# Patient Record
Sex: MALE | Race: WHITE | NOT HISPANIC OR LATINO | Employment: UNEMPLOYED | URBAN - METROPOLITAN AREA
[De-identification: names, ages, dates, MRNs, and addresses within clinical notes are randomized per-mention and may not be internally consistent; named-entity substitution may affect disease eponyms.]

---

## 2017-01-05 ENCOUNTER — COMMUNICATION - HEALTHEAST (OUTPATIENT)
Dept: BEHAVIORAL HEALTH | Facility: CLINIC | Age: 32
End: 2017-01-05

## 2017-01-05 DIAGNOSIS — F90.0 ATTENTION DEFICIT HYPERACTIVITY DISORDER (ADHD), INATTENTIVE TYPE, MODERATE: ICD-10-CM

## 2017-01-09 ENCOUNTER — COMMUNICATION - HEALTHEAST (OUTPATIENT)
Dept: BEHAVIORAL HEALTH | Facility: CLINIC | Age: 32
End: 2017-01-09

## 2017-01-12 ENCOUNTER — COMMUNICATION - HEALTHEAST (OUTPATIENT)
Dept: BEHAVIORAL HEALTH | Facility: CLINIC | Age: 32
End: 2017-01-12

## 2017-01-13 ENCOUNTER — COMMUNICATION - HEALTHEAST (OUTPATIENT)
Dept: BEHAVIORAL HEALTH | Facility: CLINIC | Age: 32
End: 2017-01-13

## 2017-01-13 DIAGNOSIS — F11.21 OPIOID USE DISORDER, SEVERE, IN EARLY REMISSION, ON MAINTENANCE THERAPY, DEPENDENCE (H): ICD-10-CM

## 2017-01-26 ENCOUNTER — OFFICE VISIT - HEALTHEAST (OUTPATIENT)
Dept: BEHAVIORAL HEALTH | Facility: CLINIC | Age: 32
End: 2017-01-26

## 2017-01-26 DIAGNOSIS — F11.21 OPIOID USE DISORDER, SEVERE, IN EARLY REMISSION, ON MAINTENANCE THERAPY, DEPENDENCE (H): ICD-10-CM

## 2017-01-26 ASSESSMENT — MIFFLIN-ST. JEOR: SCORE: 1891.08

## 2017-01-30 ENCOUNTER — COMMUNICATION - HEALTHEAST (OUTPATIENT)
Dept: BEHAVIORAL HEALTH | Facility: CLINIC | Age: 32
End: 2017-01-30

## 2017-01-30 DIAGNOSIS — F11.20 OPIOID USE DISORDER, SEVERE, ON MAINTENANCE THERAPY (H): ICD-10-CM

## 2017-02-01 ENCOUNTER — COMMUNICATION - HEALTHEAST (OUTPATIENT)
Dept: BEHAVIORAL HEALTH | Facility: CLINIC | Age: 32
End: 2017-02-01

## 2017-02-01 DIAGNOSIS — F11.20 OPIOID USE DISORDER, SEVERE, ON MAINTENANCE THERAPY (H): ICD-10-CM

## 2017-02-01 DIAGNOSIS — F90.0 ATTENTION DEFICIT HYPERACTIVITY DISORDER (ADHD), INATTENTIVE TYPE, MODERATE: ICD-10-CM

## 2017-02-07 ENCOUNTER — AMBULATORY - HEALTHEAST (OUTPATIENT)
Dept: BEHAVIORAL HEALTH | Facility: CLINIC | Age: 32
End: 2017-02-07

## 2017-02-07 DIAGNOSIS — F90.0 ATTENTION DEFICIT HYPERACTIVITY DISORDER (ADHD), INATTENTIVE TYPE, MODERATE: ICD-10-CM

## 2017-02-17 ENCOUNTER — OFFICE VISIT - HEALTHEAST (OUTPATIENT)
Dept: BEHAVIORAL HEALTH | Facility: CLINIC | Age: 32
End: 2017-02-17

## 2017-02-17 DIAGNOSIS — F11.21 OPIOID USE DISORDER, SEVERE, IN EARLY REMISSION, ON MAINTENANCE THERAPY, DEPENDENCE (H): ICD-10-CM

## 2017-02-17 ASSESSMENT — MIFFLIN-ST. JEOR: SCORE: 1872.93

## 2017-03-02 ENCOUNTER — COMMUNICATION - HEALTHEAST (OUTPATIENT)
Dept: BEHAVIORAL HEALTH | Facility: CLINIC | Age: 32
End: 2017-03-02

## 2017-03-02 DIAGNOSIS — F90.0 ATTENTION DEFICIT HYPERACTIVITY DISORDER (ADHD), INATTENTIVE TYPE, MODERATE: ICD-10-CM

## 2017-03-02 DIAGNOSIS — F11.21 OPIOID USE DISORDER, SEVERE, IN EARLY REMISSION, ON MAINTENANCE THERAPY, DEPENDENCE (H): ICD-10-CM

## 2017-03-23 ENCOUNTER — COMMUNICATION - HEALTHEAST (OUTPATIENT)
Dept: BEHAVIORAL HEALTH | Facility: CLINIC | Age: 32
End: 2017-03-23

## 2017-03-23 ENCOUNTER — OFFICE VISIT - HEALTHEAST (OUTPATIENT)
Dept: BEHAVIORAL HEALTH | Facility: CLINIC | Age: 32
End: 2017-03-23

## 2017-03-23 DIAGNOSIS — F90.0 ATTENTION DEFICIT HYPERACTIVITY DISORDER (ADHD), INATTENTIVE TYPE, MODERATE: ICD-10-CM

## 2017-03-23 DIAGNOSIS — F11.21 OPIOID USE DISORDER, SEVERE, IN EARLY REMISSION, ON MAINTENANCE THERAPY, DEPENDENCE (H): ICD-10-CM

## 2017-03-23 DIAGNOSIS — F11.20 OPIOID USE DISORDER, SEVERE, ON MAINTENANCE THERAPY (H): ICD-10-CM

## 2017-03-23 DIAGNOSIS — F43.22 ADJUSTMENT DISORDER WITH ANXIETY: ICD-10-CM

## 2017-03-23 ASSESSMENT — MIFFLIN-ST. JEOR: SCORE: 1872.93

## 2017-03-24 ENCOUNTER — RECORDS - HEALTHEAST (OUTPATIENT)
Dept: ADMINISTRATIVE | Facility: OTHER | Age: 32
End: 2017-03-24

## 2017-04-20 ENCOUNTER — OFFICE VISIT - HEALTHEAST (OUTPATIENT)
Dept: BEHAVIORAL HEALTH | Facility: CLINIC | Age: 32
End: 2017-04-20

## 2017-04-20 DIAGNOSIS — F90.0 ATTENTION DEFICIT HYPERACTIVITY DISORDER (ADHD), INATTENTIVE TYPE, MODERATE: ICD-10-CM

## 2017-04-20 DIAGNOSIS — F11.21 OPIOID USE DISORDER, SEVERE, IN EARLY REMISSION, ON MAINTENANCE THERAPY, DEPENDENCE (H): ICD-10-CM

## 2017-04-20 ASSESSMENT — MIFFLIN-ST. JEOR: SCORE: 1900.15

## 2017-05-18 ENCOUNTER — OFFICE VISIT - HEALTHEAST (OUTPATIENT)
Dept: BEHAVIORAL HEALTH | Facility: CLINIC | Age: 32
End: 2017-05-18

## 2017-05-18 DIAGNOSIS — F43.22 ADJUSTMENT DISORDER WITH ANXIETY: ICD-10-CM

## 2017-05-18 DIAGNOSIS — F11.21 OPIOID USE DISORDER, SEVERE, IN EARLY REMISSION, ON MAINTENANCE THERAPY, DEPENDENCE (H): ICD-10-CM

## 2017-05-18 DIAGNOSIS — F90.0 ATTENTION DEFICIT HYPERACTIVITY DISORDER (ADHD), INATTENTIVE TYPE, MODERATE: ICD-10-CM

## 2017-05-18 ASSESSMENT — MIFFLIN-ST. JEOR: SCORE: 1859.32

## 2017-06-16 ENCOUNTER — OFFICE VISIT - HEALTHEAST (OUTPATIENT)
Dept: BEHAVIORAL HEALTH | Facility: CLINIC | Age: 32
End: 2017-06-16

## 2017-06-16 DIAGNOSIS — F11.21 OPIOID USE DISORDER, SEVERE, IN EARLY REMISSION, ON MAINTENANCE THERAPY, DEPENDENCE (H): ICD-10-CM

## 2017-06-16 DIAGNOSIS — F90.0 ATTENTION DEFICIT HYPERACTIVITY DISORDER (ADHD), INATTENTIVE TYPE, MODERATE: ICD-10-CM

## 2017-06-16 ASSESSMENT — MIFFLIN-ST. JEOR: SCORE: 1850.25

## 2017-07-13 ENCOUNTER — OFFICE VISIT - HEALTHEAST (OUTPATIENT)
Dept: BEHAVIORAL HEALTH | Facility: CLINIC | Age: 32
End: 2017-07-13

## 2017-07-13 DIAGNOSIS — F11.21 OPIOID USE DISORDER, SEVERE, IN EARLY REMISSION, ON MAINTENANCE THERAPY, DEPENDENCE (H): ICD-10-CM

## 2017-07-13 DIAGNOSIS — F90.0 ATTENTION DEFICIT HYPERACTIVITY DISORDER (ADHD), INATTENTIVE TYPE, MODERATE: ICD-10-CM

## 2017-07-13 ASSESSMENT — MIFFLIN-ST. JEOR: SCORE: 1832.11

## 2017-07-21 ENCOUNTER — OFFICE VISIT - HEALTHEAST (OUTPATIENT)
Dept: BEHAVIORAL HEALTH | Facility: CLINIC | Age: 32
End: 2017-07-21

## 2017-07-21 DIAGNOSIS — F11.21 OPIOID USE DISORDER, SEVERE, IN EARLY REMISSION, ON MAINTENANCE THERAPY, DEPENDENCE (H): ICD-10-CM

## 2017-07-21 ASSESSMENT — MIFFLIN-ST. JEOR: SCORE: 1841.18

## 2017-08-10 ENCOUNTER — OFFICE VISIT - HEALTHEAST (OUTPATIENT)
Dept: BEHAVIORAL HEALTH | Facility: CLINIC | Age: 32
End: 2017-08-10

## 2017-08-10 DIAGNOSIS — F43.22 ADJUSTMENT DISORDER WITH ANXIETY: ICD-10-CM

## 2017-08-10 DIAGNOSIS — F90.0 ATTENTION DEFICIT HYPERACTIVITY DISORDER (ADHD), INATTENTIVE TYPE, MODERATE: ICD-10-CM

## 2017-08-10 DIAGNOSIS — F11.21 OPIOID USE DISORDER, SEVERE, IN EARLY REMISSION, ON MAINTENANCE THERAPY, DEPENDENCE (H): ICD-10-CM

## 2017-08-10 ASSESSMENT — MIFFLIN-ST. JEOR: SCORE: 1841.18

## 2017-09-07 ENCOUNTER — OFFICE VISIT - HEALTHEAST (OUTPATIENT)
Dept: BEHAVIORAL HEALTH | Facility: CLINIC | Age: 32
End: 2017-09-07

## 2017-09-07 DIAGNOSIS — F11.21 OPIOID USE DISORDER, SEVERE, IN EARLY REMISSION, ON MAINTENANCE THERAPY, DEPENDENCE (H): ICD-10-CM

## 2017-09-07 DIAGNOSIS — F90.0 ATTENTION DEFICIT HYPERACTIVITY DISORDER (ADHD), INATTENTIVE TYPE, MODERATE: ICD-10-CM

## 2017-09-07 ASSESSMENT — MIFFLIN-ST. JEOR: SCORE: 1804.89

## 2017-10-05 ENCOUNTER — OFFICE VISIT - HEALTHEAST (OUTPATIENT)
Dept: BEHAVIORAL HEALTH | Facility: CLINIC | Age: 32
End: 2017-10-05

## 2017-10-05 DIAGNOSIS — F90.0 ATTENTION DEFICIT HYPERACTIVITY DISORDER (ADHD), INATTENTIVE TYPE, MODERATE: ICD-10-CM

## 2017-10-05 DIAGNOSIS — F11.21 OPIOID USE DISORDER, SEVERE, IN EARLY REMISSION, ON MAINTENANCE THERAPY, DEPENDENCE (H): ICD-10-CM

## 2017-10-05 ASSESSMENT — MIFFLIN-ST. JEOR: SCORE: 1795.82

## 2017-10-06 ENCOUNTER — COMMUNICATION - HEALTHEAST (OUTPATIENT)
Dept: BEHAVIORAL HEALTH | Facility: CLINIC | Age: 32
End: 2017-10-06

## 2017-10-06 DIAGNOSIS — F90.0 ATTENTION DEFICIT HYPERACTIVITY DISORDER (ADHD), INATTENTIVE TYPE, MODERATE: ICD-10-CM

## 2017-10-19 ENCOUNTER — OFFICE VISIT - HEALTHEAST (OUTPATIENT)
Dept: BEHAVIORAL HEALTH | Facility: CLINIC | Age: 32
End: 2017-10-19

## 2017-10-19 ENCOUNTER — COMMUNICATION - HEALTHEAST (OUTPATIENT)
Dept: BEHAVIORAL HEALTH | Facility: CLINIC | Age: 32
End: 2017-10-19

## 2017-10-19 DIAGNOSIS — F90.0 ATTENTION DEFICIT HYPERACTIVITY DISORDER (ADHD), INATTENTIVE TYPE, MODERATE: ICD-10-CM

## 2017-10-20 ENCOUNTER — COMMUNICATION - HEALTHEAST (OUTPATIENT)
Dept: BEHAVIORAL HEALTH | Facility: CLINIC | Age: 32
End: 2017-10-20

## 2017-11-02 ENCOUNTER — OFFICE VISIT - HEALTHEAST (OUTPATIENT)
Dept: BEHAVIORAL HEALTH | Facility: CLINIC | Age: 32
End: 2017-11-02

## 2017-11-02 DIAGNOSIS — F11.21 OPIOID USE DISORDER, SEVERE, IN EARLY REMISSION, ON MAINTENANCE THERAPY, DEPENDENCE (H): ICD-10-CM

## 2017-11-02 DIAGNOSIS — F43.22 ADJUSTMENT DISORDER WITH ANXIETY: ICD-10-CM

## 2017-11-02 DIAGNOSIS — F90.0 ATTENTION DEFICIT HYPERACTIVITY DISORDER (ADHD), INATTENTIVE TYPE, MODERATE: ICD-10-CM

## 2017-11-02 ASSESSMENT — MIFFLIN-ST. JEOR: SCORE: 1804.89

## 2017-11-03 ENCOUNTER — COMMUNICATION - HEALTHEAST (OUTPATIENT)
Dept: BEHAVIORAL HEALTH | Facility: CLINIC | Age: 32
End: 2017-11-03

## 2017-12-01 ENCOUNTER — OFFICE VISIT - HEALTHEAST (OUTPATIENT)
Dept: BEHAVIORAL HEALTH | Facility: CLINIC | Age: 32
End: 2017-12-01

## 2017-12-01 ENCOUNTER — AMBULATORY - HEALTHEAST (OUTPATIENT)
Dept: BEHAVIORAL HEALTH | Facility: CLINIC | Age: 32
End: 2017-12-01

## 2017-12-01 DIAGNOSIS — F90.0 ATTENTION DEFICIT HYPERACTIVITY DISORDER (ADHD), INATTENTIVE TYPE, MODERATE: ICD-10-CM

## 2017-12-01 DIAGNOSIS — F11.21 OPIOID USE DISORDER, SEVERE, IN EARLY REMISSION, ON MAINTENANCE THERAPY, DEPENDENCE (H): ICD-10-CM

## 2017-12-01 ASSESSMENT — MIFFLIN-ST. JEOR: SCORE: 1791.28

## 2017-12-28 ENCOUNTER — OFFICE VISIT - HEALTHEAST (OUTPATIENT)
Dept: BEHAVIORAL HEALTH | Facility: CLINIC | Age: 32
End: 2017-12-28

## 2017-12-28 DIAGNOSIS — F11.21 OPIOID USE DISORDER, SEVERE, IN EARLY REMISSION, ON MAINTENANCE THERAPY, DEPENDENCE (H): ICD-10-CM

## 2017-12-28 DIAGNOSIS — F90.0 ATTENTION DEFICIT HYPERACTIVITY DISORDER (ADHD), INATTENTIVE TYPE, MODERATE: ICD-10-CM

## 2017-12-28 DIAGNOSIS — F43.22 ADJUSTMENT DISORDER WITH ANXIETY: ICD-10-CM

## 2017-12-28 ASSESSMENT — MIFFLIN-ST. JEOR: SCORE: 1786.75

## 2018-01-25 ENCOUNTER — OFFICE VISIT - HEALTHEAST (OUTPATIENT)
Dept: BEHAVIORAL HEALTH | Facility: CLINIC | Age: 33
End: 2018-01-25

## 2018-01-25 DIAGNOSIS — F11.21 OPIOID USE DISORDER, SEVERE, IN EARLY REMISSION, ON MAINTENANCE THERAPY, DEPENDENCE (H): ICD-10-CM

## 2018-01-25 DIAGNOSIS — F11.20 OPIOID USE DISORDER, SEVERE, ON MAINTENANCE THERAPY (H): ICD-10-CM

## 2018-01-25 DIAGNOSIS — F90.0 ATTENTION DEFICIT HYPERACTIVITY DISORDER (ADHD), INATTENTIVE TYPE, MODERATE: ICD-10-CM

## 2018-01-25 LAB
AMPHETAMINES UR QL SCN: ABNORMAL
BARBITURATES UR QL: ABNORMAL
BENZODIAZ UR QL: ABNORMAL
CANNABINOIDS UR QL SCN: ABNORMAL
COCAINE UR QL: ABNORMAL
CREAT UR-MCNC: 203.1 MG/DL
METHADONE UR QL SCN: ABNORMAL
OPIATES UR QL SCN: ABNORMAL
OXYCODONE UR QL: ABNORMAL
PCP UR QL SCN: ABNORMAL

## 2018-01-25 ASSESSMENT — MIFFLIN-ST. JEOR: SCORE: 1764.07

## 2018-02-22 ENCOUNTER — OFFICE VISIT - HEALTHEAST (OUTPATIENT)
Dept: BEHAVIORAL HEALTH | Facility: CLINIC | Age: 33
End: 2018-02-22

## 2018-02-22 DIAGNOSIS — F11.21 OPIOID USE DISORDER, SEVERE, IN EARLY REMISSION, ON MAINTENANCE THERAPY, DEPENDENCE (H): ICD-10-CM

## 2018-02-22 DIAGNOSIS — F90.0 ATTENTION DEFICIT HYPERACTIVITY DISORDER (ADHD), INATTENTIVE TYPE, MODERATE: ICD-10-CM

## 2018-02-22 LAB
AMPHETAMINES UR QL SCN: ABNORMAL
BARBITURATES UR QL: ABNORMAL
BENZODIAZ UR QL: ABNORMAL
BUPRENORPHINE QUAL URINE LHE: ABNORMAL
CANNABINOIDS UR QL SCN: ABNORMAL
COCAINE UR QL: ABNORMAL
CREAT UR-MCNC: 106.4 MG/DL
CREAT UR-MCNC: 107 MG/DL
METHADONE UR QL SCN: ABNORMAL
OPIATES UR QL SCN: ABNORMAL
OXYCODONE UR QL: ABNORMAL
PCP UR QL SCN: ABNORMAL

## 2018-02-22 ASSESSMENT — MIFFLIN-ST. JEOR: SCORE: 1800.36

## 2018-03-12 ENCOUNTER — COMMUNICATION - HEALTHEAST (OUTPATIENT)
Dept: BEHAVIORAL HEALTH | Facility: CLINIC | Age: 33
End: 2018-03-12

## 2018-03-12 DIAGNOSIS — F11.20 OPIOID USE DISORDER, SEVERE, ON MAINTENANCE THERAPY (H): ICD-10-CM

## 2018-03-22 ENCOUNTER — OFFICE VISIT - HEALTHEAST (OUTPATIENT)
Dept: BEHAVIORAL HEALTH | Facility: CLINIC | Age: 33
End: 2018-03-22

## 2018-03-22 DIAGNOSIS — F90.0 ATTENTION DEFICIT HYPERACTIVITY DISORDER (ADHD), INATTENTIVE TYPE, MODERATE: ICD-10-CM

## 2018-03-22 DIAGNOSIS — F11.21 OPIOID USE DISORDER, SEVERE, IN EARLY REMISSION, ON MAINTENANCE THERAPY, DEPENDENCE (H): ICD-10-CM

## 2018-03-22 DIAGNOSIS — F43.22 ADJUSTMENT DISORDER WITH ANXIETY: ICD-10-CM

## 2018-03-22 LAB
AMPHETAMINES UR QL SCN: ABNORMAL
BARBITURATES UR QL: ABNORMAL
BENZODIAZ UR QL: ABNORMAL
CANNABINOIDS UR QL SCN: ABNORMAL
COCAINE UR QL: ABNORMAL
CREAT UR-MCNC: 134.3 MG/DL
METHADONE UR QL SCN: ABNORMAL
OPIATES UR QL SCN: ABNORMAL
OXYCODONE UR QL: ABNORMAL
PCP UR QL SCN: ABNORMAL

## 2018-03-22 ASSESSMENT — MIFFLIN-ST. JEOR: SCORE: 1791.28

## 2018-04-19 ENCOUNTER — OFFICE VISIT - HEALTHEAST (OUTPATIENT)
Dept: BEHAVIORAL HEALTH | Facility: CLINIC | Age: 33
End: 2018-04-19

## 2018-04-19 DIAGNOSIS — F90.0 ATTENTION DEFICIT HYPERACTIVITY DISORDER (ADHD), INATTENTIVE TYPE, MODERATE: ICD-10-CM

## 2018-04-19 DIAGNOSIS — F11.21 OPIOID USE DISORDER, SEVERE, IN EARLY REMISSION, ON MAINTENANCE THERAPY, DEPENDENCE (H): ICD-10-CM

## 2018-04-19 LAB
AMPHETAMINES UR QL SCN: ABNORMAL
BARBITURATES UR QL: ABNORMAL
BENZODIAZ UR QL: ABNORMAL
CANNABINOIDS UR QL SCN: ABNORMAL
COCAINE UR QL: ABNORMAL
CREAT UR-MCNC: 251.6 MG/DL
METHADONE UR QL SCN: ABNORMAL
OPIATES UR QL SCN: ABNORMAL
OXYCODONE UR QL: ABNORMAL
PCP UR QL SCN: ABNORMAL

## 2018-04-19 ASSESSMENT — MIFFLIN-ST. JEOR: SCORE: 1795.82

## 2018-05-02 ENCOUNTER — COMMUNICATION - HEALTHEAST (OUTPATIENT)
Dept: BEHAVIORAL HEALTH | Facility: CLINIC | Age: 33
End: 2018-05-02

## 2018-05-17 ENCOUNTER — COMMUNICATION - HEALTHEAST (OUTPATIENT)
Dept: BEHAVIORAL HEALTH | Facility: CLINIC | Age: 33
End: 2018-05-17

## 2018-05-17 DIAGNOSIS — F90.0 ATTENTION DEFICIT HYPERACTIVITY DISORDER (ADHD), INATTENTIVE TYPE, MODERATE: ICD-10-CM

## 2018-05-17 DIAGNOSIS — F11.21 OPIOID USE DISORDER, SEVERE, IN EARLY REMISSION, ON MAINTENANCE THERAPY, DEPENDENCE (H): ICD-10-CM

## 2018-05-17 DIAGNOSIS — F11.20 OPIOID USE DISORDER, SEVERE, ON MAINTENANCE THERAPY (H): ICD-10-CM

## 2018-05-23 ENCOUNTER — OFFICE VISIT - HEALTHEAST (OUTPATIENT)
Dept: BEHAVIORAL HEALTH | Facility: CLINIC | Age: 33
End: 2018-05-23

## 2018-05-23 DIAGNOSIS — F11.21 OPIOID USE DISORDER, SEVERE, IN EARLY REMISSION, ON MAINTENANCE THERAPY, DEPENDENCE (H): ICD-10-CM

## 2018-05-23 LAB
AMPHETAMINES UR QL SCN: ABNORMAL
BARBITURATES UR QL: ABNORMAL
BENZODIAZ UR QL: ABNORMAL
BUPRENORPHINE QUAL URINE LHE: ABNORMAL
CANNABINOIDS UR QL SCN: ABNORMAL
COCAINE UR QL: ABNORMAL
CREAT UR-MCNC: 368.6 MG/DL
CREAT UR-MCNC: 369.9 MG/DL
METHADONE UR QL SCN: ABNORMAL
OPIATES UR QL SCN: ABNORMAL
OXYCODONE UR QL: ABNORMAL
PCP UR QL SCN: ABNORMAL

## 2018-05-23 ASSESSMENT — MIFFLIN-ST. JEOR: SCORE: 1773.14

## 2018-06-13 ENCOUNTER — COMMUNICATION - HEALTHEAST (OUTPATIENT)
Dept: BEHAVIORAL HEALTH | Facility: CLINIC | Age: 33
End: 2018-06-13

## 2018-06-13 DIAGNOSIS — F90.0 ATTENTION DEFICIT HYPERACTIVITY DISORDER (ADHD), INATTENTIVE TYPE, MODERATE: ICD-10-CM

## 2018-06-13 DIAGNOSIS — F11.21 OPIOID USE DISORDER, SEVERE, IN EARLY REMISSION, ON MAINTENANCE THERAPY, DEPENDENCE (H): ICD-10-CM

## 2018-06-22 ENCOUNTER — OFFICE VISIT - HEALTHEAST (OUTPATIENT)
Dept: BEHAVIORAL HEALTH | Facility: CLINIC | Age: 33
End: 2018-06-22

## 2018-06-22 DIAGNOSIS — F11.21 OPIOID USE DISORDER, SEVERE, IN EARLY REMISSION, ON MAINTENANCE THERAPY, DEPENDENCE (H): ICD-10-CM

## 2018-06-22 DIAGNOSIS — F90.0 ATTENTION DEFICIT HYPERACTIVITY DISORDER (ADHD), INATTENTIVE TYPE, MODERATE: ICD-10-CM

## 2018-06-27 ENCOUNTER — COMMUNICATION - HEALTHEAST (OUTPATIENT)
Dept: BEHAVIORAL HEALTH | Facility: CLINIC | Age: 33
End: 2018-06-27

## 2018-06-27 DIAGNOSIS — F11.21 OPIOID USE DISORDER, SEVERE, IN EARLY REMISSION, ON MAINTENANCE THERAPY, DEPENDENCE (H): ICD-10-CM

## 2018-06-27 DIAGNOSIS — F90.0 ATTENTION DEFICIT HYPERACTIVITY DISORDER (ADHD), INATTENTIVE TYPE, MODERATE: ICD-10-CM

## 2018-06-28 ENCOUNTER — COMMUNICATION - HEALTHEAST (OUTPATIENT)
Dept: BEHAVIORAL HEALTH | Facility: CLINIC | Age: 33
End: 2018-06-28

## 2018-06-28 DIAGNOSIS — F11.21 OPIOID USE DISORDER, SEVERE, IN EARLY REMISSION, ON MAINTENANCE THERAPY, DEPENDENCE (H): ICD-10-CM

## 2018-07-26 ENCOUNTER — AMBULATORY - HEALTHEAST (OUTPATIENT)
Dept: BEHAVIORAL HEALTH | Facility: CLINIC | Age: 33
End: 2018-07-26

## 2018-07-26 DIAGNOSIS — F11.21 OPIOID USE DISORDER, SEVERE, IN EARLY REMISSION, ON MAINTENANCE THERAPY, DEPENDENCE (H): ICD-10-CM

## 2018-07-26 DIAGNOSIS — F90.0 ATTENTION DEFICIT HYPERACTIVITY DISORDER (ADHD), INATTENTIVE TYPE, MODERATE: ICD-10-CM

## 2018-07-26 LAB
AMPHETAMINES UR QL SCN: ABNORMAL
BARBITURATES UR QL: ABNORMAL
BENZODIAZ UR QL: ABNORMAL
CANNABINOIDS UR QL SCN: ABNORMAL
COCAINE UR QL: ABNORMAL
CREAT UR-MCNC: 156.9 MG/DL
METHADONE UR QL SCN: ABNORMAL
OPIATES UR QL SCN: ABNORMAL
OXYCODONE UR QL: ABNORMAL
PCP UR QL SCN: ABNORMAL

## 2018-07-26 ASSESSMENT — MIFFLIN-ST. JEOR: SCORE: 1818.86

## 2018-08-23 ENCOUNTER — COMMUNICATION - HEALTHEAST (OUTPATIENT)
Dept: BEHAVIORAL HEALTH | Facility: CLINIC | Age: 33
End: 2018-08-23

## 2018-08-23 ENCOUNTER — AMBULATORY - HEALTHEAST (OUTPATIENT)
Dept: BEHAVIORAL HEALTH | Facility: CLINIC | Age: 33
End: 2018-08-23

## 2018-08-23 DIAGNOSIS — F11.21 OPIOID USE DISORDER, SEVERE, IN EARLY REMISSION, ON MAINTENANCE THERAPY, DEPENDENCE (H): ICD-10-CM

## 2018-08-23 DIAGNOSIS — F90.0 ATTENTION DEFICIT HYPERACTIVITY DISORDER (ADHD), INATTENTIVE TYPE, MODERATE: ICD-10-CM

## 2018-08-23 LAB
AMPHETAMINES UR QL SCN: ABNORMAL
BARBITURATES UR QL: ABNORMAL
BENZODIAZ UR QL: ABNORMAL
CANNABINOIDS UR QL SCN: ABNORMAL
COCAINE UR QL: ABNORMAL
CREAT UR-MCNC: 95 MG/DL
METHADONE UR QL SCN: ABNORMAL
OPIATES UR QL SCN: ABNORMAL
OXYCODONE UR QL: ABNORMAL
PCP UR QL SCN: ABNORMAL

## 2018-08-23 ASSESSMENT — MIFFLIN-ST. JEOR: SCORE: 1800.36

## 2018-09-20 ENCOUNTER — OFFICE VISIT - HEALTHEAST (OUTPATIENT)
Dept: BEHAVIORAL HEALTH | Facility: CLINIC | Age: 33
End: 2018-09-20

## 2018-09-20 DIAGNOSIS — F11.21 OPIOID USE DISORDER, SEVERE, IN EARLY REMISSION, ON MAINTENANCE THERAPY, DEPENDENCE (H): ICD-10-CM

## 2018-09-20 DIAGNOSIS — F11.20 OPIOID USE DISORDER, SEVERE, ON MAINTENANCE THERAPY (H): ICD-10-CM

## 2018-09-20 DIAGNOSIS — F90.0 ATTENTION DEFICIT HYPERACTIVITY DISORDER (ADHD), INATTENTIVE TYPE, MODERATE: ICD-10-CM

## 2018-09-20 LAB
AMPHETAMINES UR QL SCN: ABNORMAL
BARBITURATES UR QL: ABNORMAL
BENZODIAZ UR QL: ABNORMAL
BUPRENORPHINE QUAL URINE LHE: ABNORMAL
CANNABINOIDS UR QL SCN: ABNORMAL
COCAINE UR QL: ABNORMAL
CREAT UR-MCNC: 112.2 MG/DL
CREAT UR-MCNC: 113.6 MG/DL
METHADONE UR QL SCN: ABNORMAL
OPIATES UR QL SCN: ABNORMAL
OXYCODONE UR QL: ABNORMAL
PCP UR QL SCN: ABNORMAL

## 2018-09-20 ASSESSMENT — MIFFLIN-ST. JEOR: SCORE: 1777.68

## 2018-10-06 ENCOUNTER — COMMUNICATION - HEALTHEAST (OUTPATIENT)
Dept: BEHAVIORAL HEALTH | Facility: CLINIC | Age: 33
End: 2018-10-06

## 2018-10-06 DIAGNOSIS — F11.21 OPIOID USE DISORDER, SEVERE, IN EARLY REMISSION, ON MAINTENANCE THERAPY, DEPENDENCE (H): ICD-10-CM

## 2018-10-18 ENCOUNTER — OFFICE VISIT - HEALTHEAST (OUTPATIENT)
Dept: BEHAVIORAL HEALTH | Facility: CLINIC | Age: 33
End: 2018-10-18

## 2018-10-18 DIAGNOSIS — F90.0 ATTENTION DEFICIT HYPERACTIVITY DISORDER (ADHD), INATTENTIVE TYPE, MODERATE: ICD-10-CM

## 2018-10-18 DIAGNOSIS — F11.21 OPIOID USE DISORDER, SEVERE, IN EARLY REMISSION, ON MAINTENANCE THERAPY, DEPENDENCE (H): ICD-10-CM

## 2018-10-18 LAB
AMPHETAMINES UR QL SCN: ABNORMAL
BARBITURATES UR QL: ABNORMAL
BENZODIAZ UR QL: ABNORMAL
CANNABINOIDS UR QL SCN: ABNORMAL
COCAINE UR QL: ABNORMAL
CREAT UR-MCNC: 11.3 MG/DL
METHADONE UR QL SCN: ABNORMAL
OPIATES UR QL SCN: ABNORMAL
OXYCODONE UR QL: ABNORMAL
PCP UR QL SCN: ABNORMAL

## 2018-10-18 ASSESSMENT — MIFFLIN-ST. JEOR: SCORE: 1777.68

## 2018-11-14 ENCOUNTER — COMMUNICATION - HEALTHEAST (OUTPATIENT)
Dept: BEHAVIORAL HEALTH | Facility: CLINIC | Age: 33
End: 2018-11-14

## 2018-11-14 DIAGNOSIS — F11.21 OPIOID USE DISORDER, SEVERE, IN EARLY REMISSION, ON MAINTENANCE THERAPY, DEPENDENCE (H): ICD-10-CM

## 2018-11-14 DIAGNOSIS — F90.0 ATTENTION DEFICIT HYPERACTIVITY DISORDER (ADHD), INATTENTIVE TYPE, MODERATE: ICD-10-CM

## 2018-11-15 ENCOUNTER — COMMUNICATION - HEALTHEAST (OUTPATIENT)
Dept: BEHAVIORAL HEALTH | Facility: CLINIC | Age: 33
End: 2018-11-15

## 2018-12-13 ENCOUNTER — OFFICE VISIT - HEALTHEAST (OUTPATIENT)
Dept: BEHAVIORAL HEALTH | Facility: CLINIC | Age: 33
End: 2018-12-13

## 2018-12-13 DIAGNOSIS — F11.21 OPIOID USE DISORDER, SEVERE, IN EARLY REMISSION, ON MAINTENANCE THERAPY, DEPENDENCE (H): ICD-10-CM

## 2018-12-13 DIAGNOSIS — F90.0 ATTENTION DEFICIT HYPERACTIVITY DISORDER (ADHD), INATTENTIVE TYPE, MODERATE: ICD-10-CM

## 2018-12-13 LAB
AMPHETAMINES UR QL SCN: ABNORMAL
BARBITURATES UR QL: ABNORMAL
BENZODIAZ UR QL: ABNORMAL
BUPRENORPHINE QUAL URINE LHE: ABNORMAL
CANNABINOIDS UR QL SCN: ABNORMAL
COCAINE UR QL: ABNORMAL
CREAT UR-MCNC: 33.5 MG/DL
CREAT UR-MCNC: 33.7 MG/DL
METHADONE UR QL SCN: ABNORMAL
OPIATES UR QL SCN: ABNORMAL
OXYCODONE UR QL: ABNORMAL
PCP UR QL SCN: ABNORMAL

## 2018-12-13 ASSESSMENT — MIFFLIN-ST. JEOR: SCORE: 1768.6

## 2019-01-09 ENCOUNTER — COMMUNICATION - HEALTHEAST (OUTPATIENT)
Dept: BEHAVIORAL HEALTH | Facility: CLINIC | Age: 34
End: 2019-01-09

## 2019-01-09 DIAGNOSIS — F11.21 OPIOID USE DISORDER, SEVERE, IN EARLY REMISSION, ON MAINTENANCE THERAPY, DEPENDENCE (H): ICD-10-CM

## 2019-01-09 DIAGNOSIS — F90.0 ATTENTION DEFICIT HYPERACTIVITY DISORDER (ADHD), INATTENTIVE TYPE, MODERATE: ICD-10-CM

## 2019-02-06 ENCOUNTER — COMMUNICATION - HEALTHEAST (OUTPATIENT)
Dept: BEHAVIORAL HEALTH | Facility: CLINIC | Age: 34
End: 2019-02-06

## 2019-02-06 DIAGNOSIS — F90.0 ATTENTION DEFICIT HYPERACTIVITY DISORDER (ADHD), INATTENTIVE TYPE, MODERATE: ICD-10-CM

## 2019-02-06 DIAGNOSIS — F11.21 OPIOID USE DISORDER, SEVERE, IN EARLY REMISSION, ON MAINTENANCE THERAPY, DEPENDENCE (H): ICD-10-CM

## 2019-02-14 ENCOUNTER — OFFICE VISIT - HEALTHEAST (OUTPATIENT)
Dept: BEHAVIORAL HEALTH | Facility: CLINIC | Age: 34
End: 2019-02-14

## 2019-02-14 DIAGNOSIS — F11.21 OPIOID USE DISORDER, SEVERE, IN EARLY REMISSION, ON MAINTENANCE THERAPY, DEPENDENCE (H): ICD-10-CM

## 2019-02-14 DIAGNOSIS — F33.1 MAJOR DEPRESSIVE DISORDER, RECURRENT, MODERATE (H): ICD-10-CM

## 2019-02-14 DIAGNOSIS — F90.0 ATTENTION DEFICIT HYPERACTIVITY DISORDER (ADHD), INATTENTIVE TYPE, MODERATE: ICD-10-CM

## 2019-02-14 DIAGNOSIS — F13.10 SEDATIVE, HYPNOTIC OR ANXIOLYTIC USE DISORDER, MILD, ABUSE (H): ICD-10-CM

## 2019-02-14 LAB
AMPHETAMINES UR QL SCN: ABNORMAL
BARBITURATES UR QL: ABNORMAL
BENZODIAZ UR QL: ABNORMAL
CANNABINOIDS UR QL SCN: ABNORMAL
COCAINE UR QL: ABNORMAL
CREAT UR-MCNC: 36.6 MG/DL
METHADONE UR QL SCN: ABNORMAL
OPIATES UR QL SCN: ABNORMAL
OXYCODONE UR QL: ABNORMAL
PCP UR QL SCN: ABNORMAL

## 2019-02-14 ASSESSMENT — MIFFLIN-ST. JEOR: SCORE: 1809.43

## 2019-02-17 LAB
ETHYL GLUCURONIDE UR CFM-MCNC: <100 NG/ML
ETHYL SULFATE UR CFM-MCNC: <100 NG/ML

## 2019-03-04 ENCOUNTER — COMMUNICATION - HEALTHEAST (OUTPATIENT)
Dept: BEHAVIORAL HEALTH | Facility: CLINIC | Age: 34
End: 2019-03-04

## 2019-03-04 DIAGNOSIS — F11.21 OPIOID USE DISORDER, SEVERE, IN EARLY REMISSION, ON MAINTENANCE THERAPY, DEPENDENCE (H): ICD-10-CM

## 2019-03-06 ENCOUNTER — COMMUNICATION - HEALTHEAST (OUTPATIENT)
Dept: BEHAVIORAL HEALTH | Facility: CLINIC | Age: 34
End: 2019-03-06

## 2019-03-06 DIAGNOSIS — F11.21 OPIOID USE DISORDER, SEVERE, IN EARLY REMISSION, ON MAINTENANCE THERAPY, DEPENDENCE (H): ICD-10-CM

## 2019-03-06 DIAGNOSIS — F90.0 ATTENTION DEFICIT HYPERACTIVITY DISORDER (ADHD), INATTENTIVE TYPE, MODERATE: ICD-10-CM

## 2019-04-02 ENCOUNTER — OFFICE VISIT - HEALTHEAST (OUTPATIENT)
Dept: BEHAVIORAL HEALTH | Facility: CLINIC | Age: 34
End: 2019-04-02

## 2019-04-02 ENCOUNTER — COMMUNICATION - HEALTHEAST (OUTPATIENT)
Dept: BEHAVIORAL HEALTH | Facility: CLINIC | Age: 34
End: 2019-04-02

## 2019-04-02 DIAGNOSIS — F90.0 ATTENTION DEFICIT HYPERACTIVITY DISORDER (ADHD), INATTENTIVE TYPE, MODERATE: ICD-10-CM

## 2019-04-02 DIAGNOSIS — F11.21 OPIOID USE DISORDER, SEVERE, IN EARLY REMISSION, ON MAINTENANCE THERAPY, DEPENDENCE (H): ICD-10-CM

## 2019-04-02 DIAGNOSIS — F33.1 MAJOR DEPRESSIVE DISORDER, RECURRENT, MODERATE (H): ICD-10-CM

## 2019-04-05 ENCOUNTER — COMMUNICATION - HEALTHEAST (OUTPATIENT)
Dept: BEHAVIORAL HEALTH | Facility: CLINIC | Age: 34
End: 2019-04-05

## 2019-04-05 DIAGNOSIS — F11.21 OPIOID USE DISORDER, SEVERE, IN EARLY REMISSION, ON MAINTENANCE THERAPY, DEPENDENCE (H): ICD-10-CM

## 2019-04-10 ENCOUNTER — COMMUNICATION - HEALTHEAST (OUTPATIENT)
Dept: BEHAVIORAL HEALTH | Facility: CLINIC | Age: 34
End: 2019-04-10

## 2019-04-10 DIAGNOSIS — F11.21 OPIOID USE DISORDER, SEVERE, IN EARLY REMISSION, ON MAINTENANCE THERAPY, DEPENDENCE (H): ICD-10-CM

## 2019-04-15 ENCOUNTER — AMBULATORY - HEALTHEAST (OUTPATIENT)
Dept: BEHAVIORAL HEALTH | Facility: CLINIC | Age: 34
End: 2019-04-15

## 2019-04-15 ENCOUNTER — COMMUNICATION - HEALTHEAST (OUTPATIENT)
Dept: BEHAVIORAL HEALTH | Facility: CLINIC | Age: 34
End: 2019-04-15

## 2019-04-15 DIAGNOSIS — F11.21 OPIOID USE DISORDER, SEVERE, IN EARLY REMISSION, ON MAINTENANCE THERAPY, DEPENDENCE (H): ICD-10-CM

## 2019-04-18 ENCOUNTER — OFFICE VISIT - HEALTHEAST (OUTPATIENT)
Dept: BEHAVIORAL HEALTH | Facility: CLINIC | Age: 34
End: 2019-04-18

## 2019-04-18 DIAGNOSIS — F40.10 SOCIAL ANXIETY DISORDER: ICD-10-CM

## 2019-04-18 DIAGNOSIS — F90.0 ATTENTION DEFICIT HYPERACTIVITY DISORDER (ADHD), INATTENTIVE TYPE, MODERATE: ICD-10-CM

## 2019-04-18 DIAGNOSIS — F11.21 OPIOID USE DISORDER, SEVERE, IN EARLY REMISSION, ON MAINTENANCE THERAPY, DEPENDENCE (H): ICD-10-CM

## 2019-04-18 LAB
AMPHETAMINES UR QL SCN: ABNORMAL
BARBITURATES UR QL: ABNORMAL
BENZODIAZ UR QL: ABNORMAL
BUPRENORPHINE QUAL URINE LHE: ABNORMAL
CANNABINOIDS UR QL SCN: ABNORMAL
COCAINE UR QL: ABNORMAL
CREAT UR-MCNC: 207.3 MG/DL
CREAT UR-MCNC: 214.1 MG/DL
METHADONE UR QL SCN: ABNORMAL
OPIATES UR QL SCN: ABNORMAL
OXYCODONE UR QL: ABNORMAL
PCP UR QL SCN: ABNORMAL

## 2019-04-18 ASSESSMENT — MIFFLIN-ST. JEOR: SCORE: 1764.07

## 2019-05-02 ENCOUNTER — OFFICE VISIT - HEALTHEAST (OUTPATIENT)
Dept: BEHAVIORAL HEALTH | Facility: CLINIC | Age: 34
End: 2019-05-02

## 2019-05-02 DIAGNOSIS — F11.21 OPIOID USE DISORDER, SEVERE, IN EARLY REMISSION, ON MAINTENANCE THERAPY, DEPENDENCE (H): ICD-10-CM

## 2019-05-02 LAB
AMPHETAMINES UR QL SCN: ABNORMAL
BARBITURATES UR QL: ABNORMAL
BENZODIAZ UR QL: ABNORMAL
CANNABINOIDS UR QL SCN: ABNORMAL
COCAINE UR QL: ABNORMAL
CREAT UR-MCNC: 231.4 MG/DL
METHADONE UR QL SCN: ABNORMAL
OPIATES UR QL SCN: ABNORMAL
OXYCODONE UR QL: ABNORMAL
PCP UR QL SCN: ABNORMAL

## 2019-05-02 ASSESSMENT — MIFFLIN-ST. JEOR: SCORE: 1759.53

## 2019-05-14 ENCOUNTER — OFFICE VISIT - HEALTHEAST (OUTPATIENT)
Dept: BEHAVIORAL HEALTH | Facility: CLINIC | Age: 34
End: 2019-05-14

## 2019-05-14 DIAGNOSIS — F90.0 ATTENTION DEFICIT HYPERACTIVITY DISORDER (ADHD), INATTENTIVE TYPE, MODERATE: ICD-10-CM

## 2019-05-14 DIAGNOSIS — F40.10 SOCIAL ANXIETY DISORDER: ICD-10-CM

## 2019-05-14 DIAGNOSIS — F11.21 OPIOID USE DISORDER, SEVERE, IN EARLY REMISSION, ON MAINTENANCE THERAPY, DEPENDENCE (H): ICD-10-CM

## 2019-05-14 LAB
AMPHETAMINES UR QL SCN: ABNORMAL
BARBITURATES UR QL: ABNORMAL
BENZODIAZ UR QL: ABNORMAL
CANNABINOIDS UR QL SCN: ABNORMAL
COCAINE UR QL: ABNORMAL
CREAT UR-MCNC: 304.5 MG/DL
METHADONE UR QL SCN: ABNORMAL
OPIATES UR QL SCN: ABNORMAL
OXYCODONE UR QL: ABNORMAL
PCP UR QL SCN: ABNORMAL

## 2019-05-14 ASSESSMENT — MIFFLIN-ST. JEOR: SCORE: 1750.46

## 2019-05-28 ENCOUNTER — COMMUNICATION - HEALTHEAST (OUTPATIENT)
Dept: BEHAVIORAL HEALTH | Facility: CLINIC | Age: 34
End: 2019-05-28

## 2019-05-28 DIAGNOSIS — F40.10 SOCIAL ANXIETY DISORDER: ICD-10-CM

## 2019-06-06 ENCOUNTER — OFFICE VISIT - HEALTHEAST (OUTPATIENT)
Dept: BEHAVIORAL HEALTH | Facility: CLINIC | Age: 34
End: 2019-06-06

## 2019-06-06 DIAGNOSIS — F32.1 CURRENT MODERATE EPISODE OF MAJOR DEPRESSIVE DISORDER, UNSPECIFIED WHETHER RECURRENT (H): ICD-10-CM

## 2019-06-06 DIAGNOSIS — F11.21 OPIOID USE DISORDER, SEVERE, IN EARLY REMISSION, ON MAINTENANCE THERAPY, DEPENDENCE (H): ICD-10-CM

## 2019-06-06 LAB
AMPHETAMINES UR QL SCN: ABNORMAL
BARBITURATES UR QL: ABNORMAL
BENZODIAZ UR QL: ABNORMAL
CANNABINOIDS UR QL SCN: ABNORMAL
COCAINE UR QL: ABNORMAL
CREAT UR-MCNC: 268.8 MG/DL
METHADONE UR QL SCN: ABNORMAL
OPIATES UR QL SCN: ABNORMAL
OXYCODONE UR QL: ABNORMAL
PCP UR QL SCN: ABNORMAL

## 2019-06-06 ASSESSMENT — MIFFLIN-ST. JEOR: SCORE: 1773.14

## 2019-06-24 ENCOUNTER — COMMUNICATION - HEALTHEAST (OUTPATIENT)
Dept: BEHAVIORAL HEALTH | Facility: CLINIC | Age: 34
End: 2019-06-24

## 2019-06-24 DIAGNOSIS — F90.0 ATTENTION DEFICIT HYPERACTIVITY DISORDER (ADHD), INATTENTIVE TYPE, MODERATE: ICD-10-CM

## 2019-06-24 DIAGNOSIS — F11.21 OPIOID USE DISORDER, SEVERE, IN EARLY REMISSION, ON MAINTENANCE THERAPY, DEPENDENCE (H): ICD-10-CM

## 2019-06-28 ENCOUNTER — COMMUNICATION - HEALTHEAST (OUTPATIENT)
Dept: BEHAVIORAL HEALTH | Facility: CLINIC | Age: 34
End: 2019-06-28

## 2019-06-28 DIAGNOSIS — F40.10 SOCIAL ANXIETY DISORDER: ICD-10-CM

## 2019-07-03 ENCOUNTER — COMMUNICATION - HEALTHEAST (OUTPATIENT)
Dept: BEHAVIORAL HEALTH | Facility: CLINIC | Age: 34
End: 2019-07-03

## 2019-07-08 ENCOUNTER — COMMUNICATION - HEALTHEAST (OUTPATIENT)
Dept: BEHAVIORAL HEALTH | Facility: CLINIC | Age: 34
End: 2019-07-08

## 2019-07-18 ENCOUNTER — OFFICE VISIT - HEALTHEAST (OUTPATIENT)
Dept: BEHAVIORAL HEALTH | Facility: CLINIC | Age: 34
End: 2019-07-18

## 2019-07-18 DIAGNOSIS — F13.10 SEDATIVE, HYPNOTIC OR ANXIOLYTIC USE DISORDER, MILD, ABUSE (H): ICD-10-CM

## 2019-07-18 DIAGNOSIS — F11.21 OPIOID USE DISORDER, SEVERE, IN EARLY REMISSION, ON MAINTENANCE THERAPY, DEPENDENCE (H): ICD-10-CM

## 2019-07-18 DIAGNOSIS — F90.0 ATTENTION DEFICIT HYPERACTIVITY DISORDER (ADHD), INATTENTIVE TYPE, MODERATE: ICD-10-CM

## 2019-07-18 DIAGNOSIS — F40.10 SOCIAL ANXIETY DISORDER: ICD-10-CM

## 2019-07-18 DIAGNOSIS — F43.22 ADJUSTMENT DISORDER WITH ANXIETY: ICD-10-CM

## 2019-07-18 LAB
AMPHETAMINES UR QL SCN: ABNORMAL
BARBITURATES UR QL: ABNORMAL
BENZODIAZ UR QL: ABNORMAL
BUPRENORPHINE QUAL URINE LHE: ABNORMAL
CANNABINOIDS UR QL SCN: ABNORMAL
COCAINE UR QL: ABNORMAL
CREAT UR-MCNC: 200.1 MG/DL
CREAT UR-MCNC: 205.9 MG/DL
METHADONE UR QL SCN: ABNORMAL
OPIATES UR QL SCN: ABNORMAL
OXYCODONE UR QL: ABNORMAL
PCP UR QL SCN: ABNORMAL

## 2019-07-18 ASSESSMENT — MIFFLIN-ST. JEOR: SCORE: 1804.89

## 2019-07-23 LAB
ETHYL GLUCURONIDE UR CFM-MCNC: <100 NG/ML
ETHYL SULFATE UR CFM-MCNC: <100 NG/ML

## 2019-07-26 ENCOUNTER — COMMUNICATION - HEALTHEAST (OUTPATIENT)
Dept: BEHAVIORAL HEALTH | Facility: CLINIC | Age: 34
End: 2019-07-26

## 2019-07-26 DIAGNOSIS — F40.10 SOCIAL ANXIETY DISORDER: ICD-10-CM

## 2019-08-01 ENCOUNTER — COMMUNICATION - HEALTHEAST (OUTPATIENT)
Dept: BEHAVIORAL HEALTH | Facility: CLINIC | Age: 34
End: 2019-08-01

## 2019-08-01 DIAGNOSIS — F11.21 OPIOID USE DISORDER, SEVERE, IN EARLY REMISSION, ON MAINTENANCE THERAPY, DEPENDENCE (H): ICD-10-CM

## 2019-08-08 ENCOUNTER — COMMUNICATION - HEALTHEAST (OUTPATIENT)
Dept: BEHAVIORAL HEALTH | Facility: CLINIC | Age: 34
End: 2019-08-08

## 2019-08-15 ENCOUNTER — OFFICE VISIT - HEALTHEAST (OUTPATIENT)
Dept: BEHAVIORAL HEALTH | Facility: CLINIC | Age: 34
End: 2019-08-15

## 2019-08-15 DIAGNOSIS — F11.21 OPIOID USE DISORDER, SEVERE, IN EARLY REMISSION, ON MAINTENANCE THERAPY, DEPENDENCE (H): ICD-10-CM

## 2019-08-15 DIAGNOSIS — F90.0 ATTENTION DEFICIT HYPERACTIVITY DISORDER (ADHD), INATTENTIVE TYPE, MODERATE: ICD-10-CM

## 2019-08-15 LAB
AMPHETAMINES UR QL SCN: ABNORMAL
BARBITURATES UR QL: ABNORMAL
BENZODIAZ UR QL: ABNORMAL
CANNABINOIDS UR QL SCN: ABNORMAL
COCAINE UR QL: ABNORMAL
CREAT UR-MCNC: 330.3 MG/DL
METHADONE UR QL SCN: ABNORMAL
OPIATES UR QL SCN: ABNORMAL
OXYCODONE UR QL: ABNORMAL
PCP UR QL SCN: ABNORMAL

## 2019-08-15 ASSESSMENT — MIFFLIN-ST. JEOR: SCORE: 1795.82

## 2019-08-23 ENCOUNTER — COMMUNICATION - HEALTHEAST (OUTPATIENT)
Dept: BEHAVIORAL HEALTH | Facility: CLINIC | Age: 34
End: 2019-08-23

## 2019-08-23 DIAGNOSIS — F40.10 SOCIAL ANXIETY DISORDER: ICD-10-CM

## 2019-09-11 ENCOUNTER — COMMUNICATION - HEALTHEAST (OUTPATIENT)
Dept: BEHAVIORAL HEALTH | Facility: CLINIC | Age: 34
End: 2019-09-11

## 2019-09-11 DIAGNOSIS — F90.0 ATTENTION DEFICIT HYPERACTIVITY DISORDER (ADHD), INATTENTIVE TYPE, MODERATE: ICD-10-CM

## 2019-09-11 DIAGNOSIS — F11.21 OPIOID USE DISORDER, SEVERE, IN EARLY REMISSION, ON MAINTENANCE THERAPY, DEPENDENCE (H): ICD-10-CM

## 2019-09-12 ENCOUNTER — OFFICE VISIT - HEALTHEAST (OUTPATIENT)
Dept: BEHAVIORAL HEALTH | Facility: CLINIC | Age: 34
End: 2019-09-12

## 2019-09-12 DIAGNOSIS — F11.21 OPIOID USE DISORDER, SEVERE, IN EARLY REMISSION, ON MAINTENANCE THERAPY, DEPENDENCE (H): ICD-10-CM

## 2019-09-12 LAB
AMPHETAMINES UR QL SCN: ABNORMAL
BARBITURATES UR QL: ABNORMAL
BENZODIAZ UR QL: ABNORMAL
CANNABINOIDS UR QL SCN: ABNORMAL
COCAINE UR QL: ABNORMAL
CREAT UR-MCNC: 104.9 MG/DL
METHADONE UR QL SCN: ABNORMAL
OPIATES UR QL SCN: ABNORMAL
OXYCODONE UR QL: ABNORMAL
PCP UR QL SCN: ABNORMAL

## 2019-09-12 ASSESSMENT — ANXIETY QUESTIONNAIRES
7. FEELING AFRAID AS IF SOMETHING AWFUL MIGHT HAPPEN: SEVERAL DAYS
1. FEELING NERVOUS, ANXIOUS, OR ON EDGE: MORE THAN HALF THE DAYS
5. BEING SO RESTLESS THAT IT IS HARD TO SIT STILL: SEVERAL DAYS
2. NOT BEING ABLE TO STOP OR CONTROL WORRYING: MORE THAN HALF THE DAYS
4. TROUBLE RELAXING: SEVERAL DAYS
6. BECOMING EASILY ANNOYED OR IRRITABLE: MORE THAN HALF THE DAYS
GAD7 TOTAL SCORE: 10
3. WORRYING TOO MUCH ABOUT DIFFERENT THINGS: SEVERAL DAYS

## 2019-09-12 ASSESSMENT — PATIENT HEALTH QUESTIONNAIRE - PHQ9: SUM OF ALL RESPONSES TO PHQ QUESTIONS 1-9: 7

## 2019-09-12 ASSESSMENT — MIFFLIN-ST. JEOR: SCORE: 1823.04

## 2019-09-19 ENCOUNTER — COMMUNICATION - HEALTHEAST (OUTPATIENT)
Dept: BEHAVIORAL HEALTH | Facility: CLINIC | Age: 34
End: 2019-09-19

## 2019-09-19 DIAGNOSIS — F40.10 SOCIAL ANXIETY DISORDER: ICD-10-CM

## 2019-09-26 ENCOUNTER — COMMUNICATION - HEALTHEAST (OUTPATIENT)
Dept: BEHAVIORAL HEALTH | Facility: CLINIC | Age: 34
End: 2019-09-26

## 2019-09-26 DIAGNOSIS — F13.10 SEDATIVE, HYPNOTIC OR ANXIOLYTIC USE DISORDER, MILD, ABUSE (H): ICD-10-CM

## 2019-09-26 DIAGNOSIS — F43.22 ADJUSTMENT DISORDER WITH ANXIETY: ICD-10-CM

## 2019-09-26 DIAGNOSIS — F40.10 SOCIAL ANXIETY DISORDER: ICD-10-CM

## 2019-10-09 ENCOUNTER — COMMUNICATION - HEALTHEAST (OUTPATIENT)
Dept: BEHAVIORAL HEALTH | Facility: CLINIC | Age: 34
End: 2019-10-09

## 2019-10-09 DIAGNOSIS — F90.0 ATTENTION DEFICIT HYPERACTIVITY DISORDER (ADHD), INATTENTIVE TYPE, MODERATE: ICD-10-CM

## 2019-10-09 DIAGNOSIS — F11.21 OPIOID USE DISORDER, SEVERE, IN EARLY REMISSION, ON MAINTENANCE THERAPY, DEPENDENCE (H): ICD-10-CM

## 2019-10-14 ENCOUNTER — OFFICE VISIT - HEALTHEAST (OUTPATIENT)
Dept: BEHAVIORAL HEALTH | Facility: CLINIC | Age: 34
End: 2019-10-14

## 2019-10-14 DIAGNOSIS — F11.20 OPIOID USE DISORDER, SEVERE, DEPENDENCE (H): ICD-10-CM

## 2019-10-15 ENCOUNTER — OFFICE VISIT - HEALTHEAST (OUTPATIENT)
Dept: BEHAVIORAL HEALTH | Facility: CLINIC | Age: 34
End: 2019-10-15

## 2019-10-15 ENCOUNTER — COMMUNICATION - HEALTHEAST (OUTPATIENT)
Dept: BEHAVIORAL HEALTH | Facility: CLINIC | Age: 34
End: 2019-10-15

## 2019-10-15 DIAGNOSIS — F41.9 ANXIETY DISORDER, UNSPECIFIED TYPE: ICD-10-CM

## 2019-10-15 DIAGNOSIS — F11.21 OPIOID USE DISORDER, SEVERE, IN EARLY REMISSION, ON MAINTENANCE THERAPY, DEPENDENCE (H): ICD-10-CM

## 2019-10-15 DIAGNOSIS — F43.10 PTSD (POST-TRAUMATIC STRESS DISORDER): ICD-10-CM

## 2019-10-15 DIAGNOSIS — F40.10 SOCIAL ANXIETY DISORDER: ICD-10-CM

## 2019-10-15 DIAGNOSIS — F60.9 PERSONALITY DISORDER, UNSPECIFIED (H): ICD-10-CM

## 2019-10-15 DIAGNOSIS — F60.3 BORDERLINE PERSONALITY DISORDER (H): ICD-10-CM

## 2019-10-15 LAB
AMPHETAMINES UR QL SCN: ABNORMAL
BARBITURATES UR QL: ABNORMAL
BENZODIAZ UR QL: ABNORMAL
CANNABINOIDS UR QL SCN: ABNORMAL
COCAINE UR QL: ABNORMAL
CREAT UR-MCNC: 247.9 MG/DL
METHADONE UR QL SCN: ABNORMAL
OPIATES UR QL SCN: ABNORMAL
OXYCODONE UR QL: ABNORMAL
PCP UR QL SCN: ABNORMAL

## 2019-10-15 ASSESSMENT — ANXIETY QUESTIONNAIRES
4. TROUBLE RELAXING: SEVERAL DAYS
1. FEELING NERVOUS, ANXIOUS, OR ON EDGE: MORE THAN HALF THE DAYS
7. FEELING AFRAID AS IF SOMETHING AWFUL MIGHT HAPPEN: SEVERAL DAYS
GAD7 TOTAL SCORE: 10
5. BEING SO RESTLESS THAT IT IS HARD TO SIT STILL: MORE THAN HALF THE DAYS
6. BECOMING EASILY ANNOYED OR IRRITABLE: SEVERAL DAYS
2. NOT BEING ABLE TO STOP OR CONTROL WORRYING: MORE THAN HALF THE DAYS
IF YOU CHECKED OFF ANY PROBLEMS ON THIS QUESTIONNAIRE, HOW DIFFICULT HAVE THESE PROBLEMS MADE IT FOR YOU TO DO YOUR WORK, TAKE CARE OF THINGS AT HOME, OR GET ALONG WITH OTHER PEOPLE: SOMEWHAT DIFFICULT
3. WORRYING TOO MUCH ABOUT DIFFERENT THINGS: SEVERAL DAYS

## 2019-10-15 ASSESSMENT — PATIENT HEALTH QUESTIONNAIRE - PHQ9: SUM OF ALL RESPONSES TO PHQ QUESTIONS 1-9: 12

## 2019-10-15 ASSESSMENT — MIFFLIN-ST. JEOR
SCORE: 1841.54
SCORE: 1841.18

## 2019-10-17 LAB
AMPHET UR-MCNC: >5000 NG/ML
MDA UR-MCNC: <200 NG/ML
MDEA UR-MCNC: <200 NG/ML
MDMA UR-MCNC: <200 NG/ML
METHAMPHET UR-MCNC: <200 NG/ML
PHENTERMINE UR CFM-MCNC: <200 NG/ML

## 2019-10-19 LAB
ETHYL GLUCURONIDE UR CFM-MCNC: <100 NG/ML
ETHYL SULFATE UR CFM-MCNC: <100 NG/ML

## 2019-11-06 ENCOUNTER — COMMUNICATION - HEALTHEAST (OUTPATIENT)
Dept: BEHAVIORAL HEALTH | Facility: CLINIC | Age: 34
End: 2019-11-06

## 2019-11-06 DIAGNOSIS — F90.0 ATTENTION DEFICIT HYPERACTIVITY DISORDER (ADHD), INATTENTIVE TYPE, MODERATE: ICD-10-CM

## 2019-11-06 DIAGNOSIS — F41.9 ANXIETY DISORDER, UNSPECIFIED TYPE: ICD-10-CM

## 2019-11-06 DIAGNOSIS — F11.21 OPIOID USE DISORDER, SEVERE, IN EARLY REMISSION, ON MAINTENANCE THERAPY, DEPENDENCE (H): ICD-10-CM

## 2019-11-06 DIAGNOSIS — F33.1 MAJOR DEPRESSIVE DISORDER, RECURRENT, MODERATE (H): ICD-10-CM

## 2019-11-07 ENCOUNTER — COMMUNICATION - HEALTHEAST (OUTPATIENT)
Dept: BEHAVIORAL HEALTH | Facility: CLINIC | Age: 34
End: 2019-11-07

## 2019-11-07 DIAGNOSIS — F11.21 OPIOID USE DISORDER, SEVERE, IN EARLY REMISSION, ON MAINTENANCE THERAPY, DEPENDENCE (H): ICD-10-CM

## 2019-11-07 DIAGNOSIS — F90.0 ATTENTION DEFICIT HYPERACTIVITY DISORDER (ADHD), INATTENTIVE TYPE, MODERATE: ICD-10-CM

## 2019-11-14 ENCOUNTER — OFFICE VISIT - HEALTHEAST (OUTPATIENT)
Dept: BEHAVIORAL HEALTH | Facility: CLINIC | Age: 34
End: 2019-11-14

## 2019-11-14 DIAGNOSIS — F40.10 SOCIAL ANXIETY DISORDER: ICD-10-CM

## 2019-11-14 DIAGNOSIS — F11.21 OPIOID USE DISORDER, SEVERE, IN EARLY REMISSION, ON MAINTENANCE THERAPY, DEPENDENCE (H): ICD-10-CM

## 2019-11-14 LAB
AMPHETAMINES UR QL SCN: ABNORMAL
BARBITURATES UR QL: ABNORMAL
BENZODIAZ UR QL: ABNORMAL
BUPRENORPHINE QUAL URINE LHE: ABNORMAL
CANNABINOIDS UR QL SCN: ABNORMAL
COCAINE UR QL: ABNORMAL
CREAT UR-MCNC: 181.6 MG/DL
CREAT UR-MCNC: 188.6 MG/DL
METHADONE UR QL SCN: ABNORMAL
OPIATES UR QL SCN: ABNORMAL
OXYCODONE UR QL: ABNORMAL
PCP UR QL SCN: ABNORMAL

## 2019-11-14 ASSESSMENT — ANXIETY QUESTIONNAIRES
1. FEELING NERVOUS, ANXIOUS, OR ON EDGE: SEVERAL DAYS
3. WORRYING TOO MUCH ABOUT DIFFERENT THINGS: MORE THAN HALF THE DAYS
GAD7 TOTAL SCORE: 11
2. NOT BEING ABLE TO STOP OR CONTROL WORRYING: MORE THAN HALF THE DAYS
5. BEING SO RESTLESS THAT IT IS HARD TO SIT STILL: MORE THAN HALF THE DAYS
4. TROUBLE RELAXING: MORE THAN HALF THE DAYS
6. BECOMING EASILY ANNOYED OR IRRITABLE: SEVERAL DAYS
7. FEELING AFRAID AS IF SOMETHING AWFUL MIGHT HAPPEN: SEVERAL DAYS

## 2019-11-14 ASSESSMENT — PATIENT HEALTH QUESTIONNAIRE - PHQ9: SUM OF ALL RESPONSES TO PHQ QUESTIONS 1-9: 8

## 2019-11-14 ASSESSMENT — MIFFLIN-ST. JEOR: SCORE: 1832.11

## 2019-11-25 ENCOUNTER — COMMUNICATION - HEALTHEAST (OUTPATIENT)
Dept: BEHAVIORAL HEALTH | Facility: CLINIC | Age: 34
End: 2019-11-25

## 2019-11-25 DIAGNOSIS — F33.1 MAJOR DEPRESSIVE DISORDER, RECURRENT, MODERATE (H): ICD-10-CM

## 2019-11-25 DIAGNOSIS — F90.0 ATTENTION DEFICIT HYPERACTIVITY DISORDER (ADHD), INATTENTIVE TYPE, MODERATE: ICD-10-CM

## 2019-11-25 DIAGNOSIS — F41.9 ANXIETY DISORDER, UNSPECIFIED TYPE: ICD-10-CM

## 2019-12-04 ENCOUNTER — COMMUNICATION - HEALTHEAST (OUTPATIENT)
Dept: BEHAVIORAL HEALTH | Facility: CLINIC | Age: 34
End: 2019-12-04

## 2019-12-04 DIAGNOSIS — F90.0 ATTENTION DEFICIT HYPERACTIVITY DISORDER (ADHD), INATTENTIVE TYPE, MODERATE: ICD-10-CM

## 2019-12-04 DIAGNOSIS — F11.21 OPIOID USE DISORDER, SEVERE, IN EARLY REMISSION, ON MAINTENANCE THERAPY, DEPENDENCE (H): ICD-10-CM

## 2019-12-05 ENCOUNTER — COMMUNICATION - HEALTHEAST (OUTPATIENT)
Dept: BEHAVIORAL HEALTH | Facility: CLINIC | Age: 34
End: 2019-12-05

## 2019-12-05 DIAGNOSIS — F11.21 OPIOID USE DISORDER, SEVERE, IN EARLY REMISSION, ON MAINTENANCE THERAPY, DEPENDENCE (H): ICD-10-CM

## 2019-12-05 DIAGNOSIS — F90.0 ATTENTION DEFICIT HYPERACTIVITY DISORDER (ADHD), INATTENTIVE TYPE, MODERATE: ICD-10-CM

## 2019-12-12 ENCOUNTER — COMMUNICATION - HEALTHEAST (OUTPATIENT)
Dept: BEHAVIORAL HEALTH | Facility: CLINIC | Age: 34
End: 2019-12-12

## 2019-12-23 ENCOUNTER — COMMUNICATION - HEALTHEAST (OUTPATIENT)
Dept: BEHAVIORAL HEALTH | Facility: CLINIC | Age: 34
End: 2019-12-23

## 2019-12-23 DIAGNOSIS — F41.9 ANXIETY DISORDER, UNSPECIFIED TYPE: ICD-10-CM

## 2019-12-23 DIAGNOSIS — F33.1 MAJOR DEPRESSIVE DISORDER, RECURRENT, MODERATE (H): ICD-10-CM

## 2019-12-31 ENCOUNTER — COMMUNICATION - HEALTHEAST (OUTPATIENT)
Dept: BEHAVIORAL HEALTH | Facility: CLINIC | Age: 34
End: 2019-12-31

## 2019-12-31 DIAGNOSIS — F11.21 OPIOID USE DISORDER, SEVERE, IN EARLY REMISSION, ON MAINTENANCE THERAPY, DEPENDENCE (H): ICD-10-CM

## 2019-12-31 DIAGNOSIS — F90.0 ATTENTION DEFICIT HYPERACTIVITY DISORDER (ADHD), INATTENTIVE TYPE, MODERATE: ICD-10-CM

## 2020-01-03 ENCOUNTER — OFFICE VISIT - HEALTHEAST (OUTPATIENT)
Dept: BEHAVIORAL HEALTH | Facility: CLINIC | Age: 35
End: 2020-01-03

## 2020-01-03 DIAGNOSIS — F11.21 OPIOID USE DISORDER, SEVERE, IN EARLY REMISSION, ON MAINTENANCE THERAPY, DEPENDENCE (H): ICD-10-CM

## 2020-01-03 DIAGNOSIS — F90.0 ATTENTION DEFICIT HYPERACTIVITY DISORDER (ADHD), INATTENTIVE TYPE, MODERATE: ICD-10-CM

## 2020-01-03 DIAGNOSIS — F40.10 SOCIAL ANXIETY DISORDER: ICD-10-CM

## 2020-01-03 LAB
AMPHETAMINES UR QL SCN: ABNORMAL
BARBITURATES UR QL: ABNORMAL
BENZODIAZ UR QL: ABNORMAL
CANNABINOIDS UR QL SCN: ABNORMAL
COCAINE UR QL: ABNORMAL
CREAT UR-MCNC: 221.1 MG/DL
METHADONE UR QL SCN: ABNORMAL
OPIATES UR QL SCN: ABNORMAL
OXYCODONE UR QL: ABNORMAL
PCP UR QL SCN: ABNORMAL

## 2020-01-03 ASSESSMENT — ANXIETY QUESTIONNAIRES
5. BEING SO RESTLESS THAT IT IS HARD TO SIT STILL: MORE THAN HALF THE DAYS
GAD7 TOTAL SCORE: 9
6. BECOMING EASILY ANNOYED OR IRRITABLE: MORE THAN HALF THE DAYS
2. NOT BEING ABLE TO STOP OR CONTROL WORRYING: SEVERAL DAYS
4. TROUBLE RELAXING: SEVERAL DAYS
3. WORRYING TOO MUCH ABOUT DIFFERENT THINGS: SEVERAL DAYS
7. FEELING AFRAID AS IF SOMETHING AWFUL MIGHT HAPPEN: SEVERAL DAYS
1. FEELING NERVOUS, ANXIOUS, OR ON EDGE: SEVERAL DAYS

## 2020-01-03 ASSESSMENT — MIFFLIN-ST. JEOR: SCORE: 1931.9

## 2020-01-03 ASSESSMENT — PATIENT HEALTH QUESTIONNAIRE - PHQ9: SUM OF ALL RESPONSES TO PHQ QUESTIONS 1-9: 9

## 2020-01-06 LAB
ETHYL GLUCURONIDE UR CFM-MCNC: <100 NG/ML
ETHYL SULFATE UR CFM-MCNC: <100 NG/ML

## 2020-01-07 ENCOUNTER — COMMUNICATION - HEALTHEAST (OUTPATIENT)
Dept: BEHAVIORAL HEALTH | Facility: CLINIC | Age: 35
End: 2020-01-07

## 2020-01-17 ENCOUNTER — AMBULATORY - HEALTHEAST (OUTPATIENT)
Dept: BEHAVIORAL HEALTH | Facility: HOSPITAL | Age: 35
End: 2020-01-17

## 2020-01-24 ENCOUNTER — COMMUNICATION - HEALTHEAST (OUTPATIENT)
Dept: BEHAVIORAL HEALTH | Facility: CLINIC | Age: 35
End: 2020-01-24

## 2020-01-24 DIAGNOSIS — F33.1 MAJOR DEPRESSIVE DISORDER, RECURRENT, MODERATE (H): ICD-10-CM

## 2020-01-24 DIAGNOSIS — F41.9 ANXIETY DISORDER, UNSPECIFIED TYPE: ICD-10-CM

## 2020-01-30 ENCOUNTER — OFFICE VISIT - HEALTHEAST (OUTPATIENT)
Dept: BEHAVIORAL HEALTH | Facility: CLINIC | Age: 35
End: 2020-01-30

## 2020-01-30 DIAGNOSIS — F11.21 OPIOID USE DISORDER, SEVERE, IN EARLY REMISSION, ON MAINTENANCE THERAPY, DEPENDENCE (H): ICD-10-CM

## 2020-01-30 DIAGNOSIS — F90.0 ATTENTION DEFICIT HYPERACTIVITY DISORDER (ADHD), INATTENTIVE TYPE, MODERATE: ICD-10-CM

## 2020-01-30 LAB
AMPHETAMINES UR QL SCN: ABNORMAL
BARBITURATES UR QL: ABNORMAL
BENZODIAZ UR QL: ABNORMAL
BUPRENORPHINE QUAL URINE LHE: ABNORMAL
CANNABINOIDS UR QL SCN: ABNORMAL
COCAINE UR QL: ABNORMAL
CREAT UR-MCNC: 230 MG/DL
CREAT UR-MCNC: 230.3 MG/DL
METHADONE UR QL SCN: ABNORMAL
OPIATES UR QL SCN: ABNORMAL
OXYCODONE UR QL: ABNORMAL
PCP UR QL SCN: ABNORMAL

## 2020-01-30 ASSESSMENT — MIFFLIN-ST. JEOR: SCORE: 1936.44

## 2020-01-30 ASSESSMENT — ANXIETY QUESTIONNAIRES
GAD7 TOTAL SCORE: 8
1. FEELING NERVOUS, ANXIOUS, OR ON EDGE: SEVERAL DAYS
5. BEING SO RESTLESS THAT IT IS HARD TO SIT STILL: SEVERAL DAYS
6. BECOMING EASILY ANNOYED OR IRRITABLE: MORE THAN HALF THE DAYS
2. NOT BEING ABLE TO STOP OR CONTROL WORRYING: SEVERAL DAYS
4. TROUBLE RELAXING: SEVERAL DAYS
3. WORRYING TOO MUCH ABOUT DIFFERENT THINGS: SEVERAL DAYS
7. FEELING AFRAID AS IF SOMETHING AWFUL MIGHT HAPPEN: SEVERAL DAYS

## 2020-01-30 ASSESSMENT — PATIENT HEALTH QUESTIONNAIRE - PHQ9: SUM OF ALL RESPONSES TO PHQ QUESTIONS 1-9: 8

## 2020-02-21 ENCOUNTER — COMMUNICATION - HEALTHEAST (OUTPATIENT)
Dept: BEHAVIORAL HEALTH | Facility: CLINIC | Age: 35
End: 2020-02-21

## 2020-02-21 DIAGNOSIS — F33.1 MAJOR DEPRESSIVE DISORDER, RECURRENT, MODERATE (H): ICD-10-CM

## 2020-02-21 DIAGNOSIS — F41.9 ANXIETY DISORDER, UNSPECIFIED TYPE: ICD-10-CM

## 2020-02-25 ENCOUNTER — COMMUNICATION - HEALTHEAST (OUTPATIENT)
Dept: BEHAVIORAL HEALTH | Facility: CLINIC | Age: 35
End: 2020-02-25

## 2020-02-26 ENCOUNTER — COMMUNICATION - HEALTHEAST (OUTPATIENT)
Dept: BEHAVIORAL HEALTH | Facility: CLINIC | Age: 35
End: 2020-02-26

## 2020-02-26 DIAGNOSIS — F90.0 ATTENTION DEFICIT HYPERACTIVITY DISORDER (ADHD), INATTENTIVE TYPE, MODERATE: ICD-10-CM

## 2020-02-26 DIAGNOSIS — F11.21 OPIOID USE DISORDER, SEVERE, IN EARLY REMISSION, ON MAINTENANCE THERAPY, DEPENDENCE (H): ICD-10-CM

## 2020-03-13 ENCOUNTER — COMMUNICATION - HEALTHEAST (OUTPATIENT)
Dept: BEHAVIORAL HEALTH | Facility: CLINIC | Age: 35
End: 2020-03-13

## 2020-03-13 DIAGNOSIS — F33.1 MAJOR DEPRESSIVE DISORDER, RECURRENT, MODERATE (H): ICD-10-CM

## 2020-03-19 ENCOUNTER — COMMUNICATION - HEALTHEAST (OUTPATIENT)
Dept: BEHAVIORAL HEALTH | Facility: CLINIC | Age: 35
End: 2020-03-19

## 2020-03-19 DIAGNOSIS — F11.21 OPIOID USE DISORDER, SEVERE, IN EARLY REMISSION, ON MAINTENANCE THERAPY, DEPENDENCE (H): ICD-10-CM

## 2020-03-19 DIAGNOSIS — F90.0 ATTENTION DEFICIT HYPERACTIVITY DISORDER (ADHD), INATTENTIVE TYPE, MODERATE: ICD-10-CM

## 2020-03-26 ENCOUNTER — COMMUNICATION - HEALTHEAST (OUTPATIENT)
Dept: BEHAVIORAL HEALTH | Facility: CLINIC | Age: 35
End: 2020-03-26

## 2020-03-26 ENCOUNTER — OFFICE VISIT - HEALTHEAST (OUTPATIENT)
Dept: BEHAVIORAL HEALTH | Facility: CLINIC | Age: 35
End: 2020-03-26

## 2020-03-26 DIAGNOSIS — F40.10 SOCIAL ANXIETY DISORDER: ICD-10-CM

## 2020-03-26 DIAGNOSIS — F11.21 OPIOID USE DISORDER, SEVERE, IN EARLY REMISSION, ON MAINTENANCE THERAPY, DEPENDENCE (H): ICD-10-CM

## 2020-03-26 DIAGNOSIS — F17.200 NICOTINE USE DISORDER: ICD-10-CM

## 2020-03-26 DIAGNOSIS — F17.200 TOBACCO USE DISORDER: ICD-10-CM

## 2020-03-26 DIAGNOSIS — F90.0 ATTENTION DEFICIT HYPERACTIVITY DISORDER (ADHD), INATTENTIVE TYPE, MODERATE: ICD-10-CM

## 2020-04-13 ENCOUNTER — COMMUNICATION - HEALTHEAST (OUTPATIENT)
Dept: BEHAVIORAL HEALTH | Facility: CLINIC | Age: 35
End: 2020-04-13

## 2020-04-13 DIAGNOSIS — F33.1 MAJOR DEPRESSIVE DISORDER, RECURRENT, MODERATE (H): ICD-10-CM

## 2020-04-16 ENCOUNTER — OFFICE VISIT - HEALTHEAST (OUTPATIENT)
Dept: BEHAVIORAL HEALTH | Facility: CLINIC | Age: 35
End: 2020-04-16

## 2020-04-16 DIAGNOSIS — F90.0 ATTENTION DEFICIT HYPERACTIVITY DISORDER (ADHD), INATTENTIVE TYPE, MODERATE: ICD-10-CM

## 2020-04-16 DIAGNOSIS — F11.21 OPIOID USE DISORDER, SEVERE, IN EARLY REMISSION, ON MAINTENANCE THERAPY, DEPENDENCE (H): ICD-10-CM

## 2020-04-16 ASSESSMENT — ANXIETY QUESTIONNAIRES
2. NOT BEING ABLE TO STOP OR CONTROL WORRYING: MORE THAN HALF THE DAYS
GAD7 TOTAL SCORE: 16
6. BECOMING EASILY ANNOYED OR IRRITABLE: MORE THAN HALF THE DAYS
3. WORRYING TOO MUCH ABOUT DIFFERENT THINGS: NEARLY EVERY DAY
7. FEELING AFRAID AS IF SOMETHING AWFUL MIGHT HAPPEN: NEARLY EVERY DAY
5. BEING SO RESTLESS THAT IT IS HARD TO SIT STILL: MORE THAN HALF THE DAYS
4. TROUBLE RELAXING: MORE THAN HALF THE DAYS
1. FEELING NERVOUS, ANXIOUS, OR ON EDGE: MORE THAN HALF THE DAYS

## 2020-04-16 ASSESSMENT — PATIENT HEALTH QUESTIONNAIRE - PHQ9: SUM OF ALL RESPONSES TO PHQ QUESTIONS 1-9: 13

## 2020-05-01 ENCOUNTER — COMMUNICATION - HEALTHEAST (OUTPATIENT)
Dept: BEHAVIORAL HEALTH | Facility: CLINIC | Age: 35
End: 2020-05-01

## 2020-05-01 DIAGNOSIS — F40.10 SOCIAL ANXIETY DISORDER: ICD-10-CM

## 2020-05-01 DIAGNOSIS — F13.10 SEDATIVE, HYPNOTIC OR ANXIOLYTIC USE DISORDER, MILD, ABUSE (H): ICD-10-CM

## 2020-05-01 DIAGNOSIS — F43.22 ADJUSTMENT DISORDER WITH ANXIETY: ICD-10-CM

## 2020-05-07 ENCOUNTER — OFFICE VISIT - HEALTHEAST (OUTPATIENT)
Dept: BEHAVIORAL HEALTH | Facility: CLINIC | Age: 35
End: 2020-05-07

## 2020-05-07 DIAGNOSIS — F40.10 SOCIAL ANXIETY DISORDER: ICD-10-CM

## 2020-05-07 DIAGNOSIS — F90.0 ATTENTION DEFICIT HYPERACTIVITY DISORDER (ADHD), INATTENTIVE TYPE, MODERATE: ICD-10-CM

## 2020-05-07 DIAGNOSIS — F33.1 MAJOR DEPRESSIVE DISORDER, RECURRENT, MODERATE (H): ICD-10-CM

## 2020-05-07 DIAGNOSIS — F11.21 OPIOID USE DISORDER, SEVERE, IN EARLY REMISSION, ON MAINTENANCE THERAPY, DEPENDENCE (H): ICD-10-CM

## 2020-05-07 ASSESSMENT — PATIENT HEALTH QUESTIONNAIRE - PHQ9: SUM OF ALL RESPONSES TO PHQ QUESTIONS 1-9: 12

## 2020-05-07 ASSESSMENT — ANXIETY QUESTIONNAIRES
3. WORRYING TOO MUCH ABOUT DIFFERENT THINGS: NEARLY EVERY DAY
6. BECOMING EASILY ANNOYED OR IRRITABLE: NEARLY EVERY DAY
1. FEELING NERVOUS, ANXIOUS, OR ON EDGE: NEARLY EVERY DAY
GAD7 TOTAL SCORE: 21
4. TROUBLE RELAXING: NEARLY EVERY DAY
5. BEING SO RESTLESS THAT IT IS HARD TO SIT STILL: NEARLY EVERY DAY
2. NOT BEING ABLE TO STOP OR CONTROL WORRYING: NEARLY EVERY DAY
7. FEELING AFRAID AS IF SOMETHING AWFUL MIGHT HAPPEN: NEARLY EVERY DAY

## 2020-05-13 ENCOUNTER — COMMUNICATION - HEALTHEAST (OUTPATIENT)
Dept: BEHAVIORAL HEALTH | Facility: CLINIC | Age: 35
End: 2020-05-13

## 2020-05-13 DIAGNOSIS — F33.1 MAJOR DEPRESSIVE DISORDER, RECURRENT, MODERATE (H): ICD-10-CM

## 2020-06-08 ENCOUNTER — OFFICE VISIT - HEALTHEAST (OUTPATIENT)
Dept: BEHAVIORAL HEALTH | Facility: CLINIC | Age: 35
End: 2020-06-08

## 2020-06-08 DIAGNOSIS — F40.10 SOCIAL ANXIETY DISORDER: ICD-10-CM

## 2020-06-08 DIAGNOSIS — F11.21 OPIOID USE DISORDER, SEVERE, IN EARLY REMISSION, ON MAINTENANCE THERAPY, DEPENDENCE (H): ICD-10-CM

## 2020-06-08 DIAGNOSIS — F90.0 ATTENTION DEFICIT HYPERACTIVITY DISORDER (ADHD), INATTENTIVE TYPE, MODERATE: ICD-10-CM

## 2020-06-08 ASSESSMENT — ANXIETY QUESTIONNAIRES
6. BECOMING EASILY ANNOYED OR IRRITABLE: NEARLY EVERY DAY
1. FEELING NERVOUS, ANXIOUS, OR ON EDGE: MORE THAN HALF THE DAYS
7. FEELING AFRAID AS IF SOMETHING AWFUL MIGHT HAPPEN: SEVERAL DAYS
GAD7 TOTAL SCORE: 13
2. NOT BEING ABLE TO STOP OR CONTROL WORRYING: MORE THAN HALF THE DAYS
3. WORRYING TOO MUCH ABOUT DIFFERENT THINGS: MORE THAN HALF THE DAYS
4. TROUBLE RELAXING: MORE THAN HALF THE DAYS
5. BEING SO RESTLESS THAT IT IS HARD TO SIT STILL: SEVERAL DAYS

## 2020-06-08 ASSESSMENT — PATIENT HEALTH QUESTIONNAIRE - PHQ9: SUM OF ALL RESPONSES TO PHQ QUESTIONS 1-9: 12

## 2020-06-12 ENCOUNTER — COMMUNICATION - HEALTHEAST (OUTPATIENT)
Dept: BEHAVIORAL HEALTH | Facility: CLINIC | Age: 35
End: 2020-06-12

## 2020-06-12 DIAGNOSIS — F13.10 SEDATIVE, HYPNOTIC OR ANXIOLYTIC USE DISORDER, MILD, ABUSE (H): ICD-10-CM

## 2020-06-12 DIAGNOSIS — F43.22 ADJUSTMENT DISORDER WITH ANXIETY: ICD-10-CM

## 2020-06-12 DIAGNOSIS — F33.1 MAJOR DEPRESSIVE DISORDER, RECURRENT, MODERATE (H): ICD-10-CM

## 2020-06-12 DIAGNOSIS — F40.10 SOCIAL ANXIETY DISORDER: ICD-10-CM

## 2020-07-02 ENCOUNTER — COMMUNICATION - HEALTHEAST (OUTPATIENT)
Dept: BEHAVIORAL HEALTH | Facility: CLINIC | Age: 35
End: 2020-07-02

## 2020-07-14 ENCOUNTER — COMMUNICATION - HEALTHEAST (OUTPATIENT)
Dept: BEHAVIORAL HEALTH | Facility: CLINIC | Age: 35
End: 2020-07-14

## 2020-07-14 DIAGNOSIS — F33.1 MAJOR DEPRESSIVE DISORDER, RECURRENT, MODERATE (H): ICD-10-CM

## 2020-07-14 DIAGNOSIS — F40.10 SOCIAL ANXIETY DISORDER: ICD-10-CM

## 2020-07-14 DIAGNOSIS — F43.22 ADJUSTMENT DISORDER WITH ANXIETY: ICD-10-CM

## 2020-07-14 DIAGNOSIS — F13.10 SEDATIVE, HYPNOTIC OR ANXIOLYTIC USE DISORDER, MILD, ABUSE (H): ICD-10-CM

## 2020-07-15 ENCOUNTER — COMMUNICATION - HEALTHEAST (OUTPATIENT)
Dept: BEHAVIORAL HEALTH | Facility: CLINIC | Age: 35
End: 2020-07-15

## 2020-07-21 ENCOUNTER — OFFICE VISIT - HEALTHEAST (OUTPATIENT)
Dept: BEHAVIORAL HEALTH | Facility: CLINIC | Age: 35
End: 2020-07-21

## 2020-07-21 ENCOUNTER — COMMUNICATION - HEALTHEAST (OUTPATIENT)
Dept: BEHAVIORAL HEALTH | Facility: CLINIC | Age: 35
End: 2020-07-21

## 2020-07-21 DIAGNOSIS — F13.10 SEDATIVE, HYPNOTIC OR ANXIOLYTIC USE DISORDER, MILD, ABUSE (H): ICD-10-CM

## 2020-07-21 DIAGNOSIS — F17.200 TOBACCO USE DISORDER: ICD-10-CM

## 2020-07-21 DIAGNOSIS — F90.0 ATTENTION DEFICIT HYPERACTIVITY DISORDER (ADHD), INATTENTIVE TYPE, MODERATE: ICD-10-CM

## 2020-07-21 DIAGNOSIS — F15.11 AMPHETAMINE USE DISORDER, MILD, IN EARLY REMISSION (H): ICD-10-CM

## 2020-07-21 DIAGNOSIS — F40.10 SOCIAL ANXIETY DISORDER: ICD-10-CM

## 2020-07-21 DIAGNOSIS — F11.21 OPIOID USE DISORDER, SEVERE, IN EARLY REMISSION, ON MAINTENANCE THERAPY, DEPENDENCE (H): ICD-10-CM

## 2020-07-21 ASSESSMENT — ANXIETY QUESTIONNAIRES
1. FEELING NERVOUS, ANXIOUS, OR ON EDGE: SEVERAL DAYS
4. TROUBLE RELAXING: SEVERAL DAYS
IF YOU CHECKED OFF ANY PROBLEMS ON THIS QUESTIONNAIRE, HOW DIFFICULT HAVE THESE PROBLEMS MADE IT FOR YOU TO DO YOUR WORK, TAKE CARE OF THINGS AT HOME, OR GET ALONG WITH OTHER PEOPLE: SOMEWHAT DIFFICULT
GAD7 TOTAL SCORE: 7
5. BEING SO RESTLESS THAT IT IS HARD TO SIT STILL: SEVERAL DAYS
3. WORRYING TOO MUCH ABOUT DIFFERENT THINGS: SEVERAL DAYS
2. NOT BEING ABLE TO STOP OR CONTROL WORRYING: SEVERAL DAYS
6. BECOMING EASILY ANNOYED OR IRRITABLE: SEVERAL DAYS
7. FEELING AFRAID AS IF SOMETHING AWFUL MIGHT HAPPEN: SEVERAL DAYS

## 2020-07-21 ASSESSMENT — PATIENT HEALTH QUESTIONNAIRE - PHQ9: SUM OF ALL RESPONSES TO PHQ QUESTIONS 1-9: 7

## 2020-08-03 ENCOUNTER — COMMUNICATION - HEALTHEAST (OUTPATIENT)
Dept: BEHAVIORAL HEALTH | Facility: CLINIC | Age: 35
End: 2020-08-03

## 2020-08-04 ENCOUNTER — OFFICE VISIT - HEALTHEAST (OUTPATIENT)
Dept: BEHAVIORAL HEALTH | Facility: CLINIC | Age: 35
End: 2020-08-04

## 2020-08-04 DIAGNOSIS — F90.0 ATTENTION DEFICIT HYPERACTIVITY DISORDER (ADHD), INATTENTIVE TYPE, MODERATE: ICD-10-CM

## 2020-08-04 DIAGNOSIS — F40.10 SOCIAL ANXIETY DISORDER: ICD-10-CM

## 2020-08-04 DIAGNOSIS — F43.22 ADJUSTMENT DISORDER WITH ANXIETY: ICD-10-CM

## 2020-08-04 DIAGNOSIS — F13.10 SEDATIVE, HYPNOTIC OR ANXIOLYTIC USE DISORDER, MILD, ABUSE (H): ICD-10-CM

## 2020-08-04 DIAGNOSIS — F11.21 OPIOID USE DISORDER, SEVERE, IN EARLY REMISSION, ON MAINTENANCE THERAPY, DEPENDENCE (H): ICD-10-CM

## 2020-08-04 ASSESSMENT — ANXIETY QUESTIONNAIRES
6. BECOMING EASILY ANNOYED OR IRRITABLE: MORE THAN HALF THE DAYS
1. FEELING NERVOUS, ANXIOUS, OR ON EDGE: MORE THAN HALF THE DAYS
GAD7 TOTAL SCORE: 11
4. TROUBLE RELAXING: SEVERAL DAYS
7. FEELING AFRAID AS IF SOMETHING AWFUL MIGHT HAPPEN: SEVERAL DAYS
2. NOT BEING ABLE TO STOP OR CONTROL WORRYING: MORE THAN HALF THE DAYS
5. BEING SO RESTLESS THAT IT IS HARD TO SIT STILL: SEVERAL DAYS
3. WORRYING TOO MUCH ABOUT DIFFERENT THINGS: MORE THAN HALF THE DAYS

## 2020-08-04 ASSESSMENT — PATIENT HEALTH QUESTIONNAIRE - PHQ9: SUM OF ALL RESPONSES TO PHQ QUESTIONS 1-9: 7

## 2020-08-24 ENCOUNTER — COMMUNICATION - HEALTHEAST (OUTPATIENT)
Dept: BEHAVIORAL HEALTH | Facility: CLINIC | Age: 35
End: 2020-08-24

## 2020-10-08 ENCOUNTER — COMMUNICATION - HEALTHEAST (OUTPATIENT)
Dept: BEHAVIORAL HEALTH | Facility: CLINIC | Age: 35
End: 2020-10-08

## 2021-05-26 ASSESSMENT — PATIENT HEALTH QUESTIONNAIRE - PHQ9
SUM OF ALL RESPONSES TO PHQ QUESTIONS 1-9: 12
SUM OF ALL RESPONSES TO PHQ QUESTIONS 1-9: 8
SUM OF ALL RESPONSES TO PHQ QUESTIONS 1-9: 7
SUM OF ALL RESPONSES TO PHQ QUESTIONS 1-9: 8

## 2021-05-27 ASSESSMENT — PATIENT HEALTH QUESTIONNAIRE - PHQ9
SUM OF ALL RESPONSES TO PHQ QUESTIONS 1-9: 13
SUM OF ALL RESPONSES TO PHQ QUESTIONS 1-9: 12
SUM OF ALL RESPONSES TO PHQ QUESTIONS 1-9: 9
SUM OF ALL RESPONSES TO PHQ QUESTIONS 1-9: 12
SUM OF ALL RESPONSES TO PHQ QUESTIONS 1-9: 7
SUM OF ALL RESPONSES TO PHQ QUESTIONS 1-9: 7

## 2021-05-27 NOTE — TELEPHONE ENCOUNTER
Dr. Sloan covering for Dr. Brunner    Date of Last Office Visit: 2/14/19  Date of Next Office Visit: 4/11/19  No shows since last visit: none  Cancellations since last visit: none  ED visits since last visit: none    Medication Gabapentin 300 mg date last ordered: 3/4/19  Qty: 120  Refills: 0    gabapentin (NEURONTIN) 300 MG capsule 120 capsule 0 3/4/2019  No   Sig: TAKE ONE CAPSULE BY MOUTH TWICE A DAY AND TAKE TWO CAPSULES BY MOUTH AT BEDTIME       Lapse in therapy greater than 7 days: no  Medication refill request verified as identical to current order: yes  Result of Last DAM, VPA, Li+ Level, CBC, or Carbamazepine Level (at or since last visit): 2/14/19 DAM positive for amphetamines as expected ( on Vyvanse);   2/14/19 ETG: < 100      []Eligibility - not seen in last year    []Supervision - no future appointment    []Compliance     []Verification - order discrepancy    [x]Controlled Medication    []90 - day supply request    [x]Other LPN pending medications    Current Medication list:      atenolol (TENORMIN) 50 MG tablet Take 50 mg by mouth daily.    buprenorphine-naloxone (SUBOXONE) 8-2 mg Film per sublingual film Half film (4-1 mg), three times daily.   cloNIDine HCl (CATAPRES) 0.1 MG tablet Take 1 tablet (0.1 mg total) by mouth 3 (three) times a day. Use as needed for for symptoms of anxiety/craving   fluticasone (FLONASE) 50 mcg/actuation nasal spray 1 spray into each nostril 2 (two) times a day.   gabapentin (NEURONTIN) 300 MG capsule TAKE ONE CAPSULE BY MOUTH TWICE A DAY AND TAKE TWO CAPSULES BY MOUTH AT BEDTIME   lisdexamfetamine (VYVANSE) 30 MG capsule TAKE ONE CAPSULE BY MOUTH EVERY DAY AT NOON   lisdexamfetamine (VYVANSE) 40 MG capsule TAKE ONE CAPSULE BY MOUTH EVERY DAY   magnesium citrate solution Take by mouth at bedtime. 1/3 to 1/2 bottle at bedtime each night.   montelukast (SINGULAIR) 10 mg tablet    nicotine polacrilex (NICORETTE) 4 MG gum Apply 4 mg to the mouth or throat as needed for  smoking cessation.   OMEGA-3/DHA/EPA/FISH OIL (FISH OIL-OMEGA-3 FATTY ACIDS) 300-1,000 mg capsule Take 1 g by mouth daily.   polyethylene glycol (GLYCOLAX) 17 gram/dose powder MIX 17 GRAMS IN 8OZ OF LIQUID PER CONTAINER DIRECTIONS AND DRINK DAILY       Medication Plan of Care at last office visit with MD/CNP:    PLAN:    1. Patient given a 30-day prescription for suboxone 8/2 mg film three times a day, with 1 refill.   2. Encouraged patient to continue attending 12 step meetings to work on setting an example for others in the program, and he has continued to feel it is beneficial when he foes.   3. Patient encouraged to continue with psychotherapy, and schedule with psychiatry. Continue on vyvanse to help with focus, which patient has been stable and functional on for months. Vyvanse 30 mg po as well as vyvanse 40 mg po daily. I also prescribed clonidine 0.1 mg po three times a day prn anxiety/insomnia.  4. Urine toxicology as expected.  5. RTC in 2 months and sooner prn.     [place], [place], and [place] :Reviewed and no worrisome pharmacy activity noted ( see last 6 fills below)    04/03/2019 1 04/03/2019 Vyvanse 40 Mg Capsule 30 30 Se Dab [id number] Fam (7422) 0  04/03/2019 1 04/03/2019 Vyvanse 30 Mg Capsule 30 30 Se Dab [id number] Fam (7422) 0  03/18/2019 1 02/14/2019 Buprenorp-Nalox 8-2 Mg Sl Film 45 30 Em Bru [id number] Fam (7422) 1  03/06/2019 1 03/06/2019 Vyvanse 30 Mg Capsule 30 30 Se Dab [id number] Fam (7422) 003/06/2019 1   03/06/2019 Vyvanse 40 Mg Capsule 30 30 Se Dab [id number] Fam (7422) 0  03/05/2019 1 03/04/2019 Gabapentin 300 Mg Capsule 120 30 Se Dab [id number] Fam (7422) 0

## 2021-05-27 NOTE — PROGRESS NOTES
Correct pharmacy verified with patient and confirmed in snapshot? [x] yes []no    Charge captured ? [x] yes  [] no    Medications Phoned  to Pharmacy [] yes [x]no  Name of Pharmacist:  List Medications, including dose, quantity and instructions      Medication Prescriptions given to patient   [] yes  [x] no   List the name of the drug the prescription was written for.       Medications ordered this visit were e-scribed.  Verified by order class [x] yes  [] no  Suboxone 8-2 mg  Vyvanse 30 and 40 mg  Gabapentin 600 mg    Medication changes or discontinuations were communicated to patient's pharmacy: [] yes  [x] no    UA collected [x] yes  [] no    [place] Prescription Monitoring Program Reviewed? [x] yes  [] no    Referrals were made to:none      Future appointment was made: [x] yes  [] no    Dictation completed at time of chart check: [] yes  [x] no    I have checked the documentation for today s encounters and the above information has been reviewed and completed.

## 2021-05-27 NOTE — PROGRESS NOTES
Suboxone 8-2mg 6 films called into [facility]. Pharmacist read back for confirmation.

## 2021-05-27 NOTE — TELEPHONE ENCOUNTER
Pt is requesting brand, states generic not working. May need PA    buprenorphine-naloxone (SUBOXONE SL FILM) 8-2 mg Film per sublingual film 3 each 0 4/11/2019  No   Sig: DISSOLVE ONE-HALF FILM IN MOUTH THREE TIMES DAILY AS DIRECTED

## 2021-05-27 NOTE — TELEPHONE ENCOUNTER
Patient calling with questions on his name brand suboxone script, will run out before his appt on 4.18.19 please advise [phone]

## 2021-05-27 NOTE — PROGRESS NOTES
"  Mental Health Visit Note    4/2/2019    Start time: 14:05  Stop Time:15:00   Intake Session # 1    Session Type: Patient is presenting for an Individual session.    Barak Clarke is a 34 y.o. male is being seen today for    Chief Complaint   Patient presents with     Intake session 1     New symptoms or complaints: This 34 y.o  male reported to the clinic for his first intake session for a rule 79 referral. The patient was referred by Dr. Brunner due to multiple needs that require assistance to navigate the system. Has many records to be expunged \"so I can find a good job, be able to drive, and have a normal life\" He is being seeing for his maintenance from addiction. He reports a 5-6 years of sobriety. Patient is well known in the clinic. Writer had him complete the informed consent. Writer had patient complete screening tools( WHODAS, CAGE AID, PHQ-9, KILO 7 and C-SSRS). Patient lives in [facility]. He want to live there which means he will need a statement of needs from [facility]. Patient signed the authorization to review his medical records from Care everywhere.  Patient will sign a DELORES for [facility] at the next visit. Writer will need to know the exact address to send the referral.     Functional Impairment:   Personal: 4  Family: 4  Social: 4  Work: 3    Clinical assessment of mental status:   Barak Clarke presented on time.   He was oriented x3, open and cooperative, and dressed appropriately for this session and weather. His memory was Normal cognitive functioning .  His speech was  Within normal.  Language was appropriate.  Concentration and focus is Within normal. Psychosis is not noted or reported. He reports his mood is Anxious and Depressed.  Affect is congruent with speech and is Anxious and Depressed.  Fund of knowledge is adequate. Insight is adequate for therapy.    Suicidal/Homicidal Ideation present: Patient denies suicidal and homicidal ideations/means or plans.     Patient's " impression of their current status: Patient reported he was here because he needs some assistance to get back on feet. Shared the only way to do this is to get a help from someone so he can have his records expunged. He shared he has done many bad things. Shared they are more than worse things. He went in and out of nursing home. He lost a relationship. He lived with lies and addiction. He now wants to live for his 9 y.o.son and provide for him. He want to better himself. Patient reports a long history of MMD and drug use since he was 6 years old. He has been in many systems that include [facility], [facility], [facility], [facility] and [facility].  Was treated for mediations and psychotherapy. He is currently under suboxone by Dr. Brunner. He indicated his Mental health medications are being managed by his PCP. Patient is requesting that he gets a CM in [facility] where he lives.  He will sign a DELORES to their CM intake team.     Therapist impression of patients current state: This 34 y.o. White or  male presented on time for his intake session. He appeared anxious due to the unfamiliarity of the process. Writer oriented him to the process. Provided education/clarification around CM services.  Patient was determined to complete his assessment for the referral. He appeared a good historian about his own problems.     Assessment tools used today include:  KILO-7:7, PHQ-9:8, CAGE-AID:0/4 sober for 5-6 years, C-SSRS: 1 over 10 years ago due drug use. [place] it was better if he . Not in the past month.     Type of psychotherapeutic technique provided: Client centered and Intake session, rapport building    Progress toward short term goals:Today was the first intake session. patient's goal is complete his DA so a referral can be make for his CM.     Review of long term goals: Not done at today's visit     Diagnosis:   1. Major depressive disorder, recurrent, moderate (H)       Improving, worsening, no  change: Unable to assess at this very first intake session    Plan and Follow-up:   Return in week with a completed intake questionnaire for the second intake session.     Discharge Criteria/Planning: Patient will continue with follow-up until therapy can be discontinued without return of signs and symptoms.    Performed and documented by BEKA Sams- SERAFIN; [facility] 4/2/2019

## 2021-05-28 ASSESSMENT — ANXIETY QUESTIONNAIRES
GAD7 TOTAL SCORE: 9
GAD7 TOTAL SCORE: 21
GAD7 TOTAL SCORE: 7
GAD7 TOTAL SCORE: 13
GAD7 TOTAL SCORE: 10
GAD7 TOTAL SCORE: 11
GAD7 TOTAL SCORE: 8
GAD7 TOTAL SCORE: 16
GAD7 TOTAL SCORE: 11
GAD7 TOTAL SCORE: 10

## 2021-05-28 NOTE — PROGRESS NOTES
Correct pharmacy verified with patient and confirmed in snapshot? [x] yes []no    Charge captured ? [x] yes  [] no    Medications Phoned  to Pharmacy [] yes [x]no  Name of Pharmacist:  List Medications, including dose, quantity and instructions      Medication Prescriptions given to patient   [] yes  [x] no   List the name of the drug the prescription was written for.       Medications ordered this visit were e-scribed.  Verified by order class [x] yes  [] no  Suboxone 8-2 mg    Medication changes or discontinuations were communicated to patient's pharmacy: [] yes  [x] no    UA collected [x] yes  [] no    [place] Prescription Monitoring Program Reviewed? [x] yes  [] no    Referrals were made to: none     Future appointment was made: [x] yes  [] no    Dictation completed at time of chart check: [] yes  [x] no    I have checked the documentation for today s encounters and the above information has been reviewed and completed.

## 2021-05-29 NOTE — TELEPHONE ENCOUNTER
Date of Last Office Visit:5-14-19   Date of Next Office Visit: 6-6-19  No shows since last visit: 0  Cancellations since last visit: 0  ED visits since last visit:  0  Medication gabapentin  date last ordered: 4-18-19  Qty: 120  Refills: 0  Lapse in therapy greater than 7 days: yes  Medication refill request verified as identical to current order: yes  Result of Last DAM, VPA, Li+ Level, CBC, or Carbamazepine Level (at or since last visit): N/A     [] Medication refilled per [facility].   [x] Medication unable to be refilled by RN due to criteria not met as indicated below:     []Eligibility - not seen in last year    []Supervision - no future appointment    []Compliance     []Verification - order discrepancy    []Controlled Medication    [x]Medication not included in RN Protocol    []90 - day supply request    []Other   Current Medication list:    atenolol (TENORMIN) 50 MG tablet Take 50 mg by mouth daily.    buprenorphine-naloxone (SUBOXONE SL FILM) 8-2 mg Film per sublingual film DISSOLVE ONE-HALF FILM IN MOUTH THREE TIMES DAILY AS DIRECTED; YUNI   cloNIDine HCl (CATAPRES) 0.1 MG tablet Take 1 tablet (0.1 mg total) by mouth 3 (three) times a day. Use as needed for for symptoms of anxiety/craving   cloNIDine HCl (CATAPRES) 0.1 MG tablet Take 1 tablet (0.1 mg total) by mouth 3 (three) times a day as needed. Hold for dizziness or light headedness and call the clinic   fluticasone (FLONASE) 50 mcg/actuation nasal spray 1 spray into each nostril 2 (two) times a day.   gabapentin (NEURONTIN) 600 MG tablet Use three times daily and once as needed   lisdexamfetamine (VYVANSE) 30 MG capsule TAKE ONE CAPSULE BY MOUTH EVERY DAY AT NOON   lisdexamfetamine (VYVANSE) 40 MG capsule TAKE ONE CAPSULE BY MOUTH EVERY DAY   polyethylene glycol (GLYCOLAX) 17 gram/dose powder MIX 17 GRAMS IN 8OZ OF LIQUID PER CONTAINER DIRECTIONS AND DRINK DAILY       Medication Plan of Care at last office visit with MD/CNP:  Plan:   1. Patient given  a 30-day rx for 4/1 mg film of suboxone three times a day. We again reviewed importance of taking medication as prescribed and the danger of using benzodiazepines in combination with this medication. Patient verbalized an understanding of this agreement.   2. Encouraged patient to return to attending a 12 step program, and we discussed.   3. Patient encouraged to reschedule for psychotherapy, and we discussed how this can benefit him. Continue on vyvanse to help with focus, which patient has been stable and functional on for months. Vyvanse 30 mg po as well as vyvanse 40 mg po daily. Continue on clonidine 0.1 mg po three times a day prn anxiety/insomnia.  4. Urine toxicology pending at this time.   5. RTC in 30 days and sooner prn.      At least 45 minutes spent in the care of this patient, with >50% of this time in face-to-face counseling, specifically related to planning for how to add a multidisciplinary program and continue with MAT for opioid use disorder as well as generalized anxiety disorder. Motivational interviewing utilized.

## 2021-05-29 NOTE — PROGRESS NOTES
Correct pharmacy verified with patient and confirmed in snapshot? [x] yes []no    Charge captured ? [x] yes  [] no    Medications Phoned  to Pharmacy [] yes [x]no  Name of Pharmacist:  List Medications, including dose, quantity and instructions      Medication Prescriptions given to patient   [] yes  [x] no   List the name of the drug the prescription was written for.       Medications ordered this visit were e-scribed.  Verified by order class [x] yes  [] no  Prozac 10 mg  Suboxone 8-2 mg    Medication changes or discontinuations were communicated to patient's pharmacy: [] yes  [x] no    UA collected [x] yes  [] no    [place] Prescription Monitoring Program Reviewed? [x] yes  [] no    Referrals were made to: none     Future appointment was made: [x] yes  [] no    Dictation completed at time of chart check: [] yes  [x] no    I have checked the documentation for today s encounters and the above information has been reviewed and completed.

## 2021-05-30 VITALS — WEIGHT: 207 LBS | HEIGHT: 71 IN | BODY MASS INDEX: 28.98 KG/M2

## 2021-05-30 VITALS — WEIGHT: 209 LBS | BODY MASS INDEX: 29.26 KG/M2 | HEIGHT: 71 IN

## 2021-05-30 VITALS — WEIGHT: 203 LBS | BODY MASS INDEX: 28.42 KG/M2 | HEIGHT: 71 IN

## 2021-05-30 VITALS — BODY MASS INDEX: 28.42 KG/M2 | WEIGHT: 203 LBS | HEIGHT: 71 IN

## 2021-05-30 NOTE — TELEPHONE ENCOUNTER
Patient called to let Dr. Brunner know he stopped his Prozac yesterday as it makes him hungry and tired. His next follow up appointment is 7-18-19.  He will need suboxone around 7-12-19 so will have his pharmacy fax over his refill request to get addressed Friday before Dr. Brunner is on vacation.

## 2021-05-30 NOTE — PROGRESS NOTES
Correct pharmacy verified with patient and confirmed in snapshot? [x] yes []no    Charge captured ? [x] yes  [] no    Medications Phoned  to Pharmacy [] yes [x]no  Name of Pharmacist:  List Medications, including dose, quantity and instructions      Medication Prescriptions given to patient   [] yes  [x] no   List the name of the drug the prescription was written for.       Medications ordered this visit were e-scribed.  Verified by order class [x] yes  [] no  Vyvanse 30 mg and 60 mg  Baclofen 10 mg    Medication changes or discontinuations were communicated to patient's pharmacy: [] yes  [x] no    UA collected [x] yes  [] no    [place] Prescription Monitoring Program Reviewed? [x] yes  [] no    Referrals were made to: none     Future appointment was made: [x] yes  [] no    Dictation completed at time of chart check: [] yes  [x] no    I have checked the documentation for today s encounters and the above information has been reviewed and completed.

## 2021-05-30 NOTE — TELEPHONE ENCOUNTER
Called pharmacy and let them know his suboxone did not need a PA.  The pharmacist replied that they initiated it only because the patient called and said he got a letter stating his next prescription will need a PA.  Informed pharmacy we do not run proactive PAs and that those letters are courtesy letters sent to patient by insurance companies.  We process PAs once a script triggers one at the pharmacy.

## 2021-05-31 VITALS — WEIGHT: 188 LBS | HEIGHT: 71 IN | BODY MASS INDEX: 26.32 KG/M2

## 2021-05-31 VITALS — BODY MASS INDEX: 27.16 KG/M2 | HEIGHT: 71 IN | WEIGHT: 194 LBS

## 2021-05-31 VITALS — HEIGHT: 71 IN | BODY MASS INDEX: 26.32 KG/M2 | WEIGHT: 188 LBS

## 2021-05-31 VITALS — BODY MASS INDEX: 27.72 KG/M2 | WEIGHT: 198 LBS | HEIGHT: 71 IN

## 2021-05-31 VITALS — HEIGHT: 71 IN | BODY MASS INDEX: 27.44 KG/M2 | WEIGHT: 196 LBS

## 2021-05-31 VITALS — HEIGHT: 71 IN | WEIGHT: 186 LBS | BODY MASS INDEX: 26.04 KG/M2

## 2021-05-31 VITALS — WEIGHT: 184 LBS | BODY MASS INDEX: 25.76 KG/M2 | HEIGHT: 71 IN

## 2021-05-31 VITALS — BODY MASS INDEX: 28 KG/M2 | HEIGHT: 71 IN | WEIGHT: 200 LBS

## 2021-05-31 VITALS — HEIGHT: 71 IN | WEIGHT: 196 LBS | BODY MASS INDEX: 27.44 KG/M2

## 2021-05-31 VITALS — HEIGHT: 71 IN | WEIGHT: 185 LBS | BODY MASS INDEX: 25.9 KG/M2

## 2021-05-31 NOTE — TELEPHONE ENCOUNTER
Spoke to Dr. Sloan and as per verbal order Suboxone 8-2 mg film dissolve one-half film three times a day Qty: 20 RF: 0 phoned in to [facility]. Spoke to Williams, read back for accuracy.    Called and updated pt.

## 2021-05-31 NOTE — TELEPHONE ENCOUNTER
Patient called due to running last for appointment due to work, per Dr. Brunner rescheduled out one week.

## 2021-05-31 NOTE — PROGRESS NOTES
Correct pharmacy verified with patient and confirmed in snapshot? [x] yes []no    Charge captured ? [x] yes  [] no    Medications Phoned  to Pharmacy [] yes [x]no  Name of Pharmacist:  List Medications, including dose, quantity and instructions      Medication Prescriptions given to patient   [] yes  [x] no   List the name of the drug the prescription was written for.       Medications ordered this visit were e-scribed.  Verified by order class [x] yes  [] no  Suboxone 8-2 mg  Vyvance 30 mg and 40 mg    Medication changes or discontinuations were communicated to patient's pharmacy: [] yes  [x] no    UA collected [x] yes  [] no    [place] Prescription Monitoring Program Reviewed? [x] yes  [] no    Referrals were made to: none     Future appointment was made: [x] yes  [] no    Dictation completed at time of chart check: [] yes  [x] no    I have checked the documentation for today s encounters and the above information has been reviewed and completed.

## 2021-06-01 VITALS — HEIGHT: 71 IN | BODY MASS INDEX: 25.06 KG/M2 | WEIGHT: 179 LBS

## 2021-06-01 VITALS — BODY MASS INDEX: 26.18 KG/M2 | WEIGHT: 187 LBS | HEIGHT: 71 IN

## 2021-06-01 VITALS — WEIGHT: 191.08 LBS | HEIGHT: 71 IN | BODY MASS INDEX: 26.75 KG/M2

## 2021-06-01 VITALS — WEIGHT: 187 LBS | BODY MASS INDEX: 26.18 KG/M2 | HEIGHT: 71 IN

## 2021-06-01 VITALS — HEIGHT: 71 IN | BODY MASS INDEX: 25.9 KG/M2 | WEIGHT: 185 LBS

## 2021-06-01 VITALS — BODY MASS INDEX: 25.34 KG/M2 | HEIGHT: 71 IN | WEIGHT: 181 LBS

## 2021-06-01 VITALS — HEIGHT: 71 IN | WEIGHT: 186 LBS | BODY MASS INDEX: 26.04 KG/M2

## 2021-06-01 NOTE — PROGRESS NOTES
Correct pharmacy verified with patient and confirmed in snapshot? [x] yes []no    Charge captured ? [x] yes  [] no    Medications Phoned  to Pharmacy [] yes [x]no  Name of Pharmacist:  List Medications, including dose, quantity and instructions      Medication Prescriptions given to patient   [] yes  [x] no   List the name of the drug the prescription was written for.       Medications ordered this visit were e-scribed.  Verified by order class [x] yes  [] no  Suboxone 8-2 mg two times a day     Medication changes or discontinuations were communicated to patient's pharmacy: [x] yes  [] no  Suboxone 1/2 three times a day     UA collected [x] yes  [] no    [place] Prescription Monitoring Program Reviewed? [x] yes  [] no    Referrals were made to: none     Future appointment was made: [x] yes  [] no    Dictation completed at time of chart check: [] yes  [x] no    I have checked the documentation for today s encounters and the above information has been reviewed and completed.

## 2021-06-02 VITALS — WEIGHT: 180 LBS | BODY MASS INDEX: 25.2 KG/M2 | HEIGHT: 71 IN

## 2021-06-02 VITALS — WEIGHT: 182 LBS | HEIGHT: 71 IN | BODY MASS INDEX: 25.48 KG/M2

## 2021-06-02 VITALS — WEIGHT: 189 LBS | BODY MASS INDEX: 26.46 KG/M2 | HEIGHT: 71 IN

## 2021-06-02 VITALS — HEIGHT: 71 IN | WEIGHT: 182 LBS | BODY MASS INDEX: 25.48 KG/M2
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
## 2021-06-02 NOTE — PROGRESS NOTES
Correct pharmacy verified with patient and confirmed in snapshot? [x] yes []no    Charge captured ? [x] yes  [] no    Medications Phoned  to Pharmacy [] yes [x]no  Name of Pharmacist:  List Medications, including dose, quantity and instructions      Medication Prescriptions given to patient   [] yes  [x] no   List the name of the drug the prescription was written for.       Medications ordered this visit were e-scribed.  Verified by order class [] yes  [x] no, no medication ordered by Dr. Brunner.    Medication changes or discontinuations were communicated to patient's pharmacy: [] yes  [x] no, no medication discontinued by Dr. Brunner.    UA collected [x] yes  [] no    [place] Prescription Monitoring Program Reviewed? [x] yes  [] no    Referrals were made to:  NA    Future appointment was made: [x] yes  [] no    Dictation completed at time of chart check: [] yes  [x] no    I have checked the documentation for today s encounters and the above information has been reviewed and completed.

## 2021-06-02 NOTE — PROGRESS NOTES
Correct pharmacy verified with patient and confirmed in snapshot? [x] yes []no    Charge captured ? [x] yes  [] no    Medications Phoned  to Pharmacy [] yes [x]no  Name of Pharmacist:  List Medications, including dose, quantity and instructions      Medication Prescriptions given to patient   [] yes  [x] no   List the name of the drug the prescription was written for.       Medications ordered this visit were e-scribed.  Verified by order class [x] yes  [] no    Medication changes or discontinuations were communicated to patient's pharmacy: [] yes  [x] no    UA collected [] yes  [x] no    [place] Pres[place] Monitoring [place] Reviewed? [x] yes  [] no    Referrals were made to:  none  Future appointment was made: [] yes  [x] no    Dictation completed at time of chart check: [] yes  [x] no    I have checked the documentation for today s encounters and the above information has been reviewed and completed.

## 2021-06-02 NOTE — TELEPHONE ENCOUNTER
Suboxone order printed to clinic, called to pharmacy, spoke with pharmacist Robyn    Suboxone 8-2 mg sl film, takes two times a day, #60 0-RF.     Order read back for accuracy. Hard copy of printed script destroyed and placed in confidential bin.

## 2021-06-02 NOTE — TELEPHONE ENCOUNTER
Client called re:   lisdexamfetamine (VYVANSE) 40 MG capsule  SUBOXONE 8-2 mg Film per sublingual film  Pharmacy:   [facility] [address] [place] [address]  [phone] (Phone)  [phone] (Fax)    Client req call back @ [facility]# [phone]

## 2021-06-03 VITALS
DIASTOLIC BLOOD PRESSURE: 76 MMHG | BODY MASS INDEX: 27.45 KG/M2 | RESPIRATION RATE: 18 BRPM | HEART RATE: 83 BPM | HEIGHT: 71 IN | TEMPERATURE: 98.1 F | SYSTOLIC BLOOD PRESSURE: 123 MMHG | WEIGHT: 196.08 LBS

## 2021-06-03 VITALS — WEIGHT: 179 LBS | HEIGHT: 71 IN | BODY MASS INDEX: 25.06 KG/M2

## 2021-06-03 VITALS — WEIGHT: 178 LBS | BODY MASS INDEX: 24.92 KG/M2 | HEIGHT: 71 IN

## 2021-06-03 VITALS — WEIGHT: 188 LBS | HEIGHT: 71 IN | BODY MASS INDEX: 26.32 KG/M2

## 2021-06-03 VITALS
SYSTOLIC BLOOD PRESSURE: 123 MMHG | HEART RATE: 83 BPM | BODY MASS INDEX: 27.44 KG/M2 | WEIGHT: 196 LBS | DIASTOLIC BLOOD PRESSURE: 76 MMHG | HEIGHT: 71 IN

## 2021-06-03 VITALS — HEIGHT: 71 IN | BODY MASS INDEX: 26.04 KG/M2 | WEIGHT: 186 LBS

## 2021-06-03 VITALS
WEIGHT: 192 LBS | HEART RATE: 73 BPM | BODY MASS INDEX: 26.88 KG/M2 | DIASTOLIC BLOOD PRESSURE: 71 MMHG | HEIGHT: 71 IN | SYSTOLIC BLOOD PRESSURE: 117 MMHG

## 2021-06-03 VITALS — WEIGHT: 181 LBS | HEIGHT: 71 IN | BODY MASS INDEX: 25.34 KG/M2

## 2021-06-03 VITALS — WEIGHT: 176 LBS | BODY MASS INDEX: 24.64 KG/M2 | HEIGHT: 71 IN

## 2021-06-03 NOTE — PROGRESS NOTES
Correct pharmacy verified with patient and confirmed in snapshot? [x] yes []no    Charge captured ? [x] yes  [] no    Medications Phoned  to Pharmacy [] yes [x]no  Name of Pharmacist:  List Medications, including dose, quantity and instructions      Medication Prescriptions given to patient   [] yes  [x] no   List the name of the drug the prescription was written for.       Medications ordered this visit were e-scribed.  Verified by order class [x] yes  [] no  Gabapentin 600 mg    Medication changes or discontinuations were communicated to patient's pharmacy: [] yes  [x] no    UA collected [x] yes  [] no    [place] Pres[place] Monitoring [place] Reviewed? [x] yes  [] no    Referrals were made to:none     Future appointment was made: [x] yes  [] no    Dictation completed at time of chart check: [x] yes  [] no    I have checked the documentation for today s encounters and the above information has been reviewed and completed.

## 2021-06-03 NOTE — TELEPHONE ENCOUNTER
"When Barak saw Dr. Haddad on 10/15/2019, per Dr. Haddad's CMA that day, it was a one time appointment, he was seeing her only to ask for Clonazepam, and seeing she would not agree to ordering that, he did not want to return to her, and Dr. Haddad did not request him to return for a future visit.  Dr. Haddad's plan:    Plan:       Reviewed risks/benefits of medication with patient.       1. Educated patient on EBT for anxiety, PTSD and depression (SSRI/SNRI). After some discussion he agreed (reluctantly) to retrial of Celexa.  He made it clear he wants Klonopin and I doubt he will celexa. Dr. Brunner will prescribe. No f/u at this time.     2. Recommended therapy, he is not interested stating it never helped although he is currently in couples therapy with GF at [facility].  Said he tried individual therapy in [place] \"off and on x 6 months, it did not help\".  "

## 2021-06-03 NOTE — TELEPHONE ENCOUNTER
Call placed to Barak and he stated that he never restarted the Citalopram.  He stated that the last time he was on it, he had significant weight gain, and does not want that to occur again.  He would like to start the Venlafaxine, and said he would like to have it started before he sees Dr. Brunner on 11/12/2019.  He was informed the other medications have already been sent to the pharmacy.

## 2021-06-03 NOTE — TELEPHONE ENCOUNTER
Writer called in Suboxone 8-2 mg SL Film      Medications Phoned  to Pharmacy [x] yes []no  Name of Pharmacist:  Gema @ [place], [place]  List Medications, including dose, quantity and instructions    Suboxone 8-2 mg SL Film  SIG:     Place one film under tongue twice daily  Qty.     60 films   Refills:  Zero  Read back for accuracy. No errors found.    Also Pharmacy indicted that Vyvanse 40 mg was given an Qty of 60 capsules per SIG instructions written once daily so Qty was reduced per insurance to 30 capsules.

## 2021-06-03 NOTE — TELEPHONE ENCOUNTER
Call placed to patient. He states he is at work but he can't talk while he is at work. He reports said he will be out of the Effexor after today. At 11/14/19 appt, Dr Brunner had said he could increased his dose to 75 mg daily if needed (2. Patient has significant anxiety, and continues to experience sedative cravings. He can continue on baclofen 10 mg po at bedtime for sedative cravings. Patient to continue on vyvanse to help with focus, which patient has been stable and functional on for months. Vyvanse 30 mg po as well as vyvanse 40 mg po daily. Continue on clonidine 0.1 mg po three times a day and gabapentin 600 mg four times a day prn anxiety/insomnia. Patient also on effexor 37.5 mg po daily and can increase to 75 mg po daily prior to upcoming appointment.) Patient has appt with Dr Brunner 12/12/19.    Order pended to Dr Sloan (covering for Dr Brunner) as requested by patient.     Date of Last Office Visit: 11/14/19  Date of Next Office Visit: 12/12/19  No shows since last visit: none  Cancellations since last visit: none  ED visits since last visit:  none  Medication Effexor-XR date last ordered: 11/7/19  Qty: 30  Refills: 0  Lapse in therapy greater than 7 days: no  Medication refill request verified as identical to current order: yes  Result of Last DAM, VPA, Li+ Level, CBC, or Carbamazepine Level (at or since last visit): Positive  11/14/19 positive amphet, THC, BUP     [] Medication refilled per [facility] M-1.   [x] Medication unable to be refilled by RN due to criteria not met as indicated below:     []Eligibility - not seen in last year    []Supervision - no future appointment    []Compliance     [x]Verification - order discrepancy    []Controlled Medication    []Medication not included in RN Protocol    []90 - day supply request    []Other     Current Medication list:    atenolol (TENORMIN) 50 MG tablet Take 50 mg by mouth daily.    baclofen (LIORESAL) 10 MG tablet TAKE ONE TABLET BY MOUTH TWICE A DAY AS  NEEDED   citalopram (CELEXA) 20 MG tablet Take 1 tablet (20 mg total) by mouth daily.   cloNIDine HCl (CATAPRES) 0.1 MG tablet TAKE 1 TABLET BY MOUTH 3 TIMES A DAY AS NEEDED. HOLD FOR DIZZINESS OR LIGHT HEADEDNESS AND CALL THE CLINIC   fluticasone (FLONASE) 50 mcg/actuation nasal spray 1 spray into each nostril 2 (two) times a day.   gabapentin (NEURONTIN) 600 MG tablet TAKE ONE TABLET BY MOUTH THREE TIMES A DAY PLUS ONE ADDITIONAL TABLET EVERY DAY AS NEEDED   lisdexamfetamine (VYVANSE) 30 MG capsule TAKE ONE CAPSULE BY MOUTH EVERY DAY AT NOON   lisdexamfetamine (VYVANSE) 40 MG capsule TAKE ONE CAPSULE BY MOUTH ONCE A DAY   polyethylene glycol (GLYCOLAX) 17 gram/dose powder MIX 17 GRAMS IN 8OZ OF LIQUID PER CONTAINER DIRECTIONS AND DRINK DAILY   SUBOXONE 8-2 mg Film per sublingual film PLACE ONE FILM UNDER THE TONGUE TWICE A DAY   venlafaxine (EFFEXOR-XR) 37.5 MG 24 hr capsule Take 1 capsule (37.5 mg total) by mouth daily.       Medication Plan of Care at last office visit with MD/CNP:    Plan:   1. Patient to continue on suboxone, and does not need a refill today. He can call for a refill with his new pharmacy and obtain a 30-day supply prior to his next appointment.   2. Patient has significant anxiety, and continues to experience sedative cravings. He can continue on baclofen 10 mg po at bedtime for sedative cravings. Patient to continue on vyvanse to help with focus, which patient has been stable and functional on for months. Vyvanse 30 mg po as well as vyvanse 40 mg po daily. Continue on clonidine 0.1 mg po three times a day and gabapentin 600 mg four times a day prn anxiety/insomnia. Patient also on effexor 37.5 mg po daily and can increase to 75 mg po daily prior to upcoming appointment.   3. Again recommended returning to 12 step groups as he enjoyed these previously.   4. Urine toxicology - DAM pending at this time.   5. Patient recommended to cease use of e-cigarettes and replace with nicotine therapy. He  will consider.   6. RTC in 30 days and sooner prn.

## 2021-06-03 NOTE — TELEPHONE ENCOUNTER
Pt called in 11/06/2019 for refill. This was responded to via another encounter. Duplicate Request

## 2021-06-03 NOTE — TELEPHONE ENCOUNTER
Pt was told by Dr. Brunner to double up on Effexor of symptoms worsened. Pt now has one dose left for tomorrow. Pt taking 2 of the 37.5 and needing a refill. Pt needing the order to [facility] in [place] off [address]. Please contact pt

## 2021-06-03 NOTE — TELEPHONE ENCOUNTER
Client called reg meds:   SUBOXONE 8-2 mg Film per sublingual film  lisdexamfetamine (VYVANSE) 30 MG capsule and 40 MG   Per client need re-fill on all     Also called re: citalopram (CELEXA) 20 MG tablet is concerned about weight gain   Wondering if he could be on Effexor instead

## 2021-06-04 ENCOUNTER — RECORDS - HEALTHEAST (OUTPATIENT)
Dept: EMERGENCY MEDICINE | Facility: CLINIC | Age: 36
End: 2021-06-04

## 2021-06-04 VITALS
HEART RATE: 69 BPM | HEIGHT: 71 IN | SYSTOLIC BLOOD PRESSURE: 112 MMHG | WEIGHT: 194 LBS | DIASTOLIC BLOOD PRESSURE: 61 MMHG | BODY MASS INDEX: 27.16 KG/M2

## 2021-06-04 VITALS
SYSTOLIC BLOOD PRESSURE: 113 MMHG | HEART RATE: 78 BPM | BODY MASS INDEX: 30.24 KG/M2 | HEIGHT: 71 IN | WEIGHT: 216 LBS | DIASTOLIC BLOOD PRESSURE: 70 MMHG

## 2021-06-04 VITALS
SYSTOLIC BLOOD PRESSURE: 131 MMHG | DIASTOLIC BLOOD PRESSURE: 71 MMHG | HEIGHT: 71 IN | HEART RATE: 90 BPM | BODY MASS INDEX: 30.38 KG/M2 | WEIGHT: 217 LBS

## 2021-06-04 NOTE — TELEPHONE ENCOUNTER
The patient is calling back regarding his refill request. Please connect with the patient when available.

## 2021-06-04 NOTE — TELEPHONE ENCOUNTER
Patient is calling on his refill status- The patient called his pharmacy to send a refill, pharmacy told the patient to contact the provider office. Please follow up with the patient when available at [phone].    The pt does have an appt scheduled w/ Dr Brunner on 1/3/2020.     [facility] [place] [id number] - [address] [phone] (Phone)  [phone] (Fax)

## 2021-06-04 NOTE — TELEPHONE ENCOUNTER
Pt requested scripts be sent to different pharmacy. See previous refill encounter. Medications cued up for Dr. Sloan.

## 2021-06-04 NOTE — TELEPHONE ENCOUNTER
Writer called patient due to no show today with Dr. Brunner, talked with patient. Patient states he thought the appointment was on Monday and is working 9-5, states he will call back to reschedule.

## 2021-06-04 NOTE — TELEPHONE ENCOUNTER
Barak called stating he has been out of the Suboxone for 2 days, and not feeling very good today.  His explanation is not making much sense, but stating that he was cutting the film in 1/2 and taking at first 4 times daily, then later in conversation states he was taking 1 1/2 daily as in past.  I said then he should have even more left, but states he does not know, it is all gone.  Asked if he could come today to see Dr. Brunner, instead of tomorrow.  Told no, that Dr. Brunner is off today.  Told I would resend to Dr. Sloan, but would not promise that she would fill the Suboxone.  I asked about the Vyvanse, and he said he took the last today.  He said there may be some somewhere around, but hasn't felt like looking for it.  Aware that Dr. Sloan will address, but may or may not refill.

## 2021-06-04 NOTE — TELEPHONE ENCOUNTER
Per review of [place] prescription monitoring program patient's last refill of both Vyvanse and Suboxone was on December 5 for 1 month and he should have enough until his appointment on January 3.  Refill will be denied today and can be addressed at his appointment on January 3 with Dr. Brunner.

## 2021-06-04 NOTE — TELEPHONE ENCOUNTER
Date of Last Office Visit: 11/14/19  Date of Next Office Visit: 1/3/20  No shows since last visit: 12/12/19  Cancellations since last visit: 0  ED visits since last visit:  0    Medication venlafaxine 75 mg date last ordered: 11/25/19  Qty: 30  Refills: 0    Lapse in therapy greater than 7 days: No  Medication refill request verified as identical to current order: Yes  Result of Last DAM, VPA, Li+ Level, CBC, or Carbamazepine Level (at or since last visit):  DAM positive for amphetamines (on vyvanse) and THC on 11/14/19, Pos BUP 11/14/19     [] Medication refilled per [facility] M-1.   [x] Medication unable to be refilled by RN due to criteria not met as indicated below:     []Eligibility - not seen in last year    []Supervision - no future appointment    [x]Compliance - no show     []Verification - order discrepancy    []Controlled Medication    []Medication not included in RN Protocol    []90 - day supply request    []Other     Current Medication list:  Barak Clarke    (MRN [id number])   Your Current Medications Are     atenolol (TENORMIN) 50 MG tablet Take 50 mg by mouth daily.    baclofen (LIORESAL) 10 MG tablet TAKE ONE TABLET BY MOUTH TWICE A DAY AS NEEDED   citalopram (CELEXA) 20 MG tablet Take 1 tablet (20 mg total) by mouth daily.   cloNIDine HCl (CATAPRES) 0.1 MG tablet TAKE 1 TABLET BY MOUTH 3 TIMES A DAY AS NEEDED. HOLD FOR DIZZINESS OR LIGHT HEADEDNESS AND CALL THE CLINIC   fluticasone (FLONASE) 50 mcg/actuation nasal spray 1 spray into each nostril 2 (two) times a day.   gabapentin (NEURONTIN) 600 MG tablet TAKE ONE TABLET BY MOUTH THREE TIMES A DAY PLUS ONE ADDITIONAL TABLET EVERY DAY AS NEEDED   lisdexamfetamine (VYVANSE) 30 MG capsule TAKE ONE CAPSULE BY MOUTH EVERY DAY AT NOON   lisdexamfetamine (VYVANSE) 40 MG capsule TAKE ONE CAPSULE BY MOUTH ONCE A DAY   polyethylene glycol (GLYCOLAX) 17 gram/dose powder MIX 17 GRAMS IN 8OZ OF LIQUID PER CONTAINER DIRECTIONS AND DRINK DAILY   SUBOXONE 8-2 mg Film per  sublingual film PLACE ONE FILM UNDER THE TONGUE TWICE A DAY   venlafaxine (EFFEXOR-XR) 75 MG 24 hr capsule Take 1 capsule (75 mg total) by mouth daily.       Medication Plan of Care at last office visit with MD/CNP:  Plan:   1. Patient to continue on suboxone, and does not need a refill today. He can call for a refill with his new pharmacy and obtain a 30-day supply prior to his next appointment.   2. Patient has significant anxiety, and continues to experience sedative cravings. He can continue on baclofen 10 mg po at bedtime for sedative cravings. Patient to continue on vyvanse to help with focus, which patient has been stable and functional on for months. Vyvanse 30 mg po as well as vyvanse 40 mg po daily. Continue on clonidine 0.1 mg po three times a day and gabapentin 600 mg four times a day prn anxiety/insomnia. Patient also on effexor 37.5 mg po daily and can increase to 75 mg po daily prior to upcoming appointment.   3. Again recommended returning to 12 step groups as he enjoyed these previously.   4. Urine toxicology - DAM pending at this time.   5. Patient recommended to cease use of e-cigarettes and replace with nicotine therapy. He will consider.   6. RTC in 30 days and sooner prn.

## 2021-06-04 NOTE — PROGRESS NOTES
Correct pharmacy verified with patient and confirmed in snapshot? [x] yes []no    Charge captured ? [x] yes  [] no    Medications Phoned  to Pharmacy [] yes [x]no  Name of Pharmacist:  List Medications, including dose, quantity and instructions      Medication Prescriptions given to patient   [] yes  [x] no   List the name of the drug the prescription was written for.       Medications ordered this visit were e-scribed.  Verified by order class [x] yes  [] no  Vyvance 30 mg and 40 mg  Gabapentin 600 mg  Suboxone 8-2 mg    Medication changes or discontinuations were communicated to patient's pharmacy: [] yes  [x] no    UA collected [x] yes  [] no    [place] Prescription Monitoring Program Reviewed? [x] yes  [] no    Referrals were made to: none     Future appointment was made: [x] yes  [] no    Dictation completed at time of chart check: [] yes  [x] no    I have checked the documentation for today s encounters and the above information has been reviewed and completed.

## 2021-06-04 NOTE — TELEPHONE ENCOUNTER
Pt is requesting vyvannse and suboxone.  [address] [place] [address] in [place] is the address for the pharmacy, [phone]. He stated he is unable to request the meds from the pharmacy because those meds have not been filled there before.  Please contact pt if you have any questions.

## 2021-06-04 NOTE — TELEPHONE ENCOUNTER
Pt recently moved and wanting to switch pharmacy's but doesn't know if it will need a new prior auth. Pt is about an hour away from previous pharmacy and wants to switch.Please contact pt and discuss; New pharmacy is a [facility] off of [address] Suboxone and Vivance are the meds.

## 2021-06-04 NOTE — TELEPHONE ENCOUNTER
Authorized a refill of Effexor XR 75 mg #30 no refills.  Please contact the patient for arranging a follow-up appointment within the next 2 to 4 weeks.

## 2021-06-04 NOTE — TELEPHONE ENCOUNTER
Called patient and informed him that the refill request was routed to the provider with the correct pharmacy.

## 2021-06-04 NOTE — TELEPHONE ENCOUNTER
Message left for Barak to request his refills a few extra days before due, in case we will need to do a prior authorization, seeing he is changing pharmacies.  Also said for him to verify the phone number for the pharmacy, as the one that was chosen now is not off of [address] and , but on [place] [address] and [facility], and we want to make sure what scripts he needs filled, and that they go to the proper pharmacy.

## 2021-06-05 NOTE — TELEPHONE ENCOUNTER
Attempted to contact patient. Left voicemail regarding Dr. Brunner's directive. Asked for him to call back and ask for nurse triage.

## 2021-06-05 NOTE — TELEPHONE ENCOUNTER
The pt is checking on the status of his refill; the pt states he will not have medication for this weekend.     Please connect with the patient regarding his refill when available

## 2021-06-05 NOTE — PROGRESS NOTES
Patient was once seen for his first intake session. This was on 4/02/2019. Patient scheduled for his second intake session for 4/16/2019. However, he initiated a cancellation for this appointment.  Since then, he was not seen by this writer. Since this DA was not completed, writer was not able to complete his referral which was a referral for CM services.  At this time, it is necessary to discharge him.

## 2021-06-05 NOTE — TELEPHONE ENCOUNTER
Date of Last Office Visit: 1/3/2020  Date of Next Office Visit: 1/31/2020  No shows since last visit: no  Cancellations since last visit: no  ED visits since last visit: no    Medication Effexor XR 75 mg date last ordered: 12/23/2019  Qty: 30  Refills: 0    venlafaxine (EFFEXOR-XR) 75 MG 24 hr capsule 30 capsule 0 12/23/2019  No   Sig - Route: Take 1 capsule (75 mg total) by mouth daily. - Oral       Lapse in therapy greater than 7 days: no  Medication refill request verified as identical to current order: yes  Result of Last DAM, VPA, Li+ Level, CBC, or Carbamazepine Level (at or since last visit): Positive for THC and amphetamines DAM and neg ETG on 1/3/2020        []Eligibility - not seen in last year    []Supervision - no future appointment    []Compliance     []Verification - order discrepancy    []Controlled Medication    []90 - day supply request    [x]Other LPN pending medications    Current Medication list:    atenolol (TENORMIN) 50 MG tablet Take 50 mg by mouth daily.    baclofen (LIORESAL) 10 MG tablet TAKE ONE TABLET BY MOUTH TWICE A DAY AS NEEDED   citalopram (CELEXA) 20 MG tablet Take 1 tablet (20 mg total) by mouth daily.   cloNIDine HCl (CATAPRES) 0.1 MG tablet TAKE 1 TABLET BY MOUTH 3 TIMES A DAY AS NEEDED. HOLD FOR DIZZINESS OR LIGHT HEADEDNESS AND CALL THE CLINIC   fluticasone (FLONASE) 50 mcg/actuation nasal spray 1 spray into each nostril 2 (two) times a day.   gabapentin (NEURONTIN) 600 MG tablet TAKE ONE TABLET BY MOUTH THREE TIMES A DAY PLUS ONE ADDITIONAL TABLET EVERY DAY AS NEEDED   lisdexamfetamine (VYVANSE) 30 MG capsule TAKE ONE CAPSULE BY MOUTH EVERY DAY AT NOON   lisdexamfetamine (VYVANSE) 40 MG capsule TAKE ONE CAPSULE BY MOUTH ONCE A DAY   polyethylene glycol (GLYCOLAX) 17 gram/dose powder MIX 17 GRAMS IN 8OZ OF LIQUID PER CONTAINER DIRECTIONS AND DRINK DAILY   SUBOXONE 8-2 mg Film per sublingual film PLACE ONE FILM UNDER THE TONGUE TWICE A DAY   venlafaxine (EFFEXOR-XR) 75 MG 24 hr  capsule Take 1 capsule (75 mg total) by mouth daily.         Medication Plan of Care at last office visit with MD/CNP:    PLAN:     Plan:   1. Patient to continue on suboxone, and was given an rx for 8/2 mg  SL two times a day, 30 day supply was prescribed.   2. Patient has significant anxiety, and continues to experience sedative cravings. He can continue on baclofen 10 mg po at bedtime for sedative cravings. Patient to continue on vyvanse to help with focus, which patient has been stable and functional on for months. Vyvanse 30 mg po as well as vyvanse 40 mg po daily. Continue on clonidine 0.1 mg po three times a day and gabapentin 600 mg four times a day prn anxiety/insomnia. Patient increased to effexor 75 mg po daily.   3. Again recommended returning to 12 step groups as he enjoyed these previously. He will consider.   4. Urine toxicology - DAM reveals + thc and amphetamines as expected.    5. RTC in 30 days and sooner prn.     [place], [place], [place], and [place] : NA

## 2021-06-05 NOTE — PROGRESS NOTES
Correct pharmacy verified with patient and confirmed in snapshot? [x] yes []no    Charge captured ? [x] yes  [] no    Medications Phoned  to Pharmacy [] yes [x]no  Name of Pharmacist:  List Medications, including dose, quantity and instructions      Medication Prescriptions given to patient   [] yes  [x] no   List the name of the drug the prescription was written for.       Medications ordered this visit were e-scribed.  Verified by order class [x] yes  [] no  Vyvance 30 mg ad 40 mg  Suboxone 8-2 mg    Medication changes or discontinuations were communicated to patient's pharmacy: [] yes  [x] no    UA collected [x] yes  [] no    [place] Prescription Monitoring Program Reviewed? [x] yes  [] no    Referrals were made to:none      Future appointment was made: [x] yes  [] no    Dictation completed at time of chart check: [] yes  [x] no    I have checked the documentation for today s encounters and the above information has been reviewed and completed.

## 2021-06-05 NOTE — TELEPHONE ENCOUNTER
Pt states he is experiencing weight gain related to the medication venlafaxine (EFFEXOR-XR) 75 MG 24 hr capsule; pt states he has gained roughly 20 lbs over the last 2 months.     The pt would like to discuss the possibility of weaning off this medication and try a different medication.     Please connect with the pt at [phone]

## 2021-06-06 NOTE — TELEPHONE ENCOUNTER
Patient called and Dr. Brunner's directives given to him. He verbalized understanding and he was reminded of his next appointment with Dr. Brunner.     Reminded patient to call triage if further questions or concerns.

## 2021-06-06 NOTE — TELEPHONE ENCOUNTER
Date of Last Office Visit: 1/30/2020  Date of Next Office Visit: 3/26/2020  No shows since last visit: no  Cancellations since last visit: 1  ED visits since last visit:  none  Medication Vyvanse both doses 30 mg #30 with no refills and 40 mg #30 with no refills 1/30/2020  Lapse in therapy greater than 7 days: none  Medication refill request verified as identical to current order: Yes  Result of Last DAM, VPA, Li+ Level, CBC, or Carbamazepine Level (at or since last visit): N/A     [] Medication refilled per [facility] M-1.   [x] Medication unable to be refilled by RN due to criteria not met as indicated below:     []Eligibility - not seen in last year    []Supervision - no future appointment    []Compliance     []Verification - order discrepancy    [x]Controlled Medication    []Medication not included in RN Protocol    []90 - day supply request    []Other   Current Medication list:  Barak Clarke    (MRN [id number])   Your Current Medications Are     atenolol (TENORMIN) 50 MG tablet Take 50 mg by mouth daily.    baclofen (LIORESAL) 10 MG tablet TAKE ONE TABLET BY MOUTH TWICE A DAY AS NEEDED   buPROPion (WELLBUTRIN XL) 150 MG 24 hr tablet Take 1 tablet (150 mg total) by mouth daily. Use 1 tablet daily, then increase to 2 tablets daily   citalopram (CELEXA) 20 MG tablet Take 1 tablet (20 mg total) by mouth daily.   cloNIDine HCl (CATAPRES) 0.1 MG tablet TAKE 1 TABLET BY MOUTH 3 TIMES A DAY AS NEEDED. HOLD FOR DIZZINESS OR LIGHT HEADEDNESS AND CALL THE CLINIC   fluticasone (FLONASE) 50 mcg/actuation nasal spray 1 spray into each nostril 2 (two) times a day.   gabapentin (NEURONTIN) 600 MG tablet TAKE ONE TABLET BY MOUTH THREE TIMES A DAY PLUS ONE ADDITIONAL TABLET EVERY DAY AS NEEDED   lisdexamfetamine (VYVANSE) 30 MG capsule TAKE ONE CAPSULE BY MOUTH EVERY DAY AT NOON   lisdexamfetamine (VYVANSE) 40 MG capsule TAKE ONE CAPSULE BY MOUTH ONCE A DAY   polyethylene glycol (GLYCOLAX) 17 gram/dose powder MIX 17 GRAMS IN 8OZ OF  LIQUID PER CONTAINER DIRECTIONS AND DRINK DAILY   SUBOXONE 8-2 mg Film per sublingual film PLACE ONE FILM UNDER THE TONGUE TWICE A DAY   venlafaxine (EFFEXOR-XR) 37.5 MG 24 hr capsule Take 1 capsule (37.5 mg total) by mouth daily.       Medication Plan of Care at last office visit with MD/CNP:  Plan:   1. Patient to continue on suboxone, and was given an rx for 8/2 mg  SL two times a day, 30 day supply was prescribed.   2. Patient has significant anxiety, and will continue to be treated with effexor 75 mg po daily; we discussed potential treatment options in the future. Patient to continue on vyvanse to help with focus, which patient has been stable and functional on for months. Vyvanse 30 mg po as well as vyvanse 40 mg po daily. Continue on clonidine 0.1 mg po three times a day and gabapentin 600 mg four times a day prn anxiety/insomnia.   3. Again recommended returning to 12 step groups as he enjoyed these previously. He will consider.   4. Urine toxicology - DAM and bup pending  5. RTC in 30 days and sooner prn.      At least 25 minutes spent in the care of this patient, with >50% of this time in face-to-face counseling, specifically regarding relapse management. We also discussed recent side effect of increased appetite and ongoing treatment with effexor. Motivational interviewing technique utilized.     No access to [facility] as provider has not given access.

## 2021-06-06 NOTE — TELEPHONE ENCOUNTER
Client called back re: med request:   lisdexamfetamine (VYVANSE) 30 MG capsule  And the Buproprin XL   Asking for deidra back re above meds   [facility]     [phone]   Pharmacy Address and Hours     Address Hours   [address] [place] [id number] None Available

## 2021-06-06 NOTE — TELEPHONE ENCOUNTER
Pt had pharmacy send refill request for Wellbutrin XL to be 300mg. PT was told to start taking 2-150mg for seven days, which pt did. Pt is now needing a refill at 300mg

## 2021-06-06 NOTE — TELEPHONE ENCOUNTER
Client called states that he is still interested in switching to Wellbutrin '  But in the mean time he is running out of Effexor   Is requesting refill of Effexor to bridge till Wellbutrin can be started  Can be reached @ [phone]

## 2021-06-06 NOTE — TELEPHONE ENCOUNTER
Attempted to reach pt to gather more information, but had to leave a message. Instructed to return call and request a nurse in triage.

## 2021-06-06 NOTE — TELEPHONE ENCOUNTER
Incoming call from patient , asking for Dr Brunner to consider changing his Effexor to Wellbutrin 150mg XR as he has taken it before. If this is not possible, please note he only has one effexor left and will run out this weekend [phone]

## 2021-06-06 NOTE — TELEPHONE ENCOUNTER
Returned call to patient. Patient is aware of bupropion PA, aware that it was approved and has picked up the medication. Approval dates 1/1/20-2/25/21. Patient asking for refill on Vyvanse. Information given to refill nurse.

## 2021-06-06 NOTE — TELEPHONE ENCOUNTER
Pt. Called and informed that Vyavance 30 mg and 40 mg both approved by provider and sent to his pharmacy [facility]. Patient verbalized understanding.

## 2021-06-06 NOTE — TELEPHONE ENCOUNTER
PT is calling and wants to know when his Wellbutrin 150 mg will be filled. He advised that he has no more and he is out as of today. He will not be able to answer his telephone because he is at work. Inquiring to have the medication approved as soon as possible and sent to pharmacy so he can get his prescription today.

## 2021-06-06 NOTE — TELEPHONE ENCOUNTER
The patient did not  the refill sent to his pharmacy; insurance denied covering the medication. Insurance ([employer]) stated the refill is too early to process.    Please connect with the pt to clarify the medication dose and directions with the patient so that his insurance can process the refill. Please send a new rx if needed to the pharmacy for processing.     The patient will be out of medication over the weekend 3.14.2020.     Preferred Pharmacy:  [id number] [place] [address] & [facility] [phone] (Phone)  [phone] (Fax)

## 2021-06-06 NOTE — TELEPHONE ENCOUNTER
"Patient called returned. He reports that he would like Dr. Brunner to change his Effexor to Wellbutrin. He mentions that he is troubled by the weight gain from Effexor and knows that Wellbutrin works in curving his anxiety and helping with depression. He noted that he was once on Wellbutrin. Hopes that he can get a response before 11 am today, however, knows it takes time for the Wellbutrin to work. Also, reports that he is out of Effexor. States \"I feel fine\" when asked if he feels safe. Reports depression and anxiety are fine.      Instructed patient that staff will sent note to provider. If further questions he can call triage.   "

## 2021-06-06 NOTE — TELEPHONE ENCOUNTER
Date of Last Office Visit: 1/30/2020  Date of Next Office Visit: 3/26/2020  No shows since last visit: none  Cancellations since last visit: 1(patient initiated).   ED visits since last visit:  1/30/2020    Medication Wellbutrin 150 mg 24 hr date last ordered: 2/24/2020  Qty: 60  Refills: 0    Lapse in therapy greater than 7 days: no  Medication refill request verified as identical to current order: yes  Result of Last DAM, VPA, Li+ Level, CBC, or Carbamazepine Level (at or since last visit): N/A     [] Medication refilled per [facility] M-1.   [x] Medication unable to be refilled by RN due to criteria not met as indicated below:     []Eligibility - not seen in last year    []Supervision - no future appointment    []Compliance     []Verification - order discrepancy    []Controlled Medication    []Medication not included in RN Protocol    []90 - day supply request    [x]Other  Not sure if medication is too early to be filled or not.      Current Medication list:  Barak Clarke    (MRN [id number])   Your Current Medications Are     atenolol (TENORMIN) 50 MG tablet  Take 50 mg by mouth daily.    baclofen (LIORESAL) 10 MG tablet  TAKE ONE TABLET BY MOUTH TWICE A DAY AS NEEDED    buPROPion (WELLBUTRIN XL) 150 MG 24 hr tablet  Take 1 tablet (150 mg total) by mouth daily. Use 1 tablet daily, then increase to 2 tablets daily    citalopram (CELEXA) 20 MG tablet  Take 1 tablet (20 mg total) by mouth daily.    cloNIDine HCl (CATAPRES) 0.1 MG tablet  TAKE 1 TABLET BY MOUTH 3 TIMES A DAY AS NEEDED. HOLD FOR DIZZINESS OR LIGHT HEADEDNESS AND CALL THE CLINIC    fluticasone (FLONASE) 50 mcg/actuation nasal spray  1 spray into each nostril 2 (two) times a day.    gabapentin (NEURONTIN) 600 MG tablet  TAKE ONE TABLET BY MOUTH THREE TIMES A DAY PLUS ONE ADDITIONAL TABLET EVERY DAY AS NEEDED    lisdexamfetamine (VYVANSE) 30 MG capsule  TAKE ONE CAPSULE BY MOUTH EVERY DAY AT NOON    lisdexamfetamine (VYVANSE) 40 MG capsule  TAKE ONE CAPSULE BY  MOUTH ONCE A DAY    polyethylene glycol (GLYCOLAX) 17 gram/dose powder  MIX 17 GRAMS IN 8OZ OF LIQUID PER CONTAINER DIRECTIONS AND DRINK DAILY    SUBOXONE 8-2 mg Film per sublingual film  PLACE ONE FILM UNDER THE TONGUE TWICE A DAY    venlafaxine (EFFEXOR-XR) 37.5 MG 24 hr capsule  Take 1 capsule (37.5 mg total) by mouth daily.        Medication Plan of Care at last office visit with MD/CNP:  Plan:   1. Patient to continue on suboxone, and was given an rx for 8/2 mg  SL two times a day, 30 day supply was prescribed.   2. Patient has significant anxiety, and will continue to be treated with effexor 75 mg po daily; we discussed potential treatment options in the future. Patient to continue on vyvanse to help with focus, which patient has been stable and functional on for months. Vyvanse 30 mg po as well as vyvanse 40 mg po daily. Continue on clonidine 0.1 mg po three times a day and gabapentin 600 mg four times a day prn anxiety/insomnia.   3. Again recommended returning to 12 step groups as he enjoyed these previously. He will consider.   4. Urine toxicology - DAM and bup pending  5. RTC in 30 days and sooner prn.

## 2021-06-06 NOTE — TELEPHONE ENCOUNTER
Per pharmacy, patient was told to increase his Wellbutrin to 2 tablets/300 mg q day. However, insurance canot cover the 2 pills of 150 mg each, rather a 300 mg each day will be cover, therefore, patient needs a new script for 300 mg.     Per patient he was unable to  medication and will have been out by the gone weekend. Please review med list and provider directives or order medication.     Patient called, no answer, writer left him a message to call triage.

## 2021-06-07 NOTE — TELEPHONE ENCOUNTER
Called and left message for patient to request refills from his pharmacy and return call to triage.

## 2021-06-07 NOTE — TELEPHONE ENCOUNTER
Patient was called, message left to return call to triage to clarify reason for request to discontinue bupropion and to inquire if he has ever been on Fluoxetine.

## 2021-06-07 NOTE — TELEPHONE ENCOUNTER
PA approved for Nicotrol beginning 1/1/2020 and ends 3/27/2021.     Patient called and writer left a VM.   Pharmacy informed of PA approval.

## 2021-06-07 NOTE — TELEPHONE ENCOUNTER
Pt is advisng he would like to get off buPROPion (WELLBUTRIN XL) 300 MG 24 hr tablet and switch to another medication.    Pt also advised that he needs a refill on baclofen (LIORESAL) 10 MG tablet.    Preferred pharamcy :   [id number] - [place] [address] [address] [place] [address] [address] [phone] (Phone)  [phone] (Fax)       Pt can be reached at [phone]

## 2021-06-07 NOTE — TELEPHONE ENCOUNTER
Date of Last Office Visit: 4/16/20  Date of Next Office Visit: 5/7/20  No shows since last visit: none  Cancellations since last visit: none  ED visits since last visit:  none  Medication baclofen 10mg date last ordered: 9/26/19  Qty: 60  Refills: 0  Lapse in therapy greater than 7 days: yes  Medication refill request verified as identical to current order: yes  Result of Last DAM, VPA, Li+ Level, CBC, or Carbamazepine Level (at or since last visit): N/A     [] Medication refilled per [facility] M-1.   [x] Medication unable to be refilled by RN due to criteria not met as indicated below:     []Eligibility - not seen in last year    []Supervision - no future appointment    [x]Compliance     []Verification - order discrepancy    []Controlled Medication    []Medication not included in RN Protocol    []90 - day supply request    []Other   Current Medication list:   atenolol (TENORMIN) 50 MG tablet  Take 50 mg by mouth daily.    baclofen (LIORESAL) 10 MG tablet  TAKE ONE TABLET BY MOUTH TWICE A DAY AS NEEDED    buPROPion (WELLBUTRIN XL) 300 MG 24 hr tablet  TAKE 1 TABLET(300 MG) BY MOUTH DAILY    citalopram (CELEXA) 20 MG tablet  Take 1 tablet (20 mg total) by mouth daily.    cloNIDine HCl (CATAPRES) 0.1 MG tablet  TAKE 1 TABLET BY MOUTH 3 TIMES A DAY AS NEEDED. HOLD FOR DIZZINESS OR LIGHT HEADEDNESS AND CALL THE CLINIC    fluticasone (FLONASE) 50 mcg/actuation nasal spray  1 spray into each nostril 2 (two) times a day.    gabapentin (NEURONTIN) 600 MG tablet  TAKE ONE TABLET BY MOUTH THREE TIMES A DAY PLUS ONE ADDITIONAL TABLET EVERY DAY AS NEEDED    lisdexamfetamine (VYVANSE) 30 MG capsule  TAKE ONE CAPSULE BY MOUTH EVERY DAY AT NOON    lisdexamfetamine (VYVANSE) 40 MG capsule  TAKE ONE CAPSULE BY MOUTH ONCE A DAY    nicotine (NICOTROL) 10 mg inhaler  Inhale 1 puff as needed for smoking cessation.    polyethylene glycol (GLYCOLAX) 17 gram/dose powder  MIX 17 GRAMS IN 8OZ OF LIQUID PER CONTAINER DIRECTIONS AND DRINK DAILY     SUBOXONE 8-2 mg Film per sublingual film  PLACE ONE FILM UNDER THE TONGUE TWICE A DAY; please fill 2 weeks early per covid    venlafaxine (EFFEXOR-XR) 37.5 MG 24 hr capsule  Take 1 capsule (37.5 mg total) by mouth daily.         Medication Plan of Care at last office visit with MD/CNP:    Assessment/Plan: Patient is a 35-year-old man with anxiety, depression, ADHD and opioid use disorder currently on suboxone for the treatment of opioid cravings.   1. The patient was prescribed suboxone 8/2 mg film SL two times a day, a 30-day supply and I recommended this be filled 2 weeks early.   2. Patient prescribed vyvanse 30 mg and vyvanse 40 mg po daily for ADHD and again requested for this to be prescribed 2 weeks early given concern for supply chain given coronavirus issues. We also discussed ongoing mood and occasional craving for benzodiazepines.  3. Patient advised on protective measures regarding coronavirus and maintaining social disease.  4. I again recommended trying attendance at [facility]meetings.   5. Urine toxicology to be deferred during coronavirus crisis.  6. We will reschedule a cybervisit in 1 month and patient may contact me sooner if needed

## 2021-06-07 NOTE — TELEPHONE ENCOUNTER
Date of Last Office Visit: 3/26/20  Date of Next Office Visit: 4/16/20  No shows since last visit: 0  Cancellations since last visit: 0  ED visits since last visit:  0    Medication bupropion 300 mg date last ordered: 3/16/20  Qty: 30  Refills: 0    Lapse in therapy greater than 7 days: No  Medication refill request verified as identical to current order: Yes  Result of Last DAM, VPA, Li+ Level, CBC, or Carbamazepine Level (at or since last visit): N/A     [x] Medication refilled per [facility] M-1.   [] Medication unable to be refilled by RN due to criteria not met as indicated below:     []Eligibility - not seen in last year    []Supervision - no future appointment    []Compliance     []Verification - order discrepancy    []Controlled Medication    []Medication not included in RN Protocol    []90 - day supply request    []Other     Current Medication list:  Barak Clarke    (MRN [id number])   Your Current Medications Are     atenolol (TENORMIN) 50 MG tablet  Take 50 mg by mouth daily.    baclofen (LIORESAL) 10 MG tablet  TAKE ONE TABLET BY MOUTH TWICE A DAY AS NEEDED    buPROPion (WELLBUTRIN XL) 300 MG 24 hr tablet  Take 1 tablet (300 mg total) by mouth daily.    citalopram (CELEXA) 20 MG tablet  Take 1 tablet (20 mg total) by mouth daily.    cloNIDine HCl (CATAPRES) 0.1 MG tablet  TAKE 1 TABLET BY MOUTH 3 TIMES A DAY AS NEEDED. HOLD FOR DIZZINESS OR LIGHT HEADEDNESS AND CALL THE CLINIC    fluticasone (FLONASE) 50 mcg/actuation nasal spray  1 spray into each nostril 2 (two) times a day.    gabapentin (NEURONTIN) 600 MG tablet  TAKE ONE TABLET BY MOUTH THREE TIMES A DAY PLUS ONE ADDITIONAL TABLET EVERY DAY AS NEEDED    lisdexamfetamine (VYVANSE) 30 MG capsule  TAKE ONE CAPSULE BY MOUTH EVERY DAY AT NOON    lisdexamfetamine (VYVANSE) 40 MG capsule  TAKE ONE CAPSULE BY MOUTH ONCE A DAY    nicotine (NICOTROL) 10 mg inhaler  Inhale 1 puff as needed for smoking cessation.    polyethylene glycol (GLYCOLAX) 17 gram/dose powder   MIX 17 GRAMS IN 8OZ OF LIQUID PER CONTAINER DIRECTIONS AND DRINK DAILY    SUBOXONE 8-2 mg Film per sublingual film  PLACE ONE FILM UNDER THE TONGUE TWICE A DAY; please fill 2 weeks early per covid    venlafaxine (EFFEXOR-XR) 37.5 MG 24 hr capsule  Take 1 capsule (37.5 mg total) by mouth daily.        Medication Plan of Care at last office visit with MD/CNP:  Assessment/Plan:  1. The patient was prescribed suboxone 8/2 mg film SL two times a day, a 30-day supply and I recommended this be filled 2 weeks early.   2. Patient prescribed vyvanse 30 mg and vyvanse 40 mg po daily for ADHD and ok to prescribe 2 weeks early given concern for supply chain given coronavirus issues. We also discussed ongoing mood and occasional craving for benzodiazepines. Patient applauded on avoiding these.   3. Patient advised on protective measures regarding coronavirus and maintaining social disease.  4. Discussed options for cybermeetings.   5. Urine toxicology to be deferred during coronavirus crisis.  6. We will reschedule a cybervisit in 1 month and patient may contact me sooner if needed.

## 2021-06-08 NOTE — TELEPHONE ENCOUNTER
Date of Last Office Visit: 6/8/2020  Date of Next Office Visit: none (central scheduling will contact patient)  No shows since last visit: none  Cancellations since last visit: none  ED visits since last visit: none    Medication Wellbutrin  mg 24 hr date last ordered: 5/13/2020 Qty:30  Refills:0    Lapse in therapy greater than 7 days: none  Medication refill request verified as identical to current order: yes  Result of Last DAM, VPA, Li+ Level, CBC, or Carbamazepine Level (at or since last visit): n/a     [] Medication refilled per [facility] M-1.   [x] Medication unable to be refilled by RN due to criteria not met as indicated below:     []Eligibility - not seen in last year    []Supervision - no future appointment    []Compliance     []Verification - order discrepancy    []Controlled Medication    [x]Medication not included in RN Protocol    []90 - day supply request    []Other   Current Medication list: Barak Clarke    (MRN [id number])   Your Current Medications Are     atenolol (TENORMIN) 50 MG tablet  Take 50 mg by mouth daily.    baclofen (LIORESAL) 10 MG tablet  TAKE 1 TABLET BY MOUTH TWICE DAILY AS NEEDED    buPROPion (WELLBUTRIN XL) 150 MG 24 hr tablet  Take 1 tablet (150 mg total) by mouth daily.    cloNIDine HCl (CATAPRES) 0.1 MG tablet  TAKE 1 TABLET BY MOUTH 3 TIMES A DAY AS NEEDED. HOLD FOR DIZZINESS OR LIGHT HEADEDNESS AND CALL THE CLINIC    DULoxetine (CYMBALTA) 30 MG capsule  Take 1 capsule (30 mg total) by mouth 2 (two) times a day. Use 1 per day x 1 week, then increase to twice daily    fluticasone (FLONASE) 50 mcg/actuation nasal spray  1 spray into each nostril 2 (two) times a day.    gabapentin (NEURONTIN) 600 MG tablet  TAKE ONE TABLET BY MOUTH THREE TIMES A DAY PLUS ONE ADDITIONAL TABLET EVERY DAY AS NEEDED    lisdexamfetamine (VYVANSE) 30 MG capsule  TAKE ONE CAPSULE BY MOUTH EVERY DAY AT NOON    lisdexamfetamine (VYVANSE) 40 MG capsule  TAKE ONE CAPSULE BY MOUTH ONCE A DAY    nicotine  (NICOTROL) 10 mg inhaler  Inhale 1 puff as needed for smoking cessation.    polyethylene glycol (GLYCOLAX) 17 gram/dose powder  MIX 17 GRAMS IN 8OZ OF LIQUID PER CONTAINER DIRECTIONS AND DRINK DAILY    SUBOXONE 8-2 mg Film per sublingual film  Place 1 Film under the tongue 2 (two) times a day. PLACE ONE FILM UNDER THE TONGUE TWICE A DAY; please fill 2 weeks early per covid        Medication Plan of Care at last office visit with MD/CNP: Assessment/Plan:  Patient is a 35-year-old man with anxiety, depression, ADHD and opioid use disorder currently on suboxone who reports recent benzodiazepine cravings related to having higher anxiety. We discussed changing anxiety medication and starting cymbalta in place of bupropion. I also recommended starting individual psychotherapy.   1. The patient was prescribed suboxone 8/2 mg film SL two times a day, a 30-day supply with 1 refill.   2. Patient prescribed vyvanse 30 mg and vyvanse 40 mg po daily for ADHD.   3. Patient reports he ceased use of cymbalta. Bupropion 150 mg po daily started, and patient reports this is working well.   4. I again recommended trying attendance at [employer].   5. Urine toxicology to be deferred during coronavirus crisis.  6. Follow-up in 2 months and sooner prn.

## 2021-06-08 NOTE — PROGRESS NOTES
Addiction Medicine  Outpatient Visit  Dr. García    1/26/2017    Barak Clarke, 1985.    Subjective   Patient was seen today in follow-up for opiate use disorder, severe.    Last UDS performed on 12/29/16 and was: positive for amphetamines - due to medication  Last BUP performed on 12/6/17 and was: positive     UA collected    [place]  program [place]. There has been no worrisome pharmacy activity in the past 3 month      How is life going?  Are there any significant stressors? Any significant depression? Are you working or going to school?  Pt states life is going good. Biggest stressor is dwelling mortality issues. Doesn't like the thought of death. No SI /HI       Have you maintained sobriety from all substances? Are you having any cravings?  From 0 to 5 (zero being none) grade your craving. If not what is your problem substance and frequency?   Yes, small cravings now and again for marijuana but doesn't take over.       What is the long term plan for suboxone?    Get off someday         Is the medication causing any somnolence or significant side effects?  Constipation, but has meds for this.           Addiction History:  This is substantially documented elsewhere.    Recovery Environment:  This is substantially documented elsewhere.    Allergies:  Prazosin and Lactose      Medications:  Current Outpatient Prescriptions   Medication Sig Dispense Refill     atenolol (TENORMIN) 50 MG tablet Take 1 tablet (50 mg total) by mouth daily. 30 tablet 0     buprenorphine-naloxone (SUBOXONE SL FILM) 8-2 mg Film per sublingual film Take 2 films daily 24 Film 0     gabapentin (NEURONTIN) 300 MG capsule Take 1 capsule (300 mg total) by mouth 4 (four) times a day as needed (anxiety). 120 capsule 2     lisdexamfetamine (VYVANSE) 30 MG capsule Take one capsule by mouth every day at noon. 30 capsule 0     lisdexamfetamine (VYVANSE) 40 MG capsule Take 1 capsule (40 mg total) by mouth every morning. 30  capsule 0     multivitamin with minerals (THERA-M) 9 mg iron-400 mcg Tab tablet Take 1 tablet by mouth daily. 30 tablet 0     nicotine (NICODERM CQ) 7 mg/24 hr Place 1 patch on the skin daily.       OMEGA-3/DHA/EPA/FISH OIL (FISH OIL-OMEGA-3 FATTY ACIDS) 300-1,000 mg capsule Take 1 g by mouth daily.       polyethylene glycol (MIRALAX) 17 gram packet Take 1 packet (17 g total) by mouth daily. 30 packet 1     No current facility-administered medications for this visit.          Physical Examination:  There were no vitals taken for this visit.  Physical Exam  Documented elsewhere        [place] Prescription Monitoring Program:  Reviewed and no worrisome pharmacy activity noted    ROBIN HOLMAN  Search Criteria: Last Name 'robin' and First Name 'akilah' and  = '[birth year]' and Request Period = '10/28/16' to  ' - 10 out of 10 Recipients Selected.  Fill Date Product, Str, Form Qty Days Pt ID Prescriber Written RX# N/R* Pharm MED+  ---------- -------------------------------- ------ ---- --------- ---------- ---------- ------------ ----- --------- ------  2017 GABAPENTIN 300 MG CAPSULE 120.00 30 [id number] 2016 [id number] N [id number] 00.0  2017 SUBOXONE 8 MG-2 MG SL FILM 22.00 11 [id number] 2017 [id number] N [id number] 480.0  2017 SUBOXONE 8 MG-2 MG SL FILM 2.00 1 [id number] 2017 [id number] N [id number] 480.0  2017 VYVANSE 40 MG CAPSULE 30.00 30 [id number] 2017 [id number] N [id number] 00.0  2017 VYVANSE 30 MG CAPSULE 30.00 30 [id number] 2017 [id number] N [id number] 00.0  2016 SUBOXONE 8 MG-2 MG SL FILM 30.00 15 [id number] 2016 [id number] N [id number] 480.0  2016 GABAPENTIN 300 MG CAPSULE 120.00 30 [id number] 10/13/2016 [id number] R [id number] 00.0  2016 VYVANSE 30 MG CAPSULE 30.00 30 [id number] 2016 [id number] N [id number] 00.0  2016 VYVANSE 40 MG CAPSULE 30.00 30 [id number]  2016 [id number] N [id number] 00.0  2016 SUBOXONE 8 MG-2 MG SL FILM 42.00 21 [id number] 2016 [id number] N [id number] 480.0  2016 GABAPENTIN 300 MG CAPSULE 120.00 30 [id number] 10/13/2016 [id number] R [id number] 00.0  11/10/2016 VYVANSE 30 MG CAPSULE 30.00 30 [id number] 11/10/2016 [id number] N [id number] 00.0  11/10/2016 VYVANSE 40 MG CAPSULE 30.00 30 [id number] 11/10/2016 [id number] N [id number] 00.0  2016 GABAPENTIN 300 MG CAPSULE 120.00 30 [id number] 10/13/2016 [id number] N [id number] 00.0  *N/R N=New R=Refill  +MED Daily  Prescribers for prescriptions listed  ----------------------------------------------------------------------------------------------------------------------------------  [id number] JL-MARY COLVIN; [address] [facility] [address]  [id number] YESENIA REILLY MD; [facility], [address], [place] [address]  Pharmacies that dispensed prescriptions listed  ----------------------------------------------------------------------------------------------------------------------------------  [id number] [facility]; [address] [address],  [id number] [facility] : [id number], [address], [place] [address],  Patients that match search criteria  ----------------------------------------------------------------------------------------------------------------------------------  [id number] ROBIN HOLMAN, [facility] [birth year]; [address], [place] [address]  [id number] ROBIN HOLMAN, [facility] [birth year]; [address], [place] [address]  [id number] ROBIN HOLMAN, [facility] [birth year]; [address], [place] [address]  [id number] ROBIN HOLMAN, [facility] [birth year]; [address], [place] [address]  [id number] ROBIN HOLMAN,  [birth year]; [address] [address]  [id number] ROBIN HOLMAN,  [birth year]; [address] [address]  [id number] ROBIN HOLMAN, [facility] [birth year]; [address], [facility] [address]  [id number] ROBIN HOLMAN,  [birth year]; [address] , [facility] [address]  [id number] ROBIN HOLMAN,  [birth year]; [address], [facility] [address]  [id number] ROBIN HOLMAN,  [birth year]; [address] [place] [address]

## 2021-06-08 NOTE — TELEPHONE ENCOUNTER
Pt called and reports his bupropion 150mg was unable to be filled at the pharmacy.  Pharmacy told him that Dr. Brunner did not approve the refill.  Pt uses [facility] in [place], [phone].  He would like a call back to find out why this was not approved. He reports he is switching to duloxetine but was told to continue taking bupropion for several weeks in conjunction with duloxetine but he is now out and has only been taking both meds for four days. Can be reached at [phone].

## 2021-06-08 NOTE — PROGRESS NOTES
Pt is here for opioid use disorder folllow up and to discuss the Neuro[facility] test results.   Doing well, reports being sober. Some days he gets some cravings for marijuana use, otherwise doing good.     Correct pharmacy verified with patient and confirmed in snapshot? [x] yes []no    Medications Phoned  to Pharmacy [] yes [x]no  Name of Pharmacist:  List Medications, including dose, quantity and instructions      Medication Prescriptions given to patient   [] yes  [x] no   List the name of the drug the prescription was written for.       Medications ordered this visit were e-scribed.  Verified by order class [x] yes  [] no  2/17/17  Medication changes or discontinuations were communicated to patient's pharmacy: [] yes  [x] no    UA collected [x] yes    [] no    [place] Prescription Monitoring [place] Reviewed? [x] yes  [] no    Referrals were made to: none    Future appointment was made: [x] yes  [] no  3/16/17  Dictation completed at time of chart check: [x] yes  [] no    I have checked the documentation for today s encounters and the above information has been reviewed and completed.

## 2021-06-08 NOTE — PROGRESS NOTES
Correct pharmacy verified with patient and confirmed in snapshot? [x] yes []no    Medications Phoned  to Pharmacy [] yes [x]no  Name of Pharmacist:  List Medications, including dose, quantity and instructions      Medication Prescriptions given to patient   [] yes  [x] no   List the name of the drug the prescription was written for.      Medications ordered this visit were e-scribed.  Verified by order class [x] yes  [] no  Vyvanse    Medication changes or discontinuations were communicated to patient's pharmacy:  [x] yes  [] no  Pharmacist Spoke With: D/C of wrong rx of vyvanse amount in 1 tab only    UA collected [x] yes  [] no    [place] Prescription Monitoring [place] Reviewed? [x] yes  [] no    Referrals/Labs were made to: None    Completed Charge Capture?  [x] yes  [] no    Future appointment was made: [x] yes  [] no  2/17/17  Dictation completed at time of chart check: [x] yes  [] no    I have checked the documentation for today s encounters and the above information has been reviewed and completed.

## 2021-06-08 NOTE — TELEPHONE ENCOUNTER
Called and left message for patient to return call to triage. Patient's pharmacy has been requesting bupropion 300mg which patient is no longer taking.

## 2021-06-08 NOTE — TELEPHONE ENCOUNTER
Patient said he finished the Bupropion 300 mg today and is suppose to start the Bupropion 150 mg today.  He will need a new script generated to his pharmacy for the decreased dose.  Below is Dr. Brunner's last visit note.    Routed to Dr. Sloan who is covering for Dr. Brunner.     Assessment/Plan:  Patient is a 35-year-old man with anxiety, depression, ADHD and opioid use disorder currently on suboxone who reports recent benzodiazepine cravings related to having higher anxiety. We discussed changing anxiety medication and starting cymbalta in place of bupropion. I also recommended starting individual psychotherapy.   1. The patient was prescribed suboxone 8/2 mg film SL two times a day, a 30-day supply and I recommended this be filled 2 weeks early.   2. Patient prescribed vyvanse 30 mg and vyvanse 40 mg po daily for ADHD and again requested for this to be prescribed 2 weeks early given concern for supply chain given coronavirus issues.   3. Patient to be started on cymbalta 30 mg po two times a day, and will taper off bupropion as this is causing him anxiety. Strongly recommended patient return to individual psychotherapy.   4. I again recommended trying attendance at [employer].   5. Discussed increasing baclofen 10-20 mg po at bedtime or up to twice daily as needed to help with benzodiazepine cravings.   6. Urine toxicology to be deferred during coronavirus crisis.  7. Follow-up in 1 month and sooner prn.

## 2021-06-08 NOTE — TELEPHONE ENCOUNTER
Date of Last Office Visit: 6/8/2020  Date of Next Office Visit: none (central scheduling will contact patient)  No shows since last visit: none  Cancellations since last visit: none  ED visits since last visit: none    Medication Baclofen 10 mg date last ordered: 5/4/2020 Qty:60  Refills:0    Lapse in therapy greater than 7 days: none  Medication refill request verified as identical to current order: yes  Result of Last DAM, VPA, Li+ Level, CBC, or Carbamazepine Level (at or since last visit): n/a     [] Medication refilled per [facility] M-1.   [x] Medication unable to be refilled by RN due to criteria not met as indicated below:     []Eligibility - not seen in last year    []Supervision - no future appointment    []Compliance     []Verification - order discrepancy    []Controlled Medication    [x]Medication not included in RN Protocol    []90 - day supply request    []Other   Current Medication list: Barak Clarke    (MRN [id number])   Your Current Medications Are     atenolol (TENORMIN) 50 MG tablet  Take 50 mg by mouth daily.    baclofen (LIORESAL) 10 MG tablet  TAKE 1 TABLET BY MOUTH TWICE DAILY AS NEEDED    buPROPion (WELLBUTRIN XL) 150 MG 24 hr tablet  Take 1 tablet (150 mg total) by mouth daily.    cloNIDine HCl (CATAPRES) 0.1 MG tablet  TAKE 1 TABLET BY MOUTH 3 TIMES A DAY AS NEEDED. HOLD FOR DIZZINESS OR LIGHT HEADEDNESS AND CALL THE CLINIC    DULoxetine (CYMBALTA) 30 MG capsule  Take 1 capsule (30 mg total) by mouth 2 (two) times a day. Use 1 per day x 1 week, then increase to twice daily    fluticasone (FLONASE) 50 mcg/actuation nasal spray  1 spray into each nostril 2 (two) times a day.    gabapentin (NEURONTIN) 600 MG tablet  TAKE ONE TABLET BY MOUTH THREE TIMES A DAY PLUS ONE ADDITIONAL TABLET EVERY DAY AS NEEDED    lisdexamfetamine (VYVANSE) 30 MG capsule  TAKE ONE CAPSULE BY MOUTH EVERY DAY AT NOON    lisdexamfetamine (VYVANSE) 40 MG capsule  TAKE ONE CAPSULE BY MOUTH ONCE A DAY    nicotine (NICOTROL) 10 mg  inhaler  Inhale 1 puff as needed for smoking cessation.    polyethylene glycol (GLYCOLAX) 17 gram/dose powder  MIX 17 GRAMS IN 8OZ OF LIQUID PER CONTAINER DIRECTIONS AND DRINK DAILY    SUBOXONE 8-2 mg Film per sublingual film  Place 1 Film under the tongue 2 (two) times a day. PLACE ONE FILM UNDER THE TONGUE TWICE A DAY; please fill 2 weeks early per covid        Medication Plan of Care at last office visit with MD/CNP: Assessment/Plan:  Patient is a 35-year-old man with anxiety, depression, ADHD and opioid use disorder currently on suboxone who reports recent benzodiazepine cravings related to having higher anxiety. We discussed changing anxiety medication and starting cymbalta in place of bupropion. I also recommended starting individual psychotherapy.   1. The patient was prescribed suboxone 8/2 mg film SL two times a day, a 30-day supply with 1 refill.   2. Patient prescribed vyvanse 30 mg and vyvanse 40 mg po daily for ADHD.   3. Patient reports he ceased use of cymbalta. Bupropion 150 mg po daily started, and patient reports this is working well.   4. I again recommended trying attendance at [employer].   5. Urine toxicology to be deferred during coronavirus crisis.  6. Follow-up in 2 months and sooner prn.

## 2021-06-08 NOTE — TELEPHONE ENCOUNTER
Left patient vm to ask that he call and set up his follow up with Dr Brunner 2 months from 6.8.2020 visit per office visit note

## 2021-06-09 NOTE — TELEPHONE ENCOUNTER
Received message from triage that patient pharmacist needed clarification. Called pharmacy and spoke with pharmacist who needed clarification on the description. Informed Dr. Sloan and she will change from Lexapro to Prozac 20 mg. Informed pharmacy that they should cancel script for lexapro. Pharmacist verbalized understanding.

## 2021-06-09 NOTE — TELEPHONE ENCOUNTER
Date of Last Office Visit: 6/8/2020   Date of Next Office Visit: none (central scheduling will contact patient.   No shows since last visit: none  Cancellations since last visit: none  ED visits since last visit:  none    Medication Baclofen 10 mg  date last ordered:6/12/2020  Qty:60   Refills:0    Medication Bupropion 150 mg date last ordered: 6/12/2020  Qty: 30  Refills: 0    Lapse in therapy greater than 7 days: no-few days not 7.  Medication refill request verified as identical to current order: yes  Result of Last DAM, VPA, Li+ Level, CBC, or Carbamazepine Level (at or since last visit): n/a     [] Medication refilled per [facility] M-1.   [x] Medication unable to be refilled by RN due to criteria not met as indicated below:     []Eligibility - not seen in last year    [x]Supervision - no future appointment    []Compliance     []Verification - order discrepancy    []Controlled Medication    [x]Medication not included in RN Protocol    []90 - day supply request    []Other   Current Medication list:  Barak YULIA Vince    (MRN [id number])   Your Current Medications Are     atenolol (TENORMIN) 50 MG tablet  Take 50 mg by mouth daily.    baclofen (LIORESAL) 10 MG tablet  TAKE 1 TABLET BY MOUTH TWICE DAILY AS NEEDED    buPROPion (WELLBUTRIN XL) 150 MG 24 hr tablet  TAKE 1 TABLET(150 MG) BY MOUTH DAILY    cloNIDine HCl (CATAPRES) 0.1 MG tablet  TAKE 1 TABLET BY MOUTH 3 TIMES A DAY AS NEEDED. HOLD FOR DIZZINESS OR LIGHT HEADEDNESS AND CALL THE CLINIC    DULoxetine (CYMBALTA) 30 MG capsule  Take 1 capsule (30 mg total) by mouth 2 (two) times a day. Use 1 per day x 1 week, then increase to twice daily    fluticasone (FLONASE) 50 mcg/actuation nasal spray  1 spray into each nostril 2 (two) times a day.    gabapentin (NEURONTIN) 600 MG tablet  TAKE ONE TABLET BY MOUTH THREE TIMES A DAY PLUS ONE ADDITIONAL TABLET EVERY DAY AS NEEDED    lisdexamfetamine (VYVANSE) 30 MG capsule  TAKE ONE CAPSULE BY MOUTH EVERY DAY AT NOON     lisdexamfetamine (VYVANSE) 40 MG capsule  TAKE ONE CAPSULE BY MOUTH ONCE A DAY    nicotine (NICOTROL) 10 mg inhaler  Inhale 1 puff as needed for smoking cessation.    polyethylene glycol (GLYCOLAX) 17 gram/dose powder  MIX 17 GRAMS IN 8OZ OF LIQUID PER CONTAINER DIRECTIONS AND DRINK DAILY    SUBOXONE 8-2 mg Film per sublingual film  Place 1 Film under the tongue 2 (two) times a day. PLACE ONE FILM UNDER THE TONGUE TWICE A DAY; please fill 2 weeks early per covid        Medication Plan of Care at last office visit with MD/CNP:  Assessment/Plan:  Patient is a 35-year-old man with anxiety, depression, ADHD and opioid use disorder currently on suboxone who reports recent benzodiazepine cravings related to having higher anxiety. We discussed changing anxiety medication and starting cymbalta in place of bupropion. I also recommended starting individual psychotherapy.   1. The patient was prescribed suboxone 8/2 mg film SL two times a day, a 30-day supply with 1 refill.   2. Patient prescribed vyvanse 30 mg and vyvanse 40 mg po daily for ADHD.   3. Patient reports he ceased use of cymbalta. Bupropion 150 mg po daily started, and patient reports this is working well.   4. I again recommended trying attendance at [facility].   5. Urine toxicology to be deferred during coronavirus crisis.  6. Follow-up in 2 months and sooner prn.

## 2021-06-09 NOTE — TELEPHONE ENCOUNTER
Pt. Called refill line left message asking for Dr. Brunner to give him 300 mg of gabapentin as he is almost out of his 600 mg and pharmacy said he has till a week before he can have his refills.     Staff called pharmacy and spoke with pharmacist Jamie who went over frequency of the medication and was able to resolve the situation. Pharmacy will call patient to let him know to  his gabapentin.

## 2021-06-09 NOTE — TELEPHONE ENCOUNTER
7/15/20 Received call from patient, stating he ran out of Gabapentin and he is not sure how. He stated being told by the Pharmacy that he can not get refill until 7/20. He would like to know, if he can get 300 mg of Gabapentin for a week until he can get a his refill.

## 2021-06-09 NOTE — TELEPHONE ENCOUNTER
Pt. Called. He stated that Prozac made him like he was drunk and it was not a good feeling. Pt. Does not seem to want to try this medication.

## 2021-06-09 NOTE — PROGRESS NOTES
________________________________________  Medications Phoned  to Pharmacy [] yes [x]no  Name of Pharmacist:  List Medications, including dose, quantity and instructions    Medications ordered this visit were e-scribed.  Verified by order class [x] yes  [] no     Medication changes or discontinuations were communicated to patient's pharmacy: [x] yes  [] no yes, RN spoke with pharmacy staff Mar and informed him of meds Bupropion 150 mg and Cymbalth 30 mg d/c by Dr. Sloan. He stated that the lexapro 20 mg ordered was received.     Dictation completed at time of chart check: [] yes  [x] no    I have checked the documentation for today s encounters and the above information has been reviewed and completed.

## 2021-06-09 NOTE — TELEPHONE ENCOUNTER
The patient has been scheduled for a return visit with Dr. Sloan on 7.21.2020    The patient would like to know where Dr Brunner is going to, he might want to follow her to her new location. Please connect with the patient at [phone]

## 2021-06-09 NOTE — TELEPHONE ENCOUNTER
Spoke to Barak and he said he had tried the Prozac in the past and it makes him feel drunk.  He said his speech became slurred and had that drunk feeling.  Patient also said he had tried Remeron, vistaril, Buspar and Cymbalta in the past without any therapeutic results.  He is worried about anxiety in the afternoon with being off Wellbutrin.

## 2021-06-09 NOTE — PROGRESS NOTES
This telephone visit will be conducted via a call between you and your physician/provider. We have found that certain health care needs can be provided without the need for an in-person physical exam. This service lets us provide the care you need with a telephone conversation. If a prescription is necessary we can send it directly to your pharmacy. If lab work is needed we can place an order for that and you can then stop by our lab to have the test done at a later time.    Just as we bill insurance for in-person visits, we also bill insurance for telephone visits. If you have questions about your insurance coverage, we recommend that you speak with your insurance company.    Patient has given verbal consent for Telephone visit? yes cell phone: [phone]  Swathi MOSES RN    Patient verified allergies, medications and pharmacy via phone. PHQ9:7 and GAD7: 7 done verbally with writer. Patient states yes is ready for visit.

## 2021-06-09 NOTE — TELEPHONE ENCOUNTER
Patient called. He is concerned about taking Lexapro as he believes it will gain weight and he has read about it and so many people talk about the med causing them to gain weight. Patient wants to try an alternative different from what he has tried. Staff informed him that Dr. Sloan said she could order Buspar, he said it never helped him when he used it. Provider informed via teams.   He is also afraid he will have a lot of anxiety being off the Wellbutrin as provider did d/c it today.     Patient encouraged to call clinic as needed.

## 2021-06-09 NOTE — TELEPHONE ENCOUNTER
Returned call to patient and informed him that he can  his refill of 600 mg gabapentin per pharmacist.

## 2021-06-09 NOTE — PROGRESS NOTES
Patient was seen today in follow-up for opiate dependence    Last UDS performed on 2/17/17 and was: positive for amphetamines  Last BUP performed on  2/17/17 and was: positive   Pt is stable from an addiction standpoint. Uses Miralax for constipation. He is trying to quit e-cigs due to difficulties breathing. He is working on Weight loss.      printed    Correct pharmacy verified with patient and confirmed in snapshot? [x] yes []no    Medications Phoned  to Pharmacy [] yes [x]no  Name of Pharmacist:  List Medications, including dose, quantity and instructions      Medication Prescriptions given to patient   [] yes  [x] no   List the name of the drug the prescription was written for.       Medications ordered this visit were e-scribed.  Verified by order class [x] yes  [] no  Suboxone, Gabapentin, Vyvance 30 and 40 mg, and Miralax.  Medication changes or discontinuations were communicated to patient's pharmacy: [] yes  [x] no    UA collected [x] yes    [] no    [place] Prescription Monitoring Program Reviewed? [x] yes  [] no    Referrals were made to:  none    Future appointment was made: [x] yes  [] no  4/20/17  Dictation completed at time of chart check: [x] yes  [] no    I have checked the documentation for today s encounters and the above information has been reviewed and completed.

## 2021-06-09 NOTE — TELEPHONE ENCOUNTER
Patient said he does not want to take the Prozac or the Wellbutrin XL.  He wants to know if Wellbutrin can be ordered that doesn't last as long.  He said he is willing to take anything for anxiety that is not a narcotic.

## 2021-06-10 NOTE — PROGRESS NOTES
DAM  Date: 3/23/17   Results: pos amp  BUP   Date: 17   Results: pos    Attending outpatient groups: 2 x a week    Working or school: not right now    Concerns today: Patient would like to discuss how to quit smoking.    [place] :    Health Information [place] Date: 17  [employer] Query Report Page#: 1  Patient Rx History Report  ROBIN HOLMAN  Search Criteria: Last Name 'robin' and First Name 'akilah' and  = '[birth year]' and Request Period = '10/22/16' to  ' - 10 out of 10 Recipients Selected.  Fill Date Product, Str, Form Qty Days Pt ID Prescriber Written RX# N/R* Pharm Pay MED+  ---------- -------------------------------- ------ ---- --------- ---------- ---------- ------------ ----- --------- ---------- ------  2017 VYVANSE 30 MG CAPSULE 30.00 30 [id number] 2017 [id number] N [id number] 00.0  2017 VYVANSE 40 MG CAPSULE 30.00 30 [id number] 2017 [id number] N [id number] 00.0  2017 GABAPENTIN 300 MG CAPSULE 120.00 30 [id number] 2017 [id number] N [id number] 00.0  2017 SUBOXONE 8 MG-2 MG SL FILM 60.00 30 [id number] 2017 [id number] N [id number] 480.0  2017 VYVANSE 40 MG CAPSULE 30.00 30 [id number] 2017 [id number] N [id number] 00.0  2017 VYVANSE 30 MG CAPSULE 30.00 30 [id number] 2017 [id number] N [id number] 00.0  2017 GABAPENTIN 300 MG CAPSULE 120.00 30 [id number] 2016 [id number] R [id number] 00.0  2017 SUBOXONE 8 MG-2 MG SL FILM 60.00 30 [id number] 2017 [id number] N [id number] 480.0  2017 VYVANSE 30 MG CAPSULE 30.00 30 [id number] 2017 [id number] N [id number] 00.0  2017 VYVANSE 40 MG CAPSULE 30.00 30 [id number] 2017 [id number] N [id number] 00.0  2017 SUBOXONE 8 MG-2 MG SL FILM 60.00 30 [id number] 2017 [id number] N [id number] 480.0  2017 GABAPENTIN 300 MG CAPSULE 120.00 30 [id number] 2016 [id number] N [id number]  00.0  01/16/2017 SUBOXONE 8 MG-2 MG SL FILM 22.00 11 [id number] 01/13/2017 [id number] N [id number] 480.0  01/13/2017 SUBOXONE 8 MG-2 MG SL FILM 2.00 1 [id number] 01/13/2017 [id number] N [id number] 480.0  01/05/2017 VYVANSE 40 MG CAPSULE 30.00 30 [id number] 01/05/2017 [id number] N [id number] 00.0  01/05/2017 VYVANSE 30 MG CAPSULE 30.00 30 [id number] 01/05/2017 [id number] N [id number] 00.0  12/29/2016 SUBOXONE 8 MG-2 MG SL FILM 30.00 15 [id number] 12/29/2016 [id number] N [id number] 480.0  12/21/2016 GABAPENTIN 300 MG CAPSULE 120.00 30 [id number] 10/13/2016 [id number] R [id number] 00.0  12/08/2016 VYVANSE 30 MG CAPSULE 30.00 30 [id number] 12/06/2016 [id number] N [id number] 00.0  12/08/2016 VYVANSE 40 MG CAPSULE 30.00 30 [id number] 12/06/2016 [id number] N [id number] 00.0  12/06/2016 SUBOXONE 8 MG-2 MG SL FILM 42.00 21 [id number] 12/06/2016 [id number] N [id number] 480.0  11/28/2016 GABAPENTIN 300 MG CAPSULE 120.00 30 [id number] 10/13/2016 [id number] R [id number] 00.0  11/10/2016 VYVANSE 30 MG CAPSULE 30.00 30 [id number] 11/10/2016 [id number] N [id number] 00.0  11/10/2016 VYVANSE 40 MG CAPSULE 30.00 30 [id number] 11/10/2016 [id number] N [id number] 00.0  11/08/2016 SUBOXONE 8 MG-2 MG SL FILM 56.00 28 [id number] 11/08/2016 [id number] N [id number] 480.0  11/01/2016 GABAPENTIN 300 MG CAPSULE 120.00 30 [id number] 10/13/2016 [id number] N [id number] 00.0  *N/R N=New R=Refill  +MED Daily  Prescribers for prescriptions listed  ----------------------------------------------------------------------------------------------------------------------------------  [id number] JL-MARY COLVIN; [address] [facility] [address]  [id number] YESENIA REILLY MD; [facility], [address], [place] [address]  [id number] MANJULA DOWELL MD; [facility], [address] [place] [address]  Pharmacies that dispensed prescriptions  listed  -------------------------------------------------------------------------------------------------------------------------------

## 2021-06-10 NOTE — PROGRESS NOTES
Correct pharmacy verified with patient and confirmed in snapshot? [x] yes []no    Medications Phoned  to Pharmacy [] yes [x]no  Name of Pharmacist:  List Medications, including dose, quantity and instructions      Medication Prescriptions given to patient   [] yes  [x] no   List the name of the drug the prescription was written for.       Medications ordered this visit were e-scribed.  Verified by order class [x] yes  [] no   Gabapentin 300 mg, Suboxone 8-2 mg, Vyvanse 30 mg & 40 mg  Medication changes or discontinuations were communicated to patient's pharmacy: [] yes  [x] no    UA collected [x] yes    [] no    [place] Prescription Monitoring Program Reviewed? [x] yes  [] no    Referrals were made to:  None    Future appointment was made: [x] yes  [] no    Dictation completed at time of chart check: [x] yes  [] no    I have checked the documentation for today s encounters and the above information has been reviewed and completed.

## 2021-06-10 NOTE — PROGRESS NOTES
Pt here for follow up.    Last DAM 17 results: positive Amphetamines  Last BUP 17 results: positive     Depression 2/5 not all the time  Anxiety 2/5  Not all the time  Sleep about 8 hours, having nightmares and wakes up feeling tense at times, recent death in the family amando started around that time frame, he states.     Cravings: cannabis 3/10 manageable    Denies SI/HI at this time        [place]:  [place] Date: 17  [employer] Query Report Page#: 1  Patient Rx History Report  ROBIN HOLMAN  Search Criteria: Last Name 'robin' and First Name 'akilah' and  = '[birth year]' and Request Period =  to  ' - 10 out of 10 Recipients Selected.  Fill Date Product, Str, Form Qty Days Pt ID Prescriber Written RX# N/R* Pharm **MED+  ---------- -------------------------------- ------ ---- --------- ---------- ---------- ------------ ----- --------- ------  2017 GABAPENTIN 300 MG CAPSULE 120.00 30 [id number] 2017 [id number] R [id number] 00.0  2017 VYVANSE 40 MG CAPSULE 30.00 30 [id number] 2017 [id number] N [id number] 00.0  2017 VYVANSE 30 MG CAPSULE 30.00 30 [id number] 2017 [id number] N [id number] 00.0  2017 SUBOXONE 8 MG-2 MG SL FILM 60.00 30 [id number] 2017 [id number] N [id number] 480.0  2017 VYVANSE 30 MG CAPSULE 30.00 30 [id number] 2017 [id number] N [id number] 00.0  2017 VYVANSE 40 MG CAPSULE 30.00 30 [id number] 2017 [id number] N [id number] 00.0  2017 GABAPENTIN 300 MG CAPSULE 120.00 30 [id number] 2017 [id number] N [id number] 00.0  2017 SUBOXONE 8 MG-2 MG SL FILM 60.00 30 [id number] 2017 [id number] N [id number] 480.0  2017 VYVANSE 40 MG CAPSULE 30.00 30 [id number] 2017 [id number] N [id number] 00.0  2017 VYVANSE 30 MG CAPSULE 30.00 30 [id number] 2017 [id number] N [id number] 00.0  2017 GABAPENTIN 300 MG  CAPSULE 120.00 30 [id number] 12/29/2016 [id number] R [id number] 00.0  02/23/2017 SUBOXONE 8 MG-2 MG SL FILM 60.00 30 [id number] 02/17/2017 [id number] N [id number] 480.0  02/02/2017 VYVANSE 30 MG CAPSULE 30.00 30 [id number] 02/02/2017 [id number] N [id number] 00.0  02/02/2017 VYVANSE 40 MG CAPSULE 30.00 30 [id number] 01/26/2017 [id number] N [id number] 00.0  01/27/2017 SUBOXONE 8 MG-2 MG SL FILM 60.00 30 [id number] 01/26/2017 [id number] N [id number] 480.0  01/20/2017 GABAPENTIN 300 MG CAPSULE 120.00 30 [id number] 12/29/2016 [id number] N [id number] 00.0  01/16/2017 SUBOXONE 8 MG-2 MG SL FILM 22.00 11 [id number] 01/13/2017 [id number] N [id number] 480.0  01/13/2017 SUBOXONE 8 MG-2 MG SL FILM 2.00 1 [id number] 01/13/2017 [id number] N [id number] 480.0  01/05/2017 VYVANSE 40 MG CAPSULE 30.00 30 [id number] 01/05/2017 [id number] N [id number] 00.0  01/05/2017 VYVANSE 30 MG CAPSULE 30.00 30 [id number] 01/05/2017 [id number] N [id number] 00.0  12/29/2016 SUBOXONE 8 MG-2 MG SL FILM 30.00 15 [id number] 12/29/2016 [id number] N [id number] 480.0  12/21/2016 GABAPENTIN 300 MG CAPSULE 120.00 30 [id number] 10/13/2016 [id number] R [id number] 00.0  12/08/2016 VYVANSE 30 MG CAPSULE 30.00 30 [id number] 12/06/2016 [id number] N [id number] 00.0  12/08/2016 VYVANSE 40 MG CAPSULE 30.00 30 [id number] 12/06/2016 [id number] N [id number] 00.0  12/06/2016 SUBOXONE 8 MG-2 MG SL FILM 42.00 21 [id number] 12/06/2016 [id number] N [id number] 480.0  11/28/2016 GABAPENTIN 300 MG CAPSULE 120.00 30 [id number] 10/13/2016 [id number] R [id number] 00.0  *N/R N=New R=Refill  +MED Daily  Prescribers for prescriptions listed  ----------------------------------------------------------------------------------------------------------------------------------  [id number] JL-MARY COLVIN; [address] [facility] [address]  [id number] YESENIA REILLY MD; [address], [place] [address]  [id number] MANJULA DOWELL MD; [facility], [address] [place] [address]  [facility] Date: 17  [employer] Query Report Page#: 2  Patient Rx History Report  ROBIN HOLMAN  Pharmacies that dispensed prescriptions listed  ----------------------------------------------------------------------------------------------------------------------------------  [id number] [facility]; [address] [place] [address],  [id number] [facility] DAYSI LLupilloLLupilloCLupillo; : [id number], [address], [place] [address],  Patients that match search criteria  ----------------------------------------------------------------------------------------------------------------------------------  [id number] ROBIN HOLMAN,  [birth year]; [address], [place] [address]  [id number] ROBIN HOLMAN,  [birth year]; [address], [place] [address]  [id number] ROBIN HOLMAN,  [birth year]; [address], [place] [address]  [id number] ROBIN HOLMAN,  [birth year]; [facility], [place] [address]  [id number] ROBIN HOLMAN,  [birth year]; [address], [facility] [address]  [id number] ROBIN HOLMAN,  [birth year];  [address], [facility] [address]  [id number] ROBIN HOLMAN,  [birth year];  [address], [facility] [address]  [id number] ROBIN HOLMAN,  [birth year];  [address] , [facility] [address]  [id number] ROBIN HOLMAN,  [birth year];  [address], [facility] [address]  [id number] ROBIN HOLMAN,  [birth year]; [address] [place] [address]  MED Summary  This section displays cumulative MED values by unique recipient. The MED Max value is the maximum occurrence of cumulative MED  sustained for any 3 consecutive days. This value is calculated based on prescriptions dispensed during the date range requested.  -----------------------------------------------------------------------------------------------------------------------------------  [address];  1985; [address] [place] [address] [address], [facility] [place] [facility] [address]

## 2021-06-10 NOTE — PROGRESS NOTES
Correct pharmacy verified with patient and confirmed in snapshot? [x] yes []no    Medications Phoned  to Pharmacy [] yes [x]no  Name of Pharmacist:  List Medications, including dose, quantity and instructions      Medication Prescriptions given to patient   [] yes  [x] no   List the name of the drug the prescription was written for.       Medications ordered this visit were e-scribed.  Verified by order class [x] yes  [] no    Medication changes or discontinuations were communicated to patient's pharmacy: [] yes  [x] no    UA collected [x] yes    [] no    [place] Prescription Monitoring Program Reviewed? [x] yes  [] no    Referrals were made to:  none    Future appointment was made: [x] yes  [] no    Dictation completed at time of chart check: [x] yes  [] no    I have checked the documentation for today s encounters and the above information has been reviewed and completed.

## 2021-06-10 NOTE — TELEPHONE ENCOUNTER
Patient called to cancel appt- said he is establishing care at [facility] now- thanks Dr. Sloan and the team for helping him for the last few years.    Appt cancelled.    Thanks!

## 2021-06-11 NOTE — PROGRESS NOTES
Patient was seen today in follow-up for opiate dependence.    Last UDS performed on 17 and was: positive for amphetamines  Last BUP performed on  17 and was positive    Pt is having severe tooth ache. Wt. on  See attached  report below      [facility] [place] Date: 17  Query Report Page#: 1  Patient Rx History Report  ROBIN HOLMAN  Search Criteria: Last Name 'robin' and First Name 'akilah' and  = '[birth year]' and Request Period =  to  ' - 10 out of 10 Recipients Selected.  Fill Date Product, Str, Form Qty Days Pt ID Prescriber Written RX# N/R* Pharm **MED+  ---------- -------------------------------- ------ ---- --------- ---------- ---------- ------------ ----- --------- ------  2017 GABAPENTIN 300 MG CAPSULE 120.00 30 [id number] 2017 [id number] N [id number] 00.0  2017 VYVANSE 40 MG CAPSULE 30.00 30 [id number] 2017 [id number] N [id number] 00.0  2017 VYVANSE 30 MG CAPSULE 30.00 30 [id number] 2017 [id number] N [id number] 00.0  2017 SUBOXONE 8 MG-2 MG SL FILM 60.00 30 [id number] 2017 [id number] N [id number] 480.0  2017 GABAPENTIN 300 MG CAPSULE 120.00 30 [id number] 2017 [id number] R [id number] 00.0  2017 VYVANSE 40 MG CAPSULE 30.00 30 [id number] 2017 [id number] N [id number] 00.0  2017 VYVANSE 30 MG CAPSULE 30.00 30 [id number] 2017 [id number] N [id number] 00.0  2017 SUBOXONE 8 MG-2 MG SL FILM 60.00 30 [id number] 2017 [id number] N [id number] 480.0  2017 VYVANSE 30 MG CAPSULE 30.00 30 [id number] 2017 [id number] N [id number] 00.0  2017 VYVANSE 40 MG CAPSULE 30.00 30 [id number] 2017 [id number] N [id number] 00.0  2017 GABAPENTIN 300 MG CAPSULE 120.00 30 [id number] 2017 [id number] N [id number] 00.0  2017 SUBOXONE 8 MG-2 MG SL FILM 60.00 30 [id number] 2017 [id number] N [id number] 480.0  *N/R  N=New R=Refill  +MED Daily  Prescribers for prescriptions listed  ----------------------------------------------------------------------------------------------------------------------------------  [id number] YESENIA REILLY MD; [facility], [address], [place] [address]  Pharmacies that dispensed prescriptions listed  ----------------------------------------------------------------------------------------------------------------------------------  [id number] [facility]; [address] [place] [address],  Patients that match search criteria  ----------------------------------------------------------------------------------------------------------------------------------  [id number] JUAN CARLOS MAYNARD [birth year]; [address], [place] [address]  [id number] JUAN CARLOS MAYNARD [birth year]; [facility] [address], [place] [address]  [id number] ROBIN HOLMAN,  [birth year]; [address], [place] [address]  [id number] ROBIN HOLMAN,  [birth year]; [facility], [place] [address]  [id number] ROBIN HOLMAN,  [birth year]; [address] [facility] [address], [facility] [address]  [id number] ROBIN HOLMAN,  [birth year]; [address], [facility] [address]  [id number] ROBIN HOLMAN,  [birth year]; [address], [facility] [address]  [id number] ROBIN HOLMAN,  [birth year]; [address] , [facility] [address]  [id number] ROBIN HOLMAN,  [birth year]; [address], [facility] [address]  [id number] ROBIN HOLMAN,  [birth year]; [address] [place] [address]  MED Summary  **Per CDC guidance, the conversion factors and associated daily morphine milligram equivalents for drugs prescribed as part of  medication-assisted treatment for opioid use disorder should not be used to benchmark against dosage thresholds meant for opioids  prescribed for pain.    Correct pharmacy verified with patient and confirmed in snapshot? [x] yes []no    Medications Phoned  to Pharmacy [] yes [x]no  Name of Pharmacist:  List Medications, including  dose, quantity and instructions      Medication Prescriptions given to patient   [] yes  [x] no   List the name of the drug the prescription was written for.       Medications ordered this visit were e-scribed.  Verified by order class [x] yes  [] no  Suboxone and Vyvanse 30 and 40 mg.  Medication changes or discontinuations were communicated to patient's pharmacy: [] yes  [x] no    UA collected [x] yes    [] no    [place] Prescription Monitoring Program Reviewed? [x] yes  [] no    Referrals were made to:  none    Future appointment was made: [] yes  [x] no    Dictation completed at time of chart check: [x] yes  [] no    I have checked the documentation for today s encounters and the above information has been reviewed and completed.

## 2021-06-11 NOTE — PROGRESS NOTES
Patient is here for follow up and medication management.    DAM  Date: 17   Results: pos amph  BUP   Date: 17   Results: pos    Attending outpatient meetings: 3 x a week    Working or school: no    Concerns today: no new concerns today.    [place] :    [facility] [place] Date: 17  Query Report Page#: 1  Patient Rx History Report  ROBIN HOLMAN  Search Criteria: Last Name 'robin' and First Name 'akilah' and  = '[birth year]' and Request Period =  to  ' - 10 out of 10 Recipients Selected.  Fill Date Product, Str, Form Qty Days Pt ID Prescriber Written RX# N/R* Pharm **MED+  ---------- -------------------------------- ------ ---- --------- ---------- ---------- ------------ ----- --------- ------  2017 GABAPENTIN 300 MG CAPSULE 120.00 30 [id number] 2017 [id number] R [id number] 00.0  2017 VYVANSE 40 MG CAPSULE 30.00 30 [id number] 2017 [id number] N [id number] 00.0  2017 VYVANSE 30 MG CAPSULE 30.00 30 [id number] 2017 [id number] N [id number] 00.0  2017 SUBOXONE 8 MG-2 MG SL FILM 66.00 33 [id number] 2017 [id number] N [id number] 480.0  2017 GABAPENTIN 300 MG CAPSULE 120.00 30 [id number] 2017 [id number] N [id number] 00.0  2017 VYVANSE 40 MG CAPSULE 30.00 30 [id number] 2017 [id number] N [id number] 00.0  2017 VYVANSE 30 MG CAPSULE 30.00 30 [id number] 2017 [id number] N [id number] 00.0  2017 SUBOXONE 8 MG-2 MG SL FILM 60.00 30 [id number] 2017 [id number] N [id number] 480.0  2017 GABAPENTIN 300 MG CAPSULE 120.00 30 [id number] 2017 [id number] R [id number] 00.0  2017 VYVANSE 40 MG CAPSULE 30.00 30 [id number] 2017 [id number] N [id number] 00.0  2017 VYVANSE 30 MG CAPSULE 30.00 30 [id number] 2017 [id number] N [id number] 00.0  2017 SUBOXONE 8 MG-2 MG SL FILM 60.00 30 [id number] 2017 [id number] N [id number]  480.0  03/29/2017 VYVANSE 30 MG CAPSULE 30.00 30 [id number] 03/23/2017 [id number] N [id number] 00.0  03/29/2017 VYVANSE 40 MG CAPSULE 30.00 30 [id number] 03/23/2017 [id number] N [id number] 00.0  03/23/2017 GABAPENTIN 300 MG CAPSULE 120.00 30 [id number] 03/23/2017 [id number] N [id number] 00.0  03/23/2017 SUBOXONE 8 MG-2 MG SL FILM 60.00 30 [id number] 03/23/2017 [id number] N [id number] 480.0  03/03/2017 VYVANSE 40 MG CAPSULE 30.00 30 [id number] 03/03/2017 [id number] N [id number] 00.0  03/03/2017 VYVANSE 30 MG CAPSULE 30.00 30 [id number] 03/03/2017 [id number] N [id number] 00.0  02/23/2017 GABAPENTIN 300 MG CAPSULE 120.00 30 [id number] 12/29/2016 [id number] R [id number] 00.0  02/23/2017 SUBOXONE 8 MG-2 MG SL FILM 60.00 30 [id number] 02/17/2017 [id number] N [id number] 480.0  02/02/2017 VYVANSE 30 MG CAPSULE 30.00 30 [id number] 02/02/2017 [id number] N [id number] 00.0  02/02/2017 VYVANSE 40 MG CAPSULE 30.00 30 [id number] 01/26/2017 [id number] N [id number] 00.0  01/27/2017 SUBOXONE 8 MG-2 MG SL FILM 60.00 30 [id number] 01/26/2017 [id number] N [id number] 480.0  01/20/2017 GABAPENTIN 300 MG CAPSULE 120.00 30 [id number] 12/29/2016 [id number] N [id number] 00.0  01/16/2017 SUBOXONE 8 MG-2 MG SL FILM 22.00 11 [id number] 01/13/2017 [id number] N [id number] 480.0  *N/R N=New R=Refill  +MED Daily  Prescribers for prescriptions listed  ----------------------------------------------------------------------------------------------------------------------------------  [id number] YESENIA REILLY MD; [facility], [address], [place] [address]  [id number] MANJULA DOWELL MD; [facility], [address] [place] [address]  [id number] YESENIA REILLY MD; [facility], [address], [place] [address]  Pharmacies that dispensed prescriptions  listed  -------------------------------------------------------------------------------------------------------------------------------  Correct pharmacy verified with patient and confirmed in snapshot? [x] yes []no    Medications Phoned  to Pharmacy [] yes [x]no  Name of Pharmacist:  List Medications, including dose, quantity and instructions      Medication Prescriptions given to patient   [] yes  [x] no   List the name of the drug the prescription was written for.       Medications ordered this visit were e-scribed.  Verified by order class [x] yes  [] no    Medication changes or discontinuations were communicated to patient's pharmacy: [] yes  [x] no    UA collected [x] yes    [] no    [place] Prescription Monitoring Program Reviewed? [x] yes  [] no    Referrals were made to:  none    Future appointment was made: [x] yes  [] no    Dictation completed at time of chart check: [] yes  [] no    I have checked the documentation for today s encounters and the above information has been reviewed and completed.

## 2021-06-12 NOTE — PROGRESS NOTES
Addiction Medicine  Outpatient Visit  Dr. García    8/10/2017    Barak Clarke, 1985.    Subjective   Patient was seen today in follow-up for opiate use disorder, severe.    Last UDS performed on 17 and was: positive for amphetamines due to medication  Last BUP performed on 17 and was: positive     UA collected    Pt states things are going good, some depression but manageable by self.          ROBIN HOLMAN  Search Criteria: Last Name 'robin' and First Name 'barak' and  = '[birth year]' and Request Period =  to  ' - 10 out of 10 Recipients Selected.  Fill Date Product, Str, Form Qty Days Pt ID Prescriber Written RX# N/R* Pharm **MED+  ---------- -------------------------------- ------ ---- --------- ---------- ---------- ------------ ----- --------- ------  2017 GABAPENTIN 300 MG CAPSULE 120.00 30 [id number] 2017 [id number] R [id number] 00.0  2017 SUBOXONE 8 MG-2 MG SL FILM 60.00 30 [id number] 2017 [id number] N [id number] 480.0  07/15/2017 VYVANSE 30 MG CAPSULE 30.00 30 [id number] 2017 [id number] N [id number] 00.0  07/15/2017 VYVANSE 40 MG CAPSULE 30.00 30 [id number] 2017 [id number] N [id number] 00.0  2017 GABAPENTIN 300 MG CAPSULE 120.00 30 [id number] 2017 [id number] R [id number] 00.0  2017 VYVANSE 40 MG CAPSULE 30.00 30 [id number] 2017 [id number] N [id number] 00.0  2017 VYVANSE 30 MG CAPSULE 30.00 30 [id number] 2017 [id number] N [id number] 00.0  2017 SUBOXONE 8 MG-2 MG SL FILM 66.00 33 [id number] 2017 [id number] N [id number] 480.0  2017 GABAPENTIN 300 MG CAPSULE 120.00 30 [id number] 2017 [id number] N [id number] 00.0  2017 VYVANSE 40 MG CAPSULE 30.00 30 [id number] 2017 [id number] N [id number] 00.0  2017 VYVANSE 30 MG CAPSULE 30.00 30 [id number] 2017 [id number] N [id number] 00.0  2017 SUBOXONE 8 MG-2 MG SL FILM  60.00 30 [id number] 2017 [id number] N [id number] 480.0  *N/R N=New R=Refill  +MED Daily  Prescribers for prescriptions listed  ----------------------------------------------------------------------------------------------------------------------------------  [id number] YESENIA REILLY MD; [facility], [address], [place] [address]  [id number] YESNEIA REILLY MD; [facility], [address], [place] [address]  Pharmacies that dispensed prescriptions listed  ----------------------------------------------------------------------------------------------------------------------------------  [id number] [facility]; [address] [place] [address],  Patients that match search criteria  ----------------------------------------------------------------------------------------------------------------------------------  [id number] ROBIN HOLMAN,  [birth year]; [address], [place] [address]  [id number] ROBIN HOLMAN,  [birth year]; [facility], [place] [address]  [id number] ROBIN HOLMAN,  [birth year]; [address], [place] [address]  [id number] ROBIN HOLMAN,  [birth year]; GENERAL [facility], [place] [address]  [id number] ROBIN HOLMAN,  [birth year]; [address] [facility] [address], [facility] [address]  [id number] ROBIN HOLMAN,  [birth year]; [address], [facility] [address]  [id number] ROBIN HOLMAN,  [birth year]; [address], [facility] [address]  [id number] ROBIN HOLMAN,  [birth year]; [address] , [facility] [address]  [id number] ROBIN HOLMAN,  [birth year]; [address], [facility] [address]  [id number] ROBIN HOLMAN,  [birth year]; [address] [place] [address]            Correct pharmacy verified with patient and confirmed in snapshot? [x] yes []no    Medications Phoned  to Pharmacy [] yes [x]no  Name of Pharmacist:  List Medications, including dose, quantity and instructions      Medication Prescriptions given to patient   [] yes  [x] no   List the name of the  drug the prescription was written for.      Medications ordered this visit were e-scribed.  Verified by order class [x] yes  [] no  Suboxone, Gabapentin, Vyvanse    Medication changes or discontinuations were communicated to patient's pharmacy:  [] yes  [x] no  Pharmacist Spoke With:     UA collected [x] yes  [] no    [place] Prescription Monitoring Program Reviewed? [x] yes  [] no    Referrals/Labs were made to:     Completed Charge Capture?  [x] yes  [] no    Future appointment was made: [x] yes  [] no  9/7/17  Dictation completed at time of chart check: [] yes  [x] no    I have checked the documentation for today s encounters and the above information has been reviewed and completed.

## 2021-06-12 NOTE — PROGRESS NOTES
Pt here for follow up and medication management      Last DAM 17 results: positive Amphetamines  Last BUP 17 results: positive    Cravings are manageable THC he states.   Plans to see ENT in the future regarding breathing issues.   At times has trouble staying focused.    Denies SI/HI thoughts at this time.     No other concerns at this time.     [place]  below:  [facility] [place] Date: 17  Query Report Page#: 1  Patient Rx History Report  ROBIN HOLMAN  Search Criteria: Last Name 'robin' and First Name 'akilah' and  = '[birth year]' and Request Period = ' to  ' - 10 out of 10 Recipients Selected.  Fill Date Product, Str, Form Qty Days Pt ID Prescriber Written RX# N/R* Pharm **MED+  ---------- -------------------------------- ------ ---- --------- ---------- ---------- ------------ ----- --------- ------  2017 SUBOXONE 8 MG-2 MG SL FILM 60.00 30 [id number] 2017 [id number] N [id number] 480.0  07/15/2017 VYVANSE 30 MG CAPSULE 30.00 30 [id number] 2017 [id number] N [id number] 00.0  07/15/2017 VYVANSE 40 MG CAPSULE 30.00 30 [id number] 2017 [id number] N [id number] 00.0  2017 GABAPENTIN 300 MG CAPSULE 120.00 30 [id number] 2017 [id number] R [id number] 00.0  2017 VYVANSE 40 MG CAPSULE 30.00 30 [id number] 2017 [id number] N [id number] 00.0  2017 VYVANSE 30 MG CAPSULE 30.00 30 [id number] 2017 [id number] N [id number] 00.0  2017 SUBOXONE 8 MG-2 MG SL FILM 66.00 33 [id number] 2017 [id number] N [id number] 480.0  2017 GABAPENTIN 300 MG CAPSULE 120.00 30 [id number] 2017 [id number] N [id number] 00.0  2017 VYVANSE 40 MG CAPSULE 30.00 30 [id number] 2017 [id number] N [id number] 00.0  2017 VYVANSE 30 MG CAPSULE 30.00 30 [id number] 2017 [id number] N [id number] 00.0  2017 SUBOXONE 8 MG-2 MG SL FILM 60.00 30 [id number] 2017 [id number] N  [id number] 480.0  04/26/2017 GABAPENTIN 300 MG CAPSULE 120.00 30 [id number] 03/23/2017 [id number] R [id number] 00.0  04/25/2017 VYVANSE 40 MG CAPSULE 30.00 30 [id number] 04/20/2017 [id number] N [id number] 00.0  04/25/2017 VYVANSE 30 MG CAPSULE 30.00 30 [id number] 04/20/2017 [id number] N [id number] 00.0  04/22/2017 SUBOXONE 8 MG-2 MG SL FILM 60.00 30 [id number] 04/20/2017 [id number] N [id number] 480.0  03/29/2017 VYVANSE 30 MG CAPSULE 30.00 30 [id number] 03/23/2017 [id number] N [id number] 00.0  03/29/2017 VYVANSE 40 MG CAPSULE 30.00 30 [id number] 03/23/2017 [id number] N [id number] 00.0  03/23/2017 GABAPENTIN 300 MG CAPSULE 120.00 30 [id number] 03/23/2017 [id number] N [id number] 00.0  03/23/2017 SUBOXONE 8 MG-2 MG SL FILM 60.00 30 [id number] 03/23/2017 [id number] N [id number] 480.0  03/03/2017 VYVANSE 40 MG CAPSULE 30.00 30 [id number] 03/03/2017 [id number] N [id number] 00.0  03/03/2017 VYVANSE 30 MG CAPSULE 30.00 30 [id number] 03/03/2017 [id number] N [id number] 00.0  02/23/2017 GABAPENTIN 300 MG CAPSULE 120.00 30 [id number] 12/29/2016 [id number] R [id number] 00.0  02/23/2017 SUBOXONE 8 MG-2 MG SL FILM 60.00 30 [id number] 02/17/2017 [id number] N [id number] 480.0  02/02/2017 VYVANSE 30 MG CAPSULE 30.00 30 [id number] 02/02/2017 [id number] N [id number] 00.0  02/02/2017 VYVANSE 40 MG CAPSULE 30.00 30 [id number] 01/26/2017 [id number] N [id number] 00.0  01/27/2017 SUBOXONE 8 MG-2 MG SL FILM 60.00 30 [id number] 01/26/2017 [id number] N [id number] 480.0  *N/R N=New R=Refill  +MED Daily  Prescribers for prescriptions listed  ----------------------------------------------------------------------------------------------------------------------------------  [id number] YESENIA REILLY COLLIN MD; [facility], [address], [place] [address]  [id number] MANJULA DOWELL MD; [facility] "[employer], [address] [place] [address]  [id number] TOMMIE  YESENIA POLANCO MD; [facility], [address], [place] [address]  **Per CDC guidance, the conversion factors and associated daily morphine milligram equivalents for drugs prescribed as part of  medication-assisted treatment for opioid use disorder should not be used to benchmark against dosage thresholds meant for opioids  prescribed for pain.  [employer] [place] Date: 17  Query Report Page#: 2  Patient Rx History Report  ROBIN HOLMAN  Pharmacies that dispensed prescriptions listed  ----------------------------------------------------------------------------------------------------------------------------------  [id number] [facility]; [address] [place] [address],  Patients that match search criteria  ----------------------------------------------------------------------------------------------------------------------------------  [id number] ROBIN HOLMAN,  [birth year];  [address], [place] [address]  [id number] ROBIN HOLMAN,  [birth year]; [address], [place] [address]  [id number] ROBIN HOLMAN,  [birth year]; [address], [place] [address]  [id number] ROBIN HOLMAN,  [birth year]; [facility], [place] [address]  [id number] ROBIN HOLMAN,  [birth year]; [address], [facility] [place] [address]  [id number] ROBIN HOLMAN,  [birth year];  [address], [facility] [place] [address]  [id number] ROBIN HOLMAN,  [birth year];  [address], [facility] [place] [address]  [id number] ROBIN HOLMAN,  [birth year]; [address] , [facility] [place] [address]  [id number] ROBIN HOLMAN,  [birth year];  [address], [facility] [place] [address]  [id number] ROBIN HOLMAN,  [birth year]; [address] [place] [address]  MED Summary  This section displays cumulative MED values by unique recipient. The MED Max value is the maximum occurrence of cumulative MED  sustained for any 3 consecutive days. This value is calculated based on prescriptions dispensed during the date range  requested.  -----------------------------------------------------------------------------------------------------------------------------------  [address]; 1985; [address] [place] [address]  **Per CDC guidance, the conversion factors and associated daily morphine milligram equivalents for drugs prescribed as part of  medication-assisted treatment for opioid use disorder should not be used to benchmark against dosage thresholds meant for opioids  prescribed for pain.    Correct pharmacy verified with patient and confirmed in snapshot? [x] yes []no    Medications Phoned  to Pharmacy [] yes [x]no  Name of Pharmacist:  List Medications, including dose, quantity and instructions      Medication Prescriptions given to patient   [] yes  [x] no   List the name of the drug the prescription was written for.       Medications ordered this visit were e-scribed.  Verified by order class [] yes  [x] no    Medication changes or discontinuations were communicated to patient's pharmacy: [] yes  [x] no    UA collected [x] yes    [] no    [place] Prescription Monitoring Program Reviewed? [x] yes  [] no    Referrals were made to:  none    Future appointment was made: [x] yes  [] no    Dictation completed at time of chart check: [] yes  [x] no    I have checked the documentation for today s encounters and the above information has been reviewed and completed.

## 2021-06-12 NOTE — PROGRESS NOTES
Patient is here for follow up and medication management.    DAM  Date: 8/10/17    Results: + Amph.  BUP   Date: 8/10/17   Results: +    Attending outpatient groups or meetings: AA and NA 2 x a week    Working or school: not working    Concerns today: No new concerns today.    [place] :    [employer] [place] Date: 17  Query Report Page#: 1  Patient Rx History Report  ROBIN HOLMAN  Search Criteria: Last Name 'robin' and First Name 'akilah' and  = '[birth year]' and Request Period =  to  ' - 10 out of 10 Recipients Selected.  Fill Date Product, Str, Form Qty Days Pt ID Prescriber Written RX# N/R* Pharm **MED+  ---------- -------------------------------- ------ ---- --------- ---------- ---------- ------------ ----- --------- ------  2017 GABAPENTIN 300 MG CAPSULE 120.00 30 [id number] 08/10/2017 [id number] N [id number] 00.0  2017 SUBOXONE 8 MG-2 MG SL FILM 60.00 30 [id number] 08/10/2017 [id number] N [id number] 480.0  2017 VYVANSE 40 MG CAPSULE 30.00 30 [id number] 08/10/2017 [id number] N [id number] 00.0  2017 VYVANSE 30 MG CAPSULE 30.00 30 [id number] 08/10/2017 [id number] N [id number] 00.0  2017 GABAPENTIN 300 MG CAPSULE 120.00 30 [id number] 2017 [id number] R [id number] 00.0  2017 SUBOXONE 8 MG-2 MG SL FILM 60.00 30 [id number] 2017 [id number] N [id number] 480.0  07/15/2017 VYVANSE 30 MG CAPSULE 30.00 30 [id number] 2017 [id number] N [id number] 00.0  07/15/2017 VYVANSE 40 MG CAPSULE 30.00 30 [id number] 2017 [id number] N [id number] 00.0  2017 GABAPENTIN 300 MG CAPSULE 120.00 30 [id number] 2017 [id number] R [id number] 00.0  2017 VYVANSE 40 MG CAPSULE 30.00 30 [id number] 2017 [id number] N [id number] 00.0  2017 VYVANSE 30 MG CAPSULE 30.00 30 [id number] 2017 [id number] N [id number] 00.0  2017 SUBOXONE 8 MG-2 MG SL FILM 66.00 33 [id number] 2017  [id number] N [id number] 480.0  05/22/2017 GABAPENTIN 300 MG CAPSULE 120.00 30 [id number] 05/18/2017 [id number] N [id number] 00.0  05/22/2017 VYVANSE 40 MG CAPSULE 30.00 30 [id number] 05/18/2017 [id number] N [id number] 00.0  05/22/2017 VYVANSE 30 MG CAPSULE 30.00 30 [id number] 05/18/2017 [id number] N [id number] 00.0  05/19/2017 SUBOXONE 8 MG-2 MG SL FILM 60.00 30 [id number] 05/18/2017 [id number] N [id number] 480.0  04/26/2017 GABAPENTIN 300 MG CAPSULE 120.00 30 [id number] 03/23/2017 [id number] R [id number] 00.0  04/25/2017 VYVANSE 40 MG CAPSULE 30.00 30 [id number] 04/20/2017 [id number] N [id number] 00.0  04/25/2017 VYVANSE 30 MG CAPSULE 30.00 30 [id number] 04/20/2017 [id number] N [id number] 00.0  04/22/2017 SUBOXONE 8 MG-2 MG SL FILM 60.00 30 [id number] 04/20/2017 [id number] N [id number] 480.0  03/29/2017 VYVANSE 30 MG CAPSULE 30.00 30 [id number] 03/23/2017 [id number] N [id number] 00.0  03/29/2017 VYVANSE 40 MG CAPSULE 30.00 30 [id number] 03/23/2017 [id number] N [id number] 00.0  03/23/2017 GABAPENTIN 300 MG CAPSULE 120.00 30 [id number] 03/23/2017 [id number] N [id number] 00.0  03/23/2017 SUBOXONE 8 MG-2 MG SL FILM 60.00 30 [id number] 03/23/2017 [id number] N [id number] 480.0  *N/R N=New R=Refill  +MED Daily  Prescribers for prescriptions listed  ----------------------------------------------------------------------------------------------------------------------------------  [id number] YESENIA REILLY MD; [facility], [address], [place] [address]  [id number] YESENIA REILLY MD; [facility], [address], [place] [address]  Pharmacies that dispensed prescriptions listed  ----------------------------------------------------------------------------------------------------------------------------------  [id number] [facility]; [address] [place] [address],  **Per CDC guidance, the conversion factors and associated  daily morphine milligram    Correct pharmacy verified with patient and confirmed in snapshot? [x] yes []no    Charge captured ? [x] yes  [] no    Medications Phoned  to Pharmacy [] yes [x]no  Name of Pharmacist:  List Medications, including dose, quantity and instructions      Medication Prescriptions given to patient   [] yes  [x] no   List the name of the drug the prescription was written for.       Medications ordered this visit were e-scribed.  Verified by order class [x] yes  [] no    Medication changes or discontinuations were communicated to patient's pharmacy: [] yes  [x] no    UA collected [x] yes  [] no    [place] Prescription Monitoring Program Reviewed? [x] yes  [] no    Referrals were made to:  none    Future appointment was made: [x] yes  [] no    Dictation completed at time of chart check: [] yes  [x] no    I have checked the documentation for today s encounters and the above information has been reviewed and completed.

## 2021-06-13 NOTE — PROGRESS NOTES
Patient is here for follow up and medication management.      DAM  Date: 10/5/17   Results: pos. Amph  BUP   Date: 8/10/17   Results: pos    Attending outpatient groups or meetings: 2-3 x a week    Working or school: no    Concerns today: Patient is on Ritalin SR because the insurance wanted him to try it before oking the Vyvance/  He states that he feels on edge and uneasy and doesn't like it.    [place] :    [employer] [place] Date: 17  Query Report Page#: 1  Patient Rx History Report  ROBIN HOLMAN  Search Criteria: Last Name 'robin' and First Name 'akilah' and  = '[birth year]' and Request Period =  to  ' - 10 out of 10 Recipients Selected.  Fill Date Product, Str, Form Qty Days Pt ID Prescriber Written RX# N/R* Pharm **MED+  ---------- -------------------------------- ------ ---- --------- ---------- ---------- ------------ ----- --------- ------  10/19/2017 METHYLPHENIDATE ER 20 MG TAB 60.00 30 [id number] 10/19/2017 [id number] N [id number] 00.0  10/13/2017 METHYLPHENIDATE ER 20 MG TAB 14.00 7 [id number] 10/13/2017 [id number] N [id number] 00.0  10/08/2017 GABAPENTIN 300 MG CAPSULE 120.00 30 [id number] 08/10/2017 [id number] R [id number] 00.0  10/05/2017 SUBOXONE 8 MG-2 MG SL FILM 60.00 30 [id number] 10/05/2017 [id number] N [id number] 480.0  2017 GABAPENTIN 300 MG CAPSULE 120.00 30 [id number] 08/10/2017 [id number] R [id number] 00.0  2017 SUBOXONE 8 MG-2 MG SL FILM 60.00 30 [id number] 2017 [id number] N [id number] 480.0  2017 VYVANSE 40 MG CAPSULE 30.00 30 [id number] 2017 [id number] N [id number] 00.0  2017 VYVANSE 30 MG CAPSULE 30.00 30 [id number] 2017 [id number] N [id number] 00.0  2017 GABAPENTIN 300 MG CAPSULE 120.00 30 [id number] 08/10/2017 [id number] N [id number] 00.0  2017 SUBOXONE 8 MG-2 MG SL FILM 60.00 30 [id number] 08/10/2017 [id number] N [id number] 480.0  2017 VYVANSE 40 MG CAPSULE  30.00 30 [id number] 08/10/2017 [id number] N [id number] 00.0  08/11/2017 VYVANSE 30 MG CAPSULE 30.00 30 [id number] 08/10/2017 [id number] N [id number] 00.0  07/17/2017 GABAPENTIN 300 MG CAPSULE 120.00 30 [id number] 05/18/2017 [id number] R [id number] 00.0  07/16/2017 SUBOXONE 8 MG-2 MG SL FILM 60.00 30 [id number] 07/13/2017 [id number] N [id number] 480.0  07/15/2017 VYVANSE 30 MG CAPSULE 30.00 30 [id number] 07/13/2017 [id number] N [id number] 00.0  07/15/2017 VYVANSE 40 MG CAPSULE 30.00 30 [id number] 07/13/2017 [id number] N [id number] 00.0  06/19/2017 GABAPENTIN 300 MG CAPSULE 120.00 30 [id number] 05/18/2017 [id number] R [id number] 00.0  06/18/2017 VYVANSE 40 MG CAPSULE 30.00 30 [id number] 06/16/2017 [id number] N [id number] 00.0  06/18/2017 VYVANSE 30 MG CAPSULE 30.00 30 [id number] 06/16/2017 [id number] N [id number] 00.0  06/16/2017 SUBOXONE 8 MG-2 MG SL FILM 66.00 33 [id number] 06/16/2017 [id number] N [id number] 480.0  05/22/2017 GABAPENTIN 300 MG CAPSULE 120.00 30 [id number] 05/18/2017 [id number] N [id number] 00.0  05/22/2017 VYVANSE 40 MG CAPSULE 30.00 30 [id number] 05/18/2017 [id number] N [id number] 00.0  05/22/2017 VYVANSE 30 MG CAPSULE 30.00 30 [id number] 05/18/2017 [id number] N [id number] 00.0  05/19/2017 SUBOXONE 8 MG-2 MG SL FILM 60.00 30 [id number] 05/18/2017 [id number] N [id number] 480.0  *N/R N=New R=Refill  +MED Daily  Prescribers for prescriptions listed  ----------------------------------------------------------------------------------------------------------------------------------  [id number] YESENIA REILLY MD; [facility], [address], [place] [address]  [id number] YESENIA REILLY MD; [facility], [address], [place] [address]  [id number] MANJULA DOWELL MD; [facility], [address] [place] [address]  Pharmacies that dispensed prescriptions  listed  ----------------------------------------------------------------------------------------------------------------------------------  [id number] [employer]; [address] [place] [address],

## 2021-06-13 NOTE — PROGRESS NOTES
Patient here today for follow up of medication management. Last DAM 17-positive-amphetamines and THC, BUP-8/10/17-positive. States he has maintained sobriety.     [employer] [place] Date: 10/05/17  Query Report Page#: 1  Patient Rx History Report  ROBIN HOLMAN  Search Criteria: Last Name 'robin' and First Name 'akilah' and  = '[birth year]' and Request Period =  to  '10/05/17' - 10 out of 10 Recipients Selected.  Fill Date Product, Str, Form Qty Days Pt ID Prescriber Written RX# N/R* Pharm **MED+  ---------- -------------------------------- ------ ---- --------- ---------- ---------- ------------ ----- --------- ------  2017 GABAPENTIN 300 MG CAPSULE 120.00 30 [id number] 08/10/2017 [id number] R [id number] 00.0  2017 SUBOXONE 8 MG-2 MG SL FILM 60.00 30 [id number] 2017 [id number] N [id number] 480.0  2017 VYVANSE 40 MG CAPSULE 30.00 30 [id number] 2017 [id number] N [id number] 00.0  2017 VYVANSE 30 MG CAPSULE 30.00 30 [id number] 2017 [id number] N [id number] 00.0  2017 GABAPENTIN 300 MG CAPSULE 120.00 30 [id number] 08/10/2017 [id number] N [id number] 00.0  2017 SUBOXONE 8 MG-2 MG SL FILM 60.00 30 [id number] 08/10/2017 [id number] N [id number] 480.0  2017 VYVANSE 40 MG CAPSULE 30.00 30 [id number] 08/10/2017 [id number] N [id number] 00.0  2017 VYVANSE 30 MG CAPSULE 30.00 30 [id number] 08/10/2017 [id number] N [id number] 00.0  2017 GABAPENTIN 300 MG CAPSULE 120.00 30 [id number] 2017 [id number] R [id number] 00.0  2017 SUBOXONE 8 MG-2 MG SL FILM 60.00 30 [id number] 2017 [id number] N [id number] 480.0  07/15/2017 VYVANSE 30 MG CAPSULE 30.00 30 [id number] 2017 [id number] N [id number] 00.0  07/15/2017 VYVANSE 40 MG CAPSULE 30.00 30 [id number] 2017 [id number] N [id number] 00.0  *N/R N=New R=Refill  +MED Daily  Prescribers for prescriptions  listed  ----------------------------------------------------------------------------------------------------------------------------------  [id number] YESENIA REILLY MD; [facility], [address], [place] [address]  [id number] YESENIA REILLY MD; [facility], [address], [place] [address]  Pharmacies that dispensed prescriptions listed  ----------------------------------------------------------------------------------------------------------------------------------  [id number] [facility]; [address] [place] [address] [place] [address],  Patients that match search criteria  ----------------------------------------------------------------------------------------------------------------------------------  [id number] JUAN CARLOS MAYNARD [birth year]; [address], [place] [address]  [id number] ROBIN [place]IVER,  [birth year]; [facility] [address], [place] [address]  [id number] ROBIN RIVER,  [birth year]; [address], [place] [address]  [id number] ROBIN RIVER,  [birth year]; [facility], [place] [address]  [id number] ROBIN RIVER,  [birth year]; [address] [facility] [address], [facility] [address]  [id number] ROBIN RIVER,  [birth year]; [address], [facility] [address]  [id number] ROBIN RIVER,  [birth year]; [address], [facility] [address]  [id number] ROBIN RVIER,  [birth year]; [address] , [facility] [address]  [id number] ROBIN RIVER,  [birth year]; [address], [facility] [address]  [id number] ROBIN R[place],  [birth year]; [address] [place] [address]

## 2021-06-13 NOTE — PROGRESS NOTES
Correct pharmacy verified with patient and confirmed in snapshot? [x] yes []no    Charge captured ? [x] yes  [] no    Medications Phoned  to Pharmacy [] yes [x]no  Name of Pharmacist:  List Medications, including dose, quantity and instructions      Medication Prescriptions given to patient   [] yes  [x] no   List the name of the drug the prescription was written for.       Medications ordered this visit were e-scribed.  Verified by order class [x] yes  [] no  Suboxone 8-2 mg  Vyvanse 30 mg and 40 mg    Medication changes or discontinuations were communicated to patient's pharmacy: [] yes  [x] no    UA collected [x] yes  [] no    [place] Prescription Monitoring Program Reviewed? [x] yes  [] no    Referrals were made to:none     Future appointment was made: [x] yes  [] no    Dictation completed at time of chart check: [x] yes  [] no    I have checked the documentation for today s encounters and the above information has been reviewed and completed.

## 2021-06-14 NOTE — PROGRESS NOTES
Patient was seen today in follow-up for opiate dependence.    Last UDS performed on 17 and was: negative  Last BUP performed on 17  and was positive    Pt has been stable from an addiction standpoint.     See attached  report below      [employer] [place] Date: 17  Query Report Page#: 1  Patient Rx History Report  VINCE HOLMAN  Search Criteria: Last Name 'Vince' and First Name 'Barak' and  = '[birth year]' and Request Period =  to  ' - 10 out of 10 Recipients Selected.  Fill Date Product, Str, Form Qty Days Pt ID Prescriber Written RX# N/R* Pharm **MED+  ---------- -------------------------------- ------ ---- --------- ---------- ---------- ------------ ----- --------- ------  2017 VYVANSE 30 MG CAPSULE 30.00 30 [id number] 2017 [id number] N [id number] 00.0  2017 VYVANSE 40 MG CAPSULE 30.00 30 [id number] 2017 [id number] N [id number] 00.0  2017 GABAPENTIN 300 MG CAPSULE 120.00 30 [id number] 2017 [id number] N [id number] 00.0  2017 SUBOXONE 8 MG-2 MG SL FILM 60.00 30 [id number] 2017 [id number] N [id number] 480.0  10/19/2017 METHYLPHENIDATE ER 20 MG TAB 60.00 30 [id number] 10/19/2017 [id number] N [id number] 00.0  10/13/2017 METHYLPHENIDATE ER 20 MG TAB 14.00 7 [id number] 10/13/2017 [id number] N [id number] 00.0  10/08/2017 GABAPENTIN 300 MG CAPSULE 120.00 30 [id number] 08/10/2017 [id number] R [id number] 00.0  10/05/2017 SUBOXONE 8 MG-2 MG SL FILM 60.00 30 [id number] 10/05/2017 [id number] N [id number] 480.0  2017 GABAPENTIN 300 MG CAPSULE 120.00 30 [id number] 08/10/2017 [id number] R [id number] 00.0  2017 SUBOXONE 8 MG-2 MG SL FILM 60.00 30 [id number] 2017 [id number] N [id number] 480.0  2017 VYVANSE 40 MG CAPSULE 30.00 30 [id number] 2017 [id number] N [id number] 00.0  2017 VYVANSE 30 MG CAPSULE 30.00 30 [id number] 2017 [id number] N [id number]  00.0  *N/R N=New R=Refill  +MED Daily  Prescribers for prescriptions listed  ----------------------------------------------------------------------------------------------------------------------------------  [id number] YESENIA REILLY MD; [facility], [address], [place] [address]  [id number] YESENIA REILLY MD; [facility], [address], [place] [address]  [id number] MANJULA DOWELL MD; [facility] [employer] [facility], [address] [place] [address]  Pharmacies that dispensed prescriptions listed  ----------------------------------------------------------------------------------------------------------------------------------  [id number] [facility]; [address] [place] [address],  Patients that match search criteria  ----------------------------------------------------------------------------------------------------------------------------------  [id number] ROBIN HOLMAN,  [birth year]; [address], [place] [address]  [id number] ROBIN HOLMAN,  [birth year]; [address], [place] [address]  [id number] ROBIN HOLMAN,  [birth year]; [address], [place] [address]  [id number] ROBIN HOLMAN,  [birth year]; GENER[facility], [place] [address]  [id number] ROBIN HOLMAN,  [birth year]; [address] [facility] [address], [facility] [address]  [id number] ROBIN HOLMAN,  [birth year]; [address], [facility] [address]  [id number] ROBIN HOLMAN,  [birth year]; [address], [facility] [address]  [id number] ROBIN HOLMAN,  [birth year]; [address] , [facility] [address]  [id number] ROBIN HOLMAN,  [birth year]; [address], [facility] [address]  [id number] ROBIN HOLMAN,  [birth year]; [address] [place] [address]  **Per CDC guidance, the conversion factors and associated daily morphine milligram equivalents for drugs prescribed as part of  medication-assisted treatment for opioid use disorder should not be used to benchmark against dosage thresholds meant for  opioids  prescribed for pain.    Correct pharmacy verified with patient and confirmed in snapshot? [x] yes []no    Charge captured ? [x] yes  [] no    Medications Phoned  to Pharmacy [] yes [x]no  Name of Pharmacist:  List Medications, including dose, quantity and instructions      Medication Prescriptions given to patient   [] yes  [x] no   List the name of the drug the prescription was written for.       Medications ordered this visit were e-scribed.  Verified by order class [x] yes  [] no  Suboxone and Vyvanse  Medication changes or discontinuations were communicated to patient's pharmacy: [] yes  [x] no    UA collected [x] yes  [] no    [place] Prescription Monitoring Program Reviewed? [x] yes  [] no    Referrals were made to:  none    Future appointment was made: [x] yes  [] no  12/28/17  Dictation completed at time of chart check: [] yes  [x] no    I have checked the documentation for today s encounters and the above information has been reviewed and completed.

## 2021-06-15 NOTE — PROGRESS NOTES
Patient is here for follow up and medication management.      DAM  Date: 17   Results: pos. Amph.  BUP   Date: 17   Results: pos.    Pain Level: 0    How is sleep?: Not great but better than being on sleep meds.    Attending outpatient groups or meetings: 2 x a week    Working or school: no    Concerns today: No new concerns today.  He would really like to kick the ecig but has had a hard time.    [place] :    [employer] [place] Date: 17  Query Report Page#: 1  Patient Rx History Report  ROBIN HOLMAN  Search Criteria: Last Name 'robin' and First Name 'Barak' and  = '[birth year]' and Request Period =  to  ' - 10 out of 10 Recipients Selected.  Fill Date Product, Str, Form Qty Days Pt ID Prescriber Written RX# N/R* Pharm **MED+  ---------- -------------------------------- ------ ---- --------- ---------- ---------- ------------ ----- --------- ------  2017 GABAPENTIN 300 MG CAPSULE 120.00 30 [id number] 2017 [id number] R [id number] 00.0  2017 VYVANSE 40 MG CAPSULE 30.00 30 [id number] 2017 [id number] N [id number] 00.0  2017 VYVANSE 30 MG CAPSULE 30.00 30 [id number] 2017 [id number] N [id number] 00.0  2017 SUBOXONE 8 MG-2 MG SL FILM 60.00 30 [id number] 2017 [id number] N [id number] 480.0  2017 VYVANSE 30 MG CAPSULE 30.00 30 [id number] 2017 [id number] N [id number] 00.0  2017 VYVANSE 40 MG CAPSULE 30.00 30 [id number] 2017 [id number] N [id number] 00.0  2017 GABAPENTIN 300 MG CAPSULE 120.00 30 [id number] 2017 [id number] N [id number] 00.0  2017 SUBOXONE 8 MG-2 MG SL FILM 60.00 30 [id number] 2017 [id number] N [id number] 480.0  10/19/2017 METHYLPHENIDATE ER 20 MG TAB 60.00 30 [id number] 10/19/2017 [id number] N [id number] 00.0  10/13/2017 METHYLPHENIDATE ER 20 MG TAB 14.00 7 [id number] 10/13/2017 [id number] N [id number] 00.0  10/08/2017 GABAPENTIN 300 MG  CAPSULE 120.00 30 [id number] 08/10/2017 [id number] R [id number] 00.0  10/05/2017 SUBOXONE 8 MG-2 MG SL FILM 60.00 30 [id number] 10/05/2017 [id number] N [id number] 480.0  09/12/2017 GABAPENTIN 300 MG CAPSULE 120.00 30 [id number] 08/10/2017 [id number] R [id number] 00.0  09/08/2017 SUBOXONE 8 MG-2 MG SL FILM 60.00 30 [id number] 09/07/2017 [id number] N [id number] 480.0  09/07/2017 VYVANSE 40 MG CAPSULE 30.00 30 [id number] 09/07/2017 [id number] N [id number] 00.0  09/07/2017 VYVANSE 30 MG CAPSULE 30.00 30 [id number] 09/07/2017 [id number] N [id number] 00.0  08/12/2017 GABAPENTIN 300 MG CAPSULE 120.00 30 [id number] 08/10/2017 [id number] N [id number] 00.0  08/12/2017 SUBOXONE 8 MG-2 MG SL FILM 60.00 30 [id number] 08/10/2017 [id number] N [id number] 480.0  08/11/2017 VYVANSE 40 MG CAPSULE 30.00 30 [id number] 08/10/2017 [id number] N [id number] 00.0  08/11/2017 VYVANSE 30 MG CAPSULE 30.00 30 [id number] 08/10/2017 [id number] N [id number] 00.0  07/17/2017 GABAPENTIN 300 MG CAPSULE 120.00 30 [id number] 05/18/2017 [id number] R [id number] 00.0  07/16/2017 SUBOXONE 8 MG-2 MG SL FILM 60.00 30 [id number] 07/13/2017 [id number] N [id number] 480.0  07/15/2017 VYVANSE 30 MG CAPSULE 30.00 30 [id number] 07/13/2017 [id number] N [id number] 00.0  07/15/2017 VYVANSE 40 MG CAPSULE 30.00 30 [id number] 07/13/2017 [id number] N [id number] 00.0  *N/R N=New R=Refill  +MED Daily  Prescribers for prescriptions listed  ----------------------------------------------------------------------------------------------------------------------------------  [id number] YESENIA REILLY MD; [facility], [address], [place] [address]  [id number] YESENIA REILLY MD; [facility], [address], [place] [address]  [id number] MANJULA DOWELL MD; [employer] [facility], [address] [place] [address]  Pharmacies that dispensed prescriptions  listed  ----------------------------------------------------------------------------------------------------------------------------------  [id number] [facility]; [address] [place] [address],  **Per CDC guidance, the conversion factors and associated daily morphine milligram

## 2021-06-15 NOTE — PROGRESS NOTES
Patient is here for follow up and medication management.    DAM  Date: 17   Results: Pos. Amp., Pos. THC  BUP   Date: 17  Results: Pos.    Pain Level: 0    How is sleep?: Waking up a few times a night.  He wonders if it is due to metoprolol.    Attending outpatient groups or meetings: JONO 2 x a week    Working or school: no    Concerns today: Nothing new    [place] :    [employer] [place] Date: 18  Query Report Page#: 1  Patient Rx History Report  ROBIN HOLMAN  Search Criteria: Last Name 'robin' and First Name 'akilah' and  = '[birth year]' and Request Period =  to  ' - 10 out of 10 Recipients Selected.  Fill Date Product, Str, Form Qty Days Pt ID Prescriber Written RX# N/R* Pharm **MED+  ---------- -------------------------------- ------ ---- --------- ---------- ---------- ------------ ----- --------- ------  01/10/2018 GABAPENTIN 300 MG CAPSULE 120.00 30 [id number] 2017 [id number] N [id number] 00.0  2017 VYVANSE 40 MG CAPSULE 30.00 30 [id number] 2017 [id number] N [id number] 00.0  2017 VYVANSE 30 MG CAPSULE 30.00 30 [id number] 2017 [id number] N [id number] 00.0  2017 SUBOXONE 8 MG-2 MG SL FILM 60.00 30 [id number] 2017 [id number] N [id number] 480.0  2017 GABAPENTIN 300 MG CAPSULE 120.00 30 [id number] 2017 [id number] R [id number] 00.0  2017 VYVANSE 40 MG CAPSULE 30.00 30 [id number] 2017 [id number] N [id number] 00.0  2017 VYVANSE 30 MG CAPSULE 30.00 30 [id number] 2017 [id number] N [id number] 00.0  2017 SUBOXONE 8 MG-2 MG SL FILM 60.00 30 [id number] 2017 [id number] N [id number] 480.0  2017 VYVANSE 30 MG CAPSULE 30.00 30 [id number] 2017 [id number] N [id number] 00.0  2017 VYVANSE 40 MG CAPSULE 30.00 30 [id number] 2017 [id number] N [id number] 00.0  2017 GABAPENTIN 300 MG CAPSULE 120.00 30 [id number] 2017 [id number] N  [id number] 00.0  11/02/2017 SUBOXONE 8 MG-2 MG SL FILM 60.00 30 [id number] 11/02/2017 [id number] N [id number] 480.0  10/19/2017 METHYLPHENIDATE ER 20 MG TAB 60.00 30 [id number] 10/19/2017 [id number] N [id number] 00.0  10/13/2017 METHYLPHENIDATE ER 20 MG TAB 14.00 7 [id number] 10/13/2017 [id number] N [id number] 00.0  10/08/2017 GABAPENTIN 300 MG CAPSULE 120.00 30 [id number] 08/10/2017 [id number] R [id number] 00.0  10/05/2017 SUBOXONE 8 MG-2 MG SL FILM 60.00 30 [id number] 10/05/2017 [id number] N [id number] 480.0  09/12/2017 GABAPENTIN 300 MG CAPSULE 120.00 30 [id number] 08/10/2017 [id number] R [id number] 00.0  09/08/2017 SUBOXONE 8 MG-2 MG SL FILM 60.00 30 [id number] 09/07/2017 [id number] N [id number] 480.0  09/07/2017 VYVANSE 40 MG CAPSULE 30.00 30 [id number] 09/07/2017 [id number] N [id number] 00.0  09/07/2017 VYVANSE 30 MG CAPSULE 30.00 30 [id number] 09/07/2017 [id number] N [id number] 00.0  08/12/2017 GABAPENTIN 300 MG CAPSULE 120.00 30 [id number] 08/10/2017 [id number] N [id number] 00.0  08/12/2017 SUBOXONE 8 MG-2 MG SL FILM 60.00 30 [id number] 08/10/2017 [id number] N [id number] 480.0  08/11/2017 VYVANSE 40 MG CAPSULE 30.00 30 [id number] 08/10/2017 [id number] N [id number] 00.0  08/11/2017 VYVANSE 30 MG CAPSULE 30.00 30 [id number] 08/10/2017 [id number] N [id number] 00.0  *N/R N=New R=Refill  +MED Daily  Prescribers for prescriptions listed  ----------------------------------------------------------------------------------------------------------------------------------  [id number] YESENIA REILLY MD; [facility], [address], [place] [address]  [id number] YESENIA REILLY MD; [facility], [address], [place] [address]  [id number] MANJULA DOWELL MD; [facility], [address], [place] [address]  Pharmacies that dispensed prescriptions  listed  ----------------------------------------------------------------------------------------------------------------------------------  [id number] [facility]; [address] [place] [address],

## 2021-06-15 NOTE — PROGRESS NOTES
Correct pharmacy verified with patient and confirmed in snapshot? [x] yes []no    Charge captured ? [x] yes  [] no    Medications Phoned  to Pharmacy [] yes [x]no  Name of Pharmacist:  List Medications, including dose, quantity and instructions      Medication Prescriptions given to patient   [] yes  [x] no   List the name of the drug the prescription was written for.       Medications ordered this visit were e-scribed.  Verified by order class [x] yes  [] no    Medication changes or discontinuations were communicated to patient's pharmacy: [] yes  [x] no    UA collected [x] yes  [] no    [place] Prescription Monitoring Program Reviewed? [x] yes  [] no    Referrals were made to:  none  Future appointment was made: [x] yes  [] no    Dictation completed at time of chart check: [x] yes  [] no    I have checked the documentation for today s encounters and the above information has been reviewed and completed.

## 2021-06-16 PROBLEM — F60.9 PERSONALITY DISORDER, UNSPECIFIED (H): Status: ACTIVE | Noted: 2019-10-15

## 2021-06-16 NOTE — TELEPHONE ENCOUNTER
Telephone Encounter by Constanza Schulz LPN at 2/24/2020  9:19 AM     Author: Constanza Schulz LPN Service: -- Author Type: Licensed Nurse    Filed: 2/24/2020  9:20 AM Encounter Date: 2/21/2020 Status: Signed    : Constanza Schulz LPN (Licensed Nurse)       Duplicate request - see parallel telephone encounter

## 2021-06-16 NOTE — TELEPHONE ENCOUNTER
Telephone Encounter by Dena Garcia RN at 11/6/2019 12:17 PM     Author: Dena Garcia RN Service: Psychiatry Author Type: Registered Nurse    Filed: 11/6/2019 12:27 PM Encounter Date: 11/6/2019 Status: Addendum    : Dena Garcia RN (Registered Nurse)    Related Notes: Original Note by Dena Garcia RN (Registered Nurse) filed at 11/6/2019 12:26 PM       Also called re: citalopram (CELEXA) 20 MG tablet is concerned about weight gain   Wondering if he could be on Effexor instead     Date of Last Office Visit: 10/15/2019  Date of Next Office Visit: 11/12/2019  No shows since last visit: none  Cancellations since last visit: none  ED visits since last visit:  none  Medication Buprenorphine-Naloxone 8-2 mg and Lisdexamfetamine 30 mg and 40 mg date last ordered: 10/10/2019  Qty: 30 day supply  Refills: 0, and  Buprenorphine-Naloxone last filled on 10/11/2019 and Lisdexamfetamine last filled on 10/10/2019.  Lapse in therapy greater than 7 days: no  Medication refill request verified as identical to current order: yes  Result of Last DAM, VPA, Li+ Level, CBC, or Carbamazepine Level (at or since last visit): Positive on 10/15/2019 for Amphetamines, and that was confirmed.     [] Medication refilled per [facility] M-1.   [x] Medication unable to be refilled by RN due to criteria not met as indicated below:     []Eligibility - not seen in last year    []Supervision - no future appointment    []Compliance     []Verification - order discrepancy    [x]Controlled Medication    []Medication not included in RN Protocol    []90 - day supply request    []Other     Current Medication list:    Barak BENDER Vince    (MRN [id number])   Your Current Medications Are     atenolol (TENORMIN) 50 MG tablet Take 50 mg by mouth daily.    baclofen (LIORESAL) 10 MG tablet TAKE ONE TABLET BY MOUTH TWICE A DAY AS NEEDED   citalopram (CELEXA) 20 MG tablet Take 1 tablet (20 mg total) by mouth daily.   cloNIDine HCl (CATAPRES)  0.1 MG tablet TAKE 1 TABLET BY MOUTH 3 TIMES A DAY AS NEEDED. HOLD FOR DIZZINESS OR LIGHT HEADEDNESS AND CALL THE CLINIC   fluticasone (FLONASE) 50 mcg/actuation nasal spray 1 spray into each nostril 2 (two) times a day.   gabapentin (NEURONTIN) 600 MG tablet TAKE ONE TABLET BY MOUTH THREE TIMES A DAY PLUS ONE ADDITIONAL TABLET EVERY DAY AS NEEDED   lisdexamfetamine (VYVANSE) 30 MG capsule TAKE ONE CAPSULE BY MOUTH EVERY DAY AT NOON   lisdexamfetamine (VYVANSE) 40 MG capsule TAKE ONE CAPSULE BY MOUTH ONCE A DAY   polyethylene glycol (GLYCOLAX) 17 gram/dose powder MIX 17 GRAMS IN 8OZ OF LIQUID PER CONTAINER DIRECTIONS AND DRINK DAILY   SUBOXONE 8-2 mg Film per sublingual film PLACE ONE FILM UNDER THE TONGUE TWICE A DAY       Medication Plan of Care at last office visit with MD/CNP:      Plan:   1. Patient increased in suboxone to 8/2 mg film SL bid. 30-day supply prescribed today, especially as patient has multiple changes in his current situation occurring at this time.   2. Patient has significant anxiety, and continues to experience sedative cravings. He was recommended to continue on baclofen 10 mg po at bedtime at this time to help with sleep and cravings.  3. Patient to continue on vyvanse to help with focus, which patient has been stable and functional on for months. Vyvanse 30 mg po as well as vyvanse 40 mg po daily. Continue on clonidine 0.1 mg po three times a day and gabapentin 600 mg four times a day prn anxiety/insomnia. Agree with Dr. Haddad recommendation to start patient on celexa.   4. Urine toxicology - DAM was positive amphetamines only as expected.  5. Patient recommended to cease use of e-cigarettes and replace with nicotine therapy. He will consider.   6. RTC in 30 days and sooner prn.   [place] :

## 2021-06-16 NOTE — TELEPHONE ENCOUNTER
Telephone Encounter by Dena Garcia RN at 9/11/2019 12:11 PM     Author: Dena Garcia RN Service: Psychiatry Author Type: Registered Nurse    Filed: 9/11/2019 12:36 PM Encounter Date: 9/11/2019 Status: Signed    : Dena Garcia RN (Registered Nurse)       Requesting today, as would like to pick them up on way home, after appointment tomorrow, so hoping Dr. Brunner would fill maybe tomorrow am, seeing she is off today.    Date of Last Office Visit: 08/15/2019  Date of Next Office Visit: 09/12/2019  No shows since last visit: none  Cancellations since last visit: none  ED visits since last visit:  none  Medication Suboxone 8-2 mg YUNI and Lisdexamfetamine 30 mg and 40 mg date last ordered: 08/15/2019  Qty: 30 day supply for each  Refills: 0, all filled on 08/16/2019  Lapse in therapy greater than 7 days: no  Medication refill request verified as identical to current order: yes  Result of Last DAM, VPA, Li+ Level, CBC, or Carbamazepine Level (at or since last visit): Positive on 08/15/2019 for Amphetamines and THC.     [] Medication refilled per [facility] M-1.   [x] Medication unable to be refilled by RN due to criteria not met as indicated below:     []Eligibility - not seen in last year    []Supervision - no future appointment    []Compliance     []Verification - order discrepancy    [x]Controlled Medication    []Medication not included in RN Protocol    []90 - day supply request    []Other     Current Medication list:    Barak Clarke    (MRN [id number])   Your Current Medications Are     atenolol (TENORMIN) 50 MG tablet Take 50 mg by mouth daily.    baclofen (LIORESAL) 10 MG tablet Take 1 tablet (10 mg total) by mouth 2 (two) times a day as needed.   cloNIDine HCl (CATAPRES) 0.1 MG tablet Take 1 tablet (0.1 mg total) by mouth 3 (three) times a day as needed. Hold for dizziness or light headedness and call the clinic   fluticasone (FLONASE) 50 mcg/actuation nasal spray 1 spray into each nostril  2 (two) times a day.   gabapentin (NEURONTIN) 600 MG tablet TAKE ONE TABLET BY MOUTH THREE TIMES A DAY AND MAY TAKE ONE ADDITIONAL TABLET EVERY DAY AS NEEDED   lisdexamfetamine (VYVANSE) 30 MG capsule TAKE ONE CAPSULE BY MOUTH EVERY DAY AT NOON   lisdexamfetamine (VYVANSE) 40 MG capsule TAKE ONE CAPSULE BY MOUTH EVERY DAY   polyethylene glycol (GLYCOLAX) 17 gram/dose powder MIX 17 GRAMS IN 8OZ OF LIQUID PER CONTAINER DIRECTIONS AND DRINK DAILY   SUBOXONE 8-2 mg Film per sublingual film DISSOLVE ONE-HALF FILM IN MOUTH THREE TIMES DAILY AS DIRECTED       Medication Plan of Care at last office visit with MD/CNP:    Plan:   1. Patient currently on 4/1 mg film three times a day of suboxone. 30-day supply prescribed today.     2. Patient has significant anxiety, and has had prior relapses on benzodiazepines. He was recommended to continue with frequent psychotherapy.   3. Patient to continue on vyvanse to help with focus, which patient has been stable and functional on for months. Vyvanse 30 mg po as well as vyvanse 40 mg po daily. Continue on clonidine 0.1 mg po three times a day and gabapentin 600 mg four times a day prn anxiety/insomnia. Patient recommended to schedule for addiction psychiatry.   4. Urine toxicology - DAM was positive for thc and amphetamines; recommended against use of thc.   5. RTC in 30 days and sooner prn.     [place] :

## 2021-06-16 NOTE — PROGRESS NOTES
Patient is here for follow up and medication management.    DAM  Date: 18   Results: Pos. Amph.  BUP   Date: 17    Results: pos    Pain Level: 0    How is sleep?: Same as before, waking up a lot    Attending outpatient groups or meetings: 2 x a  week    Working or school: no    Concerns today: Patient is trying to cut back on Suboxone and has been taking 6 mg in the evening rather than 8 mg.  He started with therapy yesterday.    [place] :    [employer] [place] Date: 18  Query Report Page#: 1  Patient Rx History Report  ROBIN HOLMAN  Search Criteria: Last Name 'robin' and First Name 'akilah' and  = '[birth year]' and Request Period =  to  ' - 10 out of 10 Recipients Selected.  Fill Date Product, Str, Form Qty Days Pt ID Prescriber Written RX# N/R* Pharm **MED+  ---------- -------------------------------- ------ ---- --------- ---------- ---------- ------------ ----- --------- ------  02/15/2018 GABAPENTIN 300 MG CAPSULE 120.00 30 [id number] 2017 [id number] R [id number] 00.0  2018 SUBOXONE 8 MG-2 MG SL FILM 60.00 30 [id number] 2018 [id number] N [id number] 480.0  2018 VYVANSE 40 MG CAPSULE 30.00 30 [id number] 2018 [id number] N [id number] 00.0  2018 VYVANSE 30 MG CAPSULE 30.00 30 [id number] 2018 [id number] N [id number] 00.0  01/10/2018 GABAPENTIN 300 MG CAPSULE 120.00 30 [id number] 2017 [id number] N [id number] 00.0  2017 VYVANSE 40 MG CAPSULE 30.00 30 [id number] 2017 [id number] N [id number] 00.0  2017 VYVANSE 30 MG CAPSULE 30.00 30 [id number] 2017 [id number] N [id number] 00.0  2017 SUBOXONE 8 MG-2 MG SL FILM 60.00 30 [id number] 2017 [id number] N [id number] 480.0  2017 GABAPENTIN 300 MG CAPSULE 120.00 30 [id number] 2017 [id number] R [id number] 00.0  2017 VYVANSE 40 MG CAPSULE 30.00 30 [id number] 2017 [id number] N [id number]  00.0  12/02/2017 VYVANSE 30 MG CAPSULE 30.00 30 [id number] 12/01/2017 [id number] N [id number] 00.0  12/01/2017 SUBOXONE 8 MG-2 MG SL FILM 60.00 30 [id number] 12/01/2017 [id number] N [id number] 480.0  11/06/2017 VYVANSE 30 MG CAPSULE 30.00 30 [id number] 11/02/2017 [id number] N [id number] 00.0  11/06/2017 VYVANSE 40 MG CAPSULE 30.00 30 [id number] 11/02/2017 [id number] N [id number] 00.0  11/03/2017 GABAPENTIN 300 MG CAPSULE 120.00 30 [id number] 11/02/2017 [id number] N [id number] 00.0  11/02/2017 SUBOXONE 8 MG-2 MG SL FILM 60.00 30 [id number] 11/02/2017 [id number] N [id number] 480.0  10/19/2017 METHYLPHENIDATE ER 20 MG TAB 60.00 30 [id number] 10/19/2017 [id number] N [id number] 00.0  10/13/2017 METHYLPHENIDATE ER 20 MG TAB 14.00 7 [id number] 10/13/2017 [id number] N [id number] 00.0  10/08/2017 GABAPENTIN 300 MG CAPSULE 120.00 30 [id number] 08/10/2017 [id number] R [id number] 00.0  10/05/2017 SUBOXONE 8 MG-2 MG SL FILM 60.00 30 [id number] 10/05/2017 [id number] N [id number] 480.0  09/12/2017 GABAPENTIN 300 MG CAPSULE 120.00 30 [id number] 08/10/2017 [id number] R [id number] 00.0  09/08/2017 SUBOXONE 8 MG-2 MG SL FILM 60.00 30 [id number] 09/07/2017 [id number] N [id number] 480.0  09/07/2017 VYVANSE 40 MG CAPSULE 30.00 30 [id number] 09/07/2017 [id number] N [id number] 00.0  09/07/2017 VYVANSE 30 MG CAPSULE 30.00 30 [id number] 09/07/2017 [id number] N [id number] 00.0  *N/R N=New R=Refill  +MED Daily  Prescribers for prescriptions listed  ----------------------------------------------------------------------------------------------------------------------------------  [id number] YESENIA REILLY MD; [facility], [address], [place] [address]  [id number] YESENIA REILLY MD; [facility], [address], [place] [address]  [id number] MANJULA DOWELL MD; [facility], [address], [place] [address]  Pharmacies that  dispensed prescriptions listed  ----------------------------------------------------------------------------------------------------------------------------------  [id number] [facility]; [address] [place] [address],  **Per CDC guidance, the conversion factors and associated daily morphine milligram    Correct pharmacy verified with patient and confirmed in snapshot? [x] yes []no    Charge captured ? [x] yes  [] no    Medications Phoned  to Pharmacy [] yes [x]no  Name of Pharmacist:  List Medications, including dose, quantity and instructions      Medication Prescriptions given to patient   [] yes  [x] no   List the name of the drug the prescription was written for.       Medications ordered this visit were e-scribed.  Verified by order class [x] yes  [] no    Medication changes or discontinuations were communicated to patient's pharmacy: [] yes  [x] no    UA collected [x] yes  [] no    [place] Prescription Monitoring Program Reviewed? [x] yes  [] no    Referrals were made to:  NONE    Future appointment was made: [x] yes  [] no    Dictation completed at time of chart check: [] yes  [x] no    I have checked the documentation for today s encounters and the above information has been reviewed and completed.

## 2021-06-16 NOTE — TELEPHONE ENCOUNTER
Telephone Encounter by Dena Garcia RN at 3/6/2019  8:52 AM     Author: Dena Garcia RN Service: Psychiatry Author Type: Registered Nurse    Filed: 3/6/2019  9:02 AM Encounter Date: 3/6/2019 Status: Signed    : Dena Garcia RN (Registered Nurse)       Asking for refill a little early, as he has new insurance and is sure they will be asking for a new prior authorization.    Date of Last Office Visit: 02/14/2019  Date of Next Office Visit: 04/18/2019  No shows since last visit: none  Cancellations since last visit: none  ED visits since last visit:  none  Medication Lisdexamfetaine 30 mg and 40 mg date last ordered: 02/07/2019  Qty: 30  Refills: 0  Lapse in therapy greater than 7 days: no  Medication refill request verified as identical to current order: yes  Result of Last DAM, VPA, Li+ Level, CBC, or Carbamazepine Level (at or since last visit): Positive on 02/14/2019 for Amphetamines only     [] Medication refilled per [facility] M-1.   [x] Medication unable to be refilled by RN due to criteria not met as indicated below:     []Eligibility - not seen in last year    []Supervision - no future appointment    []Compliance     []Verification - order discrepancy    [x]Controlled Medication    []Medication not included in RN Protocol    []90 - day supply request    []Other     Current Medication list:    Barak Clarke    (MRN [id number])   Your Current Medications Are     atenolol (TENORMIN) 50 MG tablet Take 50 mg by mouth daily.    buprenorphine-naloxone (SUBOXONE) 8-2 mg Film per sublingual film Half film (4-1 mg), three times daily.   cloNIDine HCl (CATAPRES) 0.1 MG tablet Take 1 tablet (0.1 mg total) by mouth 3 (three) times a day. Use as needed for for symptoms of anxiety/craving   fluticasone (FLONASE) 50 mcg/actuation nasal spray 1 spray into each nostril 2 (two) times a day.   gabapentin (NEURONTIN) 300 MG capsule TAKE ONE CAPSULE BY MOUTH TWICE A DAY AND TAKE TWO CAPSULES BY MOUTH AT  BEDTIME   lisdexamfetamine (VYVANSE) 30 MG capsule TAKE ONE CAPSULE BY MOUTH EVERY DAY AT NOON   lisdexamfetamine (VYVANSE) 40 MG capsule TAKE ONE CAPSULE BY MOUTH EVERY DAY   magnesium citrate solution Take by mouth at bedtime. 1/3 to 1/2 bottle at bedtime each night.   montelukast (SINGULAIR) 10 mg tablet    nicotine polacrilex (NICORETTE) 4 MG gum Apply 4 mg to the mouth or throat as needed for smoking cessation.   OMEGA-3/DHA/EPA/FISH OIL (FISH OIL-OMEGA-3 FATTY ACIDS) 300-1,000 mg capsule Take 1 g by mouth daily.   polyethylene glycol (GLYCOLAX) 17 gram/dose powder MIX 17 GRAMS IN 8OZ OF LIQUID PER CONTAINER DIRECTIONS AND DRINK DAILY       Medication Plan of Care at last office visit with MD/CNP:    Assessment:33-year-old man with a history of ADHD, major depressive disorder, generalized anxiety disorder and opioid use disorder who presents for follow-up on medication-assisted treatment with [facility]. He continues to experience significant anxiety at times, and will be prescribed clonidine to help with anxiety.   Plan:   1. Patient given a 30-day prescription for suboxone 8/2 mg film three times a day, with 1 refill.   2. Encouraged patient to continue attending 12 step meetings to work on setting an example for others in the program, and he has continued to feel it is beneficial when he foes.   3. Patient encouraged to continue with psychotherapy, and schedule with psychiatry. Continue on vyvanse to help with focus, which patient has been stable and functional on for months. Vyvanse 30 mg po as well as vyvanse 40 mg po daily. I also prescribed clonidine 0.1 mg po three times a day prn anxiety/insomnia.  4. Urine toxicology as expected.  5. RTC in 2 months and sooner prn.     [place] :

## 2021-06-16 NOTE — TELEPHONE ENCOUNTER
Telephone Encounter by Tita Talavera RN at 7/26/2019 12:16 PM     Author: Tita Talavera RN Service: -- Author Type: Registered Nurse    Filed: 7/26/2019 12:20 PM Encounter Date: 7/26/2019 Status: Signed    : Tita Talavera RN (Registered Nurse)       Date of Last Office Visit: 7/18/19  Date of Next Office Visit: 8/8/19  No shows since last visit: none  Cancellations since last visit: none  ED visits since last visit:  none  Medication gabapentin 600mg date last ordered: 6/28/19  Qty: 120  Refills: 0  Lapse in therapy greater than 7 days: no  Medication refill request verified as identical to current order: yes  Result of Last DAM, VPA, Li+ Level, CBC, or Carbamazepine Level (at or since last visit): N/A     [] Medication refilled per [facility] M-1.   [x] Medication unable to be refilled by RN due to criteria not met as indicated below:     []Eligibility - not seen in last year    []Supervision - no future appointment    []Compliance     []Verification - order discrepancy    []Controlled Medication    []Medication not included in RN Protocol    []90 - day supply request    [x]Other - nurse discretion    Current Medication list:    atenolol (TENORMIN) 50 MG tablet Take 50 mg by mouth daily.    baclofen (LIORESAL) 10 MG tablet Take 1 tablet (10 mg total) by mouth 2 (two) times a day as needed.   buprenorphine-naloxone (SUBOXONE SL FILM) 8-2 mg Film per sublingual film DISSOLVE ONE-HALF FILM IN MOUTH THREE TIMES DAILY AS DIRECTED; YUNI   cloNIDine HCl (CATAPRES) 0.1 MG tablet Take 1 tablet (0.1 mg total) by mouth 3 (three) times a day. Use as needed for for symptoms of anxiety/craving   cloNIDine HCl (CATAPRES) 0.1 MG tablet Take 1 tablet (0.1 mg total) by mouth 3 (three) times a day as needed. Hold for dizziness or light headedness and call the clinic   fluticasone (FLONASE) 50 mcg/actuation nasal spray 1 spray into each nostril 2 (two) times a day.   gabapentin (NEURONTIN) 600 MG tablet TAKE ONE TABLET BY MOUTH THREE  TIMES A DAY AND TAKE ONE ADDITIONAL TABLET EVERY DAY AS NEEDED   lisdexamfetamine (VYVANSE) 30 MG capsule TAKE ONE CAPSULE BY MOUTH EVERY DAY AT NOON   lisdexamfetamine (VYVANSE) 40 MG capsule TAKE ONE CAPSULE BY MOUTH EVERY DAY   polyethylene glycol (GLYCOLAX) 17 gram/dose powder MIX 17 GRAMS IN 8OZ OF LIQUID PER CONTAINER DIRECTIONS AND DRINK DAILY       Medication Plan of Care at last office visit with MD/CNP:    Last office note incomplete

## 2021-06-16 NOTE — TELEPHONE ENCOUNTER
Telephone Encounter by Tita Talavera RN at 9/19/2019  3:43 PM     Author: Tita Talavera RN Service: -- Author Type: Registered Nurse    Filed: 9/19/2019  3:48 PM Encounter Date: 9/19/2019 Status: Signed    : Tita Talavera RN (Registered Nurse)       Date of Last Office Visit: 9/12/19  Date of Next Office Visit: 10/14/19  No shows since last visit: none  Cancellations since last visit: none  ED visits since last visit:  none  Medication gabapentin 600mg date last ordered: 8/23/19  Qty: 120  Refills: 0  Lapse in therapy greater than 7 days: no - patient is requesting early  Medication refill request verified as identical to current order: yes  Result of Last DAM, VPA, Li+ Level, CBC, or Carbamazepine Level (at or since last visit): N/A     [] Medication refilled per [facility] M-1.   [x] Medication unable to be refilled by RN due to criteria not met as indicated below:     []Eligibility - not seen in last year    []Supervision - no future appointment    []Compliance     []Verification - order discrepancy    []Controlled Medication    []Medication not included in RN Protocol    []90 - day supply request    [x]Other- patient requesting medication early   Current Medication list:    atenolol (TENORMIN) 50 MG tablet Take 50 mg by mouth daily.    baclofen (LIORESAL) 10 MG tablet Take 1 tablet (10 mg total) by mouth 2 (two) times a day as needed.   cloNIDine HCl (CATAPRES) 0.1 MG tablet Take 1 tablet (0.1 mg total) by mouth 3 (three) times a day as needed. Hold for dizziness or light headedness and call the clinic   fluticasone (FLONASE) 50 mcg/actuation nasal spray 1 spray into each nostril 2 (two) times a day.   gabapentin (NEURONTIN) 600 MG tablet TAKE ONE TABLET BY MOUTH THREE TIMES A DAY AND MAY TAKE ONE ADDITIONAL TABLET EVERY DAY AS NEEDED   lisdexamfetamine (VYVANSE) 30 MG capsule TAKE ONE CAPSULE BY MOUTH EVERY DAY AT NOON   lisdexamfetamine (VYVANSE) 40 MG capsule TAKE ONE CAPSULE BY MOUTH ONCE A DAY    polyethylene glycol (GLYCOLAX) 17 gram/dose powder MIX 17 GRAMS IN 8OZ OF LIQUID PER CONTAINER DIRECTIONS AND DRINK DAILY   SUBOXONE 8-2 mg Film per sublingual film 1 film two times a day       Medication Plan of Care at last office visit with MD/CNP:    Plan:   1. Patient increased in suboxone to 8/2 mg film SL bid. 30-day supply prescribed today.     2. Patient has significant anxiety, and continues to experience sedative cravings. He was recommended to continue on baclofen 10 mg po at bedtime at this time to help with sleep and cravings.  3. Patient to continue on vyvanse to help with focus, which patient has been stable and functional on for months. Vyvanse 30 mg po as well as vyvanse 40 mg po daily. Continue on clonidine 0.1 mg po three times a day and gabapentin 600 mg four times a day prn anxiety/insomnia. Patient has an appointment in October with Dr. Haddad to establish with addiction psychiatry.   4. Urine toxicology - DAM was positive for thc and amphetamines; recommended against use of thc.   5. RTC in 30 days and sooner prn.

## 2021-06-16 NOTE — TELEPHONE ENCOUNTER
Telephone Encounter by Dena Garcia RN at 4/3/2019  9:23 AM     Author: Dena Garcia RN Service: Psychiatry Author Type: Registered Nurse    Filed: 4/3/2019  9:29 AM Encounter Date: 4/2/2019 Status: Signed    : Dena Garcia RN (Registered Nurse)       Date of Last Office Visit: 02/14/2019  Date of Next Office Visit: 04/18/2019  No shows since last visit: none  Cancellations since last visit: none  ED visits since last visit:  none  Medication Lisdexamfetamine 30 mg and 40 mg date last ordered: 03/06/2019  Qty: 30  Refills: 0  Lapse in therapy greater than 7 days: no  Medication refill request verified as identical to current order: yes  Result of Last DAM, VPA, Li+ Level, CBC, or Carbamazepine Level (at or since last visit): Positive on 02/14/2019 for Amphetamines, only.     [] Medication refilled per [facility] M-1.   [x] Medication unable to be refilled by RN due to criteria not met as indicated below:     []Eligibility - not seen in last year    []Supervision - no future appointment    []Compliance     []Verification - order discrepancy    [x]Controlled Medication    []Medication not included in RN Protocol    []90 - day supply request    []Other     Current Medication list:    Barak Clarke    (MRN [id number])   Your Current Medications Are     atenolol (TENORMIN) 50 MG tablet Take 50 mg by mouth daily.    buprenorphine-naloxone (SUBOXONE) 8-2 mg Film per sublingual film Half film (4-1 mg), three times daily.   cloNIDine HCl (CATAPRES) 0.1 MG tablet Take 1 tablet (0.1 mg total) by mouth 3 (three) times a day. Use as needed for for symptoms of anxiety/craving   fluticasone (FLONASE) 50 mcg/actuation nasal spray 1 spray into each nostril 2 (two) times a day.   gabapentin (NEURONTIN) 300 MG capsule TAKE ONE CAPSULE BY MOUTH TWICE A DAY AND TAKE TWO CAPSULES BY MOUTH AT BEDTIME   lisdexamfetamine (VYVANSE) 30 MG capsule TAKE ONE CAPSULE BY MOUTH EVERY DAY AT NOON   lisdexamfetamine (VYVANSE) 40  MG capsule TAKE ONE CAPSULE BY MOUTH EVERY DAY   magnesium citrate solution Take by mouth at bedtime. 1/3 to 1/2 bottle at bedtime each night.   montelukast (SINGULAIR) 10 mg tablet    nicotine polacrilex (NICORETTE) 4 MG gum Apply 4 mg to the mouth or throat as needed for smoking cessation.   OMEGA-3/DHA/EPA/FISH OIL (FISH OIL-OMEGA-3 FATTY ACIDS) 300-1,000 mg capsule Take 1 g by mouth daily.   polyethylene glycol (GLYCOLAX) 17 gram/dose powder MIX 17 GRAMS IN 8OZ OF LIQUID PER CONTAINER DIRECTIONS AND DRINK DAILY       Medication Plan of Care at last office visit with MD/CNP:    Assessment:33-year-old man with a history of ADHD, major depressive disorder, generalized anxiety disorder and opioid use disorder who presents for follow-up on medication-assisted treatment with [facility]. He continues to experience significant anxiety at times, and will be prescribed clonidine to help with anxiety.   Plan:   1. Patient given a 30-day prescription for suboxone 8/2 mg film three times a day, with 1 refill.   2. Encouraged patient to continue attending 12 step meetings to work on setting an example for others in the program, and he has continued to feel it is beneficial when he foes.   3. Patient encouraged to continue with psychotherapy, and schedule with psychiatry. Continue on vyvanse to help with focus, which patient has been stable and functional on for months. Vyvanse 30 mg po as well as vyvanse 40 mg po daily. I also prescribed clonidine 0.1 mg po three times a day prn anxiety/insomnia.  4. Urine toxicology as expected.  5. RTC in 2 months and sooner prn.     [place] :

## 2021-06-16 NOTE — TELEPHONE ENCOUNTER
Telephone Encounter by Dena Garcia RN at 10/9/2019  2:40 PM     Author: Dena Garcia RN Service: Psychiatry Author Type: Registered Nurse    Filed: 10/9/2019  2:48 PM Encounter Date: 10/9/2019 Status: Signed    : Dena Garcia RN (Registered Nurse)       Date of Last Office Visit: 09/12/2019  Date of Next Office Visit: 10/14/2019  No shows since last visit: none  Cancellations since last visit: none  ED visits since last visit:  none  Medication Lisdexamfetamine 30 mg and 40 mg date last ordered: 09/12/2019  Qty: 30  Refills: 0  Medication Suboxone 8-2 mg YUNI date last ordered: 09//13/2019  Qty: 60  Refills: 0  Lapse in therapy greater than 7 days: no  Medication refill request verified as identical to current order: yes  Result of Last DAM, VPA, Li+ Level, CBC, or Carbamazepine Level (at or since last visit): Positive on 09/12/2019 for Amphetamines     [] Medication refilled per [facility] M-1.   [x] Medication unable to be refilled by RN due to criteria not met as indicated below:     []Eligibility - not seen in last year    []Supervision - no future appointment    []Compliance     []Verification - order discrepancy    [x]Controlled Medication    []Medication not included in RN Protocol    []90 - day supply request    []Other     Current Medication list:     Barak Clarke    (MRN [id number])   Your Current Medications Are     atenolol (TENORMIN) 50 MG tablet Take 50 mg by mouth daily.    baclofen (LIORESAL) 10 MG tablet TAKE ONE TABLET BY MOUTH TWICE A DAY AS NEEDED   cloNIDine HCl (CATAPRES) 0.1 MG tablet Take 1 tablet (0.1 mg total) by mouth 3 (three) times a day as needed. Hold for dizziness or light headedness and call the clinic   fluticasone (FLONASE) 50 mcg/actuation nasal spray 1 spray into each nostril 2 (two) times a day.   gabapentin (NEURONTIN) 600 MG tablet TAKE ONE TABLET BY MOUTH THREE TIMES A DAY AND MAY TAKE ONE ADDITIONAL TABLET EVERY DAY AS NEEDED   gabapentin (NEURONTIN)  600 MG tablet TAKE ONE TABLET BY MOUTH THREE TIMES A DAY AND TAKE ONE ADDITIONAL TABLET EVERY DAY AS NEEDED   lisdexamfetamine (VYVANSE) 30 MG capsule TAKE ONE CAPSULE BY MOUTH EVERY DAY AT NOON   lisdexamfetamine (VYVANSE) 40 MG capsule TAKE ONE CAPSULE BY MOUTH ONCE A DAY   polyethylene glycol (GLYCOLAX) 17 gram/dose powder MIX 17 GRAMS IN 8OZ OF LIQUID PER CONTAINER DIRECTIONS AND DRINK DAILY   SUBOXONE 8-2 mg Film per sublingual film 1 film two times a day       Medication Plan of Care at last office visit with MD/CNP:    Assessment  34-year-old man with a history of ADHD, major depressive disorder, generalized anxiety disorder and opioid use disorder who presents for follow-up on medication-assisted treatment with suboxone. We discussed increasing medication dose of suboxone to help with opioid cravings. He was also recommended to return to individual psychotherapy and see Dr. Haddad as scheduled.       Plan:   1. Patient increased in suboxone to 8/2 mg film SL bid. 30-day supply prescribed today.     2. Patient has significant anxiety, and continues to experience sedative cravings. He was recommended to continue on baclofen 10 mg po at bedtime at this time to help with sleep and cravings.  3. Patient to continue on vyvanse to help with focus, which patient has been stable and functional on for months. Vyvanse 30 mg po as well as vyvanse 40 mg po daily. Continue on clonidine 0.1 mg po three times a day and gabapentin 600 mg four times a day prn anxiety/insomnia. Patient has an appointment in October with Dr. Haddad to establish with addiction psychiatry.   4. Urine toxicology - DAM was positive amphetamines only as expected.  5. Patient recommended to cease use of e-cigarettes and replace with nicotine therapy. He will consider.   6. RTC in 30 days and sooner prn.     [place] :

## 2021-06-16 NOTE — TELEPHONE ENCOUNTER
Telephone Encounter by Makeda Zhou RN at 8/23/2019  3:12 PM     Author: Makeda Zhou RN Service: Behavioral Author Type: Registered Nurse    Filed: 8/23/2019  3:19 PM Encounter Date: 8/23/2019 Status: Signed    : Makeda Zhou RN (Registered Nurse)       Date of Last Office Visit: 8/15/19  Date of Next Office Visit: 9/12/19  No shows since last visit: 0  Cancellations since last visit: 0  ED visits since last visit:  0    Medication gabapentin 600 mg date last ordered: 7/26/19  Qty: 120  Refills: 0    Lapse in therapy greater than 7 days: No  Medication refill request verified as identical to current order: Yes  Result of Last DAM, VPA, Li+ Level, CBC, or Carbamazepine Level (at or since last visit): Positive amphetamines and THC on 8/15/19 (Prescription Vyvanse)     [] Medication refilled per [facility] M-1.   [x] Medication unable to be refilled by RN due to criteria not met as indicated below:     []Eligibility - not seen in last year    []Supervision - no future appointment    []Compliance     []Verification - order discrepancy    []Controlled Medication    []Medication not included in RN Protocol    []90 - day supply request    [x]Other     Current Medication list:    Medication Plan of Care at last office visit with MD/CNP:      Office visit notes from 8/15/19 unavailable.   Office visit notes from 7/18/19:

## 2021-06-16 NOTE — TELEPHONE ENCOUNTER
Telephone Encounter by Skyla Fournier RN at 6/24/2019 11:13 AM     Author: Skyla Fournier RN Service: Behavioral Author Type: Registered Nurse    Filed: 6/24/2019 11:49 AM Encounter Date: 6/24/2019 Status: Signed    : Skyla Fournier RN (Registered Nurse)       Date of Last Office Visit: 6/6/19  Date of Next Office Visit: 7/5/19  No shows since last visit: none  Cancellations since last visit: none  ED visits since last visit:  none  Medication Lisdexamfetamine 30 mg date last ordered: 5/14/19  Qty: 30  Refills: 0  Medication Lisdexamfetamine  date last ordered: 5/14/19  Qty: 30  Refills: 0  Lapse in therapy greater than 7 days: no, patient states he has a few left, he is at work so he can't count them  Medication refill request verified as identical to current order: yes  Result of Last DAM, VPA, Li+ Level, CBC, or Carbamazepine Level (at or since last visit): Positive  Amphetamines 6/6/19     [] Medication refilled per [facility] M-1.   [x] Medication unable to be refilled by RN due to criteria not met as indicated below:     []Eligibility - not seen in last year    []Supervision - no future appointment    []Compliance     []Verification - order discrepancy    [x]Controlled Medication    []Medication not included in RN Protocol    []90 - day supply request    []Other     Current Medication list:    atenolol (TENORMIN) 50 MG tablet Take 50 mg by mouth daily.    buprenorphine-naloxone (SUBOXONE SL FILM) 8-2 mg Film per sublingual film DISSOLVE ONE-HALF FILM IN MOUTH THREE TIMES DAILY AS DIRECTED; YUNI   cloNIDine HCl (CATAPRES) 0.1 MG tablet Take 1 tablet (0.1 mg total) by mouth 3 (three) times a day. Use as needed for for symptoms of anxiety/craving   cloNIDine HCl (CATAPRES) 0.1 MG tablet Take 1 tablet (0.1 mg total) by mouth 3 (three) times a day as needed. Hold for dizziness or light headedness and call the clinic   FLUoxetine (PROZAC) 10 MG capsule Take 1 capsule (10 mg total) by mouth daily.    fluticasone (FLONASE) 50 mcg/actuation nasal spray 1 spray into each nostril 2 (two) times a day.   gabapentin (NEURONTIN) 600 MG tablet TAKE ONE TABLET BY MOUTH THREE TIMES A DAY AND ONE ADDITIONAL TABLET ONCE DAILY AS NEEDED   lisdexamfetamine (VYVANSE) 30 MG capsule TAKE ONE CAPSULE BY MOUTH EVERY DAY AT NOON   lisdexamfetamine (VYVANSE) 40 MG capsule TAKE ONE CAPSULE BY MOUTH EVERY DAY   polyethylene glycol (GLYCOLAX) 17 gram/dose powder MIX 17 GRAMS IN 8OZ OF LIQUID PER CONTAINER DIRECTIONS AND DRINK DAILY       Medication Plan of Care at last office visit with MD/CNP:    Plan:   1. Patient given a 30-day rx for 4/1 mg film of suboxone three times a day. 45 of the 8/2 mg films prescribed at this time.   2. Encouraged patient to find a weekly meeting that he feels comfortable with or a volunteering activity so he practices interacting with people in a setting that is different from his work.    3. Patient applauded for going to psychotherapy with Sharda Marquez, and I agree with his coming plan to go weekly. He was asked to open a release today for her. We discussed at length various medication options for treating anxiety, and Barak elected to try fluoxetine. We reviewed risks and benefits of this medication, and I started him on fluoxetine 10 mg po daily. Continue on vyvanse to help with focus, which patient has been stable and functional on for months. Vyvanse 30 mg po as well as vyvanse 40 mg po daily. Continue on clonidine 0.1 mg po three times a day and gabapentin 600 mg four times a day prn anxiety/insomnia.  4. Urine toxicology pending at this time.   5. Patient may benefit from a job transition as he consistently complains about his job.   6. RTC in 30 days and sooner prn.    [place]

## 2021-06-16 NOTE — TELEPHONE ENCOUNTER
Telephone Encounter by Constanza Schulz LPN at 2/24/2020  9:18 AM     Author: Constanza Schulz LPN Service: -- Author Type: Licensed Nurse    Filed: 2/24/2020  9:19 AM Encounter Date: 2/21/2020 Status: Signed    : Constanza Schulz LPN (Licensed Nurse)       See parallel telephone encounter

## 2021-06-16 NOTE — TELEPHONE ENCOUNTER
Telephone Encounter by Makeda Zhou RN at 9/26/2019 11:43 AM     Author: Makeda Zhou RN Service: Behavioral Author Type: Registered Nurse    Filed: 9/26/2019 11:46 AM Encounter Date: 9/26/2019 Status: Signed    : Makeda Zhou RN (Registered Nurse)       Date of Last Office Visit: 9/12/19  Date of Next Office Visit: 10/14/19  No shows since last visit: 0  Cancellations since last visit: 0  ED visits since last visit:  0    Medication baclofen 10 mg date last ordered: 7/18/19  Qty: 60  Refills: 0    Lapse in therapy greater than 7 days: Yes  Medication refill request verified as identical to current order: Yes  Result of Last DAM, VPA, Li+ Level, CBC, or Carbamazepine Level (at or since last visit): Positive for amphetamines on 9/12/19     [] Medication refilled per [facility] M-1.   [x] Medication unable to be refilled by RN due to criteria not met as indicated below:     []Eligibility - not seen in last year    []Supervision - no future appointment    [x]Compliance -therapy lapse    []Verification - order discrepancy    []Controlled Medication    []Medication not included in RN Protocol    []90 - day supply request    []Other     Current Medication list:    Medication Plan of Care at last office visit with MD/CNP:

## 2021-06-16 NOTE — TELEPHONE ENCOUNTER
Telephone Encounter by Constanza Schulz LPN at 12/31/2019 11:16 AM     Author: Constanza Schulz LPN Service: -- Author Type: Licensed Nurse    Filed: 12/31/2019 11:58 AM Encounter Date: 12/31/2019 Status: Addendum    : Constanza Schulz LPN (Licensed Nurse)    Related Notes: Original Note by Constanza Schulz LPN (Licensed Nurse) filed at 12/31/2019 11:39 AM       Dr. Sloan covering for Dr. Brunner.    Pt called and said he is low on Suboxone, worries about clinic being closed on Holiday.      Date of Last Office Visit: 11/14/19   Date of Next Office Visit: 1/3/20  No shows since last visit: 12/12/19  Cancellations since last visit: no  ED visits since last visit: no    Medication All date last ordered: 12/5/19  Qty: 1 mos Refills: 0    Lapse in therapy greater than 7 days: no  Medication refill request verified as identical to current order: yes  Result of Last DAM, VPA, Li+ Level, CBC, or Carbamazepine Level (at or since last visit):  DAM pos for THC and Amphetamines on 11/14/19 and pos BUP on 11/14/19        []Eligibility - not seen in last year    []Supervision - no future appointment    [x]Compliance : N/S on 12/12/19    []Verification - order discrepancy    [x]Controlled Medication    []90 - day supply request    [x]Other. According to  last filled all on 12/5/19 for 30 days - pt should have enough meds until next appt  LPN pending medications    Current Medication list:      atenolol (TENORMIN) 50 MG tablet Take 50 mg by mouth daily.    baclofen (LIORESAL) 10 MG tablet TAKE ONE TABLET BY MOUTH TWICE A DAY AS NEEDED   citalopram (CELEXA) 20 MG tablet Take 1 tablet (20 mg total) by mouth daily.   cloNIDine HCl (CATAPRES) 0.1 MG tablet TAKE 1 TABLET BY MOUTH 3 TIMES A DAY AS NEEDED. HOLD FOR DIZZINESS OR LIGHT HEADEDNESS AND CALL THE CLINIC   fluticasone (FLONASE) 50 mcg/actuation nasal spray 1 spray into each nostril 2 (two) times a day.   gabapentin (NEURONTIN) 600  MG tablet TAKE ONE TABLET BY MOUTH THREE TIMES A DAY PLUS ONE ADDITIONAL TABLET EVERY DAY AS NEEDED   lisdexamfetamine (VYVANSE) 30 MG capsule TAKE ONE CAPSULE BY MOUTH EVERY DAY AT NOON   lisdexamfetamine (VYVANSE) 40 MG capsule TAKE ONE CAPSULE BY MOUTH ONCE A DAY   polyethylene glycol (GLYCOLAX) 17 gram/dose powder MIX 17 GRAMS IN 8OZ OF LIQUID PER CONTAINER DIRECTIONS AND DRINK DAILY   SUBOXONE 8-2 mg Film per sublingual film PLACE ONE FILM UNDER THE TONGUE TWICE A DAY   venlafaxine (EFFEXOR-XR) 75 MG 24 hr capsule Take 1 capsule (75 mg total) by mouth daily.       Medication Plan of Care at last office visit with MD/CNP:    PLAN:     Plan:   1. Patient to continue on suboxone, and does not need a refill today. He can call for a refill with his new pharmacy and obtain a 30-day supply prior to his next appointment.   2. Patient has significant anxiety, and continues to experience sedative cravings. He can continue on baclofen 10 mg po at bedtime for sedative cravings. Patient to continue on vyvanse to help with focus, which patient has been stable and functional on for months. Vyvanse 30 mg po as well as vyvanse 40 mg po daily. Continue on clonidine 0.1 mg po three times a day and gabapentin 600 mg four times a day prn anxiety/insomnia. Patient also on effexor 37.5 mg po daily and can increase to 75 mg po daily prior to upcoming appointment.   3. Again recommended returning to 12 step groups as he enjoyed these previously.   4. Urine toxicology - DAM pending at this time.   5. Patient recommended to cease use of e-cigarettes and replace with nicotine therapy. He will consider.   6. RTC in 30 days and sooner prn.     [place], [place], [place], and [place] :Reviewed and no worrisome pharmacy activity noted

## 2021-06-16 NOTE — TELEPHONE ENCOUNTER
Telephone Encounter by Constanza Schulz LPN at 12/4/2019  2:39 PM     Author: Constanza Schulz LPN Service: -- Author Type: Licensed Nurse    Filed: 12/4/2019  2:59 PM Encounter Date: 12/4/2019 Status: Signed    : Constanza Schulz LPN (Licensed Nurse)       Dr. Sloan covering for Dt. Brunner    Spoke to Barak, wants his prescriptions to be send to [facility]  in [facility].  Switching pharmacy, requesting refills a few days before due in case we will need to do a PA    Date of Last Office Visit: 11/14/19  Date of Next Office Visit: 12/12/19  No shows since last visit: no  Cancellations since last visit: no  ED visits since last visit: no    Medication All date last ordered: 11/7/19  Qty: 1 mos  Refills: 0  ( Last Rx for Vyvanse 40 mg caps was sent for Qty: 60 which is a 2 mos supply. Called and confirmed with [facility] that pt got only 30 caps of each dose on 11/7/19 and remaining amount got cancelled. Pt is now due for another refill)      Lapse in therapy greater than 7 days: no  Medication refill request verified as identical to current order: yes  Result of Last DAM, VPA, Li+ Level, CBC, or Carbamazepine Level (at or since last visit): Positive DAM for amphetamines and THC on 11/14/19. BUP was positive on 11/14/19 as well; Neg ETG on 10/15/19        []Eligibility - not seen in last year    []Supervision - no future appointment    []Compliance     []Verification - order discrepancy    [x]Controlled Medication    []90 - day supply request    [x]Other LPN pending medications    Current Medication list:    atenolol (TENORMIN) 50 MG tablet Take 50 mg by mouth daily.    baclofen (LIORESAL) 10 MG tablet TAKE ONE TABLET BY MOUTH TWICE A DAY AS NEEDED   citalopram (CELEXA) 20 MG tablet Take 1 tablet (20 mg total) by mouth daily.   cloNIDine HCl (CATAPRES) 0.1 MG tablet TAKE 1 TABLET BY MOUTH 3 TIMES A DAY AS NEEDED. HOLD FOR DIZZINESS OR LIGHT HEADEDNESS AND CALL THE CLINIC    fluticasone (FLONASE) 50 mcg/actuation nasal spray 1 spray into each nostril 2 (two) times a day.   gabapentin (NEURONTIN) 600 MG tablet TAKE ONE TABLET BY MOUTH THREE TIMES A DAY PLUS ONE ADDITIONAL TABLET EVERY DAY AS NEEDED   lisdexamfetamine (VYVANSE) 30 MG capsule TAKE ONE CAPSULE BY MOUTH EVERY DAY AT NOON   lisdexamfetamine (VYVANSE) 40 MG capsule TAKE ONE CAPSULE BY MOUTH ONCE A DAY   polyethylene glycol (GLYCOLAX) 17 gram/dose powder MIX 17 GRAMS IN 8OZ OF LIQUID PER CONTAINER DIRECTIONS AND DRINK DAILY   SUBOXONE 8-2 mg Film per sublingual film PLACE ONE FILM UNDER THE TONGUE TWICE A DAY   venlafaxine (EFFEXOR-XR) 75 MG 24 hr capsule Take 1 capsule (75 mg total) by mouth daily.         Medication Plan of Care at last office visit with MD/CNP:    PLAN:  Plan:   1. Patient to continue on suboxone, and does not need a refill today. He can call for a refill with his new pharmacy and obtain a 30-day supply prior to his next appointment.   2. Patient has significant anxiety, and continues to experience sedative cravings. He can continue on baclofen 10 mg po at bedtime for sedative cravings. Patient to continue on vyvanse to help with focus, which patient has been stable and functional on for months. Vyvanse 30 mg po as well as vyvanse 40 mg po daily. Continue on clonidine 0.1 mg po three times a day and gabapentin 600 mg four times a day prn anxiety/insomnia. Patient also on effexor 37.5 mg po daily and can increase to 75 mg po daily prior to upcoming appointment.   3. Again recommended returning to 12 step groups as he enjoyed these previously.   4. Urine toxicology - DAM pending at this time.   5. Patient recommended to cease use of e-cigarettes and replace with nicotine therapy. He will consider.   6. RTC in 30 days and sooner prn.        [place], [place], [place], and [place] :Reviewed and no worrisome pharmacy activity noted

## 2021-06-16 NOTE — TELEPHONE ENCOUNTER
Telephone Encounter by Constanza Schulz LPN at 2/6/2019  2:52 PM     Author: Constanza Schulz LPN Service: -- Author Type: Licensed Nurse    Filed: 2/6/2019  3:19 PM Encounter Date: 2/6/2019 Status: Signed    : Constanza Schulz LPN (Licensed Nurse)       Date of Last Office Visit: 12/13/18  Date of Next Office Visit: 2/14/19  No shows since last visit: none  Cancellations since last visit: none  ED visits since last visit: none    Medication Vyvanse 40  date last ordered: 1/11/19  Qty: 30  Refills: 0  Medication Vyvanse 30 mg date last ordered: 120  Qty: 30  Refills: 0  lisdexamfetamine (VYVANSE) 30 MG capsule 120 capsule 0 1/10/2019     Sig: TAKE ONE CAPSULE BY MOUTH EVERY DAY AT NOON    Last Rx for Vyvanse 30 mg was approved for 4 month ( 120 cap) on 1/10/19. Called pharmacy and spoke to the pharmacist Jonas pt picked up 30 capsules, remaining amount voided due to controlled C2 substance.   Medication Suboxone 8-2 mg date last ordered: 12/13/18  Qty: 45  Refills: 1    Lapse in therapy greater than 7 days:no  Medication refill request verified as identical to current order: yes  Result of Last DAM, VPA, Li+ Level, CBC, or Carbamazepine Level (at or since last visit): UTOX was positive for amphetamines and BUP as expected on 12/13/18        []Eligibility - not seen in last year    []Supervision - no future appointment    []Compliance     []Verification - order discrepancy    [x]Controlled Medication    []90 - day supply request    [x]Other LPN pending medications    Current Medication list:      atenolol (TENORMIN) 50 MG tablet Take 50 mg by mouth daily.    buprenorphine-naloxone (SUBOXONE) 8-2 mg Film per sublingual film Half film (4-1 mg), three times daily..   fluticasone (FLONASE) 50 mcg/actuation nasal spray 1 spray into each nostril 2 (two) times a day.   gabapentin (NEURONTIN) 300 MG capsule TAKE ONE CAPSULE BY MOUTH TWICE A DAY AND TAKE TWO CAPSULES BY MOUTH AT BEDTIME    lisdexamfetamine (VYVANSE) 30 MG capsule TAKE ONE CAPSULE BY MOUTH EVERY DAY AT NOON   lisdexamfetamine (VYVANSE) 40 MG capsule TAKE ONE CAPSULE BY MOUTH EVERY DAY   magnesium citrate solution Take by mouth at bedtime. 1/3 to 1/2 bottle at bedtime each night.   montelukast (SINGULAIR) 10 mg tablet    nicotine polacrilex (NICORETTE) 4 MG gum Apply 4 mg to the mouth or throat as needed for smoking cessation.   OMEGA-3/DHA/EPA/FISH OIL (FISH OIL-OMEGA-3 FATTY ACIDS) 300-1,000 mg capsule Take 1 g by mouth daily.   polyethylene glycol (GLYCOLAX) 17 gram/dose powder MIX 17 GRAMS IN 8OZ OF LIQUID PER CONTAINER DIRECTIONS AND DRINK DAILY         Medication Plan of Care at last office visit with MD/CNP:    PLAN:  . Patient continued on suboxone at current dose of 8/2 mg film- 1/2 film three times a day. 30 day supply provided today with 1 refill.   2. Patient given a refill of Vyvanse for 40 mg PO in AM and 30 mg in PM x 30 days. Contact us for refill in 1 month. Patient also to continue on gabapentin.   3. Continue individual psychotherapy. Encouraged him to explore his interest in Mu-ism but not to contact the man who came to [facility] given his potentially unsafe background.   4. Patient encouraged to continue with his robust program of recovery and to try attending pietro with his mother. Encouraged him to hold his boundaries with his sister.  5. We discussed having another trial of discontinuing vaping the e-cig at new years. We discussed 1-800-quit-now as a resource.   Consider trial of gum again and can call for prescription to use prn.   6. Patient to rtc in 2 months for follow-up and sooner prn.          [place] :  Reviewed and no worrisome pharmacy activity noted

## 2021-06-16 NOTE — TELEPHONE ENCOUNTER
Telephone Encounter by Constanza Schulz LPN at 6/28/2019 11:03 AM     Author: Constanza Schulz LPN Service: -- Author Type: Licensed Nurse    Filed: 6/28/2019 11:16 AM Encounter Date: 6/28/2019 Status: Signed    : Constanza Schulz LPN (Licensed Nurse)       Date of Last Office Visit: 6/6/19  Date of Next Office Visit: 7/5/19  No shows since last visit: no  Cancellations since last visit: no  ED visits since last visit: no    Medication Gabapentin 600 mg date last ordered: 5/28/19  Qty: 120  Refills: 0    gabapentin (NEURONTIN) 600 MG tablet 120 tablet 0 5/28/2019     Sig: TAKE ONE TABLET BY MOUTH THREE TIMES A DAY AND ONE ADDITIONAL TABLET ONCE DAILY AS NEEDED    Sent to pharmacy as: gabapentin (NEURONTIN) 600 MG tablet        Lapse in therapy greater than 7 days: no  Medication refill request verified as identical to current order: yes  Result of Last DAM, VPA, Li+ Level, CBC, or Carbamazepine Level (at or since last visit): DAM was positive for amphetamines as expected on Vyvanse on 6/6/19; Positive BUP on 4/18/19        []Eligibility - not seen in last year    []Supervision - no future appointment    []Compliance     []Verification - order discrepancy    [x]Controlled Medication    []90 - day supply request    [x]Other LPN pending medications    Current Medication list:    atenolol (TENORMIN) 50 MG tablet Take 50 mg by mouth daily.    buprenorphine-naloxone (SUBOXONE SL FILM) 8-2 mg Film per sublingual film DISSOLVE ONE-HALF FILM IN MOUTH THREE TIMES DAILY AS DIRECTED; YUNI   cloNIDine HCl (CATAPRES) 0.1 MG tablet Take 1 tablet (0.1 mg total) by mouth 3 (three) times a day. Use as needed for for symptoms of anxiety/craving   cloNIDine HCl (CATAPRES) 0.1 MG tablet Take 1 tablet (0.1 mg total) by mouth 3 (three) times a day as needed. Hold for dizziness or light headedness and call the clinic   FLUoxetine (PROZAC) 10 MG capsule Take 1 capsule (10 mg total) by mouth daily.    fluticasone (FLONASE) 50 mcg/actuation nasal spray 1 spray into each nostril 2 (two) times a day.   gabapentin (NEURONTIN) 600 MG tablet TAKE ONE TABLET BY MOUTH THREE TIMES A DAY AND ONE ADDITIONAL TABLET ONCE DAILY AS NEEDED   lisdexamfetamine (VYVANSE) 30 MG capsule TAKE ONE CAPSULE BY MOUTH EVERY DAY AT NOON   lisdexamfetamine (VYVANSE) 40 MG capsule TAKE ONE CAPSULE BY MOUTH EVERY DAY   polyethylene glycol (GLYCOLAX) 17 gram/dose powder MIX 17 GRAMS IN 8OZ OF LIQUID PER CONTAINER DIRECTIONS AND DRINK DAILY         Medication Plan of Care at last office visit with MD/CNP:    PLAN:    1. Patient given a 30-day rx for 4/1 mg film of suboxone three times a day. 45 of the 8/2 mg films prescribed at this time.   2. Encouraged patient to find a weekly meeting that he feels comfortable with or a volunteering activity so he practices interacting with people in a setting that is different from his work.    3. Patient applauded for going to psychotherapy with Sharda Marquez, and I agree with his coming plan to go weekly. He was asked to open a release today for her. We discussed at length various medication options for treating anxiety, and Barak elected to try fluoxetine. We reviewed risks and benefits of this medication, and I started him on fluoxetine 10 mg po daily. Continue on vyvanse to help with focus, which patient has been stable and functional on for months. Vyvanse 30 mg po as well as vyvanse 40 mg po daily. Continue on clonidine 0.1 mg po three times a day and gabapentin 600 mg four times a day prn anxiety/insomnia.  4. Urine toxicology pending at this time.   5. Patient may benefit from a job transition as he consistently complains about his job.   6. RTC in 30 days and sooner prn.     [place], [place], [place], and [place] : Reviewed

## 2021-06-16 NOTE — TELEPHONE ENCOUNTER
Telephone Encounter by Constanza Schulz LPN at 8/2/2019 10:10 AM     Author: Constanza Schulz LPN Service: -- Author Type: Licensed Nurse    Filed: 8/2/2019 10:24 AM Encounter Date: 8/1/2019 Status: Addendum    : Constanza Schulz LPN (Licensed Nurse)    Related Notes: Original Note by Constanza Schulz LPN (Licensed Nurse) filed at 8/2/2019 10:23 AM       Dr. Sloan covering for Dr. Brunner    Date of Last Office Visit: 7/18/19  Date of Next Office Visit: 8/8/19  No shows since last visit: no  Cancellations since last visit: no  ED visits since last visit: no    Medication Suboxone 8-2 three times a day date last ordered: 6/6/19  Qty: 90  Refills: 0 ( 2 mos supply )    buprenorphine-naloxone (SUBOXONE SL FILM) 8-2 mg Film per sublingual film 90 Film 0 6/6/2019  No   Sig: DISSOLVE ONE-HALF FILM IN MOUTH THREE TIMES DAILY AS DIRECTED; YUNI   Sent to pharmacy as: buprenorphine-naloxone (SUBOXONE SL FILM) 8-2 mg Film per sublingual film   Notes to Pharmacy: MARTINA: [id number]     Lapse in therapy greater than 7 days: no  Called pharmacy, Rx written for two month supply and had been dispensed twice: 6/12/19 Qty: 45 and 7/9/19 Q: 45    Medication refill request verified as identical to current order: yes  Result of Last DAM, VPA, Li+ Level, CBC, or Carbamazepine Level (at or since last visit):  7/18/19 DAM positive for amphetamines ( as expected on Vyvanse) and THC  7/18/19 BUP positive, ETG < 100        []Eligibility - not seen in last year    []Supervision - no future appointment    []Compliance     []Verification - order discrepancy    [x]Controlled Medication    []90 - day supply request    [x]Other. Should have enough medications until next appt with Dr. Brunner on 8/8/19  LPN pending medications    Current Medication list:      atenolol (TENORMIN) 50 MG tablet Take 50 mg by mouth daily.    baclofen (LIORESAL) 10 MG tablet Take 1 tablet (10 mg total) by mouth 2 (two) times a  day as needed.   buprenorphine-naloxone (SUBOXONE SL FILM) 8-2 mg Film per sublingual film DISSOLVE ONE-HALF FILM IN MOUTH THREE TIMES DAILY AS DIRECTED; YUNI   cloNIDine HCl (CATAPRES) 0.1 MG tablet Take 1 tablet (0.1 mg total) by mouth 3 (three) times a day. Use as needed for for symptoms of anxiety/craving   cloNIDine HCl (CATAPRES) 0.1 MG tablet Take 1 tablet (0.1 mg total) by mouth 3 (three) times a day as needed. Hold for dizziness or light headedness and call the clinic   fluticasone (FLONASE) 50 mcg/actuation nasal spray 1 spray into each nostril 2 (two) times a day.   gabapentin (NEURONTIN) 600 MG tablet TAKE ONE TABLET BY MOUTH THREE TIMES A DAY AND TAKE ONE ADDITIONAL TABLET EVERY DAY AS NEEDED   lisdexamfetamine (VYVANSE) 30 MG capsule TAKE ONE CAPSULE BY MOUTH EVERY DAY AT NOON   lisdexamfetamine (VYVANSE) 40 MG capsule TAKE ONE CAPSULE BY MOUTH EVERY DAY   polyethylene glycol (GLYCOLAX) 17 gram/dose powder MIX 17 GRAMS IN 8OZ OF LIQUID PER CONTAINER DIRECTIONS AND DRINK DAILY     Medication Plan of Care at last office visit with MD/CNP:    PLAN:  Dictation not completed    [place], [place], [place], and [place] :Reviewed and no worrisome pharmacy activity noted

## 2021-06-16 NOTE — TELEPHONE ENCOUNTER
Telephone Encounter by Makeda Zhou, RN at 4/11/2019 10:55 AM     Author: Makeda Zhou RN Service: Behavioral Author Type: Registered Nurse    Filed: 4/11/2019 11:06 AM Encounter Date: 4/10/2019 Status: Signed    : Makeda Zhou RN (Registered Nurse)       Pt has enough through 4/16/19. Cued up enough meds to get to appt on 4/18/19.      Date of Last Office Visit: 2/14/19  Date of Next Office Visit: 4/18/19  No shows since last visit: 0  Cancellations since last visit: Today, 4/11/19, due to weather (Pt initiated)  ED visits since last visit:  0    Medication buprenorphine-naloxone 8-2 mg date last ordered: 2/14/19  Qty: 45  Refills: 1    Lapse in therapy greater than 7 days: No  Medication refill request verified as identical to current order: Yes  Result of Last DAM, VPA, Li+ Level, CBC, or Carbamazepine Level (at or since last visit): Positive DAM on 2/14/19 for amphetamines (on Vyvanse)     [] Medication refilled per [facility] M-1.   [x] Medication unable to be refilled by RN due to criteria not met as indicated below:     []Eligibility - not seen in last year    []Supervision - no future appointment    []Compliance     []Verification - order discrepancy    [x]Controlled Medication    []Medication not included in RN Protocol    []90 - day supply request    []Other     Current Medication list:    Medication Plan of Care at last office visit with MD/CNP:

## 2021-06-16 NOTE — PROGRESS NOTES
Addiction Medicine  Outpatient Visit  Dr. García    3/22/2018    Barak Clarke, 1985.    Subjective   Patient was seen today in follow-up for opiate use disorder, severe. Pt states he is doing well. Pt denies cravings, pt states medication is helping with cravings. Pt states he is thinking about going down on dose of Gabapentin but he  Is not sure at the moment.    Last UDS performed on 18 and was: positive for amphetamines - medication  Last BUP performed on 18 and was: positive     UA collected        ROBIN HOLMAN  Search Criteria: Last Name 'robin' and First Name 'barak' and  = '[birth year]' and Request Period =  to  ' - 10 out of 10 Recipients Selected.  Fill Date Product, Str, Form Qty Days Pt ID Prescriber Written RX# N/R* Pharm **MED+  ---------- -------------------------------- ------ ---- --------- ---------- ---------- ------------ ----- --------- ------  2018 GABAPENTIN 300 MG CAPSULE 120.00 30 [id number] 2017 [id number] R [id number] 00.0  2018 SUBOXONE 8 MG-2 MG SL FILM 45.00 30 [id number] 2018 [id number] N [id number] 360.0  2018 VYVANSE 30 MG CAPSULE 30.00 30 [id number] 2018 [id number] N [id number] 00.0  2018 VYVANSE 40 MG CAPSULE 30.00 30 [id number] 2018 [id number] N [id number] 00.0  02/15/2018 GABAPENTIN 300 MG CAPSULE 120.00 30 [id number] 2017 [id number] R [id number] 00.0  2018 SUBOXONE 8 MG-2 MG SL FILM 60.00 30 [id number] 2018 [id number] N [id number] 480.0  2018 VYVANSE 40 MG CAPSULE 30.00 30 [id number] 2018 [id number] N [id number] 00.0  2018 VYVANSE 30 MG CAPSULE 30.00 30 [id number] 2018 [id number] N [id number] 00.0  01/10/2018 GABAPENTIN 300 MG CAPSULE 120.00 30 [id number] 2017 [id number] N [id number] 00.0  2017 VYVANSE 40 MG CAPSULE 30.00 30 [id number] 2017 [id number] N [id number] 00.0  2017 VYVANSE 30 MG  CAPSULE 30.00 30 [id number] 2017 [id number] N [id number] 00.0  2017 SUBOXONE 8 MG-2 MG SL FILM 60.00 30 [id number] 2017 [id number] N [id number] 480.0  *N/R N=New R=Refill  +MED Daily  Prescribers for prescriptions listed  ----------------------------------------------------------------------------------------------------------------------------------  [id number] YESENIA REILLY MD; [facility], [address], [place] [address]  [id number] YESENIA REILLY MD; [facility], [address], [place] [address]  Pharmacies that dispensed prescriptions listed  ----------------------------------------------------------------------------------------------------------------------------------  [id number] [facility]; [address] [place] [address],  Patients that match search criteria  ----------------------------------------------------------------------------------------------------------------------------------  [id number] ROBIN HOLMAN,  [birth year]; [address], [place] [address]  [id number] ROBIN HOLMAN,  [birth year]; [address], [place] [address]  [id number] ROBIN HOLMAN,  [birth year]; [address], [place] [address]  [id number] ROBIN HOLMAN,  [birth year]; [facility], [place] [address]  [id number] ROBIN HOLMAN,  [birth year]; [address] [facility] [address], [facility] [address]  [id number] ROBIN HOLMAN,  [birth year]; [address], [facility] [address]  [id number] ROBIN HOLMAN,  [birth year]; [address], [facility] [address]  [id number] ROBIN HOLMAN,  [birth year]; [address] , [facility] [address]  [id number] ROBIN HOLMAN,  [birth year]; [address], [facility] [address]  [id number] ROBIN HOLMAN,  [birth year]; [address] [place] [address]        Correct pharmacy verified with patient and confirmed in snapshot? [x] yes []no    Medications Phoned  to Pharmacy [] yes [x]no  Name of Pharmacist:  List Medications, including dose,  quantity and instructions      Medication Prescriptions given to patient   [] yes  [x] no   List the name of the drug the prescription was written for.      Medications ordered this visit were e-scribed.  Verified by order class [x] yes  [] no  Suboxone, Vyvanse    Medication changes or discontinuations were communicated to patient's pharmacy:  [x] yes  [] no  Pharmacist Spoke With:  States understanding to dose change of Gabapentin from 300mg QID to 400mg BID    UA collected [x] yes  [] no    [place] Prescription Monitoring Program Reviewed? [x] yes  [] no    Referrals/Labs were made to:     Completed Charge Capture?  [x] yes  [] no    Future appointment was made: [x] yes  [] no  4/19/18  Dictation completed at time of chart check: [] yes  [x] no    I have checked the documentation for today s encounters and the above information has been reviewed and completed.

## 2021-06-16 NOTE — TELEPHONE ENCOUNTER
Telephone Encounter by Constanza Schulz LPN at 3/4/2019  4:07 PM     Author: Constanza Schulz LPN Service: -- Author Type: Licensed Nurse    Filed: 3/4/2019  4:17 PM Encounter Date: 3/4/2019 Status: Signed    : Constanza Schulz LPN (Licensed Nurse)       Dr. Sloan covering for Dr. Brunner    Date of Last Office Visit: 2/14/19  Date of Next Office Visit: 4/18/19  No shows since last visit: none  Cancellations since last visit: none  ED visits since last visit: none    Medication Gabapentin 300 mg date last ordered: 1/10/19  Qty: 120  Refills: 0    gabapentin (NEURONTIN) 300 MG capsule 120 capsule 0 1/10/2019     Sig: TAKE ONE CAPSULE BY MOUTH TWICE A DAY AND TAKE TWO CAPSULES BY MOUTH AT BEDTIME        Lapse in therapy greater than 7 days: no, last filled on 1/29/19 ( as per )  Medication refill request verified as identical to current order: yes  Result of Last DAM, VPA, Li+ Level, CBC, or Carbamazepine Level (at or since last visit): Positive DAM for amphetamines on 2/14/19 (as expected, on Vyvanse); ETG on 2/14/19 < 100; BUP positive on 2/13/19        []Eligibility - not seen in last year    []Supervision - no future appointment    []Compliance     []Verification - order discrepancy    [x]Controlled Medication    []90 - day supply request    [x]Other LPN pending medications    Current Medication list:    atenolol (TENORMIN) 50 MG tablet Take 50 mg by mouth daily.    buprenorphine-naloxone (SUBOXONE) 8-2 mg Film per sublingual film Half film (4-1 mg), three times daily.   cloNIDine HCl (CATAPRES) 0.1 MG tablet Take 1 tablet (0.1 mg total) by mouth 3 (three) times a day. Use as needed for for symptoms of anxiety/craving   fluticasone (FLONASE) 50 mcg/actuation nasal spray 1 spray into each nostril 2 (two) times a day.   gabapentin (NEURONTIN) 300 MG capsule TAKE ONE CAPSULE BY MOUTH TWICE A DAY AND TAKE TWO CAPSULES BY MOUTH AT BEDTIME   lisdexamfetamine (VYVANSE) 30 MG capsule  TAKE ONE CAPSULE BY MOUTH EVERY DAY AT NOON   lisdexamfetamine (VYVANSE) 40 MG capsule TAKE ONE CAPSULE BY MOUTH EVERY DAY   magnesium citrate solution Take by mouth at bedtime. 1/3 to 1/2 bottle at bedtime each night.   montelukast (SINGULAIR) 10 mg tablet    nicotine polacrilex (NICORETTE) 4 MG gum Apply 4 mg to the mouth or throat as needed for smoking cessation.   OMEGA-3/DHA/EPA/FISH OIL (FISH OIL-OMEGA-3 FATTY ACIDS) 300-1,000 mg capsule Take 1 g by mouth daily.   polyethylene glycol (GLYCOLAX) 17 gram/dose powder MIX 17 GRAMS IN 8OZ OF LIQUID PER CONTAINER DIRECTIONS AND DRINK DAILY         Medication Plan of Care at last office visit with MD/CNP:    PLAN:  Assessment:33-year-old man with a history of ADHD, major depressive disorder, generalized anxiety disorder and opioid use disorder who presents for follow-up on medication-assisted treatment with [facility]. He continues to experience significant anxiety at times, and will be prescribed clonidine to help with anxiety.   Plan:   1. Patient given a 30-day prescription for suboxone 8/2 mg film three times a day, with 1 refill.   2. Encouraged patient to continue attending 12 step meetings to work on setting an example for others in the program, and he has continued to feel it is beneficial when he foes.   3. Patient encouraged to continue with psychotherapy, and schedule with psychiatry. Continue on vyvanse to help with focus, which patient has been stable and functional on for months. Vyvanse 30 mg po as well as vyvanse 40 mg po daily. I also prescribed clonidine 0.1 mg po three times a day prn anxiety/insomnia.  4. Urine toxicology as expected.  5. RTC in 2 months and sooner prn.     [place], [place], and [place] :Reviewed and no worrisome pharmacy activity noted

## 2021-06-17 NOTE — PROGRESS NOTES
May 10, 2021        Gayatri Held  [address] [facility] [address]      Dear Gayatri,       You have recently expressed interest in our Solid Organ Transplant Program and have requested to begin the evaluation process.  We have made several attempts to get in touch with you but have been unable to reach you.    If you are still interested and/or have any questions, please contact us at  [phone] or [phone]   Monday - Friday, between the hours of 8:00am and 5:00pm central time.    We look forward to hearing from you.      Regards,     Solid Organ Transplant Intake   [facility]'s [address]  [address], [facility] [address]  [place] [address]      Patient is here for follow up and medication management.    DAM  Date: 3/22/18   Results: pos. Amph.  BUP   Date: 18   Results: pos.    Depression:   Anxiety:3/5    Concerns today: No concerns.  He does have a job interview and is a little anxious about it.    [place] :    Lexim [place] Date: 18  Query Report Page#: 1  Patient Rx History Report  ROBIN HOLMAN  Search Criteria: Last Name 'robin' and First Name 'akilah' and  = '[birth year]' and Request Period = '10/18/17' to  ' - 10 out of 10 Recipients Selected.  Fill Date Product, Str, Form Qty Days Pt ID Prescriber Written RX# N/R* Pharm **MED+  ---------- -------------------------------- ------ ---- --------- ---------- ---------- ------------ ----- --------- ------  2018 GABAPENTIN 400 MG CAPSULE 60.00 30 [id number] 2018 [id number] N [id number] [facility]  2018 VYVANSE 30 MG CAPSULE 30.00 30 [id number] 2018 [id number] N [id number] [facility]  2018 VYVANSE 40 MG CAPSULE 30.00 30 [id number] 2018 [id number] N [id number] [facility]  2018 SUBOXONE 8 MG-2 MG SL FILM 45.00 30 [id number] 2018 [id number] N [id number] [facility]  2018 GABAPENTIN 300 MG CAPSULE 120.00 30 [id number] 2017 [id number] R [id number] [facility]  2018 SUBOXONE 8 MG-2 MG SL FILM 45.00 30 [id number] 2018 [id number] N [id number] [facility]  2018 VYVANSE 30 MG CAPSULE 30.00 30 [id number] 2018 [id number] N [id number] [facility]  2018 VYVANSE 40 MG CAPSULE 30.00 30 [id number] 2018 [id number] N [id number] [facility]  02/15/2018 GABAPENTIN 300 MG CAPSULE 120.00 30 [id number] 2017 [id number] R [id number] [facility]  2018 SUBOXONE 8 MG-2 MG SL FILM 60.00 30 [id number] 2018 [id number] N [id number] [facility]  2018 VYVANSE 40 MG CAPSULE 30.00 30 [id number] 2018 [id number] N [id number] [facility]  2018 VYVANSE 30 MG CAPSULE 30.00 30 [id number] 2018 [id number] N [id number]  [facility]  01/10/2018 GABAPENTIN 300 MG CAPSULE 120.00 30 [id number] 12/28/2017 [id number] N [id number] [facility]  12/29/2017 VYVANSE 40 MG CAPSULE 30.00 30 [id number] 12/28/2017 [id number] N [id number] [facility]  12/29/2017 VYVANSE 30 MG CAPSULE 30.00 30 [id number] 12/28/2017 [id number] N [id number] [facility]  12/28/2017 SUBOXONE 8 MG-2 MG SL FILM 60.00 30 [id number] 12/28/2017 [id number] N [id number] [facility]  12/07/2017 GABAPENTIN 300 MG CAPSULE 120.00 30 [id number] 11/02/2017 [id number] R [id number] [facility]  12/02/2017 VYVANSE 40 MG CAPSULE 30.00 30 [id number] 12/01/2017 [id number] N [id number] [facility]  12/02/2017 VYVANSE 30 MG CAPSULE 30.00 30 [id number] 12/01/2017 [id number] N [id number] [facility]  12/01/2017 SUBOXONE 8 MG-2 MG SL FILM 60.00 30 [id number] 12/01/2017 [id number] N [id number] [facility]  11/06/2017 VYVANSE 30 MG CAPSULE 30.00 30 [id number] 11/02/2017 [id number] N [id number] [facility]  11/06/2017 VYVANSE 40 MG CAPSULE 30.00 30 [id number] 11/02/2017 [id number] N [id number] [facility]  11/03/2017 GABAPENTIN 300 MG CAPSULE 120.00 30 [id number] 11/02/2017 [id number] N [id number] [facility]  11/02/2017 SUBOXONE 8 MG-2 MG SL FILM 60.00 30 [id number] 11/02/2017 [id number] N [id number] [facility]  10/19/2017 METHYLPHENIDATE ER 20 MG TAB 60.00 30 [id number] 10/19/2017 [id number] N [id number] UNK  *N/R N=New R=Refill  +MED Daily  Prescribers for prescriptions listed  ----------------------------------------------------------------------------------------------------------------------------------  [id number] YESENIA REILLY MD; [facility], [address], [place] [address]  [id number] YESENIA REILLY MD; [facility], [address], [place] [address]  Pharmacies that dispensed prescriptions listed  ----------------------------------------------------------------------------------------------------------------------------------  [id number] [facility] [address] [place] [address]  [id number],    Correct pharmacy verified with patient and confirmed in snapshot? [x] yes []no    Charge captured ? [x] yes  [] no    Medications Phoned  to Pharmacy [] yes [x]no  Name of Pharmacist:  List Medications, including dose, quantity and instructions      Medication Prescriptions given to patient   [] yes  [x] no   List the name of the drug the prescription was written for.       Medications ordered this visit were e-scribed.  Verified by order class [x] yes  [] no    Medication changes or discontinuations were communicated to patient's pharmacy: [] yes  [x] no    UA collected [x] yes  [] no    [place] Prescription Monitoring Program Reviewed? [x] yes  [] no    Referrals were made to:  none    Future appointment was made: [x] yes  [] no    Dictation completed at time of chart check: [] yes  [x] no    I have checked the documentation for today s encounters and the above information has been reviewed and completed.

## 2021-06-18 NOTE — PROGRESS NOTES
Per Dr. García, the following was called to Robyn, pharmacist at [facility]:    buprenorphine-naloxone (SUBOXONE) 8-2 mg Film per sublingual film 90 each 0 6/29/2018 7/29/2018      Sig :  Place 1 Film under the tongue 3 (three) times a day.       Route:   Sublingual       Comment:   MARTINA: # [id number]       Class:   Phone In       Directions were repeated back for accuracy and to attempt to prevent errors.

## 2021-06-18 NOTE — PROGRESS NOTES
Spoke to provider, pt has appt on 6/28 and was bridged with Vyvance and Suboxone on 6/15. Per  verbal order, called and cancelled Rxs for Suboxone ( starting date 6/29) and Vyvanse 30 and 40 mg e-scribed today for 14 days. Spoke to Nadine at [facility].

## 2021-06-19 NOTE — PROGRESS NOTES
"Addiction  Nurse Only Visit Note    7/26/2018  Date of last visit: 05/23/2018    Reason for today's visit: follow-up  Chief Complaint   Patient presents with     Follow-up       Vitals:    07/26/18 1107   BP: 111/69   Patient Site: Right Arm   Patient Position: Sitting   Cuff Size: Adult Large   Pulse: 68   Resp: 18   Temp: 97.5  F (36.4  C)   TempSrc: Oral   Weight: 191 lb 1.3 oz (86.7 kg)   Height: 5' 11\" (1.803 m)        If having pain, who will address pain:  0    Is pt assisted: No    Urine for toxicology sent today: yes, and today's screen positive for Amphetamines, as it should be, no THC this time.    Last UA date: 04/19/2018  Reviewed with patient: yes  Results: positive, for Amphetamines, THC, and BUP states he is not using, but is around son's Mom and she uses a lot.  Worries then about son, but said he keeps son the majority of the time.    AIMS: NA    Pill count: No  (Controlled substance only)  Medications needing renewal: Buprenorphine-Naloxone, Lisdexamfetamine.    Substance use history:    Using alcohol:No  Using street drugs or unprescribed medications: No  Using opioids other that Suboxone:No  Using tobacco:Yes: Describe: e-cig.    Recovery environment:  Attending formal chemical dependency programming: No  Attending \"outside\" mutual help meetings: Yes: Describe: NA and AA twice weekly, sits quietly and listens, thinks others maybe get more out of it than he does.  Worries what people would think if they new he was on the medications ordered for him.    Has a chemical dependency sponsor: No, last sponsor was bad.  Has other elements of structure: Yes: Describe: see therapist, Sharda, every two weeks.  Current Living Situation: apartment in [place] and a few buildings from son's Mom.  States it is tiring having son all of the time.  Son is partially autistic.    Cravings: yes 3 out of 10.    Sleep: yes not great, sometimes doesn't feel like he sleeps, since having son all of the time.  This has " affected his sleep.  At times he would use wife's bathroom, and when he would walk into it it would be full of smoke.  She has a bad cough, and he worries about her health.  He feels he has to be there for him, as wife was when he was using, and doesn't feel he can confront her, as she went through this with him.    Depression: yes never too bad, maybe about a 2 and sometimes to a 4-5 out of 10, but never lost in the dark range.  When sees his therapist, after about 20 minutes of talking will start crying.  About once to twice a week it gets a little higher.  Feels he should be working and feels guilty, but has to be with his son and taking care of his needs.    Anxiety: yes low most of the week, but usually always present, where depression isn't always, usually 2 out of 10, but sometimes 4 out of 10    Panic Attacks: no    Paranoia: no      Hallucinations: no      Suicidal Ideation: no    Homicidal Ideation:no  Comments: NA    Physical Problems: yes with constipation from the Buprenorphine-Naloxone, has been doing enema 2-3 times a week.  Polyethylene glycol doesn't do so well, and does not want to take another medication orally. Dr. García updated on this and said that Barak could try some Magnesium Citrate before bed each night, trying anywhere from a 1/3 to a 1/2 bottle and see how that works.  Will share with Barak on 07/27/2018 via phone, as I cannot reach him by phone after the appointment, and then at next appointment, he and Dr. García will discuss if it works or not, and if it does he will order him some.    [place]  reviewed and embedded in note, and no worrisome activity noted in reviewing it.  Struggles with son's mother and her usage, has tried to softly confront it, but she is not accepting of it, and guilt in doing so, because of his past and using.  Was upset that THC came back positive, as he was adamant he did not use, but described scenarios when at son's mother where he is definitely exposed to  it, due to her usage.  Dealing with the depression and anxiety in therapy.  Does not seem to really have people, outside of family, that he interacts much with, accept professionals.  Does not seem to feel like he fits in.  Struggling with his identity and feelings of not setting a good example for his son.  Overwhelmed by having son basically all the time.  Denies any illegal drug usage at all.  So glad Dr. García is improving, as he has been very worried about him.  Dr. García directive to schedule with him in a month, refill Buprenorphine-Naloxone without changes, and to ask Dr. Brunner if she would refill the Lisdexamfetamine 30 and 40 mg for him.  Will have Dr. García address issue of constipation either when reviewing today's notes, or when seeing him at the next visit in one month.  Buprenorphine-Nalxone ordered called to :    Gema, pharmacist at [facility].  buprenorphine-naloxone (SUBOXONE) 8-2 mg Film per sublingual film  Half film (4-1 mg), three times daily.   Phone In, [employer] Film, R-0   NADEAN: [id number]   Directions repeated back for accuracy and to attempt to prevent errors.      Yunait [place] Date: 18  Query Report Page#: 1  Patient Rx History Report  ROBIN RIVER  Search Criteria: Last Name 'ROBIN' and First Name 'RIVER' and  = '[birth year]' and Request Period = '18' to  '18' - 10 out of 10 Recipients Selected.  Fill Date Product, Str, Form Qty Days Pt ID Prescriber Written RX# N/R* Pharm **MED+  ---------- -------------------------------- ------------ ---- --------- ---------- ---------- ------------ ----- --------- ------  2018 GABAPENTIN 300 MG CAPSULE 90.821711 30 [id number] 2018 [id number] R [id number] 00.0  2018 VYVANSE 40 MG CAPSULE 30.528485 30 [id number] 2018 [id number] N [id number] 00.0  2018 VYVANSE 30 MG CAPSULE 30.353480  [id number] N [id number] 00.0  2018 SUBOXONE 8 MG-2 MG SL  FILM 45.330340 30 [id number] 06/28/2018 [id number] N [id number] UNK  06/22/2018 GABAPENTIN 300 MG CAPSULE 90.319631 30 [id number] 05/23/2018 [id number] R [id number] 00.0  06/15/2018 SUBOXONE 8 MG-2 MG SL FILM 21.258182 14 [id number] 06/15/2018 [id number] N [id number] UNK  06/15/2018 VYVANSE 30 MG CAPSULE 14.658566 14 [id number] 06/15/2018 [id number] N [id number] 00.0  06/15/2018 VYVANSE 40 MG CAPSULE 14.828390 14 [id number] 06/15/2018 [id number] N [id number] 00.0  05/23/2018 GABAPENTIN 300 MG CAPSULE 90.729897 30 [id number] 05/23/2018 [id number] N [id number] 00.0  05/17/2018 VYVANSE 40 MG CAPSULE 30.598717 30 [id number] 05/17/2018 [id number] N [id number] 00.0  05/17/2018 VYVANSE 30 MG CAPSULE 30.252966 30 [id number] 05/17/2018 [id number] N [id number] 00.0  05/17/2018 SUBOXONE 8 MG-2 MG SL FILM 45.483622 30 [id number] 05/17/2018 [id number] N [id number] K  05/04/2018 GABAPENTIN 400 MG CAPSULE 60.142242 30 [id number] 03/22/2018 [id number] R [id number] 00.0  *N/R N=New R=Refill  +MED Daily  Prescribers for prescriptions listed  ----------------------------------------------------------------------------------------------------------------------------------  [id number] YESENIA REILLY MD; [facility], [address], [place] [address]  [id number] MANJULA DOWELL MD; [facility], [address] [place], [place] [id number], [place] [address]  [id number] YESENIA REILLY MD; [facility], [address], [place] [address]  [id number] BRUNNER, EMILY A MD; [facility], [address] [place] [address] [address], [place] [address]  Pharmacies that dispensed prescriptions listed  ----------------------------------------------------------------------------------------------------------------------------------  [id number] [facility]; [address] [place] [address],  Patients that match search  criteria  ----------------------------------------------------------------------------------------------------------------------------------  [id number] ROBIN HOLMAN,  [birth year]; [address], [place] [address]  [id number] ROBIN HOLMAN,  [birth year]; [address], [place] [address]  [id number] ROBIN HOLMAN,  [birth year]; [address], [place] [address]  [id number] ROBIN HOLMAN,  [birth year]; [facility], [place] [address]  [id number] ROBIN HOLMAN,  [birth year]; [address] [address] [address], [facility] [address]  [id number] ROBIN HOLMAN,  [birth year]; [facility] [place] [address]  [id number] ROBIN HOLMAN,  [birth year]; [address], [facility] [address]  [id number] ROBIN HOLMAN,  [birth year]; [address] , [facility] [address]  **Per CDC guidance, the conversion factors and associated daily morphine milligram equivalents for drugs prescribed as part of  medication-assisted treatment for opioid use disorder should not be used to benchmark against dosage thresholds meant for opioids  prescribed for pain.  Report Disclaimers:  The report provided above is based upon the search criteria and the data provided by the dispensing entities. For more information  about any prescription, please contact the dispenser or the prescriber.  This report contains confidential information, including patient identifiers, and is not a public record. The information on this  report must be treated as protected health information and is to be disclosed to others only as authorized by applicable state  and Federal regulations.      Education Done Today  Patient Instructions:     Continue Medications as ordered.  No alcohol or unprescribed drug use.  No driving, if sedated.  Come to the Emergency Room if not feeling safe.  Call the clinic with any questions [phone].  You can also contact Urgent Care Crisis Line at [phone] 24 hours a day for help.  Follow up as directed, for your appointments, per your After Visit  Summary Form.    Dena Garcia    7/26/2018 11:18 AM

## 2021-06-19 NOTE — PROGRESS NOTES
Lisdexamphetamine did not go through with the order of taking one every am.  This was clarified with [facility].  Pended to Dr. García as a no print, as the pharmacy took clarification over the phone, but want it to go through correctly the next time it is sent in.  Call to Barak, and discussed Dr. García' directive to try using Magnesium Citrate, 1/3 of a bottle at HS every night, and could go to 1/2 of a bottle, and see if that will help, versus having to do enemas three times weekly.  He would be very glad to try this, and will have discuss how it works at next appointment with DR. García, because then he will order a case at the next visit.  He said he feels like an old man having to use enemas.  Very thankful for all of the help.

## 2021-06-20 NOTE — LETTER
Letter by Maegan Sawyer RN at      Author: Maegan Sawyer RN Service: -- Author Type: --    Filed:  Encounter Date: 7/2/2020 Status: (Other)       July 2, 2020          Barak Clarke  [address]  [facility] [address]      Dear Mr. Clarke:    Im writing to inform you that Emily Brunner, MD, will be leaving [facility] on August 28, 2020.   We apologize for this disruption in your care and we are committed to supporting your treatment needs.    If you have follow-up appointments after August 28, 2020, we will connect you with another addiction medicine provider within our system.     For medication refills, please contact your pharmacy and they will connect with us to initiate the refill process.  To schedule an appointment with Dr. Brunner before August 28, 2020 please call [facility] at [phone].    If you do not plan to return for ongoing medication management at this time, please let us know that as well, so we can note that in your medical record.   Again, we apologize for the inconvenience and look forward to continuing to provide you with the best possible care.    Sincerely,        Electronically signed by Maegan Sawyer RN, CNP - Clinic Manager  [facility]

## 2021-06-20 NOTE — PROGRESS NOTES
"Addiction  Nurse Only Visit Note    8/23/2018  Date of last visit: 7/23/18    Reason for today's visit:   Chief Complaint   Patient presents with     CCC RN Assessment       Vitals:    08/23/18 1036   BP: 140/81   Patient Site: Right Arm   Patient Position: Sitting   Cuff Size: Adult Regular   Pulse: 77   Temp: 98.1  F (36.7  C)   TempSrc: Oral   Weight: 187 lb (84.8 kg)   Height: 5' 11\" (1.803 m)        If having pain, who will address pain:  No pain    Is pt assisted: No    Urine for toxicology sent today: yes    Last UA date: 7/23/18  Reviewed with patient: yes  Results: 7/26/18 amphet (Vyvannse)    AIMS:     Pill count: NA  (Controlled substance only)  Medications needing renewal: see note    Substance use history:    Using alcohol:No  Using street drugs or unprescribed medications: No  Using opioids other that Suboxone:No  Using tobacco:Yes: Describe: e-cig    Recovery environment:  Attending formal chemical dependency programming: No  Attending \"outside\" mutual help meetings: Yes: Describe: twice weekly  Has a chemical dependency sponsor: No  Has other elements of structure: Yes: Describe: therapy every 2 weeks, takes care of son  Current Living Situation: own place    Cravings: yes always there, low, THC    Sleep: no    Depression: yes not too frequent    Anxiety: yes alot,4/10    Panic Attacks: no    Paranoia: no    Hallucinations: no    Suicidal Ideation: no    Homicidal Ideation:no  Comments: none    Physical Problems: yes dealing with constipation    [place]  was reviewed prior to seeing patient today. See below for embedded report.    Note: Pleasant and cooperative. Is attending meetings twice weekly. Does not have a sponsor. Did not talk about son's mother today, but more about his son's special needs. Excited about having a job interview tomorrow, would work at the local [employer]. Still dealing with constipation, did not try Mag Citrate as recommended last visit. Encouraged him try Dr García " recommendation, reviewing the directives with him from 18 visit. Re-enforced that he should call if he has any questions.    Also request refills for Vyvanse today, both 30 and 40 mg strengths. Pended to Dr Hernandes with visit notes. MNPMP embedded below, last filled 18    Suboxone 8/2 mg sl film, takes 1/2 film three times a day, #45 0-RF, called to pharmacy, spoke with pharmacist Luz, order read back for accuracy.     Patient Instructions   Continue Medications as ordered.  No alcohol or unprescribed drug use.  No driving, if sedated.  Come to the Emergency Room if not feeling safe.  Call the clinic with any questions [phone].  Follow up as directed, for your appointments, per your After Visit Summary Form.      Call the clinic if any concerns: [facility] [phone]  [facility] [phone]  and Report to the emergency room if you feel like harming yourself or others or are psychotic        Skyla Fournier    2018 10:44 AM    ROBIN HOLMAN  Search Criteria: Last Name 'robin' and First Name 'akilah' and  = '[birth year]' and Request Period = ' to  ' - 10 out of 10 Recipients Selected.  Fill Date Product, Str, Form Qty Days Pt ID Prescriber Written RX# N/R* Pharm **MED+  ---------- -------------------------------- ------------ ---- --------- ---------- ---------- ------------ ----- --------- ------  2018 GABAPENTIN 300 MG CAPSULE 90.360186 30 [id number] 2018 [id number] R [id number] 00.0  2018 VYVANSE 40 MG CAPSULE 30.541920 30 [id number] 2018 [id number] N [id number] 00.0  2018 SUBOXONE 8 MG-2 MG SL FILM 45.499360 30 [id number] 2018 [id number] N [id number] UNK  2018 VYVANSE 30 MG CAPSULE 30.530224 30 [id number] 2018 [id number] N [id number] 00.0  2018 GABAPENTIN 300 MG CAPSULE 90.995338 30 [id number] 2018 [id number] R [id number] 00.0  2018 VYVANSE 40 MG CAPSULE 30.432172 30 [id number]  [id number] 2018 [id number] N [id number] 00.0  2018 VYVANSE 30 MG CAPSULE 30.190382 30 [id number] 2018 [id number] N [id number] 00.0  2018 SUBOXONE 8 MG-2 MG SL FILM 45.009376 30 [id number] 2018 [id number] N [id number] UNK  2018 GABAPENTIN 300 MG CAPSULE 90.596949 30 [id number] 2018 [id number] R [id number] 00.0  06/15/2018 SUBOXONE 8 MG-2 MG SL FILM 21.822497 14 [id number] 06/15/2018 [id number] N [id number] UNK  06/15/2018 VYVANSE 30 MG CAPSULE 14.262426 14 [id number] 06/15/2018 [id number] N [id number] 00.0  06/15/2018 VYVANSE 40 MG CAPSULE 14.377538 14 [id number] 06/15/2018 [id number] N [id number] 00.0  *N/R N=New R=Refill  +MED Daily  Prescribers for prescriptions listed  ----------------------------------------------------------------------------------------------------------------------------------  [id number] MANJULA DOWELL MD; [facility], [address], [place] [address]  [id number] YESENIA REILLY MD; [facility], [address], [place] [address]  [id number] YESENIA REILLY MD; [facility], [address], [place] [address]  [id number] BRUNNER, EMILY A MD; [facility], [address] [facility] [address], [place] [address]  Pharmacies that dispensed prescriptions listed  ----------------------------------------------------------------------------------------------------------------------------------  [id number] [facility]; [address] [place] [address],  Patients that match search criteria  ----------------------------------------------------------------------------------------------------------------------------------  [id number] JUAN CARLOS MAYNARD [birth year]; [address], [place] [address]  [id number] JUAN CARLOS MAYNARD [birth year]; [address], [place] [address]  [id number] JUAN CARLOS MAYNARD [birth year]; [address], [place] [address]  [id number] ROBIN HOLMAN DOB  [birth year]; [facility], [place] [address]  [id number] ROBIN HOLMAN,  [birth year]; [address], [facility] [address]  [id number] ROBIN HOLMAN,  [birth year];  [address], [facility] [address]  [id number] ROBIN HOLMAN,  [birth year];  [address], [facility] [address]  [id number] ROBIN HOLMAN,  [birth year]; [address] , [facility] [address]  [id number] ROBIN HOLMAN,  [birth year];  [address], [facility] [address]  **Per CDC guidance, the conversion factors and associated daily morphine milligram equivalents for drugs prescribed as part of  medication-assisted treatment for opioid use disorder should not be used to benchmark against dosage thresholds meant for opioids  prescribed for pain.

## 2021-06-20 NOTE — LETTER
Letter by Maegan Sawyer RN at      Author: Maegan Sawyer RN Service: -- Author Type: --    Filed:  Encounter Date: 10/8/2020 Status: (Other)       October 9, 2020              Barak Clarke  [address]  [facility] [address]      Dear  Vince:    Im writing to inform you that Erwin Sloan MD, will be leaving [facility] on November 11, 2020.  We apologize for this disruption in your care and we are committed to supporting your treatment needs.  If you have follow-up appointments after November 11, 2020, we will connect you with another addiction medicine provider within our system.  For medication refills, please contact your pharmacy and they will connect with us to initiate the refill process.  To schedule an appointment with Dr. Sloan before November 11, 2020, please call   [facility] at [phone].  If you do not plan to return for ongoing medication management at this time, please let us know that as well, so we can note that in your medical record.  Again, we apologize for the inconvenience and look forward to continuing to provide you with the best possible care.    Sincerely,    Maegan Sawyer CNP - Clinic Manager  [facility]

## 2021-06-20 NOTE — PROGRESS NOTES
Correct pharmacy verified with patient and confirmed in snapshot? [x] yes []no    Charge captured ? [x] yes  [] no    Medications Phoned  to Pharmacy [] yes [x]no  Name of Pharmacist:  List Medications, including dose, quantity and instructions      Medication Prescriptions given to patient   [] yes  [x] no   List the name of the drug the prescription was written for.       Medications ordered this visit were e-scribed.  Verified by order class [x] yes  [] no  Suboxone 8-2 mg  Vyvanse 40 mg and 30 mg  Glycolax     Medication changes or discontinuations were communicated to patient's pharmacy: [] yes  [x] no    UA collected [x] yes  [] no    [place] Prescription Monitoring Program Reviewed? [x] yes  [] no    Referrals were made to:none      Future appointment was made: [x] yes  [] no    Dictation completed at time of chart check: [] yes  [x] no    I have checked the documentation for today s encounters and the above information has been reviewed and completed.

## 2021-06-21 NOTE — PROGRESS NOTES
Correct pharmacy verified with patient and confirmed in snapshot? [x] yes []no    Charge captured ? [x] yes  [] no    Medications Phoned  to Pharmacy [] yes [x]no  Name of Pharmacist:  List Medications, including dose, quantity and instructions      Medication Prescriptions given to patient   [] yes  [x] no   List the name of the drug the prescription was written for.       Medications ordered this visit were e-scribed.  Verified by order class [x] yes  [] no  Suboxone 8-2 mg  Gabapentin 300 mg  Vyvanse 30 mg and 40 mg    Medication changes or discontinuations were communicated to patient's pharmacy: [] yes  [x] no    UA collected [x] yes  [] no    [place] Prescription Monitoring [place] Reviewed? [x] yes  [] no    Referrals were made to:none      Future appointment was made: [x] yes  [] no    Dictation completed at time of chart check: [] yes  [x] no    I have checked the documentation for today s encounters and the above information has been reviewed and completed.

## 2021-06-22 NOTE — PROGRESS NOTES
Correct pharmacy verified with patient and confirmed in snapshot? [x] yes []no    Charge captured ? [x] yes  [] no    Medications Phoned  to Pharmacy [] yes [x]no  Name of Pharmacist:  List Medications, including dose, quantity and instructions      Medication Prescriptions given to patient   [] yes  [x] no   List the name of the drug the prescription was written for.       Medications ordered this visit were e-scribed.  Verified by order class [x] yes  [] no  Suboxone 8-2 mg  Vyvanse 30 and 40 mg    Medication changes or discontinuations were communicated to patient's pharmacy: [] yes  [x] no    UA collected [x] yes  [] no    [place] Prescription Monitoring [place] Reviewed? [x] yes  [] no    Referrals were made to:none      Future appointment was made: [x] yes  [] no    Dictation completed at time of chart check: [] yes  [x] no    I have checked the documentation for today s encounters and the above information has been reviewed and completed.

## 2021-06-22 NOTE — TELEPHONE ENCOUNTER
Date of Last Office Visit: 12/13/2018  Date of Next Office Visit: 02/14/2019  No shows since last visit: none  Cancellations since last visit: none  ED visits since last visit:  none  Medication Gabapentin 300 mg date last ordered: 01/02/2019  Qty: 120  Refills: 0  Medication Lisdexamfetamine 30 mg date last ordered: 12/13/2018  Qty: 30  Refills: 0  Lapse in therapy greater than 7 days: no, in fact the Gabapentin is early, but he realizes that he will need a refill before the 02/14/2019 appointment, as thought Dr. Brunner could post date it for .  Medication refill request verified as identical to current order: yes  Result of Last DAM, VPA, Li+ Level, CBC, or Carbamazepine Level (at or since last visit): Positive on 12/13/2018 for Amphetamines and BUP.     [] Medication refilled per [facility] M-1.   [x] Medication unable to be refilled by RN due to criteria not met as indicated below:     []Eligibility - not seen in last year    []Supervision - no future appointment    []Compliance     []Verification - order discrepancy    [x]Controlled Medication    []Medication not included in RN Protocol    []90 - day supply request    []Other     Current Medication list:    Barak Clarke    (MRN [id number])   Your Current Medications Are     atenolol (TENORMIN) 50 MG tablet Take 50 mg by mouth daily.    buprenorphine-naloxone (SUBOXONE) 8-2 mg Film per sublingual film Half film (4-1 mg), three times daily..   fluticasone (FLONASE) 50 mcg/actuation nasal spray 1 spray into each nostril 2 (two) times a day.   gabapentin (NEURONTIN) 300 MG capsule Use 1 tablet two times a day and 2 tablets qhs   lisdexamfetamine (VYVANSE) 30 MG capsule TAKE ONE CAPSULE BY MOUTH EVERY DAY AT NOON.   lisdexamfetamine (VYVANSE) 40 MG capsule TAKE ONE CAPSULE BY MOUTH EVERY DAY.   magnesium citrate solution Take by mouth at bedtime. 1/3 to 1/2 bottle at bedtime each night.   montelukast (SINGULAIR) 10 mg tablet    nicotine polacrilex (NICORETTE) 4 MG  gum Apply 4 mg to the mouth or throat as needed for smoking cessation.   OMEGA-3/DHA/EPA/FISH OIL (FISH OIL-OMEGA-3 FATTY ACIDS) 300-1,000 mg capsule Take 1 g by mouth daily.   polyethylene glycol (GLYCOLAX) 17 gram/dose powder MIX 17 GRAMS IN 8OZ OF LIQUID PER CONTAINER DIRECTIONS AND DRINK DAILY       Medication Plan of Care at last office visit with MD/CNP:    Diagnoses/Plan:  1. Patient continued on suboxone at current dose of 8/2 mg film- 1/2 film three times a day. 30 day supply provided today with 1 refill.   2. Patient given a refill of Vyvanse for 40 mg PO in AM and 30 mg in PM x 30 days. Contact us for refill in 1 month. Patient also to continue on gabapentin.   3. Continue individual psychotherapy. Encouraged him to explore his interest in [employer] but not to contact the man who came to [facility]] given his potentially unsafe background.   4. Patient encouraged to continue with his robust program of recovery and to try attending pietro with his mother. Encouraged him to hold his boundaries with his sister.  5. We discussed having another trial of discontinuing vaping the e-cig at new years. We discussed 1-800-quit-now as a resource.   Consider trial of gum again and can call for prescription to use prn.   6. Patient to rtc in 2 months for follow-up and sooner prn.      [place] :  01/02/2019 1 10/18/2018 Gabapentin 300 MG Capsule 120 30 Em Bru [id number] Fam (7422) 2 [place]    12/03/2018 1 10/18/2018 Gabapentin 300 MG Capsule 120 30 Em Bru [id number] Fam (7422) 1 [place]    12/13/2018 1 12/13/2018 Vyvanse 30 MG Capsule 30 30 Em Bru [id number] Fam (7422) 0 [place]    11/16/2018 1 11/15/2018 Vyvanse 30 MG Capsule 30 30 Em Bru [id number] Eco (7422) 0 [place]

## 2021-06-23 NOTE — TELEPHONE ENCOUNTER
Barak called asking about refill requests, reviewed with Dr Brunner, Vyvanse 30 mg and 40 mg filled, Suboxone bridged to 2/14/19 appt with Dr Brunner. Message left for patient.

## 2021-06-23 NOTE — TELEPHONE ENCOUNTER
Pt called asking to fill the Vyvanse 40 mg, he takes two different strength but only Vyvance 30 mg got approved yesterday.     Last filled:    lisdexamfetamine (VYVANSE) 40 MG capsule 30 capsule 0 12/13/2018     Sig: TAKE ONE CAPSULE BY MOUTH EVERY DAY.        Current med list    gabapentin (NEURONTIN) 300 MG capsule  (Taking) TAKE ONE CAPSULE BY MOUTH TWICE A DAY AND TAKE TWO CAPSULES BY MOUTH AT BEDTIME   lisdexamfetamine (VYVANSE) 30 MG capsule  (Taking) TAKE ONE CAPSULE BY MOUTH EVERY DAY AT NOON   atenolol (TENORMIN) 50 MG tablet Take 50 mg by mouth daily.    buprenorphine-naloxone (SUBOXONE) 8-2 mg Film per sublingual film Half film (4-1 mg), three times daily..   fluticasone (FLONASE) 50 mcg/actuation nasal spray 1 spray into each nostril 2 (two) times a day.   lisdexamfetamine (VYVANSE) 40 MG capsule TAKE ONE CAPSULE BY MOUTH EVERY DAY.   magnesium citrate solution Take by mouth at bedtime. 1/3 to 1/2 bottle at bedtime each night.   montelukast (SINGULAIR) 10 mg tablet    nicotine polacrilex (NICORETTE) 4 MG gum Apply 4 mg to the mouth or throat as needed for smoking cessation.   OMEGA-3/DHA/EPA/FISH OIL (FISH OIL-OMEGA-3 FATTY ACIDS) 300-1,000 mg capsule Take 1 g by mouth daily.   polyethylene glycol (GLYCOLAX) 17 gram/dose powder MIX 17 GRAMS IN 8OZ OF LIQUID PER CONTAINER DIRECTIONS AND DRINK DAILY

## 2021-06-24 NOTE — PROGRESS NOTES
Correct pharmacy verified with patient and confirmed in snapshot? [x] yes []no    Charge captured ? [x] yes  [] no    Medications Phoned  to Pharmacy [] yes [x]no  Name of Pharmacist:  List Medications, including dose, quantity and instructions      Medication Prescriptions given to patient   [] yes  [x] no   List the name of the drug the prescription was written for.       Medications ordered this visit were e-scribed.  Verified by order class [x] yes  [] no  Suboxone 8-2 mg  Clonidine 0.1 mg    Medication changes or discontinuations were communicated to patient's pharmacy: [] yes  [x] no    UA collected [x] yes  [] no    [place] Prescription Monitoring [place] Reviewed? [x] yes  [] no    Referrals were made to:none      Future appointment was made: [x] yes  [] no    Dictation completed at time of chart check: [] yes  [x] no    I have checked the documentation for today s encounters and the above information has been reviewed and completed.

## 2021-06-28 NOTE — PROGRESS NOTES
Progress Notes by Luz Gonzalez CMA at 3/26/2020  8:45 AM     Author: Luz Gonzalez CMA Service: Addiction Care Author Type: Certified Medical Assistant    Filed: 3/27/2020 10:21 PM Encounter Date: 3/26/2020 Status: Signed    : Luz Gonzalez CMA (Certified Medical Assistant)           Medications Phoned  to Pharmacy [] yes [x]no  Name of Pharmacist:  List Medications, including dose, quantity and instructions    Medications ordered this visit were e-scribed.  Verified by order class [x] yes  [] no  Gabapentin 600 mg  Suboxone 8-2 mg    Medication changes or discontinuations were communicated to patient's pharmacy: [] yes  [x] no    Future appointment was made: [x] yes  [] No    Dictation completed at time of chart check: [] yes  [x] no    I have checked the documentation for todays encounters and the above information has been reviewed and completed.

## 2021-06-28 NOTE — PROGRESS NOTES
Progress Notes by Sachi Gamble CMA at 10/15/2019  8:30 AM     Author: Sachi Gamble CMA Service: -- Author Type: Certified Medical Assistant    Filed: 10/15/2019  5:05 PM Encounter Date: 10/15/2019 Status: Signed    : Sachi Gamble CMA (Certified Medical Assistant)       Substance use: none  DAM: 10/14/19  Living Situation: will be moving in with girlfriend and her daughter and living in a mobile home  Therapist: couples therapy, [facility]  DELORES's: none  PCP: Dr. Milligan, seen 6 months ago    Depression: 2/5, no S/I  Anxiety: 3/5  How are things going: Patient would like to discuss his recent use of Klonopin and how it effected him.    [place] :

## 2021-06-29 NOTE — PROGRESS NOTES
Progress Notes by Luz Gonzalez CMA at 5/7/2020 11:15 AM     Author: Luz Gonzalez CMA Service: Addiction Care Author Type: Certified Medical Assistant    Filed: 5/7/2020  3:28 PM Encounter Date: 5/7/2020 Status: Addendum    : Luz Gonzalez CMA (Certified Medical Assistant)    Related Notes: Original Note by Luz Gonzalez CMA (Certified Medical Assistant) filed at 5/7/2020  1:19 PM       This video/telephone visit will be conducted via a call between you and your physician/provider. We have found that certain health care needs can be provided without the need for an in-person physical exam. This service lets us provide the care you need with a video /telephone conversation. If a prescription is necessary we can send it directly to your pharmacy. If lab work is needed we can place an order for that and you can then stop by our lab to have the test done at a later time.   Just as we bill insurance for in-person visits, we also bill insurance for video/telephone visits. If you have questions about your insurance coverage, we recommend that you speak with your insurance company.   Patient has given verbal consent for video/Telephone visit? yes  TRELL/SHANTE HUMPHRIES    Patient verified allergies, medications and pharmacy via phone. PHQ : and KILO:  done verbally with writer. Patient states he is ready for visit.  PHQ-12  KILO-21        ________________________________________  Medications Phoned  to Pharmacy [] yes [x]no  Name of Pharmacist:  List Medications, including dose, quantity and instructions    Medications ordered this visit were e-scribed.  Verified by order class [x] yes  [] no  cymbalta 30 mg  Gabapentin 600 mg  Vyvanse 30 mg and 40 mg  Suboxone 8-2 mg    Medication changes or discontinuations were communicated to patient's pharmacy: [] yes  [x] no    Dictation completed at time of chart check: [x] yes  [] no    I have checked the documentation for todays encounters and  the above information has been reviewed and completed.

## 2021-06-29 NOTE — PROGRESS NOTES
Progress Notes by Luz Gonzalez CMA at 6/8/2020 10:45 AM     Author: Luz Gonzalez CMA Service: Addiction Care Author Type: Certified Medical Assistant    Filed: 6/8/2020 12:22 PM Encounter Date: 6/8/2020 Status: Signed    : Luz Gonzalez CMA (Certified Medical Assistant)       This video/telephone visit will be conducted via a call between you and your physician/provider. We have found that certain health care needs can be provided without the need for an in-person physical exam. This service lets us provide the care you need with a video /telephone conversation. If a prescription is necessary we can send it directly to your pharmacy. If lab work is needed we can place an order for that and you can then stop by our lab to have the test done at a later time.   Just as we bill insurance for in-person visits, we also bill insurance for video/telephone visits. If you have questions about your insurance coverage, we recommend that you speak with your insurance company.   Patient has given verbal consent for video/Telephone visit? yes  TRELL/SHANTE HUMPHRIES    Patient verified allergies, medications and pharmacy via phone. PHQ : and KILO:  done verbally with writer. Patient states he is ready for visit.  PHQ-12  KILO-13

## 2021-06-29 NOTE — PROGRESS NOTES
Progress Notes by Luz Gonzalez CMA at 4/16/2020 11:15 AM     Author: Luz Gonzalez CMA Service: Addiction Care Author Type: Certified Medical Assistant    Filed: 4/17/2020 10:06 PM Encounter Date: 4/16/2020 Status: Signed    : Luz Gonzalez CMA (Certified Medical Assistant)       This video/telephone visit will be conducted via a call between you and your physician/provider. We have found that certain health care needs can be provided without the need for an in-person physical exam. This service lets us provide the care you need with a video /telephone conversation. If a prescription is necessary we can send it directly to your pharmacy. If lab work is needed we can place an order for that and you can then stop by our lab to have the test done at a later time.   Just as we bill insurance for in-person visits, we also bill insurance for video/telephone visits. If you have questions about your insurance coverage, we recommend that you speak with your insurance company.   Patient has given verbal consent for video/Telephone visit? Yes  TRELL/LPN Luz Gonzalez CMA    Patient verified allergies, medications and pharmacy via phone. PHQ : and KILO:  done verbally with writer. Patient states he  is ready for visit.  PHQ-13  KILO-16        ________________________________________  Medications Phoned  to Pharmacy [] yes [x]no  Name of Pharmacist:  List Medications, including dose, quantity and instructions    Medications ordered this visit were e-scribed.  Verified by order class [x] yes  [] no  Vyvanse    Medication changes or discontinuations were communicated to patient's pharmacy: [] yes  [x] no    Dictation completed at time of chart check: [] yes  [x] no    I have checked the documentation for todays encounters and the above information has been reviewed and completed.

## 2021-06-29 NOTE — PROGRESS NOTES
Progress Notes by Tiffanie Mclean CMA at 8/4/2020 11:45 AM     Author: Tiffanie Mclean CMA Service: -- Author Type: Certified Medical Assistant    Filed: 8/5/2020 12:47 PM Encounter Date: 8/4/2020 Status: Signed    : Tiffanie Mclean CMA (Certified Medical Assistant)       This video/telephone visit will be conducted via a call between you and your physician/provider. We have found that certain health care needs can be provided without the need for an in-person physical exam. This service lets us provide the care you need with a video /telephone conversation. If a prescription is necessary we can send it directly to your pharmacy. If lab work is needed we can place an order for that and you can then stop by our lab to have the test done at a later time.   Just as we bill insurance for in-person visits, we also bill insurance for video/telephone visits. If you have questions about your insurance coverage, we recommend that you speak with your insurance company.   Patient has given verbal consent for video/Telephone visit? Yes  Patient would like telephone visit, please call:  [phone]  TRELL/SHANTE CHAVEZ CMA    Patient verified allergies, medications and pharmacy via phone. PHQ: 7 and KILO:  11 done verbally with writer. Patient states he is ready for visit.    [place]  reviewed prior to appt, please see embedded report below:

## 2021-07-03 NOTE — ADDENDUM NOTE
Addendum Note by Brunner, Emily A, MD at 11/7/2019  7:39 PM     Author: Brunner, Emily A, MD Service: Addiction Care Author Type: Physician    Filed: 11/7/2019  7:39 PM Encounter Date: 11/6/2019 Status: Signed    : Brunner, Emily A, MD (Physician)    Addended by: BRUNNER, EMILY A on: 11/7/2019 07:39 PM        Modules accepted: Orders

## 2021-07-03 NOTE — ADDENDUM NOTE
Addendum Note by Ana Patel CMA at 1/26/2017  3:27 PM     Author: Ana Patel CMA Service: -- Author Type: Certified Medical Assistant    Filed: 1/26/2017  3:27 PM Encounter Date: 1/26/2017 Status: Signed    : Ana Patel CMA (Certified Medical Assistant)    Addended by: ANA PATEL on: 1/26/2017 03:27 PM        Modules accepted: Orders

## 2021-07-03 NOTE — ADDENDUM NOTE
Addendum Note by Brunner, Emily A, MD at 12/13/2018  2:45 PM     Author: Brunner, Emily A, MD Service: -- Author Type: Physician    Filed: 2/7/2019 12:08 PM Encounter Date: 12/13/2018 Status: Signed    : Brunner, Emily A, MD (Physician)    Addended by: BRUNNER, EMILY A on: 2/7/2019 12:08 PM        Modules accepted: Orders, Level of Service

## 2021-07-03 NOTE — ADDENDUM NOTE
Addendum Note by Maira Miranda MD at 3/3/2017  2:58 PM     Author: Maira Miranda MD Service: -- Author Type: Physician    Filed: 3/3/2017  2:58 PM Encounter Date: 3/2/2017 Status: Signed    : Maira Miranda MD (Physician)    Addended by: MAIRA MIRANDA on: 3/3/2017 02:58 PM        Modules accepted: Orders

## 2021-07-03 NOTE — ADDENDUM NOTE
Addendum Note by Mary Jefferson MD at 7/21/2020 11:45 AM     Author: Mary Jefferson MD Service: -- Author Type: Physician    Filed: 7/21/2020  2:16 PM Encounter Date: 7/21/2020 Status: Signed    : Mary Jefferson MD (Physician)    Addended by: MARY JEFFERSON on: 7/21/2020 02:16 PM        Modules accepted: Orders

## 2021-07-03 NOTE — ADDENDUM NOTE
Addendum Note by Dena Melo RN at 3/3/2017  2:51 PM     Author: Dena Melo RN Service: -- Author Type: Registered Nurse    Filed: 3/3/2017  2:51 PM Encounter Date: 3/2/2017 Status: Signed    : Dena Melo RN (Registered Nurse)    Addended by: DENA MELO on: 3/3/2017 02:51 PM        Modules accepted: Orders

## 2021-07-03 NOTE — ADDENDUM NOTE
Addendum Note by Maira Miranda MD at 10/13/2017  2:39 PM     Author: Maira Miranda MD Service: -- Author Type: Physician    Filed: 10/13/2017  2:39 PM Encounter Date: 10/6/2017 Status: Signed    : Maira Miranda MD (Physician)    Addended by: MAIRA MIRANDA on: 10/13/2017 02:39 PM        Modules accepted: Orders

## 2021-07-03 NOTE — ADDENDUM NOTE
Addendum Note by Constanza Schulz LPN at 1/11/2019  9:33 AM     Author: Constanza Schulz LPN Service: -- Author Type: Licensed Nurse    Filed: 1/11/2019  9:33 AM Encounter Date: 1/9/2019 Status: Signed    : Constanza Schulz LPN (Licensed Nurse)    Addended by: CONSTANZA SCHULZ on: 1/11/2019 09:33 AM        Modules accepted: Orders

## 2021-07-03 NOTE — ADDENDUM NOTE
Addendum Note by Yesenia García MD at 6/22/2018 11:07 AM     Author: Yesenia García MD Service: Addiction Care Author Type: Physician    Filed: 6/22/2018 11:07 AM Encounter Date: 6/13/2018 Status: Signed    : Yesenia García MD (Physician)    Addended by: YESENIA GARCÍA on: 6/22/2018 11:07 AM        Modules accepted: Orders

## 2021-07-03 NOTE — ADDENDUM NOTE
Addendum Note by Tammi Crowley RN at 6/22/2018  3:31 PM     Author: Tammi Crowley RN Service: Behavioral Author Type: Registered Nurse    Filed: 6/22/2018  3:31 PM Encounter Date: 6/22/2018 Status: Signed    : Tammi Crowley RN (Registered Nurse)    Addended by: TAMMI CROWLEY on: 6/22/2018 03:31 PM        Modules accepted: Orders

## 2021-07-03 NOTE — ADDENDUM NOTE
Addendum Note by Constanza Schulz LPN at 6/16/2017 11:55 AM     Author: Constanza Schulz LPN Service: -- Author Type: Licensed Nurse    Filed: 6/16/2017 11:55 AM Encounter Date: 6/16/2017 Status: Signed    : Constanza Schulz LPN (Licensed Nurse)    Addended by: CONSTANZA SCHULZ on: 6/16/2017 11:55 AM        Modules accepted: Orders

## 2021-07-03 NOTE — ADDENDUM NOTE
Addendum Note by Brunner, Emily A, MD at 3/19/2020  2:08 PM     Author: Brunner, Emily A, MD Service: Addiction Care Author Type: Physician    Filed: 3/19/2020  2:08 PM Encounter Date: 3/19/2020 Status: Signed    : Brunner, Emily A, MD (Physician)    Addended by: BRUNNER, EMILY A on: 3/19/2020 02:08 PM        Modules accepted: Orders

## 2021-07-03 NOTE — ADDENDUM NOTE
Addendum Note by Dena Melo RN at 6/22/2018 11:53 AM     Author: Dena Melo RN Service: -- Author Type: Registered Nurse    Filed: 6/22/2018 11:53 AM Encounter Date: 6/22/2018 Status: Signed    : Dena Melo RN (Registered Nurse)    Addended by: DENA MELO on: 6/22/2018 11:53 AM        Modules accepted: Orders

## 2022-06-29 ENCOUNTER — HOSPITAL ENCOUNTER (OUTPATIENT)
Dept: BEHAVIORAL HEALTH | Facility: CLINIC | Age: 37
Discharge: HOME | End: 2022-06-29
Attending: FAMILY MEDICINE | Admitting: FAMILY MEDICINE
Payer: COMMERCIAL

## 2022-06-29 ENCOUNTER — TELEPHONE (OUTPATIENT)
Dept: BEHAVIORAL HEALTH | Facility: CLINIC | Age: 37
End: 2022-06-29

## 2022-06-29 VITALS — WEIGHT: 210 LBS | BODY MASS INDEX: 28.44 KG/M2 | HEIGHT: 72 IN

## 2022-06-29 PROCEDURE — H0001 ALCOHOL AND/OR DRUG ASSESS: HCPCS | Mod: GT,95

## 2022-06-29 RX ORDER — BENZONATATE 200 MG/1
CAPSULE ORAL
COMMUNITY
Start: 2022-05-16 | End: 2022-08-03

## 2022-06-29 RX ORDER — BUPRENORPHINE AND NALOXONE 8; 2 MG/1; MG/1
FILM, SOLUBLE BUCCAL; SUBLINGUAL
Status: ON HOLD | COMMUNITY
Start: 2022-06-23 | End: 2024-04-02

## 2022-06-29 RX ORDER — CLONAZEPAM 0.5 MG/1
TABLET ORAL
COMMUNITY
Start: 2022-06-23 | End: 2022-08-03

## 2022-06-29 RX ORDER — CITALOPRAM HYDROBROMIDE 10 MG/1
TABLET ORAL
COMMUNITY
Start: 2022-02-04 | End: 2022-08-03

## 2022-06-29 RX ORDER — GABAPENTIN 600 MG/1
TABLET ORAL
COMMUNITY
Start: 2022-06-20 | End: 2022-08-03

## 2022-06-29 RX ORDER — LISDEXAMFETAMINE DIMESYLATE 70 MG/1
70 CAPSULE ORAL EVERY MORNING
COMMUNITY
Start: 2022-06-23 | End: 2024-03-14

## 2022-06-29 RX ORDER — LAMOTRIGINE 25 MG/1
TABLET ORAL
COMMUNITY
Start: 2022-06-16 | End: 2022-08-03

## 2022-06-29 RX ORDER — ATENOLOL 50 MG/1
50 TABLET ORAL DAILY
Status: ON HOLD | COMMUNITY
Start: 2022-01-20 | End: 2024-04-02

## 2022-06-29 ASSESSMENT — ANXIETY QUESTIONNAIRES
4. TROUBLE RELAXING: NEARLY EVERY DAY
1. FEELING NERVOUS, ANXIOUS, OR ON EDGE: NEARLY EVERY DAY
5. BEING SO RESTLESS THAT IT IS HARD TO SIT STILL: NEARLY EVERY DAY
6. BECOMING EASILY ANNOYED OR IRRITABLE: NEARLY EVERY DAY
7. FEELING AFRAID AS IF SOMETHING AWFUL MIGHT HAPPEN: MORE THAN HALF THE DAYS
2. NOT BEING ABLE TO STOP OR CONTROL WORRYING: NEARLY EVERY DAY
GAD7 TOTAL SCORE: 18
3. WORRYING TOO MUCH ABOUT DIFFERENT THINGS: SEVERAL DAYS
IF YOU CHECKED OFF ANY PROBLEMS ON THIS QUESTIONNAIRE, HOW DIFFICULT HAVE THESE PROBLEMS MADE IT FOR YOU TO DO YOUR WORK, TAKE CARE OF THINGS AT HOME, OR GET ALONG WITH OTHER PEOPLE: VERY DIFFICULT
GAD7 TOTAL SCORE: 18

## 2022-06-29 ASSESSMENT — COLUMBIA-SUICIDE SEVERITY RATING SCALE - C-SSRS
2. HAVE YOU ACTUALLY HAD ANY THOUGHTS OF KILLING YOURSELF IN THE PAST MONTH?: NO
6. HAVE YOU EVER DONE ANYTHING, STARTED TO DO ANYTHING, OR PREPARED TO DO ANYTHING TO END YOUR LIFE?: YES
4. HAVE YOU HAD THESE THOUGHTS AND HAD SOME INTENTION OF ACTING ON THEM?: NO
3. HAVE YOU BEEN THINKING ABOUT HOW YOU MIGHT KILL YOURSELF?: NO
1. IN THE PAST MONTH, HAVE YOU WISHED YOU WERE DEAD OR WISHED YOU COULD GO TO SLEEP AND NOT WAKE UP?: YES
5. HAVE YOU STARTED TO WORK OUT OR WORKED OUT THE DETAILS OF HOW TO KILL YOURSELF? DO YOU INTEND TO CARRY OUT THIS PLAN?: NO

## 2022-06-29 ASSESSMENT — PATIENT HEALTH QUESTIONNAIRE - PHQ9: SUM OF ALL RESPONSES TO PHQ QUESTIONS 1-9: 10

## 2022-06-29 ASSESSMENT — PAIN SCALES - GENERAL: PAINLEVEL: NO PAIN (0)

## 2022-06-29 NOTE — TELEPHONE ENCOUNTER
The below telephone note was routed to the [facility] UR department at: [facility] OUTPAT[facility] UR [[id number]] and to the [facility] IOP Admissions Department at: P CD ADULT IOP INTAKE POOL [[id number]] on 6/29/2022 as a referral for IOP treatment at [facility].    A.)  Name: Barak Clarke Tel #: [phone]  B.)  MRN: [id number]  C.)  Referral is for: a virtual or hybrid Evening Outpatient program at one of the [facility] locations for substance use disorder treatment or for co-occurring mental health and substance use disorder treatment.  D.)  Notes: Patient is okay with [facility], [facility] or [place] locations.    Thanks,    ALIYAH Levine

## 2022-06-29 NOTE — PROGRESS NOTES
"[facility]  Provider Name:  Sharda Cortes MA, [facility]     Telephone: [phone]      PATIENT'S NAME: Barak Clarke  PREFERRED NAME: \"Barak\"  PRONOUNS: he/him/his     MRN: [id number]  : 1985  ADDRESS: [address] [place] [address] [address]  [facility] [address]  ACCT. NUMBER:  [id number]  DATE OF SERVICE: 22  START TIME: 1:00 pm  END TIME: 2:34 pm  INSURANCE: [employer] [facility]  PMI:  [id number]  PREFERRED PHONE: [phone]  May we leave a program related message: Yes  EMERGENCY CONTACT: Sarika Clarke, mother, [phone]; Gema Clarke, spouse, [phone]    SERVICE MODALITY:  Video Visit:      Provider verified identity through the following two step process.  Patient provided:  Patient  and Patient's last 4 digits of N    Telemedicine Visit: The patient's condition can be safely assessed and treated via synchronous audio and visual telemedicine encounter.      Reason for Telemedicine Visit: Patient has requested telehealth visit    Originating Site (Patient Location): Patient's home    Distant Site (Provider Location): Provider Remote Setting- Home Office    Consent:  The patient/guardian has verbally consented to: the potential risks and benefits of telemedicine (video visit) versus in person care; bill my insurance or make self-payment for services provided; and responsibility for payment of non-covered services.     Patient would like the video invitation sent by:  Text to cell phone: [phone]    Mode of Communication:  Video Conference via MarkTend    As the provider I attest to compliance with applicable laws and regulations related to telemedicine.      UNIVERSAL ADULT SUBSTANCE USE DISORDER DIAGNOSTIC ASSESSMENT    Identifying Information:  The patient is a 37 year old, /White male of unknown decent.  The pronoun use throughout this assessment reflects the patient's chosen pronoun.  The patient was referred for an assessment by self.  The " "patient attended the session alone.     Chief Complaint:   The reason for seeking services at this time is: \"I'm looking for help.  The only reason I'm doing this is my wife, she wants me in.\"   The problem(s) began a little over a year ago. Patient has attempted to resolve these concerns in the past through inpatient treatment 10-12 times during his 20s.  Patient does not appear to be in severe withdrawal, an imminent safety risk to self or others, or requiring immediate medical attention and may proceed with the assessment interview.    Social/Family History:  Patient reported they grew up in [place], [place].  They were raised by biological parents who  when patient was 11.  Patient had 1 sister, who passed away just over a year ago.  Patient reported that their childhood was \"painful.\"  Patient describes current relationships with family of origin as \"broken. Things are hard right now.\"    The patient describes their cultural background as a Islam \"white kid.\"  Cultural influences and impact on patient's life structure, values, norms, and healthcare: N/A.  Contextual influences on patient's health include: Individual Factors Substance Use.  Patient identified their preferred language to be English. Patient reported they do not need the assistance of an  or other support involved in therapy.  Patient reports they are not involved in community of tr activities.  They reports spirituality impacts recovery in the following ways:  \"I believe in reincarnation and I have a lot of spiritual beliefs and most of them would sound too out there.\"     Patient reported had no significant delays in developmental tasks.   Patient's highest education level was some college. Patient identified the following learning problems: attention.  Patient reports they are  able to understand written materials.    Patient reported the following relationship history 1 marriage.  Patient's current relationship " "status is  for 1 year.   Patient identified their sexual orientation as heterosexual.  Patient reported having three child(christelle), ages 12, 12, and 6.    Patient's current living/housing situation involves staying in own home/apartment.  They live with wife and children and they report that housing is stable. Patient identified spouse as part of their support system.  Patient identified the quality of these relationships as poor.      Patient reports engaging in the following recreational/leisure activities: draw and being creative. Patient reports engaging in the following recreation/leisure activities while using: escape and find all sorts of ways to escape, \"the darker and more morbid the better.\" Patient reports the following people are supportive of recovery: mother and spouse and children.  Patient is currently employed full time and reports they are not able to function appropriately at work.  Patient reports their income is obtained through employment, family and spouse.  Patient does identify finances as a current stressor.  Cultural and socioeconomic factors do affect the patient's access to services.      Patient reports the following substance related arrests or legal issues: DWI, 5th degree possession, several theft charges that patient attributes to his meth use.  Patient denies being on probation / parole / under the jurisdiction of the court.    Patient's Strengths and Limitations:  Patient identified the following strengths or resources that will help them succeed in treatment: family support and motivation. Things that may interfere with the patient's success in treatment include: few friends, financial hardship and lack of social support.     Assessments:  The following assessments were completed by patient for this visit:  PHQ9:   PHQ-9 SCORE 1/30/2020 4/16/2020 5/7/2020 6/8/2020 7/21/2020 8/4/2020 6/29/2022   PHQ-9 Total Score 8 13 12 12 7 7 10     GAD7:   KILO-7 SCORE 1/30/2020 4/16/2020 " 5/7/2020 6/8/2020 7/21/2020 8/4/2020 6/29/2022   Total Score 8 16 21 13 7 11 18     PROMIS 10-Global Health (all questions and answers displayed):   PROMIS 10 6/29/2022   In general, would you say your health is: 3   In general, would you say your quality of life is: 4   In general, how would you rate your physical health? 3   In general, how would you rate your mental health, including your mood and your ability to think? 1   In general, how would you rate your satisfaction with your social activities and relationships? 2   In general, please rate how well you carry out your usual social activities and roles. (This includes activities at home, at work and in your community, and responsibilities as a parent, child, spouse, employee, friend, etc.) 4   To what extent are you able to carry out your everyday physical activities such as walking, climbing stairs, carrying groceries, or moving a chair? 5   In the past 7 days, how often have you been bothered by emotional problems such as feeling anxious, depressed, or irritable? 5   In the past 7 days, how would you rate your fatigue on average? 2   In the past 7 days, how would you rate your pain on average, where 0 means no pain, and 10 means worst imaginable pain? 0   Global Mental Health Score 8   Global Physical Health Score 17   PROMIS TOTAL - SUBSCORES 25   Some recent data might be hidden     Webster Suicide Severity Rating Scale (Lifetime/Recent)  Webster Suicide Severity Rating (Lifetime/Recent) 6/29/2022   Q1 Wished to be Dead (Past Month) yes   Q2 Suicidal Thoughts (Past Month) no   Q3 Suicidal Thought Method no   Q4 Suicidal Intent without Specific Plan no   Q5 Suicide Intent with Specific Plan no   Q6 Suicide Behavior (Lifetime) yes   Within the Past 3 Months? no   Level of Risk per Screen moderate risk       Personal and Family Medical History:  Patient does report a family history of mental health concerns.  Patient reports family history includes  Alcoholism in his father and mother; Depression in his father and mother; Hypertension in his mother; Mental Illness in his mother; Substance Abuse in his father, mother, and sister.      Patient reported the following previous mental health diagnoses: ADHD, PTSD, Major Depressive Disorder, Chemical Dependency and Anxiety.  Patient reports their primary mental health symptoms include:  Racing thoughts, disorganization, low mood, no motivation, ruminating thoughts, fatigue, excessive sleep, flashbacks and these do impact his ability to function.   Patient received mental health services in the past:  Psychiatric services, individual therapy.  Psychiatric Hospitalizations:  8 years ago in [place] for a suicide attempt.  Patient denies a history of civil commitment but says he received a stay of commitment twice.  Current mental health services/providers include:  [facility] for psychiatric medications.    Patient has not had a physical exam to rule out medical causes for current symptoms.  Date of last physical exam was greater than a year ago and client was encouraged to schedule an exam with PCP. The patient does not have a Primary Care Provider and was encouraged to establish care with a PCP.  Patient reports no current medical and/or dental concerns.  Patient denies any issues with pain. Patient denies pregnancy..  There are significant appetite / nutritional concerns / weight changes, patient said he has lost 10 pounds due to suhas.  Patient does not report a history of an eating disorder.  Patient does report a history of head injury / trauma / cognitive impairment.  Patient said he had an overdose at one point on a diuretic medication, patient said he was at 20% oxygen and was in a medical coma for a week and memory hasn't been the same since.  Patient said this occurred 8-9 years ago.  He was hospitalized at [facility].    Patient reports current meds as:   Outpatient Medications Marked as Taking for the 6/29/22  encounter (Hospital Encounter) with Sharda Cortes LADC   Medication Sig     buprenorphine HCl-naloxone HCl (SUBOXONE) 8-2 MG per film TAKE 1 FILM SUBLINGUALLY TWICE DAILY     citalopram (CELEXA) 10 MG tablet      Citalopram Hydrobromide (CELEXA PO)      clonazePAM (KLONOPIN) 0.5 MG tablet TAKE 2 TO 3 TABLETS BY MOUTH DAILY     gabapentin (NEURONTIN) 600 MG tablet TAKE 1 TABLET BY MOUTH FOUR TIMES DAILY AS NEEDED     GABAPENTIN PO Take 600 mg by mouth 3 times daily     VYVANSE 70 MG capsule        Medication Adherence:  Patient reports taking prescribed medications as prescribed.    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  Patient has received substance use disorder and/or gambling treatment in the past.  Patient reports the following dates and locations of treatment services:  IP treatment at [place], [facility], [facility], [facility], [facility], [facility], etc.  Patient has ever been to detox.  Patient is not currently receiving  "any chemical dependency treatment. Patient reports no history of support group attendance.        Substance Age of first use Pattern and duration of use (include amounts and frequency) Date of last use     Withdrawal potential Route of administration   has not used Alcohol        has used Marijuana   13 Current use: Patient said he was smoking Delta 8 until recently, 2 grams per day.  Patient said \"2 per grams\" spending $40/day.  Pattern had been going on for 2-3 months.    Patient said prior to current use he had gone 4 months without using and picked it back up.  Patient said his use has caused a lot of marital issues.    Patient said he will smoke marijuana if he cannot get Delta 8.  Patient said he first tried Delta 8 last year at a smoke shop out of curiosity. 7/7/22 No smoked     has used Amphetamines   6 Patient was prescribed Ritalin at age 6.    Patient began using meth at age 26.    Current Vyvanse only as prescribed. Last illicit use was 6 years ago No smoked and IV - injected   has not used Cocaine/crack           has used Hallucinogens 30s Patient said he uses psilocybin mushrooms twice a year.  Patient said he uses it as a \"spiritual journey\" and a way he connects with his wife. 8/2021 No oral   has not used Inhalants        has used Heroin 20s Patient said his use was around the same time as his meth use.    Patient is still on MAT. Last illicit use was 6 years ago Yes smoked and IV - injected   has not used Other Opiates        has used Benzodiazepine   20s Patient said he does not have a lot of illicit benzo use.     Patient has a current prescription of klonopin.  Patient said he takes his medication as prescribed. 6/29/22 Yes oral   has not used Barbiturates        has not used Over the counter meds.        has not use Caffeine        has used Nicotine  20 Patient said he vapes, using 6 mg liquid.  Patient will use 1.5 pods per day. 6/29/22 Yes smoked and inhaled   has not used other substances " "not listed above:  Identify:             Patient reported the following problems as a result of their substance use: DUI, family problems, financial problems, occupational / vocational problems and relationship problems.  Patient is concerned about substance use. Patient reports his wife is concerned about their substance use.  Patient reports their recovery goals are \"to take it seriously and actually do the work I need to do to see a different, better way.  My perspective on life right now is not helping me.\"     Patient reports experiencing the following withdrawal symptoms within the past 12 months: none and the following within the past 30 days: none.   Patients reports urges to use Cannabis/ Hashish and Nicotine / Tobacco.  Patient reports he has used more Cannabis/ Hashish and Nicotine / Tobacco than intended and over a longer period of time than intended. Patient reports he has had unsuccessful attempts to cut down or control use of Cannabis/ Hashish and Nicotine / Tobacco.  Patient reports longest period of abstinence was 1 year and return to use was due to his sister's death. Patient reports he has needed to use more Cannabis/ Hashish to achieve the same effect.  Patient does  report diminished effect with use of same amount of Cannabis/ Hashish.     Patient does  report a great deal of time is spent in activities necessary to obtain, use, or recover from Cannabis/ Hashish effects.  Patient does  report important social, occupational, or recreational activities are given up or reduced because of Cannabis/ Hashish use.  Cannabis/ Hashish use is continued despite knowledge of having a persistent or recurrent physical or psychological problem that is likely to have caused or exacerbated by use.  Patient reports the following problem behaviors while under the influence of substances: patient reports he becomes more argumentative with his wife.     Patient reports substance use has not ever impacted their " ability to function in a school setting. Patient reports substance use has ever impacted their ability to function in a work setting.  Patients demographics and history impact their recovery in the following ways:  N/A.  Patient reports engaging in the following recreation/leisure activities while using:  Patient said he hides from his wife and family and uses.  Patient reports the following people are supportive of recovery: Wife and mother.    Patient does not have a history of gambling concerns and/or treatment.  Patient does not have other addictive behaviors he is concerned about.      Dimension Scale Ratings:    Dimension 1 - Acute Intoxication/Withdrawal: 0 Client displays full functioning with good ability to tolerate and cope with withdrawal discomfort. No signs or symptoms of intoxication or withdrawal or resolving signs or symptoms.  Client is on MAT and is stable.  Dimension 2 - Biomedical: 0 Client displays full functioning with good ability to cope with physical discomfort.  No present medical or dental concerns.  Dimension 3 - Emotional/Behavioral/Cognitive Conditions: 2 Client has difficulty with impulse control and lacks coping skills. Client has thoughts of suicide or harm to others without means; however, the thoughts may interfere with participation in some treatment activities. Client has difficulty functioning in significant life areas, citing issues participating in his marriage and family activities. Client has moderate symptoms of emotional, behavioral, or cognitive problems. Client is able to participate in most treatment activities.  Dimension 4 - Readiness to Change:  1 Client is motivated with active reinforcement from his spouse to obtain treatment.  Dimension 5 - Relapse/Continued Use/ Continued Problem Potential: 4 No awareness of the negative impact of mental health problems or substance abuse. No coping skills to arrest mental health or addiction illnesses, or prevent  relapse.  Dimension 6 - Recovery Environment:  3 Client is not engaged in structured, meaningful activity and the client's peers, family, significant other, and living environment are unsupportive, or there is significant criminal justice system involvement.    Significant Losses / Trauma / Abuse / Neglect Issues:   The patient did not serve in the .  There are indications or report of significant loss, trauma, abuse or neglect issues related to:   The patient reported having significant grief and loss issues regarding the loss of his sister to substance use about 1 year ago.      .  Concerns for possible neglect are not present.    Safety Assessment:   Patient denies current homicidal ideation and behaviors.  Patient denies current self-injurious ideation and behaviors.    Patient denied risk behaviors associated with substance use.  Patient reported substance use associated with mental health symptoms.  Patient reports the following current concerns for their personal safety: None.  Patient reports there are not firearms in the house.     History of Safety Concerns:  Patient denied a history of homicidal ideation.     Patient denied a history of personal safety concerns.    Patient reported a history of assaultive behaviors.  Disorderly Conduct  Patient denied a history of sexual assault behaviors.     Patient denied a history of risk behaviors associated with substance use.  Patient reported a history of substance use associated with mental health symptoms.  Patient reports the following protective factors: positive relationships positive family connections, forward/future oriented thinking, adherence with prescribed medication, living with other people, daily obligations and uses community crisis resources    Risk Plan:  See Recommendations for Safety and Risk Management Plan    Review of Symptoms per patient report:  Substance Use:  blackouts, passing out, daily use, substance related legal problems,  substance related decrease in work performance, family relationship problems due to substance use and cravings/urges to use     Collateral Contact Summary:   Collateral contacts contributing to this assessment:  The patient's electronic medical records were reviewed at time of assessment.    No additional collateral data had been obtained at the time of this documentation.     If court related records were reviewed, summarize here: None    Information from collateral contacts supported/largely agreed with information from the client and associated risk ratings.    Information in this assessment was obtained from the medical record and provided by the patient who is a fair historian.        The patient will have open access to his substance use disorder assessment medical record.    Diagnostic Criteria:   1.)  Substance is often taken in larger amounts or over a longer period than was intended.  Met for:  cannabis/THC.  2.)  There is persistent desire or unsuccessful efforts to cut down or control use of the substance.  Met for:  cannabis/THC.  3.)  A great deal of time is spent in activities necessary to obtain the substance, use the substance, or recover from its effects.  Met for:  Cannabis/THC and nicotine.  4.)  Craving, or a strong desire or urge to use the substance.  Met for:  Cannabis/THC and nicotine.  5.)  Recurrent use of the substance resulting in a failure to fulfill major role obligations at work, school, or home.  Met for:  cannabis/THC.  6.)  Continued use of the substance despite having persistent or recurrent social or interpersonal problems caused or exacerbated by the effects of its use.  Met for:  cannabis/THC.  7.)  Important social, occupational, or recreational activities are given up or reduced because of the substance.  Met for:  Cannabis/THC and nicotine.  9.)  Use of the substance is continued despite knowledge of having a persistent or recurrent physical or psychological problem that is  likely to have been cause or exacerbated by the substance.  Met for:  cannabis/THC.  10.)  Tolerance:  either a need for markedly increased amounts of the substance to achieve the desired effect or a markedly diminished effect with continued use of the dame amount of the substance.  Met for:  cannabis/THC and nicotine.    As evidenced by self report and criteria, the patient meets the following DSM-5 Diagnoses: (Sustained by DSM-5 Criteria Listed Above)      Cannabis Use Disorder Severe - 304.30 (F12.20)  Tobacco Use Disorder Moderate - 305.10 (F17.200)  Opioid Use Disorder Severe - 304.00 (F11.20) In sustained remission on MAT per patient report  Amphetamine Use Disorder Severe - 304.40 (F15.20) In sustained remission per patient report    Specify if: In early remission:  After full criteria for alcohol/drug use disorder were previously met, none of the criteria for alcohol/drug use disorder have been met for at least 3 months but for less than 12 months (with the exception that Criterion A4,  Craving or a strong desire or urge to use alcohol/drug  may be met).     In sustained remission:   After full criteria for alcohol use disorder were previously met, none of the criteria for alcohol/drug use disorder have been met at any time during a period of 12 months or longer (with the exception that Criterion A4,  Craving or strong desire or urge to use alcohol/drug  may be met).     Specify if:   This additional specifier is used if the individual is in an environment where access to alcohol is restricted.    Mild: Presence of 2-3 symptoms  Moderate: Presence of 4-5 symptoms  Severe: Presence of 6 or more symptoms    Recommendations:     1. Plan for Safety and Risk Management:Recommended that patient call 911 or go to the local ED should there be a change in any of these risk factors..      Report to child / adult protection services was NA.     2. DAVID Recommendations:      1)  Complete a residential based or similar  treatment program, such as [facility].  2)  Abstain from all mood-altering chemicals unless prescribed by a licensed provider.   3)  Attend, at minimum, 2 weekly support group meetings, such as 12 step based (AA/NA), SMART Recovery, Health Realizations, and/or Refuge Recovery meetings.     4)  Actively work with a male mentor/sponsor on a weekly basis.   5)  Follow all the recommendations of your treatment/medical providers.  6)  Patient may benefit from obtaining a full mental health evaluation.  8)  Continue to attend your scheduled individual psychotherapy appointments.      The patient has a history of opiate use and was give treatment options, including Medication Assisted Treatment, and information on the risks of opiod use disorder including recognizing and responding to opiod overdose.    3.  Mental Health Referrals:     The patient would benefit from continuing to follow all of the recommendations of his mental health providers.  The patient would benefit from having a mental health evaluation to address his current mental health issues.  Intensive Outpatient Chemical Health Treatment     4. Cultural Concerns:    The patient did not identify having any cultural concerns regarding mental health, physical health, or substance use issues.     5. Recommendations for treatment focus:      Alcohol / Substance Use - See #2. DAVID Referrals above for details on recommendations.  Depressed Mood - See #3. Mental Health Referrals above for details on recommendations.  Anxiety - See #3. Mental Health Referrals above for details on recommendations.  Grief / Loss - See #3. Mental Health Referrals above for details.  Relational Problems related to: Conflict or difficulties with partner/spouse  See #3. Mental Health Referrals above for details on recommendations.     6.  Referrals:   [facility]   Mental Health & Addiction Services  Intake Phone: [phone]  Substance Use ([employer]org)  "    Clinical Substantiation:     Met with patient individually on 6/29/22 for a comprehensive assessment including summary of assessment and conclusion, assessment risk and appropriate steps taken, documentation to support the diagnosis, rationale for disposition and appropriate level of care recommended and alternative for treatment options.  Patient stated that he has had a long history of chemical dependency issues.  Currently, patient said he has been struggling with Delta 8 use.  Patient said he has been spending about $40 per day using Delta 8 which he can purchase from a smoke shop.  Patient said that it has caused him significant issues in his marriage.  Patient was seeking residential treatment but does not currently meet the criteria or inpatient services.  Patient has spoken with his wife and has agreed not to use Delta 8 any longer as he said his wife and children mean too much to him at this time.  Patient has had multiple IP episodes for chemical dependency during his 20s due to heroin and methamphetamine abuse.  Patient is on MAT currently and said he finds that to be helpful.  Patient receives [facility] care through [facility] for medication management and said he recently started individual therapy but only had a few sessions.  He states nothing further is scheduled.  Patient is encouraged to follow up with his current psychiatric provider for a mental health evaluation, as patient expressed that he believes he has bi-polar disorder and is \"just coming out of a manic episode.\"  Patient also expressed desire in wanting family therapy.    ADDENDUM 7/7/22:  Patient sent an email requesting a higher level of care.  Spoke with patient and he said that he has continued to smoke marijuana and Delta 8 and is having a difficult time stopping while in an outpatient setting and his requesting a higher level of care.  Patient said he needs to be in an environment where he does not have access to marijuana " and he is ready to address his substance abuse.  Advised patient about [facility] program and patient is agreeable to the referral.  Patient stated he is now on leave from work and his wife is not supportive and upset with him regarding his continued use.    Rational for recommended level of care: The patient lacks long-term sober maintenance skills, lacks sober coping skills, lacks a sober peer support network and has dual issues of mental health and substance abuse.    The patient reported he is willing to follow the above recommendations.    The patient would like the following family or other support people involved in their treatment:  None at this time.    DAANES Assessment ID: [id number]     Provider Name/ Credentials:  Sharda Cortes MA, [facility] Date: June 29, 2022

## 2022-06-29 NOTE — TELEPHONE ENCOUNTER
From: Sharda Cortes   Sent: Wednesday, June 29, 2022 3:14 PM  To: [email or web address]  Subject: [SECURE] CD Harris Cancino,    It was nice meeting with you today.  Please see my recommendations listed below.  I have made a referral to our outpatient programming and someone should be inContact with you within the next 2-3 business to schedule an intake.  I am also listing sober support meetings as well.      Recommendations:      1. Plan for Safety and Risk Management: Recommended that patient call 911 or go to the local ED should there be a change in any of these risk factors..                         Report to child / adult protection services was NA.      2. DAVID Recommendations:       1)  Complete an Intensive Outpatient CD Treatment Program, by a provider of your choice.  2)  Abstain from all mood-altering chemicals unless prescribed by a licensed provider.   3)  Attend, at minimum, 2 weekly support group meetings, such as 12 step based (AA/NA), SMART Recovery, Health Realizations, and/or Refuge Recovery meetings.     4)  Actively work with a male mentor/sponsor on a weekly basis.   5)  Follow all the recommendations of your treatment/medical providers.  6)  Patient may benefit from obtaining a full mental health evaluation.  8)  Continue to attend your scheduled individual psychotherapy appointments.       The patient has a history of opiate use and was give treatment options, including Medication Assisted Treatment, and information on the risks of opioid use disorder including recognizing and responding to opioid overdose.     3.  Mental Health Referrals:                The patient would benefit from continuing to follow all of the recommendations of his mental health providers.  The patient would benefit from having a mental health evaluation to address his current mental health issues.  Intensive Outpatient Chemical Health Treatment      4. Cultural Concerns:     The patient did not identify having any  cultural concerns regarding mental health, physical health, or substance use issues.      5. Recommendations for treatment focus:                 Alcohol / Substance Use - See #2. DAVID Referrals above for details on recommendations.  Depressed Mood - See #3. Mental Health Referrals above for details on recommendations.  Anxiety - See #3. Mental Health Referrals above for details on recommendations.  Grief / Loss - See #3. Mental Health Referrals above for details.  Relational Problems related to: Conflict or difficulties with partner/spouse  See #3. Mental Health Referrals above for details on recommendations.      6.  Referrals:   [facility]  Mental Health & Addiction Services  Intake Phone: [phone]  Substance Use ([facility].org)     7.  Sober Support Services  [facility] connects people seeking recovery to resources that help foster and sustain long-term recovery.  Whether you are seeking resources for treatment, transportation, housing, job training, education, health care or other pathways to recovery, [facility] is a great place to start.    [address] [place] [address] [address]  [place] [address]  Phone: [phone]  http://[place]Next 1 Interactive    Alcoholics Anonymous  24/7 Phone Line: [phone]  https://aam[place].org/   https://aamin[place].org/     For additional list of online meetings: http://aa-intergroup.org     SMART Recovery  www.smartrecovery.org   In person and Online meetings can be found through the website  For other questions, phone number is: [phone]    Addiction Busters at [facility] http://www.[facility].org/program1.html                Health Realizations- mnaltern[employer].com/AA Alternative Support Groups.pdf        Refuge Recovery, https://refugerecoverymeetings.org/locations/online-us       Let me know if you have any questions,    Sharda Cortes MA, [facility]  Chemical Dependency   Health & Addiction Services Assessment  [address], [address] [address]   [email or web address]  [email or web address].org   Office: [phone]  Fax: [phone]   *Working Remotely / Hours GILBERT-ADA 7:30-4  Employed by: [employer]

## 2022-07-01 ENCOUNTER — TELEPHONE (OUTPATIENT)
Dept: BEHAVIORAL HEALTH | Facility: CLINIC | Age: 37
End: 2022-07-01

## 2022-07-04 ENCOUNTER — TELEPHONE (OUTPATIENT)
Dept: BEHAVIORAL HEALTH | Facility: CLINIC | Age: 37
End: 2022-07-04

## 2022-07-06 ENCOUNTER — HOSPITAL ENCOUNTER (OUTPATIENT)
Dept: BEHAVIORAL HEALTH | Facility: CLINIC | Age: 37
Discharge: HOME | End: 2022-07-06
Attending: FAMILY MEDICINE
Payer: COMMERCIAL

## 2022-07-06 PROCEDURE — 999N000216 HC STATISTIC ADULT CD FACE TO FACE-NO CHRG: Mod: GT,95 | Performed by: COUNSELOR

## 2022-07-06 ASSESSMENT — ANXIETY QUESTIONNAIRES
GAD7 TOTAL SCORE: 18
4. TROUBLE RELAXING: NEARLY EVERY DAY
1. FEELING NERVOUS, ANXIOUS, OR ON EDGE: NEARLY EVERY DAY
6. BECOMING EASILY ANNOYED OR IRRITABLE: NEARLY EVERY DAY
GAD7 TOTAL SCORE: 18
3. WORRYING TOO MUCH ABOUT DIFFERENT THINGS: SEVERAL DAYS
2. NOT BEING ABLE TO STOP OR CONTROL WORRYING: NEARLY EVERY DAY
7. FEELING AFRAID AS IF SOMETHING AWFUL MIGHT HAPPEN: MORE THAN HALF THE DAYS
5. BEING SO RESTLESS THAT IT IS HARD TO SIT STILL: NEARLY EVERY DAY

## 2022-07-06 ASSESSMENT — COLUMBIA-SUICIDE SEVERITY RATING SCALE - C-SSRS
1. IN THE PAST MONTH, HAVE YOU WISHED YOU WERE DEAD OR WISHED YOU COULD GO TO SLEEP AND NOT WAKE UP?: YES
3. HAVE YOU BEEN THINKING ABOUT HOW YOU MIGHT KILL YOURSELF?: NO
5. HAVE YOU STARTED TO WORK OUT OR WORKED OUT THE DETAILS OF HOW TO KILL YOURSELF? DO YOU INTEND TO CARRY OUT THIS PLAN?: NO
2. HAVE YOU ACTUALLY HAD ANY THOUGHTS OF KILLING YOURSELF IN THE PAST MONTH?: NO
4. HAVE YOU HAD THESE THOUGHTS AND HAD SOME INTENTION OF ACTING ON THEM?: NO
6. HAVE YOU EVER DONE ANYTHING, STARTED TO DO ANYTHING, OR PREPARED TO DO ANYTHING TO END YOUR LIFE?: YES

## 2022-07-06 ASSESSMENT — PATIENT HEALTH QUESTIONNAIRE - PHQ9: SUM OF ALL RESPONSES TO PHQ QUESTIONS 1-9: 10

## 2022-07-06 NOTE — PROGRESS NOTES
"[facility] and Addiction Assessment Center    ADULT Mental Health Assessment Appointment Note    PATIENT'S NAME:  Barak Clarke    Preferred Pronoun:   MRN: [id number]  YOB: 1985  Address:  [address]  [place] [address]  PREFERRED PHONE: [phone]  May we leave a referral related message: Yes    DATE OF SERVICE: 22  START TIME: 11:05am   END TIME: 11:28  SERVICE MODALITY:  Video Visit:      Provider verified identity through the following two step process.  Patient provided:  Patient  and Patient address    Telemedicine Visit: The patient's condition can be safely assessed and treated via synchronous audio and visual telemedicine encounter.      Reason for Telemedicine Visit: Services only offered telehealth    Originating Site (Patient Location): Patient's home    Distant Site (Provider Location): [facility] & ADDICTION SERVICES    Consent:  The patient/guardian has verbally consented to: the potential risks and benefits of telemedicine (video visit) versus in person care; bill my insurance or make self-payment for services provided; and responsibility for payment of non-covered services.     Patient would like the video invitation sent by:  My Chart    Mode of Communication:  Video Conference via K[employer]cher    As the provider I attest to compliance with applicable laws and regulations related to telemedicine.    Identifying Information:  Patient is a 37 year old, .  Patient was referred for an assessment by self.  Patient attended the session alone. Patient identified their preferred language to be English. Patient reported they does not need the assistance of an  or other support involved in therapy.     Services Requested:   The reason for seeking services at this time is: \" I would like to try lithium \"  Patient has not attempted to resolve these concerns in the past.  Patient does not have legal concerns.    Substance " Use:  Do you  have a history of alcohol or illicit drug use? Pt had a rule 25 completed by  on 06/29/2022      Therapeutic Interventions Provided: supportive active listening and explored alternatives    Recommendations/Plan: Clinician explained DA process to pt and explained that we cannot prescribe medications. Clinician attempted to schedule an appointment for psychiatry in [facility], but pt already has an appointment scheduled for the 07/22/2022. Patient would like to get in sooner but no appointments were available. Clinician attempted to chat with [facility] to get pt scheduled, but was informed by [facility] they pt needs to call in to schedule now.     Referrals:     [facility]  [address], [address]  [place] [address]    [phone]    Chirag Gonzalez, [facility],  July 6, 2022  Mental Health and Addiction Services Assessment Center

## 2022-07-07 ENCOUNTER — TELEPHONE (OUTPATIENT)
Dept: BEHAVIORAL HEALTH | Facility: CLINIC | Age: 37
End: 2022-07-07

## 2022-07-07 NOTE — TELEPHONE ENCOUNTER
LP SCREEN TELEPHONE NOTE   Barak Clarke paperwork was reviewed by ALIYAH Levine and the patient was deemed ELIGIBLE for the LP program.     Inpatient or Community Referral: Community referral     Medical Screening (As Needed): The patient's medical and COVID-19 screen with the LP RN is pending at this time and NEEDS to be completed prior to placing this patient on the LP waiting list.     Regular use of benzodiazapine's (other than detox): The patient uses prescribed benzodiazepines and will NEED a medical screening with the LP RN.     Insurance: The [facility] Department is responsible for obtaining ALL authorizations for treatment services at the EOP, DOP and LP levels of care.      This will be a: Seen by a [facility] Evaluation Counselor: CHRIS, ISP & LP UPDATE NOTE evaluation. (Update SBAR and route DAANES and DELORES's)     IV use or Pregnant: This patient is not pregnant or an IV drug user.     Business office: [phone]     List: This patient CAN be placed on the COMMUNITY LP Waiting List at this time.       Group: [facility]     Additional Info as needed: Patient is taking klonopin and is aware that he cannot take that medication while in treatment and he stated he is okay with that requirement.  Patient is also on Vyvanse.     The best current contact telephone number for the patient is: [phone]       Shaji,  ALIYAH Levine   7/7/2022

## 2022-07-07 NOTE — ADDENDUM NOTE
Encounter addended by: Sharda Cortes LADC on: 7/7/2022 10:31 AM   Actions taken: Clinical Note Signed

## 2022-07-07 NOTE — TELEPHONE ENCOUNTER
Date: 7/7/2022    To: COOKIE RN    Please call this patient at [phone] to complete a medical screening to clarify his medications and medical condition and to complete a COVID-19 screening.      The patient has a history of:  Pt is prescribed Klonopin and Vyvanse    INSIDE: This patient had a substance abuse assessment with Sharda Cortes, so no paperwork will be sent up to the 6th floor because all of the clinical documentation is in the patient's electronic medical record in [facility].      Thanks,  ALIYAH Mora

## 2022-07-11 ENCOUNTER — HOSPITAL ENCOUNTER (EMERGENCY)
Facility: CLINIC | Age: 37
Discharge: HOME | End: 2022-07-12
Attending: EMERGENCY MEDICINE | Admitting: EMERGENCY MEDICINE
Payer: COMMERCIAL

## 2022-07-11 DIAGNOSIS — F19.10 POLYSUBSTANCE ABUSE (H): ICD-10-CM

## 2022-07-11 PROCEDURE — 99283 EMERGENCY DEPT VISIT LOW MDM: CPT

## 2022-07-11 PROCEDURE — 250N000013 HC RX MED GY IP 250 OP 250 PS 637: Performed by: EMERGENCY MEDICINE

## 2022-07-11 PROCEDURE — 99284 EMERGENCY DEPT VISIT MOD MDM: CPT | Performed by: EMERGENCY MEDICINE

## 2022-07-11 RX ORDER — BUPRENORPHINE AND NALOXONE 8; 2 MG/1; MG/1
2 FILM, SOLUBLE BUCCAL; SUBLINGUAL ONCE
Status: COMPLETED | OUTPATIENT
Start: 2022-07-11 | End: 2022-07-11

## 2022-07-11 RX ADMIN — BUPRENORPHINE AND NALOXONE 2 FILM: 8; 2 FILM BUCCAL; SUBLINGUAL at 23:43

## 2022-07-12 VITALS
OXYGEN SATURATION: 98 % | DIASTOLIC BLOOD PRESSURE: 85 MMHG | SYSTOLIC BLOOD PRESSURE: 145 MMHG | HEART RATE: 90 BPM | RESPIRATION RATE: 18 BRPM | TEMPERATURE: 98.2 F

## 2022-07-12 NOTE — ED PROVIDER NOTES
[facility] EMERGENCY DEPARTMENT ([facility])    7/11/22     [address]   History     Chief Complaint   Patient presents with     Addiction Problem     Detox from clonazepam before going to [facility], last today prior to arrival, 2 to 3 mg daily for one year, wanting to change to wellbutrin from vyvance due to [facility] treatment rules.     The history is provided by the patient and medical records.     Barak Clarke is a 37 year old male who presents seeking detox and treatment for clonazepam and heroin.  Patient is hoping to get into intensive outpatient chemical dependency treatment program and reports he was told he had to wean off his Klonopin in order to enter [facility].  He was told by intake that we do benzodiazepine detox with phenobarbital.  He reports he was called by [facility] and told he could present to see if he qualified for detox here.  He states that he needs to detox from clonazepam, has been on it 2-3 mg daily for about a year, prescribed by Savanna Wooten at [facility]. He is also on Suboxone 8mg strip twice a day. Hasn't had Suboxone in 2 days because he is out. He had appointment tomorrow for refill but cancelled this as he had thought he was going into detox. He has been on Suboxone for 4 years. He states his wife is sick of his ongoing substance dependence, and that he is worried about his marriage if he cannot get into detox. No alcohol use disorder. Is on Vyvanse for ADHD, is wondering if he can switch to just bupropion. He states that he is prescribed the bupropion but just doesn't  the Wellbutrin. He wanted to go to [facility] after being done with detox here. Denies any sick contacts. No suicidal or homicidal ideation. He was dropped off here. He denies any fever, cough, chest pain, shortness of breath, vomiting, diarrhea, or other complaints. He does not use any other benzodiazepines or non prescribed illicit substances. States he is going to [facility] for  marijuana addiction.       I have reviewed the Medications, Allergies, Past Medical and Surgical History, and Social History in the Epic system.  Past Medical History:   Diagnosis Date     Anxiety      Hypertension      MDD (major depressive disorder)      Opioid dependence (H)     suboxone contract       Past Surgical History:   Procedure Laterality Date     ORTHOPEDIC SURGERY       Past medical history, past surgical history, medications, and allergies were reviewed with the patient. Additional pertinent items: None    Family History   Problem Relation Age of Onset     Alcoholism Mother      Depression Mother      Substance Abuse Mother      Hypertension Mother      Mental Illness Mother      Substance Abuse Father      Alcoholism Father      Depression Father      Substance Abuse Sister        Social History     Tobacco Use     Smoking status: Current Every Day Smoker     Years: 10.00     Smokeless tobacco: Never Used     Tobacco comment: e-cig only (20 mg/nicotine)   Substance Use Topics     Alcohol use: No     Comment: occasional      Social history was reviewed with the patient. Additional pertinent items: None  REVIEW OF SYSTEMS  Pertinent positives and negatives are documented in the HPI. All other systems reviewed and are negative.    Physical Exam   BP: (!) 150/94  Pulse: 76  Temp: 98.2  F (36.8  C)  Resp: 16  SpO2: 100 %      Physical Exam  Vitals reviewed.   Constitutional:       General: He is not in acute distress.     Appearance: He is well-developed.   HENT:      Head: Normocephalic and atraumatic.   Eyes:      Extraocular Movements: Extraocular movements intact.      Pupils: Pupils are equal, round, and reactive to light.   Cardiovascular:      Rate and Rhythm: Normal rate and regular rhythm.      Pulses: Normal pulses.      Heart sounds: Normal heart sounds. No murmur heard.  Pulmonary:      Effort: Pulmonary effort is normal. No respiratory distress.      Breath sounds: Normal breath sounds. No  stridor. No wheezing or rales.   Abdominal:      General: Bowel sounds are normal. There is no distension.      Palpations: Abdomen is soft. There is no mass.      Tenderness: There is no abdominal tenderness. There is no guarding or rebound.   Musculoskeletal:         General: Normal range of motion.      Cervical back: Normal range of motion and neck supple.   Skin:     General: Skin is warm and dry.      Capillary Refill: Capillary refill takes less than 2 seconds.      Findings: No rash.   Neurological:      General: No focal deficit present.      Mental Status: He is alert and oriented to person, place, and time.      GCS: GCS eye subscore is 4. GCS verbal subscore is 5. GCS motor subscore is 6.      Cranial Nerves: No cranial nerve deficit.      Sensory: No sensory deficit.      Motor: No weakness or abnormal muscle tone.   Psychiatric:         Mood and Affect: Mood normal.         ED Course     11:22 PM  The patient was seen and examined by Dr. Valente in ED hallway.         Procedures              No results found for this or any previous visit (from the past 24 hour(s)).  Medications   buprenorphine HCl-naloxone HCl (SUBOXONE) 8-2 MG per film 2 Film (2 Film Sublingual Given 7/11/22 5484)              Assessments & Plan (with Medical Decision Making)   Patient presents requesting that he be detoxed off of his prescribed Klonopin.  He is reporting that he does not abuse this but is prescribed this by his addiction medicine provider.  He wants to go to [facility] which he reports is related to a marijuana addiction.  He previously had opioid abuse issues but has been on Suboxone for 4 years.  He has been out of this for 2 days and was supposed to have appointment tomorrow to refill this.  He states that [facility] discussed with him that he could present in the ER and be detoxed off of his Klonopin.  I did read the telephone note which stated that he could discuss weaning off the Klonopin with his  prescriber or try to present to the ER to see if he qualified for detox.  At this point today we do not have any detox beds available.  Discussed with the patient that as this is a prescribed medication that [facility] is requesting he wean off of prior to their program, he can contact his addiction medicine provider who is prescribing this and discuss a safe taper off of this medication.  Also discussed with him that he could return at any time to attempt to see if there is a detox bed.  He was given his dose of Suboxone 16 mg for the day as he had missed this for 2 days.  Discussed that he needed to call his provider for a refill of this which he states he will do in the morning and also discussed the Klonopin taper.  Unfortunately we do not have any beds available today.  He was not having any medical complaints at this time.  He is not suicidal or homicidal.  He was given indications for return emergency department.  He has no signs of benzodiazepine withdrawal at this time.  He voiced understanding and was in agreement with this plan.  He was discharged home in stable condition.    I have reviewed the nursing notes.    I have reviewed the findings, diagnosis, plan and need for follow up with the patient.    New Prescriptions    No medications on file       Final diagnoses:   Polysubstance abuse (H)       I, Carlee Knight, am serving as a trained medical scribe to document services personally performed by Miriam Valente MD based on the provider's statements to me on 7/11/22.  This document has been checked and approved by the attending provider.    I, Miriam Valente MD, was physically present and have reviewed and verified the accuracy of this note documented by Carlee Knight, medical scribe.       Miriam Valente MD    7/11/2022   [facility] EMERGENCY DEPARTMENT     Miriam Valente MD  07/13/22 0891

## 2022-07-12 NOTE — DISCHARGE INSTRUCTIONS
Follow up with your addiction medicine provider who is prescribing your klonopin to discuss tapering this off safely. Also call them for a refill of your suboxone tomorrow.     Return to the ED if you are having any urgent/life-threatening concerns.     DISCHARGE RESOURCES:  -[facility]: [phone] (detox), [phone] (outpatient & [facility])  -[facility] ([address]): [phone]  -[facility] Detox: [phone]  -[facility]: [phone]  -SMART Recovery - self management for addiction recovery:  www.Bestowed.org    -[facility] ~ A Health Crisis Resource & Support Center: 891.888.9849.  -[facility] [phone]   -Substance Abuse and Mental Health Services (www.samhsa.gov)  -Harm Reduction Coalition (www. Harmreduction.org)  -[place] Opioid Prevention Coalition: www.opioidcoalition.org  -Poison control 1-555.696.2950       Sober Support Group Information:  AA/NA & Sponsor/Support  -Alcoholics Anonymous (www.alcoholics-anonymous.org): for local information 24 hours/day  -AA Intergroup service office in [place] (http://www.aastpaul.org/) [phone]  -AA Intergroup service office in [facility]: [phone]. (http://www.aamin[place].org/)  -Narcotics Anonymous (www.Rezzcardinn[place].org) (472) 367-3841   -Sober Fun Activities: www.sober-activities.[employer]/[facility]//[facility] Recovery Connection ([facility])  [facility] connects people seeking recovery to resources that help foster and sustain long-term recovery.  Whether you are seeking resources for treatment, transportation, housing, job training, education, health care or other pathways to recovery, [facility] is a great place to start.   Phone: [phone]. www.min[employer]otaInStore Finance (Great listing of all types of recovery and non-recovery related resources)

## 2022-07-14 ENCOUNTER — HOSPITAL ENCOUNTER (EMERGENCY)
Facility: CLINIC | Age: 37
Discharge: HOME | End: 2022-07-14
Attending: EMERGENCY MEDICINE | Admitting: EMERGENCY MEDICINE
Payer: COMMERCIAL

## 2022-07-14 VITALS
DIASTOLIC BLOOD PRESSURE: 72 MMHG | OXYGEN SATURATION: 98 % | TEMPERATURE: 97.3 F | WEIGHT: 206.5 LBS | RESPIRATION RATE: 18 BRPM | BODY MASS INDEX: 28.91 KG/M2 | SYSTOLIC BLOOD PRESSURE: 129 MMHG | HEIGHT: 71 IN | HEART RATE: 98 BPM

## 2022-07-14 DIAGNOSIS — F13.20 BENZODIAZEPINE DEPENDENCE (H): ICD-10-CM

## 2022-07-14 PROCEDURE — 99282 EMERGENCY DEPT VISIT SF MDM: CPT | Performed by: EMERGENCY MEDICINE

## 2022-07-14 NOTE — ED TRIAGE NOTES
Patient is here for clonazepam detox, patient states he takes it every day, he is prescribed it. He states to smoke a lot of pot, he states his wife hates it and it is causing a lot of problems. Patient states [facility] take him, but he needs to get off clonazepam, he has been taking it for a year.      Triage Assessment     Row Name 07/14/22 3570       Triage Assessment (Adult)    Airway WDL WDL       Respiratory WDL    Respiratory WDL WDL       Skin Circulation/Temperature WDL    Skin Circulation/Temperature WDL WDL       Cardiac WDL    Cardiac WDL WDL       Peripheral/Neurovascular WDL    Peripheral Neurovascular WDL WDL       Cognitive/Neuro/Behavioral WDL    Cognitive/Neuro/Behavioral WDL WDL

## 2022-07-14 NOTE — DISCHARGE INSTRUCTIONS
As you have been taking your medication as prescribed from your doctor you are not a candidate for detox per the rules of the detox unit.  You report you have been taking 3 tablets daily.  I recommend you reduce your dose to 1 tablet twice daily  for the next week.  Subsequently you can take one half of a tablet twice daily for 1 week.  Finally reduce your dose to a one half of a tablet daily for 1 week before stopping the medication completely.  Follow-up with your primary care provider.  Return to the emergency department as needed for any new or worsening symptoms.

## 2022-07-14 NOTE — ED PROVIDER NOTES
[facility] EMERGENCY DEPARTMENT ([facility])  7/14/22    History     Chief Complaint   Patient presents with     Addiction Problem     Patient is wanting to go to [facility] for his addiction problem for weed, as his wife doesn't like it and it is causing problems. He states that he needs help getting off clonazepam prior to going there as well as getting off vyvanse and wants to be on wellbutrin instead.     HPI  Barak Clarke is a 37 year old male with PMH significant for polysubstance abuse who presents to the ED seeking detox from Klonopin.  Patient is attempting to get into treatment for marijuana use, but they will not administer Klonopin while in [facility] so he needs detox from this.  Patient states that he is prescribed Klonopin 0.5 mg 3 times daily.  He reports he has been taking this for approximately a year.  He states that he is taking his Klonopin as directed and is not taking extra.  He states that his primary problem is marijuana abuse and that he wants to get off the clonazepam so he can after treatment.  He denies alcohol or drug use besides marijuana.    Past Medical History  Past Medical History:   Diagnosis Date     Anxiety      Hypertension      MDD (major depressive disorder)      Opioid dependence (H)     suboxone contract     Past Surgical History:   Procedure Laterality Date     ORTHOPEDIC SURGERY       atenolol (TENORMIN) 50 MG tablet  ATENOLOL PO  benzonatate (TESSALON) 200 MG capsule  buprenorphine HCl-naloxone HCl (SUBOXONE) 8-2 MG per film  BuPROPion HCl (WELLBUTRIN PO)  citalopram (CELEXA) 10 MG tablet  Citalopram Hydrobromide (CELEXA PO)  clonazePAM (KLONOPIN) 0.5 MG tablet  gabapentin (NEURONTIN) 600 MG tablet  GABAPENTIN PO  HYDROcodone-acetaminophen (NORCO) 5-325 MG per tablet  IBUPROFEN PO  lamoTRIgine (LAMICTAL) 25 MG tablet  penicillin V potassium (VEETID) 500 MG tablet  PRAZOSIN HCL PO  QUEtiapine Fumarate (SEROQUEL PO)  VYVANSE 70 MG capsule      Allergies  "  Allergen Reactions     Prazosin Unknown     Worsening of nightmares     Lactose Other (See Comments)     Gas     Family History  Family History   Problem Relation Age of Onset     Alcoholism Mother      Depression Mother      Substance Abuse Mother      Hypertension Mother      Mental Illness Mother      Substance Abuse Father      Alcoholism Father      Depression Father      Substance Abuse Sister      Social History   Social History     Tobacco Use     Smoking status: Current Every Day Smoker     Years: 10.00     Smokeless tobacco: Never Used     Tobacco comment: e-cig only (20 mg/nicotine)   Substance Use Topics     Alcohol use: No     Comment: occasional     Drug use: Yes     Types: Marijuana, Methamphetamines      Past medical history, past surgical history, medications, allergies, family history, and social history were reviewed with the patient. No additional pertinent items.       Review of Systems   Constitutional: Negative for fever.   Respiratory: Negative for shortness of breath.    Cardiovascular: Negative for chest pain.   Gastrointestinal: Negative for abdominal pain.   All other systems reviewed and are negative.    A complete review of systems was performed with pertinent positives and negatives noted in the HPI, and all other systems negative.    Physical Exam   BP: 129/72  Pulse: 98  Temp: 97.3  F (36.3  C)  Resp: 18  Height: 180.3 cm (5' 11\")  Weight: 93.7 kg (206 lb 8 oz)  SpO2: 98 %  Physical Exam  Vitals and nursing note reviewed.   Constitutional:       General: He is not in acute distress.     Appearance: He is well-developed.   HENT:      Head: Normocephalic.      Right Ear: External ear normal.      Left Ear: External ear normal.      Nose: Nose normal.   Eyes:      Extraocular Movements: Extraocular movements intact.      Conjunctiva/sclera: Conjunctivae normal.   Pulmonary:      Effort: Pulmonary effort is normal. No respiratory distress.   Abdominal:      General: Abdomen is flat. " There is no distension.   Musculoskeletal:         General: No deformity. Normal range of motion.      Cervical back: Normal range of motion and neck supple.   Skin:     General: Skin is warm and dry.   Neurological:      Mental Status: He is alert. Mental status is at baseline.      Comments: Oriented   Psychiatric:         Mood and Affect: Mood normal.         Behavior: Behavior normal.         ED Course      Procedures   2:06 PM  The patient was seen and examined by Christian Jean DO in Room ED16C.                 No results found for any visits on 07/14/22.  Medications - No data to display     Assessments & Plan (with Medical Decision Making)   37-year-old male presents to us with a chief complaint of requesting detox from his Klonopin.  He is currently on 0.5 mg of Klonopin 3 times daily.  He is taking this as directed by his prescriber.  I did discuss with detox.  They do not admit for detox from benzos when the patient is taking them according to the directions provided by the physician.  The patient reports he is not taking any extra and is not obtaining any illicitly.  He stated his primary care physician was not comfortable tapering for some reason.  Recommendations at this point is he reduce from 1 tablet 3 times daily to 1 tablet twice daily for a week.  After that he can do 0.5 tablets twice daily.  Finally he can do 0.5 tablets once daily for a week.  This 3-week taper should be safe for him to get off of his Klonopin after which she can pursue chemical dependency treatment for his marijuana dependence.  I have reviewed the nursing notes. I have reviewed the findings, diagnosis, plan and need for follow up with the patient.    New Prescriptions    No medications on file       Final diagnoses:   Benzodiazepine dependence (H)     Johnson MILLER, am serving as a trained medical scribe to document services personally performed by Christian Jean DO, based on the provider's statements to me.      I, Christian Jean DO, was physically present and have reviewed and verified the accuracy of this note documented by Johnson Monroy.    --  Christian Jean DO  [facility] EMERGENCY DEPARTMENT  7/14/2022     Christian Jean DO  07/15/22 0747

## 2022-07-15 ASSESSMENT — ENCOUNTER SYMPTOMS
ABDOMINAL PAIN: 0
SHORTNESS OF BREATH: 0
FEVER: 0

## 2022-07-17 ENCOUNTER — TELEPHONE (OUTPATIENT)
Dept: BEHAVIORAL HEALTH | Facility: CLINIC | Age: 37
End: 2022-07-17

## 2022-07-17 NOTE — TELEPHONE ENCOUNTER
Dr Lee,    Wanted to get your thoughts on this pt.  He has been prescribed Klonopin for several years and has been taking as prescribed and admits to not abusing.  The pt went to the ED requesting detox from benzo's and was declined because he was taking the benzo's as prescribed.  The ED provider (7/14/22) gave pt a taper which would take 3 weeks.  In this case it may be appropriate to have the pt taper here at [facility].  We have done this in the past with a few pt's.      The drug of choice the pt has an issue with per his report is marijuana.       Please let me know your advisement and then I will reach out to the pt    Thanks!    Kayleigh

## 2022-07-18 NOTE — TELEPHONE ENCOUNTER
"Writer spoke with the pt [phone] regarding the option of signing an DELORES to come up with a benzo tapering plan which MAY be initiated while at .  [facility] Dr Doreen Barajas / Saray Wooten CNP.    Pt denied and informed writer that we wants to complete his 3 week taper at home as recommended by [facility] ED provider on 7/14/22 (see below):    \"Recommendations at this point is he reduce from 1 tablet 3 times daily to 1 tablet twice daily for a week.  After that he can do 0.5 tablets twice daily.  Finally he can do 0.5 tablets once daily for a week.  This 3-week taper should be safe for him to get off of his Klonopin after which he can pursue chemical dependency treatment for his marijuana dependence\"     Writer informed the pt that when he has completed this taper he is responsible to call [facility] and let them know he has completed.    Pt assured writer that his wife is holding his Klonopin pills and dolling them out to him as prescribed and that he wants to spend his first year anniversary with her which is the 7/24.     In the mean time, pt will be removed from [facility] call list and will pull him back on our list when he is ready for treatment.  Writer gave pt the direct number to [facility] office and he wrote it down.  Pt agrees to call [phone] when taper completed    Pt is not medically approved for admission to   This note routed to  admissions counselor    Pt is responsible to reach out to [facility] nursing staff when benzo taper is complete    Kayleigh Rothman RN  [facility]  Nurse Liaison at  [facility]  Office - [phone]  Fax - [phone]      "

## 2022-07-18 NOTE — TELEPHONE ENCOUNTER
He is seeing addiction medicine with Dr. Doreen Barajas for his ongoing Vyvnase, suboxone, and clonazepam mgmt. There is no clear indication of a conversation with his provider regarding a possible taper, which would be the ideal situation given the consistent, prescribed use.    Would the patient be willing to fill out an DELORES so we can discuss his care with Dr. Barajas?    Would be best to discuss as a team at our case review meeting today, as there are several issues to sort through.    Miquel Lee MD

## 2022-07-22 NOTE — TELEPHONE ENCOUNTER
Completed DELORES for [facility] providers  was recived  And faxed to  [facility]. Additional ED notes were also faxed to  [facility] providers.. Writer left message with pt's care team and requested a call back to discuss taper management.     Waiting on call back from  [facility] providers.

## 2022-07-22 NOTE — TELEPHONE ENCOUNTER
Pt called writer wanting to re-visit the possibly of tapering Klonopin while at [facility] under the care of his community prescriber which is [facility].  Pt was not promised by writer that this would happen.       DELORES document was sent to pt to sign and return for [facility] on 7/22/22.      Writer informed the pt that [facility] will need to then contact [facility] provider (via Fax or by phone) and ask them to approve / prescribe a Klonopin taper plan of care while at [facility].    Pt stated that he is currently following the taper prescribed by ED and in the 2nd week    Awaiting to retrieve signed and dated DELORES

## 2022-07-25 NOTE — TELEPHONE ENCOUNTER
Writer spoke with Sarya Wooten CNP. She is seeing pt in 2 days, she is open to taking on pt's taper. She understands that his meds will not be managed by anyone at [facility].  Writer will follow up wit pt and provider after this appt

## 2022-07-27 NOTE — TELEPHONE ENCOUNTER
Pt called [facility] RN and reports he saw provider Dr. Wooten and has obtained taper orders which he is following outpatient. Pt reports he would like to admit to [facility] program as soon as possible and is interested in finished his taper while admitted to [facility] program. Pt also reports that he scheduled an appointment with his primary medical provider at [facility] to manage his HTN medication and this is scheduled for 8/1/22 at 2pm. [facility] RN discussed with pt that a medical screening appears to still need to be completed and [facility] RN needs to request that Dr. Wooten fax over the taper orders of prescribed benzodiazepine for review and approval for self administration at +. Pt reports that he believes he has already completed a medical screening however cooperative. Pt verbalized understanding.     For now pt is not approved for [facility] until the above is addressed further. RN will contact Dr. Wooten and reach out to pt to complete a medical screening as it is required for admission to [facility] Shona Carter RN

## 2022-07-28 NOTE — TELEPHONE ENCOUNTER
Will speak with Savanna Wooten to confirm plan for taper and admission to [facility]. Open to this plan pending further conversation. Appreciate excellent coordination from our RN team.    Miquel Lee MD

## 2022-07-28 NOTE — TELEPHONE ENCOUNTER
Writer spoke with Savanna Wooten from [facility]  she will be reaching out to Dr Lee today to discuss medication plan for [facility] she is also faxing those orders to [facility] DELORES on file

## 2022-07-29 ENCOUNTER — MEDICAL CORRESPONDENCE (OUTPATIENT)
Dept: HEALTH INFORMATION MANAGEMENT | Facility: CLINIC | Age: 37
End: 2022-07-29

## 2022-07-29 NOTE — TELEPHONE ENCOUNTER
Savanna Wooten, provider at [facility] needs to Fax taper instructions, writer left  for her.     Admit early next week if possible, pt is medically approved to admit to [facility] Pending we receive taper instructions prior to admit date.

## 2022-07-29 NOTE — TELEPHONE ENCOUNTER
Taper schedule received. Pt should be scheduled in next available community spot. Pt is very motivated to come to program. Ideally pt will be able to admit next week.

## 2022-08-03 ENCOUNTER — TRANSFERRED RECORDS (OUTPATIENT)
Dept: HEALTH INFORMATION MANAGEMENT | Facility: CLINIC | Age: 37
End: 2022-08-03

## 2022-08-03 ENCOUNTER — HOSPITAL ENCOUNTER (OUTPATIENT)
Dept: BEHAVIORAL HEALTH | Facility: CLINIC | Age: 37
Discharge: HOME | End: 2022-08-03
Attending: FAMILY MEDICINE
Payer: COMMERCIAL

## 2022-08-03 VITALS
WEIGHT: 220 LBS | OXYGEN SATURATION: 100 % | HEIGHT: 71 IN | HEART RATE: 64 BPM | TEMPERATURE: 98.1 F | DIASTOLIC BLOOD PRESSURE: 67 MMHG | SYSTOLIC BLOOD PRESSURE: 113 MMHG | BODY MASS INDEX: 30.8 KG/M2

## 2022-08-03 DIAGNOSIS — F17.200 NICOTINE DEPENDENCE: Primary | ICD-10-CM

## 2022-08-03 PROBLEM — F19.20 CHEMICAL DEPENDENCY (H): Status: ACTIVE | Noted: 2022-08-03

## 2022-08-03 LAB — SARS-COV-2 RNA RESP QL NAA+PROBE: NEGATIVE

## 2022-08-03 PROCEDURE — 87635 SARS-COV-2 COVID-19 AMP PRB: CPT | Performed by: FAMILY MEDICINE

## 2022-08-03 PROCEDURE — 1002N00001 HC LODGING PLUS FACILITY CHARGE ADULT

## 2022-08-03 RX ORDER — CLONAZEPAM 0.5 MG/1
0.5 TABLET ORAL
COMMUNITY
End: 2023-07-10

## 2022-08-03 RX ORDER — ACETAMINOPHEN 325 MG/1
325-650 TABLET ORAL EVERY 4 HOURS PRN
COMMUNITY
End: 2023-07-10

## 2022-08-03 RX ORDER — LORATADINE 10 MG/1
10 TABLET ORAL DAILY PRN
COMMUNITY
End: 2023-07-10

## 2022-08-03 RX ORDER — AMOXICILLIN 250 MG
2 CAPSULE ORAL DAILY PRN
COMMUNITY
End: 2023-07-10

## 2022-08-03 RX ORDER — IBUPROFEN 200 MG
400 TABLET ORAL EVERY 6 HOURS PRN
COMMUNITY
End: 2023-07-10

## 2022-08-03 RX ORDER — MAGNESIUM HYDROXIDE/ALUMINUM HYDROXICE/SIMETHICONE 120; 1200; 1200 MG/30ML; MG/30ML; MG/30ML
30 SUSPENSION ORAL EVERY 6 HOURS PRN
COMMUNITY
End: 2024-03-14

## 2022-08-03 RX ORDER — LANOLIN ALCOHOL/MO/W.PET/CERES
3 CREAM (GRAM) TOPICAL AT BEDTIME
COMMUNITY

## 2022-08-03 ASSESSMENT — COLUMBIA-SUICIDE SEVERITY RATING SCALE - C-SSRS
3. HAVE YOU BEEN THINKING ABOUT HOW YOU MIGHT KILL YOURSELF?: NO
2. HAVE YOU ACTUALLY HAD ANY THOUGHTS OF KILLING YOURSELF LIFETIME?: NO
1. IN THE PAST MONTH, HAVE YOU WISHED YOU WERE DEAD OR WISHED YOU COULD GO TO SLEEP AND NOT WAKE UP?: YES
6. HAVE YOU EVER DONE ANYTHING, STARTED TO DO ANYTHING, OR PREPARED TO DO ANYTHING TO END YOUR LIFE?: YES
1. IN THE PAST MONTH, HAVE YOU WISHED YOU WERE DEAD OR WISHED YOU COULD GO TO SLEEP AND NOT WAKE UP?: YES
5. HAVE YOU STARTED TO WORK OUT OR WORKED OUT THE DETAILS OF HOW TO KILL YOURSELF? DO YOU INTEND TO CARRY OUT THIS PLAN?: NO
1. IN YOUR LIFETIME, HAVE YOU WISHED YOU WERE DEAD OR WISHED YOU COULD GO TO SLEEP AND NOT WAKE UP?: PASSIVE THOUGHTS
2. HAVE YOU ACTUALLY HAD ANY THOUGHTS OF KILLING YOURSELF IN THE PAST MONTH?: NO

## 2022-08-03 ASSESSMENT — ANXIETY QUESTIONNAIRES
3. WORRYING TOO MUCH ABOUT DIFFERENT THINGS: MORE THAN HALF THE DAYS
GAD7 TOTAL SCORE: 14
1. FEELING NERVOUS, ANXIOUS, OR ON EDGE: MORE THAN HALF THE DAYS
7. FEELING AFRAID AS IF SOMETHING AWFUL MIGHT HAPPEN: MORE THAN HALF THE DAYS
2. NOT BEING ABLE TO STOP OR CONTROL WORRYING: MORE THAN HALF THE DAYS
4. TROUBLE RELAXING: MORE THAN HALF THE DAYS
GAD7 TOTAL SCORE: 14
6. BECOMING EASILY ANNOYED OR IRRITABLE: MORE THAN HALF THE DAYS
5. BEING SO RESTLESS THAT IT IS HARD TO SIT STILL: MORE THAN HALF THE DAYS
IF YOU CHECKED OFF ANY PROBLEMS ON THIS QUESTIONNAIRE, HOW DIFFICULT HAVE THESE PROBLEMS MADE IT FOR YOU TO DO YOUR WORK, TAKE CARE OF THINGS AT HOME, OR GET ALONG WITH OTHER PEOPLE: SOMEWHAT DIFFICULT

## 2022-08-03 ASSESSMENT — PATIENT HEALTH QUESTIONNAIRE - PHQ9: SUM OF ALL RESPONSES TO PHQ QUESTIONS 1-9: 16

## 2022-08-03 NOTE — PROGRESS NOTES
Name: Barak Clarke  Date: 8/3/2022  Medical Record: [id number]  Envelope Number: [id number]  List of Contents (List each item separately in new row):   1 iphone   1 wireless   Admission:  I am responsible for any personal items that are not sent to the safe or pharmacy.  [facility] is not responsible for loss, theft or damage of any property in my possession  Patient Signature:  ___________________________________________       Date/Time:__________________________    Staff Signature: __________________________________       Date/Time:__________________________    2nd Staff person, if patient is unable/unwilling to sign:      __________________________________________________________       Date/Time: __________________________      Discharge:  [facility] has returned all of my personal belongings:    Patient Signature: ________________________________________     Date/Time: ____________________________________    Staff Signature: ______________________________________     Date/Time:_____________________________________

## 2022-08-03 NOTE — PROGRESS NOTES
Initial Services Plan    Before your first treatment group, please do the following    Immediate health & safety concerns: Look for sober housing and a supportive social network.  Look for a support network (such as AA, NA, DBT group, a Orthodoxy group, etc.)    Suggestions for client during the time between intake & completion of treatment plan:  Tour your treatment center (unit or outpatient clinic).  Introduce yourself to the treatment group.  Spend time getting to know your peers.  Complete the problem list for your treatment plan.  Start drug and alcohol use history.  Review your patient or client handbook.    Client issues to be addressed in the first treatment sessions:  Identify motivations(s) for coming to treatment, i.e. legal, family, job, self  Identify concerns about coming into treatment, i.e. fear of failing again, sharing a room in treatment  Identify concerns about going to group, i.e. fear of talking in group  Identify outside concerns that may interfere with treatment, i.e. bills not getting paid, homesick for children    Lan Kaba, [facility]  8/3/2022
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
## 2022-08-03 NOTE — PROGRESS NOTES
Name: Barak Clarke  Date: 8/3/2022  Medical Record: [id number]  Envelope Number: [id number]  List of Contents (List each item separately in new row):     Quetiapine 150 mg ER Tabs, Citalopram 20 mg Tabs, Nicotrol Inhaler Packets.    Admission:  I am responsible for any personal items that are not sent to the safe or pharmacy.  [facility] is not responsible for loss, theft or damage of any property in my possession.    Patient Signature:  ___________________________________________       Date/Time:__________________________    Staff Signature: __________________________________       Date/Time:__________________________    2nd Staff person, if patient is unable/unwilling to sign:    __________________________________________________________       Date/Time: __________________________    Discharge:  [facility] has returned all of my personal belongings:    Patient Signature: ________________________________________     Date/Time: ____________________________________    Staff Signature: ______________________________________     Date/Time:_____________________________________

## 2022-08-03 NOTE — PROGRESS NOTES
Progress Note    This patient had a Comprehensive Substance Abuse assessment on 06/29/2022 completed by Sharda LY.  This patient was seen for a face to face update of the Comprehensive Substance Abuse assessment on 8/3/2022 by ALIYAH Mora.  INSIDE: The patient's Comprehensive Substance Abuse assessment completed on 06/29/2022 is in the patient's electronic medical record in Epic in the Chart Review section under the Notes/Trans Tab.    Alcohol/Drug use since the last CD evaluation (include date of last use):     Patient reported his last use of marijuana was on 7/30/2022. He is on a benzodiazepine taper and last took a 0.5 mg on 8/2/2022.     Please note any other clinical changes since the last CD evaluation (such as medication changes, additional legal charges, detoxification admissions, overdoses, etc.)     Pt has been on a benzodiazepine taper.        ASAM Dimensions Original scores Current Scores   I.) Intoxication and Withdrawal: 0 0   II.) Biomedical:  0 0   III.) Emotional and Behavioral:  2 2   IV.) Readiness to Change:  1 1   V.) Relapse Potential: 4 4   VI.) Recovery Environmental: 3 3     Please list clinical justifications for the above ASAM score changes since the original comprehensive assessment:     None of the ASAM scores on the six dimensions had changed since the Comprehensive Substance Abuse assessment was completed on 06/29/2022.       Current BILLY: Current UA:     0.000     Positive for Amphetamines, Buprenorphine and THC and negative for all other screened drugs.       PHQ-9, KILO-7   PHQ-9 on 8/3/2022 KILO-7 on 8/3/2022   The patient's PHQ-9 score was 16 out of 27, indicating moderately severe depression.   The patient's KILO-7 score was 14 out of 21, indicating moderate anxiety.       [place]-Suicide Severity Rating Scale Reassessment   Have you ever wished you were dead or that you could go to sleep and not wake up?  Past Month:  Yes     Have you actually had any thoughts  of killing yourself?  Past Month:  No     Have you been thinking about how you might do this?     Past Month:  No   Lifetime:  No   Have you had these thoughts and had some intention of acting on them?     Past Month:  No   Lifetime:  No   Have you started to work out the details of how to kill yourself?   Past Month:  No   Lifetime:  No   Do you intend to carry out this plan?   No     When you have the thoughts how long do they last?  1-4 hours/a lot of time     Are there things - anyone or anything (i.e. family, Mormon, pain of death) that stopped you from wanting to die or acting on thoughts of suicide?  Does not apply       2008  [employer] [facility] [employer]  Used with permission by Andree Clemente, PhD.       Guide to C-SSRS Risk Ratings   NO IDEATION:  with no active thoughts IDEATION: with a wish to die. IDEATION: with active thoughts. Risk Ratings   If Yes No No 0 - Very Low Risk   If NA Yes No 1 - Low Risk   If NA Yes Yes 2 - Low/moderate risk   IDEATION: associated thoughts of methods without intent or plan INTENT: Intent to follow through on suicide PLAN: Plan to follow through on suicide Risk Ratings cont...   If Yes No No 3 - Moderate Risk   If Yes Yes No 4 - High Risk   If Yes Yes Yes 5 - High Risk   The patient's ADDITIONAL RISK FACTORS and lack of PROTECTIVE FACTORS may increase their overall suicide risk ratings.     Additional Risk Factors:    Someone close to the patient (family member/friend) completed a suicide     Significant history of having untreated or poorly treated mental health symptoms     Tendency to be socially isolated and/or cut off from the support of others     A recent death of someone close to the patient and/or unresolved grief and loss issues     A recent loss that was significant to the patient, i.e. loss of job, loss of home, divorce, break-up, etc.     Significant history of trauma and/or abuse issues     History of impulsive or aggressive behaviors    Protective Factors:    Having people in his/her life that would prevent the patient from considering a suicide attempt (i.e. young children, spouse, parents, etc.)     Having pet(s) that give companionship and/or would prevent the patient from considering a suicide attempt     An absence of chronic health problems or stable and well treated chronic health issues     A positive relationship with his/her clinical medical and/or mental health providers     Having easy access to supportive family members     Having restricted access to highly lethal means of suicide     Risk Status   1 low risk.     Additional information to support suicide risk rating: There was no additional information to provide at this time.

## 2022-08-03 NOTE — PROGRESS NOTES
[facility]  [address] [place] [address] 5th and 6th [place] [address]        ADULT CD ASSESSMENT ADDENDUM      Patient Name: Barak Clarke  Cell Phone:   Home: [phone] (home)    Mobile:   Telephone Information:   Mobile [phone]       Email:  [email or web address]  Emergency Contact: Gema Clarke- wife   Tel: [phone]    The patient reported being:      With which race do you identify? White    Initial Screening Questions     1. Are you currently having severe withdrawal symptoms that are putting yourself or others in danger?  No    2. Are you currently having severe medical problems that require immediate attention?  No    3. Are you currently having severe emotional or behavioral problems that are putting yourself or others at risk of harm?  No    4. Do you currently participate in community tr activities, such as attending Congregation, temple, Zoroastrian or Sikhism services?  No    5. How does your spirituality impact your recovery?  A lot    6. Do you currently self-administer your medications?  Yes    Do you have a valid 's license?    Yes       Mental Health Status   Physical Appearance/Attire: Appears stated age and Attire appropriate to age/situation   Hygiene: well groomed   Eye Contact: at examiner and at floor   Speech Rate:  regular   Speech Volume: regular   Speech Quality: fluid   Cognitive/Perceptual:  reality based   Cognition: memory intact    Judgment: intact and able to concentrate   Insight: intact and able to concentrate   Orientation:  time, place, person and situation   Thought: logical    Hallucinations:  none   General Behavioral Tone: cooperative   Psychomotor Activity: leg jiggling   Gait:  no problem   Mood: appropriate and anxious   Affect: congruence/appropriate   Counselor Notes: NA     Criteria for Diagnosis: DSM-5 Criteria for Substance Use Disorders      Cannabis Use Disorder Severe - 304.30 (F12.20)  Opioid Use Disorder  Severe - 304.00 (F11.20) in sustained remission, on MAT  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  Tobacco Use Disorder Moderate - 305.10 (F17.200)  Depression NOS, per patient self-report  Anxiety disorder NOS, per patient self-report    Level of Care   I.) Intoxication and Withdrawal: 0   II.) Biomedical:  0   III.) Emotional and Behavioral:  2   IV.) Readiness to Change:  1   V.) Relapse Potential: 4   VI.) Recovery Environmental: 3     Initial Problem List     The patient is currently living in an unhealthy and/or using environment  The patient lacks relapse prevention skills  The patient has poor coping skills  The patient has poor refusal skills   The patient lacks a sober peer support network  The patient has a tendency to isolate  The patient has dual issues of MI and CD  The patient lacks the ability to effectively manage his/her mental health issues  The patient has a significant history of trauma and/or abuse issues  The patient has a significant history of grief and loss issues    Patient/Client is willing to follow treatment recommendations.  Yes    Counselor: ALIYAH Mora

## 2022-08-03 NOTE — PROGRESS NOTES
"[facility] Assessment      Vital signs:     /67   Pulse 64   Temp 98.1  F (36.7  C)   Ht 1.803 m (5' 11\")   Wt 99.8 kg (220 lb)   SpO2 100%   BMI 30.68 kg/m        Direct admission    Counselor: Jeff  Drug of Choice: Marijuana / Heroin (have not used in 5 yrs)   Last use: 7/31/22  Home clinic/MD:   [facility]    [address] [place], [place] [id number] [phone]   Mario Das PA    [address] [place], [place] [id number] [phone] (Work)    [phone] (Fax)       Psychiatrist/therapist:       Medical history/current conditions:   Date     ADHD (attention deficit hyperactivity disorder)     Chemical dependency (HC)     Recurrent major depression (HC) 11/19/2013   Suicide attempt in past (approx 5 yrs ago)  Substance Use Disorder    H&P Screen:  H&P within the last 90 days: Yes.  Date: 8/1/22 [facility]: [facility]      Mental Health diagnosis: Depression / ADHD  Medication compliant?: yes  Recent sucidal thoughts? no     When? na  Current thought of self-harm? mp    Plan? na    Pain assessment:   Pt. Experiencing pain at this time?  No     [facility] Screen for COVID-19    ______________________________________________________________________________________________________________________  Do you have any of the following NEW or worsening symptoms NOT attributed to pre-existing conditions?    No    o Fever of 100.0  F (37.8 C) or over  o Chills  o Cough  o Shortness of Breath  o Loss of taste or smell  o Generalized body aches  o Persistent headache  o Sore throat (or trouble eating or drinking in young children?)  o Nausea, Vomiting, or diarrhea (loose stools)    Did you test positive for COVID-19 in the last 10 days or are you waiting on the test results due to an exposure or symptoms?  No    Has anyone told you to self-quarantine due to exposure to someone with COVID-19?  No    If pt responds   YES to any of the " symptoms or  Positive COVID-19 result in the last 10 days with or without symptoms or YES to symptoms with pending results ptwill need to leave unit immediately and can return in 11 days from discharge date.    ______________________________________________________________________________________________________________________  If you are admitting directly from the community you will be required to stay in your room until COVID lab results confirm negative. If COVID results are positive, You will have to exit the [facility] program, quarantine as recommended per CDC and then may return for CD treatment after symptoms have resolved.  What is your exit plan should you be positive for COVID today or anytime during your stay? Home to wife    COVID-19 Test completed by LPRN ? Yes    COVID-19 - Pt informed of the following while at :    1) Practice good hand washing hygiene and avoid touching face    2) If pt has any of the symptoms below, notify staff immediately.      Fever     Cough     Shortness of breath or difficulty breathing     Chills     Repeated shaking with chills    Muscle pain     3) COVID-19 testing may be initiated more than once during your stay.  If COVID results are at anytime positive, the pt will follow exit plan as listed above,  quarantine as recommended per CDC and then may return for CD treatment after symptoms have resolved.     4) Per COVID protocol, during your stay at , social distancing is required AND mouth and nose must be covered at all times with facial mask while out in milieu.      5) Patients will not be allowed to go to any outside appointments, all outside appointments will need to be virtual or by phone    RN Assessment of Patient's Ability to Safely Manage and Self-Administer Respiratory Treatments    Has experience in the management of Respiratory (If NA, indicate and move to Integrative Therapies): NA    Integrative Therapies: Essential Oils    Patient requesting essential oil  inhaler to manage (Mood/Mental Health/Physical/Spiritual symptoms).     Discussed appropriate use of essential oil inhalers and instructed patient not to leave labeled product out on unit.     Patient was screened for kidney disease, asthma/reactive airway disease and rashes and wounds or 1st trimester of pregnancy    List Essential Oils requested by pt calm    Patient verbalized and demonstrated understanding of how to use essential oil inhaler correctly and will notify LP RN with any concerns or side effects. Patient agrees not to share their essential oil inhaler with other clients.  Continue to support the patient in safely utilizing integrative therapies as able to manage symptoms during treatment.     Patient tobacco use:    Do you use tobacco? yes  Type? vape  How often? daily  Are you interested in quitting? Not at this time    NRT (Nicotine Replacement therapy) ordered? yes   Pt is aware of the dangers of tobacco cessation and in contemplation.    Pt given written education.    Nutritional Assessment:    Have you ever purged, binged or restricted yourself as a way to control your weight?   No     Are you on a special diet?   No     Do you have any concerns regarding your nutritional status?   No     Have you had any appetite changes in the last 3 months?   No   Have you had weight loss or weight gain of more than 10 lbs in the last 3 months?   If patient gained or lost more than 10 lbs, then refer to program RN / attending Physician for assessment.   No   Was the patient informed of BMI?    Above,  General nutrition education   Yes   Have you engaged in any risk-taking behavior that would put you at risk for exposure to blood-borne or sexually transmitted diseases?   No   Do you have any dental problems?   No       LPRN reviewed with pt the below 'medical concerns/medication refill expectations' while at LP Yes    LP has no rounding provider to assess medical issues or to refill your medications    Please  make virtual/phone appt/s with your community provider/s and notify LPRN of date and time    You may not leave LP to attend any medical appt's.     Pt verbalizes understanding of the above criteria yes    Nursing Assessment Summary:  As Above  Dr Lee approved both Clonazepam taper (completion will be on 8/8/22) and Adderall use while at LP    On-going nursing intervention required?   No    Acute care visit recommended: no

## 2022-08-04 ENCOUNTER — HOSPITAL ENCOUNTER (OUTPATIENT)
Dept: BEHAVIORAL HEALTH | Facility: CLINIC | Age: 37
Discharge: HOME | End: 2022-08-04
Attending: FAMILY MEDICINE
Payer: COMMERCIAL

## 2022-08-04 DIAGNOSIS — F19.20 CHEMICAL DEPENDENCY (H): ICD-10-CM

## 2022-08-04 LAB
FENTANYL UR QL: NORMAL
SARS-COV-2 RNA RESP QL NAA+PROBE: NEGATIVE

## 2022-08-04 PROCEDURE — U0003 INFECTIOUS AGENT DETECTION BY NUCLEIC ACID (DNA OR RNA); SEVERE ACUTE RESPIRATORY SYNDROME CORONAVIRUS 2 (SARS-COV-2) (CORONAVIRUS DISEASE [COVID-19]), AMPLIFIED PROBE TECHNIQUE, MAKING USE OF HIGH THROUGHPUT TECHNOLOGIES AS DESCRIBED BY CMS-2020-01-R: HCPCS | Performed by: FAMILY MEDICINE

## 2022-08-04 PROCEDURE — 1002N00001 HC LODGING PLUS FACILITY CHARGE ADULT

## 2022-08-04 PROCEDURE — 80307 DRUG TEST PRSMV CHEM ANLYZR: CPT

## 2022-08-04 PROCEDURE — H2035 A/D TX PROGRAM, PER HOUR: HCPCS | Mod: HQ

## 2022-08-04 NOTE — GROUP NOTE
Group Therapy Documentation    PATIENT'S NAME: Barak Clarke  MRN:   [id number]  :   1985  ACCT. NUMBER: [id number]  DATE OF SERVICE: 22  START TIME:  3:00 PM  END TIME:  4:00 PM  FACILITATOR(S): Tisha Guillermo LADC; Wendy Smith LADC; Cata Sanderson LADC  TOPIC: BEH Group Therapy  Number of patients attending the group:  6  Group Length:  1 Hours    Group Therapy Type: Recovery strategies    Summary of Group / Topics Discussed:    Problem gambling.      Group Attendance:  Attended group session    Patient's response to the group topic/interactions:  cooperative with task    Patient appeared to be Attentive.        Client specific details:  Barak was engaged in a lecture about problem gambling, presented by ALIYAH Olvera, [facility].

## 2022-08-04 NOTE — GROUP NOTE
Group Therapy Documentation    PATIENT'S NAME: Barak Clarke  MRN:   [id number]  :   1985  ACCT. NUMBER: [id number]  DATE OF SERVICE: 22  START TIME: 12:30 PM  END TIME:  2:30 PM  FACILITATOR(S): Rasta Ryder LADC  TOPIC: BEH Group Therapy  Number of patients attending the group:  6  Group Length:  2 Hours    Group Therapy Type: Emotion processing    Summary of Group / Topics Discussed:    Disease of addiction      Group Attendance:  Attended group session    Patient's response to the group topic/interactions:  cooperative with task    Patient appeared to be Actively participating.        Client specific details:  Barak participated and interacted appropriately with peers and staff in PM group. No triggers to use noted or discussed.

## 2022-08-04 NOTE — GROUP NOTE
Group Therapy Documentation    PATIENT'S NAME: Barak Clarke  MRN:   [id number]  :   1985  ACCT. NUMBER: [id number]  DATE OF SERVICE: 22  START TIME:  9:00 AM  END TIME: 11:00 AM  FACILITATOR(S): Wendy Smith LADC  TOPIC: BEH Group Therapy  Number of patients attending the group:  6  Group Length:  2 Hours    Group Therapy Type: Recovery strategies    Summary of Group / Topics Discussed:    Recovery Principles      Group Attendance:  Attended group session    Patient's response to the group topic/interactions:  cooperative with task    Patient appeared to be Actively participating.        Client specific details:  Barak attended AM small group.Patient remained engaged and participated. Patient shared his motivation for being in treatment is due to his wife wishes. Patient engaged in discussion with peers about vulnerability and living a authentic life.

## 2022-08-05 ENCOUNTER — HOSPITAL ENCOUNTER (OUTPATIENT)
Dept: BEHAVIORAL HEALTH | Facility: CLINIC | Age: 37
Discharge: HOME | End: 2022-08-05
Attending: FAMILY MEDICINE
Payer: COMMERCIAL

## 2022-08-05 LAB — SARS-COV-2 RNA RESP QL NAA+PROBE: NEGATIVE

## 2022-08-05 PROCEDURE — H2035 A/D TX PROGRAM, PER HOUR: HCPCS | Mod: HQ

## 2022-08-05 PROCEDURE — U0005 INFEC AGEN DETEC AMPLI PROBE: HCPCS | Performed by: FAMILY MEDICINE

## 2022-08-05 PROCEDURE — 1002N00001 HC LODGING PLUS FACILITY CHARGE ADULT

## 2022-08-05 NOTE — GROUP NOTE
Group Therapy Documentation    PATIENT'S NAME: Barak Clarke  MRN:   [id number]  :   1985  ACCT. NUMBER: [id number]  DATE OF SERVICE: 22  START TIME:  9:00 AM  END TIME: 11:00 AM  FACILITATOR(S): Angelita Doe  TOPIC: BEH Group Therapy  Number of patients attending the group: 5  Group Length:  2 Hours    Group Therapy Type: Recovery strategies and Emotion processing    Summary of Group / Topics Discussed:    Recovery Principles, Sober coping skills, Relationship/socialization, Co-occurring illnesses symptom management, Trauma informed care, Disease of addiction, Emotions/expression, Relapse prevention, and Self-care activities      Group Attendance:  Attended group session    Patient's response to the group topic/interactions:  confronted peers appropriately, cooperative with task, discussed personal experience with topic, expressed readiness to alter behaviors, expressed understanding of topic, gave appropriate feedback to peers, listened actively and offered helpful suggestions to peers    Patient appeared to be Actively participating, Attentive and Engaged.        Client specific details:  Client shared insights with group. Client participated in daily reading as well as sharing his check-in with the group. Client reports he would like to work on getting to a treatment oriented mindset this weekend and practice gratitude. Client gave positive feedback to peer who shared an assignment and client shared some of his own experiences.

## 2022-08-05 NOTE — PROGRESS NOTES
"Pt approached writer this am as he had been asked to come complete a COVID test. Pt immediatly begin talking about his Clonazepam taper instead. Pt said he wants the taper extended and wasn't sure that he wanted to go off the med at all. Pt repeatedly told writer that he is here to \"come off of marijuana not clonazepam\" and feels like he is being \"strong-armed.\" Pt was adamant that his leftover clonazepam not be destroyed after taper is completed, which is what had been decided on with staff RN at admission. Writer and patient spoke with his outside provider Savanna Wooten, pt explained he wanted to be kept on his clonazepam up until his last week of treatment instead of it ending on 8/8 as previously decided on by pt, Savanna Wooten and  medical director. Pt reported that he feels \"sick and doesn't feel right\" pt described that \"his head felt like it was smoking chemicals\" this am when he woke up. Savanna wooten spoke with  medical director and they agreed with original plan to end taper on 8/8. Writer met with pt and counselor. Decision regarding taper was explained to pt. Pt was offered multiple options, but patient continued to insist he was being forced to do something he doesn't want to do. Pt was offered to stay and end taper as planned and work with his provider on other med adjustments. If pt wanted to extend taper he could work with his provider on an outpatient basis and discharge today from . Pt chose to complete taper on 8/8 and stay at [facility] pt repeatedly declined offers to work with provider on other medication options and said he would \"just suffer and feel awful\". Pt also wanted to remove his agreement to destroy his remaining clonazepam. Writer helped pt do this. Pt was given directions of where to sign under a statement that said he did not want his meds destroyed, in addition pt aggressively crossed out his previously signed signature which agreed to destroy the meds. Writer asked him to stop as the " paper MAR can't have scribbled out information on it. Pt's provider was also informed that pt has chosen to keep his left over clonazepam. Pt reports he has no clonazepam at home.    Throughout interactions with patient writer observed that he was very intense and at times agitated. Pt was asked to be respectful while writer was speaking as pt would repeatedly look away and make frustrated noises while writer was talking.

## 2022-08-05 NOTE — GROUP NOTE
Group Therapy Documentation    PATIENT'S NAME: Barak Clarke  MRN:   [id number]  :   1985  ACCT. NUMBER: [id number]  DATE OF SERVICE: 22  START TIME: 12:30 PM  END TIME:  2:30 PM  FACILITATOR(S): Kimmie Pineda LADC; Vicky Zavala; Wendy Smith LADC  TOPIC: BEH Group Therapy  Number of patients attending the group:  13  Group Length:  2 Hours    Group Therapy Type: Recovery strategies    Summary of Group / Topics Discussed:    Recovery Principles, Disease of addiction, and Emotions/expression    Patients participated in group activity where they watched a film on addiction, treatment, and living in recovery. Patients developed greater understanding of the disease of addiction and benefits of living a life in recovery. Patients identified ways they related to characters and events portrayed in the movie and discussed how they can make positive behavior changes to support their recovery goals.       Group Attendance:  Attended group session    Patient's response to the group topic/interactions:  cooperative with task    Patient appeared to be Actively participating, Attentive and Engaged.        Client specific details:   Patient fully participated in group process and provided constructive feedback during discussion.

## 2022-08-05 NOTE — PROGRESS NOTES
Comprehensive Assessment Summary      Based on client interview, review of previous assessments and   comprehensive assessment interview the following diagnosis and recommendations are:       Client meets criteria for:  Category of Substance Severity (ICD-10 Code / DSM 5 Code)   Cannabis Use Disorder Severe - F12.20/304.30   Opioid Use Disorder Severe - F11.20/304.00 In Sustained Remission /MAT   Stimulant Related Disorder Severe - F15.20/304.00 In Sustained Remission: [facility]   Tobacco Use Disorder Moderate -F.17.20/305.10         Dimension One: Acute Intoxication/Withdrawal Potential     Ratin  (Consider the client's ability to cope with withdrawal symptoms and current state of intoxication)   Patient reports his substances of choice are Cannabis/Heroin. Patient reported his last use was 22 (Cannabis),  (Heroin/Meth).  Patient is prescribed Suboxone for MAT, and is complaint upon admission.     Dimension Two: Biomedical Condition and Complications    Ratin  (Consider the degree to which any physical disorder would interfere with treatment for substance abuse, and the client's ability to tolerate any related discomfort; determine the impact of continued chemical use on the unborn child if the client is pregnant)   Patient reported having a primary care provider, and is able to access medical services as needed.  Patient reported no medical concerns that would interfere with treatment. Patient stated she would speak to staff if any concerns arise. Patient reported remaining complaint with medications. Patient agreed prior to treatment that he would clinically taper off his Klonopin (prescription) while in treatment. Upon admission, patient signed agreement, and was advised his taper would end 22.     Dimension Three: Emotional/Behavioral/Cognitive Conditions & Complications  Ratin  (Determine the degree to which any condition or complications are likely to interfere with treatment for  "substance abuse or with functioning in significant life areas and the likelihood of risk of harm to self or others)  Patient reported a mental health diagnosis of ADHD, PTSD, and MDD. Patient reported marital problems upon admission, due to his continued use. Patient reported his wife is concerned about him. Patent reported a significant loss in the last year, with his sister passing. Patient reported wanting to explore coping skills so he can stop using cannabis to resolve mental health concerns. Patient reported hx of abuse. Patient reported an IP psych admission in [place], [place], 8 years ago due to a suicide attempt. Patient completed a C-SSRS  Risk \" Very Low.\"   Patient reported being open and willing to meet with staff therapist while in treatment. Patient expressed concerns about tapering off his Klonopin while in treatment, due to [facility] policy, knowing symptoms will arise with withdrawal.     Dimension Four: Treatment Acceptance/Resistance     Rating:  3  (Consider the amount of support and encouragement necessary to keep the client involved in treatment)   Patient reported attending treatment voluntarily, but his motivation is \" my wife wants me here, to do this.\" Patient reported a willingness to attend all programming and comply with program policy. Patient expressed some resistance to the Klonopin taper, despite initially agreeing, and feels as though he is being forced to stop. Patient was reminded of medical options in regards to the Klonopin. Patient stated his irritation, but stated he would stay and comply with taper. Patient was advised of all options, and he is able to make these choices freely.     Dimension Five: Continued Use/Relaspe Prevention     Ratin  (Consider the degree to which the client's recognizes relapse issues and has the skills to prevent relapse of either substance use or mental health problems)     Patient reported attending at least 8 previous treatments, and noted " "there were a few he couldn't remember. Patient reported being in remission with heroin for years, and wants to stop using cannabis.   Patient reported continued escalated use of cannabis despite consequences. Patient stated \" it is the same pattern I had with Heroin, years ago. \" Patient reported their use has effected their relationships, financial stability, housing, employment, and mental health stability. Patient reported needing to gain prevention skills that are essential to remain sober. Patient reported addressing their mental health concerns will be vital to remaining sober. Patient reported needing to create a prevention plan to navigate through triggers and high stress situations. Patient is at high risk for recidivisms concerns without issue.       Dimension Six: Recovery Environment     Rating:   3  (Consider the degree to which key areas of the client's life are supportive of or antagonistic to treatment participation and recovery)    Patient reported having a stable living environment to return to, and is open to considering aftercare treatment options.  Patient reported not having engagement in meetings or sober activities. Patient reported needing to expand sober network and maintain healthy connections. Patient reported no previous engagement with AA/NA. Patient reported no current probation, hx of DWI, 5th Degree Poss, and Theft Charges. Patient reported 3 children ages 12, 12, 6, whom life with their mother. Patient reported a full time, and he is on leave to address DAVID concerns. Patient identified finances are a stressor.         I have reviewed the information on the assessment, psychosocial and medical history and checklist- IT IS CURRENT.  "

## 2022-08-06 ENCOUNTER — HOSPITAL ENCOUNTER (OUTPATIENT)
Dept: BEHAVIORAL HEALTH | Facility: CLINIC | Age: 37
Discharge: HOME | End: 2022-08-06
Attending: FAMILY MEDICINE
Payer: COMMERCIAL

## 2022-08-06 PROCEDURE — 1002N00001 HC LODGING PLUS FACILITY CHARGE ADULT

## 2022-08-06 PROCEDURE — H2035 A/D TX PROGRAM, PER HOUR: HCPCS | Mod: HQ

## 2022-08-06 NOTE — GROUP NOTE
Group Therapy Documentation    PATIENT'S NAME: Barak Clarke  MRN:   [id number]  :   1985  ACCT. NUMBER: [id number]  DATE OF SERVICE: 22  START TIME:  9:00 AM  END TIME: 11:00 AM  FACILITATOR(S): Dimitri Jones LADC; Mauricio Masterson LADC; Juan Ramon Gee LADC  TOPIC: BEH Group Therapy  Number of patients attending the group:  19  Group Length:  2 Hours    Group Therapy Type: Recovery strategies and Emotion processing    Summary of Group / Topics Discussed:    Relationship/socialization and Emotions/expression      Group Attendance:  Attended group session    Patient's response to the group topic/interactions:  cooperative with task    Patient appeared to be Actively participating, Attentive and Engaged.        Client specific details:  Barak learned about building healthy relationships.

## 2022-08-06 NOTE — GROUP NOTE
Group Therapy Documentation    PATIENT'S NAME: Barak Clarke  MRN:   [id number]  :   1985  ACCT. NUMBER: [id number]  DATE OF SERVICE: 22  START TIME: 12:30 PM  END TIME:  2:30 PM  FACILITATOR(S): Mauricio Masterson LADC; Juan Ramon Gee LADC; [facility]ri [facility] [employer] [facility] [employer]  TOPIC: BEH Group Therapy  Number of patients attending the group:  19  Group Length:  2 Hours    Group Therapy Type: Recovery strategies    Summary of Group / Topics Discussed:    Recovery Principles and Sober coping skills      Group Attendance:  Attended group session    Patient's response to the group topic/interactions:  cooperative with task and listened actively    Patient appeared to be Actively participating, Attentive and Engaged.        Client specific details:  Barak learned about the importance of making sacrifices for recovery.

## 2022-08-07 ENCOUNTER — HOSPITAL ENCOUNTER (OUTPATIENT)
Dept: BEHAVIORAL HEALTH | Facility: CLINIC | Age: 37
Discharge: HOME | End: 2022-08-07
Attending: FAMILY MEDICINE
Payer: COMMERCIAL

## 2022-08-07 PROCEDURE — H2035 A/D TX PROGRAM, PER HOUR: HCPCS | Mod: HQ

## 2022-08-07 PROCEDURE — 1002N00001 HC LODGING PLUS FACILITY CHARGE ADULT

## 2022-08-07 NOTE — GROUP NOTE
Psychoeducation Group Documentation    PATIENT'S NAME: Barak Clarke  MRN:   [id number]  :   1985  ACCT. NUMBER: [id number]  DATE OF SERVICE: 22  START TIME:  8:45 AM  END TIME: 10:45 AM  FACILITATOR(S): Wendy Smith LADC; Gema Hills RN  TOPIC: BEH Pyschoeducation  Number of patients attending the group:  19  Group Length:  2 Hours    Skills Group Therapy Type: Healthy behaviors development and Health Education    Summary of Group / Topics Discussed:    HIV/AIDS Education and Prevention.     Facilitator (RN) presented educational information about HIV/AIDS. Topics shared addressed, Definition of HIV/AIDS, Transmission, Myths, Understating the Virus Breakdown, Prevention, Susceptibility, and Access to Resources for Testing in the [place] Area.           Group Attendance:  Attended group session    Patient's response to the group topic/interactions:  cooperative with task    Patient appeared to be Actively participating.         Client specific details:  Barak attended AM Psychoeducation Lecture about HIV/AIDS. Patient remained engaged and participated. No further concerns.

## 2022-08-07 NOTE — GROUP NOTE
Group Therapy Documentation    PATIENT'S NAME: Barak Clarke  MRN:   [id number]  :   1985  ACCT. NUMBER: [id number]  DATE OF SERVICE: 22  START TIME: 12:30 PM  END TIME:  1:30 PM  FACILITATOR(S): Dimitri Jones, [facility]; Wendy Smith LADC  TOPIC: BEH Group Therapy  Number of patients attending the group:  19  Group Length:  1 Hours    Group Therapy Type: Recovery strategies    Summary of Group / Topics Discussed:    Disease of addiction    Facilitator ( ALIYAH ) discussed topic related to Disease of Addiction.   Discussion focused around the middleton of the mind. The decisions that are made, and what influences our decisions day to day regarding making healthy vs unhealthy choices in regards to self, family, and use.       Group Attendance:  Attended group session    Patient's response to the group topic/interactions:  cooperative with task    Patient appeared to be Actively participating.        Client specific details:  Barak attended PM Skills Group. Patient remained engaged and participated in discussion regarding the middleton of the mind with addiction.

## 2022-08-08 ENCOUNTER — HOSPITAL ENCOUNTER (OUTPATIENT)
Dept: BEHAVIORAL HEALTH | Facility: CLINIC | Age: 37
Discharge: HOME | End: 2022-08-08
Attending: FAMILY MEDICINE
Payer: COMMERCIAL

## 2022-08-08 LAB — BENZODIAZ UR QL: NORMAL

## 2022-08-08 PROCEDURE — 80346 BENZODIAZEPINES1-12: CPT

## 2022-08-08 PROCEDURE — 80307 DRUG TEST PRSMV CHEM ANLYZR: CPT | Performed by: FAMILY MEDICINE

## 2022-08-08 PROCEDURE — 1002N00001 HC LODGING PLUS FACILITY CHARGE ADULT

## 2022-08-08 PROCEDURE — H2035 A/D TX PROGRAM, PER HOUR: HCPCS | Mod: HQ

## 2022-08-08 ASSESSMENT — PATIENT HEALTH QUESTIONNAIRE - PHQ9: SUM OF ALL RESPONSES TO PHQ QUESTIONS 1-9: 9

## 2022-08-08 ASSESSMENT — ANXIETY QUESTIONNAIRES
4. TROUBLE RELAXING: SEVERAL DAYS
5. BEING SO RESTLESS THAT IT IS HARD TO SIT STILL: SEVERAL DAYS
GAD7 TOTAL SCORE: 9
7. FEELING AFRAID AS IF SOMETHING AWFUL MIGHT HAPPEN: SEVERAL DAYS
2. NOT BEING ABLE TO STOP OR CONTROL WORRYING: MORE THAN HALF THE DAYS
2. FELT ANXIOUS, WORRIED, OR NERVOUS: MORE THAN HALF THE DAYS
3. WORRYING TOO MUCH ABOUT DIFFERENT THINGS: SEVERAL DAYS
6. BECOMING EASILY ANNOYED OR IRRITABLE: SEVERAL DAYS

## 2022-08-08 NOTE — PROGRESS NOTES
"[facility] Weekly Treatment Plan Review      ATTENDANCE for the following date span:  8-3-22 to 22    Date    8-3 Thurs 8-4 Friday 8-5 Sat 8- 8-7   Group Therapy      0 hours 4 hours 4 hours  4 hours  1 hours   Skills Psychoeducat.     0 hours  1 hour 0 hours 0 hours  2 hours   Individual Session (LADC)     1 hours 1 hours 0 hours 0 hours 0 hours   Individual psychotherapy     0 hours 0 hours 0 hours 0 hours 0 hours   Peer-led Recovery Group     1 hour  1 hour 1 hour  1 hour 1 hour   Family Therapy   0 hour 0 hour 0 hour 0 hour 0 hour       Patient did have any absences during this time period (list absence dates and reason for absence).  Pt was excused from all three groups on 8-3-22 due to admitting that afternoon.    Weekly Treatment Plan Review     Treatment Plan initiated on: 22.    Dimension1: Acute Intoxication/Withdrawal Potential -   Previous Dimension Ratin  Current Dimension Ratin  Date of Last Use: 22 (Cannabis),  (Heroin/Meth).  Any reports of withdrawal symptoms - Yes, \"my brain feels like it's on fire all night, and like smoking charcoal in the morning.\"    Dimension 2: Biomedical Conditions & Complications -   Previous Dimension Ratin  Current Dimension Ratin  Medical Concerns:  none reported  Current Medications & Medication Changes:  Current Outpatient Medications   Medication     acetaminophen (TYLENOL) 325 MG tablet     alum & mag hydroxide-simethicone (MAALOX) 200-200-20 MG/5ML SUSP suspension     atenolol (TENORMIN) 50 MG tablet     benzocaine-menthol (CEPACOL) 15-3.6 MG lozenge     buprenorphine HCl-naloxone HCl (SUBOXONE) 8-2 MG per film     Citalopram Hydrobromide (CELEXA PO)     clonazePAM (KLONOPIN) 0.5 MG tablet     GABAPENTIN PO     guaiFENesin (ROBITUSSIN) 20 mg/mL SOLN solution     ibuprofen (ADVIL/MOTRIN) 200 MG tablet     loratadine (CLARITIN) 10 MG tablet     melatonin 3 MG tablet     nicotine polacrilex (NICORETTE) 4 MG gum "     senna-docusate (SENOKOT-S/PERICOLACE) 8.6-50 MG tablet     VYVANSE 70 MG capsule     No current facility-administered medications for this encounter.     Facility-Administered Medications Ordered in Other Encounters   Medication     Self Administer Medications: Behavioral Services     Medication Prescriber:  [facility]  Taking meds as prescribed? Yes  Medication side effects or concerns:  None reported  Outside medical appointments this week (list provider and reason for visit):  none    Narrative: Pt seems fully functioning and seeks medical attention as needed. Pt's temperature and oxygen level are measured daily in accordance with COVID-19 precautions. He attended lecture given by  nurse on HIV/AIDs on 22.     Dimension 3: Emotional/Behavioral Conditions & Complications -   Previous Dimension Ratin  Current Dimension Ratin  PHQ2:   PHQ-2 (  [employer]) 2022   Q1: Little interest or pleasure in doing things 1 1   Q2: Feeling down, depressed or hopeless 2 2   PHQ-2 Score 3 3      GAD2:   KILO-2 2022   Feeling nervous, anxious, or on edge 2   Not being able to stop or control worrying 2   KILO-2 Total Score 4     Mental health diagnosis: anxiety, ADHD, PTSD, and MDD  Date of last SIB:    Date of  last SI:  Patient denies.  Date of last HI: Patient denies.  Behavioral Targets: Follow recommendations of medical provider. Report any alcohol or drug use to counselor and any increase in withdrawal symptoms to nurse. Stabilize and maintain mental health.   Risk factors:  The patient is currently living in an unhealthy and/or using environment  The patient lacks relapse prevention skills, and has poor coping and refusal skills. He lacks a sober peer support network and has a tendency to isolate. The patient has dual issues of MI and CD and lacks the ability to effectively manage his mental health issues when using substances. He has a significant history of trauma and  "grief and loss issues.  Protective factors:  spirituality, positive relationships positive family connections, forward/future oriented thinking, dedication to family/friends, safe and stable environment, abstinence from substances, adherence with prescribed medication, agreement to use safety plan, living with other people, structured day, positive social skills and access to a variety of clinical interventions  Current MH Assignments:  none currently.    Narrative: Current Mental Health symptoms include: none reported. See below for suicide risk assessment. Pt does not endorse thoughts of self-harm or suicide ideation at this time. Pt's current CGI is 5/5.  He reports that his stress level has increased; coping skills to manage stress used were \"meds and meditation.\" Pt reported that his mood has significantly changed this past week and he is \"way faster and seeing through people too well.\"    [place] Suicide Severity Rating Scale (Short Version)  [place] Suicide Severity Rating (Short Version) 6/29/2022 7/6/2022 7/11/2022 7/14/2022 8/3/2022   Over the past 2 weeks have you felt down, depressed, or hopeless? - - yes yes -   Over the past 2 weeks have you had thoughts of killing yourself? - - no no -   Have you ever attempted to kill yourself? - - no no -   Q1 Wished to be Dead (Past Month) yes yes - - yes   Q2 Suicidal Thoughts (Past Month) no no - - no   Q3 Suicidal Thought Method no no - - no   Q4 Suicidal Intent without Specific Plan no no - - -   Q5 Suicide Intent with Specific Plan no no - - no   Q6 Suicide Behavior (Lifetime) yes yes - - yes   Within the Past 3 Months? no no - - no   Level of Risk per Screen moderate risk moderate risk - - moderate risk       Dimension 4: Treatment Acceptance / Resistance -   Previous Dimension Rating:  3  Current Dimension Rating:  3  DAVID Diagnosis:   Cannabis Use Disorder Severe - 304.30 (F12.20)  Tobacco Use Disorder Moderate - 305.10 (F17.200)  Opioid Use Disorder " "Severe - 304.00 (F11.20) In sustained remission on MAT per patient report  Amphetamine Use Disorder Severe - 304.40 (F15.20) In sustained remission per patient report  Commitment to tx process/Stage of change- Pt consistently attends and participates in groups and lectures. He appears to be in the contemplative stage of change at this time.  DAVID assignments -  reading the first 164 pages of the [employer] \"Big Book\"    Narrative - Pt reports his motivation this week is \"my wife?\" Pt reports that his aftercare plans include \"finding a good outpatient place.\" He reports that he has gotten along \"fine\" with peers and staff this week.     Dimension 5: Relapse / Continued Problem Potential -   Previous Dimension Ratin  Current Dimension Ratin  Relapses this week - None  Urges to use - YES, List    UA results - Positive for BUP, AMP, THC (pt rx buprenorphine and vyvanse)    Narrative- Pt reports craving 5/10, ten being high. He reported experiencing triggers to use but declined to elaborate; he used the following coping skill(s): meditation, medication, and prayer. Pt participated in the spirituality group, facilitated by Melissa Arce and  counseling staff was present during group.    Dimension 6: Recovery Environment -   Previous Dimension Rating:  3  Current Dimension Rating:  3  Family Involvement - n/a  Summarize attendance at family groups and family sessions - n/a  Family supportive of treatment?  Yes  Recreational activities - drawing, talking with others, smoking, exercising.    Narrative - Pt is spending free time with same-gender peers and attending at least 3 virtual 12-step meetings weekly while in . He participated in weekend workshop on relationships and completed all activities.    Progress made on transition planning goals: TBD    Justification for Continued Treatment at this Level of Care: Risk ratings indicate continued need for this level of care. Pt has been unable to maintain abstinence " "from drugs while at the outpatient level of care, lacks long-term sober maintenance skills, lacks sober coping skills and has mental health issues which are exacerbated by substance abuse.  Treatment coordination activities this week:  referrals to mental health services including family therapy  Need for peer recovery support referral? No    Discharge Planning:  Target Discharge Date/Timeframe: 8-31-22  Med Mgmt Provider/Appt:  BERT  Ind therapy Provider/Appt:  BERT  Family therapy Provider/Appt:  BERT  Other referrals: Pt requested referrals for \"outpatient stuff near [place], [place]\" - this will be part of his aftercare planning.    Has vulnerable adult status changed? No    Interdisciplinary Clinical Supervision including: ALIYAH and RN    Are Treatment Plan goals/objectives effective? Yes  *If no, list changes to treatment plan:    Are the current goals meeting client's needs? Yes  *If no, list the changes to treatment plan.    Patient Input / Response: Pt contributed to treatment plan review.    *Client agrees with any changes to the treatment plan: N/A  *Client received copy of changes: N/A  *Client is aware of right to access a treatment plan review: Yes    ALIYAH Abbasi    "

## 2022-08-08 NOTE — PROGRESS NOTES
Acknowledgement of Current Treatment Plan - Initial Treatment Plan         INITIAL TREATMENT PLAN:     1. I have participated in creating my treatment plan with my therapist / counselor on __________.     I agree with the plan as it is written in the electronic health record.    Name Signature/Date   Patient     Name of Therapist / Counselor   Signature/Date   Counselor      2. I have completed and reviewed my Safety Plan with my counselor and signed this on _________. I have been given the hard copy of this plan.    Patient signature/date:      _____________________________________________________________________________    3. Last Use Date: __________    Patient signature/date:     _____________________________________________________________________________

## 2022-08-08 NOTE — PROGRESS NOTES
Name: Barak Clarke  Date: 8/8/2022  Medical Record: [id number]  Envelope Number: [id number]  List of Contents (List each item separately in new row):   7 Tabs of Clonazepam 0.5 mg tablet.   Admission:  I am responsible for any personal items that are not sent to the safe or pharmacy.  [facility] is not responsible for loss, theft or damage of any property in my possession.    Patient Signature:  ___________________________________________       Date/Time:__________________________    Staff Signature: __________________________________       Date/Time:__________________________    2nd Staff person, if patient is unable/unwilling to sign:      __________________________________________________________       Date/Time: __________________________    Discharge:  [facility] has returned all of my personal belongings:    Patient Signature: ________________________________________     Date/Time: ____________________________________    Staff Signature: ______________________________________     Date/Time:_____________________________________

## 2022-08-08 NOTE — GROUP NOTE
Group Therapy Documentation    PATIENT'S NAME: Barak Clarke  MRN:   [id number]  :   1985  ACCT. NUMBER: [id number]  DATE OF SERVICE: 22  START TIME:  9:00 AM  END TIME: 11:00 AM  FACILITATOR(S): Wendy Smith LADC  TOPIC: BEH Group Therapy  Number of patients attending the group: 5  Group Length:  2 Hours    Group Therapy Type: Recovery strategies    Summary of Group / Topics Discussed:    Recovery Principles      Group Attendance:  Attended group session    Patient's response to the group topic/interactions:  cooperative with task    Patient appeared to be Actively participating.        Client specific details:  Barak attended AM small group. Patient remained engaged and participated.

## 2022-08-08 NOTE — GROUP NOTE
Group Therapy Documentation    PATIENT'S NAME: Barak Clarke  MRN:   [id number]  :   1985  ACCT. NUMBER: [id number]  DATE OF SERVICE: 22  START TIME: 12:30 PM  END TIME:  2:30 PM  FACILITATOR(S): Wendy Smith LADC; Melissa Haywood  TOPIC: BEH Group Therapy  Number of patients attending the group:  5  Group Length:  2 Hours    Group Therapy Type: Recovery strategies    Summary of Group / Topics Discussed:    Recovery Principles and Spiritual Care      Group Attendance:  Attended group session    Patient's response to the group topic/interactions:  cooperative with task    Patient appeared to be Actively participating.        Client specific details:  Barak attended PM small group therapy. Patient remained engaged and participated. No concerns reported.

## 2022-08-09 ENCOUNTER — HOSPITAL ENCOUNTER (OUTPATIENT)
Dept: BEHAVIORAL HEALTH | Facility: CLINIC | Age: 37
Discharge: HOME | End: 2022-08-09
Attending: FAMILY MEDICINE
Payer: COMMERCIAL

## 2022-08-09 DIAGNOSIS — F19.20 CHEMICAL DEPENDENCY (H): Primary | ICD-10-CM

## 2022-08-09 DIAGNOSIS — F19.20 CHEMICAL DEPENDENCY (H): ICD-10-CM

## 2022-08-09 LAB — SARS-COV-2 RNA RESP QL NAA+PROBE: NEGATIVE

## 2022-08-09 PROCEDURE — U0003 INFECTIOUS AGENT DETECTION BY NUCLEIC ACID (DNA OR RNA); SEVERE ACUTE RESPIRATORY SYNDROME CORONAVIRUS 2 (SARS-COV-2) (CORONAVIRUS DISEASE [COVID-19]), AMPLIFIED PROBE TECHNIQUE, MAKING USE OF HIGH THROUGHPUT TECHNOLOGIES AS DESCRIBED BY CMS-2020-01-R: HCPCS

## 2022-08-09 PROCEDURE — 1002N00001 HC LODGING PLUS FACILITY CHARGE ADULT

## 2022-08-09 PROCEDURE — H2035 A/D TX PROGRAM, PER HOUR: HCPCS | Mod: HQ

## 2022-08-09 NOTE — GROUP NOTE
Group Therapy Documentation    PATIENT'S NAME: Barak Clarke  MRN:   [id number]  :   1985  ACCT. NUMBER: [id number]  DATE OF SERVICE: 22  START TIME: 12:30 PM  END TIME:  2:30 PM  FACILITATOR(S): Rasta Ryder LADC  TOPIC: BEH Group Therapy  Number of patients attending the group:  4  Group Length:  2 Hours    Group Therapy Type: Emotion processing    Summary of Group / Topics Discussed:    Disease of addiction      Group Attendance:  Attended group session    Patient's response to the group topic/interactions:  cooperative with task    Patient appeared to be Actively participating.        Client specific details:  Barak participated and interacted appropriately with peers and staff in PM group. No triggers to use noted or discussed.

## 2022-08-09 NOTE — GROUP NOTE
Psychoeducation Group Documentation    PATIENT'S NAME: Barak Clarke  MRN:   [id number]  :   1985  ACCT. NUMBER: [id number]  DATE OF SERVICE: 22  START TIME:  3:00 PM  END TIME:  4:00 PM  FACILITATOR(S): Noe Yeh LADC; Vicky Zavala; Cata Sanderson LADC  TOPIC: BEH Pyschoeducation  Number of patients attending the group:  4  Group Length:  1 Hours    Skills Group Therapy Type: Recovery skills    Summary of Group / Topics Discussed:    Balanced lifestyle skills          Group Attendance:  Attended group session    Patient's response to the group topic/interactions:  cooperative with task    Patient appeared to be Attentive.         Client specific details:  The patient participated in the afternoon lecture.

## 2022-08-09 NOTE — GROUP NOTE
Group Therapy Documentation    PATIENT'S NAME: Barak Clarke  MRN:   [id number]  :   1985  ACCT. NUMBER: [id number]  DATE OF SERVICE: 22  START TIME:  9:00 AM  END TIME: 11:00 AM  FACILITATOR(S): Wendy Smith LADC; Noe Yeh LADC  TOPIC: BEH Group Therapy  Number of patients attending the group:  7  Group Length:  2 Hours    Group Therapy Type: Recovery strategies    Summary of Group / Topics Discussed:    Recovery Principles      Group Attendance:  Attended group session    Patient's response to the group topic/interactions:  cooperative with task    Patient appeared to be Actively participating.        Client specific details:  Barak attended AM small group.Patient remained engaged and participated in discussions involving losses and burdens we carry for others, that cause maladaptive behaviors. No concerns reported.

## 2022-08-10 ENCOUNTER — HOSPITAL ENCOUNTER (OUTPATIENT)
Dept: BEHAVIORAL HEALTH | Facility: CLINIC | Age: 37
Discharge: HOME | End: 2022-08-10
Attending: FAMILY MEDICINE
Payer: COMMERCIAL

## 2022-08-10 LAB — SARS-COV-2 RNA RESP QL NAA+PROBE: NEGATIVE

## 2022-08-10 PROCEDURE — H2035 A/D TX PROGRAM, PER HOUR: HCPCS | Mod: HQ

## 2022-08-10 PROCEDURE — 1002N00001 HC LODGING PLUS FACILITY CHARGE ADULT

## 2022-08-10 PROCEDURE — U0005 INFEC AGEN DETEC AMPLI PROBE: HCPCS | Performed by: FAMILY MEDICINE

## 2022-08-10 NOTE — GROUP NOTE
Group Therapy Documentation    PATIENT'S NAME: Barak Clarke  MRN:   [id number]  :   1985  ACCT. NUMBER: [id number]  DATE OF SERVICE: 8/10/22  START TIME: 12:30 PM  END TIME:  2:30 PM  FACILITATOR(S): Kathy Gould LADC; Noe Yeh LADC  TOPIC: BEH Group Therapy  Number of patients attending the group: 8  Group Length:  2 Hours    Group Therapy Type: Emotion processing    Summary of Group / Topics Discussed:    Recovery Principles and Disease of addiction      Group Attendance:  Attended group session    Patient's response to the group topic/interactions:  cooperative with task    Patient appeared to be Actively participating, Attentive and Engaged.        Client specific details: Barak was an active participant in group therapy session discussing the potential downfalls of sobriety. He was excused for one hour to attend an appointment.

## 2022-08-10 NOTE — GROUP NOTE
Psychoeducation Group Documentation    PATIENT'S NAME: Barak Clarke  MRN:   [id number]  :   1985  ACCT. NUMBER: [id number]  DATE OF SERVICE: 8/10/22  START TIME:  8:30 AM  END TIME:  9:30 AM  FACILITATOR(S): Bro Dozier LADC; Miquel Lee MD  TOPIC: BEH Pyschoeducation  Number of patients attending the group:  5  Group Length:  1 hour    Skills Group Therapy Type: Recovery skills and Relationship skills development    Summary of Group / Topics Discussed:    Relationship/social skills and Relapse prevention skills          Group Attendance:  Attended group session    Patient's response to the group topic/interactions:  cooperative with task    Patient appeared to be Attentive.         Client specific details:  Barak gave appropriate feedback..

## 2022-08-10 NOTE — GROUP NOTE
"Group Therapy Documentation    PATIENT'S NAME: Barak Clarke  MRN:   [id number]  :   1985  ACCT. NUMBER: [id number]  DATE OF SERVICE: 8/10/22  START TIME:  9:40 AM  END TIME: 11:30 AM  FACILITATOR(S): Wendy Smith LADC  TOPIC: BEH Group Therapy  Number of patients attending the group:  8  Group Length:  2 Hours    Group Therapy Type: Recovery strategies    Summary of Group / Topics Discussed:    Recovery Principles, Sober coping skills, and Balanced lifestyle    Patients engaged in daily check ins and meditation readings. New peers shared an intro to group/treatment. Patients discussed the meditation reading that discussed Lonliness. Patients engaged in activity on problem-solving, and making healthy recovery choices easier. Patients created \"Cups of Choice.\" Patients were able to create sticks with healthy activities they can do alone or with family/friends to avoid [place] or destructive patterns. Patients had discussions of great affordable activities, events, and things to do in the [place] metro area. Patients shared experiences on trying new things and shared about the difficulty meeting new people and doing things consistently that are sober and engaging.       Group Attendance:  Attended group session    Patient's response to the group topic/interactions:  cooperative with task    Patient appeared to be Actively participating.        Client specific details:  Barak attended AM small group therapy. Patient remained engaged and participated throughout group.     "

## 2022-08-11 ENCOUNTER — HOSPITAL ENCOUNTER (OUTPATIENT)
Dept: BEHAVIORAL HEALTH | Facility: CLINIC | Age: 37
Discharge: HOME | End: 2022-08-11
Attending: FAMILY MEDICINE
Payer: COMMERCIAL

## 2022-08-11 DIAGNOSIS — F19.20 CHEMICAL DEPENDENCY (H): ICD-10-CM

## 2022-08-11 LAB — FENTANYL UR QL: NORMAL

## 2022-08-11 PROCEDURE — H2035 A/D TX PROGRAM, PER HOUR: HCPCS | Mod: HQ

## 2022-08-11 PROCEDURE — 1002N00001 HC LODGING PLUS FACILITY CHARGE ADULT

## 2022-08-11 PROCEDURE — 80307 DRUG TEST PRSMV CHEM ANLYZR: CPT

## 2022-08-11 NOTE — GROUP NOTE
Group Therapy Documentation    PATIENT'S NAME: Barak Clarke  MRN:   [id number]  :   1985  ACCT. NUMBER: [id number]  DATE OF SERVICE: 22  START TIME: 12:30 PM  END TIME:  2:30 PM  FACILITATOR(S): Rasta Ryder LADC  TOPIC: BEH Group Therapy  Number of patients attending the group:  5  Group Length:  2 Hours    Group Therapy Type: Recovery strategies    Summary of Group / Topics Discussed:    Sober coping skills      Group Attendance:  Attended group session    Patient's response to the group topic/interactions:  cooperative with task    Patient appeared to be Actively participating.        Client specific details:  Barak participated and interacted appropriately with peers and staff in PM group. No triggers to use noted or discussed.

## 2022-08-11 NOTE — GROUP NOTE
Group Therapy Documentation    PATIENT'S NAME: Barak Clarke  MRN:   [id number]  :   1985  ACCT. NUMBER: [id number]  DATE OF SERVICE: 22  START TIME:  9:00 AM  END TIME: 11:00 AM  FACILITATOR(S): Wendy Smith LADC  TOPIC: BEH Group Therapy  Number of patients attending the group:  5  Group Length:  2 Hours    Group Therapy Type: Recovery strategies    Summary of Group / Topics Discussed:    Recovery Principles, Balanced lifestyle, and Relapse prevention      Group Attendance:  Attended group session    Patient's response to the group topic/interactions:  cooperative with task    Patient appeared to be Actively participating.        Client specific details:  Barak attended AM group therapy.Patient engaged in check ins, meditation readings, and discussion revolving around accountability, self care, and relapse prevention.

## 2022-08-11 NOTE — GROUP NOTE
Psychoeducation Group Documentation    PATIENT'S NAME: Barak Clarke  MRN:   [id number]  :   1985  ACCT. NUMBER: [id number]  DATE OF SERVICE: 22  START TIME:  3:00 PM  END TIME:  4:00 PM  FACILITATOR(S): Noe Yeh LADC; Vicky Zavala  TOPIC: BEH Pyschoeducation  Number of patients attending the group:  5  Group Length:  1 Hours    Skills Group Therapy Type: Medication education    Summary of Group / Topics Discussed:    Medication management skills          Group Attendance:  Attended group session    Patient's response to the group topic/interactions:  cooperative with task    Patient appeared to be Attentive.         Client specific details:  The patient participated in the afternoon skills group focused on medications.

## 2022-08-12 ENCOUNTER — HOSPITAL ENCOUNTER (OUTPATIENT)
Dept: BEHAVIORAL HEALTH | Facility: CLINIC | Age: 37
Discharge: HOME | End: 2022-08-12
Attending: FAMILY MEDICINE
Payer: COMMERCIAL

## 2022-08-12 PROCEDURE — H2035 A/D TX PROGRAM, PER HOUR: HCPCS | Mod: HQ

## 2022-08-12 PROCEDURE — 1002N00001 HC LODGING PLUS FACILITY CHARGE ADULT

## 2022-08-12 NOTE — PROGRESS NOTES
Name: Barak Clarke  Date: 8/12/2022  Medical Record: [id number]    Envelope Number: [id number]    List of Contents (List each item separately in new row):   Quetiapine 50MG ER tab = 2 tabs inside the bottle  Citalopram 20MG tablets = 1&1/2 tabs inside the bottle  Nicotrol Inhaler packets    SENT TO SEC[facility] FROM [facility] # [id number]    Admission:  I am responsible for any personal items that are not sent to the safe or pharmacy.  [facility] is not responsible for loss, theft or damage of any property in my possession.      Patient Signature:  ___________________________________________       Date/Time:__________________________    Staff Signature: __________________________________       Date/Time:__________________________    [facility] Staff person, if patient is unable/unwilling to sign:      __________________________________________________________       Date/Time: __________________________      Discharge:  [facility] has returned all of my personal belongings:    Patient Signature: ________________________________________     Date/Time: ____________________________________    Staff Signature: ______________________________________     Date/Time:_____________________________________

## 2022-08-12 NOTE — GROUP NOTE
Group Therapy Documentation    PATIENT'S NAME: Barak Clarke  MRN:   [id number]  :   1985  ACCT. NUMBER: [id number]  DATE OF SERVICE: 22  START TIME:  9:00 AM  END TIME: 11:00 AM  FACILITATOR(S): Angelita Deo  TOPIC: BEH Group Therapy  Number of patients attending the group:  5  Group Length:  2 Hours    Group Therapy Type: Recovery strategies and Emotion processing    Summary of Group / Topics Discussed:    Recovery Principles, Sober coping skills, Relationship/socialization, Co-occurring illnesses symptom management, Trauma informed care, Disease of addiction, Emotions/expression, and Self-care activities          Group Attendance:  Attended group session    Patient's response to the group topic/interactions:  confronted peers appropriately, cooperative with task, discussed personal experience with topic, expressed readiness to alter behaviors, expressed understanding of topic, gave appropriate feedback to peers, listened actively and offered helpful suggestions to peers    Patient appeared to be Actively participating, Attentive and Engaged.        Client specific details:  Client participated in group throughout the time. Client shared reflections on the daily readings for today and how they pertain to his life. Client shared check-in with the group relating to feelings of anxiousness due to the conversations he needs to have with his wife over the weekend and his desire to spend more time with his son in 1:1 setting to help him through a hard time. Client was receptive to peer feedback and shared insights with the group.

## 2022-08-12 NOTE — GROUP NOTE
"Group Therapy Documentation    PATIENT'S NAME: Barak Clarke  MRN:   [id number]  :   1985  ACCT. NUMBER: [id number]  DATE OF SERVICE: 22  START TIME: 12:30 PM  END TIME:  2:30 PM  FACILITATOR(S): Wendy Smith LADC; Noe Yeh LADC  TOPIC: BEH Group Therapy  Number of patients attending the group:  5  Group Length:  2 Hours    Group Therapy Type: Recovery strategies    Summary of Group / Topics Discussed:    Recovery Principles, Sober coping skills, Disease of addiction, and Self-care activitiesRelationship/socialization, Balanced lifestyle,Emotions/expression, and Family and Addiction    Patients viewed an Addiction and Metal Health Related Film, \"Street Cat Named Conor\"   This film encompasses themes addressing Family and Addiction, Family Roles, Loss/Grief, Healing Relationships, and Addiction in regards to creating a sober network and perseverance.     Patients engaged in lengthy discussion surrounding themes, and how it related to their own lives. Patients shared what they enjoyed about the movie, and expressed personal experiences.        Group Attendance:  Attended group session    Patient's response to the group topic/interactions:  cooperative with task    Patient appeared to be Actively participating.        Client specific details:  Barak attended PM group therapy. Patient engaged and watched film, and shared personal thoughts on how he related with his own recovery.         "

## 2022-08-13 ENCOUNTER — HOSPITAL ENCOUNTER (OUTPATIENT)
Dept: BEHAVIORAL HEALTH | Facility: CLINIC | Age: 37
Discharge: HOME | End: 2022-08-13
Attending: FAMILY MEDICINE
Payer: COMMERCIAL

## 2022-08-13 PROCEDURE — 1002N00001 HC LODGING PLUS FACILITY CHARGE ADULT

## 2022-08-13 PROCEDURE — H2035 A/D TX PROGRAM, PER HOUR: HCPCS | Mod: HQ

## 2022-08-13 NOTE — GROUP NOTE
Group Therapy Documentation    PATIENT'S NAME: Barak Clarke  MRN:   [id number]  :   1985  ACCT. NUMBER: [id number]  DATE OF SERVICE: 22  START TIME: 12:30 PM  END TIME:  2:30 PM  FACILITATOR(S): Kimmie Pineda LADC; Kathy Gould LADC; Charla North LADC  TOPIC: BEH Group Therapy  Number of patients attending the group:  22  Group Length:  2 Hours    Group Therapy Type: Daily living/independence skills    Summary of Group / Topics Discussed:    Sober coping skills and Relationship/socialization      Group Attendance:  Attended group session    Patient's response to the group topic/interactions:  cooperative with task    Patient appeared to be Engaged.        Client specific details: Pt was appropriate and attentive in group during weekend relationship workshop lecture on  Personality Trait  this PM.

## 2022-08-14 ENCOUNTER — HOSPITAL ENCOUNTER (OUTPATIENT)
Dept: BEHAVIORAL HEALTH | Facility: CLINIC | Age: 37
Discharge: HOME | End: 2022-08-14
Attending: FAMILY MEDICINE
Payer: COMMERCIAL

## 2022-08-14 LAB
1OH-MIDAZOLAM UR CFM-MCNC: <20 NG/ML
7AMINOCLONAZEPAM UR CFM-MCNC: 26 NG/ML
A-OH ALPRAZ UR CFM-MCNC: <5 NG/ML
ALPRAZ UR CFM-MCNC: <5 NG/ML
CHLORDIAZEP UR CFM-MCNC: <20 NG/ML
CLONAZEPAM UR CFM-MCNC: <5 NG/ML
DIAZEPAM UR CFM-MCNC: <20 NG/ML
LORAZEPAM UR CFM-MCNC: <20 NG/ML
MIDAZOLAM UR CFM-MCNC: <20 NG/ML
NORDIAZEPAM UR CFM-MCNC: <20 NG/ML
OXAZEPAM UR CFM-MCNC: <20 NG/ML
TEMAZEPAM UR CFM-MCNC: <20 NG/ML

## 2022-08-14 PROCEDURE — 1002N00001 HC LODGING PLUS FACILITY CHARGE ADULT

## 2022-08-14 PROCEDURE — H2035 A/D TX PROGRAM, PER HOUR: HCPCS | Mod: HQ

## 2022-08-14 NOTE — GROUP NOTE
Group Therapy Documentation    PATIENT'S NAME: Barak Clarke  MRN:   [id number]  :   1985  ACCT. NUMBER: [id number]  DATE OF SERVICE: 22  START TIME: 12:30 PM  END TIME:  1:30 PM  FACILITATOR(S): Kimmie Pineda LADC; Charla North LADC  TOPIC: BEH Group Therapy  Number of patients attending the group:  22  Group Length:  1 Hours    Group Therapy Type: Recovery strategies    Summary of Group / Topics Discussed:    Relationship/socialization, Cognitive behavioral therapy skills, and Relapse prevention    Patients participated in group lecture and discussion on personal strengths. Patients learned how to explore and identify their personal strengths and developed insight in how to use their strengths to accomplish recovery goals and long-term abstinence of substances. Each patient had an opportunity to process and provide constructive feedback.      Group Attendance:  Attended group session    Patient's response to the group topic/interactions:  cooperative with task    Patient appeared to be Actively participating, Attentive and Engaged.        Client specific details:  Patient fully participated in group discussion and strength identification activity. Patient provided appropriate feedback to peers who shared.

## 2022-08-14 NOTE — GROUP NOTE
Group Therapy Documentation    PATIENT'S NAME: Barak Clarke  MRN:   [id number]  :   1985  ACCT. NUMBER: [id number]  DATE OF SERVICE: 22  START TIME:  9:00 AM  END TIME: 11:00 AM  FACILITATOR(S): Kayleigh Rothman RN; Kimmie Pineda LAD; Charla North [facility]  TOPIC: BEH Group Therapy  Number of patients attending the group:  22  Group Length:  2 Hours    Group Therapy Type: Health and wellbeing     Summary of Group / Topics Discussed:    Balanced lifestyle and Self-care activities      Group Attendance:  Attended group session    Patient's response to the group topic/interactions:  cooperative with task    Patient appeared to be Attentive.        Client specific details: Pt was attentive and appropriate during RN's Lecture on TB/Hep A, B, &C this AM.

## 2022-08-15 ENCOUNTER — HOSPITAL ENCOUNTER (OUTPATIENT)
Dept: BEHAVIORAL HEALTH | Facility: CLINIC | Age: 37
Discharge: HOME | End: 2022-08-15
Attending: FAMILY MEDICINE
Payer: COMMERCIAL

## 2022-08-15 PROCEDURE — 1002N00001 HC LODGING PLUS FACILITY CHARGE ADULT

## 2022-08-15 PROCEDURE — H2035 A/D TX PROGRAM, PER HOUR: HCPCS | Mod: HQ

## 2022-08-15 NOTE — GROUP NOTE
Group Therapy Documentation    PATIENT'S NAME: Barak Clarke  MRN:   [id number]  :   1985  ACCT. NUMBER: [id number]  DATE OF SERVICE: 8/15/22  START TIME:  9:00 AM  END TIME: 11:00 AM  FACILITATOR(S): Wendy Smith LADC  TOPIC: BEH Group Therapy  Number of patients attending the group:  5  Group Length:  2 Hours    Group Therapy Type: Recovery strategies    Summary of Group / Topics Discussed:    Recovery Principles and Relapse prevention          Group Attendance:  Attended group session    Patient's response to the group topic/interactions:  cooperative with task    Patient appeared to be Actively participating.        Client specific details:  Barak attended AM small group therapy.Patient remained engaged and participated in group discussion and meditation readings.

## 2022-08-16 ENCOUNTER — HOSPITAL ENCOUNTER (OUTPATIENT)
Dept: BEHAVIORAL HEALTH | Facility: CLINIC | Age: 37
Discharge: HOME | End: 2022-08-16
Attending: FAMILY MEDICINE
Payer: COMMERCIAL

## 2022-08-16 PROCEDURE — H2035 A/D TX PROGRAM, PER HOUR: HCPCS

## 2022-08-16 PROCEDURE — 1002N00001 HC LODGING PLUS FACILITY CHARGE ADULT

## 2022-08-16 PROCEDURE — H2035 A/D TX PROGRAM, PER HOUR: HCPCS | Mod: HQ

## 2022-08-16 NOTE — GROUP NOTE
Group Therapy Documentation    PATIENT'S NAME: Barak Clarke  MRN:   [id number]  :   1985  ACCT. NUMBER: [id number]  DATE OF SERVICE: 22  START TIME:  9:00 AM  END TIME: 11:00 AM  FACILITATOR(S): Wendy Smith LADC  TOPIC: BEH Group Therapy  Number of patients attending the group:  5  Group Length:  2 Hours    Group Therapy Type: Recovery strategies    Summary of Group / Topics Discussed:    Recovery Principles, Relationship/socialization, and Relapse prevention      Group Attendance:  Attended group session    Patient's response to the group topic/interactions:  cooperative with task    Patient appeared to be Actively participating.        Client specific details:  Barak attended AM group therapy. Patient remained engaged and participated in check ins, meditation readings, and feedback to peers who shared assignments.

## 2022-08-16 NOTE — GROUP NOTE
Group Therapy Documentation    PATIENT'S NAME: Barak Clarke  MRN:   [id number]  :   1985  ACCT. NUMBER: [id number]  DATE OF SERVICE: 22  START TIME: 12:30 PM  END TIME:  2:30 PM  FACILITATOR(S): Rasta Ryder LADC  TOPIC: BEH Group Therapy  Number of patients attending the group:  5  Group Length:  2 Hours    Group Therapy Type: Recovery strategies    Summary of Group / Topics Discussed:    Sober coping skills      Group Attendance:  Attended group session    Patient's response to the group topic/interactions:  cooperative with task    Patient appeared to be Actively participating.        Client specific details:  Barak participated and interacted appropriately with peers and staff in PM group. No triggers to use noted or discussed. Pt attended a one hour psychotherapy session during group.

## 2022-08-16 NOTE — ADDENDUM NOTE
Encounter addended by: Rasta Ryder LADC on: 8/16/2022 8:06 AM   Actions taken: Charge Capture section accepted

## 2022-08-16 NOTE — GROUP NOTE
Group Therapy Documentation    PATIENT'S NAME: Barak Clarke  MRN:   [id number]  :   1985  ACCT. NUMBER: [id number]  DATE OF SERVICE: 8/15/22  START TIME: 12:30 PM  END TIME:  2:30 PM  FACILITATOR(S): Rasta Ryder LADC; Melissa Haywood  TOPIC: BEH Group Therapy  Number of patients attending the group:  5  Group Length:  2 Hours    Group Therapy Type: Recovery strategies    Summary of Group / Topics Discussed:    Spiritual Care      Group Attendance:  Attended group session    Patient's response to the group topic/interactions:  cooperative with task    Patient appeared to be Actively participating.        Client specific details:  Barak participated and interacted appropriately with peers and staff in spiritual group. No triggers to use noted or discussed.

## 2022-08-16 NOTE — GROUP NOTE
Group Therapy Documentation    PATIENT'S NAME: Barak Clarke  MRN:   [id number]  :   1985  ACCT. NUMBER: [id number]  DATE OF SERVICE: 22  START TIME:  3:00 PM  END TIME:  4:00 PM  FACILITATOR(S): Wendy Smith LADC; Vicky Zavala  TOPIC: BEH Group Therapy  Number of patients attending the group:  24  Group Length:  1 Hours    Group Therapy Type: Recovery strategies and Health and wellbeing     Summary of Group / Topics Discussed:    Recovery Principles, Balanced lifestyle, and Healthy Lifestyle Choices     Patients attended a skill group with Dr. Bellamy of the [facility]. Patients developed knowledge on the disease of addiction from both a biological and environmental perspective. Patients identified ways to utilize treatment and recovery strategies to address their biological and environmental factors leading to use of substances. Each patient had an opportunity to process the information, ask questions of the facilitator, and provide constructive feedback to peers who shared.         Group Attendance:  Attended group session    Patient's response to the group topic/interactions:  cooperative with task    Patient appeared to be Actively participating.        Client specific details:  Barak attended group session and remained engaged. No further concerns.   .

## 2022-08-17 ENCOUNTER — HOSPITAL ENCOUNTER (OUTPATIENT)
Dept: BEHAVIORAL HEALTH | Facility: CLINIC | Age: 37
Discharge: HOME | End: 2022-08-17
Attending: FAMILY MEDICINE
Payer: COMMERCIAL

## 2022-08-17 LAB — SARS-COV-2 RNA RESP QL NAA+PROBE: NEGATIVE

## 2022-08-17 PROCEDURE — H2035 A/D TX PROGRAM, PER HOUR: HCPCS | Mod: HQ

## 2022-08-17 PROCEDURE — U0003 INFECTIOUS AGENT DETECTION BY NUCLEIC ACID (DNA OR RNA); SEVERE ACUTE RESPIRATORY SYNDROME CORONAVIRUS 2 (SARS-COV-2) (CORONAVIRUS DISEASE [COVID-19]), AMPLIFIED PROBE TECHNIQUE, MAKING USE OF HIGH THROUGHPUT TECHNOLOGIES AS DESCRIBED BY CMS-2020-01-R: HCPCS | Performed by: FAMILY MEDICINE

## 2022-08-17 PROCEDURE — 1002N00001 HC LODGING PLUS FACILITY CHARGE ADULT

## 2022-08-17 PROCEDURE — H2035 A/D TX PROGRAM, PER HOUR: HCPCS

## 2022-08-17 NOTE — GROUP NOTE
Group Therapy Documentation    PATIENT'S NAME: Barak Clarke  MRN:   [id number]  :   1985  ACCT. NUMBER: [id number]  DATE OF SERVICE: 22  START TIME:  9:30 AM  END TIME: 11:30 AM  FACILITATOR(S): Rasta Ryder LADC  TOPIC: BEH Group Therapy  Number of patients attending the group:  4  Group Length:  2 Hours    Group Therapy Type: Emotion processing    Summary of Group / Topics Discussed:    Disease of addiction      Group Attendance:  Attended group session    Patient's response to the group topic/interactions:  cooperative with task    Patient appeared to be Actively participating.        Client specific details:  Barak participated and interacted appropriately with peers and staff in PM group. No triggers to use noted or discussed.
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
## 2022-08-17 NOTE — PROGRESS NOTES
[facility] Weekly Treatment Plan Review      ATTENDANCE for the following date span:  22-22    Date      Group Therapy 4 Hours 4 Hours  5 Hours  4 Hours 4 Hours 4 Hours 1 Hour   Individual LADC 0 Hours  0 Hours  0 Hours  0 Hours  0 Hours  0 Hours  0 Hours    Individual Therapy 0 Hours  0 Hours  1 Hour 0 Hours  0 Hours  0 Hours  0 Hours    Skills Group 0 Hour 1 Hour 0 Hour 1 Hour 0 Hour 0 Hour 1 Hour   Peer Led Group 1 Hour 1 Hour 1 Hour 1 Hour 1 Hour 1 Hour 1 Hour       Patient did have any absences during this time period (list absence dates and reason for absence).      Weekly Treatment Plan Review     Treatment Plan initiated on: 22.    Dimension1: Acute Intoxication/Withdrawal Potential -   Previous Dimension Ratin  Current Dimension Ratin  Date of Last Use: 22 (Cannabis),  (Heroin/Meth).  Any reports of withdrawal symptoms - None  Patient reported maintaining his daily dosing of Suboxone.     Dimension 2: Biomedical Conditions & Complications -   Previous Dimension Ratin  Current Dimension Ratin  Medical Concerns:  none reported  Current Medications & Medication Changes:  Current Outpatient Medications   Medication     acetaminophen (TYLENOL) 325 MG tablet     alum & mag hydroxide-simethicone (MAALOX) 200-200-20 MG/5ML SUSP suspension     atenolol (TENORMIN) 50 MG tablet     benzocaine-menthol (CEPACOL) 15-3.6 MG lozenge     buprenorphine HCl-naloxone HCl (SUBOXONE) 8-2 MG per film     Citalopram Hydrobromide (CELEXA PO)     clonazePAM (KLONOPIN) 0.5 MG tablet     GABAPENTIN PO     guaiFENesin (ROBITUSSIN) 20 mg/mL SOLN solution     ibuprofen (ADVIL/MOTRIN) 200 MG tablet     loratadine (CLARITIN) 10 MG tablet     melatonin 3 MG tablet     nicotine polacrilex (NICORETTE) 4 MG gum     senna-docusate (SENOKOT-S/PERICOLACE) 8.6-50 MG tablet     VYVANSE 70 MG capsule     No current  facility-administered medications for this encounter.     Facility-Administered Medications Ordered in Other Encounters   Medication     Self Administer Medications: Behavioral Services     Medication Prescriber:  [facility]  Taking meds as prescribed? Yes  Medication side effects or concerns:  None reported  Outside medical appointments this week (list provider and reason for visit):  Met with primary for medication update, post benzo taper.     Narrative: Pt seems fully functioning and seeks medical attention as needed. Pt's temperature and oxygen level are measured daily in accordance with COVID-19 precautions. He attended lecture given by [facility] nurse on Hepatitis C on 22.    Dimension 3: Emotional/Behavioral Conditions & Complications -   Previous Dimension Ratin  Current Dimension Ratin  PHQ2:   PHQ-2 (  [employer]) 2022   Q1: Little interest or pleasure in doing things 1 1 1   Q2: Feeling down, depressed or hopeless 1 2 2   PHQ-2 Score 2 3 3      GAD2:   KILO-2 2022   Feeling nervous, anxious, or on edge 2 1   Not being able to stop or control worrying 2 1   KILO-2 Total Score 4 2     Mental health diagnosis: Anxiety, ADHD, PTSD, and MDD  Date of last SIB:    Date of  last SI:  Patient denies.  Date of last HI: Patient denies.  Behavioral Targets: Follow recommendations of medical provider. Report any alcohol or drug use to counselor and any increase in withdrawal symptoms to nurse. Stabilize and maintain mental health.   Risk factors:  Patient admitted from an unhealthy/non DAVID/MH supportive environment. MH concerns were not being addressed, lack of coping skills, intense grief reported, inability to implament coping skills outside of DAVID.   Protective factors:  spirituality, positive relationships positive family connections, forward/future oriented thinking, dedication to family/friends, safe and stable environment, abstinence from substances,  "adherence with prescribed medication, agreement to use safety plan, living with other people, structured day, positive social skills and access to a variety of clinical interventions  Current MH Assignments: Guilt & Shame    Narrative: Current Mental Health symptoms include: none reported. See below for suicide risk assessment. Pt does not endorse thoughts of self-harm or suicide ideation at this time. Pt's current CGI is 5/5.  He reports that his stress level has increased; coping skills to manage stress used were \"meds and meditation.\" Pt reported that his mood has significantly changed this past week and he is \"way faster and seeing through people too well.\"    Brown Suicide Severity Rating Scale (Short Version)  Brown Suicide Severity Rating (Short Version) 2022 2022 2022 2022 8/3/2022 2022   Over the past 2 weeks have you felt down, depressed, or hopeless? - - yes yes - -   Over the past 2 weeks have you had thoughts of killing yourself? - - no no - -   Have you ever attempted to kill yourself? - - no no - -   Q1 Wished to be Dead (Past Month) yes yes - - yes yes   Q2 Suicidal Thoughts (Past Month) no no - - no no   Q3 Suicidal Thought Method no no - - no no   Q4 Suicidal Intent without Specific Plan no no - - - no   Q5 Suicide Intent with Specific Plan no no - - no no   Q6 Suicide Behavior (Lifetime) yes yes - - yes yes   Within the Past 3 Months? no no - - no no   Level of Risk per Screen moderate risk moderate risk - - moderate risk moderate risk       Dimension 4: Treatment Acceptance / Resistance -   Previous Dimension Rating:  3  Current Dimension Ratin  DAVID Diagnosis:     Cannabis Use Disorder Severe - F12.20/304.30   Opioid Use Disorder Severe - F11.20/304.00 In Sustained Remission /MAT   Stimulant Related Disorder Severe - F15.20/304.00 In Sustained Remission: [place]   Tobacco Use Disorder Moderate -F.17.20/305.10        Commitment to tx process/Stage of change- Pt " "consistently attends and participates in groups and lectures. He appears to be in the Contemplative Stage of change. Patients attitude appears more engaging this week, and patient has been sharing more experiences and feelings related to his inability to stay sober longterm, and willingness to be more open and attend therapy has increased. Patient is making consistent progress.   DAVID assignments -  reading the first 164 pages of the A.A. \"Big Book\", and Complete First Step Assignment.     Narrative - Pt reports his motivation this week is \" my wife, my kids, me.\"  Pt reports that his aftercare plans include \"finding a good outpatient place.\" He reports that he has gotten along \"fine\" with peers and staff this week. Patient reports his coping skills this week that have helped are \"drawing, and sharing with peers in group.\"    Dimension 5: Relapse / Continued Problem Potential -   Previous Dimension Ratin  Current Dimension Rating:  3  Relapses this week - None  Urges to use - Yes- talking about using/stress  UA results -   8/3/22-Positive for BUP, AMP, THC (pt rx buprenorphine and vyvanse)  22- Positive for BUP, AMP, THC (pt rx buprenorphine and vyvanse)  22- Positive for BUP, AMP, THC (pt rx buprenorphine and vyvanse)  22- Positive for BUP, AMP, THC (pt rx buprenorphine and vyvanse)      Narrative- Pt reports craving 3/10, ten being highest. He reported experiencing triggers to use revolving around talking about use and stress; he used the following coping skill(s): \" nothing really healthy, I need to get more skills for this.\"   Pt participated in the spirituality group, facilitated by Melissa Arce and  counseling staff was present during group.    Dimension 6: Recovery Environment -   Previous Dimension Rating:  3  Current Dimension Rating:  3  Family Involvement - Yes, weekly Tiffany Video Check In with Wife  Summarize attendance at family groups and family sessions - N/A   Family supportive " "of treatment?  Yes  Recreational activities - drawing, talking with others, smoking, exercising.    Narrative - Pt is spending free time with same-gender peers and attending at least 3 virtual 12-step meetings weekly while in [place]. He participated in weekend workshop on relationships and completed all activities. Patient meets with counselor weekly for a family check in session with wife. Patient reports this has been really helpful and staying in touch and also being able to share how treatment has been helping patient address DAVID/MH concerns. Patient has considered outpatient and sober living, but has been hesitant and not committing to a plan yet.     Progress made on transition planning goals: In Process-       Justification for Continued Treatment at this Level of Care: Risk ratings indicate continued need for this level of care.Pt was unable upon admission to remain sober with OP level of care,Patient  lacks long-term sober maintenance skills, lacks sober coping skills and has mental health issues which are exacerbated by substance abuse. Patient has significant amount of MH concerns related to grief and healthy, stable relationships that has been unaddressed for many years.   Treatment coordination activities this week:  referrals to mental health services including family therapy  Need for peer recovery support referral? No    Discharge Planning:  Target Discharge Date/Timeframe: 8-31-22  Med Mgmt Provider/Appt:  BERT  Ind therapy Provider/Appt:  BERT  Family therapy Provider/Appt:  BERT  Other referrals: Pt requested referrals for \"outpatient stuff near [place], [place]\" - this will be part of his aftercare planning.    Has vulnerable adult status changed? No    Interdisciplinary Clinical Supervision including: LADC and RN    Are Treatment Plan goals/objectives effective? Yes  *If no, list changes to treatment plan:    Are the current goals meeting client's needs? Yes  *If no, list the changes to treatment " plan.    Patient Input / Response: Pt contributed to treatment plan review.    *Client agrees with any changes to the treatment plan: N/A  *Client received copy of changes: N/A  *Client is aware of right to access a treatment plan review: Yes    ALIYAH Hines

## 2022-08-17 NOTE — GROUP NOTE
Psychoeducation Group Documentation    PATIENT'S NAME: Barak Clarke  MRN:   [id number]  :   1985  ACCT. NUMBER: [id number]  DATE OF SERVICE: 22  START TIME:  8:30 AM  END TIME:  9:30 AM  FACILITATOR(S): Bro Dozier LADC; Liam De La Cruz, PhD LP; Kathy Gould LADC  TOPIC: BEH Pyschoeducation  Number of patients attending the group:  4  Group Length:  1 Hours    Skills Group Therapy Type: Recovery skills and Emotion regulation skills    Summary of Group / Topics Discussed:    Balanced lifestyle skills and Symptom management skills          Group Attendance:  Attended group session    Patient's response to the group topic/interactions:  cooperative with task    Patient appeared to be Attentive.         Client specific details:  Barak gave appropriate feedback..

## 2022-08-18 ENCOUNTER — HOSPITAL ENCOUNTER (OUTPATIENT)
Dept: BEHAVIORAL HEALTH | Facility: CLINIC | Age: 37
Discharge: HOME | End: 2022-08-18
Attending: FAMILY MEDICINE
Payer: COMMERCIAL

## 2022-08-18 DIAGNOSIS — F19.20 CHEMICAL DEPENDENCY (H): ICD-10-CM

## 2022-08-18 LAB — FENTANYL UR QL: NORMAL

## 2022-08-18 PROCEDURE — 1002N00001 HC LODGING PLUS FACILITY CHARGE ADULT

## 2022-08-18 PROCEDURE — H2035 A/D TX PROGRAM, PER HOUR: HCPCS | Mod: HQ

## 2022-08-18 PROCEDURE — 80307 DRUG TEST PRSMV CHEM ANLYZR: CPT

## 2022-08-18 NOTE — GROUP NOTE
Group Therapy Documentation    PATIENT'S NAME: Barak Clarke  MRN:   [id number]  :   1985  ACCT. NUMBER: [id number]  DATE OF SERVICE: 22  START TIME: 12:30 PM  END TIME:  2:30 PM  FACILITATOR(S): Rasta Ryder LADC  TOPIC: BEH Group Therapy  Number of patients attending the group:  5  Group Length:  2 Hours    Group Therapy Type: Recovery strategies    Summary of Group / Topics Discussed:    Recovery Principles      Group Attendance:  Attended group session    Patient's response to the group topic/interactions:  cooperative with task    Patient appeared to be Actively participating.        Client specific details:  Barak participated and interacted appropriately with peers and staff in PM group. No triggers to use noted or discussed.

## 2022-08-18 NOTE — GROUP NOTE
Psychoeducation Group Documentation    PATIENT'S NAME: Barak Clarke  MRN:   [id number]  :   1985  ACCT. NUMBER: [id number]  DATE OF SERVICE: 22  START TIME:  3:00 PM  END TIME:  4:00 PM  FACILITATOR(S): Noe Yeh LADC; Cata Sanderson LADC; Angelita Doe LADC  TOPIC: BEH Pyschoeducation  Number of patients attending the group:  5  Group Length:  1 Hours    Skills Group Therapy Type: Healthy behaviors development    Summary of Group / Topics Discussed:    Balanced lifestyle skills          Group Attendance:  Attended group session    Patient's response to the group topic/interactions:  cooperative with task    Patient appeared to be Attentive.         Client specific details:  The patient participated in the afternoon skills group on addiction.

## 2022-08-18 NOTE — GROUP NOTE
Group Therapy Documentation    PATIENT'S NAME: Barak Clarke  MRN:   [id number]  :   1985  ACCT. NUMBER: [id number]  DATE OF SERVICE: 22  START TIME:  9:00 AM  END TIME: 11:00 AM  FACILITATOR(S): Wendy Smith LADC  TOPIC: BEH Group Therapy  Number of patients attending the group:  4  Group Length:  2 Hours    Group Therapy Type: Recovery strategies and Emotion processing    Summary of Group / Topics Discussed:    Recovery Principles      Group Attendance:  Attended group session    Patient's response to the group topic/interactions:  cooperative with task    Patient appeared to be Actively participating.        Client specific details:  Barak attended AM small group therapy. Patient participated in activity and remained engaged.

## 2022-08-18 NOTE — PROGRESS NOTES
"Late Entry 8/17/22   DAVID Family Check In - Phone Call  3:00-3:52PM     Writer met with patient for a weekly family check-in with his wife. Patient expressed upon admission, he struggles with motivation to get sober and remain sober long term. Patient requested weekly DAVID check ins with wife to incorporate what he has been working on , and continue to address how his DAVID/MH effects his life, and his family.     The discussion consisted of patient sharing how his week has been going, and the highs/lows of being in treatment. Patient shared insight on where his motivation is at for being sober, and the struggle of thinking \" forever vs for today.\" Patient shared insight he gained from group this week about, how he can use the motivation to be sober for his wife/family, and transition that into self motivation, to continue to maintain.     Patients wife (Gema) shared about her experience this week with exploring [place] for herself. Patient stated , \" it was different, but I like it, I attended a meeting with a great group of women, and I want to go back.\"  She shared that it feelings good to have outlets for herself other then therapy. Patient reported feeling isolated and disconnected not really understanding addiction from the spouse perspective.  Writer shared the importance for both patient and wife to address personal concerns, as well as having outlets other than each other.     Wife (Gema) also mentioned insight from her therapy session  about how the two of them unintentionally trigger each other when conversations escalate to arguments. Gema shared that when she is called a liar, or feels not believed, it triggers her past trauma, and she turns mean and escalates. Patient shared understanding of this, and added that when the conversations escalate, he becomes more mad, and is more adamant at proving that she was lying. Both parties verbalized understanding there are things to work on. Each agreed to look at " their role in the conversations and how to avoid arguments.     Overall session went well with patient and his wife.   All are in agreement - Next session 8/24/22 at 3pm.       ALIYAH Hines

## 2022-08-18 NOTE — GROUP NOTE
Group Therapy Documentation    PATIENT'S NAME: Barak Clarke  MRN:   [id number]  :   1985  ACCT. NUMBER: [id number]  DATE OF SERVICE: 22  START TIME:  12:30PM  END TIME: 2:30PM  FACILITATOR(S): Wendy Smith LADC  TOPIC: BEH Group Therapy  Number of patients attending the group:  4  Group Length:  2 Hours    Group Therapy Type: Recovery strategies    Summary of Group / Topics Discussed:    Recovery Principles, Balanced lifestyle, Emotions/expression, and Relapse prevention      Group Attendance:  Attended group session    Patient's response to the group topic/interactions:  cooperative with task    Patient appeared to be Actively participating.        Client specific details:  Barak attended PM small group therapy. He remained engaged and particpated in group discussion addressing daily inventory..

## 2022-08-19 ENCOUNTER — HOSPITAL ENCOUNTER (OUTPATIENT)
Dept: BEHAVIORAL HEALTH | Facility: CLINIC | Age: 37
Discharge: HOME | End: 2022-08-19
Attending: FAMILY MEDICINE
Payer: COMMERCIAL

## 2022-08-19 PROCEDURE — 1002N00001 HC LODGING PLUS FACILITY CHARGE ADULT

## 2022-08-19 PROCEDURE — H2035 A/D TX PROGRAM, PER HOUR: HCPCS | Mod: HQ

## 2022-08-19 NOTE — GROUP NOTE
"Group Therapy Documentation    PATIENT'S NAME: Barak Clarke  MRN:   [id number]  :   1985  ACCT. NUMBER: [id number]  DATE OF SERVICE: 22  START TIME:  9:00 AM  END TIME: 11:00 AM  FACILITATOR(S): Cata Sanderson LADC  TOPIC: BEH Group Therapy  Number of patients attending the group:  10  Group Length:  2 Hours    Group Therapy Type: Recovery strategies and Daily living/independence skills    Summary of Group / Topics Discussed:    Sober coping skills, Balanced lifestyle, and Relapse prevention    Group Attendance:  Attended group session    Patient's response to the group topic/interactions:  cooperative with task and expressed readiness to alter behaviors    Patient appeared to be Actively participating, Attentive and Engaged.        Client specific details: Pt reported feeling motivated, and participated in a group exercise to set goals and \"pick low-hanging fruit.\" One of his goals is to find and practice a healthy hobby.  "

## 2022-08-20 ENCOUNTER — HOSPITAL ENCOUNTER (OUTPATIENT)
Dept: BEHAVIORAL HEALTH | Facility: CLINIC | Age: 37
Discharge: HOME | End: 2022-08-20
Attending: FAMILY MEDICINE
Payer: COMMERCIAL

## 2022-08-20 DIAGNOSIS — F41.9 ANXIETY: Primary | ICD-10-CM

## 2022-08-20 PROCEDURE — 1002N00001 HC LODGING PLUS FACILITY CHARGE ADULT

## 2022-08-20 PROCEDURE — H2035 A/D TX PROGRAM, PER HOUR: HCPCS | Mod: HQ

## 2022-08-20 RX ORDER — GABAPENTIN 600 MG/1
600 TABLET ORAL 4 TIMES DAILY PRN
Qty: 120 TABLET | Refills: 0 | Status: SHIPPED | OUTPATIENT
Start: 2022-08-20 | End: 2024-03-14

## 2022-08-20 NOTE — GROUP NOTE
Psychoeducation Group Documentation    PATIENT'S NAME: Barak Clarke  MRN:   [id number]  :   1985  ACCT. NUMBER: [id number]  DATE OF SERVICE: 22  START TIME: 12:30 PM  END TIME:  2:30 PM  FACILITATOR(S): Olvin Lafleur LADC  TOPIC: BEH Pyschoeducation  Number of patients attending the group: 22  Group Length:  2 Hours    Skills Group Therapy Type: Emotion regulation skills, Daily living/independence skills, Healthy behaviors development, and Relationship skills development    Summary of Group / Topics Discussed:    Balanced lifestyle skills and Relapse prevention skills          Group Attendance:  {Group Attendance:501722}    Patient's response to the group topic/interactions:  {OPBEHCLIENTRESPONSE:734061}    Patient appeared to be {Engagement:185519}.         Client specific details:  ***.

## 2022-08-20 NOTE — TELEPHONE ENCOUNTER
Pt's outside psychiatrist alka rivers did not send in refills at their last appt and pt had no remaining refills at his outside pharmacy. Writer found this out after hours on Friday. His provider cannot be reached on the weekend to provide a refill and pt will run out today. Pt takes this med as a scheduled med and was very anxious and upset when he found out he could run out before we could get a hold of his provider on Monday.

## 2022-08-20 NOTE — GROUP NOTE
Group Therapy Documentation    PATIENT'S NAME: Barak Clarke  MRN:   [id number]  :   1985  ACCT. NUMBER: [id number]  DATE OF SERVICE: 22  START TIME: 10:00 AM  END TIME: 11:30 AM  FACILITATOR(S): Vicky Zavala  TOPIC: BEH Group Therapy  Number of patients attending the group:  25    Group Length:  2 Hours    Group Therapy Type: Emotion processing    Summary of Group / Topics Discussed:    Co-occurring illnesses symptom management, Coping/DBT informed care, and Trauma informed care      Group Attendance:  Attended group session    Patient's response to the group topic/interactions:  cooperative with task    Patient appeared to be Actively participating, Attentive and Engaged.        Client specific details:  Patient attentive group session and was attentive and participative.

## 2022-08-20 NOTE — GROUP NOTE
"Psychoeducation Group Documentation    PATIENT'S NAME: Barak Clarke  MRN:   [id number]  :   1985  ACCT. NUMBER: [id number]  DATE OF SERVICE: 22  START TIME: 12:30 PM  END TIME:  2:30 PM  FACILITATOR(S): Olvin Lafleur LADC  TOPIC: BEH Pyschoeducation  Number of patients attending the group: 22  Group Length:  2 Hours    Skills Group Therapy Type: Recovery skills, Healthy behaviors development, and Relationship skills development    Summary of Group / Topics Discussed:    Relationship/social skills, Balanced lifestyle skills, and Relapse prevention skills          Group Attendance:  Attended group session    Patient's response to the group topic/interactions:  cooperative with task, discussed personal experience with topic and expressed readiness to alter behaviors    Patient appeared to be Actively participating, Attentive and Engaged.         Client specific details:  Patient participated in group activity on \"The voice of Addiction\" and how to utilize their own voices with telling their diease off. They also, discussed \"consequense and rewards.         "

## 2022-08-21 ENCOUNTER — HOSPITAL ENCOUNTER (OUTPATIENT)
Dept: BEHAVIORAL HEALTH | Facility: CLINIC | Age: 37
Discharge: HOME | End: 2022-08-21
Attending: FAMILY MEDICINE
Payer: COMMERCIAL

## 2022-08-21 ENCOUNTER — HEALTH MAINTENANCE LETTER (OUTPATIENT)
Age: 37
End: 2022-08-21

## 2022-08-21 PROCEDURE — H2035 A/D TX PROGRAM, PER HOUR: HCPCS | Mod: HQ

## 2022-08-21 PROCEDURE — 1002N00001 HC LODGING PLUS FACILITY CHARGE ADULT

## 2022-08-21 NOTE — GROUP NOTE
Group Therapy Documentation    PATIENT'S NAME: Barak Clarke  MRN:   [id number]  :   1985  ACCT. NUMBER: [id number]  DATE OF SERVICE: 22  START TIME:  8:45 AM  END TIME: 10:45 AM  FACILITATOR(S): Gema Hills RN; Mauricio Masterson LADC; Vicky Zavala  TOPIC: BEH Group Therapy  Number of patients attending the group:  23  Group Length:  2 Hours    Group Therapy Type: Health and wellbeing     Summary of Group / Topics Discussed:    Balanced lifestyle and Self-care activities      Group Attendance:  Attended group session    Patient's response to the group topic/interactions:  cooperative with task and listened actively    Patient appeared to be Actively participating, Attentive and Engaged.        Client specific details:  Barak learned about the importance of diet and exercise as a part of a healthy recovery.

## 2022-08-21 NOTE — GROUP NOTE
Group Therapy Documentation    PATIENT'S NAME: Barak Clarke  MRN:   [id number]  :   1985  ACCT. NUMBER: [id number]  DATE OF SERVICE: 22  START TIME: 12:30 PM  END TIME:  1:30 PM  FACILITATOR(S): Vicky Zavala  TOPIC: BEH Group Therapy  Number of patients attending the group:  23    Group Length:  1 Hours    Group Therapy Type: Recovery strategies and Daily living/independence skills    Summary of Group / Topics Discussed:    Recovery Principles, Sober coping skills, and Relapse prevention      Group Attendance:  Attended group session    Patient's response to the group topic/interactions:  cooperative with task    Patient appeared to be Actively participating, Attentive and Engaged.        Client specific details:  Patient attended group lecture and was attentive and participative.

## 2022-08-22 ENCOUNTER — HOSPITAL ENCOUNTER (OUTPATIENT)
Dept: BEHAVIORAL HEALTH | Facility: CLINIC | Age: 37
Discharge: HOME | End: 2022-08-22
Attending: FAMILY MEDICINE
Payer: COMMERCIAL

## 2022-08-22 DIAGNOSIS — F19.20 CHEMICAL DEPENDENCY (H): ICD-10-CM

## 2022-08-22 LAB — FENTANYL UR QL: NORMAL

## 2022-08-22 PROCEDURE — 80307 DRUG TEST PRSMV CHEM ANLYZR: CPT

## 2022-08-22 PROCEDURE — H2035 A/D TX PROGRAM, PER HOUR: HCPCS | Mod: HQ

## 2022-08-22 PROCEDURE — 1002N00001 HC LODGING PLUS FACILITY CHARGE ADULT

## 2022-08-22 NOTE — GROUP NOTE
Group Therapy Documentation    PATIENT'S NAME: Barak Clarke  MRN:   [id number]  :   1985  ACCT. NUMBER: [id number]  DATE OF SERVICE: 22  START TIME:  9:00 AM  END TIME: 11:00 AM  FACILITATOR(S): Wendy Smith LADC  TOPIC: BEH Group Therapy  Number of patients attending the group:  5  Group Length:  2 Hours    Group Therapy Type: Recovery strategies    Summary of Group / Topics Discussed:    Recovery Principles      Group Attendance:  Attended group session    Patient's response to the group topic/interactions:  cooperative with task    Patient appeared to be Actively participating.        Client specific details:   attended AM small group therapy. Patient participated in daily check ins, meditation readings. Patient presented their Drug History assignment, and was open to feedback and questions from peers. Patient shared his history and appeared very motivated by the assignment and was willing to verbally say that he has been addicted to his benzo prescription that he has been talking for the past year.

## 2022-08-22 NOTE — GROUP NOTE
Group Therapy Documentation    PATIENT'S NAME: Barak Clarke  MRN:   [id number]  :   1985  ACCT. NUMBER: [id number]  DATE OF SERVICE: 22  START TIME: 12:30 PM  END TIME:  2:30 PM  FACILITATOR(S): Wendy Smith LADC  TOPIC: BEH Group Therapy  Number of patients attending the group:  7  Group Length:  2 Hours    Group Therapy Type: Recovery strategies    Summary of Group / Topics Discussed:    Spiritual Care  Spiritual Group Therapy consisted of Spiritual Care and addressing Principles that are important in recovery, and wellness of self. Patients and facilitators (Rochelle & ALIYAH)  reviewed topics related to sense of self, identity, and relations to others within spirituality/relision/nonspiritual concepts.    Group Attendance:  Attended group session    Patient's response to the group topic/interactions:  cooperative with task    Patient appeared to be Actively participating.        Client specific details:  Barak  attended and participated in PM group therapy..

## 2022-08-23 ENCOUNTER — HOSPITAL ENCOUNTER (OUTPATIENT)
Dept: BEHAVIORAL HEALTH | Facility: CLINIC | Age: 37
Discharge: HOME | End: 2022-08-23
Attending: FAMILY MEDICINE
Payer: COMMERCIAL

## 2022-08-23 PROCEDURE — 1002N00001 HC LODGING PLUS FACILITY CHARGE ADULT

## 2022-08-23 PROCEDURE — H2035 A/D TX PROGRAM, PER HOUR: HCPCS | Mod: HQ

## 2022-08-23 NOTE — GROUP NOTE
Group Therapy Documentation    PATIENT'S NAME: Barak Clarke  MRN:   [id number]  :   1985  ACCT. NUMBER: [id number]  DATE OF SERVICE: 22  START TIME:  9:00 AM  END TIME: 11:00 AM  FACILITATOR(S): Vicky Zavala  TOPIC: BEH Group Therapy  Number of patients attending the group: 7    Group Length:  2 Hours    Group Therapy Type: Recovery strategies, Emotion processing, and Daily living/independence skills    Summary of Group / Topics Discussed:    Recovery Principles, Sober coping skills, Relationship/socialization, and Relapse prevention      Group Attendance:  Attended group session    Patient's response to the group topic/interactions:  cooperative with task    Patient appeared to be Actively participating, Attentive and Engaged.        Client specific details:  Patient attended group session and was attentive and participative.

## 2022-08-23 NOTE — GROUP NOTE
Psychoeducation Group Documentation    PATIENT'S NAME: Barak Clarke  MRN:   [id number]  :   1985  ACCT. NUMBER: [id number]  DATE OF SERVICE: 22  START TIME:  3:00 PM  END TIME:  4:00 PM  FACILITATOR(S): Noe Yeh LADC  TOPIC: BEH Pyschoeducation  Number of patients attending the group:  7  Group Length:  1 Hours    Skills Group Therapy Type: Healthy behaviors development    Summary of Group / Topics Discussed:    Balanced lifestyle skills          Group Attendance:  Attended group session    Patient's response to the group topic/interactions:  cooperative with task    Patient appeared to be Attentive.         Client specific details:  The patient participated in the afternoon skills group on Nutrition.

## 2022-08-23 NOTE — GROUP NOTE
"Group Therapy Documentation    PATIENT'S NAME: Barak Clarke  MRN:   [id number]  :   1985  ACCT. NUMBER: [id number]  DATE OF SERVICE: 22  START TIME: 12:30 PM  END TIME:  2:30 PM  FACILITATOR(S): Wendy Smith LADC  TOPIC: BEH Group Therapy  Number of patients attending the group: 7  Group Length:  2 Hours    Group Therapy Type: Recovery strategies    Summary of Group / Topics Discussed:    Recovery Principles, Disease of addiction, and Consequences of Use    Patients completed daily check ins and the question of the day, \" What are your top 3 values?\"        Patients engaged in reading and processing daily meditation about self care. Patients shared their ideas of self care and why it is so important.   Patients discussed their values and how they were effected while they were in active use vs sober. Patients created lists of consequences of use that effected their Health ( Physical, Emotional, Familial and Environment).      Group Attendance:  Attended group session    Patient's response to the group topic/interactions:  cooperative with task    Patient appeared to be Actively participating.        Client specific details:  Barak attended PM group therapy. Patient engaged in discussion and participated in sharing feedback to his peers.         "

## 2022-08-24 ENCOUNTER — HOSPITAL ENCOUNTER (OUTPATIENT)
Dept: BEHAVIORAL HEALTH | Facility: CLINIC | Age: 37
Discharge: HOME | End: 2022-08-24
Attending: FAMILY MEDICINE
Payer: COMMERCIAL

## 2022-08-24 DIAGNOSIS — F19.20 CHEMICAL DEPENDENCY (H): ICD-10-CM

## 2022-08-24 LAB — SARS-COV-2 RNA RESP QL NAA+PROBE: NEGATIVE

## 2022-08-24 PROCEDURE — H2035 A/D TX PROGRAM, PER HOUR: HCPCS

## 2022-08-24 PROCEDURE — U0003 INFECTIOUS AGENT DETECTION BY NUCLEIC ACID (DNA OR RNA); SEVERE ACUTE RESPIRATORY SYNDROME CORONAVIRUS 2 (SARS-COV-2) (CORONAVIRUS DISEASE [COVID-19]), AMPLIFIED PROBE TECHNIQUE, MAKING USE OF HIGH THROUGHPUT TECHNOLOGIES AS DESCRIBED BY CMS-2020-01-R: HCPCS

## 2022-08-24 PROCEDURE — 1002N00001 HC LODGING PLUS FACILITY CHARGE ADULT

## 2022-08-24 PROCEDURE — H2035 A/D TX PROGRAM, PER HOUR: HCPCS | Mod: HQ

## 2022-08-24 NOTE — PROGRESS NOTES
Patient met with program  to review and initiate aftercare placement opportunities. Patient reports that he will be returning home after completing LP. Patient states that he lives with his wife and 3 children. He indicates a stressful financial situation and related need to begin work as soon as possible.  Various outpatient program options were presented to patient. He appears amenable to EOP offered virtually. Patient ws provided program schedules and contact information for [facility] [place] and [facility], Patient was directed to review information with his primary counselors to initiate referrals.

## 2022-08-24 NOTE — GROUP NOTE
Group Therapy Documentation    PATIENT'S NAME: Barak Clarke  MRN:   [id number]  :   1985  ACCT. NUMBER: [id number]  DATE OF SERVICE: 22  START TIME: 12:30 PM  END TIME:  2:30 PM  FACILITATOR(S): Wendy Smith LADC  TOPIC: BEH Group Therapy      Group Attendance:  Attended group session    Patient's response to the group topic/interactions:  cooperative with task    Patient appeared to be Actively participating.        Client specific details:  Barak attended PM group therapy. Patient engaged in discussion and participated in sharing feedback to his peers.

## 2022-08-24 NOTE — GROUP NOTE
Psychoeducation Group Documentation    PATIENT'S NAME: Barak Clarke  MRN:   [id number]  :   1985  ACCT. NUMBER: [id number]  DATE OF SERVICE: 22  START TIME:  8:30 AM  END TIME:  9:30 AM  FACILITATOR(S): Bro Dozier LADC; Mario Richardson  TOPIC: BEH Pyschoeducation  Number of patients attending the group:  7  Group Length:  1 Hours    Skills Group Therapy Type: Recovery skills    Summary of Group / Topics Discussed:    Balanced lifestyle skills and Symptom management skills          Group Attendance:  Attended group session    Patient's response to the group topic/interactions:  cooperative with task    Patient appeared to be Attentive.         Client specific details:  Barak gave appropriate feedback..

## 2022-08-24 NOTE — GROUP NOTE
Group Therapy Documentation    PATIENT'S NAME: Barak Clarke  MRN:   [id number]  :   1985  ACCT. NUMBER: [id number]  DATE OF SERVICE: 22  START TIME: 10:00 AM  END TIME: 11:30 AM  FACILITATOR(S): Vicky Zavala  TOPIC: BEH Group Therapy  Number of patients attending the group:  7    Group Length:  1.5 Hours    Group Therapy Type: Recovery strategies, Emotion processing, and Daily living/independence skills    Summary of Group / Topics Discussed:    Recovery Principles, Sober coping skills, Relationship/socialization, and Relapse prevention      Group Attendance:  Attended group session    Patient's response to the group topic/interactions:  cooperative with task    Patient appeared to be Actively participating, Attentive and Engaged.        Client specific details:  Patient attended group session and was attentive and participative.

## 2022-08-24 NOTE — PROGRESS NOTES
Writer met with patient for a weekly Family Check In Phone Call-  3:00-3:50PM    Pt shared how his week has been, and the things he has been working on in group and with staff for his recovery.   Patient and wife discussed current stress level for each of them coming up on patients discharge date.   Patient shared meeting with staff and discussing aftercare options and his willingness to attend them.   Wife inquired about patients motivation to attend aftercare, and how it will fit into their schedule due to her being in nursing school, and him needing to find a job.   Writer acknowledged the stress for both partes, and also reiterated the importance of patient staying on track and attending aftercare for his recovery and overall stability.   Writer and patient inquired to wife how she has been doing with starting nursing school, and being able to balance everything.   Patient expressed concern for his wife being overwhelmed and he shared his feelings of guilt and embarrassment, not being able to be home and helping her.   Writer explained the importance of staying engaged in treatment and doing as much as he can to remain stable to gain skills to help for when he transitions home.   Patients discharge next week was discussed and arrangements for patient getting picked up and how he will be transported home clarified.       ALIYAH Hines

## 2022-08-25 ENCOUNTER — HOSPITAL ENCOUNTER (OUTPATIENT)
Dept: BEHAVIORAL HEALTH | Facility: CLINIC | Age: 37
Discharge: HOME | End: 2022-08-25
Attending: FAMILY MEDICINE
Payer: COMMERCIAL

## 2022-08-25 PROCEDURE — 1002N00001 HC LODGING PLUS FACILITY CHARGE ADULT

## 2022-08-25 PROCEDURE — H2035 A/D TX PROGRAM, PER HOUR: HCPCS | Mod: HQ

## 2022-08-25 NOTE — GROUP NOTE
Group Therapy Documentation    PATIENT'S NAME: Barak Clarke  MRN:   [id number]  :   1985  ACCT. NUMBER: [id number]  DATE OF SERVICE: 22  START TIME: 12:30 PM  END TIME:  2:30 PM  FACILITATOR(S): Wendy Smith LADC  TOPIC: BEH Group Therapy  Number of patients attending the group:  7  Group Length:  2 Hours    Group Therapy Type: Recovery strategies    Summary of Group / Topics Discussed:    Recovery Principles      Group Attendance:  Attended group session    Patient's response to the group topic/interactions:  cooperative with task    Patient appeared to be Actively participating.        Client specific details:  Barak attended PM group therapy. Patient engaged in discussion and participated in sharing feedback to his peers.   .

## 2022-08-25 NOTE — GROUP NOTE
Psychoeducation Group Documentation    PATIENT'S NAME: Barak Clarke  MRN:   [id number]  :   1985  ACCT. NUMBER: [id number]  DATE OF SERVICE: 22  START TIME:  3:00 PM  END TIME:  4:00 PM  FACILITATOR(S): Cata Sanderson LADC; Olvin Lafleur LADC; Kimmie Pineda LADC  TOPIC: BEH Pyschoeducation  Number of patients attending the group:  28  Group Length:  1 Hours    Skills Group Therapy Type: Recovery skills and Healthy behaviors development    Summary of Group / Topics Discussed:    Balanced lifestyle skills and Mindfulness/Relaxation skills    Group Attendance:  Attended group session    Patient's response to the group topic/interactions:  cooperative with task    Patient appeared to be Attentive and Engaged.         Client specific details: Pt participated in a mindfulness practice of identifying gratitude and utilizing it to support recovery.

## 2022-08-25 NOTE — GROUP NOTE
Group Therapy Documentation    PATIENT'S NAME: Barak Clarke  MRN:   [id number]  :   1985  ACCT. NUMBER: [id number]  DATE OF SERVICE: 22  START TIME:  9:00 AM  END TIME: 11:00 AM  FACILITATOR(S): Vicky Zavala  TOPIC: BEH Group Therapy  Number of patients attending the group:  7    Group Length:  2 Hours    Group Therapy Type: Recovery strategies, Emotion processing, and Daily living/independence skills    Summary of Group / Topics Discussed:    Recovery Principles, Sober coping skills, Relationship/socialization, Balanced lifestyle, and Relapse prevention      Group Attendance:  Attended group session    Patient's response to the group topic/interactions:  cooperative with task    Patient appeared to be Actively participating, Attentive and Engaged.        Client specific details:  Patient attended group session and was attentive and participative.

## 2022-08-26 ENCOUNTER — HOSPITAL ENCOUNTER (OUTPATIENT)
Dept: BEHAVIORAL HEALTH | Facility: CLINIC | Age: 37
Discharge: HOME | End: 2022-08-26
Attending: FAMILY MEDICINE
Payer: COMMERCIAL

## 2022-08-26 PROCEDURE — H2035 A/D TX PROGRAM, PER HOUR: HCPCS | Mod: HQ

## 2022-08-26 NOTE — PROGRESS NOTES
LP counselor notified RN staff that pt verbalized he is out of his Suboxone medication.  Last dose taken was 8/25 evening dose. Writer called [facility] and medication has not been ordered yet.     Writer then notified the below Suboxone prescribed and left  for callback.  Writer has spoken to pt in the [ast about keeping track of medications and refills.  Pt had reached out to the provider via text on 8/25  .        Awaiting for call from [facility] for resolve

## 2022-08-26 NOTE — GROUP NOTE
Group Therapy Documentation    PATIENT'S NAME: Barak Clarke  MRN:   [id number]  :   1985  ACCT. NUMBER: [id number]  DATE OF SERVICE: 22  START TIME:  9:00 AM  END TIME: 11:00 AM  FACILITATOR(S): Vicyk Zavala  TOPIC: BEH Group Therapy  Number of patients attending the group:  7    Group Length:  2 Hours    Group Therapy Type: Recovery strategies, Daily living/independence skills, and Health and wellbeing     Summary of Group / Topics Discussed:    Recovery Principles, Sober coping skills, Relationship/socialization, and Relapse prevention      Group Attendance:  Attended group session    Patient's response to the group topic/interactions:  cooperative with task    Patient appeared to be Actively participating, Attentive and Engaged.        Client specific details:  Patient attended group session and was attentive and participative.
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
## 2022-08-27 ENCOUNTER — HOSPITAL ENCOUNTER (OUTPATIENT)
Dept: BEHAVIORAL HEALTH | Facility: CLINIC | Age: 37
Discharge: HOME | End: 2022-08-27
Attending: FAMILY MEDICINE
Payer: COMMERCIAL

## 2022-08-27 PROCEDURE — H2035 A/D TX PROGRAM, PER HOUR: HCPCS | Mod: HQ

## 2022-08-27 NOTE — GROUP NOTE
Psychoeducation Group Documentation    PATIENT'S NAME: Barak Clarke  MRN:   [id number]  :   1985  ACCT. NUMBER: [id number]  DATE OF SERVICE: 22  START TIME:  9:00 AM  END TIME: 11:00 AM  FACILITATOR(S): Bro Dozier [facility]; Cata Sanderson [facility]; Wendy Smith [facility]  TOPIC: BEH Pyschoeducation  Number of patients attending the group:  7  Group Length:  2 Hours    Skills Group Therapy Type: Recovery skills    Summary of Group / Topics Discussed:    Relapse prevention skills          Group Attendance:  Attended group session    Patient's response to the group topic/interactions:  cooperative with task and listened actively    Patient appeared to be Actively participating, Attentive and Engaged.         Client specific details:  Barak gave appropriate feedback..

## 2022-08-27 NOTE — PROGRESS NOTES
[facility] Weekly Treatment Plan Review      ATTENDANCE for the following date span: 8/15/22 - 22    Date      Group Therapy 4 Hours 4 Hours  5 Hours  4 Hours 4 Hours 4 Hours 1 Hour   Individual LADC 0 Hours  0 Hours  1 Hours  0 Hours  0 Hours  0 Hours  0 Hours    Individual Therapy 0 Hours  0 Hours  1 Hour 3 Hours  0 Hours  0 Hours  0 Hours    Skills Group 0 Hour 1 Hour 0 Hour 1 Hour 0 Hour 0 Hour 1 Hour   Peer Led Group 1 Hour 1 Hour 1 Hour 1 Hour 1 Hour 1 Hour 1 Hour       Patient absenses:  22 excused for therapy session with LMFT.     Weekly Treatment Plan Review     Treatment Plan initiated on: 22.    Dimension1: Acute Intoxication/Withdrawal Potential -   Previous Dimension Ratin  Current Dimension Ratin  Date of Last Use: 22 (Cannabis),  (Heroin/Meth).  Any reports of withdrawal symptoms - None  Patient reported maintaining his daily dosing of Suboxone.     Dimension 2: Biomedical Conditions & Complications -   Previous Dimension Ratin  Current Dimension Ratin  Medical Concerns:  none reported  Current Medications & Medication Changes:  Current Outpatient Medications   Medication     acetaminophen (TYLENOL) 325 MG tablet     alum & mag hydroxide-simethicone (MAALOX) 200-200-20 MG/5ML SUSP suspension     atenolol (TENORMIN) 50 MG tablet     benzocaine-menthol (CEPACOL) 15-3.6 MG lozenge     buprenorphine HCl-naloxone HCl (SUBOXONE) 8-2 MG per film     Citalopram Hydrobromide (CELEXA PO)     clonazePAM (KLONOPIN) 0.5 MG tablet     gabapentin (NEURONTIN) 600 MG tablet     guaiFENesin (ROBITUSSIN) 20 mg/mL SOLN solution     ibuprofen (ADVIL/MOTRIN) 200 MG tablet     loratadine (CLARITIN) 10 MG tablet     melatonin 3 MG tablet     nicotine polacrilex (NICORETTE) 4 MG gum     senna-docusate (SENOKOT-S/PERICOLACE) 8.6-50 MG tablet     VYVANSE 70 MG capsule     No current facility-administered  medications for this encounter.     Facility-Administered Medications Ordered in Other Encounters   Medication     Self Administer Medications: Behavioral Services     Medication Prescriber:  [facility]  Taking meds as prescribed? Yes  Medication side effects or concerns:  None reported  Outside medical appointments this week (list provider and reason for visit):  None  Narrative: Pt seems fully functioning and seeks medical attention as needed. Pt's temperature and oxygen level are measured daily in accordance with COVID-19 precautions. He attended lecture given by LP nurse on Self Care 22.    Dimension 3: Emotional/Behavioral Conditions & Complications -   Previous Dimension Ratin  Current Dimension Ratin  PHQ2:   PHQ-2 (  [employer]) 2022   Q1: Little interest or pleasure in doing things 1 1 1 1   Q2: Feeling down, depressed or hopeless 1 1 2 2   PHQ-2 Score 2 2 3 3      GAD2:   KILO-2 2022   Feeling nervous, anxious, or on edge 2 1 1   Not being able to stop or control worrying 2 1 1   KILO-2 Total Score 4 2 2     Mental health diagnosis: Anxiety, ADHD, PTSD, and MDD  Date of last SIB:    Date of  last SI:  Patient denies.  Date of last HI: Patient denies.  Behavioral Targets: Follow recommendations of medical provider. Report any alcohol or drug use to counselor and any increase in withdrawal symptoms to nurse. Stabilize and maintain mental health.   Risk factors:  Patient admitted from an unhealthy/non DAVID/MH supportive environment. MH concerns were not being addressed, lack of coping skills, intense grief reported, inability to implament coping skills outside of DAVID.   Protective factors:  spirituality, positive relationships positive family connections, forward/future oriented thinking, dedication to family/friends, safe and stable environment, abstinence from substances, adherence with prescribed medication, agreement to  "use safety plan, living with other people, structured day, positive social skills and access to a variety of clinical interventions  Current MH Assignments: Anxiety and DAVID.     Narrative: Current Mental Health symptoms include: none reported. See below for suicide risk assessment. Pt does not endorse thoughts of self-harm or suicide ideation at this time. Pt's current CGI is 5/5.  He reports that his stress level has been up and down all week, shifting every other day. Patient shared that he feelings his moods have been up and down, is due to, \" realizing I have so much to change.\" Patient stated asking himself, \" is it worth it? \" Patient shared that he tends to isolate, read, and draw, to cope with the moods, and increased stress. Patient continues to struggle with motivation, and reasoning to stay in treatment, and processing emotions around these situations. Patient stated he feels \" angry and resentful at times\" due to feeling not good enough or that he isn't providing what he needs to for his wife and family.   [place] Suicide Severity Rating Scale (Short Version)  [place] Suicide Severity Rating (Short Version) 6/29/2022 7/6/2022 7/11/2022 7/14/2022 8/3/2022 8/17/2022   Over the past 2 weeks have you felt down, depressed, or hopeless? - - yes yes - -   Over the past 2 weeks have you had thoughts of killing yourself? - - no no - -   Have you ever attempted to kill yourself? - - no no - -   Q1 Wished to be Dead (Past Month) yes yes - - yes yes   Q2 Suicidal Thoughts (Past Month) no no - - no no   Q3 Suicidal Thought Method no no - - no no   Q4 Suicidal Intent without Specific Plan no no - - - no   Q5 Suicide Intent with Specific Plan no no - - no no   Q6 Suicide Behavior (Lifetime) yes yes - - yes yes   Within the Past 3 Months? no no - - no no   Level of Risk per Screen moderate risk moderate risk - - moderate risk moderate risk       Dimension 4: Treatment Acceptance / Resistance -   Previous Dimension " "Rating:  3  Current Dimension Ratin  DAVID Diagnosis:     Cannabis Use Disorder Severe - F12.20/304.30   Opioid Use Disorder Severe - F11.20/304.00 In Sustained Remission /MAT   Stimulant Related Disorder Severe - F15.20/304.00 In Sustained Remission: DE   Tobacco Use Disorder Moderate -F.17.20/305.10        Commitment to tx process/Stage of change- Pt consistently attends and participates in groups and lectures. He appears to be in the Contemplative Stage of change. Patients attitude appears more engaging this week, and patient has been sharing more experiences and feelings related to his inability to stay sober longterm, and willingness to be more open and attend therapy has increased. Patient is making consistent progress.     DAVID assignments -  Completing Drug History     Narrative - Pt reports his motivation this week is \" my wife, will go to hell if I dont.\"  Patient reported feeling motivated to talk with LADC about aftercare and see what his options are.    He reports that he has gotten along \"fine\" with peers and staff this week. Patient reports his coping skills this week that have helped are \"drawing, reading big book with wife, and sharing with peers in group.\"    Dimension 5: Relapse / Continued Problem Potential -   Previous Dimension Ratin  Current Dimension Rating:  3  Relapses this week - None  Urges to use - Yes- talking about using/stress  UA results -   8/3/22-Positive for BUP, AMP, THC (pt rx buprenorphine and vyvanse)  22- Positive for BUP, AMP, THC (pt rx buprenorphine and vyvanse)  22- Positive for BUP, AMP, THC (pt rx buprenorphine and vyvanse)  22- Positive for BUP, AMP, THC (pt rx buprenorphine and vyvanse)  22- Positive for BUP, AMP, THC (pt rx buprenorphine and vyvanse)      Narrative- Pt reports craving 7/10, ten being highest. He reported experiencing triggers to use revolving around talking about use and feeling very anxious this week. Patient shared that he " "\"talked with peers and with his wife, and asked more questions in group, to gain skills to address concerns. \"  Pt participated in the spirituality group, facilitated by Melissa Arce and  counseling staff was present during group.    Dimension 6: Recovery Environment -   Previous Dimension Rating:  3  Current Dimension Rating:  3  Family Involvement - Yes, weekly Amwell Video Check In with Wife  Summarize attendance at family groups and family sessions - N/A   Family supportive of treatment?  Yes  Recreational activities - drawing, talking with others, smoking, exercising.    Narrative - Pt is spending free time with same-gender peers and attending at least 3 virtual 12-step meetings weekly while in . He participated in weekend workshop on relationships and completed all activities. Patient met with counselor weekly for a family check in session with wife. Patient reports this has been really helpful and staying in touch and also being able to share how treatment has been helping patient address DAVID/MH concerns. Patient was given outpatient options and decided to do evening virtual OP and be able to take care of his kids and still be engaged in recovery.  Progress made on transition planning goals: In Process- Referrals made to [facility] and [facility]    Justification for Continued Treatment at this Level of Care: Risk ratings indicate continued need for this level of care.Pt was unable upon admission to remain sober with OP level of care,Patient  lacks long-term sober maintenance skills, lacks sober coping skills and has mental health issues which are exacerbated by substance abuse. Patient has significant amount of MH concerns related to grief and healthy, stable relationships that has been unaddressed for many years.     Treatment coordination activities this week:  referrals to DAVID services for OP made this week.     Need for peer recovery support referral? No    Discharge Planning:  Target Discharge " Date/Timeframe: 8-31-22  Med Mgmt Provider/Appt:  TBD  Ind therapy Provider/Appt:  TBD  Family therapy Provider/Appt:  TBD  Other referrals:None  Has vulnerable adult status changed? No    Interdisciplinary Clinical Supervision including: ALIYAH, Mental health professional, RN and Supervisor/manager    Are Treatment Plan goals/objectives effective? Yes  *If no, list changes to treatment plan:    Are the current goals meeting client's needs? Yes  *If no, list the changes to treatment plan.    Patient Input / Response: Pt contributed to treatment plan review.    *Client agrees with any changes to the treatment plan: N/A  *Client received copy of changes: N/A  *Client is aware of right to access a treatment plan review: Yes    ALIYAH Hines

## 2022-08-27 NOTE — GROUP NOTE
Group Therapy Documentation    PATIENT'S NAME: Barak Clarke  MRN:   [id number]  :   1985  ACCT. NUMBER: [id number]  DATE OF SERVICE: 22  START TIME: 12:30 PM  END TIME:  2:30 PM  FACILITATOR(S): Bro Dozier LADC; Cata Sanderson LADC; Wendy Smith LADC  TOPIC: BEH Group Therapy  Number of patients attending the group:  26  Group Length:  2 Hours    Group Therapy Type: Recovery strategies and Daily living/independence skills    Summary of Group / Topics Discussed:    Relationship/socialization, Balanced lifestyle, and communication skills    Group Attendance:  Attended group session    Patient's response to the group topic/interactions:  cooperative with task    Patient appeared to be Actively participating, Attentive and Engaged.        Client specific details: Pt listened respectfully to a presentation on communication styles and participated in an exercise practicing communication skills.

## 2022-08-27 NOTE — PROGRESS NOTES
Patient left AMA this evening. Writer met with patient and asked why he was leaving, and patient responded,  oh it s time to go  and would not elaborate further. Patient reported he has saved place to go and transportation. No safety concern from the program and he left program around @1840.

## 2022-08-30 NOTE — PROGRESS NOTES
CHEMICAL DEPENDENCY DISCHARGE SUMMARY    PATIENT NAME: Barak Clarke   : 1985     EVALUATION COUNSELOR: Sharda Cortes [facility]  TREATMENT COUNSELORS: Rasta Ryder [facility]; Wendy Smith [facility]   REFERRAL SOURCE: Self   PROGRAM: [facility]  ADMISSION DATE: 8/3/2022  DATE OF LAST SESSION: 2022   DISCHARGE DATE: 2022    ADMISSION DIAGNOSIS:    Cannabis Use Disorder Severe - F12.20/304.30   Opioid Use Disorder Severe - F11.20/304.00 In Sustained Remission /MAT   Stimulant Related Disorder Severe - F15.20/304.00 In Sustained Remission: [place]   Tobacco Use Disorder Moderate -F.17.20/305.10         DISCHARGE DIAGNOSIS:  Cannabis Use Disorder Severe - F12.20/304.30   Opioid Use Disorder Severe - F11.20/304.00 In Sustained Remission /MAT   Stimulant Related Disorder Severe - F15.20/304.00 In Sustained Remission: [place]   Tobacco Use Disorder Moderate -F.17.20/305.10         DISCHARGE STATUS: Pt left the [facility] program ASA.  LAST USE DATE: Patient reported last date of use as 2022  DAYS OF TREATMENT COMPLETED:  Patient completed 23 days of treatment    PRESENTING INFORMATION: Barak Clarke presented to the [facility] in [place], [place] for a substance use evaluation. The pt was a self referent. The pt was seeking tx for cannabis use. The pt reported a hx of ADHD, PTSD, MDD, abuse, recent significant loss, psychiatric hospitalization, prior substance use and concerns which are in remission, prior tx attempts, and current prescribed Klonopin taper attempt. Pt denied unemployment and homelessness. Pt was assessed to be appropriate for substance use disorder tx at [facility].      READMITTANCE INFORMATION: If additional substance use disorder tx is required, pt may re-admit into the [facility] program following 30 days from date of discharge.     SERVICES PROVIDED: Our services included assessment, treatment planning and education regarding  "chemical dependency, mental illness, relationships, and relapse prevention. The patient also participated in individual therapy, group therapy, recovery oriented workshops, spiritual care counseling, recovery skills training, and aftercare planning.    ISSUES ADDRESS IN TREATMENT:    DIMENSION 1 - ACUTE INTOXICATION/WITHDRAWAL POTENTIAL  ADMISSION RISK RATIN   DISCHARGE RISK RATIN   Patient entered into [facility] on 8/3/2022. Patient reported substances of use as cannabis. Patient reported last date of use as 2022. Throughout treatment patient denied any withdrawal symptoms that would interfere with full participation in treatment programming.     DIMENSION 2 - BIOMEDICAL COMPLICATIONS AND CONDITIONS  ADMISSION RISK RATIN   DISCHARGE RISK RATIN  Upon admissions patient denied any medical concerns that would interfere with full participation in treatment programming. Patient reported having a PCP. Patient maintain medication compliance throughout treatment. Upon discharge patient appeared able to access medical aid as needed.     DIMENSION 3 - EMOTIONAL, BEHAVIORAL, COGNITIVE CONDITIONS AND COMPLICATIONS  ADMISSION RISK RATIN   DISCHARGE RISK RATIN   Upon admissions patient reported a mental health diagnosis of ADHD, PTSD, and MDD. To address this concern patient met with staff therapist DIEGO Stephenson. Patient reported this as a healthy therapeutic relationship. Upon admissions patient was given a suicidal risk screening. Patient was rated as \"Low Risk\". Upon discharge patient denied any suicidal thoughts or ideations.     DIMENSION 4 - READINESS FOR CHANGE  ADMISSION RISK RATING: 3   DISCHARGE RISK RATIN  Goal: Develop a greater awareness of substance use patterns and identify consequences of addiction, strength and commitment to making significant lifestyle changes supportive of recovery. Intervention: Pt completed and presented his drug use history/ " "progression assignment. The assignment helps to identify patterns of use and related emotional costs. The pt presented as an active and willing participant in all scheduled programming. Pt was also provided with a \"First Step\" assignment which was meant to develop his understanding of the AA's First Step and how it relates to his tx program and recovery.    DIMENSION 5 - RELAPSE, CONTINUED USE AND CONTINUED PROBLEM POTENTIAL   ADMISSION RISK RATIN   DISCHARGE RISK RATING: 3   Goal: Structure an aftercare plan, which will provide for continued support and skills development. Intervention: The pt was encouraged by his primary counselors to consider transitioning into an outpatient tx program after completing [facility]. The pt was accepted into [facility] prior to being discharged from [facility]. Goal: Gain insight into personal relapse cues and effective prevention strategies. Intervention: Pt attended two relapse prevention workshops. The pt was provided assignments to develop a personal relapse prevention plan and understand potential self-sabotaging, triggers, and cues. Pt was also required to attend at least three sober support meetings a week while in [facility].    DIMENSION 6 - RECOVERY ENVIRONMENT  ADMISSION RISK RATING: 3   DISCHARGE RISK RATIN   Goal: Ensure that living environment is supportive of recovery. Intervention: Pt reported that he would be returning home after discharge from [facility]. Goal: Begin to develop skills necessary to building a sober support network, identify helpful community resources. Intervention: The pt was provided information/ suggestions about getting a temporary sponsor and resources were made available to assist pt in identifying sober support group times and locations he  might attend after completing [facility]. Goal: Begin process of healing personal relationships damaged by ongoing use and related behaviors. Intervention: Pt was encouraged to invite " family/ concerned persons to attend an individual session. Pt's wife did participate in the session. The pt attended two weekend workshops on personal relationships.          STRENGTHS: Patient maintained a positive attitude while in treatment. Patient was an active participant in group therapy and was always open for feedback from his counselors and peers. Patient appears motivated for recovery at this time and willing to incorporate positive changes into his life.     LIVING ARRANGEMENTS AT DISCHARGE: Pt reported that he would be returning home after discharge from the [facility] program.      PROGNOSIS: Prognosis for this patient is guarded at this time. This can be upgraded if the patient follows the continuing care recommendations below.      CONTINUING CARE RECOMMENDATIONS AND REFERRALS:    1.  Abstain from all mood-altering chemicals unless prescribed by a licensed medical provider, and take all medications as prescribed.  2.  Attend a minimum of three AA/NA/Sober support groups weekly in the community.  3.  Obtain a permanent male sponsor and maintain regular contact with him.  4.  Admit immediately into [facility] and follow all recommendations.  5.  Continue to invest in building a sober support network.  6.  Continue to monitor and understand relapse triggers and stressors through the use and development of healthy coping skills.  7.  Obtain a mental health therapist and attend all scheduled programming.  8. Attend all scheduled medical appointments.  9. Take medication as prescribed.      This information has been disclosed to you from records protected by Federal confidentiality rules (42 CFR part 2). The Federal rules prohibit you from making any further disclosure of this information unless further disclosure is expressly permitted by the written consent of the person to whom it pertains or as otherwise permitted by 42 CFR part 2. A general authorization for the release of medical or other  information is NOT sufficient for this purpose. The Federal rules restrict any use of the information to criminally investigate or prosecute any alcohol or drug abuse patient.       NORIS Lewis

## 2022-11-06 ENCOUNTER — HOSPITAL ENCOUNTER (EMERGENCY)
Facility: HOSPITAL | Age: 37
Discharge: HOME | End: 2022-11-06
Attending: EMERGENCY MEDICINE | Admitting: EMERGENCY MEDICINE
Payer: COMMERCIAL

## 2022-11-06 VITALS
WEIGHT: 240 LBS | BODY MASS INDEX: 33.6 KG/M2 | OXYGEN SATURATION: 96 % | HEART RATE: 68 BPM | DIASTOLIC BLOOD PRESSURE: 86 MMHG | RESPIRATION RATE: 18 BRPM | TEMPERATURE: 97.1 F | HEIGHT: 71 IN | SYSTOLIC BLOOD PRESSURE: 128 MMHG

## 2022-11-06 DIAGNOSIS — F11.93 OPIOID WITHDRAWAL (H): ICD-10-CM

## 2022-11-06 PROCEDURE — 99282 EMERGENCY DEPT VISIT SF MDM: CPT

## 2022-11-06 PROCEDURE — 250N000013 HC RX MED GY IP 250 OP 250 PS 637: Performed by: EMERGENCY MEDICINE

## 2022-11-06 RX ORDER — BUPRENORPHINE AND NALOXONE 8; 2 MG/1; MG/1
1 FILM, SOLUBLE BUCCAL; SUBLINGUAL ONCE
Status: CANCELLED | OUTPATIENT
Start: 2022-11-06

## 2022-11-06 RX ORDER — BUPRENORPHINE AND NALOXONE 8; 2 MG/1; MG/1
1 FILM, SOLUBLE BUCCAL; SUBLINGUAL ONCE
Status: DISCONTINUED | OUTPATIENT
Start: 2022-11-06 | End: 2022-11-06 | Stop reason: CLARIF

## 2022-11-06 RX ORDER — BUPRENORPHINE HYDROCHLORIDE AND NALOXONE HYDROCHLORIDE DIHYDRATE 8; 2 MG/1; MG/1
1 TABLET SUBLINGUAL ONCE
Status: COMPLETED | OUTPATIENT
Start: 2022-11-06 | End: 2022-11-06

## 2022-11-06 RX ADMIN — BUPRENORPHINE HYDROCHLORIDE AND NALOXONE HYDROCHLORIDE DIHYDRATE 1 TABLET: 8; 2 TABLET SUBLINGUAL at 18:53

## 2022-11-06 NOTE — ED TRIAGE NOTES
Pt is looking for refill on suboxone. States he ran out yesterday and didn't realize it and feels like he is beginning to withdraw. Pt would like either rx or meds for symptoms.

## 2022-11-07 NOTE — DISCHARGE INSTRUCTIONS
You were seen in the Emergency Department today for evaluation of some opiate withdrawal symptoms from missing your Suboxone.  You were given a single dose of Suboxone here.  You need to call your provider tomorrow to get your prescription.  Follow up with your primary care physician to ensure resolution of symptoms. Return if you have new or worsening symptoms.

## 2022-11-07 NOTE — ED PROVIDER NOTES
EMERGENCY DEPARTMENT ENCOUNTER      NAME: Barak Clarke  AGE: 37 year old male  YOB: 1985  MRN: [id number]  EVALUATION DATE & TIME: No admission date for patient encounter.    PCP: Sameer Milligan    ED PROVIDER: Angella Adams M.D.      Chief Complaint   Patient presents with     Withdrawal       FINAL IMPRESSION:  1. Opioid withdrawal (H)        ED COURSE & MEDICAL DECISION MAKING:    Pertinent Labs & Imaging studies reviewed. (See chart for details)  ED Course as of 11/06/22 2215   Sun Nov 06, 2022   1800 Patient is a pleasant self 37-year-old male with a history of opiate abuse and currently on Suboxone and has been for several years.  He said he was supposed to get it filled on Friday but forgot and so took his last dose yesterday morning.  He is starting to feel withdrawal symptoms and feeling a little bit anxious.  He is hemodynamically stable.  I told him we could give him a dose of his Suboxone here but then he needs to call his provider tomorrow to get set up for refill.  He agrees to do so.  He otherwise looks okay here.  He does not have any other complaints or other concerns and is comfortable with the plan.  We will work on getting him dismissed.       6:00 PM I met with patient for initial interview and encounter. We also discussed plan for discharge at this time.     Medical Decision Making    Supplemental history from: N/A    External Record(s) Reviewed: Outpatient Record    Differential Diagnosis: See [facility] charting for differential considered.     I performed an independent interpretation of the: N/A    Discussed with radiology regarding test interpretation: N/A    Discussion of management with another provider: N/A    The following testing was considered but ultimately not selected: None    I considered prescription management with: N/A    The patient's care impacted: None    Consideration of Admission/Observation: N/A - Patient discharged without consideration for  "admission    Care significantly affected by Social Determinants of Health including: Alcohol Abuse and/or Recreational Drug Use    At the conclusion of the encounter I discussed  the results of all of the tests and the disposition with patient.   All questions were answered.  The patient acknowledged understanding and was involved in the decision making regarding the overall care plan.      I discussed with patient the utility, limitations and findings of the exam/interventions/studies done during this visit as well as the list of differential diagnosis and symptoms to monitor/return to ER for.  Additional verbal discharge instructions were provided.     MEDICATIONS GIVEN IN THE EMERGENCY:  Medications   buprenorphine-naloxone (SUBOXONE) 8-2 MG sublingual tablet 1 tablet (1 tablet Sublingual Given 11/6/22 1853)       NEW PRESCRIPTIONS STARTED AT TODAY'S ER VISIT  Discharge Medication List as of 11/6/2022  6:54 PM             =================================================================    HPI    Triage Note: Pt is looking for refill on suboxone. States he ran out yesterday and didn't realize it and feels like he is beginning to withdraw. Pt would like either rx or meds for symptoms.     Patient information was obtained from: Patient    Use of : N/A    Barak Clarke is a 37 year old male who presents to the ED for evaluation of withdrawal-like symptoms.     Patient presents with complaints of increasing withdrawal-like symptoms including anxiety, shakiness, and \"feeling awful.\" He is requesting a temporary prescription of his Suboxone at this time, and states that he has been on this medication for years. There are no other medical complaints.     REVIEW OF SYSTEMS   Except as stated in the HPI all other systems reviewed and are negative.    PAST MEDICAL HISTORY:  Past Medical History:   Diagnosis Date     Anxiety      Hypertension      MDD (major depressive disorder)      Opioid dependence (H)     " suboxone contract       PAST SURGICAL HISTORY:  Past Surgical History:   Procedure Laterality Date     ORTHOPEDIC SURGERY         CURRENT MEDICATIONS:    No current facility-administered medications for this encounter.    Current Outpatient Medications:      acetaminophen (TYLENOL) 325 MG tablet, Take 325-650 mg by mouth every 4 hours as needed for mild pain, Disp: , Rfl:      alum & mag hydroxide-simethicone (MAALOX) 200-200-20 MG/5ML SUSP suspension, Take 30 mLs by mouth every 6 hours as needed for indigestion, Disp: , Rfl:      atenolol (TENORMIN) 50 MG tablet, Take 50 mg by mouth daily, Disp: , Rfl:      benzocaine-menthol (CEPACOL) 15-3.6 MG lozenge, Place 1 lozenge inside cheek every 2 hours as needed for moderate pain, Disp: , Rfl:      buprenorphine HCl-naloxone HCl (SUBOXONE) 8-2 MG per film, TAKE 1 FILM SUBLINGUALLY TWICE DAILY, Disp: , Rfl:      Citalopram Hydrobromide (CELEXA PO), Take 20 mg by mouth daily, Disp: , Rfl:      clonazePAM (KLONOPIN) 0.5 MG tablet, Take 0.5 mg by mouth Take one-half (0.25mg) tab daily for 10 days starting from July 29, 2022.  Last dose will be taken on Prescribed by PIPER Moreno / [facility] [place] Approved by Dr Lee for pt to complete taper while at at [facility], Disp: , Rfl:      gabapentin (NEURONTIN) 600 MG tablet, Take 1 tablet (600 mg) by mouth 4 times daily as needed for other (anxiety), Disp: 120 tablet, Rfl: 0     guaiFENesin (ROBITUSSIN) 20 mg/mL SOLN solution, Take 10 mLs by mouth every 4 hours as needed for cough, Disp: , Rfl:      ibuprofen (ADVIL/MOTRIN) 200 MG tablet, Take 400 mg by mouth every 6 hours as needed for mild pain, Disp: , Rfl:      loratadine (CLARITIN) 10 MG tablet, Take 10 mg by mouth daily as needed for allergies, Disp: , Rfl:      melatonin 3 MG tablet, Take 3 mg by mouth nightly as needed for sleep, Disp: , Rfl:      nicotine polacrilex (NICORETTE) 4 MG gum, Place 1 each (4 mg) inside cheek as needed for smoking cessation, Disp:  "220 each, Rfl: 1     senna-docusate (SENOKOT-S/PERICOLACE) 8.6-50 MG tablet, Take 2 tablets by mouth daily as needed for constipation, Disp: , Rfl:      VYVANSE 70 MG capsule, 70 mg every morning Prescribed by Saray SALDAÑA / [facility] [place] and approved by Dr Miquel Lee while pt is at [employer] [employer], Disp: , Rfl:     Facility-Administered Medications Ordered in Other Encounters:      Self Administer Medications: Behavioral Services, , Does not apply, See Admin Instructions, Miquel Lee MD    ALLERGIES:  Allergies   Allergen Reactions     Prazosin Unknown     Worsening of nightmares     Lactose Other (See Comments)     Gas       FAMILY HISTORY:  Family History   Problem Relation Age of Onset     Alcoholism Mother      Depression Mother      Substance Abuse Mother      Hypertension Mother      Mental Illness Mother      Substance Abuse Father      Alcoholism Father      Depression Father      Substance Abuse Sister        SOCIAL HISTORY:   Social History     Socioeconomic History     Marital status: Single   Tobacco Use     Smoking status: Every Day     Years: 10.00     Types: Cigarettes     Smokeless tobacco: Never     Tobacco comments:     e-cig only (20 mg/nicotine)   Substance and Sexual Activity     Alcohol use: No     Comment: occasional     Drug use: Yes     Types: Marijuana, Methamphetamines     Sexual activity: Not Currently     Partners: Female     Birth control/protection: None       PHYSICAL EXAM    VITAL SIGNS: /86   Pulse 68   Temp 97.1  F (36.2  C) (Temporal)   Resp 18   Ht 1.803 m (5' 11\")   Wt 108.9 kg (240 lb)   SpO2 96%   BMI 33.47 kg/m     GENERAL: Awake, Alert, answering questions, No acute distress, Well nourished  HEENT: Normal cephalic, Atraumatic, bilateral external ears normal, No scleral icterus, mask in place  NECK: No obvious swelling or abnormality, No stridor  PULMONARY:Normal and symmetric breath sounds, No respiratory distress, Lungs clear to auscultation " bilaterally. No wheezing  CARDIOVASCULAR: Regular rate and rhythm, Distal pulses present and normal.  ABDOMINAL: Soft, Nondistended, Nontender, No flank tenderness, No palpable masses  EXTREMITIES: Moves all extremities spontaneously, warm, no edema, No major deformities  NEURO: No facial droop, normal motor function, Normal speech   PSYCH: Normal mood and affect  SKIN: No rashes on visualized skin, dry, warm      I, Luis Fernando Jones, am serving as a scribe to document services personally performed by Dr. Adams based on my observation and the provider's statements to me. I, Angella Adams MD attest that Luis Fernando Jones is acting in a scribe capacity, has observed my performance of the services and has documented them in accordance with my direction.    Angella Adams M.D.  Emergency Medicine  [facility] EMERGENCY DEPARTMENT  [address] [place] [id number]  [phone]  Dept: [phone]     Angella Adams MD  11/06/22 5070

## 2022-11-21 ENCOUNTER — HEALTH MAINTENANCE LETTER (OUTPATIENT)
Age: 37
End: 2022-11-21

## 2023-04-16 ENCOUNTER — HEALTH MAINTENANCE LETTER (OUTPATIENT)
Age: 38
End: 2023-04-16

## 2023-07-10 ENCOUNTER — HOSPITAL ENCOUNTER (OUTPATIENT)
Facility: CLINIC | Age: 38
Setting detail: OBSERVATION
Discharge: HOME | End: 2023-07-11
Attending: EMERGENCY MEDICINE | Admitting: EMERGENCY MEDICINE
Payer: COMMERCIAL

## 2023-07-10 DIAGNOSIS — F11.21 OPIOID USE DISORDER, MODERATE, IN EARLY REMISSION, ON MAINTENANCE THERAPY, DEPENDENCE (H): ICD-10-CM

## 2023-07-10 DIAGNOSIS — F90.2 ATTENTION DEFICIT HYPERACTIVITY DISORDER (ADHD), COMBINED TYPE: ICD-10-CM

## 2023-07-10 DIAGNOSIS — F33.1 MAJOR DEPRESSIVE DISORDER, RECURRENT EPISODE, MODERATE (H): ICD-10-CM

## 2023-07-10 DIAGNOSIS — F41.1 GAD (GENERALIZED ANXIETY DISORDER): ICD-10-CM

## 2023-07-10 PROBLEM — F48.9 MENTAL HEALTH PROBLEM: Status: ACTIVE | Noted: 2023-07-10

## 2023-07-10 LAB
ALBUMIN SERPL BCG-MCNC: 4.2 G/DL (ref 3.5–5.2)
ALP SERPL-CCNC: 151 U/L (ref 40–129)
ALT SERPL W P-5'-P-CCNC: 26 U/L (ref 0–70)
ANION GAP SERPL CALCULATED.3IONS-SCNC: 12 MMOL/L (ref 7–15)
AST SERPL W P-5'-P-CCNC: 21 U/L (ref 0–45)
BASOPHILS # BLD AUTO: 0.1 10E3/UL (ref 0–0.2)
BASOPHILS NFR BLD AUTO: 1 %
BILIRUB SERPL-MCNC: 0.6 MG/DL
BUN SERPL-MCNC: 16.6 MG/DL (ref 6–20)
CALCIUM SERPL-MCNC: 8.5 MG/DL (ref 8.6–10)
CHLORIDE SERPL-SCNC: 99 MMOL/L (ref 98–107)
CREAT SERPL-MCNC: 0.91 MG/DL (ref 0.67–1.17)
DEPRECATED HCO3 PLAS-SCNC: 25 MMOL/L (ref 22–29)
EOSINOPHIL # BLD AUTO: 0 10E3/UL (ref 0–0.7)
EOSINOPHIL NFR BLD AUTO: 1 %
ERYTHROCYTE [DISTWIDTH] IN BLOOD BY AUTOMATED COUNT: 13.6 % (ref 10–15)
GFR SERPL CREATININE-BSD FRML MDRD: >90 ML/MIN/1.73M2
GLUCOSE SERPL-MCNC: 110 MG/DL (ref 70–99)
HBA1C MFR BLD: 5.1 %
HCT VFR BLD AUTO: 45.1 % (ref 40–53)
HGB BLD-MCNC: 15.2 G/DL (ref 13.3–17.7)
HOLD SPECIMEN: NORMAL
HOLD SPECIMEN: NORMAL
IMM GRANULOCYTES # BLD: 0 10E3/UL
IMM GRANULOCYTES NFR BLD: 1 %
LYMPHOCYTES # BLD AUTO: 1.5 10E3/UL (ref 0.8–5.3)
LYMPHOCYTES NFR BLD AUTO: 24 %
MCH RBC QN AUTO: 26.4 PG (ref 26.5–33)
MCHC RBC AUTO-ENTMCNC: 33.7 G/DL (ref 31.5–36.5)
MCV RBC AUTO: 78 FL (ref 78–100)
MONOCYTES # BLD AUTO: 0.3 10E3/UL (ref 0–1.3)
MONOCYTES NFR BLD AUTO: 4 %
NEUTROPHILS # BLD AUTO: 4.4 10E3/UL (ref 1.6–8.3)
NEUTROPHILS NFR BLD AUTO: 69 %
NRBC # BLD AUTO: 0 10E3/UL
NRBC BLD AUTO-RTO: 0 /100
PLATELET # BLD AUTO: 285 10E3/UL (ref 150–450)
POTASSIUM SERPL-SCNC: 3.7 MMOL/L (ref 3.4–5.3)
PROT SERPL-MCNC: 7.5 G/DL (ref 6.4–8.3)
RBC # BLD AUTO: 5.75 10E6/UL (ref 4.4–5.9)
SODIUM SERPL-SCNC: 136 MMOL/L (ref 136–145)
WBC # BLD AUTO: 6.2 10E3/UL (ref 4–11)

## 2023-07-10 PROCEDURE — G0378 HOSPITAL OBSERVATION PER HR: HCPCS

## 2023-07-10 PROCEDURE — 83036 HEMOGLOBIN GLYCOSYLATED A1C: CPT | Performed by: EMERGENCY MEDICINE

## 2023-07-10 PROCEDURE — 85025 COMPLETE CBC W/AUTO DIFF WBC: CPT | Performed by: EMERGENCY MEDICINE

## 2023-07-10 PROCEDURE — 36415 COLL VENOUS BLD VENIPUNCTURE: CPT | Performed by: EMERGENCY MEDICINE

## 2023-07-10 PROCEDURE — 80053 COMPREHEN METABOLIC PANEL: CPT | Performed by: EMERGENCY MEDICINE

## 2023-07-10 PROCEDURE — 90791 PSYCH DIAGNOSTIC EVALUATION: CPT

## 2023-07-10 PROCEDURE — 99285 EMERGENCY DEPT VISIT HI MDM: CPT | Mod: 25

## 2023-07-10 RX ORDER — OLANZAPINE 5 MG/1
5 TABLET ORAL AT BEDTIME
Status: DISCONTINUED | OUTPATIENT
Start: 2023-07-10 | End: 2023-07-11 | Stop reason: HOSPADM

## 2023-07-10 RX ORDER — OLANZAPINE 5 MG/1
5 TABLET ORAL DAILY PRN
Status: ON HOLD | COMMUNITY
End: 2024-04-02

## 2023-07-10 RX ORDER — GABAPENTIN 600 MG/1
600 TABLET ORAL 4 TIMES DAILY PRN
Status: DISCONTINUED | OUTPATIENT
Start: 2023-07-10 | End: 2023-07-11 | Stop reason: HOSPADM

## 2023-07-10 RX ORDER — MAGNESIUM HYDROXIDE/ALUMINUM HYDROXICE/SIMETHICONE 120; 1200; 1200 MG/30ML; MG/30ML; MG/30ML
30 SUSPENSION ORAL EVERY 6 HOURS PRN
Status: DISCONTINUED | OUTPATIENT
Start: 2023-07-10 | End: 2023-07-11 | Stop reason: HOSPADM

## 2023-07-10 RX ORDER — HYDROXYZINE HYDROCHLORIDE 50 MG/1
50 TABLET, FILM COATED ORAL EVERY 6 HOURS PRN
Status: DISCONTINUED | OUTPATIENT
Start: 2023-07-10 | End: 2023-07-11 | Stop reason: HOSPADM

## 2023-07-10 RX ORDER — LANOLIN ALCOHOL/MO/W.PET/CERES
3 CREAM (GRAM) TOPICAL
Status: DISCONTINUED | OUTPATIENT
Start: 2023-07-10 | End: 2023-07-11 | Stop reason: HOSPADM

## 2023-07-10 RX ORDER — TRAZODONE HYDROCHLORIDE 50 MG/1
50 TABLET, FILM COATED ORAL
Status: DISCONTINUED | OUTPATIENT
Start: 2023-07-10 | End: 2023-07-11 | Stop reason: HOSPADM

## 2023-07-10 RX ORDER — BUPROPION HYDROCHLORIDE 150 MG/1
150 TABLET ORAL EVERY MORNING
Status: DISCONTINUED | OUTPATIENT
Start: 2023-07-11 | End: 2023-07-11 | Stop reason: HOSPADM

## 2023-07-10 RX ORDER — CITALOPRAM HYDROBROMIDE 20 MG/1
20 TABLET ORAL DAILY
Status: DISCONTINUED | OUTPATIENT
Start: 2023-07-11 | End: 2023-07-11 | Stop reason: HOSPADM

## 2023-07-10 RX ORDER — BUPRENORPHINE AND NALOXONE 8; 2 MG/1; MG/1
1 FILM, SOLUBLE BUCCAL; SUBLINGUAL DAILY
Status: DISCONTINUED | OUTPATIENT
Start: 2023-07-11 | End: 2023-07-11 | Stop reason: HOSPADM

## 2023-07-10 RX ORDER — BUPROPION HYDROCHLORIDE 150 MG/1
150 TABLET ORAL EVERY MORNING
COMMUNITY
End: 2024-03-14

## 2023-07-10 RX ORDER — LISDEXAMFETAMINE DIMESYLATE 70 MG/1
70 CAPSULE ORAL DAILY
Status: DISCONTINUED | OUTPATIENT
Start: 2023-07-11 | End: 2023-07-11

## 2023-07-10 ASSESSMENT — COLUMBIA-SUICIDE SEVERITY RATING SCALE - C-SSRS
LETHALITY/MEDICAL DAMAGE CODE MOST LETHAL ACTUAL ATTEMPT: SEVERE PHYSICAL DAMAGE, MEDICAL HOSPITALIZATION WITH INTENSIVE CARE REQUIRED
2. HAVE YOU ACTUALLY HAD ANY THOUGHTS OF KILLING YOURSELF?: YES
1. IN THE PAST MONTH, HAVE YOU WISHED YOU WERE DEAD OR WISHED YOU COULD GO TO SLEEP AND NOT WAKE UP?: YES
REASONS FOR IDEATION PAST MONTH: COMPLETELY TO END OR STOP THE PAIN (YOU COULDN'T GO ON LIVING WITH THE PAIN OR HOW YOU WERE FEELING)
3. HAVE YOU BEEN THINKING ABOUT HOW YOU MIGHT KILL YOURSELF?: YES
ATTEMPT LIFETIME: YES
LETHALITY/MEDICAL DAMAGE CODE MOST RECENT ACTUAL ATTEMPT: SEVERE PHYSICAL DAMAGE, MEDICAL HOSPITALIZATION WITH INTENSIVE CARE REQUIRED
6. HAVE YOU EVER DONE ANYTHING, STARTED TO DO ANYTHING, OR PREPARED TO DO ANYTHING TO END YOUR LIFE?: NO
4. HAVE YOU HAD THESE THOUGHTS AND HAD SOME INTENTION OF ACTING ON THEM?: YES
2. HAVE YOU ACTUALLY HAD ANY THOUGHTS OF KILLING YOURSELF?: YES
TOTAL  NUMBER OF ABORTED OR SELF INTERRUPTED ATTEMPTS LIFETIME: NO
REASONS FOR IDEATION LIFETIME: COMPLETELY TO END OR STOP THE PAIN (YOU COULDN'T GO ON LIVING WITH THE PAIN OR HOW YOU WERE FEELING)
ATTEMPT PAST THREE MONTHS: NO
4. HAVE YOU HAD THESE THOUGHTS AND HAD SOME INTENTION OF ACTING ON THEM?: NO
1. HAVE YOU WISHED YOU WERE DEAD OR WISHED YOU COULD GO TO SLEEP AND NOT WAKE UP?: YES
LETHALITY/MEDICAL DAMAGE CODE FIRST ACTUAL ATTEMPT: SEVERE PHYSICAL DAMAGE, MEDICAL HOSPITALIZATION WITH INTENSIVE CARE REQUIRED
TOTAL  NUMBER OF INTERRUPTED ATTEMPTS LIFETIME: NO
5. HAVE YOU STARTED TO WORK OUT OR WORKED OUT THE DETAILS OF HOW TO KILL YOURSELF? DO YOU INTEND TO CARRY OUT THIS PLAN?: YES
5. HAVE YOU STARTED TO WORK OUT OR WORKED OUT THE DETAILS OF HOW TO KILL YOURSELF? DO YOU INTEND TO CARRY OUT THIS PLAN?: NO
TOTAL  NUMBER OF ACTUAL ATTEMPTS LIFETIME: 1

## 2023-07-10 ASSESSMENT — ACTIVITIES OF DAILY LIVING (ADL)
ADLS_ACUITY_SCORE: 35
ADLS_ACUITY_SCORE: 35

## 2023-07-10 NOTE — ED PROVIDER NOTES
"    History     Chief Complaint:  Depression       The history is provided by the patient and a relative.      Barak Clarke is a 38 year old male with a history of depression and anxiety who presents with depression. The patient's family reports that the patient has been experiencing increased stressors regarding his relationship with his wife. When asked if the patient has been experiencing suicidal ideations, he states, \"I don't know\". Family also notes of decreased involvement with his son with whom he is usually very involved with. The patient notes that he does not have a psychiatrist that he follows with but does have a suboxone provider. He did take his dose of suboxone today. The patient's family also notes of a concern for Diabetes Mellitus due a recent weight gain over the past few months. He has been drinking a lot of soda. The patient denies homicidal ideation. He denies cigarette or drug use.     Independent Historian:    Family    Review of External Notes:  n/a    Medications:    Tenormin   Suboxone   Celexa   Klonopin   Gabapentin   Vyvanse     Past Medical History:    Amphetamine use disorder   Depression   ADHD   Insomnia   Anxiety   Opioid dependence     Past Surgical History:    Orthopedic surgery     Physical Exam     Patient Vitals for the past 24 hrs:   BP Temp Temp src Pulse Resp SpO2 Height Weight   07/10/23 1919 126/86 97.8  F (36.6  C) Oral 72 14 98 % 1.803 m (5' 11\") 118.9 kg (262 lb 3.2 oz)   07/10/23 1633 128/86 97.8  F (36.6  C) Temporal 77 20 95 % -- --        Physical Exam  General: Patient in mild distress.  Alert and cooperative with exam. Normal mentation  HEENT: NC/AT. Conjunctiva without injection or scleral icterus. External ears normal.  Respiratory: Breathing comfortably on room air  CV: Normal rate, all extremities well perfused  GI:  Non-distended abdomen  Skin: Warm, dry, no rashes/open wounds on exposed skin  Musculoskeletal: No obvious deformities  Neuro: Alert, answers " questions appropriately. No gross motor deficits  Psychiatric: endorses depression    Emergency Department Course     Laboratory:  Labs Ordered and Resulted from Time of ED Arrival to Time of ED Departure   COMPREHENSIVE METABOLIC PANEL - Abnormal       Result Value    Sodium 136      Potassium 3.7      Chloride 99      Carbon Dioxide (CO2) 25      Anion Gap 12      Urea Nitrogen 16.6      Creatinine 0.91      Calcium 8.5 (*)     Glucose 110 (*)     Alkaline Phosphatase 151 (*)     AST 21      ALT 26      Protein Total 7.5      Albumin 4.2      Bilirubin Total 0.6      GFR Estimate >90     CBC WITH PLATELETS AND DIFFERENTIAL - Abnormal    WBC Count 6.2      RBC Count 5.75      Hemoglobin 15.2      Hematocrit 45.1      MCV 78      MCH 26.4 (*)     MCHC 33.7      RDW 13.6      Platelet Count 285      % Neutrophils 69      % Lymphocytes 24      % Monocytes 4      % Eosinophils 1      % Basophils 1      % Immature Granulocytes 1      NRBCs per 100 WBC 0      Absolute Neutrophils 4.4      Absolute Lymphocytes 1.5      Absolute Monocytes 0.3      Absolute Eosinophils 0.0      Absolute Basophils 0.1      Absolute Immature Granulocytes 0.0      Absolute NRBCs 0.0     HEMOGLOBIN A1C - Normal    Hemoglobin A1C 5.1        Emergency Department Course & Assessments:    PSS-3    Date and Time Over the past 2 weeks have you felt down, depressed, or hopeless? Over the past 2 weeks have you had thoughts of killing yourself? Have you ever attempted to kill yourself? When did this last happen? User   07/10/23 1638  yes  yes  yes -- W              Suicide assessment completed by mental health (D.E.C., LCSW, etc.)    Assessments:  1831 I obtained history and examined the patient as noted above. We discussed plan for transfer to [facility] and the patient is comfortable with this plan.     Independent Interpretation (X-rays, CTs, rhythm strip):  None    Consultations/Discussion of Management or Tests:  None    Social Determinants of Health  affecting care:  None    [facility]  [facility]     Impression & Plan    CMS Diagnoses: None    Medical Decision Making:  Barak Clarke is a 38 year old male who presents for evaluation of depression.  Patient denies toxic ingestion, intoxication, illicit drug use, or intentional overdose.  Family did have concern for significant increased weight gain/development of diabetes and therefore labs were obtained without significant acute findings.  Hemoglobin A1c not significant elevated.  At this time patient is medically clear for ongoing evaluation in empath unit.  [facility]      Diagnosis:    ICD-10-CM    1. Depression, unspecified depression type  F32.A             Scribe Disclosure:  Ruby MILLER, am serving as a scribe at 7:14 PM on 7/10/2023 to document services personally performed by Zelalem Flores DO based on my observations and the provider's statements to me.    7/10/2023   Zelalem Flores DO O'Neill, Christopher Warren, DO  07/11/23 0421

## 2023-07-10 NOTE — ED TRIAGE NOTES
"Per pt, worsening depression. When asked if pt feels suicidal pt reports \"I dont know\"   Mother concerned with psych medications causing diabetes, 50lbs weight gain since starting medication this fall. Mother reports \"Just craving sweets\"    Pt reports psychiatry office closing and no longer prescribing medications any longer.      Triage Assessment     Row Name 07/10/23 5123       Triage Assessment (Adult)    Airway WDL WDL              "

## 2023-07-11 VITALS
HEART RATE: 70 BPM | OXYGEN SATURATION: 96 % | RESPIRATION RATE: 18 BRPM | SYSTOLIC BLOOD PRESSURE: 113 MMHG | BODY MASS INDEX: 36.71 KG/M2 | TEMPERATURE: 97.8 F | WEIGHT: 262.2 LBS | HEIGHT: 71 IN | DIASTOLIC BLOOD PRESSURE: 62 MMHG

## 2023-07-11 PROCEDURE — 250N000013 HC RX MED GY IP 250 OP 250 PS 637: Performed by: PSYCHIATRY & NEUROLOGY

## 2023-07-11 PROCEDURE — 99234 HOSP IP/OBS SM DT SF/LOW 45: CPT

## 2023-07-11 PROCEDURE — G0378 HOSPITAL OBSERVATION PER HR: HCPCS

## 2023-07-11 RX ORDER — POLYETHYLENE GLYCOL 3350 17 G
2 POWDER IN PACKET (EA) ORAL
Status: DISCONTINUED | OUTPATIENT
Start: 2023-07-11 | End: 2023-07-11 | Stop reason: HOSPADM

## 2023-07-11 RX ADMIN — NICOTINE POLACRILEX 2 MG: 2 LOZENGE ORAL at 08:20

## 2023-07-11 RX ADMIN — GABAPENTIN 600 MG: 600 TABLET, FILM COATED ORAL at 07:49

## 2023-07-11 RX ADMIN — LISDEXAMFETAMINE DIMESYLATE 70 MG: 50 CAPSULE ORAL at 07:39

## 2023-07-11 RX ADMIN — NICOTINE POLACRILEX 2 MG: 2 LOZENGE ORAL at 03:14

## 2023-07-11 RX ADMIN — BUPROPION HYDROCHLORIDE 150 MG: 150 TABLET, FILM COATED, EXTENDED RELEASE ORAL at 07:41

## 2023-07-11 RX ADMIN — OLANZAPINE 5 MG: 5 TABLET, FILM COATED ORAL at 01:59

## 2023-07-11 RX ADMIN — CITALOPRAM HYDROBROMIDE 20 MG: 20 TABLET ORAL at 07:41

## 2023-07-11 RX ADMIN — BUPRENORPHINE AND NALOXONE 1 FILM: 8; 2 FILM, SOLUBLE BUCCAL; SUBLINGUAL at 07:41

## 2023-07-11 ASSESSMENT — ACTIVITIES OF DAILY LIVING (ADL)
ADLS_ACUITY_SCORE: 35

## 2023-07-11 NOTE — PROGRESS NOTES
Patient visible on the unit. Pacing the unit. He denies SI/HI today. Denies AH/VH. States he is still depressed over the desolation of his marriage but he feels safe discharging today with the resources he was given. He endorses feeling anxious about the upcoming changes in his life.   Mood is sad. Affect is blunt, anxious

## 2023-07-11 NOTE — ED NOTES
Pt was sleeping in his chair when nursing went to offer HS medications. Pt was no arousable will continue to monitor.

## 2023-07-11 NOTE — CONSULTS
Diagnostic Evaluation Consultation  Crisis Assessment    Patient was assessed: In Person  Patient location: declocations: EMPATH  Was a release of information signed: Yes. Providers included on the release: Dr. Doreen Barajas (Suboxone maintenance), [facility] (therapy), Louise Clarke (mother)      Referral Data and Chief Complaint  Barak is a 38 year old, who uses he/him pronouns, and presents to the ED with his mother. Patient is referred to the ED by his mother. Patient is presenting to the ED for the following concerns: suicidal ideation.      Informed Consent and Assessment Methods     Patient is his own guardian. Writer met with patient and explained the crisis assessment process, including applicable information disclosures and limits to confidentiality, assessed understanding of the process, and obtained consent to proceed with the assessment. Patient was observed to be able to participate in the assessment as evidenced by verbal understanding and engagement in the assessment process. Assessment methods included conducting a formal interview with patient, review of medical records, collaboration with medical staff, and obtaining relevant collateral information from family and community providers when available..     Over the course of this crisis assessment this writer provided reassurance, offered validation and engaged patient in problem solving and disposition planning. Patient's response to interventions was positive. Pt answered this writer's questions about his mental health symptoms and engaged with this writer in disposition planning.      Summary of Patient Situation     Pt presents to the ED at the recommendation of his mother for worsening depressive symptoms. Pt has flat affect and slowed speech upon assessment. He tells this writer that he has had increasingly intense arguments with his wife and that he feels that his wife no longer has an emotional investment in their marriage. He acknowledges  "that an ongoing point of contention has been his marijuana use and hiding his use from his wife. Pt reports that he started sleeping in the family's porch but ultimately left to stay at his mother's house on 07/06/2023. He believes that his marriage is ending. Pt tells this writer that he feels broken and no longer knows what his purpose is as his family was \"everything\" to him. He states that he has been isolating in his room smoking marijuana since going to his mother's house. When asked about suicidal ideation, pt states that he has \"had enough\" and wants \"to turn the Barak tape off\". He reports that he has been smoking marijuana with the hope of passing out. When asked if he has a plan for suicide, he states something \"quick\" like jumping off of something high. When asked if he has intent to end his life, he reports that \"it depends on how bad it gets\". He denies homicidal ideation. He does not know whether he has been experiencing sleep or appetite disruptions due to how frequently he has been using marijuana.     Brief Psychosocial History    Pt has been staying with his mother since 07/06/2023 when he  from his wife. He has a 13-year-old son from a previous relationship who is currently staying with his mother. Pt has been  for two years and was dating his wife for three years before they got . His wife has two children of her own. Pt identifies his mother as a current source of support. He is employed but reports that he has been unable to meaningfully engage with others at work since the separation.     Significant Clinical History     Pt carries current diagnoses of major depressive disorder, social anxiety disorder, ADHD inattentive type, and opioid use disorder on a maintenance therapy. He is followed by an addiction medicine doctor at [facility] for Suboxone maintenance as well as a therapist. Per chart review, he has participated in at least 8-10 [facility] treatment programs. He was initiated " "on Suboxone with a significant improvement in functioning following a hospitalization at [facility] from 11/12-12/03/2015 for polysubstance use. At that time, pt was using multiple substances, including benzodiazepines, cocaine, methamphetamines, synthetic stimulants, and synthetic opioids. Pt attempted suicide in 2014 via ingestion of Imodium and hydroxyzine, resulting in hypoxic respiratory failure requiring intubation. He subsequently developed aspiration pneumonia. He was hospitalized at [facility] [place], [place]. Pt also had inpatient psychiatric hospitalizations from 02/07-02/18/2011 at [facility] for substance-induced psychosis and again from 05/20-06/01/2015 at [facility] for suicidal ideation.      Collateral Information    The following information was received from Louise Clarke whose relationship to the patient is mother. Information was obtained via phone. Her phone number is [phone] and she last had contact with patient on 07/10/2023.    What happened today: Pt broke down and cried a lot today. Pt's mother suggested that he go to the ED for a mental health evaluation.    What is different about patient's functioning: Pt has been staying with his mother for the last week. He asked to come stay with her on 07/06/23 due to marital conflict. He has been very depressed and is sleeping all of the time. Pt's mother is concerned that he has gained 60 to 70 pounds since his medications were adjusted late last summer.     Concern about alcohol/drug use: Yes. Pt's mother has limited information about his current substance use. When he was a senior in college, he was not allowed to take his ADHD medication to [place]. This set him into a \"tailspin\" when he was in [place] as he had significantly difficulty functioning without his medication. He self-medicated with alcohol while he was in [place]. When he returned home, he started \"self-medicating with anything\" to \"middleton his depression\". He was primarily diagnosed " with addiction to opioids.      What do you think the patient needs: He needs a psychiatrist. The addiction medication doctor who manages his Suboxone maintenance therapy will no longer be able to prescribe him his psychiatric medication as they are only going to be focusing on addiction medicine.    Has patient made comments about wanting to kill themselves/others:  Pt's mother does not have information about his current suicidal ideation. He was been hesitant to talk with her about his mental health. He has at least one prior suicide attempt from 8 or 9 years ago. He overdosed on Imodium to the point that his lips were turning blue and [place] [facility] [place]     If d/c is recommended, can they take part in safety/aftercare planning: Yes, pt's mother is a support for him. He is able to return to her house.    Other information: No other information collected at this time.       Risk Assessment  [place] Suicide Severity Rating Scale Full Clinical Version: 07/10/2023  Suicidal Ideation  1. Wish to be Dead (Lifetime): Yes  1. Wish to be Dead (Past 1 Month): Yes  2. Non-Specific Active Suicidal Thoughts (Lifetime): Yes  2. Non-Specific Active Suicidal Thoughts (Past 1 Month): Yes  3. Active Suicidal Ideation with any Methods (Not Plan) Without Intent to Act (Lifetime): Yes  3. Active Suicidal Ideation with any Methods (Not Plan) Without Intent to Act (Past 1 Month): Yes  4. Active Suicidal Ideation with Some Intent to Act, Without Specific Plan (Lifetime): Yes  4. Active Suicidal Ideation with Some Intent to Act, Without Specific Plan (Past 1 Month): No  5. Active Suicidal Ideation with Specific Plan and Intent (Lifetime): Yes  5. Active Suicidal Ideation with Specific Plan and Intent (Past 1 Month): No  Intensity of Ideation  Most Severe Ideation Rating (Lifetime): 5  Most Severe Ideation Rating (Past 1 Month): 3  Frequency (Lifetime): Many times each day  Frequency (Past 1 Month): Many times each  day  Duration (Lifetime): More than 8 hours/persistent or continuous  Duration (Past 1 Month): 4-8 hours/most of day  Controllability (Lifetime): Unable to control thoughts  Controllability (Past 1 Month): Unable to control thoughts  Deterrents (Lifetime): Deterrents definitely did not stop you  Deterrents (Past 1 Month): Uncertain that deterrents stopped you  Reasons for Ideation (Lifetime): Completely to end or stop the pain (You couldn't go on living with the pain or how you were feeling)  Reasons for Ideation (Past 1 Month): Completely to end or stop the pain (You couldn't go on living with the pain or how you were feeling)  Suicidal Behavior  Actual Attempt (Lifetime): Yes  Total Number of Actual Attempts (Lifetime): 1  Actual Attempt (Past 3 Months): No  Has subject engaged in non-suicidal self-injurious behavior? (Lifetime): No  Interrupted Attempts (Lifetime): No  Aborted or Self-Interrupted Attempt (Lifetime): No  Preparatory Acts or Behavior (Lifetime): No  C-SSRS Risk (Lifetime/Recent)  Calculated C-SSRS Risk Score (Lifetime/Recent): Moderate Risk    Actual/Potential Lethality (Most Lethal Attempt)  Most Lethal Attempt Date:  (2014)  Actual Lethality/Medical Damage Code (Most Lethal Attempt): Severe physical damage, medical hospitalization with intensive care required       Validity of evaluation is not impacted by presenting factors during interview.   Comments regarding subjective versus objective responses to [place] tool: none  Environmental or Psychosocial Events: loss of relationship due to divorce/separation, threats to a prized relationship, challenging interpersonal relationships, helplessness/hopelessness, ongoing abuse of substances and social isolation  Chronic Risk Factors: history of suicide attempts (2014) and history of psychiatric hospitalization   Warning Signs: talking or writing about death, dying, or suicide, hopelessness, feeling trapped, like there is no way out, withdrawing from  "friends, family, and society, anxiety, agitation, unable to sleep, sleeping all the time, no reason for living, no sense of purpose in life and recent losses (physical, financial, personal)  Protective Factors: strong bond to family unit, community support, or employment, responsibilities and duties to others, including pets and children and lives in a responsibly safe and stable environment  Interpretation of Risk Scoring, Risk Mitigation Interventions and Safety Plan:  Pt endorses thinking that it would be easier if it would all end and wanting the \"Barak tape\" to end. When asked about a suicidal plan, he states something \"quick\" like jumping off of something high. He does not have a place identified to jump. When asked about intent, he states that it \"depends on how bad it gets\".      Does the patient have thoughts of harming others? No     Is the patient engaging in sexually inappropriate behavior?  no        Current Substance Abuse     Is there recent substance abuse? Yes, pt reports excessively smoking marijuana.      Was a urine drug screen or blood alcohol level obtained: No       Mental Status Exam     Affect: Flat   Appearance: Disheveled    Attention Span/Concentration: Attentive  Eye Contact: Avoidant   Fund of Knowledge: Appropriate    Language /Speech Content: Fluent   Language /Speech Volume: Normal    Language /Speech Rate/Productions: Slow    Recent Memory: Intact   Remote Memory: Intact   Mood: Depressed and Sad    Orientation to Person: Yes    Orientation to Place: Yes   Orientation to Time of Day: Yes    Orientation to Date: Yes    Situation (Do they understand why they are here?): Yes    Psychomotor Behavior: Normal    Thought Content: Suicidal   Thought Form: Intact      History of commitment: No       Medication    Psychotropic medications:     No current facility-administered medications for this encounter.     Current Outpatient Medications   Medication     alum & mag hydroxide-simethicone " (MAALOX) 200-200-20 MG/5ML SUSP suspension     atenolol (TENORMIN) 50 MG tablet     benzocaine-menthol (CEPACOL) 15-3.6 MG lozenge     buprenorphine HCl-naloxone HCl (SUBOXONE) 8-2 MG per film     buPROPion (WELLBUTRIN XL) 150 MG 24 hr tablet     Citalopram Hydrobromide (CELEXA PO)     gabapentin (NEURONTIN) 600 MG tablet     melatonin 3 MG tablet     OLANZapine (ZYPREXA) 5 MG tablet     VYVANSE 70 MG capsule     Facility-Administered Medications Ordered in Other Encounters   Medication     Self Administer Medications: Behavioral Services     Medication compliant: Yes  Recent medication changes: No  Medication changes made in the last two weeks: No       Current Care Team    Primary Care Provider: No  Psychiatrist: No  Addiction Medicine: Dr. Doreen Barajas [facility]  Therapist: [facility] (possibly a [facility] Echevarria)   : No     CTSS or ARMHS: No  ACT Team: No  Other: No      Diagnosis    296.33 (F33.2) Major Depressive Disorder, Recurrent Episode, Severe - by hx  Attention-Deficit/Hyperactivity Disorder 314.01 (F90.2) Combined presentation - by hx  300.23 (F40.10) Social Anxiety Disorder - by hx  Substance-Related & Addictive Disorders Opioid Use Disorder, Specify if: On a maintenance therapy with a severity of: 304.00 (F11.20) Severe - by hx    Clinical Summary and Substantiation of Recommendations    It is the recommendation of this writer that pt stay overnight on observation status due to his presenting suicidal ideation with identified means to jump off of something high. Pt will be seen by the psychiatrist in the morning. If pt's suicidal ideation stabilizes, he may be able to discharge home to his mother's house. He would benefit from a psychiatry appointment and a referral to an IOP.   Disposition    Recommended disposition: Individual Therapy, Medication Management and Programmatic Care: IOP       Reviewed case and recommendations with attending provider. Attending Name: pending review by attending  provider       Attending concurs with disposition: pending review by attending provider     Patient and/or validated legal guardian concurs with disposition: Yes       Final disposition: Other: observation.     Assessment Details    Patient interview started at: 9:30 PM and completed at: 10:00 PM.     Total time spent with the patient or their family: .50 hrs      CPT code(s) utilized: 92725 - Psychotherapy for Crisis - 60 (30-74*) min       REN Lynne, Psychotherapist  [facility] - Triage & Transition Services  Callback: [phone]

## 2023-07-11 NOTE — PROGRESS NOTES
Pt woke briefly during the NOC, and received his HS Zyprexa late, he has otherwise been sleeping sound.

## 2023-07-11 NOTE — DISCHARGE INSTRUCTIONS
"Aftercare Plan    Appointment information and/or additional resources available to me:      Scheduled Appointment  Date: Friday, 7/14/2023  Time: 10:00 am - 11:00 am  Provider: Luis Armando Avalos MA, CNP,RN  Location: [facility], [address] [place] [address], [address], [place] [address]  Phone: [phone]  Type: Telepsychiatry      If I am feeling unsafe or I am in a crisis, I will:   Contact my established care providers   Call the National Suicide Prevention Lifeline: 871.879.9442   Go to the nearest emergency room   Call 911     Your On license of [facility] has a mental health crisis team you can call 24/7: [facility], [phone]    Warning signs that I or other people might notice when a crisis is developing for me: \"I stop communicating\"    Things I am able to do on my own to cope or help me feel better: \"I can exercise\"  -Practice square breathing when I begin to feel anxious - in breath through the nose for the count   of 4 and the first line on the square. Out breath through the mouth for the count of 4 for the second line   of the square. Repeat to complete the square. Repeat the square as many times as needed.    Things that I am able to do with others to cope or help me feel better: \"I can ask others how they are doing.\"  -Use community resources, including hotline numbers, On license of [facility] crisis and support meetings    Things I can use or do for distraction: \"I can clean.\"  -Distraction skills of: going for walks, watching TV, spending time outside, calling a friend or family member  -Download a meditation alon and spend 15-20 minutes per day mediating/relaxing. Some apps to   download include: Calm, Headspace and Insight Timer. All 3 of these apps have free version    Changes I can make to support my mental health and wellness: \"I can try harder.\"  -Attend scheduled mental health therapy and psychiatric appointments and follow all recommendations  -Maintain a daily schedule/routine  -Practice deep breathing " "skills  -Abstain from all mood altering chemicals not currently prescribed to me     People in my life that I can ask for help: \"My mom, Fuentes.\"  [employer]ness (AMINATA)  123.564.1677 or 1.888.AMINATA.HELPS    Other things that are important when I m in crisis:  -Commit to 30 minutes of self care daily - this can be as simple as taking a shower, going for a walk, cooking a meal, read, writing, etc        Crisis Lines  Crisis Text Line  Text 363346  You will be connected with a trained live crisis counselor to provide support.    Por espanol, texto  KITA a 324085 o texto a 442-AYUDAME en WhatsApp    National Hope Line  1.800.SUICIDE [4053630]      Community Resources  Fast Tracker  Linking people to mental health and substance use disorder resources  AdReadyn.org     [place] Mental Health Warm Line  Peer to peer support  Monday thru Saturday, 12 pm to 10 pm  130.914.0485 or 0.729.607.6923  Text \"Support\" to 18549    [employer] Mental Illness (AMINATA)  453.346.8590 or 1.888.AMINATA.HELPS        Mental Health Apps  My3  https://myOpalitypp.org/    VirtualHopeBox  https://[employer].org/apps/virtual-hope-box/      Additional Information  Today you were seen by a licensed mental health professional through Triage and Transition services, [facility] Providers ([facility])  for a crisis assessment in the Emergency Department at [facility].  It is recommended that you follow up with your established providers (psychiatrist, mental health therapist, and/or primary care doctor - as relevant) as soon as possible. Coordinators from [facility] will be calling you in the next 24-48 hours to ensure that you have the resources you need.  You can also contact [facility] coordinators directly at [phone]. You may have been scheduled for or offered an appointment with a mental health provider. [facility] maintains an extensive network of licensed behavioral health providers to connect patients with the " services they need.  We do not charge providers a fee to participate in our referral network.  We match patients with providers based on a patient's specific needs, insurance coverage, and location.  Our first effort will be to refer you to a provider within your care system, and will utilize providers outside your care system as needed.

## 2023-07-11 NOTE — PLAN OF CARE
Barak Clarke  July 10, 2023  Plan of Care Hand-off Note     Patient Care Path: Observation    Plan for Care:     It is the recommendation of this writer that pt stay overnight on observation status due to his presenting suicidal ideation with identified means to jump off of something high. Pt will be seen by the psychiatrist in the morning. If pt's suicidal ideation stabilizes, he may be able to discharge home to his mother's house. He would benefit from a psychiatry appointment and a referral to an IOP.     Critical Safety Issues: suicidal ideation    Overview:  This patient is a child/adolescent: No    This patient has additional special visitor precautions: No    Legal Status: Voluntary    Contacts:   Louise Clarke, mother, PH: [phone]     Psychiatry Consult:  Psychiatry Consult not requested because pt is on [facility]    Updated RN and Attending Provider regarding plan of care.    REN Lynne

## 2023-07-11 NOTE — ED PROVIDER NOTES
"EmPATH Unit - Psychiatry  Combined Observation Note and Discharge Summary  [facility] Emergency Department  Observation Initiation Date: Jul 10, 2023    Barak Clarke MRN: [id number]   Age: 38 year old YOB: 1985     History     Chief Complaint   Patient presents with     Depression     HPI  Barak Clarke is a 38 year old male with a past history notable for MDD, KILO, ADHD, polysubstance abuse, and opioid use disorder- on maintenance therapy who presented to the emergency department with worsening depression and suicidal thoughts in the context of his marriage ending and recent cannabis use. In the emergency department, Barak was determined to be medically stable and transferred to the EmPATH unit for psychiatric assessment.  Record review indicates numerous past [facility] treatment programs with several inpatient hospitalizations. Record review indicates one previous suicide attempt via overdose in 2014 with subsequent hospitalization.  They have currently been in the emergency department for 17 hours.     On examination today Barak reports that he feels like \"part of my world is gone\" referring to his marriage. He has been  for 2 years and describes this as a \"bad codependency.\" He reports that 5 days ago everything \"blew up\" and he thinks his marriage is over now. He has been using cannabis daily to help him relax after work and says his wife did not approve of it. He has increasing passive suicidal thoughts yesterday, denies specific plan and intent. He denies suicidal thoughts today and reports that intensity of anxiety has also decreased since arriving at [facility]. He denies AH/VH and denies homicidal thoughts. He continues to feel hopeless and sad about his life situation and is unsure where he will be living and when he will see his children given the state of his marriage. He has been staying with his mom and reports that although this is not where he wants to be living, it is a safe environment. He " works at [employer] and finds this stressful but reports it is helpful to keep him busy. He has a therapist that he sees weekly and an addiction medicine provider where he obtains his suboxone. He find his current medications to be helpful and does not feel as though medication changes will help his current situation. He has taken klonopin in the past for anxiety but notes he was tapered off of this about 6 months ago and given zyprexa and gabapentin instead. He says his addiction medicine provider is unwilling to restart klonopin given his past substance use history, writer agrees with his outpatient provider and dicussed that klonopin would not be restarted at [facility]. Barak reports that prn gabapentin has been helpful for anxiety. He endorses weight gain since starting zyprexa 6 months ago but does not want to change this as he has found it to be helpful. He also notes recent decrease in activity and poor diet that could be contributing.  Discussed considering propranolol or hydroxyzine, Barak declined noting that he would prefer to continue his current medication regimen. He would like to discharge to home this morning so that he can return to work this afternoon. He's interested in a referral for psychiatric medication management and IOP.       Past Medical History  Past Medical History:   Diagnosis Date     Anxiety      Hypertension      MDD (major depressive disorder)      Opioid dependence (H)     suboxone contract     Past Surgical History:   Procedure Laterality Date     ORTHOPEDIC SURGERY       alum & mag hydroxide-simethicone (MAALOX) 200-200-20 MG/5ML SUSP suspension  atenolol (TENORMIN) 50 MG tablet  benzocaine-menthol (CEPACOL) 15-3.6 MG lozenge  buprenorphine HCl-naloxone HCl (SUBOXONE) 8-2 MG per film  buPROPion (WELLBUTRIN XL) 150 MG 24 hr tablet  Citalopram Hydrobromide (CELEXA PO)  gabapentin (NEURONTIN) 600 MG tablet  melatonin 3 MG tablet  OLANZapine (ZYPREXA) 5 MG tablet  VYVANSE 70 MG  capsule      Allergies   Allergen Reactions     Prazosin Unknown     Worsening of nightmares     Lactose Other (See Comments)     Gas     Family History  Family History   Problem Relation Age of Onset     Alcoholism Mother      Depression Mother      Substance Abuse Mother      Hypertension Mother      Mental Illness Mother      Substance Abuse Father      Alcoholism Father      Depression Father      Substance Abuse Sister      Social History   Social History     Tobacco Use     Smoking status: Every Day     Years: 10.00     Types: Cigarettes     Smokeless tobacco: Never     Tobacco comments:     e-cig only (20 mg/nicotine)   Substance Use Topics     Alcohol use: No     Comment: occasional     Drug use: Yes     Types: Marijuana, Methamphetamines      Past medical history, past surgical history, medications, allergies, family history, and social history were reviewed with the patient. No additional pertinent items.       Review of Systems  A medically appropriate review of systems was performed with pertinent positives and negatives noted in the HPI, and all other systems negative.    Physical Examination   BP: 128/86  Pulse: 77  Temp: 97.8  F (36.6  C)  Resp: 20  Height:  (5 11)  Weight: 118.9 kg (262 lb 3.2 oz)  SpO2: 95 %    Physical Exam  General: Appears stated age.   Neuro: Alert and fully oriented. Extremities appear to demonstrate normal strength on visual inspection.   Integumentary/Skin: no rash visualized, normal color    Psychiatric Examination   Appearance: awake, alert, adequately groomed and dressed in hospital scrubs  Attitude:  cooperative  Eye Contact:  fair  Mood:  depressed  Affect:  mood congruent and intensity is flat  Speech:  clear, coherent and normal prosody  Psychomotor Behavior:  no evidence of tardive dyskinesia, dystonia, or tics and intact station, gait and muscle tone  Thought Process:  logical and linear  Associations:  no loose associations  Thought Content:  no evidence of suicidal  ideation or homicidal ideation, no evidence of psychotic thought, no auditory hallucinations present and no visual hallucinations present  Insight:  fair  Judgement:  fair  Oriented to:  time, person, and place  Attention Span and Concentration:  fair  Recent and Remote Memory:  intact  Language: able to name/identify objects without impairment  Fund of Knowledge: intact with awareness of current and past events    ED Course        Labs Ordered and Resulted from Time of ED Arrival to Time of ED Departure   COMPREHENSIVE METABOLIC PANEL - Abnormal       Result Value    Sodium 136      Potassium 3.7      Chloride 99      Carbon Dioxide (CO2) 25      Anion Gap 12      Urea Nitrogen 16.6      Creatinine 0.91      Calcium 8.5 (*)     Glucose 110 (*)     Alkaline Phosphatase 151 (*)     AST 21      ALT 26      Protein Total 7.5      Albumin 4.2      Bilirubin Total 0.6      GFR Estimate >90     CBC WITH PLATELETS AND DIFFERENTIAL - Abnormal    WBC Count 6.2      RBC Count 5.75      Hemoglobin 15.2      Hematocrit 45.1      MCV 78      MCH 26.4 (*)     MCHC 33.7      RDW 13.6      Platelet Count 285      % Neutrophils 69      % Lymphocytes 24      % Monocytes 4      % Eosinophils 1      % Basophils 1      % Immature Granulocytes 1      NRBCs per 100 WBC 0      Absolute Neutrophils 4.4      Absolute Lymphocytes 1.5      Absolute Monocytes 0.3      Absolute Eosinophils 0.0      Absolute Basophils 0.1      Absolute Immature Granulocytes 0.0      Absolute NRBCs 0.0     HEMOGLOBIN A1C - Normal    Hemoglobin A1C 5.1         Assessments & Plan (with Medical Decision Making)   Patient presenting with increasing symptoms of depression and passive suicidal thoughts in the context of recent psychosocial stressors, his marriage ending, and cannabis use. Patient is currently on suboxone for opioid maintenance therapy. Treatment plan focused on continuing current medication regimen as patient perceives these to be beneficial. Nursing  notes reviewed noting no acute issues.     I have reviewed the assessment completed by the [facility].     During the observation period, the patient did not require medications for agitation, and did not require restraints/seclusion for patient and/or provider safety.    The patient was found to have a psychiatric condition that would benefit from an observation stay in the emergency department for further psychiatric stabilization and/or coordination of a safe disposition. The observation plan includes serial assessments of psychiatric condition, potential administration of medications if indicated, further disposition pending the patient's psychiatric course during the monitoring period.     Preliminary diagnosis:    ICD-10-CM    1. KILO (generalized anxiety disorder)  F41.1       2. Opioid use disorder, moderate, in early remission, on maintenance therapy, dependence (H)  F11.21       3. Attention deficit hyperactivity disorder (ADHD), combined type  F90.2       4. Major depressive disorder, recurrent episode, moderate (H)  F33.1            Treatment Plan:  -Continue current medications without change   -Referral for IOP and psychiatric medication provider (pateint has been obtaining refills from addiction medicine provider or PCP)  -Patient has standing weekly therapy appointments  -Follow-up with addiction medication provider for suboxone   -Individual psychotherapy and outpatient psychiatric care recommended for additional support and ongoing development of nonpharmacologic coping skills and strategies.    -Problem focused supportive therapy and education provided today related to patient's current and acute stressors, symptoms, and diagnoses.  -Discharge to home with outpatient and crisis resources     After the period in observation care, the patient's circumstances and mental state were safe for outpatient management. After counseling on the diagnosis, work-up, and treatment plan, the patient was discharged.  Close follow-up with a psychiatrist and/or therapist was recommended and community psychiatric resources were provided. Patient is to return to the ED if any urgent or potentially life-threatening concerns.      At the time of discharge, the patient's acute suicide risk was determined to be low due to the following factors: Reduction in the intensity of mood/anxiety symptoms that preceded the admission, denial of suicidal thoughts, denies feeling helpless or helpless, not currently under the influence of alcohol or illicit substances, denies experiencing command hallucinations, no immediate access to firearms. The patient's acute risk could be higher if noncompliant with their treatment plan, medications, follow-up appointments or using illicit substances or alcohol. Protective factors include: social supports, children, employment, established outpatient therapist and addiction medicine provider     --  PIPER Bustamante CNP   [facility] EMERGENCY DEPT  EmPATH Unit        Gema Haas APRN CNP  07/11/23 0928

## 2023-07-11 NOTE — PROGRESS NOTES
"emPATH Discharge Note    Writer met with patient to discuss discharge planning. Reviewed current presentation on the unit and assessed for safety. Patient participated in aftercare and safety planning. The following plan was developed:    Aftercare Plan     Appointment information and/or additional resources available to me:        Scheduled Appointment  Date: Friday, 7/14/2023  Time: 10:00 am - 11:00 am  Provider: Luis Armando Avalos MA, CNP,RN  Location: [facility], [address] [place] [address], [address], [place] [address]  Phone: [phone]  Type: Telepsychiatry        If I am feeling unsafe or I am in a crisis, I will:   Contact my established care providers   Call the National Suicide Prevention Lifeline: 511.667.3111   Go to the nearest emergency room   Call 911      Your [facility] has a mental health crisis team you can call 24/7: [facility], [phone]     Warning signs that I or other people might notice when a crisis is developing for me: \"I stop communicating\"     Things I am able to do on my own to cope or help me feel better: \"I can exercise\"  -Practice square breathing when I begin to feel anxious - in breath through the nose for the count   of 4 and the first line on the square. Out breath through the mouth for the count of 4 for the second line   of the square. Repeat to complete the square. Repeat the square as many times as needed.     Things that I am able to do with others to cope or help me feel better: \"I can ask others how they are doing.\"  -Use community resources, including hotline numbers, [facility] crisis and support meetings     Things I can use or do for distraction: \"I can clean.\"  -Distraction skills of: going for walks, watching TV, spending time outside, calling a friend or family member  -Download a meditation alon and spend 15-20 minutes per day mediating/relaxing. Some apps to   download include: Calm, Headspace and Insight Timer. All 3 of these apps have free " "version     Changes I can make to support my mental health and wellness: \"I can try harder.\"  -Attend scheduled mental health therapy and psychiatric appointments and follow all recommendations  -Maintain a daily schedule/routine  -Practice deep breathing skills  -Abstain from all mood altering chemicals not currently prescribed to me      People in my life that I can ask for help: \"My mom, Fuentes.\"  [employer] [place] [employer] Illness (AMINATA)  215.335.3615 or 1.888.AMINATA.HELPS     Other things that are important when I m in crisis:  -Commit to 30 minutes of self care daily - this can be as simple as taking a shower, going for a walk, cooking a meal, read, writing, etc           Crisis Lines  Crisis Text Line  Text 306683  You will be connected with a trained live crisis counselor to provide support.     Por joaquinanol, texto  KITA a 661649 o texto a 442-AYUDAME en WhatsApp     National Hope Line  1.800.SUICIDE [1211913]        Community Resources  Fast Tracker  Linking people to mental health and substance use disorder resources  myseekitn.Ozsale      [place] Mental Health Warm Line  Peer to peer support  Monday thru Saturday, 12 pm to 10 pm  606.901.0307 or 8.279.763.0652  Text \"Support\" to 77717     [employer] [place] [employer] Mental Illness (AMINATA)  219.254.7175 or 1.888.AMINATA.HELPS           Mental Health Apps  My3  https://my3app.org/     VirtualHopeBox  https://[employer].org/apps/virtual-hope-box/        Additional Information  Today you were seen by a licensed mental health professional through Triage and Transition services, [facility])  for a crisis assessment in the Emergency Department at [facility].  It is recommended that you follow up with your established providers (psychiatrist, mental health therapist, and/or primary care doctor - as relevant) as soon as possible. Coordinators from P will be calling you in the next 24-48 hours to ensure that you have the " resources you need.  You can also contact [facility] coordinators directly at [phone]. You may have been scheduled for or offered an appointment with a mental health provider. [facility] maintains an extensive network of licensed behavioral health providers to connect patients with the services they need.  We do not charge providers a fee to participate in our referral network.  We match patients with providers based on a patient's specific needs, insurance coverage, and location.  Our first effort will be to refer you to a provider within your care system, and will utilize providers outside your care system as needed.

## 2023-07-11 NOTE — PROGRESS NOTES
Patient agrees to discharge plan. Discharge instructions reviewed with patient including follow-up care plan. Medications: not changed. Reviewed safety plan and outpatient resources. Denies SI and HI. All belongings that were brought into the hospital have been returned to patient. Escorted off the unit at 1145 accompanied by [facility] staff. Discharged to home via car.

## 2023-08-11 ENCOUNTER — HOSPITAL ENCOUNTER (EMERGENCY)
Facility: CLINIC | Age: 38
Discharge: HOME | End: 2023-08-11
Attending: EMERGENCY MEDICINE | Admitting: EMERGENCY MEDICINE
Payer: COMMERCIAL

## 2023-08-11 VITALS
HEIGHT: 71 IN | TEMPERATURE: 97.2 F | HEART RATE: 72 BPM | RESPIRATION RATE: 16 BRPM | WEIGHT: 240 LBS | DIASTOLIC BLOOD PRESSURE: 72 MMHG | SYSTOLIC BLOOD PRESSURE: 112 MMHG | OXYGEN SATURATION: 98 % | BODY MASS INDEX: 33.6 KG/M2

## 2023-08-11 DIAGNOSIS — F32.A DEPRESSION, UNSPECIFIED DEPRESSION TYPE: ICD-10-CM

## 2023-08-11 PROCEDURE — 99282 EMERGENCY DEPT VISIT SF MDM: CPT

## 2023-08-11 ASSESSMENT — ACTIVITIES OF DAILY LIVING (ADL): ADLS_ACUITY_SCORE: 35

## 2023-08-11 NOTE — ED PROVIDER NOTES
"  History     Chief Complaint:  Depression       HPI   Barak Clarke is a 38 year old male arrives in the ER at 1204.  The patient is brought to the emergency department by his father.  The patient was admitted to the empath unit on 7/10/2023.  The patient's father states that he was \"supposed to have been contacted by a psychiatrist\" for a follow-up visit but was \"never contacted\".  The patient's father states that he is worried about him because he has been sleeping excessively, recently started on Suboxone therapy, and has been staying at his mother's house in [place].  The patient's father states the patient has been experiencing memory loss, he has lost a $1400 phone.  The patient is at risk of losing his job because he has missed a few days of work.    The patient himself feels he is does not need to be here.  He is interested in psychiatric follow-up but denies suicidal ideation.  He does not believe that he has been experiencing memory loss.    Independent Historian:   The patient and his father are providing most of the history.    Review of External Notes:   I reviewed several telephone notes from 7/27 and 7/31 regarding their attempts to contact the patient for follow-up      Medications:    alum & mag hydroxide-simethicone (MAALOX) 200-200-20 MG/5ML SUSP suspension  atenolol (TENORMIN) 50 MG tablet  benzocaine-menthol (CEPACOL) 15-3.6 MG lozenge  buprenorphine HCl-naloxone HCl (SUBOXONE) 8-2 MG per film  buPROPion (WELLBUTRIN XL) 150 MG 24 hr tablet  Citalopram Hydrobromide (CELEXA PO)  gabapentin (NEURONTIN) 600 MG tablet  melatonin 3 MG tablet  OLANZapine (ZYPREXA) 5 MG tablet  VYVANSE 70 MG capsule        Past Medical History:    Past Medical History:   Diagnosis Date     Anxiety      Hypertension      MDD (major depressive disorder)      Opioid dependence (H)        Past Surgical History:    Past Surgical History:   Procedure Laterality Date     ORTHOPEDIC SURGERY          Physical Exam   Patient " "Vitals for the past 24 hrs:   BP Temp Temp src Pulse Resp SpO2 Height Weight   08/11/23 1207 112/72 97.2  F (36.2  C) Temporal 72 16 98 % 1.803 m (5' 11\") 108.9 kg (240 lb)        Physical Exam  General: Alert, No distress. Nontoxic appearance  Head: No signs of trauma.   Mouth/Throat: Oropharynx moist.   Eyes: Conjunctivae are normal. Pupils are equal..   Neck: Normal range of motion.    CV: Appears well perfused.  Resp:No respiratory distress.   MSK: Normal range of motion. No obvious deformity.   Neuro: The patient is alert and interactive. RAINEY. Speech normal. GCS 15  Skin: No lesion or sign of trauma noted.   Psych: normal mood and affect. behavior is normal.       Emergency Department Course       Emergency Department Course & Assessments:      Assessments:  The patient was assessed in the triage area but refused evaluation in the empath unit    Consultations/Discussion of Management or Tests:  I placed an order for the DEC consult but the patient refused to wait for this consult to be completed    Social Determinants of Health affecting care:   Healthcare Access/Compliance and Stress/Adjustment Disorders    Disposition:  The patient was discharged to home.     Impression & Plan        Medical Decision Making:  The patient is brought into the emergency department for a psychiatric evaluation after the father became concerned with the patient's progress in obtaining outpatient treatment for his depression.  A consultation was placed for the DEC staff to evaluate the patient but the patient refused to wait for this to be completed.  They are most concerned about obtaining a work note so the patient does not lose his job.  A work note was provided that he was seen in the ED today.  He denies suicidal ideation.  No signs of psychosis.  He will follow-up at his previously scheduled psychiatric appointments.  He states that he is aware of an upcoming appointment that he will complete.    Diagnosis:    ICD-10-CM    1. " Depression, unspecified depression type  F32.A            Discharge Medications:  New Prescriptions    No medications on file          8/11/2023   Arnav Fish MD Joing, Todd Roger, MD  08/11/23 2051

## 2023-08-11 NOTE — ED TRIAGE NOTES
"    Patient states he is having increased depresion.  States he \"Wants to be left alone\" Patient states he doesn't want to go to empath, but also needs a doctors note for work.   "

## 2023-08-11 NOTE — LETTER
August 11, 2023      To Whom It May Concern:      Barak Clarke was seen in our Emergency Department today, 08/11/23.  Sincerely,        Britta Murcia RN

## 2024-02-21 ENCOUNTER — TRANSFERRED RECORDS (OUTPATIENT)
Dept: HEALTH INFORMATION MANAGEMENT | Facility: CLINIC | Age: 39
End: 2024-02-21
Payer: COMMERCIAL

## 2024-02-27 ENCOUNTER — TELEPHONE (OUTPATIENT)
Dept: BEHAVIORAL HEALTH | Facility: CLINIC | Age: 39
End: 2024-02-27

## 2024-02-27 NOTE — TELEPHONE ENCOUNTER
Writer contacted ALIYAH Hernandez, the  who sent referral for pt to discuss referral. Writer left VM and callback number.

## 2024-02-27 NOTE — TELEPHONE ENCOUNTER
Writer received call back from . Writer informed  that pt would be denied due to recent suicide attempt.

## 2024-02-27 NOTE — TELEPHONE ENCOUNTER
2/27/2024    Received referral from [facility]     Zeny Parker [facility] - Patient Navigator   [email or web address]  Direct phone: [phone]

## 2024-03-07 ENCOUNTER — TRANSFERRED RECORDS (OUTPATIENT)
Dept: HEALTH INFORMATION MANAGEMENT | Facility: CLINIC | Age: 39
End: 2024-03-07
Payer: COMMERCIAL

## 2024-03-14 ENCOUNTER — TELEPHONE (OUTPATIENT)
Dept: BEHAVIORAL HEALTH | Facility: CLINIC | Age: 39
End: 2024-03-14
Payer: COMMERCIAL

## 2024-03-14 ENCOUNTER — HOSPITAL ENCOUNTER (INPATIENT)
Facility: CLINIC | Age: 39
LOS: 14 days | Discharge: HOME | End: 2024-04-03
Attending: EMERGENCY MEDICINE | Admitting: PSYCHIATRY & NEUROLOGY
Payer: COMMERCIAL

## 2024-03-14 DIAGNOSIS — F48.9 MENTAL HEALTH PROBLEM: ICD-10-CM

## 2024-03-14 DIAGNOSIS — F19.90 POLYSUBSTANCE USE DISORDER: ICD-10-CM

## 2024-03-14 DIAGNOSIS — F29 PSYCHOSIS, UNSPECIFIED PSYCHOSIS TYPE (H): ICD-10-CM

## 2024-03-14 DIAGNOSIS — I10 HYPERTENSION, UNSPECIFIED TYPE: ICD-10-CM

## 2024-03-14 DIAGNOSIS — F33.1 MAJOR DEPRESSIVE DISORDER, RECURRENT, MODERATE (H): ICD-10-CM

## 2024-03-14 DIAGNOSIS — F22 PARANOID (H): ICD-10-CM

## 2024-03-14 DIAGNOSIS — F19.99 SUBSTANCE-RELATED DISORDER (H): Primary | ICD-10-CM

## 2024-03-14 PROCEDURE — 250N000013 HC RX MED GY IP 250 OP 250 PS 637: Performed by: EMERGENCY MEDICINE

## 2024-03-14 PROCEDURE — 99285 EMERGENCY DEPT VISIT HI MDM: CPT | Mod: 25 | Performed by: EMERGENCY MEDICINE

## 2024-03-14 PROCEDURE — 250N000013 HC RX MED GY IP 250 OP 250 PS 637: Performed by: PHYSICIAN ASSISTANT

## 2024-03-14 PROCEDURE — 99285 EMERGENCY DEPT VISIT HI MDM: CPT | Mod: FS | Performed by: EMERGENCY MEDICINE

## 2024-03-14 RX ORDER — ATENOLOL 50 MG/1
50 TABLET ORAL DAILY
Status: DISCONTINUED | OUTPATIENT
Start: 2024-03-15 | End: 2024-04-03 | Stop reason: HOSPADM

## 2024-03-14 RX ORDER — GABAPENTIN 300 MG/1
600 CAPSULE ORAL ONCE
Status: COMPLETED | OUTPATIENT
Start: 2024-03-14 | End: 2024-03-14

## 2024-03-14 RX ORDER — DESVENLAFAXINE 50 MG/1
50 TABLET, FILM COATED, EXTENDED RELEASE ORAL DAILY
Status: DISCONTINUED | OUTPATIENT
Start: 2024-03-15 | End: 2024-03-20

## 2024-03-14 RX ORDER — POLYETHYLENE GLYCOL 3350 17 G
2 POWDER IN PACKET (EA) ORAL
Status: DISCONTINUED | OUTPATIENT
Start: 2024-03-14 | End: 2024-03-22

## 2024-03-14 RX ORDER — OLANZAPINE 5 MG/1
5 TABLET ORAL DAILY PRN
Status: DISCONTINUED | OUTPATIENT
Start: 2024-03-14 | End: 2024-03-15 | Stop reason: DRUGHIGH

## 2024-03-14 RX ORDER — TIZANIDINE 2 MG/1
2 TABLET ORAL 2 TIMES DAILY
Status: ON HOLD | COMMUNITY
End: 2024-04-02

## 2024-03-14 RX ORDER — OLANZAPINE 10 MG/1
10 TABLET ORAL AT BEDTIME
Status: ON HOLD | COMMUNITY
End: 2024-04-02

## 2024-03-14 RX ORDER — BUPROPION HYDROCHLORIDE 150 MG/1
150 TABLET, EXTENDED RELEASE ORAL DAILY
Status: DISCONTINUED | OUTPATIENT
Start: 2024-03-15 | End: 2024-03-17 | Stop reason: ALTCHOICE

## 2024-03-14 RX ORDER — BUPRENORPHINE AND NALOXONE 8; 2 MG/1; MG/1
1 FILM, SOLUBLE BUCCAL; SUBLINGUAL 2 TIMES DAILY
Status: DISCONTINUED | OUTPATIENT
Start: 2024-03-15 | End: 2024-03-15

## 2024-03-14 RX ORDER — ACETAMINOPHEN 500 MG
1000 TABLET ORAL ONCE
Status: COMPLETED | OUTPATIENT
Start: 2024-03-14 | End: 2024-03-15

## 2024-03-14 RX ORDER — TIZANIDINE 2 MG/1
2 TABLET ORAL 2 TIMES DAILY
Status: DISCONTINUED | OUTPATIENT
Start: 2024-03-14 | End: 2024-04-03 | Stop reason: HOSPADM

## 2024-03-14 RX ORDER — GABAPENTIN 300 MG/1
900 CAPSULE ORAL 3 TIMES DAILY
Status: DISCONTINUED | OUTPATIENT
Start: 2024-03-14 | End: 2024-03-17

## 2024-03-14 RX ORDER — DESVENLAFAXINE 50 MG/1
50 TABLET, FILM COATED, EXTENDED RELEASE ORAL DAILY
Status: ON HOLD | COMMUNITY
End: 2024-04-02

## 2024-03-14 RX ORDER — OLANZAPINE 10 MG/1
10 TABLET ORAL AT BEDTIME
Status: DISCONTINUED | OUTPATIENT
Start: 2024-03-14 | End: 2024-03-16

## 2024-03-14 RX ORDER — BUPRENORPHINE AND NALOXONE 4; 1 MG/1; MG/1
2 FILM, SOLUBLE BUCCAL; SUBLINGUAL ONCE
Status: COMPLETED | OUTPATIENT
Start: 2024-03-14 | End: 2024-03-14

## 2024-03-14 RX ORDER — GABAPENTIN 300 MG/1
900 CAPSULE ORAL 3 TIMES DAILY
Status: ON HOLD | COMMUNITY
End: 2024-04-02

## 2024-03-14 RX ORDER — BUPROPION HYDROCHLORIDE 150 MG/1
150 TABLET, EXTENDED RELEASE ORAL DAILY
Status: ON HOLD | COMMUNITY
End: 2024-04-02

## 2024-03-14 RX ADMIN — GABAPENTIN 900 MG: 300 CAPSULE ORAL at 21:22

## 2024-03-14 RX ADMIN — NICOTINE POLACRILEX 2 MG: 2 LOZENGE ORAL at 19:30

## 2024-03-14 RX ADMIN — NICOTINE POLACRILEX 2 MG: 2 LOZENGE ORAL at 20:29

## 2024-03-14 RX ADMIN — OLANZAPINE 10 MG: 10 TABLET, FILM COATED ORAL at 21:22

## 2024-03-14 RX ADMIN — GABAPENTIN 600 MG: 300 CAPSULE ORAL at 18:08

## 2024-03-14 RX ADMIN — TIZANIDINE 2 MG: 2 TABLET ORAL at 21:23

## 2024-03-14 RX ADMIN — BUPRENORPHINE AND NALOXONE 2 FILM: 4; 1 FILM, SOLUBLE BUCCAL; SUBLINGUAL at 18:09

## 2024-03-14 ASSESSMENT — ACTIVITIES OF DAILY LIVING (ADL)
ADLS_ACUITY_SCORE: 35

## 2024-03-14 ASSESSMENT — COLUMBIA-SUICIDE SEVERITY RATING SCALE - C-SSRS
2. HAVE YOU ACTUALLY HAD ANY THOUGHTS OF KILLING YOURSELF IN THE PAST MONTH?: NO
6. HAVE YOU EVER DONE ANYTHING, STARTED TO DO ANYTHING, OR PREPARED TO DO ANYTHING TO END YOUR LIFE?: NO
1. IN THE PAST MONTH, HAVE YOU WISHED YOU WERE DEAD OR WISHED YOU COULD GO TO SLEEP AND NOT WAKE UP?: NO

## 2024-03-14 NOTE — ED TRIAGE NOTES
Triage Assessment (Adult)       Row Name 03/14/24 1411          Triage Assessment    Airway WDL WDL        Respiratory WDL    Respiratory WDL WDL        Skin Circulation/Temperature WDL    Skin Circulation/Temperature WDL WDL        Cardiac WDL    Cardiac WDL WDL        Peripheral/Neurovascular WDL    Peripheral Neurovascular WDL WDL        Cognitive/Neuro/Behavioral WDL    Cognitive/Neuro/Behavioral WDL WDL
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
## 2024-03-14 NOTE — PROGRESS NOTES
"Triage & Transition Services, Extended Care       Patient: Barak goes by \"Barak,\" uses he/him pronouns  Date of Service: March 14, 2024  Site of Service: [facility] EMERGENCY DEPARTMENT                             UREDH-E  Patient was seen      Mode of Assessment:  in person    Patient is followed related to: boarding status  Notable observations from today's encounter include: Friendly, easy to work with, did tell one very bizarre story.    Recommendations: Residential MICD Treatment at [facility] and [facility]     Referrals/ Alternatives:  [facility]  [address] [place] [address]  Phone: [phone]  [email or web address]     [facility]  [address] [place] [address]  Phone: [phone]  Fax: [phone]  [email or web address]    ALIYAH Pisano on 3/14/2024 at 4:34 PM    "

## 2024-03-14 NOTE — DISCHARGE INSTRUCTIONS
Behavioral Discharge Planning and Instructions    Summary: You were admitted on 3/20/2024 after presenting to the ED on 3/14/2024 due to symptoms concerning for psychosis.  You were treated by Dr. Bridger Stack and discharged on 4/2/20[facility] Locations:[id number]}    Main Diagnosis: ***    Health Care Follow-up:   Medication Management/Psychiatry:  Provider: PIPER Carey [facility]: [address] [place] [address] ph:  [phone]: fax: [phone]       You report you have an appointment scheduled and decline having unit staff verify this appointment.     If no appointments scheduled, explain - you report you have an appointment with your provider and decline to verify prior to leaving the unit.     [facility] crisis: [phone]      While in the ED you had a Substance Use Disorder Assessment and referrals were made to the following programs.   Referrals/ Alternatives: Patient should call each of the below facilities on a daily basis until placement is found.   [facility] [address] [place] [address] Phone: [phone]  [email or web address]     [facility]  [address] [place] [address] Phone: [phone] Fax: [phone]  [email or web address]    Attend all scheduled appointments with your outpatient providers. Call at least 24 hours in advance if you need to reschedule an appointment to ensure continued access to your outpatient providers.     Major Treatments, Procedures and Findings:  You were provided with: a psychiatric assessment, assessed for medical stability, medication evaluation and/or management, and CD evaluation/assessment    Symptoms to Report: increased confusion, losing more sleep, mood getting worse, or thoughts of suicide    Early warning signs can include: increased depression or  "anxiety sleep disturbances increased thoughts or behaviors of suicide or self-harm  increased unusual thinking, such as paranoia or hearing voices    Safety and Wellness:  Take all medicines as directed.  Make no changes unless your doctor suggests them.      Follow treatment recommendations.  Refrain from alcohol and non-prescribed drugs.     Resources:   Crisis Intervention: 700.748.1654 or 361-852-0796 (TTY: 271.264.9723).  Call anytime for help.  [place] on Mental Illness (www.[place].colt.org): 569.994.3022 or 796-665-6377.  Alcoholics Anonymous (www.alcoholics-anonymous.org): Check your phone book for your local chapter.  Suicide Awareness Voices of Education (SAVE) (www.save.org): 218-536-SAVE (0790)  Self- Management and Recovery Training., SMART-- Toll free: 878.851.6199  www.[employer]  [facility] Crisis (COPE) Response - Adult 850 641-7783  [facility] Crisis Response - Adult 871 076-2134  Text 4 Life: txt \"LIFE\" to [address]9712 for immediate support and crisis intervention  Crisis text line: Text \"[place]\" to 347159. Free, confidential, 24/7.    General Medication Instructions:   See your medication sheet(s) for instructions.   Take all medicines as directed.  Make no changes unless your doctor suggests them.   Go to all your doctor visits.  Be sure to have all your required lab tests. This way, your medicines can be refilled on time.  Do not use any drugs not prescribed by your doctor.  Avoid alcohol.    Advance Directives:   Scanned document on file with [place]? No scanned doc  Is document scanned? Pt states no documents  Honoring Choices Your Rights Handout: Informed and given  Was more information offered? Pt declined    The Treatment team has appreciated the opportunity to work with you. If you have any questions or concerns about your recent admission, you can contact the unit which can receive your call 24 hours a day, 7 days a week. They will be able to get in touch with a Provider if " needed. The unit number is [phone] .

## 2024-03-14 NOTE — TELEPHONE ENCOUNTER
3/14/2024    Received another referral from [place] for pt. [facility] to [facility] admission staff.     Zeny Parker [facility] - Patient Navigator   [email or web address]  Direct phone: [phone]

## 2024-03-14 NOTE — TELEPHONE ENCOUNTER
Pts Mom called seeking info on L+ program.   Pt on Multiple medications that may exclude him from L+   Pts Mom told client needs to contact intake re: Lodging program.     @ time of phone call writter was not aware that client had been denied admission 2/27/24  Seven on file for Mom.

## 2024-03-14 NOTE — TELEPHONE ENCOUNTER
Writer received referral. There is not an assessment in the paperwork sent to [facility]. Pt is currently in the ED awaiting placement. Writer will contact the ED and explain the necessary steps to admit to [facility].

## 2024-03-14 NOTE — CONSULTS
"Triage & Transition Services, Extended Care         Patient: Baark goes by \"Barak,\" uses he/him pronouns  Date of Service: March 14, 2024  Site of Service: [facility] EMERGENCY DEPARTMENT                             UREDH-E  Patient was seen      Mode of Assessment:  in person     Patient is followed related to: boarding status  Notable observations from today's encounter include: Friendly, easy to work with, did tell one very bizarre story.     Recommendations: Residential MICD Treatment at [facility] and [facility]     Referrals/ Alternatives:  [facility]  [address] [place] [address]  Phone: [phone]  [email or web address]-[email or web address]-program     [facility]  [address] [place] [address]  Phone: [phone]  Fax: [phone]  [email or web address]     ALIYAH Pisano on 3/14/2024 at 4:34 PM  "
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
## 2024-03-14 NOTE — TELEPHONE ENCOUNTER
LP SCREEN TELEPHONE NOTE   Barak Clarke paperwork was reviewed by Rasta Pradhan [facility] and the patient was deemed ELIGIBLE for the LP program.     Inpatient or Community Referral: Inpatient referral     Medical Screening (As Needed): The patient's medical and COVID-19 screen with the LP RN is pending at this time and NEEDS to be completed prior to placing this patient on the LP waiting list.     Regular use of benzodiazapine's (other than detox): The patient uses prescribed benzodiazepines and will NEED a medical screening with the LP RN.     Insurance: The [facility] Department is responsible for obtaining ALL authorizations for treatment services at the EOP, DOP and LP levels of care.      This will be a: FULL evaluation. (FULL ASSESSMENT, ADDENDUM, VAA, ISP, DAANES, DELORES's & SBAR)     IV use or Pregnant: This patient is not pregnant or an IV drug user.     Business office: [phone]     List: This patient CAN be placed on the PRIORITY LP Waiting List at this time.       Group: [facility]     Additional Info as needed: NA     The best current contact telephone number for the patient is: [phone]

## 2024-03-15 ENCOUNTER — TELEPHONE (OUTPATIENT)
Dept: BEHAVIORAL HEALTH | Facility: CLINIC | Age: 39
End: 2024-03-15

## 2024-03-15 LAB
AMPHETAMINES UR QL SCN: ABNORMAL
BARBITURATES UR QL SCN: ABNORMAL
BENZODIAZ UR QL SCN: ABNORMAL
BZE UR QL SCN: ABNORMAL
CANNABINOIDS UR QL SCN: ABNORMAL
FENTANYL UR QL: ABNORMAL
OPIATES UR QL SCN: ABNORMAL
PCP QUAL URINE (ROCHE): ABNORMAL

## 2024-03-15 PROCEDURE — 250N000013 HC RX MED GY IP 250 OP 250 PS 637: Performed by: PSYCHIATRY & NEUROLOGY

## 2024-03-15 PROCEDURE — 250N000013 HC RX MED GY IP 250 OP 250 PS 637: Performed by: EMERGENCY MEDICINE

## 2024-03-15 PROCEDURE — 99418 PROLNG IP/OBS E/M EA 15 MIN: CPT | Performed by: CLINICAL NURSE SPECIALIST

## 2024-03-15 PROCEDURE — 80307 DRUG TEST PRSMV CHEM ANLYZR: CPT | Performed by: PHYSICIAN ASSISTANT

## 2024-03-15 PROCEDURE — 250N000013 HC RX MED GY IP 250 OP 250 PS 637: Performed by: PHYSICIAN ASSISTANT

## 2024-03-15 PROCEDURE — 99255 IP/OBS CONSLTJ NEW/EST HI 80: CPT | Performed by: CLINICAL NURSE SPECIALIST

## 2024-03-15 PROCEDURE — 250N000013 HC RX MED GY IP 250 OP 250 PS 637: Performed by: CLINICAL NURSE SPECIALIST

## 2024-03-15 RX ORDER — NICOTINE 21 MG/24HR
1 PATCH, TRANSDERMAL 24 HOURS TRANSDERMAL ONCE
Status: DISCONTINUED | OUTPATIENT
Start: 2024-03-15 | End: 2024-03-15

## 2024-03-15 RX ORDER — POLYETHYLENE GLYCOL 3350 17 G
2 POWDER IN PACKET (EA) ORAL
Status: DISCONTINUED | OUTPATIENT
Start: 2024-03-15 | End: 2024-03-20

## 2024-03-15 RX ORDER — OLANZAPINE 10 MG/1
10 TABLET ORAL EVERY MORNING
Status: DISCONTINUED | OUTPATIENT
Start: 2024-03-16 | End: 2024-03-16

## 2024-03-15 RX ORDER — BUPRENORPHINE AND NALOXONE 8; 2 MG/1; MG/1
1 FILM, SOLUBLE BUCCAL; SUBLINGUAL 2 TIMES DAILY
Status: DISCONTINUED | OUTPATIENT
Start: 2024-03-16 | End: 2024-04-03 | Stop reason: HOSPADM

## 2024-03-15 RX ORDER — OLANZAPINE 5 MG/1
5 TABLET ORAL ONCE
Status: COMPLETED | OUTPATIENT
Start: 2024-03-15 | End: 2024-03-15

## 2024-03-15 RX ORDER — LANOLIN ALCOHOL/MO/W.PET/CERES
3 CREAM (GRAM) TOPICAL
Status: DISCONTINUED | OUTPATIENT
Start: 2024-03-15 | End: 2024-03-20

## 2024-03-15 RX ORDER — NICOTINE 21 MG/24HR
1 PATCH, TRANSDERMAL 24 HOURS TRANSDERMAL DAILY
Status: DISCONTINUED | OUTPATIENT
Start: 2024-03-15 | End: 2024-04-03 | Stop reason: HOSPADM

## 2024-03-15 RX ORDER — ACETAMINOPHEN 500 MG
1000 TABLET ORAL ONCE
Status: COMPLETED | OUTPATIENT
Start: 2024-03-15 | End: 2024-03-15

## 2024-03-15 RX ADMIN — DESVENLAFAXINE 50 MG: 50 TABLET, FILM COATED, EXTENDED RELEASE ORAL at 07:55

## 2024-03-15 RX ADMIN — NICOTINE POLACRILEX 2 MG: 2 LOZENGE ORAL at 17:42

## 2024-03-15 RX ADMIN — NICOTINE POLACRILEX 2 MG: 2 LOZENGE ORAL at 11:00

## 2024-03-15 RX ADMIN — OLANZAPINE 10 MG: 10 TABLET, FILM COATED ORAL at 20:03

## 2024-03-15 RX ADMIN — NICOTINE POLACRILEX 2 MG: 2 LOZENGE ORAL at 11:40

## 2024-03-15 RX ADMIN — NICOTINE POLACRILEX 2 MG: 2 LOZENGE ORAL at 09:19

## 2024-03-15 RX ADMIN — OLANZAPINE 5 MG: 5 TABLET, FILM COATED ORAL at 02:08

## 2024-03-15 RX ADMIN — GABAPENTIN 900 MG: 300 CAPSULE ORAL at 13:24

## 2024-03-15 RX ADMIN — NICOTINE POLACRILEX 2 MG: 2 LOZENGE ORAL at 15:04

## 2024-03-15 RX ADMIN — GABAPENTIN 900 MG: 300 CAPSULE ORAL at 07:54

## 2024-03-15 RX ADMIN — BUPRENORPHINE AND NALOXONE 1 FILM: 8; 2 FILM, SOLUBLE BUCCAL; SUBLINGUAL at 07:54

## 2024-03-15 RX ADMIN — TIZANIDINE 2 MG: 2 TABLET ORAL at 16:54

## 2024-03-15 RX ADMIN — Medication 3 MG: at 20:02

## 2024-03-15 RX ADMIN — NICOTINE 1 PATCH: 21 PATCH, EXTENDED RELEASE TRANSDERMAL at 18:25

## 2024-03-15 RX ADMIN — ATENOLOL 50 MG: 50 TABLET ORAL at 07:55

## 2024-03-15 RX ADMIN — NICOTINE POLACRILEX 2 MG: 2 LOZENGE ORAL at 18:53

## 2024-03-15 RX ADMIN — BUPROPION HYDROCHLORIDE 150 MG: 150 TABLET, EXTENDED RELEASE ORAL at 07:55

## 2024-03-15 RX ADMIN — NICOTINE POLACRILEX 2 MG: 2 LOZENGE ORAL at 20:49

## 2024-03-15 RX ADMIN — NICOTINE POLACRILEX 2 MG: 2 LOZENGE ORAL at 08:10

## 2024-03-15 RX ADMIN — TIZANIDINE 2 MG: 2 TABLET ORAL at 07:54

## 2024-03-15 RX ADMIN — NICOTINE 1 PATCH: 21 PATCH, EXTENDED RELEASE TRANSDERMAL at 08:55

## 2024-03-15 RX ADMIN — GABAPENTIN 900 MG: 300 CAPSULE ORAL at 19:37

## 2024-03-15 RX ADMIN — NICOTINE POLACRILEX 2 MG: 2 LOZENGE ORAL at 06:29

## 2024-03-15 RX ADMIN — NICOTINE POLACRILEX 2 MG: 2 LOZENGE ORAL at 16:54

## 2024-03-15 RX ADMIN — ACETAMINOPHEN 1000 MG: 500 TABLET ORAL at 00:49

## 2024-03-15 RX ADMIN — BUPRENORPHINE AND NALOXONE 1 FILM: 8; 2 FILM, SOLUBLE BUCCAL; SUBLINGUAL at 16:53

## 2024-03-15 RX ADMIN — OLANZAPINE 5 MG: 5 TABLET, FILM COATED ORAL at 13:25

## 2024-03-15 RX ADMIN — NICOTINE POLACRILEX 2 MG: 2 LOZENGE ORAL at 15:56

## 2024-03-15 RX ADMIN — NICOTINE POLACRILEX 2 MG: 2 LOZENGE ORAL at 13:25

## 2024-03-15 NOTE — ED NOTES
Pt's home medications counted and logged onto Medications/valuables envelope. Medications taken to central pharmacy for storage. Medications were counted and logged by writer and CHARI Tejeda.   Controlled substance, Suboxone 8-2mg Film amongst home medications. Total of 43 sublingual films counted/logged.

## 2024-03-15 NOTE — ED NOTES
IP MH Referral Acuity Rating Score (RARS)    LMHP complete at referral to IP MH, with DEC; and, daily while awaiting IP MH placement. Call score to PPS.  CRITERIA SCORING   New 72 HH and Involuntary for IP MH (not adolescent) 0/1   Boarding over 24 hours 0/1   Vulnerable adult at least 55+ with multiple co morbidities; or, Patient age 11 or under 0/1   Suicide ideation without relief of precipitating factors 0/1   Current plan for suicide 0/1   Current plan for homicide 0/1   Imminent risk or actual attempt to seriously harm another without relief of factors precipitating the attempt 0/1   Severe dysfunction in daily living (ex: complete neglect for self care, extreme disruption in vegetative function, extreme deterioration in social interactions) 1/1   Recent (last 2 weeks) or current physical aggression in the ED 0/1   Restraints or seclusion episode in ED 0/1   Verbal aggression, agitation, yelling, etc., while in the ED 0/1   Active psychosis with psychomotor agitation or catatonia 1/1   Need for constant or near constant redirection (from leaving, from others, etc).  1/1   Intrusive or disruptive behaviors 0/1   TOTAL Acuity Total Score: 3

## 2024-03-15 NOTE — TELEPHONE ENCOUNTER
R: [place]  Access Inpatient Bed Call Log 3/15/24 8:30 AM  (metro):  Intake has called facilities that have not updated the bed status within the last 12 hours.                             [facility] does not have an appropriate bed avail.        [facility] is posting 0 beds. [phone]   [facility] is posting 2 beds. Negative covid required; per call at 9:10 am to [place], they are at cap.   [facility] is posting 0 beds. Neg covid. No high school/Lori-psych. [phone]   [facility] is posting 0 beds. [phone]  [facility] is posting 0 beds. [phone]   [facility] is posting 3 beds. Ages 18-28. Negative covid. [phone] Per call @8:21am; Pt is outside the accepted age range  [facility] is posting 0 beds          [facility] is posting 0 beds.  [phone]        Pt remains on the work list pending appropriate bed availability.

## 2024-03-15 NOTE — ED NOTES
I received report on this patient and will take over care at 23:30 pm.2:20 am patient restless states having hard time resting calm soft voice.5:54 am patient does wake up and sleep walks it appears at times on unit guided back to room back to bed.

## 2024-03-15 NOTE — TELEPHONE ENCOUNTER
R: [place]  Access Inpatient Bed Call Log  3/15/2024 12:28 AM  Intake has called facilities that have not updated their bed status within the last 12 hours.??      ADULTS:     *[facility]  [place] -- [facility]: @ cap per website.  [place] -- [facility]:  @ cap per website.   [place] -- [facility]: @ Cap per website.  [place] -- [facility]: @ cap per website. 3/15 12:54 AM Per [place], they are capped.   [place] -- [facility]: @ Cap per website.  [facility] [place] -- [facility]: @ cap per website.   [place] -- [facility] beds @ Posting 3 beds.  Ages 18-25, Voluntary only, COVID test req'd, NO aggression, physical or sexual assault, violence hx or drug abuse, or psychosis 3/15 Pt not appropriate d/t unit age restrictions.  [place] -- [facility]: @ Cap per website.  [place] -- [facility]: @ cap per website.  [place] -- [facility]:  @ Cap per website.      Pt remains on waitlist pending appropriate placement availability.

## 2024-03-15 NOTE — ED NOTES
"Writer introduced self to patient. Offered time for 1:1. Patient affect calm. Mood verbalized as \"good.\" Pt was medication compliant. Reviewed medications with patient prior to administration. Pt reports main reason for admission is substance abuse. However patient has had paranoia. Pt reports no safety concerns at this time. Pt is expected to go inpatient mental health.  "

## 2024-03-15 NOTE — ED NOTES
"Pt received from main ED; BEC process explained. Pt ambulatory. Pt states he's here at hospital with the hopes of getting into the treatment program because \"its safer\". Pt states he last used street drugs on 2/20th. He also reports he was hearing voices up until 2/24. He's currently denying any AVH, SI/SIB/HI. Pt is behaviorally controlled. VSS, denies pain.  "
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
## 2024-03-15 NOTE — ED NOTES
Patient pacing unit at 3pm.   Introduced self and patient participated in one on one.   Patient reports struggles with substance abuse and is here for treatment.   Patient denies SI, HI, AVH.  Patient suboxone time of admin. Changed to earlier.
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
## 2024-03-15 NOTE — PHARMACY-ADMISSION MEDICATION HISTORY
Pharmacist Admission Medication History    Admission medication history is complete. The information provided in this note is only as accurate as the sources available at the time of the update.    Information Source(s): Patient and Hospital records via in-person    Pertinent Information: Updated medication list based on discharge summary from [facility] 3/6/24, patient confirmed he was taking the same medications. The only medications he endorsed taking that didn't match the discharge summary were tizanidine and Vyvanse. He did fill a prescription for tizanidine on 3/12/24. He has been off Vyvanse for over a month, he used to take 70 mg (40 mg + 30 mg). Vyvanse was discontinued on the [facility] discharge summary.     Changes made to PTA medication list:  Added: desvenlafaxine, tizanidine  Deleted: alum & mag hydroxide-simethicone suspension, benzocaine-menthol lozenge, citalopram, lisdexamphetamine  Changed: changed bupropion dosage form, updated gabapentin dose, updating olanzapine dosing    Allergies reviewed with patient and updates made in EHR: yes    Medication History Completed By: Berenice Jeter RPH 3/14/2024 8:15 PM    PTA Med List   Medication Sig Last Dose    atenolol (TENORMIN) 50 MG tablet Take 50 mg by mouth daily 3/14/2024    buprenorphine HCl-naloxone HCl (SUBOXONE) 8-2 MG per film TAKE 1 FILM SUBLINGUALLY TWICE DAILY 3/14/2024    buPROPion (WELLBUTRIN SR) 150 MG 12 hr tablet Take 150 mg by mouth daily 3/14/2024    desvenlafaxine (PRISTIQ) 50 MG 24 hr tablet Take 50 mg by mouth daily 3/14/2024    gabapentin (NEURONTIN) 300 MG capsule Take 900 mg by mouth 3 times daily 3/14/2024    melatonin 3 MG tablet Take 3 mg by mouth at bedtime 3/13/2024    OLANZapine (ZYPREXA) 10 MG tablet Take 10 mg by mouth at bedtime 3/13/2024    OLANZapine (ZYPREXA) 5 MG tablet Take 5 mg by mouth daily as needed (anxiety, mood swings, psychosis) Unknown    tiZANidine (ZANAFLEX) 2 MG tablet Take 2 mg by mouth 2 times daily AM and  1700 3/14/2024      Berenice Jeter, PharmD

## 2024-03-15 NOTE — PLAN OF CARE
Barak YULIA Vince  March 14, 2024  Plan of Care Hand-off Note     Patient Care Path: inpatient mental health    Plan for Care:   Patient is alert and oriented x4. Patient is adequately dressed and groomed. Patient has articulate speech with normal rate and volume. Patient has anxious mood and congruent affect. Patient was cooperative with the assessment process. Patient cites primary stressor(s) as divorce from ex-wife. Patient reports coping with substances, most recently 2/20/2024. Patient presents as acutely paranoid and delusional, please see extensive documentation above. Patient denies any current SIB, SI, or HI. Patient was initially seen by [facility] clinician who recommended residential treatment. Patient was reportedly declined from [facility] due to psychiatric symptoms. Patient is agreeable to inpatient admission for stabilization, prior to substance use treatment.    Identified Goals and Safety Issues: medication management and crisis stabilization    Overview:    Sarika Clarke (Mother) [phone]      Legal Status: Legal Status at Admission: Voluntary/Patient has signed consent for treatment    Psychiatry Consult: ordered     Updated MD regarding plan of care.      SERAFIN Hernandez

## 2024-03-15 NOTE — TELEPHONE ENCOUNTER
R: [place]  Access Inpatient Bed Call Log 3/14/24 @ 330 pm      Intake has called facilities that have not updated the bed status within the last 12 hours.                                 [facility] is at capacity               [facility] is posting 0 beds. [phone]       [facility] is posting 0 beds. Negative covid req.   [phone]       [facility] is posting 0 beds. Neg covid. No high school/Lori-psych.   [phone]  Kei Blake @3:25 pm- No Beds     [facility] is posting 0 beds. [phone]     [facility] is posting 0 beds. [phone]       [facility] is posting 0 beds for Young Adults. Negative covid. [phone]       [facility] is posting 0 beds. [phone]         [facility] is posting 0 beds. [phone]           Pt remains on the work list pending appropriate bed availability.

## 2024-03-16 ENCOUNTER — TELEPHONE (OUTPATIENT)
Dept: BEHAVIORAL HEALTH | Facility: CLINIC | Age: 39
End: 2024-03-16
Payer: COMMERCIAL

## 2024-03-16 PROCEDURE — 250N000013 HC RX MED GY IP 250 OP 250 PS 637: Performed by: PSYCHIATRY & NEUROLOGY

## 2024-03-16 PROCEDURE — 250N000013 HC RX MED GY IP 250 OP 250 PS 637: Performed by: CLINICAL NURSE SPECIALIST

## 2024-03-16 PROCEDURE — 250N000013 HC RX MED GY IP 250 OP 250 PS 637: Performed by: EMERGENCY MEDICINE

## 2024-03-16 PROCEDURE — 250N000013 HC RX MED GY IP 250 OP 250 PS 637: Performed by: PHYSICIAN ASSISTANT

## 2024-03-16 PROCEDURE — 99233 SBSQ HOSP IP/OBS HIGH 50: CPT | Performed by: CLINICAL NURSE SPECIALIST

## 2024-03-16 PROCEDURE — 99418 PROLNG IP/OBS E/M EA 15 MIN: CPT | Performed by: CLINICAL NURSE SPECIALIST

## 2024-03-16 RX ORDER — OLANZAPINE 10 MG/1
10 TABLET ORAL AT BEDTIME
Status: DISCONTINUED | OUTPATIENT
Start: 2024-03-16 | End: 2024-03-16

## 2024-03-16 RX ORDER — OLANZAPINE 10 MG/1
10 TABLET ORAL AT BEDTIME
Status: DISCONTINUED | OUTPATIENT
Start: 2024-03-16 | End: 2024-03-17

## 2024-03-16 RX ORDER — ACETAMINOPHEN 325 MG/1
650 TABLET ORAL EVERY 6 HOURS PRN
Status: DISCONTINUED | OUTPATIENT
Start: 2024-03-16 | End: 2024-03-20

## 2024-03-16 RX ORDER — TIZANIDINE 2 MG/1
2 TABLET ORAL DAILY PRN
Status: DISCONTINUED | OUTPATIENT
Start: 2024-03-16 | End: 2024-04-03 | Stop reason: HOSPADM

## 2024-03-16 RX ORDER — HYDROXYZINE HYDROCHLORIDE 50 MG/1
50 TABLET, FILM COATED ORAL 3 TIMES DAILY PRN
Status: DISCONTINUED | OUTPATIENT
Start: 2024-03-16 | End: 2024-03-20

## 2024-03-16 RX ORDER — OLANZAPINE 10 MG/1
10 TABLET ORAL 3 TIMES DAILY
Status: DISCONTINUED | OUTPATIENT
Start: 2024-03-16 | End: 2024-03-16

## 2024-03-16 RX ORDER — OLANZAPINE 5 MG/1
5 TABLET ORAL DAILY PRN
Status: DISCONTINUED | OUTPATIENT
Start: 2024-03-16 | End: 2024-03-17

## 2024-03-16 RX ORDER — TRAZODONE HYDROCHLORIDE 50 MG/1
50 TABLET, FILM COATED ORAL AT BEDTIME
Status: DISCONTINUED | OUTPATIENT
Start: 2024-03-16 | End: 2024-03-18

## 2024-03-16 RX ORDER — OLANZAPINE 5 MG/1
5 TABLET ORAL EVERY MORNING
Status: DISCONTINUED | OUTPATIENT
Start: 2024-03-17 | End: 2024-03-17 | Stop reason: ALTCHOICE

## 2024-03-16 RX ADMIN — BUPRENORPHINE AND NALOXONE 1 FILM: 8; 2 FILM BUCCAL; SUBLINGUAL at 07:58

## 2024-03-16 RX ADMIN — TRAZODONE HYDROCHLORIDE 50 MG: 50 TABLET ORAL at 03:39

## 2024-03-16 RX ADMIN — TIZANIDINE 2 MG: 2 TABLET ORAL at 07:58

## 2024-03-16 RX ADMIN — NICOTINE POLACRILEX 2 MG: 2 LOZENGE ORAL at 18:20

## 2024-03-16 RX ADMIN — NICOTINE POLACRILEX 2 MG: 2 LOZENGE ORAL at 13:24

## 2024-03-16 RX ADMIN — GABAPENTIN 900 MG: 300 CAPSULE ORAL at 07:58

## 2024-03-16 RX ADMIN — NICOTINE POLACRILEX 2 MG: 2 LOZENGE ORAL at 10:53

## 2024-03-16 RX ADMIN — BUPRENORPHINE AND NALOXONE 1 FILM: 8; 2 FILM BUCCAL; SUBLINGUAL at 16:36

## 2024-03-16 RX ADMIN — DESVENLAFAXINE 50 MG: 50 TABLET, FILM COATED, EXTENDED RELEASE ORAL at 07:59

## 2024-03-16 RX ADMIN — NICOTINE POLACRILEX 2 MG: 2 LOZENGE ORAL at 12:01

## 2024-03-16 RX ADMIN — NICOTINE POLACRILEX 2 MG: 2 LOZENGE ORAL at 19:57

## 2024-03-16 RX ADMIN — GABAPENTIN 900 MG: 300 CAPSULE ORAL at 15:13

## 2024-03-16 RX ADMIN — TIZANIDINE 2 MG: 2 TABLET ORAL at 18:18

## 2024-03-16 RX ADMIN — ATENOLOL 50 MG: 50 TABLET ORAL at 07:58

## 2024-03-16 RX ADMIN — NICOTINE POLACRILEX 2 MG: 2 LOZENGE ORAL at 23:30

## 2024-03-16 RX ADMIN — NICOTINE POLACRILEX 2 MG: 2 LOZENGE ORAL at 03:10

## 2024-03-16 RX ADMIN — NICOTINE POLACRILEX 2 MG: 2 LOZENGE ORAL at 09:27

## 2024-03-16 RX ADMIN — NICOTINE 1 PATCH: 21 PATCH, EXTENDED RELEASE TRANSDERMAL at 08:10

## 2024-03-16 RX ADMIN — NICOTINE POLACRILEX 2 MG: 2 LOZENGE ORAL at 16:36

## 2024-03-16 RX ADMIN — OLANZAPINE 10 MG: 10 TABLET, FILM COATED ORAL at 07:58

## 2024-03-16 RX ADMIN — OLANZAPINE 10 MG: 10 TABLET, FILM COATED ORAL at 20:05

## 2024-03-16 RX ADMIN — BUPROPION HYDROCHLORIDE 150 MG: 150 TABLET, EXTENDED RELEASE ORAL at 07:58

## 2024-03-16 RX ADMIN — NICOTINE POLACRILEX 2 MG: 2 LOZENGE ORAL at 07:59

## 2024-03-16 RX ADMIN — NICOTINE POLACRILEX 2 MG: 2 LOZENGE ORAL at 04:40

## 2024-03-16 RX ADMIN — ACETAMINOPHEN 650 MG: 325 TABLET, FILM COATED ORAL at 05:04

## 2024-03-16 RX ADMIN — Medication 3 MG: at 19:56

## 2024-03-16 RX ADMIN — NICOTINE POLACRILEX 2 MG: 2 LOZENGE ORAL at 00:06

## 2024-03-16 RX ADMIN — GABAPENTIN 900 MG: 300 CAPSULE ORAL at 19:57

## 2024-03-16 NOTE — ED NOTES
Pt has slept minimally. Awakened around 0006 Hrs. And received sleepy time tea & nicotine lozenge per request. Pt retreated to bed and awakened again at 0300 Hrs. Pt noted to be pacing; states he's having a difficult time falling/staying asleep. Trazodone administered per MD orders. Pt intermittently awake/pacing  milieu. He received nicotine lozenge a few more times. Also received PRN Tylenol per request for pain. Pt currently resting in recliner chair in milieu; eyes closed, even breathing, body movements noted. No safety/behavioral concerns.

## 2024-03-16 NOTE — TELEPHONE ENCOUNTER
R: [place]  Access Inpatient Bed Call Log 3/16/24 7:15 AM    Intake has called facilities that have not updated the bed status within the last 12 hours.                               [facility] is at capacity            [facility] is posting 0 beds. [phone]    [facility] is posting 0 beds. Negative covid required              [facility] is posting 0 beds. Neg covid. No high school/Lori-psych. [phone] Per call to Ozzy@ 7:11, at capacity and no d/c's till Monday.   [facility] is posting 0 beds. [phone]   [facility] is posting 0 beds. [phone]    [facility] is posting 0 beds           [facility] is posting 1 bed [phone]           Pt remains on the work list pending appropriate bed availability.
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
## 2024-03-16 NOTE — TELEPHONE ENCOUNTER
R: [place]  Access Inpatient Bed Call Log 3/16/24 4:15 PM    Intake has called facilities that have not updated the bed status within the last 12 hours.                               [facility] is at capacity            [facility] is posting 0 beds. [phone]    [facility] is posting 0 beds. Negative covid required              [facility] is posting 0 beds. Neg covid. No high school/[facility]-psych. [phone] Per call to Ozzy@ 7:11, at capacity and no d/c's till Monday.   [facility] is posting 0 beds. [phone]   [facility] is posting 0 beds. [phone]    [facility] is posting 2 beds. Negative covid. [phone]   [facility] is posting 0 beds           [facility] is posting 1 bed [phone]     Pt remains on the work list pending appropriate bed availability.
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
## 2024-03-16 NOTE — ED NOTES
"Pt reports \"groggy\" feeling that he believes is due to the Trazodone. No acute clinical concerns noted.  "
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
## 2024-03-16 NOTE — TELEPHONE ENCOUNTER
R: [place]  Access Inpatient Bed Call Log 3/15/24 9:36 PM    Intake has called facilities that have not updated the bed status within the last 12 hours.  ([facility])                      [facility] is at capacity            [facility] is posting 0 beds. [phone]    [facility] is posting 0 beds. Negative covid required              [facility] is posting 0 beds. Neg covid. No high school/[facility]-psych. [phone]    [facility] is posting 0 beds. [phone]   [facility] is posting 0 beds. [phone]    [facility] is posting 2 beds. Negative covid. [phone] 3/16 Pt not appropriate d/t unit age restriction.  [facility] is posting 0 beds           [facility] is posting 2 bed [phone] 3/16 Per call to [facility] at 1:42 am no beds available.    Pt remains on the work list pending appropriate bed availability.

## 2024-03-17 ENCOUNTER — TELEPHONE (OUTPATIENT)
Dept: BEHAVIORAL HEALTH | Facility: CLINIC | Age: 39
End: 2024-03-17
Payer: COMMERCIAL

## 2024-03-17 PROCEDURE — 250N000013 HC RX MED GY IP 250 OP 250 PS 637: Performed by: EMERGENCY MEDICINE

## 2024-03-17 PROCEDURE — 250N000013 HC RX MED GY IP 250 OP 250 PS 637: Performed by: CLINICAL NURSE SPECIALIST

## 2024-03-17 PROCEDURE — 99418 PROLNG IP/OBS E/M EA 15 MIN: CPT | Performed by: CLINICAL NURSE SPECIALIST

## 2024-03-17 PROCEDURE — 250N000013 HC RX MED GY IP 250 OP 250 PS 637: Performed by: PSYCHIATRY & NEUROLOGY

## 2024-03-17 PROCEDURE — 99233 SBSQ HOSP IP/OBS HIGH 50: CPT | Performed by: CLINICAL NURSE SPECIALIST

## 2024-03-17 PROCEDURE — 250N000013 HC RX MED GY IP 250 OP 250 PS 637: Performed by: PHYSICIAN ASSISTANT

## 2024-03-17 RX ORDER — GABAPENTIN 300 MG/1
600 CAPSULE ORAL 3 TIMES DAILY
Status: DISCONTINUED | OUTPATIENT
Start: 2024-03-17 | End: 2024-04-03 | Stop reason: HOSPADM

## 2024-03-17 RX ORDER — OLANZAPINE 2.5 MG/1
2.5 TABLET, FILM COATED ORAL DAILY PRN
Status: DISCONTINUED | OUTPATIENT
Start: 2024-03-17 | End: 2024-03-17 | Stop reason: DRUGHIGH

## 2024-03-17 RX ORDER — LISDEXAMFETAMINE DIMESYLATE 40 MG/1
40 CAPSULE ORAL DAILY
Status: DISCONTINUED | OUTPATIENT
Start: 2024-03-18 | End: 2024-03-20

## 2024-03-17 RX ORDER — OLANZAPINE 5 MG/1
5 TABLET ORAL AT BEDTIME
Status: DISCONTINUED | OUTPATIENT
Start: 2024-03-17 | End: 2024-03-17 | Stop reason: ALTCHOICE

## 2024-03-17 RX ORDER — OLANZAPINE 5 MG/1
5 TABLET, ORALLY DISINTEGRATING ORAL DAILY PRN
Status: DISCONTINUED | OUTPATIENT
Start: 2024-03-17 | End: 2024-03-18

## 2024-03-17 RX ORDER — OLANZAPINE 5 MG/1
5 TABLET, ORALLY DISINTEGRATING ORAL 2 TIMES DAILY
Status: DISCONTINUED | OUTPATIENT
Start: 2024-03-17 | End: 2024-03-18

## 2024-03-17 RX ADMIN — GABAPENTIN 600 MG: 300 CAPSULE ORAL at 20:33

## 2024-03-17 RX ADMIN — OLANZAPINE 5 MG: 5 TABLET, ORALLY DISINTEGRATING ORAL at 20:34

## 2024-03-17 RX ADMIN — NICOTINE POLACRILEX 2 MG: 2 LOZENGE ORAL at 15:13

## 2024-03-17 RX ADMIN — TIZANIDINE 2 MG: 2 TABLET ORAL at 02:49

## 2024-03-17 RX ADMIN — ATENOLOL 50 MG: 50 TABLET ORAL at 07:47

## 2024-03-17 RX ADMIN — Medication 3 MG: at 20:34

## 2024-03-17 RX ADMIN — NICOTINE POLACRILEX 2 MG: 2 LOZENGE ORAL at 09:42

## 2024-03-17 RX ADMIN — TIZANIDINE 2 MG: 2 TABLET ORAL at 07:46

## 2024-03-17 RX ADMIN — BUPRENORPHINE AND NALOXONE 1 FILM: 8; 2 FILM BUCCAL; SUBLINGUAL at 15:56

## 2024-03-17 RX ADMIN — NICOTINE POLACRILEX 2 MG: 2 LOZENGE ORAL at 20:33

## 2024-03-17 RX ADMIN — NICOTINE 1 PATCH: 21 PATCH, EXTENDED RELEASE TRANSDERMAL at 08:00

## 2024-03-17 RX ADMIN — DESVENLAFAXINE 50 MG: 50 TABLET, FILM COATED, EXTENDED RELEASE ORAL at 07:47

## 2024-03-17 RX ADMIN — NICOTINE POLACRILEX 2 MG: 2 LOZENGE ORAL at 18:38

## 2024-03-17 RX ADMIN — BUPROPION HYDROCHLORIDE 150 MG: 150 TABLET, EXTENDED RELEASE ORAL at 07:47

## 2024-03-17 RX ADMIN — GABAPENTIN 900 MG: 300 CAPSULE ORAL at 13:29

## 2024-03-17 RX ADMIN — NICOTINE POLACRILEX 2 MG: 2 LOZENGE ORAL at 19:22

## 2024-03-17 RX ADMIN — NICOTINE POLACRILEX 2 MG: 2 LOZENGE ORAL at 06:44

## 2024-03-17 RX ADMIN — NICOTINE POLACRILEX 2 MG: 2 LOZENGE ORAL at 07:46

## 2024-03-17 RX ADMIN — OLANZAPINE 5 MG: 5 TABLET, FILM COATED ORAL at 02:48

## 2024-03-17 RX ADMIN — NICOTINE POLACRILEX 2 MG: 2 LOZENGE ORAL at 02:49

## 2024-03-17 RX ADMIN — BUPRENORPHINE AND NALOXONE 1 FILM: 8; 2 FILM BUCCAL; SUBLINGUAL at 07:49

## 2024-03-17 RX ADMIN — GABAPENTIN 900 MG: 300 CAPSULE ORAL at 07:48

## 2024-03-17 RX ADMIN — TIZANIDINE 2 MG: 2 TABLET ORAL at 15:57

## 2024-03-17 RX ADMIN — OLANZAPINE 5 MG: 5 TABLET, FILM COATED ORAL at 07:47

## 2024-03-17 RX ADMIN — NICOTINE POLACRILEX 2 MG: 2 LOZENGE ORAL at 13:31

## 2024-03-17 RX ADMIN — NICOTINE POLACRILEX 2 MG: 2 LOZENGE ORAL at 05:48

## 2024-03-17 NOTE — ED NOTES
Patient remains medication compliant. At times pacing lounge, other times watching television in room. Some social interaction with peers.

## 2024-03-17 NOTE — TELEPHONE ENCOUNTER
R: [facility] ONLY    [facility] Access Inpatient Bed Call Log 3/17/24 7:35 AM    Intake has called facilities that have not updated the bed status within the last 12 hours.                                            [facility] is at capacity                       [facility] is posting 0 beds. [phone]               [facility] is posting 0 beds. Negative covid required                         [facility] is posting 0 beds. Neg covid. No high school/Lori-psych. [phone] Per call at 7:40am to [facility] at [facility].              [facility] is posting 0 beds. [phone]              [facility] is posting 0 beds. [phone]               [facility] is posting 2 beds. Negative covid. [phone] Per call at 7:41 am to [facility] MARCELINO is unavailable to provide bed availability to check back.              [facility] is posting 0 beds                      [facility] is posting 0 bed [phone]       Pt remains on the work list pending appropriate bed availability.

## 2024-03-17 NOTE — ED NOTES
"Patient up early and in the milieu. Patient asked for morning medications before breakfast. Pt reports slept well, but did wake a couple times for PRNs' \"I got Zyprexa and Zanaflex\" which patient states was helpful in getting back to sleep. Otherwise patient states he is well rested. Paranoia continues with the belief his residence is a home for squatters. No behavioral issues. No SI/SIB or HI. Reviewed medications and administered per order. Pt verbalized understanding of medications.   "

## 2024-03-17 NOTE — CONSULTS
Adult Psychiatry Consultation     Barak Clarke MRN# [id number]   Age: 39 year old YOB: 1985   Date of Admission to ED: 3/14/2024    In person visit Details:     Patient was assessed and interviewed in person in the [facility]. Assessment methods included conducting a formal interview with patient, review of medical records, collaboration with medical staff, and obtaining relevant collateral information from family and community providers when available.      PIPER Narayan CNP            Contacts:   Attending Physician: Dr. Christian Jean    Current Outpatient Psychiatrist:  [facility]  Primary Care Provider: [facility], [facility]         Subjective:     Barak reports that he feels a little too sedated while also feeling restless.  He did not have a lot of energy today and spent more time than usual sleeping.  He also appears to be more restless than yesterday, as he needed to constantly move.  He reports that he had asked if there was access to a gym so that he can get some exercise in.  He is also concerned about weight gain as he is feeling his weight creeping up.    He was able to sleep better last night, but he did take his as needed dose of Zyprexa and tizanidine.  His appetite has increased.  He is feeling very anxious, and partly attributes this to Wellbutrin.  He has experienced increased anxiety on Wellbutrin in the past but needs to take this as it helps with focus and concentration.  Prior to being in the hospital, he was actually taking Vyvanse 70 mg daily.  Reviewed his , which does show that he has been taking Vyvanse for the past year, with no pattern of misuse.     He continues to feel paranoid about his home having been breached by people who he believes need to cause him harm.  He denies auditory or visual hallucinations.  He denies suicidal or homicidal thoughts.         Objective:       He continues to be cooperative with nursing  cares, as well as taking his scheduled medications.  He continues to report to nursing staff that he believes that his home has been invaded by squatters.  He has not had any episodes of behavioral dysregulation since his arrival on 3/14/2024 and consequently has not required seclusion or restraints.  He has requested that his morning dose of Zyprexa be decreased to 5 mg, with an additional 5 mg available daily as needed for increased anxiety during the day, or at night which might negatively impact sleep.  He did request the as needed dose at 2:48 AM.  He is currently prescribed tizanidine 2 mg twice a day, which he reports is effective for restlessness and muscle spasms for about 3 to 4 hours.  He reported that the restlessness and spasms sometimes wake him up in the middle of the night, so we added tizanidine 2 mg daily as needed.      Laboratory/Imaging:    Recent Results (from the past 336 hour(s))   Urine Drug Screen Panel    Collection Time: 03/15/24  6:43 AM   Result Value Ref Range    Amphetamines Urine Screen Negative Screen Negative    Barbituates Urine Screen Negative Screen Negative    Benzodiazepine Urine Screen Negative Screen Negative    Cannabinoids Urine Screen Positive (A) Screen Negative    Cocaine Urine Screen Negative Screen Negative    Fentanyl Qual Urine Screen Negative Screen Negative    Opiates Urine Screen Negative Screen Negative    PCP Urine Screen Negative Screen Negative              Collateral information from chart review and phone calls:     Reviewed DEC assessment, Initial ED consult, nursing and ED notes.      Mental Status Exam:   Appearance:  awake, alert, adequately groomed, and casually dressed  Attitude:  cooperative  Eye Contact:  good  Mood:  anxious  Affect:  appropriate and in normal range  Speech:  clear, coherent  Psychomotor Behavior:   Constant movement, rocking from side-to-side  Thought Process:  logical, linear, and goal oriented  Associations:  no loose  "associations  Thought Content:  no evidence of suicidal ideation or homicidal ideation and obsessions present  Insight:  partial  Judgment:  fair  Oriented to:  time, person, and place  Attention Span and Concentration:  fair  Recent and Remote Memory:  intact  Language: Able to name objects, Able to repeat phrases, and Able to read and write  Fund of Knowledge: appropriate  Muscle Strength and Tone: normal  Gait and Station: Normal         Physical Exam:     /79   Pulse 73   Temp 98.1  F (36.7  C) (Oral)   Resp 16   Ht 1.803 m (5' 11\")   Wt 98.5 kg (217 lb 3.2 oz)   SpO2 98%   BMI 30.29 kg/m    Weight is 217 lbs 3.2 oz 5' 11\" Body mass index is 30.29 kg/m .      ROS: 10 point ROS neg other than the symptoms noted above in the subjective.                Impression:     This patient is a 39 year old year old male with a past psychiatric history of depression, anxiety, panic disorder, ADHD, PTSD, and substance use, who presented on 3/14/2024 for paranoia. Prior to presenting to the ED, Barak Clarke was at a treatment facility that he wanted to transfer out of because he believes that his ex-wife's uncle is also there and is targeting him. He states that this uncle was talking to him about hiking the [facility] [place] together, and Barak believes that the uncle plans to kill him. He has been having bizarre experiences where he wakes up with knives sticking straight up beside the bed, nooses randomly placed at home, and cameras that he bought but did not set up already being set up when he woke up, and smart home functions having been taken over by someone. These are all very upsetting for him, and he does not feel safe at home. He believes that his ex-wife's family is out to get him. He has been abusing substances, notably  benzos and stimulants purchased online from [employer], which are contributory, if not the direct cause.     Significant symptoms include paranoia, depression, and anxiety.    Risk " for harm is moderate.    Based on interview with patient, records review, conversations with ED staff, [facility] staff and ED attending, Barak Clarke meets criteria for unspecified psychosis.  Current medications are listed below.  Recommended medications adjustments are listed below.  Acute inpatient stabilization is needed as indicated by unresolved psychosis.  Other recommendations are listed below.     Brief Therapeutic Intervention(s):   Provided rapport building, active listening, unconditional positive regard, and validation.    Legal Status: Voluntary    Medications: risks/benefits discussed with patient    Current Facility-Administered Medications   Medication    acetaminophen (TYLENOL) tablet 650 mg    atenolol (TENORMIN) tablet 50 mg    buprenorphine HCl-naloxone HCl (SUBOXONE) 8-2 MG per film 1 Film    buPROPion (WELLBUTRIN SR) 12 hr tablet 150 mg    desvenlafaxine (PRISTIQ) 24 hr tablet 50 mg    gabapentin (NEURONTIN) capsule 900 mg    hydrOXYzine HCl (ATARAX) tablet 50 mg    melatonin tablet 3 mg    nicotine (COMMIT) lozenge 2 mg    nicotine (COMMIT) lozenge 2 mg    nicotine (NICODERM CQ) 21 MG/24HR 24 hr patch 1 patch    OLANZapine (zyPREXA) tablet 10 mg    OLANZapine (zyPREXA) tablet 5 mg    OLANZapine (zyPREXA) tablet 5 mg    tiZANidine (ZANAFLEX) tablet 2 mg    tiZANidine (ZANAFLEX) tablet 2 mg    traZODone (DESYREL) tablet 50 mg     Current Outpatient Medications   Medication Sig    atenolol (TENORMIN) 50 MG tablet Take 50 mg by mouth daily    buprenorphine HCl-naloxone HCl (SUBOXONE) 8-2 MG per film TAKE 1 FILM SUBLINGUALLY TWICE DAILY    buPROPion (WELLBUTRIN SR) 150 MG 12 hr tablet Take 150 mg by mouth daily    desvenlafaxine (PRISTIQ) 50 MG 24 hr tablet Take 50 mg by mouth daily    gabapentin (NEURONTIN) 300 MG capsule Take 900 mg by mouth 3 times daily    melatonin 3 MG tablet Take 3 mg by mouth at bedtime    OLANZapine (ZYPREXA) 10 MG tablet Take 10 mg by mouth at bedtime    OLANZapine (ZYPREXA) 5  MG tablet Take 5 mg by mouth daily as needed (anxiety, mood swings, psychosis)    tiZANidine (ZANAFLEX) 2 MG tablet Take 2 mg by mouth 2 times daily AM and 1700     Facility-Administered Medications Ordered in Other Encounters   Medication    Self Administer Medications: Behavioral Services              Diagnoses:     Principal Diagnosis:   Psychosis, unspecified  R/O Delusional disorder  R/O Trauma and stressor-related disorder    Secondary psychiatric diagnoses of concern this admission:  ADHD  Anxiety  Methamphetamine use disorder  Benzodiazepine use disorder    Current medical diagnosis being treated:   R/O akathisia  Muscle spasms         Recommendations:     Discussed the case and writer's recommendations with Dr. Christian Jean, B[facility] attending.    Recommend inpatient psychiatric admission for stabilization as he continues to be paranoid and delusional.    Continue the following medications:  Buprenorphine naloxone (Suboxone) 8-2 mg twice a day for opioid use disorder  Desvenlafaxine (Pristiq) 50 mg daily for depression  Gabapentin (Neurontin) 600 mg 3 times daily - decreased the dose by 300 mg per dose to see if patient will be less sedated while anxiety remains manageable.  If he does experience an increase in anxiety, okay to increase back to 900 milligrams 3 times daily  Olanzapine ODT (Zyprexa) 5 mg every morning and nightly.  This is a dose decrease.  The as needed dose will stat at 5 mg daily. He reports that the ODT formulation works better.  Tizanidine (Zanaflex) 2 mg twice daily for muscle spasms, and another 2 mg dose daily as needed  Trazodone (Desyrel) 50 mg at bedtime for insomnia  Hydroxyzine (Atarax) 50 mg 3 times daily as needed for anxiety  Atenolol (Tenormin) 50 mg daily for hypertension  Acetaminophen (Tylenol) 650 mg every 6 hours for mild pain  Melatonin 3 mg at bedtime as needed  Nicotine lozenge (Commit) 2 - 4 mg every hour as needed      3.   Start the following medications:   -  Lisdexamfetamine (Vyvanse) 40 mg daily every morning - this is to replace Wellbutrin SR.  He has been taking Vyvanse for the past year, and his dose is 70 mg daily.  However he will be started at 40 since he has not taken this medicine in almost 4 weeks.     -Metformin (Glucophage) 500 mg twice daily with meals for antipsychotic induced weight gain.  He is aware that this may cause GI upset.    4.   Continue to consult psychiatry as needed.       Attestation:  Patient time: 20 minutes  Team/BHP/ED/EC staff time:10 minutes  Chart review: 15 minutes  Documentation: 20 minutes  Total time:  65 minutes spent on the date of the encounter.     I have provided critical care services at the bedside in the [facility] behavioral emergency [place], evaluating the patient, reviewing notes and laboratory values and directing care. I have discussed recommendation regarding whether or not hospitalization is needed and recommendations for medications and laboratory testing with the attending emergency department provider. Katie Virk, Dominique, MSN, APRN, CNP March 17, 2024.    Disclaimer: This note consists of symbols derived from keyboarding, dictation, and/or voice recognition software. As a result, there may be errors in the script that have gone undetected.  Please consider this when interpreting information found in the chart.    Please call [facility]/DEC at [phone] if you have follow-up questions or wish to place another consult.

## 2024-03-17 NOTE — ED NOTES
Patient woke up several time asking for something to drink,after drinking patient went back to sleep,patient was comfortable no s/s of distress noted.2.50 am patient woke up  request for his prn and lozenge 2mg went back to bed no behaviors noted

## 2024-03-17 NOTE — TELEPHONE ENCOUNTER
R: [place]  Access Inpatient Bed Call Log  3/17/24 @ 1:00am   Intake has called facilities that have not updated their bed status within the last 12 hours.??     *METRO:  [place] -- [facility]: @ cap per website.  [place] -- [facility]:  @ cap per website.  [place] -- [facility]: @ cap per website.  [place] -- [facility]: @ cap per website. Low acuity only.  [place] -- [facility]: @ cap per website.  [facility] -- [facility]: @ cap per website.   [facility]/ beds - POSTING 2 BEDS. Ages 18-28, Voluntary only, NO aggression/physical/sexual assault, violence hx or drug abuse, or psychosis. Negative Covid.   [place] -- [facility]: @ cap per website.  [place] -- [facility]: @ cap per website.  [place] -- [facility]: @ cap per website. Not reviewing until 10AM.     Pt remains on waitlist pending appropriate placement availability

## 2024-03-17 NOTE — ED NOTES
"Patient walking the milieu at start of shift.  Patient visiting with other patient.  During one to one conversation, patient states he believes there are squatters at his residence.  Patient spent approximately 45 minutes with the NP reviewing and changing medications.   Patient Stated \"I am so happy and thankful to her for fixing my medications, I am gaining so much weight from the Zyprexa\"   Patient medication compliant.  Patient relaxing in room watching TV prior to bed.      "

## 2024-03-17 NOTE — ED NOTES
Patient playing chess with volunteer this afternoon.   Patient calm and cooperative with bright affect most of evening.  Patient at times fixating on medications and timing.  Patient reports frustrated when medications are not present to take from pharmacy.   Patient took all medications except Trazodone.   Patient too concerned of after affects of drowsiness the next day.  Patient going to bed early in bed by 9pm.

## 2024-03-18 ENCOUNTER — TELEPHONE (OUTPATIENT)
Dept: BEHAVIORAL HEALTH | Facility: CLINIC | Age: 39
End: 2024-03-18
Payer: COMMERCIAL

## 2024-03-18 PROCEDURE — 250N000013 HC RX MED GY IP 250 OP 250 PS 637: Performed by: PSYCHIATRY & NEUROLOGY

## 2024-03-18 PROCEDURE — 250N000013 HC RX MED GY IP 250 OP 250 PS 637: Performed by: EMERGENCY MEDICINE

## 2024-03-18 PROCEDURE — 250N000013 HC RX MED GY IP 250 OP 250 PS 637: Performed by: PHYSICIAN ASSISTANT

## 2024-03-18 PROCEDURE — 250N000013 HC RX MED GY IP 250 OP 250 PS 637: Performed by: CLINICAL NURSE SPECIALIST

## 2024-03-18 RX ORDER — QUETIAPINE FUMARATE 25 MG/1
50 TABLET, FILM COATED ORAL AT BEDTIME
Status: DISCONTINUED | OUTPATIENT
Start: 2024-03-18 | End: 2024-03-19

## 2024-03-18 RX ADMIN — BUPRENORPHINE AND NALOXONE 1 FILM: 8; 2 FILM BUCCAL; SUBLINGUAL at 17:23

## 2024-03-18 RX ADMIN — NICOTINE 1 PATCH: 21 PATCH, EXTENDED RELEASE TRANSDERMAL at 08:04

## 2024-03-18 RX ADMIN — NICOTINE POLACRILEX 2 MG: 2 LOZENGE ORAL at 14:26

## 2024-03-18 RX ADMIN — TIZANIDINE 2 MG: 2 TABLET ORAL at 16:11

## 2024-03-18 RX ADMIN — TIZANIDINE 2 MG: 2 TABLET ORAL at 08:03

## 2024-03-18 RX ADMIN — NICOTINE POLACRILEX 2 MG: 2 LOZENGE ORAL at 23:52

## 2024-03-18 RX ADMIN — Medication 3 MG: at 21:33

## 2024-03-18 RX ADMIN — NICOTINE POLACRILEX 2 MG: 2 LOZENGE ORAL at 21:33

## 2024-03-18 RX ADMIN — NICOTINE POLACRILEX 2 MG: 2 LOZENGE ORAL at 09:19

## 2024-03-18 RX ADMIN — DESVENLAFAXINE 50 MG: 50 TABLET, FILM COATED, EXTENDED RELEASE ORAL at 08:03

## 2024-03-18 RX ADMIN — NICOTINE POLACRILEX 2 MG: 2 LOZENGE ORAL at 20:31

## 2024-03-18 RX ADMIN — HYDROXYZINE HYDROCHLORIDE 50 MG: 25 TABLET, FILM COATED ORAL at 22:09

## 2024-03-18 RX ADMIN — OLANZAPINE 5 MG: 5 TABLET, ORALLY DISINTEGRATING ORAL at 00:51

## 2024-03-18 RX ADMIN — NICOTINE POLACRILEX 2 MG: 2 LOZENGE ORAL at 16:08

## 2024-03-18 RX ADMIN — QUETIAPINE FUMARATE 50 MG: 25 TABLET ORAL at 21:33

## 2024-03-18 RX ADMIN — GABAPENTIN 600 MG: 300 CAPSULE ORAL at 08:03

## 2024-03-18 RX ADMIN — NICOTINE POLACRILEX 2 MG: 2 LOZENGE ORAL at 19:09

## 2024-03-18 RX ADMIN — NICOTINE POLACRILEX 2 MG: 2 LOZENGE ORAL at 13:31

## 2024-03-18 RX ADMIN — NICOTINE POLACRILEX 2 MG: 2 LOZENGE ORAL at 17:23

## 2024-03-18 RX ADMIN — METFORMIN HYDROCHLORIDE 500 MG: 500 TABLET, FILM COATED ORAL at 12:34

## 2024-03-18 RX ADMIN — LISDEXAMFETAMINE DIMESYLATE 40 MG: 20 CAPSULE ORAL at 08:03

## 2024-03-18 RX ADMIN — METFORMIN HYDROCHLORIDE 500 MG: 500 TABLET, FILM COATED ORAL at 18:11

## 2024-03-18 RX ADMIN — NICOTINE POLACRILEX 2 MG: 2 LOZENGE ORAL at 00:42

## 2024-03-18 RX ADMIN — ATENOLOL 50 MG: 50 TABLET ORAL at 08:03

## 2024-03-18 RX ADMIN — NICOTINE POLACRILEX 2 MG: 2 LOZENGE ORAL at 06:38

## 2024-03-18 RX ADMIN — NICOTINE POLACRILEX 2 MG: 2 LOZENGE ORAL at 08:11

## 2024-03-18 RX ADMIN — GABAPENTIN 600 MG: 300 CAPSULE ORAL at 19:10

## 2024-03-18 RX ADMIN — TIZANIDINE 2 MG: 2 TABLET ORAL at 02:40

## 2024-03-18 RX ADMIN — NICOTINE POLACRILEX 2 MG: 2 LOZENGE ORAL at 12:35

## 2024-03-18 RX ADMIN — GABAPENTIN 600 MG: 300 CAPSULE ORAL at 13:06

## 2024-03-18 RX ADMIN — BUPRENORPHINE AND NALOXONE 1 FILM: 8; 2 FILM BUCCAL; SUBLINGUAL at 08:05

## 2024-03-18 NOTE — ED NOTES
IP MH Referral Acuity Rating Score (RARS)    LMHP complete at referral to IP MH, with DEC; and, daily while awaiting IP MH placement. Call score to PPS.  CRITERIA SCORING   New 72 HH and Involuntary for IP MH (not adolescent) 1/1   Boarding over 24 hours 1/1   Vulnerable adult at least 55+ with multiple co morbidities; or, Patient age 11 or under 0/1   Suicide ideation without relief of precipitating factors 0/1   Current plan for suicide 0/1   Current plan for homicide 0/1   Imminent risk or actual attempt to seriously harm another without relief of factors precipitating the attempt 0/1   Severe dysfunction in daily living (ex: complete neglect for self care, extreme disruption in vegetative function, extreme deterioration in social interactions) 1/1   Recent (last 2 weeks) or current physical aggression in the ED 0/1   Restraints or seclusion episode in ED 0/1   Verbal aggression, agitation, yelling, etc., while in the ED 0/1   Active psychosis with psychomotor agitation or catatonia 1/1   Need for constant or near constant redirection (from leaving, from others, etc).  1/1   Intrusive or disruptive behaviors 0/1   TOTAL Acuity Total Score: 5

## 2024-03-18 NOTE — ED NOTES
Patient woke up request  for prn lozenge and olanzapine took medication went back to bed.Patient denies SI/HIB/SIB,denies auditory hallucinations. Patient was up couples time asking for food and drinks patient had good appetite no behaviors noted.

## 2024-03-18 NOTE — TELEPHONE ENCOUNTER
R: [place]  Access Inpatient Bed Call Log  3/18/24 @ 1:00am   Intake has called facilities that have not updated their bed status within the last 12 hours.??     *METRO:  [place] -- [facility]: @ cap per website.  [place] -- [facility]:  @ cap per website.  [place] -- [facility]: @ cap per website.  [place] -- [facility]: @ cap per website. Low acuity only.  [place] -- [facility]: @ cap per website.  [facility] -- [facility]: @ cap per website.   [facility] - @ cap per website. Ages 18-28, Voluntary only, NO aggression/physical/sexual assault, violence hx or drug abuse, or psychosis. Negative Covid.   [place] -- [facility]: @ cap per website.  [place] -- [facility]: @ cap per website.  [place] -- [facility]: @ cap per website. Not reviewing until 10AM.     Pt remains on waitlist pending appropriate placement availability

## 2024-03-18 NOTE — ED PROVIDER NOTES
[facility] ED Mental Health Handoff Note:       Brief HPI:  This is a 39 year old male signed out to me.  See initial ED Provider note for full details of the presentation. Interval history is pertinent for continued search for inpatient mental health bed patient while waiting in the emergency room until that time..    Home meds reviewed and ordered/administered: Yes    Medically stable for inpatient mental health admission: Yes.    Evaluated by mental health: Yes. The recommendation is for inpatient mental health treatment. Bed search in process    Safety concerns: At the time I received sign out, there were no safety concerns.    Hold Status:  Active Orders   N/A           Exam:   Patient Vitals for the past 24 hrs:   BP Temp Temp src Resp SpO2   03/18/24 0756 124/72 98.3  F (36.8  C) Oral 18 97 %           ED Course:    Medications   nicotine (COMMIT) lozenge 2 mg ( Buccal Canceled Entry 3/18/24 1422)   desvenlafaxine (PRISTIQ) 24 hr tablet 50 mg (50 mg Oral $Given 3/18/24 0803)   atenolol (TENORMIN) tablet 50 mg (50 mg Oral $Given 3/18/24 0803)   tiZANidine (ZANAFLEX) tablet 2 mg (2 mg Oral $Given 3/18/24 0803)   nicotine (COMMIT) lozenge 2 mg (2 mg Buccal $Given 3/18/24 1426)   buprenorphine HCl-naloxone HCl (SUBOXONE) 8-2 MG per film 1 Film (1 Film Sublingual $Given 3/18/24 0805)   nicotine (NICODERM CQ) 21 MG/24HR 24 hr patch 1 patch (1 patch Transdermal $Patch/Med Applied 3/18/24 0804)   melatonin tablet 3 mg (3 mg Oral $Given 3/17/24 2034)   traZODone (DESYREL) tablet 50 mg (0 mg Oral Hold 3/17/24 2200)   acetaminophen (TYLENOL) tablet 650 mg (650 mg Oral $Given 3/16/24 0504)   hydrOXYzine HCl (ATARAX) tablet 50 mg (has no administration in time range)   tiZANidine (ZANAFLEX) tablet 2 mg (2 mg Oral $Given 3/18/24 0240)   lisdexamfetamine (VYVANSE) capsule 40 mg (40 mg Oral $Given 3/18/24 0803)   metFORMIN (GLUCOPHAGE) tablet 500 mg (500 mg Oral $Given 3/18/24 1232)   gabapentin (NEURONTIN) capsule  600 mg (600 mg Oral $Given 3/18/24 1306)   OLANZapine zydis (zyPREXA) ODT tab 5 mg (5 mg Oral Not Given 3/18/24 0757)   OLANZapine zydis (zyPREXA) ODT tab 5 mg (5 mg Oral $Given 3/18/24 0051)   gabapentin (NEURONTIN) capsule 600 mg (600 mg Oral $Given 3/14/24 1808)   buprenorphine HCl-naloxone HCl (SUBOXONE) 4-1 MG per film 2 Film (2 Film Sublingual $Given 3/14/24 1809)   acetaminophen (TYLENOL) tablet 1,000 mg (1,000 mg Oral Not Given 3/15/24 0001)   acetaminophen (TYLENOL) tablet 1,000 mg (1,000 mg Oral $Given 3/15/24 0049)   OLANZapine (zyPREXA) tablet 5 mg (5 mg Oral $Given 3/15/24 1325)            There were no significant events during my shift.    Patient was signed out to the oncoming provider, Dr. Armando      Impression:    ICD-10-CM    1. Paranoid (H)  F22       2. Psychosis, unspecified psychosis type (H)  F29       3. Polysubstance use disorder  F19.90           Plan:    Awaiting inpatient mental health admission/transfer.            MD Breanne Da Silva, Ernesto Neville MD  03/18/24 1478
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
## 2024-03-18 NOTE — ED NOTES
Pt visible in milieu; intermittently pacing, coloring, speaking on phone. He is med compliant. VSS, denies pain. Pt makes frequent requests. He's pleasant with interactions; denies any safety concerns. Denies SI/SIB/HI, denies AVH. POC ongoing.

## 2024-03-18 NOTE — PROGRESS NOTES
Triage & Transition Services, Extended [facility]     Barak Clarke  March 18, 2024      Barak is followed related to 72 hour hold, long wait time for admission, high patient acuity Per psychiatry, patient is not making progress and continues to endorse paranoia and delusional thinking. See psychiatry note for most recent recommendations. . Please see initial DEC Crisis Assessment completed for complete assessment information. Medical record is reviewed.  While patient is in the ED, care team is working towards symptom reduction and stabilization, placement for [facility].     Patient was seen no  Mode of Assessment:Not completed    There no significant status changes.     Recommend to continue care coordination with care team, patient support system and outpatient providers    Plan:  inpatient mental health     Plan for Care reviewed with Assigned Medical Provider?   Yes - Dr. Raymundo    Comments:  [facility] will follow and meet with patient/family/care team as able or requested.   Patient was not seen today due to prioritization of crisis assessments. Patient is voluntary for admission at time of this note.     Priyank Flood  [facility], [facility]   [phone]

## 2024-03-18 NOTE — TELEPHONE ENCOUNTER
R: [facility] Access Inpatient Bed Call Log 3/18/2024 8:09:00 AM    Intake has called facilities that have not updated their bed status within the last 12 hours.    ADULTS:  *[facility] is posting 0 beds.              [facility] is posting 0 beds. [phone]    [facility] is posting 0 beds. [phone]              [facility] is posting 0 beds. [phone] 8:15a Per [facility], at capacity.   [facility] is posting 0 beds. [phone]   [facility] (These are Young Adult beds, 18-28) is posting 0 beds. Negative COVID test required, no recent or significant aggression, violence, or sexual assault. [phone] 8:16a Per [place], all beds under review for currently, can CB later to see if anything opens up.   [facility] is posting 0 beds. [phone]            [facility] is posting 0 beds. [phone]     [facility] through [facility] is posting 0 beds. [phone]        Pt remains on work list pending appropriate bed availability.

## 2024-03-19 ENCOUNTER — TELEPHONE (OUTPATIENT)
Dept: BEHAVIORAL HEALTH | Facility: CLINIC | Age: 39
End: 2024-03-19
Payer: COMMERCIAL

## 2024-03-19 PROCEDURE — 250N000013 HC RX MED GY IP 250 OP 250 PS 637: Performed by: FAMILY MEDICINE

## 2024-03-19 PROCEDURE — 250N000013 HC RX MED GY IP 250 OP 250 PS 637

## 2024-03-19 PROCEDURE — 250N000013 HC RX MED GY IP 250 OP 250 PS 637: Performed by: EMERGENCY MEDICINE

## 2024-03-19 PROCEDURE — 250N000013 HC RX MED GY IP 250 OP 250 PS 637: Performed by: PSYCHIATRY & NEUROLOGY

## 2024-03-19 PROCEDURE — 99233 SBSQ HOSP IP/OBS HIGH 50: CPT

## 2024-03-19 PROCEDURE — 250N000013 HC RX MED GY IP 250 OP 250 PS 637: Performed by: PHYSICIAN ASSISTANT

## 2024-03-19 PROCEDURE — 250N000013 HC RX MED GY IP 250 OP 250 PS 637: Performed by: CLINICAL NURSE SPECIALIST

## 2024-03-19 RX ORDER — OLANZAPINE 5 MG/1
5 TABLET, ORALLY DISINTEGRATING ORAL 2 TIMES DAILY PRN
Status: DISCONTINUED | OUTPATIENT
Start: 2024-03-19 | End: 2024-03-20 | Stop reason: DRUGHIGH

## 2024-03-19 RX ORDER — OLANZAPINE 10 MG/1
10 TABLET, ORALLY DISINTEGRATING ORAL ONCE
Status: COMPLETED | OUTPATIENT
Start: 2024-03-19 | End: 2024-03-19

## 2024-03-19 RX ORDER — QUETIAPINE FUMARATE 100 MG/1
100 TABLET, FILM COATED ORAL AT BEDTIME
Status: DISCONTINUED | OUTPATIENT
Start: 2024-03-19 | End: 2024-03-20

## 2024-03-19 RX ADMIN — NICOTINE POLACRILEX 2 MG: 2 LOZENGE ORAL at 19:54

## 2024-03-19 RX ADMIN — OLANZAPINE 10 MG: 10 TABLET, ORALLY DISINTEGRATING ORAL at 00:27

## 2024-03-19 RX ADMIN — QUETIAPINE FUMARATE 100 MG: 100 TABLET ORAL at 20:49

## 2024-03-19 RX ADMIN — NICOTINE POLACRILEX 2 MG: 2 LOZENGE ORAL at 22:03

## 2024-03-19 RX ADMIN — NICOTINE POLACRILEX 2 MG: 2 LOZENGE ORAL at 13:19

## 2024-03-19 RX ADMIN — NICOTINE POLACRILEX 2 MG: 2 LOZENGE ORAL at 07:10

## 2024-03-19 RX ADMIN — OLANZAPINE 5 MG: 5 TABLET, ORALLY DISINTEGRATING ORAL at 17:36

## 2024-03-19 RX ADMIN — NICOTINE POLACRILEX 2 MG: 2 LOZENGE ORAL at 09:49

## 2024-03-19 RX ADMIN — DESVENLAFAXINE 50 MG: 50 TABLET, FILM COATED, EXTENDED RELEASE ORAL at 08:07

## 2024-03-19 RX ADMIN — NICOTINE 1 PATCH: 21 PATCH, EXTENDED RELEASE TRANSDERMAL at 08:14

## 2024-03-19 RX ADMIN — NICOTINE POLACRILEX 2 MG: 2 LOZENGE ORAL at 06:03

## 2024-03-19 RX ADMIN — BUPRENORPHINE AND NALOXONE 1 FILM: 8; 2 FILM BUCCAL; SUBLINGUAL at 17:35

## 2024-03-19 RX ADMIN — ATENOLOL 50 MG: 50 TABLET ORAL at 09:36

## 2024-03-19 RX ADMIN — NICOTINE POLACRILEX 2 MG: 2 LOZENGE ORAL at 20:49

## 2024-03-19 RX ADMIN — BUPRENORPHINE AND NALOXONE 1 FILM: 8; 2 FILM BUCCAL; SUBLINGUAL at 08:06

## 2024-03-19 RX ADMIN — NICOTINE POLACRILEX 2 MG: 2 LOZENGE ORAL at 17:38

## 2024-03-19 RX ADMIN — NICOTINE POLACRILEX 2 MG: 2 LOZENGE ORAL at 08:06

## 2024-03-19 RX ADMIN — GABAPENTIN 600 MG: 300 CAPSULE ORAL at 19:18

## 2024-03-19 RX ADMIN — LISDEXAMFETAMINE DIMESYLATE 40 MG: 20 CAPSULE ORAL at 08:06

## 2024-03-19 RX ADMIN — Medication 3 MG: at 20:50

## 2024-03-19 RX ADMIN — NICOTINE POLACRILEX 2 MG: 2 LOZENGE ORAL at 14:23

## 2024-03-19 RX ADMIN — TIZANIDINE 2 MG: 2 TABLET ORAL at 17:34

## 2024-03-19 RX ADMIN — TIZANIDINE 2 MG: 2 TABLET ORAL at 06:03

## 2024-03-19 RX ADMIN — NICOTINE POLACRILEX 2 MG: 2 LOZENGE ORAL at 12:19

## 2024-03-19 RX ADMIN — NICOTINE POLACRILEX 2 MG: 2 LOZENGE ORAL at 03:11

## 2024-03-19 RX ADMIN — NICOTINE POLACRILEX 2 MG: 2 LOZENGE ORAL at 15:30

## 2024-03-19 RX ADMIN — GABAPENTIN 600 MG: 300 CAPSULE ORAL at 13:13

## 2024-03-19 RX ADMIN — NICOTINE POLACRILEX 2 MG: 2 LOZENGE ORAL at 18:46

## 2024-03-19 RX ADMIN — TIZANIDINE 2 MG: 2 TABLET ORAL at 09:47

## 2024-03-19 RX ADMIN — GABAPENTIN 600 MG: 300 CAPSULE ORAL at 08:08

## 2024-03-19 NOTE — CONSULTS
"      Psychiatry Consultation; Follow up              Reason for Consult, requesting source:    Follow up   Requesting source: Ernesto Raymundo    Labs and imaging reviewed, seen by PIPER Wilkerson CNP    Total time spent in chart review, patient interview and coordination of care; 60 minutes - all time was spent on the date of the encounter that I saw patient                Interim history:    --PIPER Garcia CNP for initial psychiatric evaluation on 03/15/2024 who also evaluated him on 2 subsequent occasions on 3/16 and 3/17/2024.   --Seen by Dr. Castillo 3/18 who discontinued trazodone, replaced zyprexa with seroquel     Patients main concern today was that he \"can't sleep\" however upon further questioning he states he goes to bed around 9 but then wakes up at 3 and then 5 again. He reports the medication changes last night were not particularly helpful. He has been pacing around the [facility] and talked to me about how recently he has been hypervigilant because of \"recent trauma.\"         Current Medications:      atenolol  50 mg Oral Daily    buprenorphine HCl-naloxone HCl  1 Film Sublingual BID    desvenlafaxine  50 mg Oral Daily    gabapentin  600 mg Oral TID    lisdexamfetamine  40 mg Oral Daily    metFORMIN  500 mg Oral BID w/meals    nicotine  1 patch Transdermal Daily    QUEtiapine  100 mg Oral At Bedtime    tiZANidine  2 mg Oral BID              MSE:   Appearance: awake, alert  Attitude:  cooperative  Eye Contact:  intense  Mood:  anxious  Affect:  mood congruent  Speech:  clear, coherent  Psychomotor Behavior:  no evidence of tardive dyskinesia, dystonia, or tics  Muscle strength and tone: baseline   Thought Process:  logical, linear, and goal oriented  Associations:  no loose associations  Thought Content:  no evidence of suicidal ideation or homicidal ideation  Insight:  fair  Judgement:  fair  Oriented to:  time, person, and place  Attention Span and Concentration:  fair  Recent and " "Remote Memory:  fair    Vital signs:  Temp: 98.1  F (36.7  C) Temp src: Oral BP: (!) 127/92 Pulse: 74   Resp: 17 SpO2: 97 % O2 Device: None (Room air)   Height: 180.3 cm (5' 11\") Weight: 98.5 kg (217 lb 3.2 oz)  Estimated body mass index is 30.29 kg/m  as calculated from the following:    Height as of this encounter: 1.803 m (5' 11\").    Weight as of this encounter: 98.5 kg (217 lb 3.2 oz).             DSM-5 Diagnosis:   PTSD  Anxiety  ADHD  Status post substance-induced psychotic disorder  Opioid use disorder  Stimulant use disorder  Cannabis use disorder  Benzodiazepine use disorder          Assessment:   Barak is a 39 year old male with a history of the above diagnoses who presents for mental health evaluation. Zyprexa was replaced with seroquel last night and patient reports he still isn't sleeping well. I discussed increasing seroquel to 100mg and patient agreed, CBT-I will be the most helpful thing for him on an outpatient basis if he is willing to engage. I discussed my concern for vyvanse given recent psychosis and continued anxiety/hypervigilence, he reported that he's been on stimulants since 6 years old and that they make him feel \"normal.\"             Summary of Recommendations:     Increased Seroquel to 100mg at bedtime   Zyprexa 5mg ODT BID PRN for agitation  Consider decreasing Vyvanse given ongoing anxiety/hypervigilance    Emy Cary, PMHNP-BC  Consult/Liaison Psychiatry   [facility]            "

## 2024-03-19 NOTE — PROGRESS NOTES
Triage & Transition Services, Extended Care     Client Name: Barak Clarke    Date: March 19, 2024      Service Type: attended group session  Session Start Time:  1135    Session End Time: 1205  Session Length: 30  Site Location: [facility] EMERGENCY DEPARTMENT                             [id number]  Total Number ofAttendees: 4  Topic: self-care activities, emotions/expression, coping skills/lifestyle management   Response: cooperative with task     CHARLES Rincon   MSW Intern, Extended Care  [phone]

## 2024-03-19 NOTE — PROGRESS NOTES
"Triage & Transition Services, Extended Care     Therapy Progress Note    Patient: Barak goes by \"Barak,\" uses he/him pronouns  Date of Service: March 19, 2024  Site of Service: [facility] EMERGENCY DEPARTMENT                             [id number]  Patient was seen yes  Mode of Assessment: In person    Presentation Summary: Pt is calm and cooperative. Affect is constricted. HIs thoughts to appear more clear today. He is questioning if his medications are effective and notes that psychiatry has discontinued his Zyprexa and started him on Seroquel. He is not sure if this is helping or not. He has been having trouble sleeping each night while in the [facility].    Therapeutic Intervention(s) Provided: Coached on coping techniques/relaxation skills to help improve distress tolerance and managing intense emotions., Discussed and practiced mindfulness., Identified and practiced coping skills.    Current Symptoms: anxious impaired decision making excessive worry (pacing) high risk behavior      Mental Status Exam   Affect: Constricted  Appearance: Appropriate  Attention Span/Concentration: Attentive  Eye Contact: Intense    Fund of Knowledge: Appropriate   Language /Speech Content: Fluent  Language /Speech Volume: Normal  Language /Speech Rate/Productions: Normal  Recent Memory: Intact  Remote Memory: Intact  Mood: Anxious  Orientation to Person: Yes   Orientation to Place: Yes  Orientation to Time of Day: Yes  Orientation to Date: Yes     Situation (Do they understand why they are here?): No  Psychomotor Behavior: Normal  Thought Content: Paranoia  Thought Form: Paranoia    Treatment Objective(s) Addressed: processing feelings, assessing safety, identifying treatment goals, exploring obstacles to safety in the community    Patient Response to Interventions: verbalizes understanding, acceptance expressed    Progress Towards Goals: Patient Reports Symptoms Are: ongoing  Patient Progress Toward Goals: is making " progress  Comment: Appears to be less anxious today  Next Step to Work Toward Discharge: symptom stabilization  Symptom Stabilization Comment: Patient endorses ongoing paranoia and delusions.    Case Management: Case Management Included: collaborating with patient's support system  Details on Collaborating with Patient's Support System: CNP    Plan: inpatient mental health  yes provider, CHARI Raymundo and CNP  yes    Clinical Substantiation: Pt continues to present as anxious. He is worried about his sleep and change of medications.   He appears less paranoid.    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    Major depressive disorder, recurrent, moderate (H) F33.1    Opioid use disorder, severe, in early remission, on maintenance therapy, dependence (H) F11.21    Attention deficit hyperactivity disorder (ADHD), inattentive type, moderate F90.0    Sedative, hypnotic or anxiolytic use disorder, mild, 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

## 2024-03-19 NOTE — ED NOTES
Uneventful night. Patient noted to sleep walking at one point. Needed staff guidance to get back to bed. Requested and received Zyprexa x1 earlier at the beginning of the shift and later couple of gums and snack offered per request. Safety precautons in place. Otherwise, Appeared to be asleep on all other safety checks. No complaint of pain distress/discomfort.

## 2024-03-19 NOTE — TELEPHONE ENCOUNTER
Bed search update ([facility]) @ 12:30AM:         [facility]: No appropriate beds  [facility]: @ cap per website   [facility]: @ cap per website   [facility]: @ cap per website. Per Monet @ 00:40, they are at capacity    [facility]: @ cap per website   [facility]: @ cap per website   [facility]: @ cap per website (Young Adult unit: No recent aggressions, violence or sexual assault. Neg covid required).  Per Lillian @ 00:33, no YA beds   [facility]: @ cap per website   [facility]: @ cap per website   [facility]: Posting 1 bed. Per Robyn @ 00:42, at capacity until 8AM     Pt remains on work list until appropriate placement is available

## 2024-03-19 NOTE — TELEPHONE ENCOUNTER
R:  [facility] Access Inpatient Bed Call Log 3/19/2024 7:30 AM   Intake has called facilities that have not updated their bed status within the last 12 hours.     ADULTS:   [facility] is posting 0 beds.               [facility] is posting 0 beds. [phone]     [facility] is posting 0 beds. [phone]               [facility] is posting 0 beds. [phone] Per call at 7:33am to [place] currently at capacity can check back later for discharges.   [facility] is posting 0 beds. [phone]    [facility] (These are Young Adult beds, 18-28) is posting 0 beds. Negative COVID test required, no recent or significant aggression, violence, or sexual assault. [phone] Per call at 7:3am to [place] no YA, 1 child and a couple adolescent beds available.   [facility] is posting 0 beds. [phone]             [facility] is posting 0 beds. [phone]      [facility] through [facility] is posting 1 beds. [phone] Per call with [place], no adult beds currently available, reassessing at noon.         Pt remains on work list pending appropriate bed availability.

## 2024-03-19 NOTE — ED NOTES
Pt states he's getting a rash in his back and wrist and will stop taking Metformin. Writer looked at back, red spots noted. Pt states back is itchy - PRN Hydroxyzine 50 mg administered.  Pt's breathing is normal, non-labored. VSS.

## 2024-03-19 NOTE — ED NOTES
Pt is calm and pleasant with cares. He is visible in the milieu. He is social and engaged with peers and staff. He is observed reading a book, coloring, and intermittently pacing. Spoke on the phone with family. He is med compliant. Denies pain. VSS. Denies SI/SIB/HI, denies AVH. Voices no concerns at this time. Staff will continue to monitor.

## 2024-03-19 NOTE — TELEPHONE ENCOUNTER
R:  [place]  Access Inpatient Bed Call Log 3/18/2024 @ 10:00 PM   Intake has called facilities that have not updated their bed status within the last 12 hours.     ADULTS:   [facility] is posting 0 beds.               [facility] is posting 0 beds. [phone]     [facility] is posting 0 beds. [phone]               [facility] is posting 0 beds. [phone] 8:15a Per [place], at capacity.   [facility] is posting 0 beds. [phone]    [facility] (These are Young Adult beds, 18-28) is posting 1 beds. Negative COVID test required, no recent or significant aggression, violence, or sexual assault. [phone]             [facility] is posting 0 beds. [phone]      [facility] through [facility] is posting 1 beds. [phone]     Per call with Robyn, at capacity.  Try back at 8AM 3/19.          Pt remains on work list pending appropriate bed availability.

## 2024-03-19 NOTE — TELEPHONE ENCOUNTER
R: [place]  Access Inpatient Bed Call Log 3/19/2024 6:49PM   Intake has called facilities that have not updated their bed status within the last 12 hours.??         *[facility]: @ cap per website.   [place] -- [facility]:  @ cap per website. [phone] ECT Tx offered.   [facility] -- [facility]: Posting 2 beds. ECT Tx offered.        [place] -- [facility]:  @ cap per website.   [place] -- [facility]: Posting 0 beds.   [facility]: Posting 0 beds. [phone] ECT Tx offered.   [facility] -- [facility]/YA beds Posting 0 beds. Pt must be 16 and older.  [facility] -- [facility] Posting 0 beds. [phone]     [facility] -- [facility]: @ cap per website.   [place] -- [facility]:  Posting 1 bed. [phone].         Pt remains on waitlist pending appropriate placement availability.

## 2024-03-20 ENCOUNTER — TELEPHONE (OUTPATIENT)
Dept: BEHAVIORAL HEALTH | Facility: CLINIC | Age: 39
End: 2024-03-20
Payer: COMMERCIAL

## 2024-03-20 PROBLEM — F23 ACUTE PSYCHOSIS (H): Status: ACTIVE | Noted: 2024-03-20

## 2024-03-20 PROCEDURE — 250N000013 HC RX MED GY IP 250 OP 250 PS 637

## 2024-03-20 PROCEDURE — 250N000013 HC RX MED GY IP 250 OP 250 PS 637: Performed by: EMERGENCY MEDICINE

## 2024-03-20 PROCEDURE — 93005 ELECTROCARDIOGRAM TRACING: CPT

## 2024-03-20 PROCEDURE — 93010 ELECTROCARDIOGRAM REPORT: CPT | Performed by: INTERNAL MEDICINE

## 2024-03-20 PROCEDURE — 250N000013 HC RX MED GY IP 250 OP 250 PS 637: Performed by: PSYCHIATRY & NEUROLOGY

## 2024-03-20 PROCEDURE — 250N000013 HC RX MED GY IP 250 OP 250 PS 637: Performed by: PHYSICIAN ASSISTANT

## 2024-03-20 PROCEDURE — 124N000002 HC R&B MH UMMC

## 2024-03-20 PROCEDURE — 99223 1ST HOSP IP/OBS HIGH 75: CPT | Mod: AI | Performed by: PSYCHIATRY & NEUROLOGY

## 2024-03-20 PROCEDURE — 250N000013 HC RX MED GY IP 250 OP 250 PS 637: Performed by: CLINICAL NURSE SPECIALIST

## 2024-03-20 RX ORDER — OLANZAPINE 10 MG/2ML
10 INJECTION, POWDER, FOR SOLUTION INTRAMUSCULAR 3 TIMES DAILY PRN
Status: DISCONTINUED | OUTPATIENT
Start: 2024-03-20 | End: 2024-04-03 | Stop reason: HOSPADM

## 2024-03-20 RX ORDER — DESVENLAFAXINE 50 MG/1
100 TABLET, FILM COATED, EXTENDED RELEASE ORAL DAILY
Status: DISCONTINUED | OUTPATIENT
Start: 2024-03-21 | End: 2024-04-03 | Stop reason: HOSPADM

## 2024-03-20 RX ORDER — LANOLIN ALCOHOL/MO/W.PET/CERES
3 CREAM (GRAM) TOPICAL
Status: DISCONTINUED | OUTPATIENT
Start: 2024-03-20 | End: 2024-04-03 | Stop reason: HOSPADM

## 2024-03-20 RX ORDER — ACETAMINOPHEN 325 MG/1
650 TABLET ORAL EVERY 4 HOURS PRN
Status: DISCONTINUED | OUTPATIENT
Start: 2024-03-20 | End: 2024-04-03 | Stop reason: HOSPADM

## 2024-03-20 RX ORDER — QUETIAPINE FUMARATE 200 MG/1
200 TABLET, FILM COATED ORAL AT BEDTIME
Status: DISCONTINUED | OUTPATIENT
Start: 2024-03-20 | End: 2024-03-21

## 2024-03-20 RX ORDER — HYDROXYZINE HYDROCHLORIDE 25 MG/1
25 TABLET, FILM COATED ORAL EVERY 4 HOURS PRN
Status: DISCONTINUED | OUTPATIENT
Start: 2024-03-20 | End: 2024-04-03 | Stop reason: HOSPADM

## 2024-03-20 RX ORDER — OLANZAPINE 10 MG/1
10 TABLET ORAL 3 TIMES DAILY PRN
Status: DISCONTINUED | OUTPATIENT
Start: 2024-03-20 | End: 2024-03-25 | Stop reason: SINTOL

## 2024-03-20 RX ORDER — POLYETHYLENE GLYCOL 3350 17 G/17G
17 POWDER, FOR SOLUTION ORAL DAILY PRN
Status: DISCONTINUED | OUTPATIENT
Start: 2024-03-20 | End: 2024-04-03 | Stop reason: HOSPADM

## 2024-03-20 RX ADMIN — LISDEXAMFETAMINE DIMESYLATE 40 MG: 20 CAPSULE ORAL at 08:18

## 2024-03-20 RX ADMIN — TIZANIDINE 2 MG: 2 TABLET ORAL at 08:20

## 2024-03-20 RX ADMIN — NICOTINE POLACRILEX 2 MG: 2 LOZENGE ORAL at 08:22

## 2024-03-20 RX ADMIN — QUETIAPINE FUMARATE 200 MG: 200 TABLET ORAL at 20:17

## 2024-03-20 RX ADMIN — NICOTINE POLACRILEX 2 MG: 2 LOZENGE ORAL at 14:06

## 2024-03-20 RX ADMIN — NICOTINE POLACRILEX 2 MG: 2 LOZENGE ORAL at 13:09

## 2024-03-20 RX ADMIN — NICOTINE POLACRILEX 2 MG: 2 LOZENGE ORAL at 06:16

## 2024-03-20 RX ADMIN — Medication 3 MG: at 20:17

## 2024-03-20 RX ADMIN — OLANZAPINE 5 MG: 5 TABLET, ORALLY DISINTEGRATING ORAL at 17:01

## 2024-03-20 RX ADMIN — BUPRENORPHINE AND NALOXONE 1 FILM: 8; 2 FILM BUCCAL; SUBLINGUAL at 08:18

## 2024-03-20 RX ADMIN — NICOTINE 1 PATCH: 21 PATCH, EXTENDED RELEASE TRANSDERMAL at 08:18

## 2024-03-20 RX ADMIN — ATENOLOL 50 MG: 50 TABLET ORAL at 08:24

## 2024-03-20 RX ADMIN — NICOTINE POLACRILEX 2 MG: 2 LOZENGE ORAL at 02:19

## 2024-03-20 RX ADMIN — GABAPENTIN 600 MG: 300 CAPSULE ORAL at 08:19

## 2024-03-20 RX ADMIN — BUPRENORPHINE AND NALOXONE 1 FILM: 8; 2 FILM BUCCAL; SUBLINGUAL at 16:47

## 2024-03-20 RX ADMIN — NICOTINE POLACRILEX 2 MG: 2 LOZENGE ORAL at 11:44

## 2024-03-20 RX ADMIN — GABAPENTIN 600 MG: 300 CAPSULE ORAL at 20:17

## 2024-03-20 RX ADMIN — OLANZAPINE 5 MG: 5 TABLET, ORALLY DISINTEGRATING ORAL at 02:55

## 2024-03-20 RX ADMIN — NICOTINE POLACRILEX 2 MG: 2 LOZENGE ORAL at 18:05

## 2024-03-20 RX ADMIN — NICOTINE POLACRILEX 2 MG: 2 LOZENGE ORAL at 04:47

## 2024-03-20 RX ADMIN — GABAPENTIN 600 MG: 300 CAPSULE ORAL at 13:10

## 2024-03-20 RX ADMIN — NICOTINE POLACRILEX 2 MG: 2 LOZENGE ORAL at 20:29

## 2024-03-20 RX ADMIN — NICOTINE POLACRILEX 2 MG: 2 LOZENGE ORAL at 19:09

## 2024-03-20 RX ADMIN — DESVENLAFAXINE 50 MG: 50 TABLET, FILM COATED, EXTENDED RELEASE ORAL at 08:21

## 2024-03-20 RX ADMIN — NICOTINE POLACRILEX 2 MG: 2 LOZENGE ORAL at 17:01

## 2024-03-20 ASSESSMENT — ACTIVITIES OF DAILY LIVING (ADL)
ADLS_ACUITY_SCORE: 35
ADLS_ACUITY_SCORE: 30
ADLS_ACUITY_SCORE: 35
ADLS_ACUITY_SCORE: 30
ADLS_ACUITY_SCORE: 35
ADLS_ACUITY_SCORE: 35
ADLS_ACUITY_SCORE: 30
ADLS_ACUITY_SCORE: 35
ADLS_ACUITY_SCORE: 30
ADLS_ACUITY_SCORE: 30
ADLS_ACUITY_SCORE: 35
ADLS_ACUITY_SCORE: 30
ADLS_ACUITY_SCORE: 35
ADLS_ACUITY_SCORE: 35

## 2024-03-20 ASSESSMENT — LIFESTYLE VARIABLES: SKIP TO QUESTIONS 9-10: 0

## 2024-03-20 NOTE — ED PROVIDER NOTES
[facility] ED Mental Health Handoff Note:       Brief HPI:  This is a 39 year old male signed out to me.  See initial ED Provider note for full details of the presentation. Interval history is pertinent for continued need for inpatient hospitalization..    Home meds reviewed and ordered/administered: Yes    Medically stable for inpatient mental health admission: Yes.    Evaluated by mental health: Yes. The recommendation is for inpatient mental health treatment. Bed search in process    Safety concerns: At the time I received sign out, there were no safety concerns.    Hold Status:  Active Orders   N/A           Exam:   Patient Vitals for the past 24 hrs:   BP Temp Pulse Resp SpO2   03/19/24 1500 (!) 153/91 98.2  F (36.8  C) 97 17 100 %   03/19/24 0835 (!) 127/92 98.1  F (36.7  C) 74 17 97 %           ED Course:    Medications   nicotine (COMMIT) lozenge 2 mg (2 mg Buccal Not Given 3/19/24 2222)   desvenlafaxine (PRISTIQ) 24 hr tablet 50 mg (50 mg Oral $Given 3/19/24 0807)   atenolol (TENORMIN) tablet 50 mg (50 mg Oral $Given 3/19/24 0936)   tiZANidine (ZANAFLEX) tablet 2 mg (2 mg Oral $Given 3/19/24 1734)   nicotine (COMMIT) lozenge 2 mg (2 mg Buccal $Given 3/19/24 2203)   buprenorphine HCl-naloxone HCl (SUBOXONE) 8-2 MG per film 1 Film (1 Film Sublingual $Given 3/19/24 1735)   nicotine (NICODERM CQ) 21 MG/24HR 24 hr patch 1 patch (1 patch Transdermal $Patch/Med Applied 3/19/24 0814)   melatonin tablet 3 mg (3 mg Oral $Given 3/19/24 2050)   acetaminophen (TYLENOL) tablet 650 mg (650 mg Oral $Given 3/16/24 0504)   hydrOXYzine HCl (ATARAX) tablet 50 mg (50 mg Oral $Given 3/18/24 2209)   tiZANidine (ZANAFLEX) tablet 2 mg (2 mg Oral $Given 3/19/24 0603)   lisdexamfetamine (VYVANSE) capsule 40 mg (40 mg Oral $Given 3/19/24 0806)   metFORMIN (GLUCOPHAGE) tablet 500 mg (500 mg Oral Not Given 3/19/24 1829)   gabapentin (NEURONTIN) capsule 600 mg (600 mg Oral $Given 3/19/24 1918)   QUEtiapine (SEROquel) tablet 100 mg  (100 mg Oral Not Given 3/19/24 2157)   OLANZapine zydis (zyPREXA) ODT tab 5 mg (5 mg Oral $Given 3/19/24 1736)   gabapentin (NEURONTIN) capsule 600 mg (600 mg Oral $Given 3/14/24 1808)   buprenorphine HCl-naloxone HCl (SUBOXONE) 4-1 MG per film 2 Film (2 Film Sublingual $Given 3/14/24 1809)   acetaminophen (TYLENOL) tablet 1,000 mg (1,000 mg Oral Not Given 3/15/24 0001)   acetaminophen (TYLENOL) tablet 1,000 mg (1,000 mg Oral $Given 3/15/24 0049)   OLANZapine (zyPREXA) tablet 5 mg (5 mg Oral $Given 3/15/24 1325)   OLANZapine zydis (zyPREXA) ODT tab 10 mg (10 mg Oral $Given 3/19/24 0027)            There were no significant events during my shift.    Patient was signed out to the oncoming provider, Dr. Burrell        Impression:    ICD-10-CM    1. Paranoid (H)  F22       2. Psychosis, unspecified psychosis type (H)  F29       3. Polysubstance use disorder  F19.90           Plan:    Awaiting inpatient mental health admission/transfer.      RESULTS:   Results for orders placed or performed during the hospital encounter of 03/14/24 (from the past 24 hour(s))   Psychiatry IP Consult: Recommendations; Consultant may enter orders: Yes; Requesting provider? Other supervising provider; Name: Molly Gould     Status: None ()    Collection Time: 03/19/24  6:41 AM    Emy Jimenez APRN CNP     3/19/2024  4:34 PM        Psychiatry Consultation; Follow up              Reason for Consult, requesting source:    Follow up   Requesting source: Ernesto Raymundo    Labs and imaging reviewed, seen by PIPER Wilkerson CNP    Total time spent in chart review, patient interview and   coordination of care; 60 minutes - all time was spent on the date   of the encounter that I saw patient                Interim history:    --PIPER Garcia CNP for initial psychiatric evaluation on   03/15/2024 who also evaluated him on 2 subsequent occasions on   3/16 and 3/17/2024.   --Seen by Dr. Castillo 3/18 who  "discontinued trazodone, replaced   zyprexa with seroquel     Patients main concern today was that he \"can't sleep\" however   upon further questioning he states he goes to bed around 9 but   then wakes up at 3 and then 5 again. He reports the medication   changes last night were not particularly helpful. He has been   pacing around the [facility] and talked to me about how recently he has   been hypervigilant because of \"recent trauma.\"         Current Medications:      atenolol  50 mg Oral Daily    buprenorphine HCl-naloxone HCl  1 Film Sublingual BID    desvenlafaxine  50 mg Oral Daily    gabapentin  600 mg Oral TID    lisdexamfetamine  40 mg Oral Daily    metFORMIN  500 mg Oral BID w/meals    nicotine  1 patch Transdermal Daily    QUEtiapine  100 mg Oral At Bedtime    tiZANidine  2 mg Oral BID              MSE:   Appearance: awake, alert  Attitude:  cooperative  Eye Contact:  intense  Mood:  anxious  Affect:  mood congruent  Speech:  clear, coherent  Psychomotor Behavior:  no evidence of tardive dyskinesia,   dystonia, or tics  Muscle strength and tone: baseline   Thought Process:  logical, linear, and goal oriented  Associations:  no loose associations  Thought Content:  no evidence of suicidal ideation or homicidal   ideation  Insight:  fair  Judgement:  fair  Oriented to:  time, person, and place  Attention Span and Concentration:  fair  Recent and Remote Memory:  fair    Vital signs:  Temp: 98.1  F (36.7  C) Temp src: Oral BP: (!) 127/92 Pulse: 74     Resp: 17 SpO2: 97 % O2 Device: None (Room air)   Height: 180.3 cm   (5' 11\") Weight: 98.5 kg (217 lb 3.2 oz)  Estimated body mass index is 30.29 kg/m  as calculated from the   following:    Height as of this encounter: 1.803 m (5' 11\").    Weight as of this encounter: 98.5 kg (217 lb 3.2 oz).             DSM-5 Diagnosis:   PTSD  Anxiety  ADHD  Status post substance-induced psychotic disorder  Opioid use disorder  Stimulant use disorder  Cannabis use " "disorder  Benzodiazepine use disorder          Assessment:   Barak is a 39 year old male with a history of the above diagnoses   who presents for mental health evaluation. Zyprexa was replaced   with seroquel last night and patient reports he still isn't   sleeping well. I discussed increasing seroquel to 100mg and   patient agreed, CBT-I will be the most helpful thing for him on   an outpatient basis if he is willing to engage. I discussed my   concern for vyvanse given recent psychosis and continued   anxiety/hypervigilence, he reported that he's been on stimulants   since 6 years old and that they make him feel \"normal.\"             Summary of Recommendations:     Increased Seroquel to 100mg at bedtime   Zyprexa 5mg ODT BID PRN for agitation  Consider decreasing Vyvanse given ongoing anxiety/hypervigilance    Emy Cary, PMHNP-BC  Consult/Liaison Psychiatry   [facility]                       MD Breanne Da Silva Eric Girard, MD  03/19/24 [id number]    "

## 2024-03-20 NOTE — ED NOTES
Pt noted to be pacing,  talking on the phone and engaging with peers and staff.  He is med compliant besides Metformin due to side effect of rash. Denies SI, HI, AVH. Denies pain VSS. No behavior concerns.

## 2024-03-20 NOTE — TELEPHONE ENCOUNTER
[place]: [place]  3/20/2024 12:08 AM  Intake has called facilities that have not updated their bed status within the last 12 hours.??      ADULTS:     *METRO  [place] -- [facility]: @ cap per website.  [place] -- [facility]:  @ cap per website.   [place] -- [facility]: @ Cap per website.  [place] -- [facility]: @ cap per website.   [place] -- [facility]: @ Cap per website.  [facility] -- [facility]: @ cap per website.   [place] Al[place] -- [facility] [place]/ beds @ Cap per website.  Ages 18-25, Voluntary only, COVID test req'd, NO aggression, physical or sexual assault, violence hx or drug abuse, or psychosis  [place] -- [facility]: @ Cap per website.  [place] -- [facility]: @ cap per website.  [place] -- [facility]:  @ Cap per website.      Pt remains on waitlist pending appropriate placement availability.

## 2024-03-20 NOTE — PLAN OF CARE
03/20/24 1812   Patient Belongings   Did you bring any home meds/supplements to the hospital?  No   Patient Belongings remains with patient;locker;sent to security per site process   Patient Belongings Remaining with Patient vision aids   Patient Belongings Put in Hospital Secure Location (Security or Locker, etc.) cash/credit card;cell phone/electronics;clothing;tote;purse/wallet;plastic bag;other (see comments)   Belongings Search Yes   Clothing Search Yes   Second Staff Criselda CHAVEZ and Kush SANCHEZ   Comment Writer only performed belongings search     With Patient:  Glasses    Locker:  Clothing: shoes (2 pairs), shirts (6), sweaters/long sleeve (6), pants (6), sweat pants (3), winter jacket (2), winter gloves (1 pair), socks (8 pairs, 1 single), rubber sandals (1), belts (2).(In gray plastic bag)  Other: blue tote, assorted books, papers, folders, sketch pads, note books, drawing/coloring utensils(colored pencils, pens, etc), toiletry bags with assorted toiletries(tooth brush, tooth past, floss, mouth wash, etc), water bottle, phone, phone , charging port,   Wallet: ID, Social Security card, assorted business cards/rewards cards/ reminders, insurance cards, library cards.    Security:  Credit cards:   [employer] johns (2): 9617, [id number]  [place] EBT (3): 9636, 3686, 3867  Players club: [id number]  Greet dot: [phone]    A               Admission:  I am responsible for any personal items that are not sent to the safe or pharmacy.  [facility] is not responsible for loss, theft or damage of any property in my possession.    Signature:  _________________________________ Date: _______  Time: _____                                              Staff Signature:  ____________________________ Date: ________  Time: _____      2nd Staff person, if patient is unable/unwilling to sign:    Signature: ________________________________ Date: ________  Time: _____     Discharge:  [facility] has returned all of my personal belongings:    Signature:  _________________________________ Date: ________  Time: _____                                          Staff Signature:  ____________________________ Date: ________  Time: _____

## 2024-03-20 NOTE — CONSULTS
"  PSYCHIATRIC CONSULTATION, follow up    Requesting Physician: Ernesto Barnes MD    Admission date: 03/14/2024  Date of Service: 03/20/2024    I was asked to see this patient for a follow-up to evaluate his current status and advice on further management and disposition.  The patient was seen, his chart reviewed. He reports increasing anxiety and fear for his life as he believed that \"people were breaking in with the intent to hurt me\". Although he felt safe here his anxiety was high about his recent past experiences. He also told me that he had problems staying asleep despite recent increase in the dose of Seroquel. He also wants his Vyvanse increased as he used to take 70 mg QAM.    MEDICATIONS, psychotropic  Suboxone 8-2 mg per film, 1 film BID  Vyvanse 40 mg daily  Gabapentin 600 mg 3 times daily  Pristiq 50 mg daily  Seroquel 100 mg at bedtime  Requip 1 mg at bedtime    Zyprexa 5 mg as TID as needed  Melatonin 3 mg at bedtime as needed  Hydroxyzine 50 mg 3 times daily as needed    MENTAL STATUS EXAMINATION  Revealed a normally built and normally developed 49-year-old white male appearing about his stated age he was alert and oriented x 3.  His speech was coherent logical and goal directed.  He appeared to be quite anxious. He denied suicidal ideation or intent at the moment.  He denied hallucinations but appeared to be somewhat paranoid. He had some insight into his problems and his judgment did not seem to be impaired at the moment.     DIAGNOSTIC IMPRESSION  PTSD  Anxiety disorder NOS  ADHD  Status post substance-induced psychotic disorder  Opioid use disorder  Stimulant use disorder  Cannabis use disorder  Benzodiazepine use disorder  Restless legs syndrome    Plan: Transfer the patient to the psychiatric unit upon bed availability. I will increase his Pristiq ro 100 mg QAM and Seroquel to 200 mg at bedtime. I will not increase Vyvanse because of high anxiety.    Alfred Castillo MD  "

## 2024-03-20 NOTE — ED NOTES
Patient up pacing at start of shift.  Patient and RN discussed concerns with medications.  Patient reports rash to back and arms after taking metformin.  Updated Dr. Cornell and held medication.   Patient reports also wanting to hold zanaflex as he has no cramping.  Holding and requesting patient to speak with MD. About a discontinuation as requested.   Patient calm and cooperative.  Excited he is going to station 22.  Report give to Kush MARCELINO.  Transport called.

## 2024-03-20 NOTE — ED PROVIDER NOTES
[facility] ED Mental Health Handoff Note:       Brief HPI:  This is a 39 year old male signed out to me.  See initial ED Provider note for full details of the presentation.     Home meds reviewed and ordered/administered: Yes    Medically stable for inpatient mental health admission: Yes.    Evaluated by mental health: Yes. The recommendation is for inpatient mental health treatment. Bed search in process    Safety concerns: At the time I received sign out, there were no safety concerns.    Hold Status:  Active Orders   N/A       Exam:   Patient Vitals for the past 24 hrs:   BP Temp Pulse Resp SpO2   03/20/24 0824 117/83 97.9  F (36.6  C) 70 16 98 %   03/19/24 1500 (!) 153/91 98.2  F (36.8  C) 97 17 100 %           ED Course:    Medications   nicotine (COMMIT) lozenge 2 mg (2 mg Buccal Not Given 3/20/24 1146)   desvenlafaxine (PRISTIQ) 24 hr tablet 50 mg (50 mg Oral $Given 3/20/24 0821)   atenolol (TENORMIN) tablet 50 mg (50 mg Oral $Given 3/20/24 0824)   tiZANidine (ZANAFLEX) tablet 2 mg (2 mg Oral $Given 3/20/24 0820)   nicotine (COMMIT) lozenge 2 mg (2 mg Buccal $Given 3/20/24 1144)   buprenorphine HCl-naloxone HCl (SUBOXONE) 8-2 MG per film 1 Film (1 Film Sublingual $Given 3/20/24 0818)   nicotine (NICODERM CQ) 21 MG/24HR 24 hr patch 1 patch (1 patch Transdermal Patch/Med Removed 3/20/24 0820)   melatonin tablet 3 mg (3 mg Oral $Given 3/19/24 2050)   acetaminophen (TYLENOL) tablet 650 mg (650 mg Oral $Given 3/16/24 0504)   hydrOXYzine HCl (ATARAX) tablet 50 mg (50 mg Oral $Given 3/18/24 2209)   tiZANidine (ZANAFLEX) tablet 2 mg (2 mg Oral $Given 3/19/24 0603)   lisdexamfetamine (VYVANSE) capsule 40 mg (40 mg Oral $Given 3/20/24 0818)   metFORMIN (GLUCOPHAGE) tablet 500 mg (500 mg Oral Not Given 3/20/24 1154)   gabapentin (NEURONTIN) capsule 600 mg (600 mg Oral $Given 3/20/24 6719)   QUEtiapine (SEROquel) tablet 100 mg (100 mg Oral Not Given 3/19/24 0402)   OLANZapine zydis (zyPREXA) ODT tab 5 mg (5 mg  Oral $Given 3/20/24 0255)   gabapentin (NEURONTIN) capsule 600 mg (600 mg Oral $Given 3/14/24 1808)   buprenorphine HCl-naloxone HCl (SUBOXONE) 4-1 MG per film 2 Film (2 Film Sublingual $Given 3/14/24 1809)   acetaminophen (TYLENOL) tablet 1,000 mg (1,000 mg Oral Not Given 3/15/24 0001)   acetaminophen (TYLENOL) tablet 1,000 mg (1,000 mg Oral $Given 3/15/24 0049)   OLANZapine (zyPREXA) tablet 5 mg (5 mg Oral $Given 3/15/24 1325)   OLANZapine zydis (zyPREXA) ODT tab 10 mg (10 mg Oral $Given 3/19/24 0027)            There were no significant events during my shift.      Impression:    ICD-10-CM    1. Paranoid (H)  F22       2. Psychosis, unspecified psychosis type (H)  F29       3. Polysubstance use disorder  F19.90           Plan:  patient accepted to inpatient mental health.       RESULTS:   No results found for this visit on 03/14/24 (from the past 24 hour(s)).          MD Kraig Craven Cara, MD  03/20/24 2848

## 2024-03-20 NOTE — PROGRESS NOTES
"Triage & Transition Services, Extended Care     Therapy Progress Note    Patient: Barak goes by \"Barak,\" uses he/him pronouns  Date of Service: March 20, 2024  Site of Service: [facility] EMERGENCY DEPARTMENT                             [id number]  Patient was seen yes  Mode of Assessment: In person    Presentation Summary: Pt presents as engaged and cooperative. Affect is constricted and he reports increased anxiety. Attributes this to believing that one of the new patients in the [facility] is someone who has been following him and attempted to break into his house. He does ot know this person by name but states that he recognizes him. Pt states that he will be staying in his room from now on. He wants to ensure that this person does not hear us talking about him. He states that every recent treatment center or hospital he has been to, someone has followed him there. He continues to believe that his wife's family is behind all of this stating 'they're out for blood.' Presents with paranoia and delusional thinking, loose associations. He reports feeling chest tightness, heaviness in his body, rage and re-emerging thoughts of SI. He commits to safety in the [facility].    Therapeutic Intervention(s) Provided: Engaged in cognitive restructuring/ reframing, looked at common cognitive distortions and challenged negative thoughts., Engaged in guided discovery, explored patient's perspectives and helped expand them through socratic dialogue., Taught the link between thoughts, feelings, and behaviors., Provided positive reinforcement for progress towards goals, gains in knowledge, and application of skills previously taught.    Current Symptoms: anxious, excessive worry impaired decision making, withdrawl/isolation, thoughts of death/suicide, helplessness excessive worry, anxious distractability      Mental Status Exam   Affect: Constricted, Flat  Appearance: Appropriate  Attention Span/Concentration: Attentive  Eye Contact: " Intense, Variable    Fund of Knowledge: Appropriate   Language /Speech Content: Fluent  Language /Speech Volume: Normal  Language /Speech Rate/Productions: Normal  Recent Memory: Intact  Remote Memory: Intact  Mood: Anxious  Orientation to Person: Yes   Orientation to Place: Yes  Orientation to Time of Day: Yes  Orientation to Date: Yes     Situation (Do they understand why they are here?): Yes  Psychomotor Behavior: Normal  Thought Content: Paranoia, Delusions  Thought Form: Loose Associations, Paranoia    Treatment Objective(s) Addressed: processing feelings, assessing safety, identifying treatment goals, exploring obstacles to safety in the community    Patient Response to Interventions: verbalizes understanding, acceptance expressed, needs reinforcement    Progress Towards Goals: Patient Reports Symptoms Are: ongoing  Patient Progress Toward Goals: is not making progress  Comment: Appears to be less anxious today  Next Step to Work Toward Discharge: symptom stabilization  Symptom Stabilization Comment: Patient endorses ongoing paranoia and delusions.    Case Management: Case Management Included: collaborating with patient's support system  Details on Collaborating with Patient's Support System: CNP    Plan: inpatient mental health  yes provider Dr Raymundo and CNP  yes    Clinical Substantiation: Pt continues to present with signs/symptoms of paranoia and delusional thinking resulting in increased anxiety and isolation. Continue to recommend [facility] for safety and stabilization.    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    Major depressive disorder, recurrent, moderate (H) F33.1    Opioid use disorder, severe, in early remission, on maintenance therapy, dependence (H) F11.21    Attention deficit hyperactivity disorder (ADHD), inattentive type, moderate F90.0    Sedative, hypnotic or anxiolytic use disorder, mild, 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

## 2024-03-20 NOTE — H&P
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 zyprexa     #ADHD  Pt has long hx of adhd - said Vyvanse was prescribed in [place] but this is unclear given he has not filled it in a month per pharmacy. Likely contributing to current agitation and trouble sleeping.   - Stopped vyvanse on admission.   Possible past seizure hx when on Wellbutrin but unclear, also when using drugs. Was given some Wellbutrin in the Ed and then it was stopped.   - Seizure precautions.     # Polysubstance use   UDS pos for cannabinoids in ED, pt states he has not used other substances since Feb 20th. Does make references to meth in his house.   - Pt declining to return to CD tx at this time, wants sober living  - Nicotine patch and lozenge  - Continue PTA suboxone 8-2 MG    #Akithisia/muscle spasms  Pt endorsing restlessness, keeps him awake, could be RLS as well.   - Continue to decrease Zyprexa and monitor with prn  - Tizanidine 2mg Bid and prn 2mg BID  - Consider Ropinirole     2. Pertinent Labs/Monitoring:   - Uds pos for cannabinoids  -CBC, CMP, TSH, Vit D, Vit B12 and folate, pending  - Lipids last month wnl  - A1C last month wnl  - EKG pending     3. Additional  Plans:  - Patient will be treated in therapeutic milieu with appropriate individual and group therapies as described     Psychiatric Hospital Course:      Barak Clarke was admitted to [facility] as a voluntary pt.  Medications:  PTA tizanidine, Pristiq, Gabapentin, Suboxone were continued with prn tizanidine added for restlessness, PTA zyprexa made prn due to akithisia in the Ed.   PTA Vyvanse was held due to anxiety and agitation.    New medications started at the time of admission include Seroquel 200mg in the ED.     The risks, benefits, alternatives, and side effects were discussed and understood by the patient and other caregivers.     Medical Assessment and Plan     Medical diagnoses to be addressed this admission:      # Metabolic syndrome  Pt endorsed weight gain with zyprexa.   - Metformin started/continued in the ED  - Metformin stopped on admission due to pt endorsing rash after taking it in the ED, showed provider red raised rash on forearm and stated he had some on his back as well. Since Zyprexa was being decreased, decided to stop metformin and monitor.     # HTN  - Continued pta atenolol  - CTM     Medical course: Patient was physically examined by the ED prior to being transferred to the unit and was found to be medically stable and appropriate for admission.     Consults:  none     Checklist     Legal Status: None  Voluntary     Safety Assessment:   Behavioral Orders   Procedures    [facility] Extended Care     Until discharge, Extended Care to offer psychotherapeutic services to mental health patients boarding for admission or stabilization. These services are to include but are not limited to: individual psychotherapy, diagnostic assessment, case management and care planning, safety planning, etc. This may include up to 1 visit per day. If patient is physically located at [facility] or [facility], group psychotherapy up to 2 time per day may be offered.     Suicide precautions: Suicide Risk: MODERATE; Clinical  rationale to override score: modification to the care environment     Search patient belongings according to policy for contraband or items that could be used for self-harm.   Send personal items home or secure valuables according to Patient Belongings policy.  Secure visitor's belongings  Patients able to keep undergarments on after search.  Direct visualization when patient in bathroom.     Order Specific Question:   Suicide Risk     Answer:   MODERATE     Order Specific Question:   Clinical rationale to override score:     Answer:   modification to the care environment       Risk Assessment:  Risk for harm is moderate-high.  Risk factors: maladaptive coping, substance use, trauma, family dynamics, impulsive, past behaviors, and psychosocial stressors, psychosis and delusions with commands hallucinations to use substances   Protective factors: engaged in treatment and seeking CD treatment      SIO: none    Dispo: TBD. Disposition pending clinical stabilization, medication optimization and development of an appropriate discharge plan.     Attestations:     This patient was seen and discussed with my attending physician.    Kath Garcia MD  Psychiatry Resident Physician     I, Bridger Stack , have personally performed an examination of this patient and I have reviewed the resident's documentation.  I have edited the note to reflect all relevant changes.  I have discussed this patient with the house staff on 3/21/2024.  I agree with resident findings and plan in today's note and yesterdays resident H&P.  I have reviewed all vitals and laboratory findings.      Bridger Stack MD

## 2024-03-20 NOTE — ED NOTES
A copy of the list of patient's Medications, located at the Inpatient Pharmacy brought in per ED. Medications in Medications/Valuable envelope needs to be picked up at the pharmacy prior to patient discharge. The list is located in patient's belongings.
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
## 2024-03-20 NOTE — ED NOTES
Patient was calm cooperative this morning. He was medication compliant. He was asking for nicorette on the hour. He ate 100 percent of all his meals during the shift. He was engaged in the milieu with staff and peers this morning. He currently denied pain, SI/HI/SIB. He contracted for safety. Will continue to monitor for behaviors.

## 2024-03-20 NOTE — TELEPHONE ENCOUNTER
R: [place]  Access Inpatient Bed Call Log 3/20/24 8:40 AM    Intake has called facilities that have not updated the bed status within the last 12 hours.  ([facility])                           [facility] is at capacity           [facility] is posting 0 beds. [phone]   [facility] is posting 2 beds. Negative covid required             [facility] is posting 0 beds. Neg covid. No high school/Lori-psych. [phone]   [place] is posting 0 beds. [phone]  [facility] is posting 0 beds. [phone]   [facility] is posting 1 bed. Negative covid. [phone] Per call @8:15 am 3/20 Pt not appropriate d/t unit age restrictions.  [facility] is posting 1 bed [phone] Per call at 9:15 am to [place] no beds available to be reassessed tomorrow 3/21.    1:48 PM Paged Dr. Stack.  1:50 PM Patient accepted to unit 22/Dr. Stack.  1:59 PM Patient added to queue. Indicia completed.  2:00 PM JUAN LUIS Tejeda informed of pt in queue, will review and follow up with [facility] for report.  2:04 PM [facility] notified.    Pt remains on the work list pending appropriate bed availability.

## 2024-03-21 LAB
ALBUMIN SERPL BCG-MCNC: 4.3 G/DL (ref 3.5–5.2)
ALP SERPL-CCNC: 107 U/L (ref 40–150)
ALT SERPL W P-5'-P-CCNC: 27 U/L (ref 0–70)
ANION GAP SERPL CALCULATED.3IONS-SCNC: 8 MMOL/L (ref 7–15)
AST SERPL W P-5'-P-CCNC: 24 U/L (ref 0–45)
ATRIAL RATE - MUSE: 72 BPM
BILIRUB SERPL-MCNC: 1.1 MG/DL
BUN SERPL-MCNC: 16.4 MG/DL (ref 6–20)
CALCIUM SERPL-MCNC: 8.7 MG/DL (ref 8.6–10)
CHLORIDE SERPL-SCNC: 97 MMOL/L (ref 98–107)
CREAT SERPL-MCNC: 0.8 MG/DL (ref 0.67–1.17)
DEPRECATED HCO3 PLAS-SCNC: 30 MMOL/L (ref 22–29)
DIASTOLIC BLOOD PRESSURE - MUSE: NORMAL MMHG
EGFRCR SERPLBLD CKD-EPI 2021: >90 ML/MIN/1.73M2
ERYTHROCYTE [DISTWIDTH] IN BLOOD BY AUTOMATED COUNT: 13.7 % (ref 10–15)
FOLATE SERPL-MCNC: 12.5 NG/ML (ref 4.6–34.8)
GLUCOSE SERPL-MCNC: 93 MG/DL (ref 70–99)
HCT VFR BLD AUTO: 45 % (ref 40–53)
HGB BLD-MCNC: 15.1 G/DL (ref 13.3–17.7)
INTERPRETATION ECG - MUSE: NORMAL
MCH RBC QN AUTO: 26.9 PG (ref 26.5–33)
MCHC RBC AUTO-ENTMCNC: 33.6 G/DL (ref 31.5–36.5)
MCV RBC AUTO: 80 FL (ref 78–100)
P AXIS - MUSE: 71 DEGREES
PLATELET # BLD AUTO: 261 10E3/UL (ref 150–450)
POTASSIUM SERPL-SCNC: 4.9 MMOL/L (ref 3.4–5.3)
PR INTERVAL - MUSE: 162 MS
PROT SERPL-MCNC: 7.5 G/DL (ref 6.4–8.3)
QRS DURATION - MUSE: 98 MS
QT - MUSE: 392 MS
QTC - MUSE: 429 MS
R AXIS - MUSE: 64 DEGREES
RBC # BLD AUTO: 5.61 10E6/UL (ref 4.4–5.9)
SODIUM SERPL-SCNC: 135 MMOL/L (ref 135–145)
SYSTOLIC BLOOD PRESSURE - MUSE: NORMAL MMHG
T AXIS - MUSE: 42 DEGREES
TSH SERPL DL<=0.005 MIU/L-ACNC: 1.73 UIU/ML (ref 0.3–4.2)
VENTRICULAR RATE- MUSE: 72 BPM
VIT B12 SERPL-MCNC: 494 PG/ML (ref 232–1245)
VIT D+METAB SERPL-MCNC: 14 NG/ML (ref 20–50)
WBC # BLD AUTO: 6.3 10E3/UL (ref 4–11)

## 2024-03-21 PROCEDURE — 250N000013 HC RX MED GY IP 250 OP 250 PS 637

## 2024-03-21 PROCEDURE — 84443 ASSAY THYROID STIM HORMONE: CPT | Performed by: PSYCHIATRY & NEUROLOGY

## 2024-03-21 PROCEDURE — G0177 OPPS/PHP; TRAIN & EDUC SERV: HCPCS

## 2024-03-21 PROCEDURE — 250N000013 HC RX MED GY IP 250 OP 250 PS 637: Performed by: PSYCHIATRY & NEUROLOGY

## 2024-03-21 PROCEDURE — 82306 VITAMIN D 25 HYDROXY: CPT | Performed by: PSYCHIATRY & NEUROLOGY

## 2024-03-21 PROCEDURE — 36415 COLL VENOUS BLD VENIPUNCTURE: CPT | Performed by: PSYCHIATRY & NEUROLOGY

## 2024-03-21 PROCEDURE — 82607 VITAMIN B-12: CPT | Performed by: PSYCHIATRY & NEUROLOGY

## 2024-03-21 PROCEDURE — 82746 ASSAY OF FOLIC ACID SERUM: CPT | Performed by: PSYCHIATRY & NEUROLOGY

## 2024-03-21 PROCEDURE — 124N000002 HC R&B MH UMMC

## 2024-03-21 PROCEDURE — 85027 COMPLETE CBC AUTOMATED: CPT | Performed by: PSYCHIATRY & NEUROLOGY

## 2024-03-21 PROCEDURE — 80053 COMPREHEN METABOLIC PANEL: CPT | Performed by: PSYCHIATRY & NEUROLOGY

## 2024-03-21 RX ORDER — QUETIAPINE FUMARATE 100 MG/1
100 TABLET, FILM COATED ORAL DAILY
Status: DISCONTINUED | OUTPATIENT
Start: 2024-03-21 | End: 2024-03-26

## 2024-03-21 RX ORDER — OLANZAPINE 10 MG/1
10 TABLET, ORALLY DISINTEGRATING ORAL 2 TIMES DAILY PRN
Status: DISCONTINUED | OUTPATIENT
Start: 2024-03-21 | End: 2024-03-22

## 2024-03-21 RX ORDER — QUETIAPINE FUMARATE 300 MG/1
300 TABLET, FILM COATED ORAL AT BEDTIME
Status: DISCONTINUED | OUTPATIENT
Start: 2024-03-21 | End: 2024-03-26

## 2024-03-21 RX ADMIN — NICOTINE POLACRILEX 2 MG: 2 LOZENGE ORAL at 13:25

## 2024-03-21 RX ADMIN — NICOTINE 1 PATCH: 21 PATCH, EXTENDED RELEASE TRANSDERMAL at 08:38

## 2024-03-21 RX ADMIN — QUETIAPINE FUMARATE 300 MG: 300 TABLET ORAL at 21:07

## 2024-03-21 RX ADMIN — NICOTINE POLACRILEX 2 MG: 2 LOZENGE ORAL at 07:53

## 2024-03-21 RX ADMIN — NICOTINE POLACRILEX 2 MG: 2 LOZENGE ORAL at 16:02

## 2024-03-21 RX ADMIN — NICOTINE POLACRILEX 2 MG: 2 LOZENGE ORAL at 12:19

## 2024-03-21 RX ADMIN — TIZANIDINE 2 MG: 2 TABLET ORAL at 01:47

## 2024-03-21 RX ADMIN — GABAPENTIN 600 MG: 300 CAPSULE ORAL at 21:07

## 2024-03-21 RX ADMIN — TIZANIDINE 2 MG: 2 TABLET ORAL at 08:31

## 2024-03-21 RX ADMIN — NICOTINE POLACRILEX 2 MG: 2 LOZENGE ORAL at 21:07

## 2024-03-21 RX ADMIN — BUPRENORPHINE AND NALOXONE 1 FILM: 8; 2 FILM BUCCAL; SUBLINGUAL at 08:27

## 2024-03-21 RX ADMIN — NICOTINE POLACRILEX 2 MG: 2 LOZENGE ORAL at 18:25

## 2024-03-21 RX ADMIN — NICOTINE POLACRILEX 2 MG: 2 LOZENGE ORAL at 11:05

## 2024-03-21 RX ADMIN — Medication 3 MG: at 21:07

## 2024-03-21 RX ADMIN — NICOTINE POLACRILEX 2 MG: 2 LOZENGE ORAL at 08:56

## 2024-03-21 RX ADMIN — OLANZAPINE 10 MG: 10 TABLET, FILM COATED ORAL at 15:38

## 2024-03-21 RX ADMIN — NICOTINE POLACRILEX 2 MG: 2 LOZENGE ORAL at 17:02

## 2024-03-21 RX ADMIN — GABAPENTIN 600 MG: 300 CAPSULE ORAL at 14:12

## 2024-03-21 RX ADMIN — NICOTINE POLACRILEX 2 MG: 2 LOZENGE ORAL at 06:30

## 2024-03-21 RX ADMIN — DESVENLAFAXINE 100 MG: 50 TABLET, FILM COATED, EXTENDED RELEASE ORAL at 08:26

## 2024-03-21 RX ADMIN — TIZANIDINE 2 MG: 2 TABLET ORAL at 16:02

## 2024-03-21 RX ADMIN — NICOTINE POLACRILEX 2 MG: 2 LOZENGE ORAL at 14:57

## 2024-03-21 RX ADMIN — QUETIAPINE FUMARATE 100 MG: 100 TABLET ORAL at 16:03

## 2024-03-21 RX ADMIN — NICOTINE POLACRILEX 2 MG: 2 LOZENGE ORAL at 10:05

## 2024-03-21 RX ADMIN — BUPRENORPHINE AND NALOXONE 1 FILM: 8; 2 FILM BUCCAL; SUBLINGUAL at 16:02

## 2024-03-21 RX ADMIN — ATENOLOL 50 MG: 50 TABLET ORAL at 08:30

## 2024-03-21 RX ADMIN — GABAPENTIN 600 MG: 300 CAPSULE ORAL at 08:26

## 2024-03-21 ASSESSMENT — ACTIVITIES OF DAILY LIVING (ADL)
ADLS_ACUITY_SCORE: 30
LAUNDRY: WITH SUPERVISION
ADLS_ACUITY_SCORE: 30
HYGIENE/GROOMING: INDEPENDENT
ADLS_ACUITY_SCORE: 30
ADLS_ACUITY_SCORE: 30
HYGIENE/GROOMING: INDEPENDENT
ADLS_ACUITY_SCORE: 30
ORAL_HYGIENE: INDEPENDENT
ADLS_ACUITY_SCORE: 30
DRESS: SCRUBS (BEHAVIORAL HEALTH);INDEPENDENT
ORAL_HYGIENE: INDEPENDENT
ADLS_ACUITY_SCORE: 30
DRESS: INDEPENDENT
LAUNDRY: WITH SUPERVISION
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
## 2024-03-21 NOTE — PLAN OF CARE
Initial meeting note:    Pt immediately asked who writer was when entering his room. Writer introduced self to patient and discussed psychotherapy service available to patient.     Pt response: Pt not currently interested in meeting 1:1. Pt reports he is only interested in speaking with the doctor.    Plan: Will continue to check in with Pt and remain available for 1:1 sessions.      REN Fischer  Psychotherapist

## 2024-03-21 NOTE — PLAN OF CARE
"Occupational Therapy Group Note     03/21/24 1838   Group Therapy Session   Group Attendance attended group session   Group Type psychotherapeutic   Group Topic Covered coping skills/lifestyle management;self-care activities   Group Session Detail OT: Wellness group focused on autonomic nervous system education/discussion and exploration of mind-body coping skills for parasympathetic nervous system activation, coping with stress/sxs and building sense of ownership over self-management.  (17:15-18:00; 4 participants)   Patient Response/Contribution cooperative with task;disorganized;other (see comments)    Patient joined group for 45 minutes.  Pt rated self on a scale from 0-10 with 0 being calm and 10 being crisis.  At the start of group, pt rated themself at a 5.  Pt shared that he was feeling \"cloudy\".  Pt read from his worksheet and shared, \"I'm on a lot of meds. I think that makes everything a bit more cloudy and different.  I have all these tough feeling I don't know how to express.\"  Pt struggled to articulate his thoughts/feeling and spoke in more vague terms.  Pt said that he thinks he has been feeling numb for so long and now he is having to confront how he is actually feeling.  He said \"It feels like a dark place.\"  Writer suggested that pt focus on the mind-body activities to help him feel more regulated and connected and told him that the CTC therapist is available to meet with him if he wants to talk more about this.  Pt engaged in the majority of the mind-body strategies during group.  At the end of group pt rated themself at a 5 again and he presented as more somnolent and was struggling to focus on the discussion and to keep his eyes open.  Writer informed pt's RN.            "

## 2024-03-21 NOTE — PLAN OF CARE
Goal Outcome Evaluation: Ongoing  Problem: Adult Inpatient Plan of Care  Goal: Optimal Comfort and Wellbeing  Intervention: Monitor Pain and Promote Comfort  Recent Flowsheet Documentation  Taken 3/21/2024 0100 by Melissa Berry RN  Pain Management Interventions:   medication (see MAR)   emotional support   relaxation techniques promoted     Problem: Adult Behavioral Health Plan of Care  Goal: Adheres to Safety Considerations for Self and Others  Outcome: Progressing     Problem: Suicide Risk  Goal: Absence of Self-Harm  Outcome: Progressing     Problem: Sleep Disturbance  Goal: Adequate Sleep/Rest  Outcome: Not Progressing    Rec'd pt. Sleeping in bed w/ regular non-labored respirations and no reported or noted distress.  Wakeful just after Midnight requesting snacks.  Same provided.  Paced about the unit briefly engaging w/ staff re: needs.  Slept at intervals.  Up again at approx  0145 requesting Zanaflex for Bilateral elbow and knee muscle spasms. Same administered and appeared to be  effective w/i the hr as pt. Was observed sleeping w/o noted distress.  Up again at approx 0400.  Required directive re: picking up confidential papers at the desk area;  Apologized stating that he thought it was his menu.  Requested add'l snacks stating that he was hungry r/t his increase in his Seroquel.  Easily back to sleep though up again w/ EMA.  Interactive w/ wakeful peers in [facility].  Observed to have slept for approx 3.75 hrs overnight.  Safe, supportive therapeutic environment maintained.

## 2024-03-21 NOTE — PLAN OF CARE
03/21/24 1325   Individualization/Patient Specific Goals   Patient Personal Strengths community support;expressive of needs;family/social support;intellectual cognitive skills;independent living skills;no history of violence;positive attitude;positive educational history;positive vocational history;resilient;resourceful;tolerant   Patient Vulnerabilities adverse childhood experience(s);history of unsuccessful treatment;substance abuse/addiction   Interprofessional Rounds   Participants CTC;psychiatrist;nursing   Behavioral Team Discussion   Progress Initial assessment   Anticipated length of stay Assessment ongoing   Continued Stay Criteria/Rationale Symptoms of psychosis present requiring medication management and assessment    Medical/Physical medically cleared in ED - adequate for discharge   Precautions see below - will be discontinued at time of discharge   Plan Psychiatric assessment/Medication management. Therapeutic Milieu. Individual care planning and after care planning. Patient to participate in unit groups and activities. Individual and group support on unit.    Rationale for change in precautions or plan per assessment   Anticipated Discharge Disposition              PRECAUTIONS AND SAFETY    Behavioral Orders   Procedures    Code 1 - Restrict to Unit    Routine Programming     As clinically indicated    Seizure precautions    Self Injury Precaution    Status 15     Every 15 minutes.    Suicide precautions: Suicide Risk: HIGH     Patients on Suicide Precautions should have a Combination Diet ordered that includes a Diet selection(s) AND a Behavioral Tray selection for Safe Tray - with utensils, or Safe Tray - NO utensils       Order Specific Question:   Suicide Risk     Answer:   HIGH       Safety  Safety WDL: WDL  Patient Location: patient room, own  Observed Behavior: calm, sleeping  Safety Measures: safety plan reviewed, safety rounds completed  Diversional Activity: play, reading,  television  Suicidality: Status 15  Withdrawal: seizure precautions (past history of withdrawal seizure)  Seizure precautions: clutter free environment

## 2024-03-21 NOTE — PROGRESS NOTES
"  DAILY PROGRESS NOTE  Barak Clarke  YOB: 1985  MRN: [id number]  Date of Service: 03/21/2024      INTERIM SUMMARY:   Chart Reviewed and Patient seen today.   D/w staff regarding pt status and behavior.     Today, Barak Clarke was seen in his room with the treatment team.  Pt said he is \"not so good\".  Pt didn't realize the requirements of the unit.  Pt said they started his Vyvanse 4 days ago and needs to be on it and has too much anxiety without it.  Pt said Dr. Castillo was going to increase his dose of Vyvanse.  Pt said he is in a \"very anxious frustrating spot.\"   Pt said he tried to discharge.  Pt said he also has a lot of depression.  Pt said he always has suicidal thoughts but doesn't have any plans and no intent of suicide.  Pt denies and thoughts to harm others.  Pt endorsed that people are out to get him.  Pt said another pt in the [facility] was someone he knew from the streets and kept walking past his room and made it clear he wanted to harm him by pretending to be the grim reaper.  Pt said he needs to feel normal and he feels vulnerable if people are following him into the hospital.  Pt said he would feel safer outside the hospital.  Pt feels safe on this unit.  Pt was at [facility] and there was chela that wanted to go to the [facility], and he felt \"spooked out\".  Pt told [facility] he couldn't stay there anymore, and they tried to transfer him to another program, but this was next to a town his ex-wife lives in, so he decided to try [facility].  Pt said he has been clean for a month and wants to find sober housing in [facility].  PT said he would go to his mom's house if he discharges.  Has been staying at mom's house.   Things have been happening - rosaries in the shape of nooses, taken off of him and tied into nooses while he sleeps. Has woken up with sharpened knife next to his bed, finds death notes, they have a signature - they sprinkle meth sometimes.  Is not " "interested in medications other than seroquel. Risperidone - doesn't want breasts, has been told he statistically has a high chance of gynecomastia. Abilify - had muscle spasms in leg, had to get up and do calf raises all the time.   Knows that if he is discharged he can be medicated (script filled and is at mom's boyfriend's house). He feels his life is on the line and he saw someone follow him into the unit and doesn't want to be here on anti-psychotics, feels vulnerable if he doesn't have anything to cover his ADHD.    When asked about income or medical assistance, states has insurance. Knows there are a lot of people with no income going into sober housing, there will be a way.   \"I want to get a job and a vehicle and start seeing my son\"  Has a white bag of his medications in the pharmacy. Has the keys to get into his house. \"I know I do\" in regards to having an appointment with the psych provider, says mom has been in touch with them keeping them updated.      Telephone with patients mother who said she thinks he is vulnerable due to delusions and that he agreed to take medication and remain in the hospital.  She said he has been on ADHD medication since age 6 and will fall apart without it.  Need to stabilize his psychosis first discussed.    Pt seen again and he agreed to remain in the hospital and wanted Zyprexa and increase in dose of Seroquel (which he had declined earlier toda.      Medication Side Effects: The patient denies all medication side effects.   Review of organ systems:    ROS: 10 point ROS neg other than the symptoms noted above in the HPI.     Any pain?: Denies     Patient required PRN's in the last 24 hours.   Patient has not attended groups today.     LABS:   Recent Results (from the past 168 hour(s))   Urine Drug Screen Panel   Result Value Ref Range Status    Amphetamines Urine Screen Negative Screen Negative Final    Barbituates Urine Screen Negative Screen Negative Final    " "Benzodiazepine Urine Screen Negative Screen Negative Final    Cannabinoids Urine Screen Positive (A) Screen Negative Final    Cocaine Urine Screen Negative Screen Negative Final    Fentanyl Qual Urine Screen Negative Screen Negative Final    Opiates Urine Screen Negative Screen Negative Final    PCP Urine Screen Negative Screen Negative Final   CBC with platelets   Result Value Ref Range Status    WBC Count 6.3 4.0 - 11.0 10e3/uL Final    RBC Count 5.61 4.40 - 5.90 10e6/uL Final    Hemoglobin 15.1 13.3 - 17.7 g/dL Final    Hematocrit 45.0 40.0 - 53.0 % Final    MCV 80 78 - 100 fL Final    MCH 26.9 26.5 - 33.0 pg Final    MCHC 33.6 31.5 - 36.5 g/dL Final    RDW 13.7 10.0 - 15.0 % Final    Platelet Count 261 150 - 450 10e3/uL Final     *Note: Due to a large number of results and/or encounters for the requested time period, some results have not been displayed. A complete set of results can be found in Results Review.         VITALS:   /89 (BP Location: Left arm, Patient Position: Sitting, Cuff Size: Adult Large)   Pulse 63   Temp 98  F (36.7  C) (Oral)   Resp 16   Ht 1.803 m (5' 11\")   Wt 99.6 kg (219 lb 9.6 oz)   SpO2 97%   BMI 30.63 kg/m        CURRENT MEDICATIONS:  No current outpatient medications on file.       MENTAL STATUS EXAMINATION  Oriented to:  Grossly Oriented  General:  Awake and Alert  Appearance:  appears stated age, Grooming is adequate, slender, and Tattoos on forearm of heart in chains  Behavior/Attitude:  Calm, Engaged, Difficult to redirect, Easily distracted, and Defensive  Eye Contact: Appropriate  Psychomotor: No evidence of tics, dystonia, or tardive dyskinesia , Agitated, and Restless no catatonia present, no tremors, glances at the door frequently during interview  Speech:  appropriate volume/tone, spontaneous, expansive, and with good articulation, normal rate  Language: Fluent in English with appropriate syntax and vocabulary.  Mood:  \"anxious\"  Affect:  broad/full, dysphoric " , anxious, and animated  Thought Process:  coherent, ruminative, perseverative, and tangential  Thought Content:    No SI or HI, No VH, No AH, and delusions; delusions of paranoia and delusions that are self-referential  Associations: loose  Insight:  limited due to paranoid delusions and confident that his ex is against him  Judgment:  limited due to wanting to discharge due to not getting vyvanse right after discussing how scared and unsafe he has been feeling outside the hospital   Impulse control: partial  Attention Span:  adequate for conversation, distracted  Concentration:  grossly intact  Recent and Remote Memory:  not formally assessed  Fund of Knowledge: average  Muscle Strength and Tone: normal  Gait and Station: Normal       ASSESSMENT:     Barak Clarke is a 39 year old male with previous psychiatric diagnoses of depression, anxiety, ADHD, PTSD, substance-induced psychotic disorder, and polysubstance dependency and use disorder who checked into our emergency department from [facility] requesting an alternative chemical dependency treatment, admitted on 03/20/2024 due to concern for SI, psychosis, paranoia, delusions, and disorganized behavior in the context of substance use and relationship concerns. Most recent psychiatric hospitalization was last month for SI s/p overdose. Significant symptoms on admission include paranoia, hypervigilance, racing thoughts, disorganized behavior, social withdrawal. The MSE on admission was pertinent for poor insight and judgement, paranoia, delusions of his ex wife and family out to get him, self-referential delusions, agitation/restlessness. Biological contributions to mental health presentation include substance use most recently cannabis, past MH diagnoses. Psychological contributions to mental health presentation include poor coping, possible cluster B traits especially considering relationship concerns over his lifetime, PTSD, poor insight into need for  substance treatment. Social factors contributing to mental health presentation include isolation, interpersonal conflicts, unemployed.  Protective factors include seeking help, knows when SI is bad he said, would like to stay on his medications.      In summary, the patient's reported symptoms of paranoia, delusions, anxiety with disorganized thoughts, and expressed concerns about people conspiring to kill him, taking over his electronics and monitoring him in the context of cannabis use and past substance use are consistent with prior diagnosis of substance induced psychosis. However, could also consider primary diagnosis of psychotic disorder with mood component given signs for suhas such as decrease need for sleep, rambling and perfuse thoughts, supporting Bipolar disorder with psychotic features or Schizoaffective disorder. Although meth can also present with these features, he has not used on over a month per his report and supported by UDS. He will likely benefit from medication optimization and therapeutic milieu this admission. PT declining CD tx.      Given that he currently has intermitted SI and HI, psychosis, and possible suhas, patient warrants inpatient psychiatric hospitalization to maintain his safety.       Psychiatric Plan by Diagnosis      # Substance induced psychosis vs primary psychosis  - Continue Seroquel 200mg at bedtime (started in the ED from 50mg, increased to 100mg)  -prn zyprexa      #MDD  - PTA Pristiq 100mg daily - consider stopping if pt continues to have trouble sleeping and is anxious/agitated     #Anxiety/agitation  - PTA gabapentin 600mg TID  - prn hydroxyzine  -prn zyprexa      #ADHD  Pt has long hx of adhd - said Vyvanse was prescribed in [place] but this is unclear given he has not filled it in a month per pharmacy. Likely contributing to current agitation and trouble sleeping.   - Stopped vyvanse on admission.   Possible past seizure hx when on Wellbutrin but unclear, also when  using drugs. Was given some Wellbutrin in the Ed and then it was stopped.   - Seizure precautions.      # Polysubstance use   UDS pos for cannabinoids in ED, pt states he has not used other substances since Feb 20th. Does make references to meth in his house.   - Pt declining to return to  tx at this time, wants sober living  - Nicotine patch and lozenge  - Continue PTA suboxone 8-2 MG     #Akithisia/muscle spasms  Pt endorsing restlessness, keeps him awake, could be RLS as well.   - Continue to decrease Zyprexa and monitor with prn  - Tizanidine 2mg Bid and prn 2mg BID  - Consider Ropinirole      2. Pertinent Labs/Monitoring:   - Uds pos for cannabinoids  -CBC, CMP, TSH, Vit D, Vit B12 and folate, pending  - Lipids last month wnl  - A1C last month wnl  - EKG pending     3. Additional Plans:  - Patient will be treated in therapeutic milieu with appropriate individual and group therapies as described      Psychiatric Hospital Course:       Barak Clarke was admitted to [facility] as a voluntary pt.  Medications:  PTA tizanidine, Pristiq, Gabapentin, Suboxone were continued with prn tizanidine added for restlessness, PTA zyprexa made prn due to akithisia in the Ed.   PTA Vyvanse was held due to anxiety and agitation.    New medications started at the time of admission include Seroquel 200mg in the ED.      The risks, benefits, alternatives, and side effects were discussed and understood by the patient and other caregivers.      Medical Assessment and Plan      Medical diagnoses to be addressed this admission:       # Metabolic syndrome  Pt endorsed weight gain with zyprexa.   - Metformin started/continued in the ED  - Metformin stopped on admission due to pt endorsing rash after taking it in the ED, showed provider red raised rash on forearm and stated he had some on his back as well. Since Zyprexa was being decreased, decided to stop metformin and monitor.      # HTN  - Continued pta atenolol  - CTM      Medical  course: Patient was physically examined by the ED prior to being transferred to the unit and was found to be medically stable and appropriate for admission.      Consults:  none      Checklist      Legal Status: None  Voluntary      Safety Assessment:         Behavioral Orders   Procedures    [facility] Extended Care       Until discharge, Extended Care to offer psychotherapeutic services to mental health patients boarding for admission or stabilization. These services are to include but are not limited to: individual psychotherapy, diagnostic assessment, case management and care planning, safety planning, etc. This may include up to 1 visit per day. If patient is physically located at [facility] or [facility], group psychotherapy up to 2 time per day may be offered.     Suicide precautions: Suicide Risk: MODERATE; Clinical rationale to override score: modification to the care environment       Search patient belongings according to policy for contraband or items that could be used for self-harm.   Send personal items home or secure valuables according to Patient Belongings policy.  Secure visitor's belongings  Patients able to keep undergarments on after search.  Direct visualization when patient in bathroom.       Order Specific Question:   Suicide Risk       Answer:   MODERATE       Order Specific Question:   Clinical rationale to override score:       Answer:   modification to the care environment         Risk Assessment:  Risk for harm is moderate-high.  Risk factors: maladaptive coping, substance use, trauma, family dynamics, impulsive, past behaviors, and psychosocial stressors, psychosis and delusions with commands hallucinations to use substances   Protective factors: engaged in treatment and seeking CD treatment       SIO: none    Dispo: TBD. Disposition pending clinical stabilization, medication optimization and development of an appropriate discharge plan.        Bridger Stack MD  3/21/2024 5:02 PM

## 2024-03-21 NOTE — PLAN OF CARE
"  Problem: Suicide Risk  Goal: Absence of Self-Harm  Outcome: Progressing   Goal Outcome Evaluation:    Plan of Care Reviewed With: patient          Patient during medication pass this morning demanding for vyvanse, informed him that vyvanse was not listed on his medication. He asked for Zydis 5 mg states \"to dope myself up\". Writer listed medications that were ordered and due. Patient said, I am going to leave if they are not going to give me Vyvanse. \"I want to discharge\". Encouraged  him to discuss his concern with treatment team. Patient approached staff and asked that DELORES for his mother be cancelled, said he does not want her involve in his care. Patient received phone call from his mother. Came to the desk and re signed DELORES for mother. Received report patient will be discharging today after meeting with providers. Patient mother called later to verbalize concern that she does not think patient will be safe if discharged without Vyvanse and Zyprexa. Patient mother requested that provider call her before discharge. Relayed information to provider. Waiting on feedback from provider if patient is discharging.   Patient approached writer and said, \"My mom made me feel guilty, she said I should take Risperdal, I can try Risperdal\"  Patient later came back to writer and said he meant Seroquel, patient  said if they prescribe Extended release Seroquel for me maybe I will stay. Patient was informed that the decision is for provider to make. Information relayed to provider.   Patient out to the [facility] for the most part of the shift. Making and receiving phone calls. Multiple request for nicotine lozenge. Refused groups. No interactions with peers noted this shift. Staff will continue to monitor and  offer support as needed.           "

## 2024-03-21 NOTE — PLAN OF CARE
BEH IP Unit Acuity Rating Score (UARS)  Patient is given one point for every criteria they meet.    CRITERIA SCORING   On a 72 hour hold, court hold, committed, stay of commitment, or revocation. 0    Patient LOS on BEH unit exceeds 20 days. 0  LOS: 1   Patient under guardianship, 55+, otherwise medically complex, or under age 11. 0   Suicide ideation without relief of precipitating factors. 0   Current plan for suicide. 0   Current plan for homicide. 0   Imminent risk or actual attempt to seriously harm another without relief of factors precipitating the attempt. 0   Severe dysfunction in daily living (ex: complete neglect for self care, extreme disruption in vegetative function, extreme deterioration in social interactions). 0   Recent (last 7 days) or current physical aggression in the ED or on unit. 0   Restraints or seclusion episode in past 72 hours. 0   Recent (last 7 days) or current verbal aggression, agitation, yelling, etc., while in the ED or unit. 0   Active psychosis. 1   Need for constant or near constant redirection (from leaving, from others, etc).  0   Intrusive or disruptive behaviors. 0   Patient requires 3 or more hours of individualized nursing care per 8-hour shift (i.e. for ADLs, meds, therapeutic interventions). 0   TOTAL 1

## 2024-03-22 PROCEDURE — 99232 SBSQ HOSP IP/OBS MODERATE 35: CPT | Performed by: PSYCHIATRY & NEUROLOGY

## 2024-03-22 PROCEDURE — 250N000013 HC RX MED GY IP 250 OP 250 PS 637: Performed by: PSYCHIATRY & NEUROLOGY

## 2024-03-22 PROCEDURE — 250N000013 HC RX MED GY IP 250 OP 250 PS 637

## 2024-03-22 PROCEDURE — 124N000002 HC R&B MH UMMC

## 2024-03-22 PROCEDURE — 90832 PSYTX W PT 30 MINUTES: CPT

## 2024-03-22 RX ORDER — POLYETHYLENE GLYCOL 3350 17 G
2 POWDER IN PACKET (EA) ORAL
Status: DISCONTINUED | OUTPATIENT
Start: 2024-03-22 | End: 2024-04-03 | Stop reason: HOSPADM

## 2024-03-22 RX ORDER — OLANZAPINE 10 MG/1
10 TABLET, ORALLY DISINTEGRATING ORAL 2 TIMES DAILY PRN
Status: DISCONTINUED | OUTPATIENT
Start: 2024-03-22 | End: 2024-03-25

## 2024-03-22 RX ADMIN — OLANZAPINE 10 MG: 10 TABLET, FILM COATED ORAL at 16:12

## 2024-03-22 RX ADMIN — GABAPENTIN 600 MG: 300 CAPSULE ORAL at 20:29

## 2024-03-22 RX ADMIN — GABAPENTIN 600 MG: 300 CAPSULE ORAL at 13:02

## 2024-03-22 RX ADMIN — Medication 3 MG: at 20:29

## 2024-03-22 RX ADMIN — TIZANIDINE 2 MG: 2 TABLET ORAL at 08:30

## 2024-03-22 RX ADMIN — NICOTINE POLACRILEX 2 MG: 2 LOZENGE ORAL at 07:48

## 2024-03-22 RX ADMIN — NICOTINE POLACRILEX 2 MG: 2 LOZENGE ORAL at 17:43

## 2024-03-22 RX ADMIN — NICOTINE POLACRILEX 2 MG: 2 LOZENGE ORAL at 16:12

## 2024-03-22 RX ADMIN — NICOTINE POLACRILEX 2 MG: 2 LOZENGE ORAL at 13:01

## 2024-03-22 RX ADMIN — NICOTINE POLACRILEX 2 MG: 2 LOZENGE ORAL at 20:30

## 2024-03-22 RX ADMIN — NICOTINE POLACRILEX 2 MG: 2 LOZENGE ORAL at 06:33

## 2024-03-22 RX ADMIN — NICOTINE 1 PATCH: 21 PATCH, EXTENDED RELEASE TRANSDERMAL at 08:33

## 2024-03-22 RX ADMIN — NICOTINE POLACRILEX 2 MG: 2 LOZENGE ORAL at 09:04

## 2024-03-22 RX ADMIN — QUETIAPINE FUMARATE 300 MG: 300 TABLET ORAL at 20:29

## 2024-03-22 RX ADMIN — ATENOLOL 50 MG: 50 TABLET ORAL at 08:32

## 2024-03-22 RX ADMIN — BUPRENORPHINE AND NALOXONE 1 FILM: 8; 2 FILM BUCCAL; SUBLINGUAL at 08:30

## 2024-03-22 RX ADMIN — NICOTINE POLACRILEX 2 MG: 2 LOZENGE ORAL at 14:01

## 2024-03-22 RX ADMIN — QUETIAPINE FUMARATE 100 MG: 100 TABLET ORAL at 08:31

## 2024-03-22 RX ADMIN — NICOTINE POLACRILEX 2 MG: 2 LOZENGE ORAL at 11:50

## 2024-03-22 RX ADMIN — DESVENLAFAXINE 100 MG: 50 TABLET, FILM COATED, EXTENDED RELEASE ORAL at 08:31

## 2024-03-22 RX ADMIN — NICOTINE POLACRILEX 2 MG: 2 LOZENGE ORAL at 18:28

## 2024-03-22 RX ADMIN — GABAPENTIN 600 MG: 300 CAPSULE ORAL at 08:30

## 2024-03-22 RX ADMIN — NICOTINE POLACRILEX 2 MG: 2 LOZENGE ORAL at 04:33

## 2024-03-22 RX ADMIN — BUPRENORPHINE AND NALOXONE 1 FILM: 8; 2 FILM BUCCAL; SUBLINGUAL at 16:11

## 2024-03-22 RX ADMIN — OLANZAPINE 10 MG: 10 TABLET, ORALLY DISINTEGRATING ORAL at 05:45

## 2024-03-22 ASSESSMENT — ACTIVITIES OF DAILY LIVING (ADL)
ADLS_ACUITY_SCORE: 30
ORAL_HYGIENE: INDEPENDENT
ADLS_ACUITY_SCORE: 30
DRESS: INDEPENDENT
ORAL_HYGIENE: INDEPENDENT
ADLS_ACUITY_SCORE: 30
HYGIENE/GROOMING: INDEPENDENT
ADLS_ACUITY_SCORE: 30
LAUNDRY: WITH SUPERVISION
DRESS: INDEPENDENT
ADLS_ACUITY_SCORE: 30
LAUNDRY: WITH SUPERVISION
HYGIENE/GROOMING: INDEPENDENT
ADLS_ACUITY_SCORE: 30

## 2024-03-22 NOTE — PLAN OF CARE
"Goal Outcome Evaluation:    Plan of Care Reviewed With: patient        Pt visible in the milieu, socialized with peers while watching TV, attended and participated in group activities. Pt expressed moderate anxiety and received scheduled Seroquel and reassessed for mild anxiety. Pt denies all other mental health psych symptoms and contracted for safety. Pt speech moderate in tone, clear and logical with good eye contact. Denies pain and shortness of breath and vital sign stable. /89 (BP Location: Left arm, Patient Position: Sitting, Cuff Size: Adult Large)   Pulse 63   Temp 98  F (36.7  C) (Oral)   Resp 16   Ht 1.803 m (5' 11\")   Wt 99.6 kg (219 lb 9.6 oz)   SpO2 97%   BMI 30.63 kg/m               "

## 2024-03-22 NOTE — PLAN OF CARE
"Goal Outcome Evaluation:    Plan of Care Reviewed With: patient      Problem: Adult Behavioral Health Plan of Care  Goal: Adheres to Safety Considerations for Self and Others  Intervention: Develop and Maintain Individualized Safety Plan  Recent Flowsheet Documentation  Taken 3/22/2024 0900 by Irish Hermosillo RN  Safety Measures: safety rounds completed     Patient visible on the unit. Patient presents with flat blunted mood affect. Seen pacing back and forth in the hallway with weighted shoulder and neck vest. Patient observed coming more frequently to request for nicotine lozenge too early. Patient was redirected. Patient was visible at breakfast and ate 100% of his meal. When it was time to fill out his menu it appears patient has limited choices on his menu. Patient then requested to be removed from low lactose diet.   During assessment in patient's room, patient appeared appeared calm. Speech was clear and coherent with organized thought process but patient was noted to be paranoid.  Patient endorsed anxiety of 6 and depression of 4. He described his mood as \"pushing through\" and stated that his goal is to \"get through the day.\" Patient denied feeling of hopelessness. Patient stated, \"I have a lot of trouble staying here. I want to grab my things and leave, but my mom wants me to be here.\"  Patient reported that he is going through divorce and his wife's family/dad comes into his apartment hang noose on his door knob and sometimes he wakes up and see sharp knife on his pillow.    Patient denied SI/HI and hallucinations. Patient stated that he feels safer here than when he was in the ED. Stated \" I see people follow me in the ED. His name is Laith.\" Patient denied any person following him here.   He remained visible all shift. No outburst noted.                "

## 2024-03-22 NOTE — PROGRESS NOTES
"Psychiatry Cross Cover Note    Subjective:   Notified by staff that pt was requesting PRN Zyprexa for anxiety, per discussion with Attending earlier in the day. Declined Hydroxyzine.     Discussed with Nurse that in patients with psychosis, it is okay to offer Zyprexa first line for anxiety, and that he should receive the medication.    Objective:   /89 (BP Location: Left arm, Patient Position: Sitting, Cuff Size: Adult Large)   Pulse 63   Temp 98  F (36.7  C) (Oral)   Resp 16   Ht 1.803 m (5' 11\")   Wt 99.6 kg (219 lb 9.6 oz)   SpO2 97%   BMI 30.63 kg/m      Assessment:  Barak Clarke is a 39 year old male with previous psychiatric diagnoses of depression, anxiety, ADHD, PTSD, substance-induced psychotic disorder, and polysubstance dependency and use disorder who checked into our emergency department from [facility] requesting an alternative chemical dependency treatment, admitted on 03/20/2024 due to concern for SI, psychosis, paranoia, delusions, and disorganized behavior in the context of substance use and relationship concerns.     Plan:  Offer Zyprexa for anxiety/psychosis related symptoms (order has been modified to reflect this)    Ruthann Chaparro MD  Psychiatry PGY2   "

## 2024-03-22 NOTE — PLAN OF CARE
"Individual Therapy Note      Date of Service: 2024    Patient: Barak goes by \"Barak,\" uses he/him pronouns    Individuals Present: Barak Karl Osorio [facility]    Session start: 915  Session end: 93  Session duration in minutes: 20    Patient Active Problem List   Diagnosis    Tobacco use disorder    Amphetamine use disorder, mild, in early remission (H)    Major depressive disorder, recurrent, moderate (H)    Opioid use disorder, severe, in early remission, on maintenance therapy, dependence (H)    Attention deficit hyperactivity disorder (ADHD), inattentive type, moderate    Sedative, hypnotic or anxiolytic use disorder, mild, abuse (H)    Insomnia, unspecified    Personality disorder, unspecified (H)    Chemical dependency (H)    Mental health problem    Psychosis (H)    Substance-related disorder (H)    Acute psychosis (H)         Modality Used:Person Centered, Rapport Building, and Motivational Interviewing    Goals: Process emotional pain, live in sober house in [place]    Patient Description of current symptoms: Sedated, pretty drugged, cloudy, my spirit is sleeping     Mental Status Exam:   Attitude: cooperative  Eye Contact: poor   Mood: sad  and depressed  Affect: intensity is flat  Speech: clear, coherent  Psychomotor Behavior: no evidence of tardive dyskinesia, dystonia, or tics  Thought Process:  linear  Associations: no loose associations  Thought Content: no evidence of suicidal ideation or homicidal ideation  Insight: limited  Judgement: limited  Attention Span and Concentration: fair    Pt progress: Pt reports he is doing as well as he can be this morning. Pt reports he is here because of his mother and to find housing, not to withdraw from medications. Processed his feelings related to family issues; Pt expressed guilt over his sister's death and would feel guilt if he  because it would \"kill\" his mother. Pt shared since sister's death he has not dealt with grief, stopped caring " "about his health and work and has been self-destructive. Pt reports he has \"too much pain\" and appeared hopeless that it could get easier. Writer validated his feelings, normalized his feelings as symptoms of grief and depression, and instilled hope that treating depression and processing grief could reduce his sense of hopelessness, lack of motivation. Pt avoided eye contact, was flat, spoke slowly, appeared open to exploring feelings around grief, lacks some insight into current mental state.     Treatment Objective(s) Addressed:   The focus of this session was on rapport building, orienting the patient to therapy, processing feelings related to family struggles, and identifying treatment goals     Progress Towards Goals and Assessment of Patient:   Unable to assess progress towards goals - first meeting with Pt.     Therapeutic Intervention(s):   Provided active listening, unconditional positive regard, and validation. Engaged in cognitive restructuring/ reframing, looked at common cognitive distortions and challenged negative thoughts. Explored motivation for behavioral change.    Plan/next step: Writer will continue to check in with Pt, encouraged Pt to attend group sessions.     [id number] - Psychotherapy (with patient) - 30 (16-37*) min      Jennifer Osorio [facility]  Psychotherapist      "

## 2024-03-22 NOTE — PLAN OF CARE
03/22/24 1423   Group Therapy Session   Group Attendance attended group session   Time Session Began 1015   Time Session Ended 1150   Total Time (minutes) 20  (no charge)   Total # Attendees 4   Group Type expressive therapy;recreation;task skill   Group Topic Covered coping skills/lifestyle management;problem-solving;leisure exploration/use of leisure time;cognitive activities   Group Session Detail OT Clinic Group   Patient Response/Contribution cooperative with task;confronted peers appropriately   Patient Participation Detail Pt actively participated in occupational therapy clinic to facilitate coping skill exploration, creative expression within personally meaningful activities, and clinical observation of social, cognitive, and kinesthetic performance skills. Pt response: pt flat. pt arrived toward end of group. pt requested paint markers to add detail to a dragon drawing.  ind to initiate, gather materials, sequence, and adjust to workspace demands as needed. Demonstrated sustained focus, planning, and problem solving for selected creative task. Able to ask for assistance as needed, and appropriate with peers and staff.

## 2024-03-22 NOTE — PLAN OF CARE
Problem: Sleep Disturbance  Goal: Adequate Sleep/Rest  Outcome: Progressing   Goal Outcome Evaluation:         Pt slept most of the night. No signs or symptoms of pain and discomfort noted during 15 minutes safety checks. Pt appeared sleeping comfortable with no signs of distress noted. Pt breathing normally. Pt slept for about 5 hours. He was up at about 0430 and requested for nicotine Lozenge and Zyprexa for anxiety stating that his anxiety was 6/10. Resident doctor on call notified and she put in the order for pt to get Zyprexa 10 as first choice for anxiety. This was given to pt. He was then noted pacing hallway with headphone. No safety concern noted this shift. Will continue to monitor and will assist if need arise,

## 2024-03-22 NOTE — PLAN OF CARE
BEH IP Unit Acuity Rating Score (UARS)  Patient is given one point for every criteria they meet.    CRITERIA SCORING   On a 72 hour hold, court hold, committed, stay of commitment, or revocation. 0    Patient LOS on BEH unit exceeds 20 days. 0  LOS: 2   Patient under guardianship, 55+, otherwise medically complex, or under age 11. 0   Suicide ideation without relief of precipitating factors. 0   Current plan for suicide. 0   Current plan for homicide. 0   Imminent risk or actual attempt to seriously harm another without relief of factors precipitating the attempt. 0   Severe dysfunction in daily living (ex: complete neglect for self care, extreme disruption in vegetative function, extreme deterioration in social interactions). 1   Recent (last 7 days) or current physical aggression in the ED or on unit. 0   Restraints or seclusion episode in past 72 hours. 0   Recent (last 7 days) or current verbal aggression, agitation, yelling, etc., while in the ED or unit. 0   Active psychosis. 1   Need for constant or near constant redirection (from leaving, from others, etc).  0   Intrusive or disruptive behaviors. 0   Patient requires 3 or more hours of individualized nursing care per 8-hour shift (i.e. for ADLs, meds, therapeutic interventions). 0   TOTAL 2

## 2024-03-22 NOTE — PLAN OF CARE
Team Note Due:  Thursday     Assessment/Intervention/Current Symtoms and Care Coordination:  - chart review  - team meeting - discussed pt progress, symptomology, and response to treatment.  Discussed the discharge plan and any potential impediments to discharge.    - team rounds    - post team rounds team meeting / discussion    Discharge Plan or Goal:  Pending stabilization & development of a safe discharge plan.        Barriers to Discharge:  Patient requires further psychiatric stabilization due to current symptomology        Referral Status:   DAVID referrals made in ED:  [facility] [address] [place] [address] Phone: [phone]  [email or web address]     [facility]  [address] [place] [address] Phone: [phone] Fax: [phone]  [email or web address]       Legal Status:  voluntary    Contacts:         Upcoming Meetings and Dates/Important Information and next steps:  Continue to discuss with patient if would like to proceed with residential / IOP with lodging
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
## 2024-03-23 PROCEDURE — 250N000013 HC RX MED GY IP 250 OP 250 PS 637

## 2024-03-23 PROCEDURE — G0177 OPPS/PHP; TRAIN & EDUC SERV: HCPCS

## 2024-03-23 PROCEDURE — 250N000013 HC RX MED GY IP 250 OP 250 PS 637: Performed by: PSYCHIATRY & NEUROLOGY

## 2024-03-23 PROCEDURE — 124N000002 HC R&B MH UMMC

## 2024-03-23 RX ADMIN — GABAPENTIN 600 MG: 300 CAPSULE ORAL at 08:21

## 2024-03-23 RX ADMIN — BUPRENORPHINE AND NALOXONE 1 FILM: 8; 2 FILM BUCCAL; SUBLINGUAL at 16:23

## 2024-03-23 RX ADMIN — NICOTINE POLACRILEX 2 MG: 2 LOZENGE ORAL at 07:19

## 2024-03-23 RX ADMIN — NICOTINE POLACRILEX 2 MG: 2 LOZENGE ORAL at 16:11

## 2024-03-23 RX ADMIN — NICOTINE POLACRILEX 2 MG: 2 LOZENGE ORAL at 14:01

## 2024-03-23 RX ADMIN — GABAPENTIN 600 MG: 300 CAPSULE ORAL at 13:05

## 2024-03-23 RX ADMIN — NICOTINE POLACRILEX 2 MG: 2 LOZENGE ORAL at 18:47

## 2024-03-23 RX ADMIN — OLANZAPINE 10 MG: 10 TABLET, FILM COATED ORAL at 08:22

## 2024-03-23 RX ADMIN — NICOTINE POLACRILEX 2 MG: 2 LOZENGE ORAL at 12:45

## 2024-03-23 RX ADMIN — NICOTINE POLACRILEX 2 MG: 2 LOZENGE ORAL at 17:53

## 2024-03-23 RX ADMIN — Medication 3 MG: at 20:59

## 2024-03-23 RX ADMIN — ATENOLOL 50 MG: 50 TABLET ORAL at 08:21

## 2024-03-23 RX ADMIN — NICOTINE POLACRILEX 2 MG: 2 LOZENGE ORAL at 08:22

## 2024-03-23 RX ADMIN — GABAPENTIN 600 MG: 300 CAPSULE ORAL at 20:59

## 2024-03-23 RX ADMIN — QUETIAPINE FUMARATE 100 MG: 100 TABLET ORAL at 08:21

## 2024-03-23 RX ADMIN — TIZANIDINE 2 MG: 2 TABLET ORAL at 08:21

## 2024-03-23 RX ADMIN — NICOTINE POLACRILEX 2 MG: 2 LOZENGE ORAL at 23:05

## 2024-03-23 RX ADMIN — QUETIAPINE FUMARATE 300 MG: 300 TABLET ORAL at 20:59

## 2024-03-23 RX ADMIN — NICOTINE 1 PATCH: 21 PATCH, EXTENDED RELEASE TRANSDERMAL at 08:22

## 2024-03-23 RX ADMIN — OLANZAPINE 10 MG: 10 TABLET, ORALLY DISINTEGRATING ORAL at 16:22

## 2024-03-23 RX ADMIN — NICOTINE POLACRILEX 2 MG: 2 LOZENGE ORAL at 11:34

## 2024-03-23 RX ADMIN — BUPRENORPHINE AND NALOXONE 1 FILM: 8; 2 FILM BUCCAL; SUBLINGUAL at 08:22

## 2024-03-23 RX ADMIN — NICOTINE POLACRILEX 2 MG: 2 LOZENGE ORAL at 09:32

## 2024-03-23 RX ADMIN — DESVENLAFAXINE 100 MG: 50 TABLET, FILM COATED, EXTENDED RELEASE ORAL at 08:21

## 2024-03-23 RX ADMIN — NICOTINE POLACRILEX 2 MG: 2 LOZENGE ORAL at 05:44

## 2024-03-23 ASSESSMENT — ACTIVITIES OF DAILY LIVING (ADL)
ADLS_ACUITY_SCORE: 30
LAUNDRY: WITH SUPERVISION
DRESS: INDEPENDENT
DRESS: INDEPENDENT
ADLS_ACUITY_SCORE: 30
HYGIENE/GROOMING: INDEPENDENT
ADLS_ACUITY_SCORE: 30
ORAL_HYGIENE: INDEPENDENT
ADLS_ACUITY_SCORE: 30
HYGIENE/GROOMING: INDEPENDENT
ADLS_ACUITY_SCORE: 30
ORAL_HYGIENE: INDEPENDENT
ADLS_ACUITY_SCORE: 30
LAUNDRY: WITH SUPERVISION
ADLS_ACUITY_SCORE: 30
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
## 2024-03-23 NOTE — PLAN OF CARE
"  Problem: Adult Inpatient Plan of Care  Goal: Plan of Care Review  Description: The Plan of Care Review/Shift note should be completed every shift.  The Outcome Evaluation is a brief statement about your assessment that the patient is improving, declining, or no change.  This information will be displayed automatically on your shift  note.  Outcome: Progressing     Problem: Suicide Risk  Goal: Absence of Self-Harm  Outcome: Progressing   Goal Outcome Evaluation:      /74 (Patient Position: Sitting)   Pulse 89   Temp 97.1  F (36.2  C) (Temporal)   Resp 16   Ht 1.803 m (5' 11\")   Wt 99.6 kg (219 lb 9.6 oz)   SpO2 99%   BMI 30.63 kg/m          At the start of the shift patient approached writer and requested prn zyprexa for anxiety 8/10, which was administered at 16:22.  Upon reassessment patient reported medication was effective in decreasing anxiety to 6/10.   Patient oriented to person, place and situation.  Affect: flat and restricted.  Mood: sightly irritable and tense.  Speech normal rate and rhythm.  Rates depression 7/10.  Denies SI/SIB/HI/AH/VH. He appears well groomed and took a shower this shift and dressed in his clothes from home. Denies any medication side effects at this time.    He was visible in the milieu interacting and playing a board game with select peers.  He also spent time in his room reading. Barak went to bed around 19:30 and writer work him up around 21:00 for his bedtime medications, then he preceded to go back to sleep.    Patient was compliant with medications. He requested prn nicotine lozenges throughout the shift and melatonin at bedtime.  He continues to have a no roommate order.  Remains on suicide, SIB, and seizure precautions.  No associated behaviors noted this shift.  Will continue to monitor and assess as needed.                 "

## 2024-03-23 NOTE — PLAN OF CARE
03/23/24 1452   Group Therapy Session   Group Attendance attended group session   Time Session Began 1015   Time Session Ended 1115   Total Time (minutes) 45   Total # Attendees 4   Group Type expressive therapy;recreation;task skill   Group Topic Covered coping skills/lifestyle management;problem-solving;leisure exploration/use of leisure time;cognitive activities   Group Session Detail OT Clinic Group   Patient Response/Contribution able to recall/repeat info presented;confronted peers appropriately;cooperative with task   Patient Participation Detail Pt actively participated in OT Clinic group. pt chose to engage in previous, familiar creative task. pt flat. pt polite and pleasant during interactions. pt was independent with task and supplies. pt readily shared his drawing portfolio with staff and peers, pt appeared proud of his work. pt remained focused on task for duration of engagement. pt worked quietly and methodically.

## 2024-03-23 NOTE — PLAN OF CARE
Problem: Adult Behavioral Health Plan of Care  Goal: Plan of Care Review  Recent Flowsheet Documentation  Taken 3/22/2024 2232 by Madeleine Hernandez, RN  Patient Agreement with Plan of Care: agrees  Goal: Optimized Coping Skills in Response to Life Stressors  Outcome: Progressing  Note: Patient stays on periphery of milieu. He otherwise spends much of shift in his room. Affect is blunted and mood is tense. Guarded during staff checking in with him. Requested and given Zyprexa 10 mg at 1612 for agitation. Appears preoccupied. Impatient waiting for any medication. Responds well to reassurance. No interaction with peers. He declines his scheduled zanaflex. Denies SI and SIB. Madeleine Hernandez RN    Goal Outcome Evaluation:    Plan of Care Reviewed With: patient

## 2024-03-23 NOTE — PLAN OF CARE
Problem: Sleep Disturbance  Goal: Adequate Sleep/Rest  Outcome: Progressing  Intervention: Promote Sleep/Rest  Flowsheets (Taken 3/22/2024 2327)  Sleep/Rest Enhancement:   awakenings minimized   regular sleep/rest pattern promoted   consistent schedule promoted   Goal Outcome Evaluation: ongoing      Pt asleep at the start of shift. Slept a total of 3.75 hours per q15 safety checks. Nicotine lozenge given @ 0544 per pt request. No s/s pain or discomfort noted.Even respirations , non labored breathing. No behavorial concerns.

## 2024-03-24 PROCEDURE — 124N000002 HC R&B MH UMMC

## 2024-03-24 PROCEDURE — 250N000013 HC RX MED GY IP 250 OP 250 PS 637

## 2024-03-24 PROCEDURE — 250N000013 HC RX MED GY IP 250 OP 250 PS 637: Performed by: PSYCHIATRY & NEUROLOGY

## 2024-03-24 RX ADMIN — NICOTINE POLACRILEX 2 MG: 2 LOZENGE ORAL at 15:37

## 2024-03-24 RX ADMIN — NICOTINE POLACRILEX 2 MG: 2 LOZENGE ORAL at 16:44

## 2024-03-24 RX ADMIN — ATENOLOL 50 MG: 50 TABLET ORAL at 07:53

## 2024-03-24 RX ADMIN — NICOTINE POLACRILEX 2 MG: 2 LOZENGE ORAL at 12:19

## 2024-03-24 RX ADMIN — NICOTINE POLACRILEX 2 MG: 2 LOZENGE ORAL at 20:18

## 2024-03-24 RX ADMIN — BUPRENORPHINE AND NALOXONE 1 FILM: 8; 2 FILM BUCCAL; SUBLINGUAL at 16:06

## 2024-03-24 RX ADMIN — QUETIAPINE FUMARATE 100 MG: 100 TABLET ORAL at 07:54

## 2024-03-24 RX ADMIN — OLANZAPINE 10 MG: 10 TABLET, ORALLY DISINTEGRATING ORAL at 05:11

## 2024-03-24 RX ADMIN — TIZANIDINE 2 MG: 2 TABLET ORAL at 07:53

## 2024-03-24 RX ADMIN — NICOTINE POLACRILEX 2 MG: 2 LOZENGE ORAL at 06:35

## 2024-03-24 RX ADMIN — NICOTINE POLACRILEX 2 MG: 2 LOZENGE ORAL at 18:12

## 2024-03-24 RX ADMIN — Medication 3 MG: at 20:18

## 2024-03-24 RX ADMIN — GABAPENTIN 600 MG: 300 CAPSULE ORAL at 20:18

## 2024-03-24 RX ADMIN — NICOTINE POLACRILEX 2 MG: 2 LOZENGE ORAL at 07:53

## 2024-03-24 RX ADMIN — NICOTINE 1 PATCH: 21 PATCH, EXTENDED RELEASE TRANSDERMAL at 07:56

## 2024-03-24 RX ADMIN — NICOTINE POLACRILEX 2 MG: 2 LOZENGE ORAL at 10:40

## 2024-03-24 RX ADMIN — BUPRENORPHINE AND NALOXONE 1 FILM: 8; 2 FILM BUCCAL; SUBLINGUAL at 07:53

## 2024-03-24 RX ADMIN — OLANZAPINE 10 MG: 10 TABLET, ORALLY DISINTEGRATING ORAL at 16:06

## 2024-03-24 RX ADMIN — GABAPENTIN 600 MG: 300 CAPSULE ORAL at 13:12

## 2024-03-24 RX ADMIN — GABAPENTIN 600 MG: 300 CAPSULE ORAL at 07:53

## 2024-03-24 RX ADMIN — QUETIAPINE FUMARATE 300 MG: 300 TABLET ORAL at 20:19

## 2024-03-24 RX ADMIN — DESVENLAFAXINE 100 MG: 50 TABLET, FILM COATED, EXTENDED RELEASE ORAL at 07:54

## 2024-03-24 RX ADMIN — NICOTINE POLACRILEX 2 MG: 2 LOZENGE ORAL at 09:43

## 2024-03-24 ASSESSMENT — ACTIVITIES OF DAILY LIVING (ADL)
DRESS: INDEPENDENT
ADLS_ACUITY_SCORE: 30
ORAL_HYGIENE: INDEPENDENT
HYGIENE/GROOMING: INDEPENDENT
ADLS_ACUITY_SCORE: 30
ORAL_HYGIENE: INDEPENDENT
ADLS_ACUITY_SCORE: 30
DRESS: INDEPENDENT
ADLS_ACUITY_SCORE: 30
LAUNDRY: WITH SUPERVISION
ADLS_ACUITY_SCORE: 30
HYGIENE/GROOMING: INDEPENDENT
ADLS_ACUITY_SCORE: 30
LAUNDRY: WITH SUPERVISION
ADLS_ACUITY_SCORE: 30
ADLS_ACUITY_SCORE: 30

## 2024-03-24 NOTE — PLAN OF CARE
Problem: Sleep Disturbance  Goal: Adequate Sleep/Rest  Outcome: Progressing  Intervention: Promote Sleep/Rest  Flowsheets (Taken 3/24/2024 0133)  Sleep/Rest Enhancement:   awakenings minimized   consistent schedule promoted   regular sleep/rest pattern promoted   Goal Outcome Evaluation: ongoing      Pt asleep at the start of shift. Slept a total of 2.5 hours per q15 safety checks.Pt up most of shift pacing and sitting in lounge area. PRN nicotine and Zyprexa given per pt request for anxiety;refused hydroxyzine and confirmed agitation. No s/s pain or discomfort noted.Even respirations , non labored breathing. No behavorial concerns.

## 2024-03-24 NOTE — PLAN OF CARE
Goal Outcome Evaluation:    Plan of Care Reviewed With: patient      Problem: Adult Behavioral Health Plan of Care  Goal: Develops/Participates in Therapeutic [place] to Support Successful Transition  Outcome: Progressing     Patient presented with flat blunted mood affect. Appeared tensed, pacing around the desk. Patient told RN that RN should make a note somewhere that he should be denied snacks whenever he asks for it. Patient stated that he added weight. Patient said he was 217 LB when he came here but now he is 235 LB. RN explained that staff cannot decline giving him snacks and that the only way he will not get snacks is by not asking for it. Patient kept quiet.   He endorsed anxiety 6 and depression 4. Patient denied SI/HI and hallucinations. His goal today is to reach out to his 13 yo son.   He was observed to be intermittently isolative in his room through out the shift. He retired after breakfast. Patient came out later to join community meeting. He still engages in drawing and coloring. Patient requested and was giving a box of color pencils.  Patient later returned to talk to RN and stated that he would like his PRN Zyprexa and Seroquel doses to be reduced. Patient attributed his weight gain to his medications. Patient asked that his Zyprexa dose be reduced to 5 mg BID and Seroquel be reduced to 100 mg daily at night only. RN explained that medication change may not be done this weekend. RN encouraged patient to speak with his provider on Monday. RN paged Psych Resident on call with patient's request who called and said she will leave medication change for patient's provider to do. The same was communicated to patient. He was seen at times pacing on the hallway with select peer. No aggressive behavior noted.

## 2024-03-25 PROCEDURE — 250N000013 HC RX MED GY IP 250 OP 250 PS 637

## 2024-03-25 PROCEDURE — 99232 SBSQ HOSP IP/OBS MODERATE 35: CPT | Mod: GC | Performed by: PSYCHIATRY & NEUROLOGY

## 2024-03-25 PROCEDURE — 250N000013 HC RX MED GY IP 250 OP 250 PS 637: Performed by: PSYCHIATRY & NEUROLOGY

## 2024-03-25 PROCEDURE — 124N000002 HC R&B MH UMMC

## 2024-03-25 RX ORDER — DIPHENHYDRAMINE HCL 25 MG
25 CAPSULE ORAL EVERY 6 HOURS PRN
Status: DISCONTINUED | OUTPATIENT
Start: 2024-03-25 | End: 2024-03-26 | Stop reason: SINTOL

## 2024-03-25 RX ORDER — OLANZAPINE 5 MG/1
5 TABLET, ORALLY DISINTEGRATING ORAL 2 TIMES DAILY PRN
Status: DISCONTINUED | OUTPATIENT
Start: 2024-03-25 | End: 2024-04-03 | Stop reason: HOSPADM

## 2024-03-25 RX ORDER — AMOXICILLIN 250 MG
1 CAPSULE ORAL AT BEDTIME
Status: DISCONTINUED | OUTPATIENT
Start: 2024-03-25 | End: 2024-03-28

## 2024-03-25 RX ADMIN — GABAPENTIN 600 MG: 300 CAPSULE ORAL at 20:01

## 2024-03-25 RX ADMIN — TIZANIDINE 2 MG: 2 TABLET ORAL at 20:10

## 2024-03-25 RX ADMIN — GABAPENTIN 600 MG: 300 CAPSULE ORAL at 13:04

## 2024-03-25 RX ADMIN — ATENOLOL 50 MG: 50 TABLET ORAL at 07:44

## 2024-03-25 RX ADMIN — NICOTINE POLACRILEX 2 MG: 2 LOZENGE ORAL at 12:53

## 2024-03-25 RX ADMIN — NICOTINE 1 PATCH: 21 PATCH, EXTENDED RELEASE TRANSDERMAL at 07:46

## 2024-03-25 RX ADMIN — QUETIAPINE FUMARATE 100 MG: 100 TABLET ORAL at 07:44

## 2024-03-25 RX ADMIN — NICOTINE POLACRILEX 2 MG: 2 LOZENGE ORAL at 09:27

## 2024-03-25 RX ADMIN — BUPRENORPHINE AND NALOXONE 1 FILM: 8; 2 FILM BUCCAL; SUBLINGUAL at 16:19

## 2024-03-25 RX ADMIN — NICOTINE POLACRILEX 2 MG: 2 LOZENGE ORAL at 21:40

## 2024-03-25 RX ADMIN — GABAPENTIN 600 MG: 300 CAPSULE ORAL at 07:44

## 2024-03-25 RX ADMIN — DESVENLAFAXINE 100 MG: 50 TABLET, FILM COATED, EXTENDED RELEASE ORAL at 07:44

## 2024-03-25 RX ADMIN — NICOTINE POLACRILEX 2 MG: 2 LOZENGE ORAL at 06:23

## 2024-03-25 RX ADMIN — METFORMIN HYDROCHLORIDE 500 MG: 500 TABLET, FILM COATED ORAL at 17:47

## 2024-03-25 RX ADMIN — DIPHENHYDRAMINE HYDROCHLORIDE 25 MG: 25 CAPSULE ORAL at 18:27

## 2024-03-25 RX ADMIN — BUPRENORPHINE AND NALOXONE 1 FILM: 8; 2 FILM BUCCAL; SUBLINGUAL at 07:42

## 2024-03-25 RX ADMIN — QUETIAPINE FUMARATE 300 MG: 300 TABLET ORAL at 20:01

## 2024-03-25 RX ADMIN — NICOTINE POLACRILEX 2 MG: 2 LOZENGE ORAL at 15:49

## 2024-03-25 RX ADMIN — NICOTINE POLACRILEX 2 MG: 2 LOZENGE ORAL at 07:44

## 2024-03-25 RX ADMIN — OLANZAPINE 5 MG: 5 TABLET, ORALLY DISINTEGRATING ORAL at 16:20

## 2024-03-25 RX ADMIN — NICOTINE POLACRILEX 2 MG: 2 LOZENGE ORAL at 19:01

## 2024-03-25 RX ADMIN — NICOTINE POLACRILEX 2 MG: 2 LOZENGE ORAL at 11:18

## 2024-03-25 RX ADMIN — TIZANIDINE 2 MG: 2 TABLET ORAL at 07:44

## 2024-03-25 RX ADMIN — OLANZAPINE 10 MG: 10 TABLET, ORALLY DISINTEGRATING ORAL at 11:16

## 2024-03-25 RX ADMIN — Medication 125 MCG: at 16:19

## 2024-03-25 ASSESSMENT — ACTIVITIES OF DAILY LIVING (ADL)
ADLS_ACUITY_SCORE: 30
HYGIENE/GROOMING: INDEPENDENT
ORAL_HYGIENE: INDEPENDENT
ADLS_ACUITY_SCORE: 30
ADLS_ACUITY_SCORE: 30
DRESS: INDEPENDENT
ADLS_ACUITY_SCORE: 30
HYGIENE/GROOMING: INDEPENDENT
ADLS_ACUITY_SCORE: 30
LAUNDRY: WITH SUPERVISION
ADLS_ACUITY_SCORE: 30
ADLS_ACUITY_SCORE: 30
DRESS: INDEPENDENT
ADLS_ACUITY_SCORE: 30
ADLS_ACUITY_SCORE: 30
ORAL_HYGIENE: INDEPENDENT
LAUNDRY: WITH SUPERVISION
ADLS_ACUITY_SCORE: 30
ADLS_ACUITY_SCORE: 30

## 2024-03-25 NOTE — PLAN OF CARE
03/25/24 1235   Group Therapy Session   Group Attendance attended group session   Time Session Began 1015   Time Session Ended 1150   Total Time (minutes) 25  (no charge)   Total # Attendees 4   Group Type expressive therapy;recreation;task skill   Group Topic Covered coping skills/lifestyle management;problem-solving;leisure exploration/use of leisure time;cognitive activities   Group Session Detail OT Clinic Group   Patient Response/Contribution cooperative with task;able to recall/repeat info presented;organized;confronted peers appropriately   Patient Participation Detail Pt actively participated in occupational therapy clinic to facilitate coping skill exploration, creative expression within personally meaningful activities, and clinical observation of social, cognitive, and kinesthetic performance skills. Pt response:ind to initiate, gather materials, sequence, and adjust to workspace demands as needed. Demonstrated sustained focus, planning, frustration tolerance, and problem solving for selected creative task. Able to ask for assistance as needed, and appropriate with peers and staff. pt worked quietly and methodically on project through completion. pt left group shortly after and did not return

## 2024-03-25 NOTE — PLAN OF CARE
BEH IP Unit Acuity Rating Score (UARS)  Patient is given one point for every criteria they meet.    CRITERIA SCORING   On a 72 hour hold, court hold, committed, stay of commitment, or revocation. 0    Patient LOS on BEH unit exceeds 20 days. 0  LOS: 5   Patient under guardianship, 55+, otherwise medically complex, or under age 11. 0   Suicide ideation without relief of precipitating factors. 1   Current plan for suicide. 0   Current plan for homicide. 0   Imminent risk or actual attempt to seriously harm another without relief of factors precipitating the attempt. 0   Severe dysfunction in daily living (ex: complete neglect for self care, extreme disruption in vegetative function, extreme deterioration in social interactions). 1   Recent (last 7 days) or current physical aggression in the ED or on unit. 0   Restraints or seclusion episode in past 72 hours. 0   Recent (last 7 days) or current verbal aggression, agitation, yelling, etc., while in the ED or unit. 0   Active psychosis. 1   Need for constant or near constant redirection (from leaving, from others, etc).  0   Intrusive or disruptive behaviors. 0   Patient requires 3 or more hours of individualized nursing care per 8-hour shift (i.e. for ADLs, meds, therapeutic interventions). 0   TOTAL 3

## 2024-03-25 NOTE — PLAN OF CARE
Problem: Sleep Disturbance  Goal: Adequate Sleep/Rest  Outcome: Progressing  Intervention: Promote Sleep/Rest  Flowsheets (Taken 3/25/2024 0014)  Sleep/Rest Enhancement:   awakenings minimized   consistent schedule promoted   regular sleep/rest pattern promoted   Goal Outcome Evaluation: ongoing      Pt asleep at the start of shift. Slept a total of 4.5 hours per q15 safety checks.No s/s pain or discomfort noted.Even respirations , non labored breathing. No behavorial concerns.

## 2024-03-25 NOTE — PLAN OF CARE
Occupational Therapy     03/24/24 1900   Group Therapy Session   Group Attendance attended group session   Time Session Began 1715   Time Session Ended 1800   Total Time (minutes) 45   Total # Attendees 4   Group Type expressive therapy;task skill   Group Topic Covered cognitive activities;coping skills/lifestyle management;leisure exploration/use of leisure time;relaxation techniques;structured socialization   Group Session Detail OT: Education on healthy relaxation and creative hands-on endeavor (bookmarks) to increase concentration, focus, attention to task/detail, decision making, problem solving, frustration tolerance, task follow through, coping with stress, relaxation healthy leisure engagement, creative expression, and social engagement   Patient Response/Contribution cooperative with task;listened actively;other (see comments)  (pleasant; engaged)   Patient Participation Detail Pt reported during check-in  drawing  is a healthy relaxation activity they enjoy to support their well-being. Pt sat among peers to complete presented education on relaxation and activity and engaged in brief reciprocal social interactions with peers. Pt worked in a neat and tidy manner to complete project and reported they are looking forward to using the completed project. Pt cleaned-up all supplies and their workspace. Pt verbalized understanding of importance of healthy relaxation in a healthy lifestyle.

## 2024-03-25 NOTE — PROGRESS NOTES
----------------------------------------------------------------------------------------------------------  [facility]  Psychiatry Progress Note  Hospital Day #5     Interim History:     The patient's care was discussed with the treatment team and chart notes were reviewed.    Vitals: VSS  Sleep: 4.5 hours (03/25/24 0600)  Scheduled medications: Took all scheduled medications as prescribed  Psychiatric PRN medications: Melatonin 3mg, nicotine lozenge, zyprexa 10mg    Staff Report:   No acute events or safety concerns overnight. Summarized from staff notes 3/22-3/25, please see staff notes for further details.  Patient was an active participant in group over the weekend. Early in weekend stayed in periphery of [facility], guarded with staff check-ins, later observed playing board games, watching TV in [facility]. Observed with a peer openly discussing their robbery offenses, easy to redirect. Engages in drawing and coloring. Irritable if requests are not urgently met. Frequent requests for nicotine lozenges, coffee. Requested prn Zyprexa for agitation, for anxiety; reported effective for these. Anxiety ranged from 6-8/10 and depression 4-7/10 over the weekend. On 3/24 voiced passive SI thoughts but denied plan. At other times in the weekend denied SI/SIB/HI/AH/VH.   Good appetite. Worried about weight gain, attributes it to medications, wanted zyprexa and seroquel decreased. Told RN that RN should make a note that he should be denied snacks whenever he asks for it. Poor sleep 3/23 (3.75 hours), 3/24 (2.5 hours), 3/25 (4.5 hours).       Subjective:     Patient Interview:  Barak Clarke said weekend was ok, gained a bunch of weight, tried without the Zyprexa - gets depression and anxiety, suicidal without it. Feels metabolism has froze. Is constipated. Would like 5mg of Zyprexa and would like 100mg of Seroquel. Rash happened after two doses of metformin. Team shared possible  "Topamax as alternative. Could be worth trialing metformin one more time and monitoring for rash - pt ok with this. Ok with going down with Zyprexa today. Ok holding off changing Seroquel at this time. Stopped ordering diet with carbs he said. Mood is low when not on Zyprexa, so it does help he said. Feels that Zypreza is a \"bit of a miracle\" - goes from suicidal to being ok. Is worried about someone following him into the hospital - feels it is \"legit\". Also happened at [place] he said. Was worried about someone here in the ED as well. Feels safe here. Felt \"walked on\" at home, threats were \"always empty\" he said. \"No one ever laid a finger on me\". Feels these are empty threats but did not like that people broke into his home and disrespected his property. Doesn't feel like he needed to protect himself at home however.      ROS:  Patient has constipation   Patient denies acute concerns     Objective:     Vitals:  /71 (BP Location: Left arm, Patient Position: Sitting, Cuff Size: Adult Large)   Pulse 76   Temp 97.8  F (36.6  C) (Temporal)   Resp 14   Ht 1.803 m (5' 11\")   Wt 106.6 kg (235 lb)   SpO2 96%   BMI 32.78 kg/m      Allergies:  Allergies   Allergen Reactions    Prazosin Unknown     Worsening of nightmares       Current Medications:  Scheduled:  Current Facility-Administered Medications   Medication Dose Route Frequency    atenolol  50 mg Oral Daily    buprenorphine HCl-naloxone HCl  1 Film Sublingual BID    desvenlafaxine  100 mg Oral Daily    gabapentin  600 mg Oral TID    nicotine  1 patch Transdermal Daily    QUEtiapine  100 mg Oral Daily    QUEtiapine  300 mg Oral At Bedtime    tiZANidine  2 mg Oral BID       PRN:  Current Facility-Administered Medications   Medication Dose Route Frequency    acetaminophen  650 mg Oral Q4H PRN    hydrOXYzine HCl  25 mg Oral Q4H PRN    melatonin  3 mg Oral At Bedtime PRN    nicotine  2 mg Buccal Q1H PRN    OLANZapine  10 mg Oral TID PRN    Or    OLANZapine  10 " "mg Intramuscular TID PRN    OLANZapine zydis  10 mg Oral BID PRN    polyethylene glycol  17 g Oral Daily PRN    tiZANidine  2 mg Oral Daily PRN       Labs and Imaging:  New results:   No results found for this or any previous visit (from the past 24 hour(s)).    Data this admission:  - CBC unremarkable  - CMP unremarkable  - TSH normal  - UDS positive for cannabinoids  - Vit D 14 (L)  - Hgb A1c 5.1 (WNL) on 7/10/23  - Lipids last month WNL  - Vit B12 normal  - Folate normal  - Urinalysis not yet performed  - EKG normal sinus rhythm, QTc 429     Mental Status Exam:     Oriented to:  Grossly Oriented  General:  Awake and Alert  Appearance:  appears stated age, Grooming is adequate, Dressed in casual clothing, and Tattoos on bilateral arms  Behavior/Attitude:  Calm, Cooperative, Engaged, and Guarded  Eye Contact: Downcast and Glances  Psychomotor: Normal and No evidence of tics, dystonia, or tardive dyskinesia  no catatonia present  Speech:  appropriate volume/tone and with good articulation  Language: Fluent in English with appropriate syntax and vocabulary.  Mood:  \"okay\"  Affect:  congruent with mood, stable, and restricted  Thought Process:  linear, coherent, and goal directed  Thought Content:   No SI/HI/AH/VH; delusions of paranoia  Associations:  questionable, improving   Insight:   limited due to paranoid delusions and confident that his ex/ex's family is against him although he now feels \"they're empty threats\".  Judgment:  Seeking help. some inappropriate discussions with peers in the milieu regarding robbery offenses.   Impulse control: partial  Attention Span:  adequate for conversation  Concentration:  grossly intact  Recent and Remote Memory:  not formally assessed  Fund of Knowledge: average  Muscle Strength and Tone: normal  Gait and Station: Normal     Psychiatric Assessment     Barak Clarke is a 39 year old male with previous psychiatric diagnoses of depression, anxiety, ADHD, PTSD, substance-induced " psychotic disorder, and polysubstance dependency and use disorder who checked into our emergency department from [facility] requesting an alternative chemical dependency treatment, admitted on 03/20/2024 due to concern for SI, psychosis, paranoia, delusions, and disorganized behavior in the context of substance use and relationship concerns. Most recent psychiatric hospitalization was last month for SI s/p overdose. Significant symptoms on admission include paranoia, hypervigilance, racing thoughts, disorganized behavior, social withdrawal. The MSE on admission was pertinent for poor insight and judgement, paranoia, delusions of his ex wife and family out to get him, self-referential delusions, agitation/restlessness. Biological contributions to mental health presentation include substance use most recently cannabis, past MH diagnoses. Psychological contributions to mental health presentation include poor coping, possible cluster B traits especially considering relationship concerns over his lifetime, PTSD, poor insight into need for substance treatment. Social factors contributing to mental health presentation include isolation, interpersonal conflicts, unemployed.  Protective factors include seeking help, knows when SI is bad he said, would like to stay on his medications.      In summary, the patient's reported symptoms of paranoia, delusions, anxiety with disorganized thoughts, and expressed concerns about people conspiring to kill him, taking over his electronics and monitoring him in the context of cannabis use and past substance use are consistent with prior diagnosis of substance induced psychosis. However, could also consider primary diagnosis of psychotic disorder with mood component given signs for suhas such as decrease need for sleep, rambling and perfuse thoughts, supporting Bipolar disorder with psychotic features or Schizoaffective disorder. Although meth can also present with these features, he  has not used on over a month per his report and supported by UDS. He will likely benefit from medication optimization and therapeutic milieu this admission. PT declining CD tx.      Given that he currently has intermitted SI and HI, psychosis, and possible suhas, patient warrants inpatient psychiatric hospitalization to maintain his safety.      Psychiatric Plan by Diagnosis      Today's changes:  - Decrease Zyprexa to 5mg BID PRN  - Trial of metformin 500mg BID with meals, monitor for rash  - Vitamin D supplement    # Substance induced psychosis vs primary psychosis  - Continue Seroquel 200mg at bedtime (started in the ED from 50mg, increased to 100mg)  -prn zyprexa 5mg bid     #MDD  - PTA Pristiq 100mg daily - consider stopping if pt continues to have trouble sleeping and is anxious/agitated     #Anxiety/agitation  - PTA gabapentin 600mg TID  - prn hydroxyzine  -prn zyprexa      #ADHD  Pt has long hx of adhd - said Vyvanse was prescribed in [place] but this is unclear given he has not filled it in a month per pharmacy. Likely contributing to current agitation and trouble sleeping.   - Stopped vyvanse on admission.   Possible past seizure hx when on Wellbutrin but unclear, also when using drugs. Was given some Wellbutrin in the Ed and then it was stopped.   - Seizure precautions.      # Polysubstance use   UDS pos for cannabinoids in ED, pt states he has not used other substances since Feb 20th. Does make references to meth in his house.   - Pt declining to return to CD tx at this time, wants sober living  - Nicotine patch and lozenge  - Continue PTA suboxone 8-2 MG     #Akithisia/muscle spasms  Pt endorsing restlessness, keeps him awake, could be RLS as well.   - Continue to decrease Zyprexa and monitor with prn  - Tizanidine 2mg Bid and prn 2mg BID  - Consider Ropinirole      2. Pertinent Labs/Monitoring:   - Uds pos for cannabinoids  - CBC, CMP, TSH, Vit B12 and folate WNL  - Vitamin D low  - Lipids last month  wnl  - A1C last month wnl  - EKG normal sinus rhythm     3. Additional Plans:  - Patient will be treated in therapeutic milieu with appropriate individual and group therapies as described     Psychiatric Hospital Course:      Barak Clarke was admitted to [facility] as a voluntary pt.  Medications:  PTA tizanidine, Pristiq, Gabapentin, Suboxone were continued with prn tizanidine added for restlessness, PTA zyprexa made prn due to akithisia in the Ed.   PTA Vyvanse was held due to anxiety and agitation.    New medications started at the time of admission include Seroquel 200mg in the ED.   Seroquel: increased to 300mg on 3/21  Zyprexa prn: 10mg TID starting 3/20, decreased to 5mg BID on 3/25 due to patient concern for weight gain  - restarted trial of metformin with monitoring for rash and to help with metabolic syndrome although substantial weight gain during this hospitalization so far is unlikely.      The risks, benefits, alternatives, and side effects were discussed and understood by the patient and other caregivers.     Medical Assessment and Plan     Medical diagnoses to be addressed this admission:    # Metabolic syndrome  Pt endorsed weight gain with zyprexa.   - Metformin started/continued in the ED  - Metformin stopped on admission due to pt endorsing rash after taking it in the ED, showed provider red raised rash on forearm and stated he had some on his back as well. Since Zyprexa was being decreased, decided to stop metformin and monitor.   - Metformin restarted on 3/25 due to patient concern for weight gain, monitor for rash.  - Prn Benadryl      # HTN  - Continued pta atenolol  - CTM      Medical course: Patient was physically examined by the ED prior to being transferred to the unit and was found to be medically stable and appropriate for admission.      Consults:  none     Checklist     Legal Status: Voluntary     Safety Assessment:   Behavioral Orders   Procedures    Code 1 - Restrict to Unit     Routine Programming     As clinically indicated    Seizure precautions    Self Injury Precaution    Status 15     Every 15 minutes.    Suicide precautions: Suicide Risk: HIGH     Patients on Suicide Precautions should have a Combination Diet ordered that includes a Diet selection(s) AND a Behavioral Tray selection for Safe Tray - with utensils, or Safe Tray - NO utensils       Order Specific Question:   Suicide Risk     Answer:   HIGH       Risk Assessment:  Risk for harm is moderate-high.  Risk factors: maladaptive coping, substance use, trauma, family dynamics, impulsive, past behaviors, and psychosocial stressors, psychosis and delusions with commands hallucinations to use substances.  Protective factors: engaged in treatment and seeking CD treatment    SIO: none    Disposition: TBD. Pending stabilization, medication optimization, & development of a safe discharge plan.     Attestations     Shawna Bhatt MS4  [facility] Medical Student      Resident Attestation:   I was present with the medical student who participated in the service and in the documentation of the note.  I have verified the history and personally performed the physical exam, mental status exam, and medical decision making. I agree with the assessment and plan of care as documented in the note.     Kath Garcia MD  Psychiatry Resident Physician      This patient has been seen and evaluated by me, Bridger Stack.  I have discussed this patient with the psychiatry resident and I agree with the findings and plan in this note.    I have reviewed today's vital signs, medications, labs and imaging.       Bridger Hernandez MD

## 2024-03-25 NOTE — PLAN OF CARE
"  Problem: Suicide Risk  Goal: Absence of Self-Harm  Intervention: Promote Psychosocial Wellbeing  Recent Flowsheet Documentation  Taken 3/24/2024 1650 by Bal Haas RN  Supportive Measures:   decision-making supported   counseling provided   Goal Outcome Evaluation:    Plan of Care Reviewed With: patient      Patient has been out in the milieu this shift at times watching T.V in the lounge area. His affect has been tense, but redirectable. During 1:1 with writer patient talked about the \"Amount of weight\" he states he has gain since he has been here. Per patient he had gained \"20 lbs and it is due to the increase in the Quetiapine\". Patient was encouraged to speak with the team on Monday about this issue as per day shift the resident had deferred medication changes to the team on Monday. Patient denied SIB/HI and pain. Patient did however voice passive SI thoughts, but denies having any plans and contracted for safety. At this shift patient took PRN Zyprexa for anxiety which he rated at 6/10. He stated that the medication bring his Agitation down to about 4/10 which is \"manageable level\". He continues to request nicotine lozenges about hourly.     "

## 2024-03-25 NOTE — PLAN OF CARE
"Goal Outcome Evaluation:    Plan of Care Reviewed With: patient      Problem: Sleep Disturbance  Goal: Adequate Sleep/Rest  Outcome: Progressing     Patient reported that he did not sleep well last night. Patient stated that he does not know why he could not sleep stating, \"I kept waking up.\" Patient described his mood as \"anxious.\" He rated his anxiety and depression at 6 respectively. He denied SI/HI and hallucinations. Patient's mood affect could be described as flat and blunted. He continues to be preoccupied with weight gain. He warned RN not to give him Zyprexa.\" RN explained that that medication is as needed medication not scheduled. He was compliant with his morning medications. Patient encouraged to use the exercise bike on the unit.   Patient was pacing on the hallway with select peer. Patient redirected to veer off their negative conversation on the unit. He later asked for the plug for the exercise bike and spent 10 minutes on it this morning.   Patient went to group therapy but came out before the end of the session. He requested for snacks. After eating the snacks patient paced around the lounge briefly and then  came to RN and said that he could not handle it any more and that he needed his Zyprexa. Patient was restless, appeared agitated. PRN Olanzapine ODT administered at 1116 after which he asked to play Switch with a select.                "

## 2024-03-25 NOTE — PLAN OF CARE
Team Note Due:  Thursday     Assessment/Intervention/Current Symtoms and Care Coordination:  - chart review  - team meeting - discussed pt progress, symptomology, and response to treatment.  Discussed the discharge plan and any potential impediments to discharge.    - team rounds    - post team rounds team meeting / discussion    Discharge Plan or Goal:  Pending stabilization & development of a safe discharge plan.        Barriers to Discharge:  Patient requires further psychiatric stabilization due to current symptomology        Referral Status:   DAVID referrals made in ED:  [facility] [address] [place] [address] Phone: [phone]  [email or web address]     [facility]  [address] [place] [address] Phone: [phone] Fax: [phone]  [email or web address]       Legal Status:  voluntary    Contacts:         Upcoming Meetings and Dates/Important Information and next steps:  Continue to discuss with patient if would like to proceed with residential / IOP with lodging

## 2024-03-26 PROCEDURE — 90853 GROUP PSYCHOTHERAPY: CPT

## 2024-03-26 PROCEDURE — 250N000013 HC RX MED GY IP 250 OP 250 PS 637

## 2024-03-26 PROCEDURE — 250N000013 HC RX MED GY IP 250 OP 250 PS 637: Performed by: PSYCHIATRY & NEUROLOGY

## 2024-03-26 PROCEDURE — 124N000002 HC R&B MH UMMC

## 2024-03-26 PROCEDURE — H2032 ACTIVITY THERAPY, PER 15 MIN: HCPCS

## 2024-03-26 PROCEDURE — 99232 SBSQ HOSP IP/OBS MODERATE 35: CPT | Mod: GC | Performed by: PSYCHIATRY & NEUROLOGY

## 2024-03-26 RX ORDER — TOPIRAMATE 25 MG/1
25 TABLET, FILM COATED ORAL AT BEDTIME
Status: DISCONTINUED | OUTPATIENT
Start: 2024-03-26 | End: 2024-04-03 | Stop reason: HOSPADM

## 2024-03-26 RX ADMIN — DESVENLAFAXINE 100 MG: 50 TABLET, FILM COATED, EXTENDED RELEASE ORAL at 07:50

## 2024-03-26 RX ADMIN — ATENOLOL 50 MG: 50 TABLET ORAL at 07:49

## 2024-03-26 RX ADMIN — NICOTINE POLACRILEX 2 MG: 2 LOZENGE ORAL at 12:35

## 2024-03-26 RX ADMIN — NICOTINE POLACRILEX 2 MG: 2 LOZENGE ORAL at 08:59

## 2024-03-26 RX ADMIN — OLANZAPINE 5 MG: 5 TABLET, ORALLY DISINTEGRATING ORAL at 16:22

## 2024-03-26 RX ADMIN — NICOTINE 1 PATCH: 21 PATCH, EXTENDED RELEASE TRANSDERMAL at 07:54

## 2024-03-26 RX ADMIN — DIPHENHYDRAMINE HYDROCHLORIDE 25 MG: 25 CAPSULE ORAL at 07:50

## 2024-03-26 RX ADMIN — NICOTINE POLACRILEX 2 MG: 2 LOZENGE ORAL at 18:16

## 2024-03-26 RX ADMIN — GABAPENTIN 600 MG: 300 CAPSULE ORAL at 20:08

## 2024-03-26 RX ADMIN — QUETIAPINE 150 MG: 100 TABLET ORAL at 20:09

## 2024-03-26 RX ADMIN — NICOTINE POLACRILEX 2 MG: 2 LOZENGE ORAL at 05:43

## 2024-03-26 RX ADMIN — GABAPENTIN 600 MG: 300 CAPSULE ORAL at 14:04

## 2024-03-26 RX ADMIN — NICOTINE POLACRILEX 2 MG: 2 LOZENGE ORAL at 17:44

## 2024-03-26 RX ADMIN — TIZANIDINE 2 MG: 2 TABLET ORAL at 16:22

## 2024-03-26 RX ADMIN — BUPRENORPHINE AND NALOXONE 1 FILM: 8; 2 FILM BUCCAL; SUBLINGUAL at 16:21

## 2024-03-26 RX ADMIN — NICOTINE POLACRILEX 2 MG: 2 LOZENGE ORAL at 20:08

## 2024-03-26 RX ADMIN — GABAPENTIN 600 MG: 300 CAPSULE ORAL at 07:49

## 2024-03-26 RX ADMIN — OLANZAPINE 5 MG: 5 TABLET, ORALLY DISINTEGRATING ORAL at 05:43

## 2024-03-26 RX ADMIN — Medication 125 MCG: at 07:50

## 2024-03-26 RX ADMIN — METFORMIN HYDROCHLORIDE 500 MG: 500 TABLET, FILM COATED ORAL at 07:50

## 2024-03-26 RX ADMIN — NICOTINE POLACRILEX 2 MG: 2 LOZENGE ORAL at 10:09

## 2024-03-26 RX ADMIN — QUETIAPINE FUMARATE 100 MG: 100 TABLET ORAL at 07:50

## 2024-03-26 RX ADMIN — NICOTINE POLACRILEX 2 MG: 2 LOZENGE ORAL at 16:22

## 2024-03-26 RX ADMIN — NICOTINE POLACRILEX 2 MG: 2 LOZENGE ORAL at 19:16

## 2024-03-26 RX ADMIN — NICOTINE POLACRILEX 2 MG: 2 LOZENGE ORAL at 22:54

## 2024-03-26 RX ADMIN — NICOTINE POLACRILEX 2 MG: 2 LOZENGE ORAL at 15:07

## 2024-03-26 RX ADMIN — NICOTINE POLACRILEX 2 MG: 2 LOZENGE ORAL at 14:04

## 2024-03-26 RX ADMIN — BUPRENORPHINE AND NALOXONE 1 FILM: 8; 2 FILM BUCCAL; SUBLINGUAL at 07:51

## 2024-03-26 RX ADMIN — Medication 3 MG: at 20:08

## 2024-03-26 RX ADMIN — TIZANIDINE 2 MG: 2 TABLET ORAL at 07:50

## 2024-03-26 RX ADMIN — NICOTINE POLACRILEX 2 MG: 2 LOZENGE ORAL at 07:50

## 2024-03-26 ASSESSMENT — ACTIVITIES OF DAILY LIVING (ADL)
DRESS: SCRUBS (BEHAVIORAL HEALTH);INDEPENDENT
ADLS_ACUITY_SCORE: 30
ADLS_ACUITY_SCORE: 30
HYGIENE/GROOMING: INDEPENDENT
ADLS_ACUITY_SCORE: 30
ADLS_ACUITY_SCORE: 30
DRESS: INDEPENDENT
ADLS_ACUITY_SCORE: 30
ORAL_HYGIENE: INDEPENDENT
LAUNDRY: WITH SUPERVISION
ADLS_ACUITY_SCORE: 30
ADLS_ACUITY_SCORE: 30
ORAL_HYGIENE: INDEPENDENT
ADLS_ACUITY_SCORE: 30
ADLS_ACUITY_SCORE: 30
HYGIENE/GROOMING: INDEPENDENT
ADLS_ACUITY_SCORE: 30
LAUNDRY: WITH SUPERVISION
ADLS_ACUITY_SCORE: 30

## 2024-03-26 NOTE — PROGRESS NOTES
----------------------------------------------------------------------------------------------------------  [facility]  Psychiatry Progress Note  Hospital Day #6     Interim History:     The patient's care was discussed with the treatment team and chart notes were reviewed.    Vitals: VSS  Sleep: 2.25 hours (03/26/24 0641)  Scheduled medications: Took all scheduled medications as prescribed with the exception of senna.   Psychiatric PRN medications: nicotine lozenge, zyprexa 5mg, tizanidine 2mg    Staff Report:   No acute events or safety concerns overnight.     Spends most of the time withdrawn to his room though does go into the milieu and interacts with one peer. Patient was an active participant in group, though left early and asked for snacks, paced around lounge briefly before asking for Zyprexa stating he could not handle it anymore. Endorsed as effective. Used exercise bike for 10 minutes. Observed pacing hallway with select peer, redirected away from their negative conversation on the unit. Played switch with select peer. Expressed frustration around discontinuation of Vyvanse, says it helped him and helped stop his appetite. Is preoccupied with weight gain. Rash on back of wrist started after metformin administration, benadryl effective. Requested tizanidine 2mg for muscle spasms overnight. Endorses passive SI, denies SIB/HI/AH/VH. Quietly paced quinn overnight.    Please see staff notes for further details.     Subjective:     Patient Interview:  Barak Clarke was seen in his room. Doing ok, wanted to mention two things. Feels high dose of Seroquel is making sleep apnea worse which he feels he has, wakes often 4-5 times per night. Past partner says he stops breathing at night sometimes in the past. Would like Wellbutrin for ADHD. Ok going down on Seroquel at nighttime. Mood is OK. Feels not as bad as he thought he would when off the meds he is used to, still  "doing ok. Playing video games with another pt and eating well. Plays kesha cart. Feels famished all the time, but says this is normal for him on these meds. Trying to be careful with what he chooses to eat, careful with carbs. Said that he gained 0.2lbs since yesterday which he feels is less than prior. Got rash from metformin. Topamax could be an alternative but he feels metformin has a better hx of stopping weight and would like to stay on that instead. Explained risk of allergic reaction expanding and benefit of switching to Topamax which he is willing to try while here. Denied AH, VH, feels it is legitimate that someone is out to get him. Feels he does not deserve this (\"death notes\"). Enjoying groups. The art is a self esteem boost without the Vyvanse. Shows a bookmark he made with butterflies. Said he liked dark stuff growing up.     ROS:  Patient has constipation   Patient denies acute concerns     Objective:     Vitals:  /78 (BP Location: Left arm, Cuff Size: Adult Regular)   Pulse 76   Temp 97.4  F (36.3  C) (Temporal)   Resp 16   Ht 1.803 m (5' 11\")   Wt 107 kg (235 lb 14.4 oz)   SpO2 97%   BMI 32.90 kg/m      Allergies:  Allergies   Allergen Reactions    Prazosin Unknown     Worsening of nightmares       Current Medications:  Scheduled:  Current Facility-Administered Medications   Medication Dose Route Frequency    atenolol  50 mg Oral Daily    buprenorphine HCl-naloxone HCl  1 Film Sublingual BID    cholecalciferol  125 mcg Oral Daily    desvenlafaxine  100 mg Oral Daily    gabapentin  600 mg Oral TID    metFORMIN  500 mg Oral BID w/meals    nicotine  1 patch Transdermal Daily    QUEtiapine  100 mg Oral Daily    QUEtiapine  300 mg Oral At Bedtime    senna-docusate  1 tablet Oral At Bedtime    tiZANidine  2 mg Oral BID       PRN:  Current Facility-Administered Medications   Medication Dose Route Frequency    acetaminophen  650 mg Oral Q4H PRN    diphenhydrAMINE  25 mg Oral Q6H PRN    " "hydrOXYzine HCl  25 mg Oral Q4H PRN    melatonin  3 mg Oral At Bedtime PRN    nicotine  2 mg Buccal Q1H PRN    OLANZapine  10 mg Intramuscular TID PRN    OLANZapine zydis  5 mg Oral BID PRN    polyethylene glycol  17 g Oral Daily PRN    tiZANidine  2 mg Oral Daily PRN       Labs and Imaging:  New results:   No results found for this or any previous visit (from the past 24 hour(s)).    Data this admission:  - CBC unremarkable  - CMP unremarkable  - TSH normal  - UDS positive for cannabinoids  - Vit D 14 (L)  - Hgb A1c 5.1 (WNL) on 7/10/23  - Lipids last month WNL  - Vit B12 normal  - Folate normal  - Urinalysis not yet performed  - EKG normal sinus rhythm, QTc 429     Mental Status Exam:     Oriented to:  Grossly Oriented  General:  Awake and Alert  Appearance:  appears stated age, Grooming is adequate, Dressed in casual clothing, and Tattoos on left arm  Behavior/Attitude:  Calm, Cooperative, and Engaged  Eye Contact: Downcast when answering, Intense staring/eye contact at times, and Glances, more eye contact compared to prior  Psychomotor: Normal and No evidence of tics, dystonia, or tardive dyskinesia  no catatonia present  Speech:  appropriate volume/tone and with good articulation  Language: Fluent in English with appropriate syntax and vocabulary.  Mood:  \"okay\"  Affect:  congruent with mood, stable, and restricted  Thought Process:  linear, coherent, and goal directed  Thought Content:   No SI/HI/AH/VH; delusions of paranoia  Associations:  questionable, improving   Insight:   limited due to paranoid delusions and confident that his ex/ex's family is against him although he now feels \"they're empty threats\" yet real.   Judgment:  Seeking help. Some inappropriate discussions with peers in the milieu regarding robbery offenses.   Impulse control: partial  Attention Span:  adequate for conversation  Concentration:  grossly intact  Recent and Remote Memory:  not formally assessed  Fund of Knowledge: " average  Muscle Strength and Tone: normal  Gait and Station: Normal     Psychiatric Assessment     Barak Clarke is a 39 year old male with previous psychiatric diagnoses of depression, anxiety, ADHD, PTSD, substance-induced psychotic disorder, and polysubstance dependency and use disorder who checked into our emergency department from [facility] requesting an alternative chemical dependency treatment, admitted on 03/20/2024 due to concern for SI, psychosis, paranoia, delusions, and disorganized behavior in the context of substance use and relationship concerns. Most recent psychiatric hospitalization was last month for SI s/p overdose. Significant symptoms on admission include paranoia, hypervigilance, racing thoughts, disorganized behavior, social withdrawal. The MSE on admission was pertinent for poor insight and judgement, paranoia, delusions of his ex wife and family out to get him, self-referential delusions, agitation/restlessness. Biological contributions to mental health presentation include substance use most recently cannabis, past MH diagnoses. Psychological contributions to mental health presentation include poor coping, possible cluster B traits especially considering relationship concerns over his lifetime, PTSD, poor insight into need for substance treatment. Social factors contributing to mental health presentation include isolation, interpersonal conflicts, unemployed.  Protective factors include seeking help, knows when SI is bad he said, would like to stay on his medications.      In summary, the patient's reported symptoms of paranoia, delusions, anxiety with disorganized thoughts, and expressed concerns about people conspiring to kill him, taking over his electronics and monitoring him in the context of cannabis use and past substance use are consistent with prior diagnosis of substance induced psychosis. However, could also consider primary diagnosis of psychotic disorder with mood component  given signs for suhas such as decrease need for sleep, rambling and perfuse thoughts, supporting Bipolar disorder with psychotic features or Schizoaffective disorder. Although meth can also present with these features, he has not used in over a month per his report and supported by UDS. He will likely benefit from medication optimization and therapeutic milieu this admission. PT declining CD tx.      Given that he currently has intermittent SI and HI, psychosis, and possible suhas, patient warrants inpatient psychiatric hospitalization to maintain his safety.      Psychiatric Plan by Diagnosis      Today's changes:  - Discontinue metformin  - Start Topamax 25mg daily  - Change Seroquel dosing to 150mg BID    # Substance induced psychosis vs primary psychosis  - Continue Seroquel, changed to 150mg BID on 3/26 due to patient concern for nighttime sedation making possible sleep apnea worse   - prn zyprexa 5mg BID     #MDD  - PTA Pristiq 100mg daily - consider stopping if pt continues to have trouble sleeping and is anxious/agitated     #Anxiety/agitation  - PTA gabapentin 600mg TID  - prn hydroxyzine  - prn zyprexa      #ADHD  Pt has long hx of adhd - said Vyvanse was prescribed in [place] but this is unclear given he has not filled it in a month per pharmacy. Likely contributing to current agitation and trouble sleeping.   - Stopped vyvanse on admission.   Possible past seizure hx when on Wellbutrin but unclear, also when using drugs. Was given some Wellbutrin in the Ed and then it was stopped. Patient requested Wellbutrin on 3/26, team declined due to possible seizure history.   - Seizure precautions.      # Polysubstance use   UDS pos for cannabinoids in ED, pt states he has not used other substances since Feb 20th. Does make references to meth in his house.   - Pt declining to return to CD tx at this time, wants sober living  - Nicotine patch and lozenge  - Continue PTA suboxone 8-2 MG for opioid use disorder in  remission, UDS negative for opioid use. Pt said he has been stable on this medication. Plan for outpt follow up with prescriber.      #Akithisia/muscle spasms  Pt endorsing restlessness, keeps him awake, could be RLS as well.   - Continue to decrease Zyprexa and monitor with prn  - Tizanidine 2mg Bid and prn 2mg BID  - Consider Ropinirole      2. Pertinent Labs/Monitoring:   - Uds pos for cannabinoids  - CBC, CMP, TSH, Vit B12 and folate WNL  - Vitamin D low  - Lipids last month wnl  - A1C last month wnl  - EKG normal sinus rhythm     3. Additional Plans:  - Patient will be treated in therapeutic milieu with appropriate individual and group therapies as described     Psychiatric Hospital Course:      Barak Clarke was admitted to [facility] as a voluntary pt.  Medications:  PTA tizanidine, Pristiq, Gabapentin, Suboxone were continued with prn tizanidine added for restlessness, PTA zyprexa made prn due to akithisia in the Ed.   PTA Vyvanse was held due to anxiety and agitation.    New medications started at the time of admission include Seroquel 200mg in the ED.   Seroquel: 50mg at bedtime started in the ED, increased to 100mg at bedtime on 3/19, increased to 200mg at bedtime on 3/20, increased to morning 100mg and evening 300mg on 3/21. Changed to 150mg BID on 3/26 due to patient concern for sedation overnight affecting possible sleep apnea.   Zyprexa prn: 10mg TID starting 3/20, decreased to 5mg BID on 3/25 due to patient concern for weight gain  - restarted trial of metformin with monitoring for rash and to help with metabolic syndrome although substantial weight gain during this hospitalization so far is unlikely.      The risks, benefits, alternatives, and side effects were discussed and understood by the patient and other caregivers.     Medical Assessment and Plan     Medical diagnoses to be addressed this admission:    # Metabolic syndrome  Pt endorsed weight gain with zyprexa.   - Metformin started/continued  in the ED  - Metformin stopped on admission due to pt endorsing rash after taking it in the ED, showed provider red raised rash on forearm and stated he had some on his back as well. Since Zyprexa was being decreased, decided to stop metformin and monitor.   - Metformin restarted on 3/25 due to patient concern for weight gain but had recurrence of rash on wrists and back, PRN Benadryl helped with this.  - Metformin discontinued on 3/26 and Topamax 25mg daily started to avoid rash.        # HTN  - Continued pta atenolol  - CTM      # Patient concerned for sleep apnea  - Will monitor vitals  - Outpatient PCP follow up    Medical course: Patient was physically examined by the ED prior to being transferred to the unit and was found to be medically stable and appropriate for admission.      Consults:  none     Checklist     Legal Status: Voluntary     Safety Assessment:   Behavioral Orders   Procedures    Code 1 - Restrict to Unit    Routine Programming     As clinically indicated    Seizure precautions    Self Injury Precaution    Status 15     Every 15 minutes.    Suicide precautions: Suicide Risk: HIGH     Patients on Suicide Precautions should have a Combination Diet ordered that includes a Diet selection(s) AND a Behavioral Tray selection for Safe Tray - with utensils, or Safe Tray - NO utensils       Order Specific Question:   Suicide Risk     Answer:   HIGH       Risk Assessment:  Risk for harm is moderate-high.  Risk factors: maladaptive coping, substance use, trauma, family dynamics, impulsive, past behaviors, and psychosocial stressors, psychosis and delusions with commands hallucinations to use substances.  Protective factors: engaged in treatment and seeking CD treatment    SIO: none    Disposition: TBD. Pending stabilization, medication optimization, & development of a safe discharge plan.     Attestations     Shawna Bhatt, MS4  [facility] Medical Student      Resident Attestation:   I was present with the  medical student who participated in the service and in the documentation of the note.  I have verified the history and personally performed the physical exam, mental status exam, and medical decision making. I agree with the assessment and plan of care as documented in the note.     Kath Garcia MD  Psychiatry Resident Physician      This patient has been seen and evaluated by me, Bridger Stack.  I have discussed this patient with the psychiatry resident and I agree with the findings and plan in this note.    I have reviewed today's vital signs, medications, labs and imaging.     Bridger Hernandez MD

## 2024-03-26 NOTE — PLAN OF CARE
03/26/24 1500   Group Therapy Session   Group Attendance attended group session   Time Session Began 1015   Time Session Ended 1215   Total Time (minutes) 120   Total # Attendees 5   Group Type expressive therapy   Group Topic Covered emotions/expression   Patient Response/Contribution cooperative with task     Art Therapy directive was to create a watercolor painting in response to a chosen mindfulness themed prompt from handout.   Goals of directive: mindfulness, media exploration, emotional expression, emotional regulation  Pt was an engaged participant, focused on painting/drawing for the full duration of group. Pt chose to work on his own independent, detailed drawings/paintings of human figures. Pt has not yet finished or verbally processed with author or group.  Pts mood was calm, further assessment is needed.

## 2024-03-26 NOTE — PLAN OF CARE
03/26/24     Group Psychotherapy Session   Group Attendance Psychotherapy   Time Session Began 1700   Time Session Ended 1755   Total Time (minutes) 55   Total # Attendees 5   Group Type Psychotherapy   Group Topic Covered Emotion regulation and insight building against relapse   Patient Response/Contribution This patient participated in this psychotherapy session where we reviewed sources of dysregulation, insight building, and relapse prevention. The group discussed psychological pathways to emotional dysregulation, drawing concepts from cognitive behavioral therapy and dialectical behavioral therapy. Reviewed ways to stay treatment compliant, increase sense of realization around mental health early warning signs, and ultimately, relapse prevention.  This patient asked questions during the group. Provided feedback to others, and received feedback as well.   Ravinder Catrer, Ph.D, MSW, [facility]

## 2024-03-26 NOTE — PLAN OF CARE

## 2024-03-26 NOTE — PLAN OF CARE
"Problem: Adult Behavioral Health Plan of Care  Goal: Adheres to Safety Considerations for Self and Others  Outcome: Ebonie Cancino spends most of the shift withdrawn to his room. He occasionally comes out to the milieu, where he interacts with one patient. His affect is flat & blunted. He endorses 6/10 anxiety & depression.  Pt requested & received PRN Zyprexa 5 mg @ 1620 for anxiety. He endorsed this as effective, stating \"it really helps me\". Pt expresses frustration surrounding the discontinuation  of his Vyvanse. Stating, \"I'm really upset because my doctor took away my Vyvanse. It helped me and helped stop my appetite\". Pt is also preoccupied with weight gain. He states \"I know that these antipsychotics help me but I hate gaining weight\". PRN Benadryl 25 mg requested & received @ 1827 for itchiness along with the start of a rash on his wrist and back after receiving Metformin. Writer observed light redness throughout his back along with a small red area on his wrist, which pt stated \"this is where and how it started in the ED\". The Benadryl was effective in relieving the rashes along with the itchiness. PRN Zanaflex 2 mg requested & received @ 2010 for muscle spasms. PRN Nicotine gum given upon request. He endorses passive SI. Pt denies SIB/HI/AH/VH.     "

## 2024-03-26 NOTE — PLAN OF CARE
Team Note Due:  Thursday     Assessment/Intervention/Current Symtoms and Care Coordination:  - chart review    - team meeting - discussed pt progress, symptomology, and response to treatment.  Discussed the discharge plan and any potential impediments to discharge.    Discussed patient's request about finding Sober Housing for discharge disposition. He has not had 30 days of sobriety - did not complete dual DAVID program. Not appropriate for Sober House at this time. Will encourage patient to consider either going to further treatment or following up once discharge regarding his goal to move to the [facility].     - team rounds    - post team rounds team meeting / discussion    Discharge Plan or Goal:  Pending stabilization & development of a safe discharge plan.        Barriers to Discharge:  Patient requires further psychiatric stabilization due to current symptomology        Referral Status:   DAVID referrals made in ED:  [facility] [place] [address] Phone: [phone]  [email or web address]     [facility]  [address] [place] [address] Phone: [phone] Fax: [phone]  [email or web address]       Legal Status:  voluntary    Contacts:         Upcoming Meetings and Dates/Important Information and next steps:  Continue to discuss with patient [facility]

## 2024-03-26 NOTE — PLAN OF CARE
Goal Outcome Evaluation:    Plan of Care Reviewed With: patient      Problem: Adult Behavioral Health Plan of Care  Goal: Plan of Care Review  Outcome: Progressing  Flowsheets (Taken 3/26/2024 0900)  Patient Agreement with Plan of Care: agrees     Patient visible on the unit. Patient's mood affect remains flat and blunted. Patient reported anxiety 4 and depression 5. Patient endorsed SI with no plans. RN asked if it is new since he has not endorsed that previously. Patient looked surprised and then change his answer and denied SI/HI and hallucinations. Patient was complaint with his medications. Continues to make lozenge request. Patient was visible during breakfast and ate well. Patient briefly played Switch with a select peer on the unit. He showered afterwards. He was seen intermittently pacing on the hallway. Patient took a nap after lunch and woke at 1400.

## 2024-03-26 NOTE — PLAN OF CARE
"  Problem: Suicide Risk  Goal: Absence of Self-Harm  Outcome: Progressing     Problem: Sleep Disturbance  Goal: Adequate Sleep/Rest  Outcome: Progressing   Goal Outcome Evaluation: Ongoing     Observed to have slept well for approx hrs on all Q 15\" rounds overnight w/ regular non-labored respirations and no reported or observed distress.  Wakeful at approx 0100 quietly pacing the quinn.  Acknowledged staff appropriately w/ minimal engagement denying any discomforts.  Endorsed resolvement of the itching of his back and wrist during the evening stating that the Benadryl had helped.  Requesting his Nicotine as ordered  and ODT Zyprexa 5 mg  for anxiety at 0543.  Observed to have slept well for approx 2.25  hrs on all Q 15\" rounds overnight.  Safe, therapeutic environment maintained.                         "

## 2024-03-27 PROCEDURE — 250N000013 HC RX MED GY IP 250 OP 250 PS 637

## 2024-03-27 PROCEDURE — 99232 SBSQ HOSP IP/OBS MODERATE 35: CPT | Mod: GC | Performed by: PSYCHIATRY & NEUROLOGY

## 2024-03-27 PROCEDURE — 250N000013 HC RX MED GY IP 250 OP 250 PS 637: Performed by: PSYCHIATRY & NEUROLOGY

## 2024-03-27 PROCEDURE — 124N000002 HC R&B MH UMMC

## 2024-03-27 PROCEDURE — H2032 ACTIVITY THERAPY, PER 15 MIN: HCPCS

## 2024-03-27 RX ADMIN — NICOTINE POLACRILEX 2 MG: 2 LOZENGE ORAL at 13:38

## 2024-03-27 RX ADMIN — NICOTINE POLACRILEX 2 MG: 2 LOZENGE ORAL at 16:42

## 2024-03-27 RX ADMIN — NICOTINE 1 PATCH: 21 PATCH, EXTENDED RELEASE TRANSDERMAL at 08:15

## 2024-03-27 RX ADMIN — NICOTINE POLACRILEX 2 MG: 2 LOZENGE ORAL at 09:30

## 2024-03-27 RX ADMIN — TOPIRAMATE 25 MG: 25 TABLET, FILM COATED ORAL at 21:13

## 2024-03-27 RX ADMIN — QUETIAPINE 150 MG: 100 TABLET ORAL at 20:22

## 2024-03-27 RX ADMIN — NICOTINE POLACRILEX 2 MG: 2 LOZENGE ORAL at 08:10

## 2024-03-27 RX ADMIN — GABAPENTIN 600 MG: 300 CAPSULE ORAL at 20:22

## 2024-03-27 RX ADMIN — BUPRENORPHINE AND NALOXONE 1 FILM: 8; 2 FILM BUCCAL; SUBLINGUAL at 16:39

## 2024-03-27 RX ADMIN — GABAPENTIN 600 MG: 300 CAPSULE ORAL at 13:04

## 2024-03-27 RX ADMIN — ATENOLOL 50 MG: 50 TABLET ORAL at 08:10

## 2024-03-27 RX ADMIN — QUETIAPINE 150 MG: 100 TABLET ORAL at 08:10

## 2024-03-27 RX ADMIN — GABAPENTIN 600 MG: 300 CAPSULE ORAL at 08:10

## 2024-03-27 RX ADMIN — OLANZAPINE 5 MG: 5 TABLET, ORALLY DISINTEGRATING ORAL at 05:09

## 2024-03-27 RX ADMIN — NICOTINE POLACRILEX 2 MG: 2 LOZENGE ORAL at 05:09

## 2024-03-27 RX ADMIN — OLANZAPINE 5 MG: 5 TABLET, ORALLY DISINTEGRATING ORAL at 16:39

## 2024-03-27 RX ADMIN — TIZANIDINE 2 MG: 2 TABLET ORAL at 08:10

## 2024-03-27 RX ADMIN — NICOTINE POLACRILEX 2 MG: 2 LOZENGE ORAL at 18:15

## 2024-03-27 RX ADMIN — Medication 125 MCG: at 08:10

## 2024-03-27 RX ADMIN — NICOTINE POLACRILEX 2 MG: 2 LOZENGE ORAL at 15:19

## 2024-03-27 RX ADMIN — NICOTINE POLACRILEX 2 MG: 2 LOZENGE ORAL at 06:34

## 2024-03-27 RX ADMIN — DOCUSATE SODIUM 50MG AND SENNOSIDES 8.6MG 1 TABLET: 8.6; 5 TABLET, FILM COATED ORAL at 20:22

## 2024-03-27 RX ADMIN — BUPRENORPHINE AND NALOXONE 1 FILM: 8; 2 FILM BUCCAL; SUBLINGUAL at 08:10

## 2024-03-27 RX ADMIN — NICOTINE POLACRILEX 2 MG: 2 LOZENGE ORAL at 11:58

## 2024-03-27 RX ADMIN — TIZANIDINE 2 MG: 2 TABLET ORAL at 16:39

## 2024-03-27 RX ADMIN — DESVENLAFAXINE 100 MG: 50 TABLET, FILM COATED, EXTENDED RELEASE ORAL at 08:09

## 2024-03-27 ASSESSMENT — ACTIVITIES OF DAILY LIVING (ADL)
ADLS_ACUITY_SCORE: 30
HYGIENE/GROOMING: INDEPENDENT
ADLS_ACUITY_SCORE: 30
ORAL_HYGIENE: INDEPENDENT
ADLS_ACUITY_SCORE: 30
DRESS: SCRUBS (BEHAVIORAL HEALTH)
ADLS_ACUITY_SCORE: 30
DRESS: INDEPENDENT
ADLS_ACUITY_SCORE: 30
ORAL_HYGIENE: INDEPENDENT
ADLS_ACUITY_SCORE: 30
ADLS_ACUITY_SCORE: 30
LAUNDRY: WITH SUPERVISION
ADLS_ACUITY_SCORE: 30
ADLS_ACUITY_SCORE: 30
LAUNDRY: WITH SUPERVISION
ADLS_ACUITY_SCORE: 30
ADLS_ACUITY_SCORE: 30
HYGIENE/GROOMING: INDEPENDENT
ADLS_ACUITY_SCORE: 30
ADLS_ACUITY_SCORE: 30

## 2024-03-27 NOTE — PLAN OF CARE
Team Note Due:  Thursday     Assessment/Intervention/Current Symtoms and Care Coordination:  - chart review    - team meeting - discussed pt progress, symptomology, and response to treatment.  Discussed the discharge plan and any potential impediments to discharge.    - team rounds    - post team rounds team meeting / discussion    Mom is concerned about his coming home and readiness for discharge - mentioning that he is seemly still very paranoid. Has said to her everything that I said happened when I was living at home did happen.  Has told her (not during this stay) that being at the house is like a post traumatic experience. Has concerns on what options there will be if when he comes home he is immediately experiencing worse symptoms.     In regards to his statements about what happened at home did happen - spoke with her about how it is common that for a person who has experienced paranoia / delusional thoughts that their experience / belief of this as being reality will continue. With time he may be able to process and question if that was a real thing, or if may have not been.    In talking about concern about his returning and having worsening symptoms, said that we as team can discuss with him options for when he discharges if things are worse again / having issues at home and coping strategies.     This writer briefly gave information about mental health crisis as a resource as well.     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 he talked about his feeling he's not smart enough, worthy / shamed. Very low self - imagine says she hopes that a therapist he could process this with. Says he at one point did want to pursue writing but didn't because he felt inferior to his sister who was a published poet. Also talks about his ongoing guilt about how he felt he was treating his sister / their  relationship prior to her death two years ago.     Says he had only seen the provider at [facility] one time before hospitalization, this writer did say that [facility] a reputable clinic and if he can schedule with them again should do so.  She will contact them.       Earlier in our discussion she did ask if they were saying discharge on Friday. Informed her that it has been discussed and if no further clinical / medication changes requiring 24/7 observation we would recommend to discharge.   Informed her that I will talk with the team tomorrow and call her again with an update.       Discharge Plan or Goal:  Pending stabilization & development of a safe discharge plan.        Barriers to Discharge:  Patient requires further psychiatric stabilization due to current symptomology        Referral Status:   [facility] referrals made in ED:  [facility] [address] [place] [address] Phone: [phone]  [email or web address]     [facility]  [address] [place] [address] Phone: [phone] Fax: [phone]  [email or web address]       Legal Status:  voluntary    Contacts:         Upcoming Meetings and Dates/Important Information and next steps:  Continue to discuss with patient [facility]

## 2024-03-27 NOTE — PLAN OF CARE
Problem: Sleep Disturbance  Goal: Adequate Sleep/Rest  Outcome: Progressing   Goal Outcome Evaluation:Progressing    Patient was sleeping when shift started. Respirations easy and non labored. Continues on 15 minutes checks. Patient requested for prn Zyprexa for agitation and sleep and prn nicotine gum, administered. Slept for about 5.5 hours.

## 2024-03-27 NOTE — PLAN OF CARE
"  Problem: Suicide Risk  Goal: Absence of Self-Harm  Outcome: Progressing  Intervention: Assess Risk to Self and Maintain Safety  Recent Flowsheet Documentation  Taken 3/27/2024 1200 by Krista Lara RN  Self-Harm Prevention: environment modified for self-harm risk  Intervention: Promote Psychosocial Wellbeing  Recent Flowsheet Documentation  Taken 3/27/2024 1200 by Krista Lara RN  Family/Support System Care: self-care encouraged  Intervention: Establish Safety Plan and Continuity of Care  Recent Flowsheet Documentation  Taken 3/27/2024 1200 by Krista Lara RN  Safe Transition Promotion:   personal safety plan developed   protective factors promoted   Goal Outcome Evaluation:    Plan of Care Reviewed With: patient          Patient reported doing well, asked patient how he was doing patient responded \"getting by, killing time\" said his mother will be coming home from [place] soon. Reports he would like phone number's nad informations about sober houses, SW aware. Patient denies all mental health symptoms but anxiety. He endorsed mild anxiety, said it has always been there and is \"bearable\". No prn needed per patient. Patient noted to socialize with select peers. Hygiene and appetite good. Patient is medication compliant. Speech and thoughts organized and coherent. No psychosis nor any paranoidal behavior nor comment this shift.  Spent most of the shift out in the lounge and seen visible in the unit. No other concern. Staff will continue to monitor and offer support as needed.              "

## 2024-03-27 NOTE — PLAN OF CARE
"Problem: Adult Behavioral Health Plan of Care  Goal: Adheres to Safety Considerations for Self and Others  Outcome: Ebonie Cancino spends most of the shift out in the milieu, interacting with select peers. His affect is flat & blunted, he is pleasant and cooperative. He endorses 5/10 anxiety & depression. He requested & received PRN Zyprexa 5 mg @ 1639 for 6/10 anxiety. He endorsed this as effective, stating \"Zyprexa always helps\". He attended group this evening. PRN Nicotine lozenge given upon request. Pt continues to have paranoid delusions and perseverates on his weight. He requested & received a pt education sheet on Topamax. Pt proceeded to then share this sheet with other patient's and became very paranoid because \"the sheet said this is used for bipolar disorder and my doctor's told me it's for weight loss\". Writer provided education on how a medication can have different indications and assured him that he was on it for weight loss purposes. Pt was accepting of this. He denied SI/SIB/HI/AH/VH.  "

## 2024-03-27 NOTE — PROGRESS NOTES
"  ----------------------------------------------------------------------------------------------------------  [facility]  Psychiatry Progress Note  Hospital Day #7     Interim History:     The patient's care was discussed with the treatment team and chart notes were reviewed.    Vitals: VSS  Sleep: 5.5 hours (03/27/24 0600)  Scheduled medications: Took all scheduled medications as prescribed with the exception of senna and topamax  Psychiatric PRN medications: nicotine lozenge, zyprexa 5mg    Staff Report:   No acute events or safety concerns overnight.   Patient is visible on the unit and in the milieu. On assessment, reported anxiety 4 and depression 5 and endorsed SI with no plans. When RN asked if this was new as patient has not endorsed this before, patient looked surprised and then changed his answer and denied SI/HI and hallucinations. Frequently seeks staff to request PRNs. Briefly played Switch with a select peer, showered, intermittently paced the hallway. Active participant in group, during art therapy chose to work on his own independent drawings. Patient requesting sober housing, [facility] determined he is not appropriate for Sober Housing at this time as he has had 30 days of sobriety - did not complete dual DAVID program. [facility]EL offered to discuss this with patient today.   This AM stated to staff he is just getting by. Is waiting for mom to get home from trip.   Please see staff notes for further details.     Subjective:     Patient Interview:  Barak Clarke was seen in his room. Was riding the exercise bike prior to visit. Anxiety from not being on prior meds, some depression he said. \"Nothing life threatening\". Showed the team his art, said he let's his subconscious take over and draws whatever comes out - said it boosts his self esteem. Said he has been on stimulants since 6 years old and was fully dependent when 18 yrs old and believed he would do badly if not on " "meds, so art has been rewarding without meds. Feels he can learn to function without it. Agrees art is a good outlet. \"Didn't sleep too bad\", woke up 2-3 times and able to fall back asleep. Pretty good last night. Had a dream, and it wasn't bad. [place] it was natural. Feels he is moving faster this AM than even when on Vyvanse. Likes splitting the doses of Seroquel. Would like to eventually get a sleep study. Feels he has had sleep apnea for years. Said he would consider the mask for sleep apnea as he liked how rested he is feeling today. Forgot to take Topamax last night and went to bed. Will try tonight, said he and staff both forgot to get it. Likes riding the exercise bike. Exercise helps mood, said he used to be addicted to exercise. Liked the endorphins. Ran 5-10 miles a day in college but was not in track and field he said, felt his mood was not good when he didn't run.     ROS:   Patient denies acute concerns other than what is listed in HPI.     Objective:     Vitals:  /64   Pulse 74   Temp 98  F (36.7  C) (Temporal)   Resp 16   Ht 1.803 m (5' 11\")   Wt 107 kg (235 lb 14.4 oz)   SpO2 97%   BMI 32.90 kg/m      Allergies:  Allergies   Allergen Reactions    Prazosin Unknown     Worsening of nightmares       Current Medications:  Scheduled:  Current Facility-Administered Medications   Medication Dose Route Frequency    atenolol  50 mg Oral Daily    buprenorphine HCl-naloxone HCl  1 Film Sublingual BID    cholecalciferol  125 mcg Oral Daily    desvenlafaxine  100 mg Oral Daily    gabapentin  600 mg Oral TID    nicotine  1 patch Transdermal Daily    QUEtiapine  150 mg Oral BID    senna-docusate  1 tablet Oral At Bedtime    tiZANidine  2 mg Oral BID    topiramate  25 mg Oral At Bedtime       PRN:  Current Facility-Administered Medications   Medication Dose Route Frequency    acetaminophen  650 mg Oral Q4H PRN    hydrOXYzine HCl  25 mg Oral Q4H PRN    melatonin  3 mg Oral At Bedtime PRN    nicotine  2 mg " "Buccal Q1H PRN    OLANZapine  10 mg Intramuscular TID PRN    OLANZapine zydis  5 mg Oral BID PRN    polyethylene glycol  17 g Oral Daily PRN    tiZANidine  2 mg Oral Daily PRN       Labs and Imaging:  New results:   No results found for this or any previous visit (from the past 24 hour(s)).    Data this admission:  - CBC unremarkable  - CMP unremarkable  - TSH normal  - UDS positive for cannabinoids  - Vit D 14 (L)  - Hgb A1c 5.1 (WNL) on 7/10/23  - Lipids last month WNL  - Vit B12 normal  - Folate normal  - Urinalysis not yet performed  - EKG normal sinus rhythm, QTc 429     Mental Status Exam:     Oriented to:  Grossly Oriented  General:  Awake and Alert  Appearance:  appears stated age, Grooming is adequate, Dressed in casual clothing, and Tattoos on left arm  Behavior/Attitude:  Calm, Cooperative, and Engaged  Eye Contact: Intense downcast stare when answering questions, Glances, more eye contact and less intense compared to prior  Psychomotor: Normal and No evidence of tics, dystonia, or tardive dyskinesia  no catatonia present  Speech:  appropriate volume/tone and with good articulation  Language: Fluent in English with appropriate syntax and vocabulary.  Mood:  \"anxiety...some depression...nothing life threatening\"  Affect:   more full than prior, congruent with mood, stable, and restricted  Thought Process:  linear, coherent, and goal directed  Thought Content:   No SI/HI/AH/VH; delusions of paranoia  Associations:  questionable, improving   Insight:   limited due to paranoid delusions and confident that his ex/ex's family is against him although a couple days ago he now felt \"they're empty threats\" yet real.   Judgment:  Seeking help. Has had some inappropriate discussions with peers in the milieu regarding robbery offenses.   Impulse control: fair  Attention Span:  adequate for conversation  Concentration:  grossly intact  Recent and Remote Memory:  not formally assessed  Fund of Knowledge: average  Muscle " Strength and Tone: normal  Gait and Station: Normal     Psychiatric Assessment     Barak Clarke is a 39 year old male with previous psychiatric diagnoses of depression, anxiety, ADHD, PTSD, substance-induced psychotic disorder, and polysubstance dependency and use disorder who checked into our emergency department from [facility] requesting an alternative chemical dependency treatment, admitted on 03/20/2024 due to concern for SI, psychosis, paranoia, delusions, and disorganized behavior in the context of substance use and relationship concerns. Most recent psychiatric hospitalization was last month for SI s/p overdose. Significant symptoms on admission include paranoia, hypervigilance, racing thoughts, disorganized behavior, social withdrawal. The MSE on admission was pertinent for poor insight and judgement, paranoia, delusions of his ex wife and family out to get him, self-referential delusions, agitation/restlessness. Biological contributions to mental health presentation include substance use most recently cannabis, past MH diagnoses. Psychological contributions to mental health presentation include poor coping, possible cluster B traits especially considering relationship concerns over his lifetime, PTSD, poor insight into need for substance treatment. Social factors contributing to mental health presentation include isolation, interpersonal conflicts, unemployed.  Protective factors include seeking help, knows when SI is bad he said, would like to stay on his medications.      In summary, the patient's reported symptoms of paranoia, delusions, anxiety with disorganized thoughts, and expressed concerns about people conspiring to kill him, taking over his electronics and monitoring him in the context of cannabis use and past substance use are consistent with prior diagnosis of substance induced psychosis. However, could also consider primary diagnosis of psychotic disorder with mood component given signs for  suhas such as decrease need for sleep, rambling and perfuse thoughts, supporting Bipolar disorder with psychotic features or Schizoaffective disorder. Although meth can also present with these features, he has not used in over a month per his report and supported by UDS. He will likely benefit from medication optimization and therapeutic milieu this admission. PT declining CD tx.      Given that he currently has intermittent SI and HI, psychosis, and possible suhas, patient warrants inpatient psychiatric hospitalization to maintain his safety.      Psychiatric Plan by Diagnosis      Today's changes:  - none    # Substance induced psychosis vs primary psychosis  - Continue Seroquel, changed to 150mg BID on 3/26 due to patient concern for nighttime sedation making possible sleep apnea worse   - prn zyprexa 5mg BID     #MDD  - PTA Pristiq 100mg daily - consider stopping if pt continues to have trouble sleeping and is anxious/agitated     #Anxiety/agitation  - PTA gabapentin 600mg TID  - prn hydroxyzine  - prn zyprexa      #ADHD  Pt has long hx of adhd - said Vyvanse was prescribed in [place] but this is unclear given he has not filled it in a month per pharmacy. Likely contributing to current agitation and trouble sleeping.   - Stopped vyvanse on admission.   Possible past seizure hx when on Wellbutrin but unclear, also when using drugs. Was given some Wellbutrin in the Ed and then it was stopped. Patient requested Wellbutrin on 3/26, team declined due to possible seizure history.   - Seizure precautions.      # Polysubstance use   UDS pos for cannabinoids in ED, pt states he has not used other substances since Feb 20th. Does make references to meth in his house.   - Pt declining to return to CD tx at this time, wants sober living  - Nicotine patch and lozenge  - Continue PTA suboxone 8-2 MG for opioid use disorder in remission, UDS negative for opioid use. Pt said he has been stable on this medication. Plan for outpt  "follow up with prescriber.      #Akithisia/muscle spasms  Pt endorsing restlessness, keeps him awake, could be RLS as well.   - Continue to decrease Zyprexa and monitor with prn  - Tizanidine 2mg Bid and prn 2mg BID  - Consider Ropinirole      2. Pertinent Labs/Monitoring:   - Uds pos for cannabinoids  - CBC, CMP, TSH, Vit B12 and folate WNL  - Vitamin D low  - Lipids last month wnl  - A1C last month wnl  - EKG normal sinus rhythm     3. Additional Plans:  - Patient will be treated in therapeutic milieu with appropriate individual and group therapies as described     Psychiatric Hospital Course:      Barak Clarke was admitted to [facility] as a voluntary pt.  Medications:  PTA tizanidine, Pristiq, Gabapentin, Suboxone were continued with prn tizanidine added for restlessness, PTA zyprexa made prn due to akithisia in the Ed.   PTA Vyvanse was held due to anxiety and agitation.  Possible past seizure hx when on Wellbutrin but unclear, also when using drugs. Was given some Wellbutrin in the Ed and then it was stopped. Patient requested Wellbutrin on 3/26, team declined due to possible seizure history. Seizure precautions in place. Pt said he has been learning to cope without Vyvanse, relying on art and other forms of activity to find value and \"self worth\".    New medications started at the time of admission include Seroquel 200mg in the ED.   Seroquel: 50mg at bedtime started in the ED, increased to 100mg at bedtime on 3/19, increased to 200mg at bedtime on 3/20, increased to morning 100mg and evening 300mg on 3/21. Changed to 150mg BID on 3/26 due to patient concern for sedation overnight affecting possible sleep apnea.   Zyprexa prn: 10mg TID starting 3/20, decreased to 5mg BID on 3/25 due to patient concern for weight gain  - restarted trial of metformin with monitoring for rash and to help with metabolic syndrome although substantial weight gain during this hospitalization so far is unlikely.   # Polysubstance " use: UDS pos for cannabinoids in ED, pt states he has not used other substances since Feb 20th. Does make references to meth in his house. Pt declining to return to  tx at this time, wants sober living although he will be unlikely to qualify due to not being sober for 30 days prior and not completing dual DAVID program prior. Nicotine patch and lozenge given on admission. Continued PTA suboxone 8-2 MG for opioid use disorder in remission, UDS negative for opioid use. Pt said he has been stable on this medication. Plan for outpt follow up with prescriber.      The risks, benefits, alternatives, and side effects were discussed and understood by the patient and other caregivers.     Medical Assessment and Plan     Medical diagnoses to be addressed this admission:    # Metabolic syndrome  Pt endorsed weight gain with zyprexa.   - Metformin started/continued in the ED  - Metformin stopped on admission due to pt endorsing rash after taking it in the ED, showed provider red raised rash on forearm and stated he had some on his back as well. Since Zyprexa was being decreased, decided to stop metformin and monitor.   - Metformin restarted on 3/25 due to patient concern for weight gain but had recurrence of rash on wrists and back, PRN Benadryl helped with this.  - Metformin discontinued on 3/26 and Topamax 25mg daily started to avoid rash.        # HTN  - Continued PTA atenolol  - CTM      # Patient concerned for sleep apnea  - Will monitor vitals  - Outpatient PCP follow up  - Recommend sleep study outpt    Medical course: Patient was physically examined by the ED prior to being transferred to the unit and was found to be medically stable and appropriate for admission.      Consults:  none     Checklist     Legal Status: Voluntary     Safety Assessment:   Behavioral Orders   Procedures    Code 1 - Restrict to Unit    Routine Programming     As clinically indicated    Seizure precautions    Self Injury Precaution    Status 15      Every 15 minutes.    Suicide precautions: Suicide Risk: HIGH     Patients on Suicide Precautions should have a Combination Diet ordered that includes a Diet selection(s) AND a Behavioral Tray selection for Safe Tray - with utensils, or Safe Tray - NO utensils       Order Specific Question:   Suicide Risk     Answer:   HIGH       Risk Assessment:  Risk for harm is moderate-high.  Risk factors: maladaptive coping, substance use, trauma, family dynamics, impulsive, past behaviors, and psychosocial stressors, psychosis and delusions with commands hallucinations to use substances.  Protective factors: engaged in treatment and seeking CD treatment    SIO: none    Disposition: TBD. Pending stabilization, medication optimization, & development of a safe discharge plan. Plan to return home with his mom.      Attestations     Shawna Bhatt MS4  [facility] Medical Student      Resident Attestation:   I was present with the medical student who participated in the service and in the documentation of the note.  I have verified the history and personally performed the physical exam, mental status exam, and medical decision making. I agree with the assessment and plan of care as documented in the note.     Kath Garcia MD  Psychiatry Resident Physician    This patient has been seen and evaluated by me, Bridger Stack.  I have discussed this patient with the psychiatry resident and I agree with the findings and plan in this note.    I have reviewed today's vital signs, medications, labs and imaging.     Bridger Hernandez MD

## 2024-03-27 NOTE — PLAN OF CARE
BEH IP Unit Acuity Rating Score (UARS)  Patient is given one point for every criteria they meet.    CRITERIA SCORING   On a 72 hour hold, court hold, committed, stay of commitment, or revocation. 0    Patient LOS on BEH unit exceeds 20 days. 0  LOS: 7   Patient under guardianship, 55+, otherwise medically complex, or under age 11. 0   Suicide ideation without relief of precipitating factors. 1   Current plan for suicide. 0   Current plan for homicide. 0   Imminent risk or actual attempt to seriously harm another without relief of factors precipitating the attempt. 0   Severe dysfunction in daily living (ex: complete neglect for self care, extreme disruption in vegetative function, extreme deterioration in social interactions). 1   Recent (last 7 days) or current physical aggression in the ED or on unit. 0   Restraints or seclusion episode in past 72 hours. 0   Recent (last 7 days) or current verbal aggression, agitation, yelling, etc., while in the ED or unit. 0   Active psychosis. 1   Need for constant or near constant redirection (from leaving, from others, etc).  0   Intrusive or disruptive behaviors. 1   Patient requires 3 or more hours of individualized nursing care per 8-hour shift (i.e. for ADLs, meds, therapeutic interventions). 0   TOTAL 4

## 2024-03-27 NOTE — PROGRESS NOTES
03/27/24 1500    Group Therapy Session   Time Session Began 1015   Time Session Ended 1500   Total Time (minutes) 55   Total # Attendees 6-8   Group Type expressive therapy   Group Topic Covered balanced lifestyle   Group Session Detail Mindfulness I & II   Patient Response/Contribution cooperative with task   Patient Participation Detail Cooperatively engaged in portions of Morning and Afternoon Music Relaxation groups to decrease anxiety and promote Mindfulness. Calm affect, appropriately engaged in session, responding well to the music.

## 2024-03-27 NOTE — PLAN OF CARE
Patient visible in the milieu this shift. Patient was calm on approach. Patient was frequently seeking out staff for PRN. Patient denied SI/SIB/HI/AVH. Patient is eating and drinking adequately. No behavioral issues observed or reported. Patient was seen sitting in lounge engaging/watching TV with peers. Patient refused scheduled Senna and Topamax. Will continue to monitor and offer support.    Problem: Adult Behavioral Health Plan of Care  Goal: Plan of Care Review  Outcome: Progressing    Overall Patient Progress: improving

## 2024-03-28 PROCEDURE — 250N000013 HC RX MED GY IP 250 OP 250 PS 637: Performed by: PSYCHIATRY & NEUROLOGY

## 2024-03-28 PROCEDURE — 250N000013 HC RX MED GY IP 250 OP 250 PS 637

## 2024-03-28 PROCEDURE — G0177 OPPS/PHP; TRAIN & EDUC SERV: HCPCS

## 2024-03-28 PROCEDURE — 99232 SBSQ HOSP IP/OBS MODERATE 35: CPT | Mod: GC | Performed by: PSYCHIATRY & NEUROLOGY

## 2024-03-28 PROCEDURE — 124N000002 HC R&B MH UMMC

## 2024-03-28 RX ORDER — AMOXICILLIN 250 MG
1 CAPSULE ORAL
Status: DISCONTINUED | OUTPATIENT
Start: 2024-03-28 | End: 2024-04-03 | Stop reason: HOSPADM

## 2024-03-28 RX ADMIN — QUETIAPINE 150 MG: 100 TABLET ORAL at 19:26

## 2024-03-28 RX ADMIN — OLANZAPINE 5 MG: 5 TABLET, ORALLY DISINTEGRATING ORAL at 14:07

## 2024-03-28 RX ADMIN — OLANZAPINE 5 MG: 5 TABLET, ORALLY DISINTEGRATING ORAL at 05:39

## 2024-03-28 RX ADMIN — GABAPENTIN 600 MG: 300 CAPSULE ORAL at 08:21

## 2024-03-28 RX ADMIN — NICOTINE POLACRILEX 2 MG: 2 LOZENGE ORAL at 08:47

## 2024-03-28 RX ADMIN — BUPRENORPHINE AND NALOXONE 1 FILM: 8; 2 FILM BUCCAL; SUBLINGUAL at 16:11

## 2024-03-28 RX ADMIN — ATENOLOL 50 MG: 50 TABLET ORAL at 08:22

## 2024-03-28 RX ADMIN — NICOTINE POLACRILEX 2 MG: 2 LOZENGE ORAL at 07:41

## 2024-03-28 RX ADMIN — NICOTINE POLACRILEX 2 MG: 2 LOZENGE ORAL at 09:48

## 2024-03-28 RX ADMIN — GABAPENTIN 600 MG: 300 CAPSULE ORAL at 13:14

## 2024-03-28 RX ADMIN — Medication 3 MG: at 19:26

## 2024-03-28 RX ADMIN — NICOTINE POLACRILEX 2 MG: 2 LOZENGE ORAL at 17:16

## 2024-03-28 RX ADMIN — Medication 125 MCG: at 08:21

## 2024-03-28 RX ADMIN — NICOTINE POLACRILEX 2 MG: 2 LOZENGE ORAL at 20:40

## 2024-03-28 RX ADMIN — NICOTINE 1 PATCH: 21 PATCH, EXTENDED RELEASE TRANSDERMAL at 08:27

## 2024-03-28 RX ADMIN — NICOTINE POLACRILEX 2 MG: 2 LOZENGE ORAL at 05:39

## 2024-03-28 RX ADMIN — NICOTINE POLACRILEX 2 MG: 2 LOZENGE ORAL at 11:50

## 2024-03-28 RX ADMIN — GABAPENTIN 600 MG: 300 CAPSULE ORAL at 19:26

## 2024-03-28 RX ADMIN — NICOTINE POLACRILEX 2 MG: 2 LOZENGE ORAL at 16:07

## 2024-03-28 RX ADMIN — NICOTINE POLACRILEX 2 MG: 2 LOZENGE ORAL at 19:26

## 2024-03-28 RX ADMIN — TOPIRAMATE 25 MG: 25 TABLET, FILM COATED ORAL at 19:26

## 2024-03-28 RX ADMIN — BUPRENORPHINE AND NALOXONE 1 FILM: 8; 2 FILM BUCCAL; SUBLINGUAL at 08:21

## 2024-03-28 RX ADMIN — TIZANIDINE 2 MG: 2 TABLET ORAL at 16:11

## 2024-03-28 RX ADMIN — NICOTINE POLACRILEX 2 MG: 2 LOZENGE ORAL at 18:22

## 2024-03-28 RX ADMIN — NICOTINE POLACRILEX 2 MG: 2 LOZENGE ORAL at 13:15

## 2024-03-28 RX ADMIN — NICOTINE POLACRILEX 2 MG: 2 LOZENGE ORAL at 14:25

## 2024-03-28 RX ADMIN — TIZANIDINE 2 MG: 2 TABLET ORAL at 08:22

## 2024-03-28 RX ADMIN — QUETIAPINE 150 MG: 100 TABLET ORAL at 08:21

## 2024-03-28 RX ADMIN — DESVENLAFAXINE 100 MG: 50 TABLET, FILM COATED, EXTENDED RELEASE ORAL at 08:21

## 2024-03-28 ASSESSMENT — ACTIVITIES OF DAILY LIVING (ADL)
ADLS_ACUITY_SCORE: 30
HYGIENE/GROOMING: INDEPENDENT
ADLS_ACUITY_SCORE: 30
ADLS_ACUITY_SCORE: 30
HYGIENE/GROOMING: INDEPENDENT
ADLS_ACUITY_SCORE: 30
ADLS_ACUITY_SCORE: 30
ORAL_HYGIENE: INDEPENDENT
LAUNDRY: WITH SUPERVISION
ADLS_ACUITY_SCORE: 30
DRESS: INDEPENDENT
ADLS_ACUITY_SCORE: 30
ADLS_ACUITY_SCORE: 30
ORAL_HYGIENE: INDEPENDENT
ADLS_ACUITY_SCORE: 30
DRESS: INDEPENDENT
ADLS_ACUITY_SCORE: 30
ADLS_ACUITY_SCORE: 30
LAUNDRY: WITH SUPERVISION
ADLS_ACUITY_SCORE: 30

## 2024-03-28 NOTE — PLAN OF CARE
BEH IP Unit Acuity Rating Score (UARS)  Patient is given one point for every criteria they meet.    CRITERIA SCORING   On a 72 hour hold, court hold, committed, stay of commitment, or revocation. 0    Patient LOS on BEH unit exceeds 20 days. 0  LOS: 8   Patient under guardianship, 55+, otherwise medically complex, or under age 11. 0   Suicide ideation without relief of precipitating factors. 1   Current plan for suicide. 0   Current plan for homicide. 0   Imminent risk or actual attempt to seriously harm another without relief of factors precipitating the attempt. 0   Severe dysfunction in daily living (ex: complete neglect for self care, extreme disruption in vegetative function, extreme deterioration in social interactions). 1   Recent (last 7 days) or current physical aggression in the ED or on unit. 0   Restraints or seclusion episode in past 72 hours. 0   Recent (last 7 days) or current verbal aggression, agitation, yelling, etc., while in the ED or unit. 0   Active psychosis. 1   Need for constant or near constant redirection (from leaving, from others, etc).  0   Intrusive or disruptive behaviors. 0   Patient requires 3 or more hours of individualized nursing care per 8-hour shift (i.e. for ADLs, meds, therapeutic interventions). 0   TOTAL 3

## 2024-03-28 NOTE — PLAN OF CARE
Problem: Suicide Risk  Goal: Absence of Self-Harm  Outcome: Progressing  Intervention: Assess Risk to Self and Maintain Safety  Recent Flowsheet Documentation  Taken 3/28/2024 1100 by Krista Lara RN  Self-Harm Prevention: environment modified for self-harm risk  Intervention: Promote Psychosocial Wellbeing  Recent Flowsheet Documentation  Taken 3/28/2024 1100 by Krista Lara RN  Family/Support System Care: self-care encouraged  Intervention: Establish Safety Plan and Continuity of Care  Recent Flowsheet Documentation  Taken 3/28/2024 1100 by Krista Lara RN  Safe Transition Promotion:   personal safety plan developed   protective factors promoted   Goal Outcome Evaluation:    Plan of Care Reviewed With: patient             Denies all mental health symptoms, multiple request for nicotine lozenges. Medication compliant. Out and visible in the unit. Social with peers. Hygiene and appetite good. Attends groups. No concern this shift. Staff will continue to monitor.

## 2024-03-28 NOTE — PLAN OF CARE
Occupational Therapy Group Note:     03/28/24 1533   Group Therapy Session   Group Attendance attended group session   Time Session Began 1315   Time Session Ended 1405   Total Time (minutes) 45   Total # Attendees 5   Group Type recreation   Group Topic Covered leisure exploration/use of leisure time;structured socialization   Group Session Detail OT Leisure Group: [place]   Patient Response/Contribution confronted peers appropriately;cooperative with task;listened actively   Patient Participation Detail Pt actively participated in a structured occupational therapy group with a focus on facilitating social and leisure engagement. This occupational therapy group was facilitated in order to: foster relaxation, explore leisure opportunities, cope with stress, decrease isolation/improve social skills, and exercise overall cognitive skills. Pt was able to follow multi- step directions of the novel task. Patient asked appropriate clarifying questions throughout activity. Patient's understanding and independence in activity improved with repetition. Patient exhibited increased understanding of strategic play as game progressed. Patient was receptive to recommendations provided by others. Patient appeared invested in activity and remained attentive and engaged for the full duration of group. Patient confirmed enjoyment of activity at conclusion of group. Affect: blunted. Mood: pleasant, calm, cooperative.

## 2024-03-28 NOTE — PROGRESS NOTES
03/27/24 1900   Group Therapy Session   Group Attendance attended group session   Time Session Began 1715   Time Session Ended 1805   Total Time (minutes) 45   Total # Attendees 4   Group Type recreation   Group Topic Covered leisure exploration/use of leisure time   Group Session Detail TR leisure group   Patient Response/Contribution cooperative with task   Patient Participation Detail Pt participated in Therapeutic Recreation group with focus on leisure participation, communication skills, and critical thinking. Engaged and focused in the group recreational activity via a group game.  Pt was a full participant throughout the entire duration of group.  Pt presented as more reserved and did not interact as much, outside the structured activity.

## 2024-03-28 NOTE — PLAN OF CARE
Occupational Therapy Group Note:     03/28/24 1519   Group Therapy Session   Group Attendance attended group session   Time Session Began 1015   Time Session Ended 1150   Total Time (minutes) 75   Total # Attendees 4   Group Type expressive therapy;task skill   Group Topic Covered cognitive activities;coping skills/lifestyle management;emotions/expression;leisure exploration/use of leisure time;relaxation techniques;structured socialization   Group Session Detail OT Clinic Group X 2   Patient Response/Contribution confronted peers appropriately;cooperative with task;listened actively   Patient Participation Detail Pt actively participated in occupational therapy clinic to facilitate coping skill exploration, creative expression within personally meaningful activities, and clinical observation of social, cognitive, and kinesthetic performance skills. Pt response: supervision to initiate, gather materials, sequence, and adjust to workspace demands as needed. Demonstrated good focus, planning, and problem solving for selected free-hand drawing task. Able to ask for assistance as needed and politely requested specific supplies. Patient was mostly withdrawn to self; however, did respond appropriately to conversation when initiated by others. Affect: blunted. Mood: calm, cooperative, pleasant.

## 2024-03-28 NOTE — PLAN OF CARE
03/28/24      Group Psychotherapy Session   Group Attendance Psychotherapy   Time Session Began 1713   Time Session Ended 1720 No charge   Total Time (minutes) 7   Total # Attendees 6   Group Type Psychotherapy   Group Topic Covered Recognizing sources of severe anxiety and preventing them; ways to remain medication compliant to prevent relapse   Patient Response/Contribution This patient participated in this psychotherapy session where we reviewed strategies for preventing sources of severe anxiety.   This patient attended but left the group after a few minutes.   Ravinder Carter, Ph.D, MSW, [facility]

## 2024-03-28 NOTE — PLAN OF CARE
Team Note Due:  Thursday     Assessment/Intervention/Current Symtoms and Care Coordination:  - chart review    - team meeting - discussed pt progress, symptomology, and response to treatment.  Discussed the discharge plan and any potential impediments to discharge.    - team rounds    - post team rounds team meeting / discussion    - Behavioral Team Discussion Note completed for weekly plan of care update.     Discharge Plan or Goal:  Pending stabilization & development of a safe discharge plan.        Barriers to Discharge:  Patient requires further psychiatric stabilization due to current symptomology        Referral Status:   DAVID referrals made in ED:  [facility] [facility] [place] [address] Phone: [phone]  [email or web address]     [facility]  [address] [place] [address] Phone: [phone] Fax: [phone]  [email or web address]       Legal Status:  voluntary    Contacts:         Upcoming Meetings and Dates/Important Information and next steps:  Continue to discuss with patient if would like to proceed with residential / IOP with lodging or IRTS

## 2024-03-28 NOTE — PLAN OF CARE
03/28/24 1806   Behavioral Team Discussion   Progress improving   Anticipated length of stay TBD   Continued Stay Criteria/Rationale medication managment for symptoms of psychosis ongoing   Medical/Physical medically cleared in ED - see Psychiatry note for further details   Precautions see below   Plan continue with medication managment / assessment/ development of discharge plan   Rationale for change in precautions or plan per assessment           PRECAUTIONS AND SAFETY    Behavioral Orders   Procedures    Code 1 - Restrict to Unit    Routine Programming     As clinically indicated    Seizure precautions    Self Injury Precaution    Status 15     Every 15 minutes.    Suicide precautions: Suicide Risk: HIGH     Patients on Suicide Precautions should have a Combination Diet ordered that includes a Diet selection(s) AND a Behavioral Tray selection for Safe Tray - with utensils, or Safe Tray - NO utensils       Order Specific Question:   Suicide Risk     Answer:   HIGH       Safety  Safety WDL: WDL  Patient Location: patient room, own  Observed Behavior: calm, walking  Observed Behavior (Comment): calm  Safety Measures: safety rounds completed  Diversional Activity: television, art work, structured exercise  Suicidality: Status 15  Withdrawal: seizure precautions (past history of withdrawal seizure)  Seizure precautions: room close to team care station (desk), clutter free environment, calm, consistent lighting (Seizure pads on floor by bed)         This note has been dictated.

## 2024-03-28 NOTE — PLAN OF CARE
"  Problem: Adult Behavioral Health Plan of Care  Goal: Adheres to Safety Considerations for Self and Others  Outcome: Progressing     Problem: Suicide Risk  Goal: Absence of Self-Harm  Outcome: Progressing     Problem: Sleep Disturbance  Goal: Adequate Sleep/Rest  Outcome: Not Progressing   Goal Outcome Evaluation: Ongoing    Rec'd pt. wakeful and visible on the unit as shift commenced. Off to bed and was observed sleeping on [place] rounds w/o any noted distress.  Up again at approx 0200;  pleasantly requesting a snack.  Same provided.  Further requested a nicotine lozenge stating that his order had been changed and that he could now have the lozenge anytime during the night prn.  When informed that the order had not changed, pt. Then remarked that a nurse had told him that.  Assured that the nicotine would be available as of 0500. Back to sleep after approx 1/2 hr.  Though briefly wakeful again at approx 0315.  Easily falls asleep but unable to stay asleep.   Up to nurses stations at approx 0530 requesting his Nicotine and ODT Zyprexa. Administered as ordered. Paced the hallway for some time. Interactive w/ wakeful peers.Observed to have slept for approx 4 hrs on all Q 15\" rounds overnight.  Safe, supportive environment maintained.                            "

## 2024-03-28 NOTE — PROGRESS NOTES
"  ----------------------------------------------------------------------------------------------------------  [facility]  Psychiatry Progress Note  Hospital Day #8     Interim History:     The patient's care was discussed with the treatment team and chart notes were reviewed.    Vitals: VSS  Sleep: 4 hours (03/28/24 0700)  Scheduled medications: Took all scheduled medications as prescribed   Psychiatric PRN medications: nicotine lozenge, zyprexa 5mg    Staff Report:   No acute events or safety concerns overnight.     Up requesting nicotine lozenge overnight stating he was told his order had been changed and he could have it during the night, RN informed him this was not the case.     Visible in the unit and lounge, noted to socialize with select peers. Reported 5/10 anxiety and depression on assessment. Endorsed Zyprexa always helps his anxiety. Earlier in the day had endorsed mild anxiety, said it has always been there and is \"bearable.\" Continues to have paranoid delusions and perseverates on his weight, requested education sheet on Topamax. Shared this sheet with other patients and became paranoid because \"the sheet said this is used for bipolar disorder and my doctor's told me it's for weight loss,\"  RN provided education on different uses, patient accepting. Denied SI/SIB/HI/AH/VH. Active participant in group.      SW note reports patient's mother has concern about him going home and his readiness for discharge, that he still seems very paranoid. She says that patient has told her (not during this stay) that being at the house is like a post-traumatic experience. Also reported patient's mother stated right before she left she found a well-written memoir by the patient, he talked about feeling not smart enough, worthy/shamed. Mother shares that he has very low self-image she hopes a therapist can help with. At one point wanted to pursue writing but didn't because he " "felt inferior to his sister, a published poet. Also reports patient has ongoing guilt about how he felt he was treating his sister prior to her death two years ago. Mother reported he had only seen provider at [facility] one time.     Please see staff notes for further details.     Subjective:     Patient Interview:  Barak Clarke was seen in his room. Looking for housing in [facility], found an apartment hs said. Feels he is pushing manic with Topamax.  Upset stomach from it after he ate. Asked about Clonazepam - \"would you ever consider re-prescribing?\" Team said no and he said \"ok that's ok\". Feels this manic feeling is \"messing with creativity\". Likes that it helps with weight loss. Woke up a bunch overnight but was able to fall back asleep. Feels really awake today. A little racing thoughts. Feels safe on the unit, feels understood he said. Denies sxs of akithisia. \"Mental restlessness\". Was able to read a whole chapter of a book and keep going, feels more persistent. Feels brain is blocked a little bit, a little bit down about himself. But wants to keep going with Topamax for now and see if he adjusts. Stomach pain lasted half an hour. Had two Bms today. Said his mom said she would be ready for him tomorrow. Is feeling good about it. Has places to call he said. Looking for subsidized housing, can't find sober housing. Found one in [place]. Is thinking of calling them.    Phone call with pt's mother:  Pt's mom shared that pt's plans are not concrete - has talked about looking for housing in [place]. Concerned that he continues with therapy. Said he was not doing well before hospitalization - isolating and ordered meds on the internet and overdosed. Said he has been insisting on coming home now, she would like him to stay a little longer to stabilize. Would like a solid plan for him for discharge more than what he has at this point. Wouldn't let him walk onto the street she said, but she would like a little break as " "she is burned out. Said she told him he is welcome with her but felt things fell apart earlier - said he almost destroyed her house prior due to the paranoia and looking for \"signs\". Obsessed and staying up all night. Took apart smoke alarm, broke things, took apart internet modem bc thought messages came from there. Says he is bright, he is autistic. Relationships fall apart for him, wants him to get back on his feet and to have a plan and direction for the future. Willing to have him home until something more permanent is in place. His life has been disorganized she said. Spent Avel[place] year in [place] - went with stimulant and never abused it, but security would not allow him to have it on the trip and he fell apart she said. Welcomed the news that he has been coping without vyvanse.     ROS:   Patient denies acute concerns other than what is listed in HPI.     Objective:     Vitals:  /84   Pulse 74   Temp 97.6  F (36.4  C) (Temporal)   Resp 16   Ht 1.803 m (5' 11\")   Wt 107 kg (235 lb 14.4 oz)   SpO2 98%   BMI 32.90 kg/m      Allergies:  Allergies   Allergen Reactions    Prazosin Unknown     Worsening of nightmares       Current Medications:  Scheduled:  Current Facility-Administered Medications   Medication Dose Route Frequency    atenolol  50 mg Oral Daily    buprenorphine HCl-naloxone HCl  1 Film Sublingual BID    cholecalciferol  125 mcg Oral Daily    desvenlafaxine  100 mg Oral Daily    gabapentin  600 mg Oral TID    nicotine  1 patch Transdermal Daily    QUEtiapine  150 mg Oral BID    senna-docusate  1 tablet Oral At Bedtime    tiZANidine  2 mg Oral BID    topiramate  25 mg Oral At Bedtime       PRN:  Current Facility-Administered Medications   Medication Dose Route Frequency    acetaminophen  650 mg Oral Q4H PRN    hydrOXYzine HCl  25 mg Oral Q4H PRN    melatonin  3 mg Oral At Bedtime PRN    nicotine  2 mg Buccal Q1H PRN    OLANZapine  10 mg Intramuscular TID PRN    OLANZapine zydis  5 mg Oral " "BID PRN    polyethylene glycol  17 g Oral Daily PRN    tiZANidine  2 mg Oral Daily PRN       Labs and Imaging:  New results:   No results found for this or any previous visit (from the past 24 hour(s)).    Data this admission:  - CBC unremarkable  - CMP unremarkable  - TSH normal  - UDS positive for cannabinoids  - Vit D 14 (L)  - Hgb A1c 5.1 (WNL) on 7/10/23  - Lipids last month WNL  - Vit B12 normal  - Folate normal  - Urinalysis not yet performed  - EKG normal sinus rhythm, QTc 429     Mental Status Exam:     Oriented to:  Grossly Oriented  General:  Awake and Alert  Appearance:  appears stated age, Grooming is adequate, Dressed in casual clothing, and Tattoos on left arm  Behavior/Attitude:  Calm, Cooperative, and Engaged  Eye Contact: Downcast stare when answering questions, Glances, more eye contact to prior  Psychomotor: Normal and No evidence of tics, dystonia, or tardive dyskinesia  no catatonia present  Speech:  appropriate volume/tone and with good articulation  Language: Fluent in English with appropriate syntax and vocabulary.  Mood:  \"edge of manic\"  Affect:   more full than prior, congruent with mood, stable, and restricted  Thought Process:  linear, coherent, and goal directed  Thought Content:   No SI/HI/AH/VH; delusions of paranoia  Associations:  questionable, improving   Insight:   limited due to paranoid delusions and confident that his ex/ex's family is against him although earlier in the week he now felt \"they're empty threats\" yet real.   Judgment:  Seeking help. Has had some inappropriate discussions with peers in the milieu regarding robbery offenses.   Impulse control: fair  Attention Span:  adequate for conversation  Concentration:  grossly intact  Recent and Remote Memory:  not formally assessed  Fund of Knowledge: average  Muscle Strength and Tone: normal  Gait and Station: Normal     Psychiatric Assessment     Barak Clarke is a 39 year old male with previous psychiatric diagnoses of " depression, anxiety, ADHD, PTSD, substance-induced psychotic disorder, and polysubstance dependency and use disorder who checked into our emergency department from [facility] requesting an alternative chemical dependency treatment, admitted on 03/20/2024 due to concern for SI, psychosis, paranoia, delusions, and disorganized behavior in the context of substance use and relationship concerns. Most recent psychiatric hospitalization was last month for SI s/p overdose. Significant symptoms on admission include paranoia, hypervigilance, racing thoughts, disorganized behavior, social withdrawal. The MSE on admission was pertinent for poor insight and judgement, paranoia, delusions of his ex wife and family out to get him, self-referential delusions, agitation/restlessness. Biological contributions to mental health presentation include substance use most recently cannabis, past MH diagnoses. Psychological contributions to mental health presentation include poor coping, possible cluster B traits especially considering relationship concerns over his lifetime, PTSD, poor insight into need for substance treatment. Social factors contributing to mental health presentation include isolation, interpersonal conflicts, unemployed.  Protective factors include seeking help, knows when SI is bad he said, would like to stay on his medications.      In summary, the patient's reported symptoms of paranoia, delusions, anxiety with disorganized thoughts, and expressed concerns about people conspiring to kill him, taking over his electronics and monitoring him in the context of cannabis use and past substance use are consistent with prior diagnosis of substance induced psychosis. However, could also consider primary diagnosis of psychotic disorder with mood component given signs for suhas such as decrease need for sleep, rambling and perfuse thoughts, supporting Bipolar disorder with psychotic features or Schizoaffective disorder.  Although meth can also present with these features, he has not used in over a month per his report and supported by UDS. He will likely benefit from medication optimization and therapeutic milieu this admission. PT declining CD tx.      Given that he currently has intermittent SI and HI, psychosis, and possible suhas, patient warrants inpatient psychiatric hospitalization to maintain his safety.      Psychiatric Plan by Diagnosis      Today's changes:  - none    # Substance induced psychosis vs primary psychosis  - Continue Seroquel, changed to 150mg BID on 3/26 due to patient concern for nighttime sedation making possible sleep apnea worse   - prn zyprexa 5mg BID     #MDD  - PTA Pristiq 100mg daily - consider stopping if pt continues to have trouble sleeping and is anxious/agitated     #Anxiety/agitation  - PTA gabapentin 600mg TID  - prn hydroxyzine  - prn zyprexa      #ADHD  Pt has long hx of adhd - said Vyvanse was prescribed in [place] but this is unclear given he has not filled it in a month per pharmacy. Likely contributing to current agitation and trouble sleeping.   - Stopped vyvanse on admission.   Possible past seizure hx when on Wellbutrin but unclear, also when using drugs. Was given some Wellbutrin in the Ed and then it was stopped. Patient requested Wellbutrin on 3/26, team declined due to possible seizure history.   - Seizure precautions.      # Polysubstance use   UDS pos for cannabinoids in ED, pt states he has not used other substances since Feb 20th. Does make references to meth in his house.   - Pt declining to return to CD tx at this time, wants sober living  - Nicotine patch and lozenge  - Continue PTA suboxone 8-2 MG for opioid use disorder in remission, UDS negative for opioid use. Pt said he has been stable on this medication. Plan for outpt follow up with prescriber.      #Akithisia/muscle spasms  Pt endorsing restlessness, keeps him awake, could be RLS as well.   - Continue to decrease  "Zyprexa and monitor with prn  - Tizanidine 2mg Bid and prn 2mg BID  - Consider Ropinirole      2. Pertinent Labs/Monitoring:   - Uds pos for cannabinoids  - CBC, CMP, TSH, Vit B12 and folate WNL  - Vitamin D low  - Lipids last month wnl  - A1C last month wnl  - EKG normal sinus rhythm     3. Additional Plans:  - Patient will be treated in therapeutic milieu with appropriate individual and group therapies as described     Psychiatric Hospital Course:      Barak Clarke was admitted to [facility] as a voluntary pt.  Medications:  PTA tizanidine, Pristiq, Gabapentin, Suboxone were continued with prn tizanidine added for restlessness, PTA zyprexa made prn due to akithisia in the Ed.   PTA Vyvanse was held due to anxiety and agitation.  Possible past seizure hx when on Wellbutrin but unclear, also when using drugs. Was given some Wellbutrin in the Ed and then it was stopped. Patient requested Wellbutrin on 3/26, team declined due to possible seizure history. Seizure precautions in place. Pt said he has been learning to cope without Vyvanse, relying on art and other forms of activity to find value and \"self worth\".    New medications started at the time of admission include Seroquel 200mg in the ED.   Seroquel: 50mg at bedtime started in the ED, increased to 100mg at bedtime on 3/19, increased to 200mg at bedtime on 3/20, increased to morning 100mg and evening 300mg on 3/21. Changed to 150mg BID on 3/26 due to patient concern for sedation overnight affecting possible sleep apnea.   Zyprexa prn: 10mg TID starting 3/20, decreased to 5mg BID on 3/25 due to patient concern for weight gain  - restarted trial of metformin with monitoring for rash and to help with metabolic syndrome although substantial weight gain during this hospitalization so far is unlikely.   Polysubstance use: UDS pos for cannabinoids in ED, pt states he has not used other substances since Feb 20th. Does make references to meth in his house. Pt declining to " return to  tx at this time, wants sober living although he will be unlikely to qualify due to not being sober for 30 days prior and not completing dual [facility] program prior. Nicotine patch and lozenge given on admission. Continued PTA suboxone 8-2 MG for opioid use disorder in remission, UDS negative for opioid use. Pt said he has been stable on this medication. Plan for outpt follow up with prescriber.      The risks, benefits, alternatives, and side effects were discussed and understood by the patient and other caregivers.     Medical Assessment and Plan     Medical diagnoses to be addressed this admission:    # Metabolic syndrome  Pt endorsed weight gain with zyprexa.   - Metformin started/continued in the ED  - Metformin stopped on admission due to pt endorsing rash after taking it in the ED. Since Zyprexa was being decreased, decided to stop metformin and monitor.   - Metformin restarted on 3/25 due to patient concern for weight gain but had recurrence of rash on wrists and back, PRN Benadryl helped with this.  - Metformin discontinued on 3/26 and Topamax 25mg daily started to avoid rash.        # HTN  - Continued PTA atenolol  - CTM      # Patient concerned for sleep apnea  - Will monitor vitals  - Outpatient PCP follow up  - Recommend sleep study outpt    Medical course: Patient was physically examined by the ED prior to being transferred to the unit and was found to be medically stable and appropriate for admission. Metformin stopped on admission due to pt endorsing rash after taking it in the ED, showed provider red raised rash on forearm and stated he had some on his back as well. Since Zyprexa was being decreased, decided to stop metformin and monitor. Metformin restarted on 3/25 due to patient concern for weight gain but had recurrence of rash on bilateral wrists and back, PRN Benadryl helped with this. Metformin discontinued on 3/26 due to rash and Topamax 25mg daily for weight loss. Some upset stomach  after starting Topamax and patient reported he felt almost manic, but liked that it helps with weight loss and wanted to continue and see how it goes. Seroquel dose was decreased and split to BID due to patient concern for sleep apnea and that sedation from Seroquel made it worse and caused him to wake up during the night and have poor sleep. Reported improved sleep after starting BID dosing.      Consults:  none     Checklist     Legal Status: Voluntary     Safety Assessment:   Behavioral Orders   Procedures    Code 1 - Restrict to Unit    Routine Programming     As clinically indicated    Seizure precautions    Self Injury Precaution    Status 15     Every 15 minutes.    Suicide precautions: Suicide Risk: HIGH     Patients on Suicide Precautions should have a Combination Diet ordered that includes a Diet selection(s) AND a Behavioral Tray selection for Safe Tray - with utensils, or Safe Tray - NO utensils       Order Specific Question:   Suicide Risk     Answer:   HIGH       Risk Assessment:  Risk for harm is moderate-high.  Risk factors: maladaptive coping, substance use, trauma, family dynamics, impulsive, past behaviors, and psychosocial stressors, psychosis and delusions with commands hallucinations to use substances.  Protective factors: engaged in treatment and seeking CD treatment    SIO: none    Disposition: TBD. Pending stabilization, medication optimization, & development of a safe discharge plan. Plan to return home with his mom.      Attestations     Shawna Bhatt, MS4  [facility] Medical Student      Resident Attestation:   I was present with the medical student who participated in the service and in the documentation of the note.  I have verified the history and personally performed the physical exam, mental status exam, and medical decision making. I agree with the assessment and plan of care as documented in the note.     Kath Garcia MD  Psychiatry Resident Physician    This patient has been seen  and evaluated by me, Bridger Stack.  I have discussed this patient with the psychiatry resident and I agree with the findings and plan in this note.    I have reviewed today's vital signs, medications, labs and imaging.     Bridger Hernandez MD

## 2024-03-29 PROCEDURE — 250N000013 HC RX MED GY IP 250 OP 250 PS 637: Performed by: PSYCHIATRY & NEUROLOGY

## 2024-03-29 PROCEDURE — 250N000013 HC RX MED GY IP 250 OP 250 PS 637

## 2024-03-29 PROCEDURE — 99232 SBSQ HOSP IP/OBS MODERATE 35: CPT | Mod: GC | Performed by: PSYCHIATRY & NEUROLOGY

## 2024-03-29 PROCEDURE — H2032 ACTIVITY THERAPY, PER 15 MIN: HCPCS

## 2024-03-29 PROCEDURE — 124N000002 HC R&B MH UMMC

## 2024-03-29 RX ADMIN — NICOTINE POLACRILEX 2 MG: 2 LOZENGE ORAL at 11:12

## 2024-03-29 RX ADMIN — TOPIRAMATE 25 MG: 25 TABLET, FILM COATED ORAL at 20:27

## 2024-03-29 RX ADMIN — OLANZAPINE 5 MG: 5 TABLET, ORALLY DISINTEGRATING ORAL at 13:29

## 2024-03-29 RX ADMIN — NICOTINE POLACRILEX 2 MG: 2 LOZENGE ORAL at 08:10

## 2024-03-29 RX ADMIN — GABAPENTIN 600 MG: 300 CAPSULE ORAL at 20:27

## 2024-03-29 RX ADMIN — ATENOLOL 50 MG: 50 TABLET ORAL at 07:51

## 2024-03-29 RX ADMIN — NICOTINE 1 PATCH: 21 PATCH, EXTENDED RELEASE TRANSDERMAL at 08:31

## 2024-03-29 RX ADMIN — BUPRENORPHINE AND NALOXONE 1 FILM: 8; 2 FILM BUCCAL; SUBLINGUAL at 07:51

## 2024-03-29 RX ADMIN — NICOTINE POLACRILEX 2 MG: 2 LOZENGE ORAL at 13:29

## 2024-03-29 RX ADMIN — NICOTINE POLACRILEX 2 MG: 2 LOZENGE ORAL at 16:01

## 2024-03-29 RX ADMIN — NICOTINE POLACRILEX 2 MG: 2 LOZENGE ORAL at 07:06

## 2024-03-29 RX ADMIN — QUETIAPINE 150 MG: 100 TABLET ORAL at 07:54

## 2024-03-29 RX ADMIN — BUPRENORPHINE AND NALOXONE 1 FILM: 8; 2 FILM BUCCAL; SUBLINGUAL at 16:01

## 2024-03-29 RX ADMIN — NICOTINE POLACRILEX 2 MG: 2 LOZENGE ORAL at 05:31

## 2024-03-29 RX ADMIN — NICOTINE POLACRILEX 2 MG: 2 LOZENGE ORAL at 17:56

## 2024-03-29 RX ADMIN — TIZANIDINE 2 MG: 2 TABLET ORAL at 07:55

## 2024-03-29 RX ADMIN — QUETIAPINE 150 MG: 100 TABLET ORAL at 20:27

## 2024-03-29 RX ADMIN — GABAPENTIN 600 MG: 300 CAPSULE ORAL at 13:29

## 2024-03-29 RX ADMIN — GABAPENTIN 600 MG: 300 CAPSULE ORAL at 07:51

## 2024-03-29 RX ADMIN — NICOTINE POLACRILEX 2 MG: 2 LOZENGE ORAL at 12:13

## 2024-03-29 RX ADMIN — OLANZAPINE 5 MG: 5 TABLET, ORALLY DISINTEGRATING ORAL at 05:31

## 2024-03-29 RX ADMIN — NICOTINE POLACRILEX 2 MG: 2 LOZENGE ORAL at 14:42

## 2024-03-29 RX ADMIN — TIZANIDINE 2 MG: 2 TABLET ORAL at 16:01

## 2024-03-29 RX ADMIN — DESVENLAFAXINE 100 MG: 50 TABLET, FILM COATED, EXTENDED RELEASE ORAL at 07:50

## 2024-03-29 RX ADMIN — Medication 125 MCG: at 07:51

## 2024-03-29 ASSESSMENT — ACTIVITIES OF DAILY LIVING (ADL)
ADLS_ACUITY_SCORE: 30
LAUNDRY: WITH SUPERVISION
ADLS_ACUITY_SCORE: 30
ADLS_ACUITY_SCORE: 30
HYGIENE/GROOMING: INDEPENDENT
ADLS_ACUITY_SCORE: 30
ORAL_HYGIENE: INDEPENDENT
ADLS_ACUITY_SCORE: 30
LAUNDRY: WITH SUPERVISION
ORAL_HYGIENE: INDEPENDENT
DRESS: INDEPENDENT;STREET CLOTHES
DRESS: INDEPENDENT
ADLS_ACUITY_SCORE: 30
HYGIENE/GROOMING: INDEPENDENT
ADLS_ACUITY_SCORE: 30

## 2024-03-29 NOTE — PROGRESS NOTES
"Psychiatry Cross Cover Note    Subjective:   Notified by staff that patient had signed a 12-hour intent at 2007.  Per nurse, patient had a distressing phone call with his mother, where he feels that she was trying to \"control\" his discharge.  As such, patient got aggravated and said that he would sign himself out right now.    Per nurse, patient had packed up his things and insisted on discharging tonight.  Patient has no plan regarding how he would go where he wants to go.  Patient also does not have a definitive location as to where he would go as mom does not want him to come back home at this time, due to the severity of his symptoms.    Per writer and nurse discussion, it appears as though patient is acting out based upon the conversation with his mother, and as such we will give a bit of time for patient to calm down and rescind the 12-hour intent.  Per hand off, patient is holdable if he requests to leave.    Objective:   /77 (BP Location: Right arm, Patient Position: Sitting, Cuff Size: Adult Large)   Pulse 76   Temp 97.6  F (36.4  C) (Temporal)   Resp 16   Ht 1.803 m (5' 11\")   Wt 108.8 kg (239 lb 14.4 oz)   SpO2 100%   BMI 33.46 kg/m      Assessment:  Barak Clarke is a 39 year old male with previous psychiatric diagnoses of depression, anxiety, ADHD, PTSD, substance-induced psychotic disorder, and polysubstance dependency and use disorder who checked into our emergency department from [facility] requesting an alternative chemical dependency treatment, admitted on 03/20/2024 due to concern for SI, psychosis, paranoia, delusions, and disorganized behavior in the context of substance use and relationship concerns     Plan:  Give patient an opportunity chance to calm down after the intense conversation with mom and  have him agree to talk further about discharge with team in the morning  If patient continues to insist on discharge tonight, 72-hour hold will have to be initiated    Ruthann" MD Shankar  Psychiatry PGY2

## 2024-03-29 NOTE — PLAN OF CARE
"  Problem: Psychotic Signs/Symptoms  Goal: Improved Behavioral Control (Psychotic Signs/Symptoms)  Outcome: Progressing   Goal Outcome Evaluation:    Plan of Care Reviewed With: patient      At the start of the shift patient was out in the lounge watching T.V with peers. During 1:1 with writer patient was upset that he was \"Being held against my will and force to taking injection I don't need\". He continue to state that \"That so call doctor and my mom are conspiring against me. They told be I can leave this week and now they are telling me next week. I Don't trust that doctor.\" He talked about how he was \"Faking to be volentary here before so I can go home, but I don't care now. I will fake it until I make it out of here\". He denied SI/SIB/HI and pain. His thought process was paranoid and suspicious. He ate 100% of his dinner. Patient requested PRN lozenges.       "

## 2024-03-29 NOTE — PLAN OF CARE
Pt appeared sleeping for 5.5 hours without acute distress   Utilized PRN lozenge and ODT Zyprexa at 0531 for  craving and anxiety  15 minutes safety checks completed and no issues identified  Continue  on SI/SIB with absence of self harm this shift  No pain or discomfort verbalized  No outburst or other inappropriate behavior this shift         Problem: Sleep Disturbance  Goal: Adequate Sleep/Rest  Outcome: Progressing     Problem: Adult Behavioral Health Plan of Care  Goal: Plan of Care Review  Outcome: Progressing  Goal: Adheres to Safety Considerations for Self and Others  Intervention: Develop and Maintain Individualized Safety Plan  Recent Flowsheet Documentation  Taken 3/29/2024 0429 by Sharonda Gleason, RN  Safety Measures: safety rounds completed  Goal: Absence of New-Onset Illness or Injury  Intervention: Identify and Manage Fall Risk  Recent Flowsheet Documentation  Taken 3/29/2024 0429 by Sharonda Gleason, RN  Safety Measures: safety rounds completed   Goal Outcome Evaluation:

## 2024-03-29 NOTE — PLAN OF CARE
"Team Note Due:  Thursday     Assessment/Intervention/Current Symtoms and Care Coordination:  - chart review    - team meeting - discussed pt progress, symptomology, and response to treatment.  Discussed the discharge plan and any potential impediments to discharge.    - per staff - patient was at desk asking to be placed on a 72 hour hold - this writer did overhear some comments made by patient , however was on a call and could not hear all statements and was not able to speak with patient at that time.     - post team rounds team meeting / discussion  patient would not talk to the Psychiatrist today, they attempted two times. Paranoia now includes Psychiatrist - this is new from prior. Unable to assess or discuss discharge / safety plan options/ 72 hour placed given change worsening of paranoia    - patient asked to talk to this writer later in the afternoon. Met with him in his room, he starting talking about wanting to be discharged. Did discuss with him 72 hour hold. Through our discussion patient would make comments about not trusting Dr. Stack. This writer did ask what has changed as he has not talked about having any issues with trusting the Dr until today. He made comments about 'him going back on his word', inquired what he means by going back on his word. Makes a comment that \"they\" are working with the Dr to be against him.  He mentions about being discharged. This writer said my understanding is he would not talk to the Doctors today , how would he be able to know what the recommendation is. Says \"I talked to my mom last night and she told me that she talked to him on the phone and now I won't discharge until Wednesday\" again asked him if either Dr told him this today. He makes a comment about not talking to him \"because I don't trust him\".  He later adds \"another patient\" has been talking about how he's trying to inject him with medicine, I didn't believe it but now I see how he's vindictive \" " "  Overall appeared quite paranoid and disorganized in his attempts to explain why he now doesn't trust the provider. And seems very influenced by paranoid comments made by another patient.   He comments that he is suicidal - when this writer asked follow up questions - safety questions e.g if has a plan / can be safe on unit. He immediatly says \"I'm only suicidal if I have to stay here\"      Discharge Plan or Goal:  Pending stabilization & development of a safe discharge plan.        Barriers to Discharge:  Patient requires further psychiatric stabilization due to current symptomology        Referral Status:   DAVID referrals made in ED:  [facility] [address] [place] [address] Phone: [phone]  [email or web address]     [facility]  [address] [place] [address] Phone: [phone] Fax: [phone]  [email or web address]       Legal Status:  voluntary    Contacts:         Upcoming Meetings and Dates/Important Information and next steps:     "

## 2024-03-29 NOTE — PROGRESS NOTES
"  ----------------------------------------------------------------------------------------------------------  [facility]  Psychiatry Progress Note  Hospital Day #9     Interim History:     The patient's care was discussed with the treatment team and chart notes were reviewed.    Vitals: VSS  Sleep: 5.5 hours (03/29/24 0600)  Scheduled medications: Took all scheduled medications as prescribed   Psychiatric PRN medications: nicotine lozenge, melatonin 3mg, zyprexa 5mg    Staff Report:   Patient signed a 12 hour intent after becoming upset during a phone call with his mother. After phone call he went to the desk and informed RN he was discharging and wanted to rescind the DELORES for his mother, stated he feels his mother is interfering with his discharge. Signed 12 hour intent at 2007. [place]ant that he would leave overnight, packed up his belongings. Provider on call notified, patient notified he would likely be placed on a hold if continues to insist on discharge, was encouraged to discuss discharge with primary team, patient upset with this but agreed to rescind intent at 2049. RN tried to check in with patient, he said he was not on the mental plane to answer questions, \"full of rage\" due to having to stay and feels lied to and that his mother sabotaged him.   During day had been out and visible in the unit. Social with peers. Hygiene and appetite good. Active participant in group.   Please see staff notes for further details.     Subjective:     Patient Interview:  Barak Clarke was seen in his room. He was making bed, sat down on it to talk to team. He says that what he thought would happen has happened, lies to keep him here, these places tell patients they have rights and they don't actually. \"They\" wanted him to be here all along, \"they\" were conspiring with the doctors to keep him here, \"they\" were influencing his mom. When team asked who \"they\" are, he told team to " "leave his room.      Later asked to see team again, and when team went to see him he then refused to speak with them, saying he did not want to talk to the attending, he just wanted to talk to SW. Said he felt he could no longer trust the attending. Shortly after, he reportedly went to desk and told RN and SW that he wants 72h hold as he said he would be able to discharge after it . Later asked staff to speak to just the resident without the attending, staff informed the resident. This resident informed staff that both resident and attending would be present for further interviews.     ROS:   Patient denies acute concerns other than what is listed in HPI.     Objective:     Vitals:  /82 (BP Location: Left arm)   Pulse 64   Temp 97.2  F (36.2  C) (Temporal)   Resp 14   Ht 1.803 m (5' 11\")   Wt 108.8 kg (239 lb 14.4 oz)   SpO2 98%   BMI 33.46 kg/m      Allergies:  Allergies   Allergen Reactions    Prazosin Unknown     Worsening of nightmares       Current Medications:  Scheduled:  Current Facility-Administered Medications   Medication Dose Route Frequency    atenolol  50 mg Oral Daily    buprenorphine HCl-naloxone HCl  1 Film Sublingual BID    cholecalciferol  125 mcg Oral Daily    desvenlafaxine  100 mg Oral Daily    gabapentin  600 mg Oral TID    nicotine  1 patch Transdermal Daily    QUEtiapine  150 mg Oral BID    tiZANidine  2 mg Oral BID    topiramate  25 mg Oral At Bedtime       PRN:  Current Facility-Administered Medications   Medication Dose Route Frequency    acetaminophen  650 mg Oral Q4H PRN    hydrOXYzine HCl  25 mg Oral Q4H PRN    melatonin  3 mg Oral At Bedtime PRN    nicotine  2 mg Buccal Q1H PRN    OLANZapine  10 mg Intramuscular TID PRN    OLANZapine zydis  5 mg Oral BID PRN    polyethylene glycol  17 g Oral Daily PRN    senna-docusate  1 tablet Oral At Bedtime PRN    tiZANidine  2 mg Oral Daily PRN       Labs and Imaging:  New results:   No results found for this or any previous " "visit (from the past 24 hour(s)).    Data this admission:  - CBC unremarkable  - CMP unremarkable  - TSH normal  - UDS positive for cannabinoids  - Vit D 14 (L)  - Hgb A1c 5.1 (WNL) on 7/10/23  - Lipids last month WNL  - Vit B12 normal  - Folate normal  - Urinalysis not yet performed  - EKG normal sinus rhythm, QTc 429     Mental Status Exam:     Oriented to:  Grossly Oriented  General:  Awake and Alert  Appearance:  appears stated age, Grooming is adequate, Dressed in casual clothing, and Tattoos on left arm  Behavior/Attitude:  Accusatory, Guarded, and Difficult to redirect  Eye Contact: Intense downcast stare, Glances, minimal to no eye contact, glaring  Psychomotor: Normal and No evidence of tics, dystonia, or tardive dyskinesia  no catatonia present  Speech:  appropriate volume/tone and with good articulation  Language: Fluent in English with appropriate syntax and vocabulary.  Mood:  declined to discuss with team  Affect:  restricted and irritable, intense   Thought Process:  ruminative and looseness of association  Thought Content:   No SI/HI/AH/VH; delusions of paranoia very evident today, says \"they\" are conspiring with doctors against him and wanted him here the whole time, would not elaborate on who \"they\" are.   Associations:  questionable   Insight:   limited due to paranoid delusions and confident that his ex/ex's family is against him, now endorsing \"they\" are conspiring with doctors against him and wanted him here the whole time  Judgment:  limited. Has had some inappropriate discussions with peers in the milieu regarding robbery offenses. Asked for a 12hour intent, rescinded it, then asked for a 72 hour hold as he felt he would discharge after it .   Impulse control: fair  Attention Span:  adequate for conversation  Concentration:  grossly intact  Recent and Remote Memory:  not formally assessed  Fund of Knowledge: average  Muscle Strength and Tone: normal  Gait and Station: Normal     " Psychiatric Assessment     Barak Clarke is a 39 year old male with previous psychiatric diagnoses of depression, anxiety, ADHD, PTSD, substance-induced psychotic disorder, and polysubstance dependency and use disorder who checked into our emergency department from [facility] requesting an alternative chemical dependency treatment, admitted on 03/20/2024 due to concern for SI, psychosis, paranoia, delusions, and disorganized behavior in the context of substance use and relationship concerns. Most recent psychiatric hospitalization was last month for SI s/p overdose. Significant symptoms on admission include paranoia, hypervigilance, racing thoughts, disorganized behavior, social withdrawal. The MSE on admission was pertinent for poor insight and judgement, paranoia, delusions of his ex wife and family out to get him, self-referential delusions, agitation/restlessness. Biological contributions to mental health presentation include substance use most recently cannabis, past MH diagnoses. Psychological contributions to mental health presentation include poor coping, possible cluster B traits especially considering relationship concerns over his lifetime, PTSD, poor insight into need for substance treatment. Social factors contributing to mental health presentation include isolation, interpersonal conflicts, unemployed.  Protective factors include seeking help, knows when SI is bad he said, would like to stay on his medications.      In summary, the patient's reported symptoms of paranoia, delusions, anxiety with disorganized thoughts, and expressed concerns about people conspiring to kill him, taking over his electronics and monitoring him in the context of cannabis use and past substance use are consistent with prior diagnosis of substance induced psychosis. However, could also consider primary diagnosis of psychotic disorder with mood component given signs for suhas such as decrease need for sleep, rambling and  perfuse thoughts, supporting Bipolar disorder with psychotic features or Schizoaffective disorder. Although meth can also present with these features, he has not used in over a month per his report and supported by UDS. He will likely benefit from medication optimization and therapeutic milieu this admission. PT declining CD tx.      Given that he currently has psychosis, and possible suhas, patient warrants inpatient psychiatric hospitalization to maintain his safety.      Psychiatric Plan by Diagnosis      Today's changes:  - 72H hold placed    # Substance induced psychosis vs primary psychosis  - Continue Seroquel, changed to 150mg BID on 3/26 due to patient concern for nighttime sedation making possible sleep apnea worse   - prn zyprexa 5mg BID     #MDD  - PTA Pristiq 100mg daily - consider stopping if pt continues to have trouble sleeping and is anxious/agitated     #Anxiety/agitation  - PTA gabapentin 600mg TID  - prn hydroxyzine  - prn zyprexa      #ADHD  Pt has long hx of adhd - said Vyvanse was prescribed in [place] but this is unclear given he has not filled it in a month per pharmacy. Likely contributing to current agitation and trouble sleeping.   - Stopped vyvanse on admission.   Possible past seizure hx when on Wellbutrin but unclear, also when using drugs. Was given some Wellbutrin in the Ed and then it was stopped. Patient requested Wellbutrin on 3/26, team declined due to possible seizure history.   - Seizure precautions.      # Polysubstance use   UDS pos for cannabinoids in ED, pt states he has not used other substances since Feb 20th. Does make references to meth in his house.   - Pt declining to return to CD tx at this time, wants sober living  - Nicotine patch and lozenge  - Continue PTA suboxone 8-2 MG for opioid use disorder in remission, UDS negative for opioid use. Pt said he has been stable on this medication. Plan for outpt follow up with prescriber.      #Akithisia/muscle spasms  Pt  "endorsing restlessness, keeps him awake, could be RLS as well.   - Continue to decrease Zyprexa and monitor with prn  - Tizanidine 2mg Bid and prn 2mg BID  - Consider Ropinirole      2. Pertinent Labs/Monitoring:   - Uds pos for cannabinoids  - CBC, CMP, TSH, Vit B12 and folate WNL  - Vitamin D low  - Lipids last month wnl  - A1C last month wnl  - EKG normal sinus rhythm     3. Additional Plans:  - Patient will be treated in therapeutic milieu with appropriate individual and group therapies as described     Psychiatric Hospital Course:      Barak Clarke was admitted to [facility] as a voluntary pt.  Medications:  PTA tizanidine, Pristiq, Gabapentin, Suboxone were continued with prn tizanidine added for restlessness, PTA zyprexa made prn due to akithisia in the Ed.   PTA Vyvanse was held due to anxiety and agitation.  Possible past seizure hx when on Wellbutrin but unclear, also when using drugs. Was given some Wellbutrin in the Ed and then it was stopped. Patient requested Wellbutrin on 3/26, team declined due to possible seizure history. Seizure precautions in place. Pt said he has been learning to cope without Vyvanse, relying on art and other forms of activity to find value and \"self worth\".    New medications started at the time of admission include Seroquel 200mg in the ED.   Seroquel: 50mg at bedtime started in the ED, increased to 100mg at bedtime on 3/19, increased to 200mg at bedtime on 3/20, increased to morning 100mg and evening 300mg on 3/21. Changed to 150mg BID on 3/26 due to patient concern for sedation overnight affecting possible sleep apnea.   Zyprexa prn: 10mg TID starting 3/20, decreased to 5mg BID on 3/25 due to patient concern for weight gain  - restarted trial of metformin with monitoring for rash and to help with metabolic syndrome although substantial weight gain during this hospitalization so far is unlikely.   Polysubstance use: UDS pos for cannabinoids in ED, pt states he has not used " "other substances since Feb 20th. Does make references to meth in his house. Pt declining to return to  tx at this time, wants sober living although he will be unlikely to qualify due to not being sober for 30 days prior and not completing dual [facility] program prior. Nicotine patch and lozenge given on admission. Continued PTA suboxone 8-2 MG for opioid use disorder in remission, UDS negative for opioid use. Pt said he has been stable on this medication. Plan for outpt follow up with prescriber.   Overnight and during the day on 3/29/2024 patient appeared disorganized and upset, requested and then rescinded 12H intent, refused to engage with treatment team regarding discharge requests due to paranoia that \"they\" were conspiring with the doctors and his mom (would not specify who \"they\" are), and appeared to be splitting - asked specifically to speak with the resident on the treatment team without the attending due to paranoia about the attending. 72H hold placed for disorganized thinking and paranoid delusions with a lack of a coherent discharge plan. Will reassess for possible civil commitment petition on Monday 4/1/2024.      The risks, benefits, alternatives, and side effects were discussed and understood by the patient and other caregivers.     Medical Assessment and Plan     Medical diagnoses to be addressed this admission:    # Metabolic syndrome  Pt endorsed weight gain with zyprexa.   - Metformin started/continued in the ED  - Metformin stopped on admission due to pt endorsing rash after taking it in the ED. Since Zyprexa was being decreased, decided to stop metformin and monitor.   - Metformin restarted on 3/25 due to patient concern for weight gain but had recurrence of rash on wrists and back, PRN Benadryl helped with this.  - Metformin discontinued on 3/26 and Topamax 25mg daily started to avoid rash.        # HTN  - Continued PTA atenolol  - CTM      # Patient concerned for sleep apnea  - Will monitor " vitals  - Outpatient PCP follow up  - Recommend sleep study outpt    Medical course: Patient was physically examined by the ED prior to being transferred to the unit and was found to be medically stable and appropriate for admission. Metformin stopped on admission due to pt endorsing rash after taking it in the ED, showed provider red raised rash on forearm and stated he had some on his back as well. Since Zyprexa was being decreased, decided to stop metformin and monitor. Metformin restarted on 3/25 due to patient concern for weight gain but had recurrence of rash on bilateral wrists and back, PRN Benadryl helped with this. Metformin discontinued on 3/26 due to rash and Topamax 25mg daily for weight loss. Some upset stomach after starting Topamax and patient reported he felt almost manic, but liked that it helps with weight loss and wanted to continue and see how it goes. Seroquel dose was decreased and split to BID due to patient concern for sleep apnea and that sedation from Seroquel made it worse and caused him to wake up during the night and have poor sleep. Reported improved sleep after starting BID dosing.      Consults:  none     Checklist     Legal Status: 72-h Hold signed on 3/29/2024 at 1:53pm      Safety Assessment:   Behavioral Orders   Procedures    Code 1 - Restrict to Unit    Routine Programming     As clinically indicated    Seizure precautions    Self Injury Precaution    Status 15     Every 15 minutes.    Suicide precautions: Suicide Risk: HIGH     Patients on Suicide Precautions should have a Combination Diet ordered that includes a Diet selection(s) AND a Behavioral Tray selection for Safe Tray - with utensils, or Safe Tray - NO utensils       Order Specific Question:   Suicide Risk     Answer:   HIGH       Risk Assessment:  Risk for harm is moderate-high.  Risk factors: maladaptive coping, substance use, trauma, family dynamics, impulsive, past behaviors, and psychosocial stressors, psychosis  and delusions with commands hallucinations to use substances.  Protective factors: engaged in treatment and seeking CD treatment    SIO: none    Disposition: TBD. Pending stabilization, medication optimization, & development of a safe discharge plan. Plan to return home with his mom with outpt follow up when stable.      Attestations     Shawna Bhatt, MS4  [facility] Medical Student      Resident Attestation:   I was present with the medical student who participated in the service and in the documentation of the note.  I have verified the history and personally performed the physical exam, mental status exam, and medical decision making. I agree with the assessment and plan of care as documented in the note.     Kath Garcia MD  Psychiatry Resident Physician    This patient has been seen and evaluated by me, Bridger Stack.  I have discussed this patient with the psychiatry resident and I agree with the findings and plan in this note.    I have reviewed today's vital signs, medications, labs and imaging.     Bridger Hernandez MD

## 2024-03-29 NOTE — PLAN OF CARE
"RN Shift Assessment for Barak  [id number]     Patient placed on 72hh at 1353 by provider who he refused to speak with today, paperwork given to pt, hold expiring 4/3 1353.   Pt expressed feelings of being persecuted and imprisoned.   Pt did not attend groups and appeared anxious walking in room or hallway much of shift. No safety concerns.     Verbalized Emotional State: anxiety, frustration, disbelief, powerlessness  Anxiety:  Did not number /10 Anxiety symptoms: Stress, Nervous, Restless, Unease, Sleep-related problems, and Avoidance  Depression:  Did not number /10 Depression symptoms: Isolative and Helplessness  SI: no   SIB: no Self Injury WDL: WDL  Feels Like Hurting Others: no  Overt Aggression WDL: WDL Duty to Inform: NA   Safety Concerns Expressed: No Contracted for Safety: yes    PRN Medications Administered this shift: Yes - please explain: Nicotine lozenge x5  And zyprexa 5mg x1 for heightened anxiety.   Scheduled Medications given: yes   Medication Side effects/ Concerns: No     Perceptual State: consistent with reality  Hallucinations: denies hallucinations  Delusions: persecutory  Judgment and Insight: blaming others, denies responsibility  Thought Content: suspiciousness, preoccupation  Intellectual Performance: oriented x 4  Level of Consciousness: alert  Behavior: Interactions: guarded, cooperative  Motor Movement: tense, balanced, steady gait  Emotion/Mood: anxious, pessimistic  Affect: blunted, restricted     Nutrition: 3-->adequate  Sleep/Rest/Relaxation: pt states sleep is erratic which is normal for him, hard to stay asleep  General Appearance WDL: WDL  Patient Currently in Pain: no     /82 (BP Location: Left arm)   Pulse 64   Temp 97.2  F (36.2  C) (Temporal)   Resp 14   Ht 1.803 m (5' 11\")   Wt 108.8 kg (239 lb 14.4 oz)   SpO2 98%   BMI 33.46 kg/m      Problem: Adult Behavioral Health Plan of Care  Goal: Optimized Coping Skills in Response to Life Stressors  Intervention: Promote " Effective Coping Strategies  Recent Flowsheet Documentation  Taken 3/29/2024 1315 by Miriam Horta, RN  Supportive Measures: active listening utilized   Goal Outcome Evaluation:    Plan of Care Reviewed With: patient

## 2024-03-29 NOTE — PLAN OF CARE
BEH IP Unit Acuity Rating Score (UARS)  Patient is given one point for every criteria they meet.    CRITERIA SCORING   On a 72 hour hold, court hold, committed, stay of commitment, or revocation. 0    Patient LOS on BEH unit exceeds 20 days. 0  LOS: 9   Patient under guardianship, 55+, otherwise medically complex, or under age 11. 0   Suicide ideation without relief of precipitating factors. 1   Current plan for suicide. 0   Current plan for homicide. 0   Imminent risk or actual attempt to seriously harm another without relief of factors precipitating the attempt. 0   Severe dysfunction in daily living (ex: complete neglect for self care, extreme disruption in vegetative function, extreme deterioration in social interactions). 1   Recent (last 7 days) or current physical aggression in the ED or on unit. 0   Restraints or seclusion episode in past 72 hours. 0   Recent (last 7 days) or current verbal aggression, agitation, yelling, etc., while in the ED or unit. 0   Active psychosis. 1   Need for constant or near constant redirection (from leaving, from others, etc).  0   Intrusive or disruptive behaviors. 0   Patient requires 3 or more hours of individualized nursing care per 8-hour shift (i.e. for ADLs, meds, therapeutic interventions). 0   TOTAL 3

## 2024-03-29 NOTE — PROGRESS NOTES
03/29/24 1500    Group Therapy Session   Time Session Began 1115   Time Session Ended 1210   Total Time (minutes) 45   Total # Attendees 6   Group Type expressive therapy   Group Topic Covered balanced lifestyle;relaxation techniques;self-care activities;leisure exploration/use of leisure time   Group Session Detail Relaxation   Patient Response/Contribution cooperative with task   Patient Participation Detail Cooperatively engaged in Morning Music Relaxation group to decrease anxiety and promote validation and reflection.  Calm affect, appropriately engaged in session, responding well to the music.

## 2024-03-29 NOTE — PLAN OF CARE
"  Problem: Adult Behavioral Health Plan of Care  Goal: Optimized Coping Skills in Response to Life Stressors  Outcome: Not Progressing   Goal Outcome Evaluation:    Plan of Care Reviewed With: patient       Nursing Assessment    Recent Vitals: B/P:120/77  T:97.6  P:76    General Shift Summary  Visible in milieu and social with select peers. No major behavioral concerns this shift. No overtly paranoid comments but seems to be generally guarded. The phones on the unit were not working properly and pt made some statements about not blaming him for the phones not working and that it's not his fault. Pt did not appear to be responding to internal stimuli. At around 2000 pt had a phone call with their mom where they were talking loudly saying, \"no, no, no\" and \"fine I'll just leave right now.\" Pt then came to the desk and informed writer that he was discharging and wanted to rescind the DELORES for his mother. Pt did not want to discuss the reason for these sudden decisions other than stating that he feels his mother is interfering with his discharge. Writer discussed 12 hour intent process which they signed at 2007. Pt was adamant that they would leave tonight and packed their belongings up. Writer notified on call provider who informed writer that based upon the primary teams sign out and fact that patient planned to go to his mother's house that she would place patient on a 72 hour hold. Writer met with pt and told them that the on call provider would likely put them on a hold but that if they wanted to the provider to come and talk with them that they would do this. Pt was encouraged to discuss discharge planning with the primary team tomorrow. Pt was upset with this but agreed to rescind their intent to leave at 2049.     Writer attempted to check in with patient but pt says that they are, \"not even on the mental plane right now to answer those questions. Pt says they are, \"full of rage\" due to having to stay in the " "hospital and that they are too angry to think about anything else. Pt shares that he was told by the provider and his mother that he could go back home on Friday but feels he was lied to and that his mother sabotaged him. Pt says that they do not like being here but have been trying to put on a good face. Pt says that now they are just going to be angry and miserable. Pt tells writer, \"I haven't acted insane, I haven't acted out at all, I didn't even need to come here, I just agreed to come for a few days because my mom wanted me to.\" Pt does contract for safety. Says they are having trouble sleeping but otherwise don't have any mental health symptoms.     Patient is medication compliant and reported no side effects. Pt received PRN melatonin at 1926 for sleep.     Hygiene and appetite are appropriate.     Bakari Garduno RN   "

## 2024-03-30 PROCEDURE — 250N000013 HC RX MED GY IP 250 OP 250 PS 637

## 2024-03-30 PROCEDURE — 250N000013 HC RX MED GY IP 250 OP 250 PS 637: Performed by: PSYCHIATRY & NEUROLOGY

## 2024-03-30 PROCEDURE — 124N000002 HC R&B MH UMMC

## 2024-03-30 RX ADMIN — ATENOLOL 50 MG: 50 TABLET ORAL at 08:15

## 2024-03-30 RX ADMIN — NICOTINE POLACRILEX 2 MG: 2 LOZENGE ORAL at 07:27

## 2024-03-30 RX ADMIN — Medication 125 MCG: at 08:15

## 2024-03-30 RX ADMIN — NICOTINE POLACRILEX 2 MG: 2 LOZENGE ORAL at 22:14

## 2024-03-30 RX ADMIN — NICOTINE POLACRILEX 2 MG: 2 LOZENGE ORAL at 08:50

## 2024-03-30 RX ADMIN — GABAPENTIN 600 MG: 300 CAPSULE ORAL at 22:14

## 2024-03-30 RX ADMIN — NICOTINE POLACRILEX 2 MG: 2 LOZENGE ORAL at 05:04

## 2024-03-30 RX ADMIN — DESVENLAFAXINE 100 MG: 50 TABLET, FILM COATED, EXTENDED RELEASE ORAL at 08:14

## 2024-03-30 RX ADMIN — NICOTINE POLACRILEX 2 MG: 2 LOZENGE ORAL at 14:20

## 2024-03-30 RX ADMIN — TIZANIDINE 2 MG: 2 TABLET ORAL at 16:00

## 2024-03-30 RX ADMIN — BUPRENORPHINE AND NALOXONE 1 FILM: 8; 2 FILM BUCCAL; SUBLINGUAL at 08:15

## 2024-03-30 RX ADMIN — QUETIAPINE 150 MG: 100 TABLET ORAL at 22:14

## 2024-03-30 RX ADMIN — GABAPENTIN 600 MG: 300 CAPSULE ORAL at 14:20

## 2024-03-30 RX ADMIN — TOPIRAMATE 25 MG: 25 TABLET, FILM COATED ORAL at 22:14

## 2024-03-30 RX ADMIN — TIZANIDINE 2 MG: 2 TABLET ORAL at 08:15

## 2024-03-30 RX ADMIN — NICOTINE 1 PATCH: 21 PATCH, EXTENDED RELEASE TRANSDERMAL at 08:20

## 2024-03-30 RX ADMIN — OLANZAPINE 5 MG: 5 TABLET, ORALLY DISINTEGRATING ORAL at 05:04

## 2024-03-30 RX ADMIN — QUETIAPINE 150 MG: 100 TABLET ORAL at 08:14

## 2024-03-30 RX ADMIN — OLANZAPINE 5 MG: 5 TABLET, ORALLY DISINTEGRATING ORAL at 14:21

## 2024-03-30 RX ADMIN — BUPRENORPHINE AND NALOXONE 1 FILM: 8; 2 FILM BUCCAL; SUBLINGUAL at 16:00

## 2024-03-30 RX ADMIN — GABAPENTIN 600 MG: 300 CAPSULE ORAL at 08:14

## 2024-03-30 RX ADMIN — NICOTINE POLACRILEX 2 MG: 2 LOZENGE ORAL at 17:17

## 2024-03-30 RX ADMIN — NICOTINE POLACRILEX 2 MG: 2 LOZENGE ORAL at 16:02

## 2024-03-30 ASSESSMENT — ACTIVITIES OF DAILY LIVING (ADL)
ADLS_ACUITY_SCORE: 30
DRESS: INDEPENDENT
ADLS_ACUITY_SCORE: 30
ADLS_ACUITY_SCORE: 30
LAUNDRY: WITH SUPERVISION
ADLS_ACUITY_SCORE: 30
ADLS_ACUITY_SCORE: 30
ORAL_HYGIENE: INDEPENDENT
ADLS_ACUITY_SCORE: 30
HYGIENE/GROOMING: INDEPENDENT
ADLS_ACUITY_SCORE: 30
ADLS_ACUITY_SCORE: 30
LAUNDRY: WITH SUPERVISION
ADLS_ACUITY_SCORE: 30
ORAL_HYGIENE: INDEPENDENT
ADLS_ACUITY_SCORE: 30
HYGIENE/GROOMING: INDEPENDENT
ADLS_ACUITY_SCORE: 30
DRESS: INDEPENDENT

## 2024-03-30 NOTE — PLAN OF CARE
"  Problem: Suicide Risk  Goal: Absence of Self-Harm  Outcome: Progressing     Problem: Sleep Disturbance  Goal: Adequate Sleep/Rest  Outcome: Progressing  Goal Outcome Evaluation:  Ongoing                                                Observed to have slept well for approx  3.75 hrs. on all Q 15 \" rounds overnight w/ regular non-labored respirations and no reported or observed distress. Wakeful, up for a snack at approx 0150.  Had an apple, banana, and sunbutter.  Denied any pain or discomfort otherwise .  Easily back to sleep though had difficulty staying asleep.  Requested his Nicotine and ODT Zyprexa for anxiety just after 0500. Safe, therapeutic environment maintained.                         "

## 2024-03-30 NOTE — PLAN OF CARE
"  Problem: Psychotic Signs/Symptoms  Goal: Improved Behavioral Control (Psychotic Signs/Symptoms)  Outcome: Progressing   Goal Outcome Evaluation:    Plan of Care Reviewed With: patient          Patient out and visible in the unit. Denies all mental health symptoms. Noted to be calm and social with select peers. Upon check in with patient he reported not happy and not wanting to be here. He said his mother and doctor conspired against him and would not discharge him on Friday, patient said after provider spoke to his mother he changed his discharge date to next week. He said he did not want to work with the treatment team afterwards and believed he was put on hold because of that. Stating the doctor is \"vindictive\". Patient was encouraged to work with his treatment team which he responded \"I have no choice\". No SI/SIB reported. No psychosis noted. Attended groups. Received zydis 5 mg for anxiety. Staff will continue to monitor and offer support as needed.           "

## 2024-03-31 PROCEDURE — 250N000013 HC RX MED GY IP 250 OP 250 PS 637

## 2024-03-31 PROCEDURE — 90853 GROUP PSYCHOTHERAPY: CPT

## 2024-03-31 PROCEDURE — 250N000013 HC RX MED GY IP 250 OP 250 PS 637: Performed by: PSYCHIATRY & NEUROLOGY

## 2024-03-31 PROCEDURE — 124N000002 HC R&B MH UMMC

## 2024-03-31 RX ORDER — GUAIFENESIN 600 MG/1
600 TABLET, EXTENDED RELEASE ORAL 2 TIMES DAILY PRN
Status: DISCONTINUED | OUTPATIENT
Start: 2024-03-31 | End: 2024-04-03 | Stop reason: HOSPADM

## 2024-03-31 RX ADMIN — NICOTINE 1 PATCH: 21 PATCH, EXTENDED RELEASE TRANSDERMAL at 07:45

## 2024-03-31 RX ADMIN — NICOTINE POLACRILEX 2 MG: 2 LOZENGE ORAL at 16:10

## 2024-03-31 RX ADMIN — NICOTINE POLACRILEX 2 MG: 2 LOZENGE ORAL at 17:20

## 2024-03-31 RX ADMIN — OLANZAPINE 5 MG: 5 TABLET, ORALLY DISINTEGRATING ORAL at 14:53

## 2024-03-31 RX ADMIN — GUAIFENESIN 600 MG: 600 TABLET ORAL at 01:42

## 2024-03-31 RX ADMIN — NICOTINE POLACRILEX 2 MG: 2 LOZENGE ORAL at 09:52

## 2024-03-31 RX ADMIN — GABAPENTIN 600 MG: 300 CAPSULE ORAL at 07:43

## 2024-03-31 RX ADMIN — DOCUSATE SODIUM AND SENNOSIDES 1 TABLET: 8.6; 5 TABLET, FILM COATED ORAL at 20:57

## 2024-03-31 RX ADMIN — NICOTINE POLACRILEX 2 MG: 2 LOZENGE ORAL at 18:36

## 2024-03-31 RX ADMIN — TIZANIDINE 2 MG: 2 TABLET ORAL at 07:43

## 2024-03-31 RX ADMIN — QUETIAPINE 150 MG: 100 TABLET ORAL at 20:29

## 2024-03-31 RX ADMIN — NICOTINE POLACRILEX 2 MG: 2 LOZENGE ORAL at 07:47

## 2024-03-31 RX ADMIN — NICOTINE POLACRILEX 2 MG: 2 LOZENGE ORAL at 05:57

## 2024-03-31 RX ADMIN — TOPIRAMATE 25 MG: 25 TABLET, FILM COATED ORAL at 20:29

## 2024-03-31 RX ADMIN — Medication 3 MG: at 20:57

## 2024-03-31 RX ADMIN — NICOTINE POLACRILEX 2 MG: 2 LOZENGE ORAL at 08:52

## 2024-03-31 RX ADMIN — TIZANIDINE 2 MG: 2 TABLET ORAL at 16:10

## 2024-03-31 RX ADMIN — NICOTINE POLACRILEX 2 MG: 2 LOZENGE ORAL at 11:24

## 2024-03-31 RX ADMIN — NICOTINE POLACRILEX 2 MG: 2 LOZENGE ORAL at 19:44

## 2024-03-31 RX ADMIN — GABAPENTIN 600 MG: 300 CAPSULE ORAL at 14:49

## 2024-03-31 RX ADMIN — BUPRENORPHINE AND NALOXONE 1 FILM: 8; 2 FILM BUCCAL; SUBLINGUAL at 07:45

## 2024-03-31 RX ADMIN — OLANZAPINE 5 MG: 5 TABLET, ORALLY DISINTEGRATING ORAL at 05:57

## 2024-03-31 RX ADMIN — BUPRENORPHINE AND NALOXONE 1 FILM: 8; 2 FILM BUCCAL; SUBLINGUAL at 16:10

## 2024-03-31 RX ADMIN — ATENOLOL 50 MG: 50 TABLET ORAL at 07:43

## 2024-03-31 RX ADMIN — QUETIAPINE 150 MG: 100 TABLET ORAL at 07:43

## 2024-03-31 RX ADMIN — Medication 125 MCG: at 07:43

## 2024-03-31 RX ADMIN — DESVENLAFAXINE 100 MG: 50 TABLET, FILM COATED, EXTENDED RELEASE ORAL at 07:43

## 2024-03-31 RX ADMIN — NICOTINE POLACRILEX 2 MG: 2 LOZENGE ORAL at 20:29

## 2024-03-31 RX ADMIN — NICOTINE POLACRILEX 2 MG: 2 LOZENGE ORAL at 12:37

## 2024-03-31 RX ADMIN — NICOTINE POLACRILEX 2 MG: 2 LOZENGE ORAL at 14:49

## 2024-03-31 RX ADMIN — GABAPENTIN 600 MG: 300 CAPSULE ORAL at 20:29

## 2024-03-31 ASSESSMENT — ACTIVITIES OF DAILY LIVING (ADL)
ADLS_ACUITY_SCORE: 30
ORAL_HYGIENE: INDEPENDENT
DRESS: INDEPENDENT
ADLS_ACUITY_SCORE: 30
HYGIENE/GROOMING: INDEPENDENT
LAUNDRY: WITH SUPERVISION
ADLS_ACUITY_SCORE: 30
DRESS: INDEPENDENT
HYGIENE/GROOMING: INDEPENDENT
ORAL_HYGIENE: INDEPENDENT
ADLS_ACUITY_SCORE: 30
LAUNDRY: WITH SUPERVISION
ADLS_ACUITY_SCORE: 30

## 2024-03-31 NOTE — PLAN OF CARE
Problem: Psychotic Signs/Symptoms  Goal: Improved Behavioral Control (Psychotic Signs/Symptoms)  Outcome: Progressing   Goal Outcome Evaluation:    Plan of Care Reviewed With: patient    Patient out and visible in the unit. Pacing hallway, reading and watching television. Patient denies all mental health symptoms but anxiety. Says it is manageable and did not need any prn for it at the time. Vitals sign stable. Medication compliant. No behavior outburst. Staff will continue to monitor and offer support as needed.

## 2024-03-31 NOTE — PLAN OF CARE
Pt was awake around 0100; ate snacks and went back to sleep. At 0135, pt reported of having difficult breathing from his nose as his nose was blocked from cold. Pt requested if he can get medication for it. This writer notified the on call resident about pt's concern. At 0142, pt received prn Mucinex. Pt went back to sleep after taking a med. Pt slept a total of 4 hours based on Q 15 mins rounding. At 0557, pt requested and received prn Zyprexa for anxiety rated 6/10. Pt received prn Nicotine lozenge on request.

## 2024-03-31 NOTE — PLAN OF CARE
"  Problem: Suicide Risk  Goal: Absence of Self-Harm  Outcome: Progressing   Goal Outcome Evaluation:    Nursing Assessment    Recent Vitals: B/P:114/73  T:98  P:72    General Shift Summary  Visible in milieu at beginning of shift, social with select peers. Pt fell asleep shortly after dinner and woke up on their own at around 2215. Pt says that sometimes they just get tired early in the evening but then they are up early in the morning. Pt does not attribute increased isolation and energy level to depression or medication. Pt denying all MH sx. When asked if they feel like anybody is out to get them here, pt responds, \"I'm fine.\" Pt receptive to encouragement to talk with treatment team on Monday. Writer did not overhear pt making any delusional statements this shift and pt had no behavioral concerns.     Patient is medication compliant and reported no side effects. No PRN medications given this shift other than nicotine.     Hygiene and appetite are appropriate.     Bakari Garduno RN   "

## 2024-04-01 PROCEDURE — 250N000013 HC RX MED GY IP 250 OP 250 PS 637: Performed by: PSYCHIATRY & NEUROLOGY

## 2024-04-01 PROCEDURE — 250N000013 HC RX MED GY IP 250 OP 250 PS 637

## 2024-04-01 PROCEDURE — 124N000002 HC R&B MH UMMC

## 2024-04-01 PROCEDURE — H2032 ACTIVITY THERAPY, PER 15 MIN: HCPCS

## 2024-04-01 PROCEDURE — 99232 SBSQ HOSP IP/OBS MODERATE 35: CPT | Mod: GC | Performed by: PSYCHIATRY & NEUROLOGY

## 2024-04-01 RX ADMIN — BUPRENORPHINE AND NALOXONE 1 FILM: 8; 2 FILM BUCCAL; SUBLINGUAL at 16:07

## 2024-04-01 RX ADMIN — OLANZAPINE 5 MG: 5 TABLET, ORALLY DISINTEGRATING ORAL at 05:11

## 2024-04-01 RX ADMIN — QUETIAPINE 150 MG: 100 TABLET ORAL at 07:53

## 2024-04-01 RX ADMIN — NICOTINE POLACRILEX 2 MG: 2 LOZENGE ORAL at 05:09

## 2024-04-01 RX ADMIN — DESVENLAFAXINE 100 MG: 50 TABLET, FILM COATED, EXTENDED RELEASE ORAL at 07:53

## 2024-04-01 RX ADMIN — NICOTINE POLACRILEX 2 MG: 2 LOZENGE ORAL at 22:44

## 2024-04-01 RX ADMIN — Medication 125 MCG: at 07:53

## 2024-04-01 RX ADMIN — BUPRENORPHINE AND NALOXONE 1 FILM: 8; 2 FILM BUCCAL; SUBLINGUAL at 07:54

## 2024-04-01 RX ADMIN — NICOTINE POLACRILEX 2 MG: 2 LOZENGE ORAL at 13:01

## 2024-04-01 RX ADMIN — NICOTINE POLACRILEX 2 MG: 2 LOZENGE ORAL at 08:18

## 2024-04-01 RX ADMIN — NICOTINE POLACRILEX 2 MG: 2 LOZENGE ORAL at 07:01

## 2024-04-01 RX ADMIN — TIZANIDINE 2 MG: 2 TABLET ORAL at 16:06

## 2024-04-01 RX ADMIN — NICOTINE POLACRILEX 2 MG: 2 LOZENGE ORAL at 17:36

## 2024-04-01 RX ADMIN — TIZANIDINE 2 MG: 2 TABLET ORAL at 07:53

## 2024-04-01 RX ADMIN — QUETIAPINE 150 MG: 100 TABLET ORAL at 22:38

## 2024-04-01 RX ADMIN — OLANZAPINE 5 MG: 5 TABLET, ORALLY DISINTEGRATING ORAL at 13:50

## 2024-04-01 RX ADMIN — NICOTINE POLACRILEX 2 MG: 2 LOZENGE ORAL at 15:56

## 2024-04-01 RX ADMIN — ATENOLOL 50 MG: 50 TABLET ORAL at 07:53

## 2024-04-01 RX ADMIN — GABAPENTIN 600 MG: 300 CAPSULE ORAL at 07:54

## 2024-04-01 RX ADMIN — TOPIRAMATE 25 MG: 25 TABLET, FILM COATED ORAL at 22:38

## 2024-04-01 RX ADMIN — GABAPENTIN 600 MG: 300 CAPSULE ORAL at 13:01

## 2024-04-01 RX ADMIN — NICOTINE POLACRILEX 2 MG: 2 LOZENGE ORAL at 09:54

## 2024-04-01 RX ADMIN — GABAPENTIN 600 MG: 300 CAPSULE ORAL at 22:38

## 2024-04-01 RX ADMIN — NICOTINE 1 PATCH: 21 PATCH, EXTENDED RELEASE TRANSDERMAL at 07:57

## 2024-04-01 ASSESSMENT — ACTIVITIES OF DAILY LIVING (ADL)
ADLS_ACUITY_SCORE: 30
DRESS: INDEPENDENT
ADLS_ACUITY_SCORE: 30
HYGIENE/GROOMING: INDEPENDENT
ORAL_HYGIENE: INDEPENDENT
LAUNDRY: WITH SUPERVISION
ADLS_ACUITY_SCORE: 30
ORAL_HYGIENE: INDEPENDENT
ADLS_ACUITY_SCORE: 30
ADLS_ACUITY_SCORE: 30
HYGIENE/GROOMING: INDEPENDENT
LAUNDRY: WITH SUPERVISION
ADLS_ACUITY_SCORE: 30
DRESS: INDEPENDENT
ADLS_ACUITY_SCORE: 30

## 2024-04-01 NOTE — PLAN OF CARE
"  Problem: Suicide Risk  Goal: Absence of Self-Harm  Outcome: Progressing     Problem: Sleep Disturbance  Goal: Adequate Sleep/Rest  Outcome: Progressing   Goal Outcome Evaluation:  On-going    Observed sleeping w/ regular non-labored respirations and no reported or observed distress.  Wakeful shortly thereafter, up requesting a snack  Had milk and 2 pkts of cheerios  + 2 pkts sunbutter, then requested 2 jolly ranchers. Back to bed and immediately back to sleep.  Wakeful again after several hrs.  Up appearing quite sleepy barely holding his eyes open yet stating \"I'm hungry\"  and requested another milk + 2 Cheerios and 2 sun butters\".  Readily back to bed and sleeping but up again before 0500 awaiting the time when he can have his nicotine.  Further requested his ODT Zyprexa 5 mg prn for anxiety. Realistic, supportive intervention provided.  Observed to have slept for approx 3.75  hrs on all Q 15\" rounds overnight.  Safe, supportive environment maintained.                        "

## 2024-04-01 NOTE — PLAN OF CARE
Problem: Adult Behavioral Health Plan of Care  Goal: Develops/Participates in Therapeutic [place] to Support Successful Transition  Outcome: Progressing   Goal Outcome Evaluation:    Plan of Care Reviewed With: patient      Nursing Assessment    Recent Vitals: B/P:115/72  T:96.8  P:73  General Shift Summary  Visible in milieu much more this shift and was social with select peers. Spent time playing board game, video game, and watching TV with peers. Pt also attended group in which they needed some redirection for having negative conversation about provider and healthcare. These negative conversations continued with the same peers throughout the shift. At one point pt and peers were discussing who would make the best doctor out of them and decided that the actual provider would be good at playing dungeons and dragons and saying that actually the attending provider should be placed in a dungeon full of dragons. Pt did not have any other behavioral concerns. Denies all MH sx other than anxiety, although guarded with assessment. Pt did have visit with their mother and son this shift which seemed to go okay with pt being calm during and after visit.     Patient is medication compliant and reported no side effects. Pt requested and received PRN Senna for constipation. Pt says they did have a bowel movement earlier this shift but that they are usually going more frequently. Pt also received PRN melatonin with HS medication and was able to go to sleep shortly after.     Hygiene and appetite are appropriate.     Bakari Garduno RN

## 2024-04-01 NOTE — PLAN OF CARE
03/31/24 1903   Group Therapy Session   Group Attendance attended group session   Time Session Began 1715   Time Session Ended 1800   Total Time (minutes) 45   Total # Attendees 3   Group Type psychoeducation;psychotherapeutic   Group Topic Covered coping skills/lifestyle management;emotions/expression   Group Session Detail Group members check in with how the are feeling today. The group members then took turns choosing cards from a DBT Skills-Building deck. Pt s would choose a card, read it and the group would practice the skill or discuss the concepts presented.   Patient Response/Contribution discussed personal experience with topic;expressed understanding of topic;verbalizations were off topic   Patient Participation Detail Pt attended group and checked in as feeling stir crazy. Pt shared that he is frustrated due to his discharge date being exteneded. Pt and peers needed a lot of redirection to the topic of the group and would often start to discuss their frustrations with the [facility] care system, their doctor, and their stay in the hospital. Pt shared his derogatory nick-name for his provider, then apologized for the comment. Pt was otherwise appropriate during group.

## 2024-04-01 NOTE — PLAN OF CARE
"Goal Outcome Evaluation:    Plan of Care Reviewed With: patient      Problem: Adult Behavioral Health Plan of Care  Goal: Adheres to Safety Considerations for Self and Others  Outcome: Progressing  Intervention: Develop and Maintain Individualized Safety Plan  Recent Flowsheet Documentation  Taken 4/1/2024 0900 by Irish Hermosillo RN  Safety Measures: safety rounds completed     Patient visible on the unit. Demonstrates labile mood affect. Was seen laughing and giggling with a select peer in the lounge. The next minute affect became flat and restricted. RN checked in with patient in his room. Patient described his mood as \"okay.\" Endorsed anxiety as 6 and depression as 4. Patient stated that he would like to leave. He also said, \"I am worried about my weight. I need to start using the exercise bike.\" His goal is to work with a peer when choosing his menu, because per patient the said peer chooses more healthy meal than he does. Patient is happy with his medication, Topamax. \"I like how Topamax makes me feel. It helps me feel persistent.\" Patient said he will talk to his provider and see if he can start getting Topamax during the day too. Patient denied SI/HI and hallucinations. While RN was still in patient's room, patient got up and closed the door and said that he thinks that the \"older gentleman here with tattoos looks like the people that are following me.\" He said that the peer also behave that way evidenced by taking his seat whenever he gets up. RN reassured patient of his safety.   Patient was intermittently visible all through the shift, He attended few group activities this shift. He received PRN Olanzapine at 1350 at request. Will continue to reassure and encourage patient.                "

## 2024-04-01 NOTE — PROGRESS NOTES
"  ----------------------------------------------------------------------------------------------------------  [facility]  Psychiatry Progress Note  Hospital Day #12     Interim History:     The patient's care was discussed with the treatment team and chart notes were reviewed.    Vitals: VSS  Sleep: 3.75 hours (04/01/24 0600)  Scheduled medications: Took all scheduled medications as prescribed   Psychiatric PRN medications: nicotine lozenge, melatonin, zyprexa for anxiety, Senna    Staff Report: patient was upset that he was \"Being held against my will and force to taking injection I don't need\". He talked about how he was \"Faking to be volentary here before so I can go home, but I don't care now. I will fake it until I make it out of here\". Declined all MH sxs for the rest of the weekend. Dismissive and guarded. Slept 3.75 hrs Friday night, snacked. Noted to be calm and social with select peers. He said he did not want to work with the treatment team afterwards and believed he was put on hold because of that. Stating the doctor is \"vindictive\". Patient was encouraged to work with his treatment team which he responded \"I have no choice\". At 0135 Saturday night, pt reported of having difficult breathing from his nose as his nose was blocked from cold. Staff notified the on call resident about pt's concern. At 0142, pt received prn Mucinex.  Pt attended group Sunday, needed a lot of redirection to the topic of the group and would often start to discuss their frustrations with the healt care system, their doctor, and their stay in the hospital. Pt shared his derogatory nick-name for his provider, then apologized for the comment. These negative conversations continued with the same peers throughout the shift. At one point pt and peers were discussing who would make the best doctor out of them and decided that the actual provider would be good at playing dungeons and dragons " "and saying that actually the attending provider should be placed in a dungeon full of dragons. Spent time playing board game, video game, and watching TV with peers. Pt did have visit with their mother and son this shift which seemed to go okay with pt being calm during and after visit.  Told staff this AM 4/1 that he likes Topamax and would like to add a dose during the day. Was noted to be paranoid about another pt on the unit, feels this pt was sent here for him.   Please see staff notes for further details.     Subjective:     Patient Interview:  Barak Clarke was seen in his room. Reports weekend was fine. Didn't like that his discharge date was changed, thought he would be getting out on Friday, was told this by his mother and the doctor he said. Feels mom and team conspired behind his back. Now is \"over it\" and \"ready for whatever\". Apologized for getting angry. Likes the Topamax and would like this as a day medication. Feels more focused with this medication but worries the effect will wear off since he takes it at night. Noted that after he took the medication he felt more focused while biking and playing games, was able to complete these tasks more easily. Also reports previously taking lamotrigine helped similarly but he had a rash and manic episode with this so it was stopped he said. Discussed that he could increase Topamax after a week or two on current dose, can do this with an outpatient psychiatrist. Let patient know that the 72 hour hold ends Wednesday. Mom and patient's 14 year old son visited yesterday and he enjoyed this. They played Catan. Mom mentioned the Barak had overdosed in front of son and this bothered the patient as the son didn't previously know that had he felt it hurt him. Reports he thinks mom has Munch[employer]en's by proxy for many years when he was growing up and \"enjoys talking to doctors\". Mom has been in and out of hospitals herself he said. Patient has previously talked to therapist " "about this. Team reassured patient that team decisions are made based on patient's symptoms. Reports that he liked his previous psychiatrist and would like to return there. Would like to get back on vyvanse when he leaves. Team advised against this and noted that patient will make that decision with outpt provider. Takes sudafed if he cannot have vyvanse he said so he feels Vyvanse is safer. Feels he has been stable on it in the past.     ROS:   Patient denies acute concerns other than what is listed in HPI.     Objective:     Vitals:  /72 (BP Location: Right arm, Patient Position: Sitting, Cuff Size: Adult Regular)   Pulse 73   Temp 96.8  F (36  C) (Temporal)   Resp 16   Ht 1.803 m (5' 11\")   Wt 109.7 kg (241 lb 12.8 oz)   SpO2 95%   BMI 33.72 kg/m      Allergies:  Allergies   Allergen Reactions    Prazosin Unknown     Worsening of nightmares       Current Medications:  Scheduled:  Current Facility-Administered Medications   Medication Dose Route Frequency    atenolol  50 mg Oral Daily    buprenorphine HCl-naloxone HCl  1 Film Sublingual BID    cholecalciferol  125 mcg Oral Daily    desvenlafaxine  100 mg Oral Daily    gabapentin  600 mg Oral TID    nicotine  1 patch Transdermal Daily    QUEtiapine  150 mg Oral BID    tiZANidine  2 mg Oral BID    topiramate  25 mg Oral At Bedtime       PRN:  Current Facility-Administered Medications   Medication Dose Route Frequency    acetaminophen  650 mg Oral Q4H PRN    guaiFENesin  600 mg Oral BID PRN    hydrOXYzine HCl  25 mg Oral Q4H PRN    melatonin  3 mg Oral At Bedtime PRN    nicotine  2 mg Buccal Q1H PRN    OLANZapine  10 mg Intramuscular TID PRN    OLANZapine zydis  5 mg Oral BID PRN    polyethylene glycol  17 g Oral Daily PRN    senna-docusate  1 tablet Oral At Bedtime PRN    tiZANidine  2 mg Oral Daily PRN       Labs and Imaging:  New results:   No results found for this or any previous visit (from the past 24 hour(s)).    Data this admission:  - CBC " "unremarkable  - CMP unremarkable  - TSH normal  - UDS positive for cannabinoids  - Vit D 14 (L)  - Hgb A1c 5.1 (WNL) on 7/10/23  - Lipids last month WNL  - Vit B12 normal  - Folate normal  - Urinalysis not yet performed  - EKG normal sinus rhythm, QTc 429     Mental Status Exam:     Oriented to:  Grossly Oriented  General:  Awake and Alert  Appearance:  appears stated age, Grooming is adequate, Dressed in casual clothing, and Tattoos on left arm  Behavior/Attitude:  Calm, Engaged, Guarded, and Difficult to redirect  Eye Contact: downcast stare, Glances, minimal to no eye contact  Psychomotor: Normal and No evidence of tics, dystonia, or tardive dyskinesia  no catatonia present  Speech:  appropriate volume/tone and with good articulation  Language: Fluent in English with appropriate syntax and vocabulary.  Mood:  \"ready for whatever\"  Affect:  restricted and irritable  Thought Process:  ruminative and circumstantial  Thought Content:   No SI/HI/AH/VH; delusions of paranoia intermittently, feels team conspired with his mother   Associations:  questionable   Insight:  partial due to paranoid delusions and confident that his ex/ex's family is against him, now endorsing doctors against him but does apologize and work with the team on a treatment plan  Judgment: partial. Has had some inappropriate discussions with peers in the milieu regarding robbery offenses. Asked for a 12hour intent, rescinded it, then asked for a 72 hour hold as he felt he would discharge after it .   Impulse control: fair  Attention Span:  adequate for conversation  Concentration:  grossly intact  Recent and Remote Memory:  not formally assessed  Fund of Knowledge: average  Muscle Strength and Tone: normal  Gait and Station: Normal     Psychiatric Assessment     Barak Clarke is a 39 year old male with previous psychiatric diagnoses of depression, anxiety, ADHD, PTSD, substance-induced psychotic disorder, and polysubstance dependency and use " disorder who checked into our emergency department from [facility] requesting an alternative chemical dependency treatment, admitted on 03/20/2024 due to concern for SI, psychosis, paranoia, delusions, and disorganized behavior in the context of substance use and relationship concerns. Most recent psychiatric hospitalization was last month for SI s/p overdose. Significant symptoms on admission include paranoia, hypervigilance, racing thoughts, disorganized behavior, social withdrawal. The MSE on admission was pertinent for poor insight and judgement, paranoia, delusions of his ex wife and family out to get him, self-referential delusions, agitation/restlessness. Biological contributions to mental health presentation include substance use most recently cannabis, past MH diagnoses. Psychological contributions to mental health presentation include poor coping, possible cluster B traits especially considering relationship concerns over his lifetime, PTSD, poor insight into need for substance treatment. Social factors contributing to mental health presentation include isolation, interpersonal conflicts, unemployed.  Protective factors include seeking help, knows when SI is bad he said, would like to stay on his medications.      In summary, the patient's reported symptoms of paranoia, delusions, anxiety with disorganized thoughts, and expressed concerns about people conspiring to kill him, taking over his electronics and monitoring him in the context of cannabis use and past substance use are consistent with prior diagnosis of substance induced psychosis. However, could also consider primary diagnosis of psychotic disorder with mood component given signs for suhas such as decrease need for sleep, rambling and perfuse thoughts, supporting Bipolar disorder with psychotic features or Schizoaffective disorder. Although meth can also present with these features, he has not used in over a month per his report and supported  by UDS. He will likely benefit from medication optimization and therapeutic milieu this admission. PT declining CD tx.      Given that he currently has psychosis, and possible suhas, patient warrants inpatient psychiatric hospitalization to maintain his safety.      Psychiatric Plan by Diagnosis      Today's changes:  - no changes today     # Substance induced psychosis vs primary psychosis  - Continue Seroquel, changed to 150mg BID on 3/26 due to patient concern for nighttime sedation making possible sleep apnea worse   - prn zyprexa 5mg BID     #MDD  - PTA Pristiq 100mg daily - consider stopping if pt continues to have trouble sleeping and is anxious/agitated     #Anxiety/agitation  - PTA gabapentin 600mg TID  - prn hydroxyzine  - prn zyprexa      #ADHD  Pt has long hx of adhd - said Vyvanse was prescribed in [place] but this is unclear given he has not filled it in a month per pharmacy. Likely contributing to current agitation and trouble sleeping.   - Stopped vyvanse on admission.   Possible past seizure hx when on Wellbutrin but unclear, also when using drugs. Was given some Wellbutrin in the Ed and then it was stopped. Patient requested Wellbutrin on 3/26, team declined due to possible seizure history.   - Seizure precautions.      # Polysubstance use   UDS pos for cannabinoids in ED, pt states he has not used other substances since Feb 20th. Does make references to meth in his house.   - Pt declining to return to CD tx at this time, wants sober living  - Nicotine patch and lozenge  - Continue PTA suboxone 8-2 MG for opioid use disorder in remission, UDS negative for opioid use. Pt said he has been stable on this medication. Plan for outpt follow up with prescriber.      #Akithisia/muscle spasms  Pt endorsing restlessness, keeps him awake, could be RLS as well.   - Continue to decrease Zyprexa and monitor with prn  - Tizanidine 2mg Bid and prn 2mg BID  - Consider Ropinirole      2. Pertinent Labs/Monitoring:   -  "Uds pos for cannabinoids  - CBC, CMP, TSH, Vit B12 and folate WNL  - Vitamin D low  - Lipids last month wnl  - A1C last month wnl  - EKG normal sinus rhythm     3. Additional Plans:  - Patient will be treated in therapeutic milieu with appropriate individual and group therapies as described     Psychiatric Hospital Course:      Barak Clarke was admitted to [facility] as a voluntary pt.  Medications:  PTA tizanidine, Pristiq, Gabapentin, Suboxone were continued with prn tizanidine added for restlessness, PTA zyprexa made prn due to akithisia in the Ed.   PTA Vyvanse was held due to anxiety and agitation.  Possible past seizure hx when on Wellbutrin but unclear, also when using drugs. Was given some Wellbutrin in the Ed and then it was stopped. Patient requested Wellbutrin on 3/26, team declined due to possible seizure history. Seizure precautions in place. Pt said he has been learning to cope without Vyvanse, relying on art and other forms of activity to find value and \"self worth\".    New medications started at the time of admission include Seroquel 200mg in the ED.   Seroquel: 50mg at bedtime started in the ED, increased to 100mg at bedtime on 3/19, increased to 200mg at bedtime on 3/20, increased to morning 100mg and evening 300mg on 3/21. Changed to 150mg BID on 3/26 due to patient concern for sedation overnight affecting possible sleep apnea.   Zyprexa prn: 10mg TID starting 3/20, decreased to 5mg BID on 3/25 due to patient concern for weight gain  - restarted trial of metformin with monitoring for rash and to help with metabolic syndrome although substantial weight gain during this hospitalization so far is unlikely.   Polysubstance use: UDS pos for cannabinoids in ED, pt states he has not used other substances since Feb 20th. Does make references to meth in his house. Pt declining to return to  tx at this time, wants sober living although he will be unlikely to qualify due to not being sober for 30 days " "prior and not completing dual [facility] program prior. Nicotine patch and lozenge given on admission. Continued PTA suboxone 8-2 MG for opioid use disorder in remission, UDS negative for opioid use. Pt said he has been stable on this medication. Plan for outpt follow up with prescriber.   Overnight and during the day on 3/29/2024 patient appeared disorganized and upset, requested and then rescinded 12H intent, refused to engage with treatment team regarding discharge requests due to paranoia that \"they\" were conspiring with the doctors and his mom (would not specify who \"they\" are), and appeared to be splitting - asked specifically to speak with the resident on the treatment team without the attending due to paranoia about the attending. 72H hold placed for disorganized thinking and paranoid delusions with a lack of a coherent discharge plan. Will reassess for possible civil commitment petition on Monday 4/1/2024.      The risks, benefits, alternatives, and side effects were discussed and understood by the patient and other caregivers.     Medical Assessment and Plan     Medical diagnoses to be addressed this admission:    # Metabolic syndrome  Pt endorsed weight gain with zyprexa.   - Metformin started/continued in the ED  - Metformin stopped on admission due to pt endorsing rash after taking it in the ED. Since Zyprexa was being decreased, decided to stop metformin and monitor.   - Metformin restarted on 3/25 due to patient concern for weight gain but had recurrence of rash on wrists and back, PRN Benadryl helped with this.  - Metformin discontinued on 3/26 and Topamax 25mg daily started to avoid rash.        # HTN  - Continued PTA atenolol  - CTM      # Patient concerned for sleep apnea  - Will monitor vitals  - Outpatient PCP follow up  - Recommend sleep study outpt    Medical course: Patient was physically examined by the ED prior to being transferred to the unit and was found to be medically stable and appropriate " for admission. Metformin stopped on admission due to pt endorsing rash after taking it in the ED, showed provider red raised rash on forearm and stated he had some on his back as well. Since Zyprexa was being decreased, decided to stop metformin and monitor. Metformin restarted on 3/25 due to patient concern for weight gain but had recurrence of rash on bilateral wrists and back, PRN Benadryl helped with this. Metformin discontinued on 3/26 due to rash and Topamax 25mg daily for weight loss. Some upset stomach after starting Topamax and patient reported he felt almost manic, but liked that it helps with weight loss and wanted to continue and see how it goes. Seroquel dose was decreased and split to BID due to patient concern for sleep apnea and that sedation from Seroquel made it worse and caused him to wake up during the night and have poor sleep. Reported improved sleep after starting BID dosing.      Consults:  none     Checklist     Legal Status: 72-h Hold signed on 3/29/2024 at 1:53pm      Safety Assessment:   Behavioral Orders   Procedures    Code 1 - Restrict to Unit    Routine Programming     As clinically indicated    Seizure precautions    Self Injury Precaution    Status 15     Every 15 minutes.    Suicide precautions: Suicide Risk: HIGH     Patients on Suicide Precautions should have a Combination Diet ordered that includes a Diet selection(s) AND a Behavioral Tray selection for Safe Tray - with utensils, or Safe Tray - NO utensils       Order Specific Question:   Suicide Risk     Answer:   HIGH       Risk Assessment:  Risk for harm is moderate-high.  Risk factors: maladaptive coping, substance use, trauma, family dynamics, impulsive, past behaviors, and psychosocial stressors, psychosis and delusions with commands hallucinations to use substances.  Protective factors: engaged in treatment and seeking CD treatment    SIO: none    Disposition: TBD. Pending stabilization, medication optimization, &  development of a safe discharge plan. Plan to return home with his mom with outpt follow up after 72 hr hold is up if he has stabilized.      Attestations     Kath Garcia MD  Psychiatry Resident Physician    This patient has been seen and evaluated by me, Bridger Stack.  I have discussed this patient with the psychiatry resident and I agree with the findings and plan in this note.    I have reviewed today's vital signs, medications, labs and imaging.     Bridger Hernandez MD

## 2024-04-01 NOTE — PLAN OF CARE
"Occupational Therapy Group Note     04/01/24 1252   Group Therapy Session   Group Attendance attended group session   Group Type other (see comments)   Group Topic Covered coping skills/lifestyle management;relaxation techniques   Group Session Detail OT: Goals Discussion and Qigong sequence and wellness discussion for parsympathetic nervous system activation, sensory modulation, building mind-body awareness, building insight into total body wellness and coping with stress/sxs. (10:20-11:05; 4 participants)   Patient Response/Contribution cooperative with task    Patient joined group for 40 minutes.  Pt was mildly hyperverbal and tangential with an anxious presentation when it was his turn to check in.  Pt shared that he was feeling \"Pressured.  I have a lot on my plate.\"  Pt elaborated and shared that he feels he might be \"overthinking\" and that the solution to his problems is to \"just get a job\".  Pt said he was upset over the weekend because his mother mentioned the pt's overdoes in front of his 13 y/o son.  Pt is now upset at his mother and feels that he needs to repair with his son.  Pt appeared to be in the contemplative stage of change regarding his substance use but did not ID steps that he was going to take aside from making broad comments such as \"I just need to get a job\" and \"I just need to quit using.\"  Wrier offered active listening, validation and reflections.  Pt engaged in ~25 minutes of the wellness activity and them opted to leave group early.  During wellness activity pt appeared focused and calm.              "

## 2024-04-01 NOTE — PLAN OF CARE
BEH IP Unit Acuity Rating Score (UARS)  Patient is given one point for every criteria they meet.    CRITERIA SCORING   On a 72 hour hold, court hold, committed, stay of commitment, or revocation. 0    Patient LOS on BEH unit exceeds 20 days. 0  LOS: 12   Patient under guardianship, 55+, otherwise medically complex, or under age 11. 0   Suicide ideation without relief of precipitating factors. 1   Current plan for suicide. 0   Current plan for homicide. 0   Imminent risk or actual attempt to seriously harm another without relief of factors precipitating the attempt. 0   Severe dysfunction in daily living (ex: complete neglect for self care, extreme disruption in vegetative function, extreme deterioration in social interactions). 1   Recent (last 7 days) or current physical aggression in the ED or on unit. 0   Restraints or seclusion episode in past 72 hours. 0   Recent (last 7 days) or current verbal aggression, agitation, yelling, etc., while in the ED or unit. 0   Active psychosis. 1   Need for constant or near constant redirection (from leaving, from others, etc).  0   Intrusive or disruptive behaviors. 1   Patient requires 3 or more hours of individualized nursing care per 8-hour shift (i.e. for ADLs, meds, therapeutic interventions). 0   TOTAL 4

## 2024-04-02 PROBLEM — I10 HTN (HYPERTENSION): Status: ACTIVE | Noted: 2024-04-02

## 2024-04-02 PROCEDURE — 99239 HOSP IP/OBS DSCHRG MGMT >30: CPT | Mod: GC | Performed by: PSYCHIATRY & NEUROLOGY

## 2024-04-02 PROCEDURE — 250N000013 HC RX MED GY IP 250 OP 250 PS 637

## 2024-04-02 PROCEDURE — 250N000013 HC RX MED GY IP 250 OP 250 PS 637: Performed by: PSYCHIATRY & NEUROLOGY

## 2024-04-02 PROCEDURE — 124N000002 HC R&B MH UMMC

## 2024-04-02 RX ORDER — TIZANIDINE 2 MG/1
2 TABLET ORAL 2 TIMES DAILY
Qty: 60 TABLET | Refills: 0 | Status: SHIPPED | OUTPATIENT
Start: 2024-04-02

## 2024-04-02 RX ORDER — OLANZAPINE 5 MG/1
5 TABLET, ORALLY DISINTEGRATING ORAL 2 TIMES DAILY PRN
Qty: 60 TABLET | Refills: 0 | Status: SHIPPED | OUTPATIENT
Start: 2024-04-02

## 2024-04-02 RX ORDER — GABAPENTIN 300 MG/1
600 CAPSULE ORAL 3 TIMES DAILY
Qty: 180 CAPSULE | Refills: 0 | Status: SHIPPED | OUTPATIENT
Start: 2024-04-02

## 2024-04-02 RX ORDER — QUETIAPINE FUMARATE 150 MG/1
150 TABLET, FILM COATED ORAL 2 TIMES DAILY
Qty: 60 TABLET | Refills: 0 | Status: SHIPPED | OUTPATIENT
Start: 2024-04-02

## 2024-04-02 RX ORDER — TOPIRAMATE 25 MG/1
25 TABLET, FILM COATED ORAL AT BEDTIME
Qty: 30 TABLET | Refills: 0 | Status: SHIPPED | OUTPATIENT
Start: 2024-04-02

## 2024-04-02 RX ORDER — TIZANIDINE 2 MG/1
2 TABLET ORAL DAILY PRN
Qty: 30 TABLET | Refills: 0 | Status: SHIPPED | OUTPATIENT
Start: 2024-04-02

## 2024-04-02 RX ORDER — DESVENLAFAXINE 100 MG/1
100 TABLET, EXTENDED RELEASE ORAL DAILY
Qty: 30 TABLET | Refills: 0 | Status: SHIPPED | OUTPATIENT
Start: 2024-04-03

## 2024-04-02 RX ORDER — BUPRENORPHINE AND NALOXONE 8; 2 MG/1; MG/1
1 FILM, SOLUBLE BUCCAL; SUBLINGUAL 2 TIMES DAILY
Qty: 60 FILM | Refills: 0 | Status: SHIPPED | OUTPATIENT
Start: 2024-04-02

## 2024-04-02 RX ORDER — ATENOLOL 50 MG/1
50 TABLET ORAL DAILY
Qty: 30 TABLET | Refills: 0 | Status: SHIPPED | OUTPATIENT
Start: 2024-04-02

## 2024-04-02 RX ADMIN — NICOTINE POLACRILEX 2 MG: 2 LOZENGE ORAL at 13:20

## 2024-04-02 RX ADMIN — NICOTINE POLACRILEX 2 MG: 2 LOZENGE ORAL at 18:33

## 2024-04-02 RX ADMIN — OLANZAPINE 5 MG: 5 TABLET, ORALLY DISINTEGRATING ORAL at 13:10

## 2024-04-02 RX ADMIN — QUETIAPINE 150 MG: 100 TABLET ORAL at 07:44

## 2024-04-02 RX ADMIN — GABAPENTIN 600 MG: 300 CAPSULE ORAL at 07:43

## 2024-04-02 RX ADMIN — QUETIAPINE 150 MG: 100 TABLET ORAL at 20:08

## 2024-04-02 RX ADMIN — NICOTINE POLACRILEX 2 MG: 2 LOZENGE ORAL at 22:24

## 2024-04-02 RX ADMIN — TIZANIDINE 2 MG: 2 TABLET ORAL at 16:11

## 2024-04-02 RX ADMIN — NICOTINE POLACRILEX 2 MG: 2 LOZENGE ORAL at 05:22

## 2024-04-02 RX ADMIN — NICOTINE POLACRILEX 2 MG: 2 LOZENGE ORAL at 16:11

## 2024-04-02 RX ADMIN — GABAPENTIN 600 MG: 300 CAPSULE ORAL at 20:08

## 2024-04-02 RX ADMIN — GABAPENTIN 600 MG: 300 CAPSULE ORAL at 13:09

## 2024-04-02 RX ADMIN — TIZANIDINE 2 MG: 2 TABLET ORAL at 07:44

## 2024-04-02 RX ADMIN — NICOTINE POLACRILEX 2 MG: 2 LOZENGE ORAL at 08:39

## 2024-04-02 RX ADMIN — OLANZAPINE 5 MG: 5 TABLET, ORALLY DISINTEGRATING ORAL at 05:22

## 2024-04-02 RX ADMIN — TOPIRAMATE 25 MG: 25 TABLET, FILM COATED ORAL at 20:08

## 2024-04-02 RX ADMIN — POLYETHYLENE GLYCOL 3350 17 G: 17 POWDER, FOR SOLUTION ORAL at 06:48

## 2024-04-02 RX ADMIN — NICOTINE POLACRILEX 2 MG: 2 LOZENGE ORAL at 21:01

## 2024-04-02 RX ADMIN — NICOTINE POLACRILEX 2 MG: 2 LOZENGE ORAL at 06:33

## 2024-04-02 RX ADMIN — DESVENLAFAXINE 100 MG: 50 TABLET, FILM COATED, EXTENDED RELEASE ORAL at 07:43

## 2024-04-02 RX ADMIN — BUPRENORPHINE AND NALOXONE 1 FILM: 8; 2 FILM BUCCAL; SUBLINGUAL at 16:10

## 2024-04-02 RX ADMIN — BUPRENORPHINE AND NALOXONE 1 FILM: 8; 2 FILM BUCCAL; SUBLINGUAL at 07:42

## 2024-04-02 RX ADMIN — ATENOLOL 50 MG: 50 TABLET ORAL at 07:44

## 2024-04-02 RX ADMIN — TIZANIDINE 2 MG: 2 TABLET ORAL at 22:24

## 2024-04-02 RX ADMIN — NICOTINE 1 PATCH: 21 PATCH, EXTENDED RELEASE TRANSDERMAL at 07:47

## 2024-04-02 RX ADMIN — Medication 125 MCG: at 07:43

## 2024-04-02 RX ADMIN — NICOTINE POLACRILEX 2 MG: 2 LOZENGE ORAL at 12:16

## 2024-04-02 ASSESSMENT — ACTIVITIES OF DAILY LIVING (ADL)
ADLS_ACUITY_SCORE: 30
ADLS_ACUITY_SCORE: 30
HYGIENE/GROOMING: INDEPENDENT
ADLS_ACUITY_SCORE: 30
LAUNDRY: WITH SUPERVISION
ADLS_ACUITY_SCORE: 30
LAUNDRY: WITH SUPERVISION
ADLS_ACUITY_SCORE: 30
HYGIENE/GROOMING: INDEPENDENT
ADLS_ACUITY_SCORE: 30
ORAL_HYGIENE: INDEPENDENT
ADLS_ACUITY_SCORE: 30
DRESS: INDEPENDENT
ADLS_ACUITY_SCORE: 30
DRESS: SCRUBS (BEHAVIORAL HEALTH);INDEPENDENT
ADLS_ACUITY_SCORE: 30
ORAL_HYGIENE: INDEPENDENT
ADLS_ACUITY_SCORE: 30

## 2024-04-02 NOTE — PROGRESS NOTES
"  ----------------------------------------------------------------------------------------------------------  [facility]  Psychiatry Progress Note  Hospital Day #13     Interim History:     The patient's care was discussed with the treatment team and chart notes were reviewed.    Vitals: VSS  Sleep: 3.75 hours (04/02/24 0600)  Scheduled medications: Took all scheduled medications as prescribed   Psychiatric PRN medications: nicotine lozenge, Miralax, zyprexa for anxiety    Staff Report: Pt was mildly hyperverbal and tangential with an anxious presentation when it was his turn to check in during groups. Pt shared that he was feeling \"Pressured. I have a lot on my plate.\" Pt elaborated and shared that he feels he might be \"overthinking\" and that the solution to his problems is to \"just get a job\". Pt said he was upset over the weekend because his mother mentioned the pt's overdoes in front of his 15 y/o son. Pt is now upset at his mother and feels that he needs to repair with his son. Visible in milieu up until dinner and social with select peers. Pt denies all MH sx other than anxiety. Woken at 2245 to take HS meds without issue and then quickly back to bed.  Also he woke up few times for snacks and then read while walking in the quinn near his room.    Please see staff notes for further details.     Subjective:     Patient Interview:  Barak Clarke was seen in his room. Waking up from a nap. Still interested in taking Topamax during the day, he notices appetite at night with it. Trying out handing a note to nursing staff to remind him of food intake. Has been doing menus with another pt. Eating at night has caused him to gain weight. Sleep is \"messed up\". Starting to fall asleep around 6pm. Worked out yesterday. Was up by 4AM. Mood has been \"alright\". Have been frustrated at times, but comes with the territory he feels. Feels like \"this has been pushed on [him]\" meaning " "hospitalization. But he believes things happen for a reason and that he likely needed it. Feeling good but also scared about leaving, he feels nervous about \"life\" in general, \"life is hard\". Feels like he has the tools that he needs to deal with it, his medications makes him feel persistent. \"Almost like a stimulant without being a stimulant\". Feels like he needs to keep working out and decreasing food intake. Will take things \"one at a time\". He thinks it is a good sign that he is nervous for discharge. He wants to look into housing because he does not want to stay with his mother. No SI/HI. Mom knows that he is leaving tomorrow (\"maybe\"), he will call her and set up a ride for tomorrow - feels noon is good. Informed patient about 30-day medication supply upon discharge. He reports having a PCP.    ROS:   Patient denies acute concerns other than what is listed in HPI.     Objective:     Vitals:  /74 (BP Location: Left arm)   Pulse 75   Temp 97.1  F (36.2  C) (Temporal)   Resp 14   Ht 1.803 m (5' 11\")   Wt 111.1 kg (245 lb)   SpO2 98%   BMI 34.17 kg/m      Allergies:  Allergies   Allergen Reactions    Prazosin Unknown     Worsening of nightmares       Current Medications:  Scheduled:  Current Facility-Administered Medications   Medication Dose Route Frequency    atenolol  50 mg Oral Daily    buprenorphine HCl-naloxone HCl  1 Film Sublingual BID    cholecalciferol  125 mcg Oral Daily    desvenlafaxine  100 mg Oral Daily    gabapentin  600 mg Oral TID    nicotine  1 patch Transdermal Daily    QUEtiapine  150 mg Oral BID    tiZANidine  2 mg Oral BID    topiramate  25 mg Oral At Bedtime       PRN:  Current Facility-Administered Medications   Medication Dose Route Frequency    acetaminophen  650 mg Oral Q4H PRN    guaiFENesin  600 mg Oral BID PRN    hydrOXYzine HCl  25 mg Oral Q4H PRN    melatonin  3 mg Oral At Bedtime PRN    nicotine  2 mg Buccal Q1H PRN    OLANZapine  10 mg Intramuscular TID PRN    " "OLANZapine zydis  5 mg Oral BID PRN    polyethylene glycol  17 g Oral Daily PRN    senna-docusate  1 tablet Oral At Bedtime PRN    tiZANidine  2 mg Oral Daily PRN       Labs and Imaging:  New results:   No results found for this or any previous visit (from the past 24 hour(s)).    Data this admission:  - CBC unremarkable  - CMP unremarkable  - TSH normal  - UDS positive for cannabinoids  - Vit D 14 (L)  - Hgb A1c 5.1 (WNL) on 7/10/23  - Lipids last month WNL  - Vit B12 normal  - Folate normal  - Urinalysis not yet performed - declined  - EKG normal sinus rhythm, QTc 429     Mental Status Exam:     Oriented to:  Grossly Oriented  General:  Awake and Alert  Appearance:  appears stated age, Grooming is adequate, Dressed in casual clothing, and Tattoos on left arm  Behavior/Attitude:  Calm, Engaged, Easy to redirect, and reserved  Eye Contact: downcast stare, Glances  Psychomotor: Normal and No evidence of tics, dystonia, or tardive dyskinesia  no catatonia present  Speech:  appropriate volume/tone and with good articulation  Language: Fluent in English with appropriate syntax and vocabulary.  Mood:  \"alright\"  Affect:  appropriate and restricted  Thought Process:  coherent and ruminative  Thought Content:   No SI/HI/AH/VH; delusions of paranoia   Associations:  questionable   Insight:  partial due to paranoid delusions and confident that his ex/ex's family is against him, now endorsing doctors against him but does apologize and work with the team on a treatment plan  Judgment: partial. Has had some inappropriate discussions with peers in the milieu regarding robbery offenses. Asked for a 12hour intent, rescinded it, then asked for a 72 hour hold as he felt he would discharge after it .   Impulse control: fair  Attention Span:  adequate for conversation  Concentration:  grossly intact  Recent and Remote Memory:  grossly intact  Fund of Knowledge: average  Muscle Strength and Tone: normal  Gait and Station: " Normal     Psychiatric Assessment     Barak Clarke is a 39 year old male with previous psychiatric diagnoses of depression, anxiety, ADHD, PTSD, substance-induced psychotic disorder, and polysubstance dependency and use disorder who checked into our emergency department from [facility] requesting an alternative chemical dependency treatment, admitted on 03/20/2024 due to concern for SI, psychosis, paranoia, delusions, and disorganized behavior in the context of substance use and relationship concerns. Most recent psychiatric hospitalization was last month for SI s/p overdose. Significant symptoms on admission include paranoia, hypervigilance, racing thoughts, disorganized behavior, social withdrawal. The MSE on admission was pertinent for poor insight and judgement, paranoia, delusions of his ex wife and family out to get him, self-referential delusions, agitation/restlessness. Biological contributions to mental health presentation include substance use most recently cannabis, past MH diagnoses. Psychological contributions to mental health presentation include poor coping, possible cluster B traits especially considering relationship concerns over his lifetime, PTSD, poor insight into need for substance treatment. Social factors contributing to mental health presentation include isolation, interpersonal conflicts, unemployed.  Protective factors include seeking help, knows when SI is bad he said, would like to stay on his medications.      In summary, the patient's reported symptoms of paranoia, delusions, anxiety with disorganized thoughts, and expressed concerns about people conspiring to kill him, taking over his electronics and monitoring him in the context of cannabis use and past substance use are consistent with prior diagnosis of substance induced psychosis. However, could also consider primary diagnosis of psychotic disorder with mood component given signs for suhas such as decrease need for sleep,  rambling and perfuse thoughts, supporting Bipolar disorder with psychotic features or Schizoaffective disorder. Although meth can also present with these features, he has not used in over a month per his report and supported by UDS. He will likely benefit from medication optimization and therapeutic milieu this admission. PT declining CD tx.      Given that he currently has psychosis, and possible suhas, patient warrants inpatient psychiatric hospitalization to maintain his safety.      Psychiatric Plan by Diagnosis      Today's changes:  - no changes today     # Substance induced psychosis vs primary psychosis  - Continue Seroquel, changed to 150mg BID on 3/26 due to patient concern for nighttime sedation making possible sleep apnea worse   - prn zyprexa 5mg BID     #MDD  - PTA Pristiq 100mg daily - consider stopping if pt continues to have trouble sleeping and is anxious/agitated     #Anxiety/agitation  - PTA gabapentin 600mg TID  - prn hydroxyzine  - prn zyprexa      #ADHD  Pt has long hx of adhd - said Vyvanse was prescribed in [place] but this is unclear given he has not filled it in a month per pharmacy. Likely contributing to current agitation and trouble sleeping.   - Stopped vyvanse on admission.   Possible past seizure hx when on Wellbutrin but unclear, also when using drugs. Was given some Wellbutrin in the Ed and then it was stopped. Patient requested Wellbutrin on 3/26, team declined due to possible seizure history.   - Seizure precautions.      # Polysubstance use , severe  UDS pos for cannabinoids in ED, pt states he has not used other substances since Feb 20th. Does make references to meth in his house.   - Pt declining to return to CD tx at this time, wants sober living  - Nicotine patch and lozenge  - Continue PTA suboxone 8-2 MG for opioid use disorder in remission, UDS negative for opioid use. Pt said he has been stable on this medication. Plan for outpt follow up with prescriber.     "  #Akithisia/muscle spasms  Pt endorsing restlessness, keeps him awake, could be RLS as well.   - Continue to decrease Zyprexa and monitor with prn  - Tizanidine 2mg Bid and prn 2mg BID  - Consider Ropinirole      2. Pertinent Labs/Monitoring:   - Uds pos for cannabinoids  - CBC, CMP, TSH, Vit B12 and folate WNL  - Vitamin D low  - Lipids last month wnl  - A1C last month wnl  - EKG normal sinus rhythm     3. Additional Plans:  - Patient will be treated in therapeutic milieu with appropriate individual and group therapies as described     Psychiatric Hospital Course:      Barak Clarke was admitted to [facility] as a voluntary pt.  Medications:  PTA tizanidine, Pristiq, Gabapentin, Suboxone were continued with prn tizanidine added for restlessness, PTA zyprexa made prn due to akithisia in the Ed.   PTA Vyvanse was held due to anxiety and agitation.  Possible past seizure hx when on Wellbutrin but unclear, also when using drugs. Was given some Wellbutrin in the Ed and then it was stopped. Patient requested Wellbutrin on 3/26, team declined due to possible seizure history. Seizure precautions in place. Pt said he has been learning to cope without Vyvanse, relying on art and other forms of activity to find value and \"self worth\".    New medications started at the time of admission include Seroquel 200mg in the ED.   Seroquel: 50mg at bedtime started in the ED, increased to 100mg at bedtime on 3/19, increased to 200mg at bedtime on 3/20, increased to morning 100mg and evening 300mg on 3/21. Changed to 150mg BID on 3/26 due to patient concern for sedation overnight affecting possible sleep apnea.   Zyprexa prn: 10mg TID starting 3/20, decreased to 5mg BID on 3/25 due to patient concern for weight gain  - restarted trial of metformin with monitoring for rash and to help with metabolic syndrome although substantial weight gain during this hospitalization so far is unlikely.   Polysubstance use: UDS pos for cannabinoids in " "ED, pt states he has not used other substances since Feb 20th. Does make references to meth in his house. Pt declining to return to  tx at this time, wants sober living although he will be unlikely to qualify due to not being sober for 30 days prior and not completing dual [facility] program prior. Nicotine patch and lozenge given on admission. Continued PTA suboxone 8-2 MG for opioid use disorder in remission, UDS negative for opioid use. Pt said he has been stable on this medication. Plan for outpt follow up with prescriber.   Overnight and during the day on 3/29/2024 patient appeared disorganized and upset, requested and then rescinded 12H intent, refused to engage with treatment team regarding discharge requests due to paranoia that \"they\" were conspiring with the doctors and his mom (would not specify who \"they\" are), and appeared to be splitting - asked specifically to speak with the resident on the treatment team without the attending due to paranoia about the attending. 72H hold placed for disorganized thinking and paranoid delusions with a lack of a coherent discharge plan. Will discharge to home after 72hr hold is up. Petition unlikely to be upheld.      The risks, benefits, alternatives, and side effects were discussed and understood by the patient and other caregivers.     Medical Assessment and Plan     Medical diagnoses to be addressed this admission:    # Metabolic syndrome  Pt endorsed weight gain with zyprexa.   - Metformin started/continued in the ED  - Metformin stopped on admission due to pt endorsing rash after taking it in the ED. Since Zyprexa was being decreased, decided to stop metformin and monitor.   - Metformin restarted on 3/25 due to patient concern for weight gain but had recurrence of rash on wrists and back, PRN Benadryl helped with this.  - Metformin discontinued on 3/26 and Topamax 25mg daily started to avoid rash.   -Pt likes Topamax and was encouraged to talk to outpt provider " regarding increasing it     # HTN  - Continued PTA atenolol  - CTM      # Patient concerned for sleep apnea  - Will monitor vitals  - Outpatient PCP follow up  - Recommend sleep study outpt    Medical course: Patient was physically examined by the ED prior to being transferred to the unit and was found to be medically stable and appropriate for admission. Metformin stopped on admission due to pt endorsing rash after taking it in the ED, showed provider red raised rash on forearm and stated he had some on his back as well. Since Zyprexa was being decreased, decided to stop metformin and monitor. Metformin restarted on 3/25 due to patient concern for weight gain but had recurrence of rash on bilateral wrists and back, PRN Benadryl helped with this. Metformin discontinued on 3/26 due to rash and Topamax 25mg daily for weight loss. Some upset stomach after starting Topamax and patient reported he felt almost manic, but liked that it helps with weight loss and wanted to continue and see how it goes. Seroquel dose was decreased and split to BID due to patient concern for sleep apnea and that sedation from Seroquel made it worse and caused him to wake up during the night and have poor sleep. Reported improved sleep after starting BID dosing.      Consults:  none     Checklist     Legal Status: 72-h Hold signed on 3/29/2024 at 1:53pm      Safety Assessment:   Behavioral Orders   Procedures    Code 1 - Restrict to Unit    Routine Programming     As clinically indicated    Seizure precautions    Self Injury Precaution    Status 15     Every 15 minutes.    Suicide precautions: Suicide Risk: HIGH     Patients on Suicide Precautions should have a Combination Diet ordered that includes a Diet selection(s) AND a Behavioral Tray selection for Safe Tray - with utensils, or Safe Tray - NO utensils       Order Specific Question:   Suicide Risk     Answer:   HIGH       Risk Assessment:  Risk for harm is moderate-high.  Risk factors:  maladaptive coping, substance use, trauma, family dynamics, impulsive, past behaviors, and psychosocial stressors, psychosis and delusions with commands hallucinations to use substances.  Protective factors: engaged in treatment and seeking CD treatment    SIO: none    Disposition: TBD. Pending stabilization, medication optimization, & development of a safe discharge plan. Plan to return home with his mom with outpt follow up after 72 hr hold is up if he has stabilized.      Attestations     Kath Garcia MD  Psychiatry Resident Physician      This patient has been seen and evaluated by me, Bridger Stack.  I have discussed this patient with the psychiatry resident and I agree with the findings and plan in this note.    I have reviewed today's vital signs, medications, labs and imaging.     Bridger Hernandez MD

## 2024-04-02 NOTE — PLAN OF CARE
Problem: Sleep Disturbance  Goal: Adequate Sleep/Rest  4/2/2024 0400 by Alie Arce RN  Outcome: Progressing   Goal Outcome Evaluation:Progressing  Barak was sleeping at start of this shift with easy and non labored respirations. He woke up few times for snacks and then read while walking in the [place] near his room. Continues on SI/SIB/Seizure precautions. Patient requested and was administered prn Zyprexa for agitation and nicotine lozenge. Also requested and received prn Miralax for constipation. Slept for about 3.75 hours this night.

## 2024-04-02 NOTE — DISCHARGE SUMMARY
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 (H)      QUEtiapine 150 MG TABS Take 150 mg by mouth 2 times daily  Qty: 60 tablet, Refills: 0    Associated Diagnoses: Psychosis, unspecified psychosis type (H); Mental health problem      topiramate (TOPAMAX) 25 MG tablet Take 1 tablet (25 mg) by mouth at bedtime  Qty: 30 tablet, Refills: 0    Associated Diagnoses: Mental health problem           CONTINUE these medications which have CHANGED    Details   atenolol (TENORMIN) 50 MG tablet Take 1 tablet (50 mg) by mouth daily  Qty: 30 tablet, Refills: 0    Associated Diagnoses: Hypertension, unspecified type      buprenorphine HCl-naloxone HCl (SUBOXONE) 8-2 MG per film Place 1 Film under the tongue 2 times daily  Qty: 60 Film, Refills: 0    Associated Diagnoses: Substance-related disorder (H)      desvenlafaxine (PRISTIQ) 100 MG 24 hr tablet Take 1 tablet (100 mg) by mouth daily  Qty: 30 tablet, Refills: 0    Associated Diagnoses: Major depressive disorder, recurrent, moderate (H)      gabapentin (NEURONTIN) 300 MG capsule Take 2 capsules (600 mg) by mouth 3 times daily  Qty: 180 capsule, Refills: 0    Associated Diagnoses: Mental health problem      !! tiZANidine (ZANAFLEX) 2 MG tablet Take 1 tablet (2 mg) by mouth 2 times daily  Qty: 60 tablet, Refills: 0    Associated Diagnoses: Mental health problem      !! tiZANidine (ZANAFLEX) 2 MG tablet Take 1 tablet (2 mg) by mouth daily as  needed for muscle spasms  Qty: 30 tablet, Refills: 0    Associated Diagnoses: Mental health problem       !! - Potential duplicate medications found. Please discuss with provider.        CONTINUE these medications which have NOT CHANGED    Details   melatonin 3 MG tablet Take 3 mg by mouth at bedtime           STOP taking these medications       buPROPion (WELLBUTRIN SR) 150 MG 12 hr tablet Comments:   Reason for Stopping:         OLANZapine (ZYPREXA) 10 MG tablet Comments:   Reason for Stopping:         OLANZapine (ZYPREXA) 5 MG tablet Comments:   Reason for Stopping:                Discharge Plan:   Medications as above  Medication Management/Psychiatry:  Provider: PIPER [facility]: [address] [place] [address] ph:  [phone]: fax: [phone]        You report you have an appointment scheduled and decline having unit staff verify this appointment.      If no appointments scheduled, explain - you report you have an appointment with your provider and decline to verify prior to leaving the unit.         Attestations:     Tea Negro, MS3     Resident Attestation:   I was present with the medical student who participated in the service and in the documentation of the note.  I have verified the history and personally performed the physical exam, mental status exam, and medical decision making. I agree with the assessment and plan of care as documented in the note.     Nazanin Ley MD, MPH, MSc., PhD candidate   Psychiatry Resident Physician     The patient has been seen and evaluated by me, Bridger Stack . I have examined the patient today and reviewed the discharge plan with the resident. I agree with the final assessment and plan, as noted in the discharge summary. I have reviewed today's vital signs, medications, labs and imaging.    Total time discharge plannin minutes      Bridger Hernandez MD

## 2024-04-02 NOTE — PLAN OF CARE
"Occupational Therapy Group Note     04/02/24 1457   Group Therapy Session   Group Attendance attended group session   Group Type recreation   Group Topic Covered cognitive activities;leisure exploration/use of leisure time   Group Session Detail OT: Cognitive activity (Dizios) for attention, concentration, new learning, follow through, visuospatial recognition, opportunity for social engagement, leisure education/exploration, reality orientation and managing mood/symptoms.   (13:20-14:10; 4 participants)   Patient Response/Contribution cooperative with task;organized    Patient joined group for 50 minutes.  Pt reported feeling \"Tired but in a relaxed way.  Not sad\".  Pt was able to learn and follow through with steps for a novel cognitive activity.  Pt engaged with staff and peers in a prosocial manner.  Affect broadened in response to positive engagement.            "

## 2024-04-02 NOTE — PLAN OF CARE
"Goal Outcome Evaluation:    Plan of Care Reviewed With: patient      Problem: Adult Behavioral Health Plan of Care  Goal: Adheres to Safety Considerations for Self and Others  Intervention: Develop and Maintain Individualized Safety Plan  Recent Flowsheet Documentation  Taken 4/2/2024 0900 by Irish Hermosillo RN  Safety Measures: safety rounds completed     Patient presents with flat blunted affect. Patient inappropriately focused on discharge. Patient endorsed anxiety of 6 and depression of 4. He denied all other mental health symptoms. Patient verbalized, \"I feel like myself even without Vyvanse\" but stated that he will go back to it as soon as he gets out of here. Patient said that if he does not take Vyvanse, he will be taking Sudafed.   He continues to perseverate on his weight gain. Patient asked RN to help deny him dinner tonight. RN informed patient that staff may not do that unless he writes it down on a paper.   Patient was compliant with his morning medications. He remained visible on the unit. Patient intermittently paces on the hallway in the company of a select peer. Patient attended groups. He requested for a second dose of his PRN Olanzapine and it was given at 1310.                "
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
## 2024-04-02 NOTE — PLAN OF CARE
BEH IP Unit Acuity Rating Score (UARS)  Patient is given one point for every criteria they meet.    CRITERIA SCORING   On a 72 hour hold, court hold, committed, stay of commitment, or revocation. 0    Patient LOS on BEH unit exceeds 20 days. 0  LOS: 13   Patient under guardianship, 55+, otherwise medically complex, or under age 11. 0   Suicide ideation without relief of precipitating factors. 1   Current plan for suicide. 0   Current plan for homicide. 0   Imminent risk or actual attempt to seriously harm another without relief of factors precipitating the attempt. 0   Severe dysfunction in daily living (ex: complete neglect for self care, extreme disruption in vegetative function, extreme deterioration in social interactions). 1   Recent (last 7 days) or current physical aggression in the ED or on unit. 0   Restraints or seclusion episode in past 72 hours. 0   Recent (last 7 days) or current verbal aggression, agitation, yelling, etc., while in the ED or unit. 0   Active psychosis. 1   Need for constant or near constant redirection (from leaving, from others, etc).  0   Intrusive or disruptive behaviors. 1   Patient requires 3 or more hours of individualized nursing care per 8-hour shift (i.e. for ADLs, meds, therapeutic interventions). 0   TOTAL 4

## 2024-04-02 NOTE — PLAN OF CARE
Problem: Adult Behavioral Health Plan of Care  Goal: Optimized Coping Skills in Response to Life Stressors  Outcome: Progressing   Goal Outcome Evaluation:    Plan of Care Reviewed With: patient        Nursing Assessment    Recent Vitals: B/P:124/74  T:98.4  P:79    General Shift Summary  Visible in milieu up until dinner and social with select peers. Pt denies all MH sx other than anxiety. Pt is guarded on assessment. No behavioral issues. Pt was sleeping the entire shift after dinner. Woken at 2245 to take HS meds without issue and then quickly back to bed.     Patient is medication compliant and reported no side effects. No PRN medications given this shift other than nicotine.     Hygiene and appetite are appropriate.    Bakari Garduno RN

## 2024-04-03 VITALS
BODY MASS INDEX: 34.3 KG/M2 | DIASTOLIC BLOOD PRESSURE: 75 MMHG | OXYGEN SATURATION: 99 % | TEMPERATURE: 97.9 F | RESPIRATION RATE: 18 BRPM | WEIGHT: 245 LBS | HEART RATE: 76 BPM | HEIGHT: 71 IN | SYSTOLIC BLOOD PRESSURE: 108 MMHG

## 2024-04-03 PROCEDURE — 250N000013 HC RX MED GY IP 250 OP 250 PS 637: Performed by: PSYCHIATRY & NEUROLOGY

## 2024-04-03 PROCEDURE — 250N000013 HC RX MED GY IP 250 OP 250 PS 637

## 2024-04-03 RX ADMIN — GABAPENTIN 600 MG: 300 CAPSULE ORAL at 08:14

## 2024-04-03 RX ADMIN — DESVENLAFAXINE 100 MG: 50 TABLET, FILM COATED, EXTENDED RELEASE ORAL at 08:14

## 2024-04-03 RX ADMIN — NICOTINE POLACRILEX 2 MG: 2 LOZENGE ORAL at 09:32

## 2024-04-03 RX ADMIN — BUPRENORPHINE AND NALOXONE 1 FILM: 8; 2 FILM BUCCAL; SUBLINGUAL at 08:15

## 2024-04-03 RX ADMIN — NICOTINE POLACRILEX 2 MG: 2 LOZENGE ORAL at 06:35

## 2024-04-03 RX ADMIN — ATENOLOL 50 MG: 50 TABLET ORAL at 08:15

## 2024-04-03 RX ADMIN — NICOTINE POLACRILEX 2 MG: 2 LOZENGE ORAL at 05:16

## 2024-04-03 RX ADMIN — Medication 125 MCG: at 08:14

## 2024-04-03 RX ADMIN — TIZANIDINE 2 MG: 2 TABLET ORAL at 08:14

## 2024-04-03 RX ADMIN — OLANZAPINE 5 MG: 5 TABLET, ORALLY DISINTEGRATING ORAL at 05:16

## 2024-04-03 RX ADMIN — NICOTINE 1 PATCH: 21 PATCH, EXTENDED RELEASE TRANSDERMAL at 08:17

## 2024-04-03 RX ADMIN — NICOTINE POLACRILEX 2 MG: 2 LOZENGE ORAL at 08:14

## 2024-04-03 RX ADMIN — QUETIAPINE 150 MG: 100 TABLET ORAL at 08:14

## 2024-04-03 ASSESSMENT — ACTIVITIES OF DAILY LIVING (ADL)
ADLS_ACUITY_SCORE: 30

## 2024-04-03 NOTE — PLAN OF CARE
Goal Outcome Evaluation:    Problem: Sleep Disturbance  Goal: Adequate Sleep/Rest  Outcome: Not Progressing     Problem: Adult Behavioral Health Plan of Care  Goal: Plan of Care Review  Outcome: Progressing     Pt received sleeping in bed. Woke-up couple of time for snacks. Received prn Nicotine Lozenge x 2. At 0516 pt requested and received prn Zyprexa 5 Mg for anxiety. Safety rounds completed every 15 minutes. Pt appeared asleep for 5.5 hours.

## 2024-04-03 NOTE — PLAN OF CARE
Goal Outcome Evaluation:               Patient out and visible in the unit, denies all mental health symptoms and contracted for safety. Verbalized readiness to discharge. Reviewed discharge instructions and medication list. Patient verbalized understanding. Belongings and medication sent home with patient. No concern.     Patient discharged to home, picked up by mother.

## 2024-04-03 NOTE — PLAN OF CARE
Patient visible in the milieu this shift. Patient denied SI/SIB/HI/AVH. Patient was frequently asking for PRN nicotine, went to bed earlier and is currently up at this emndy, requested PRN Tizanidine for restless legs around 2224. Patient is eating and drinking adequately. Possible discharge tomorrow at noon. No behavioral issues observed or reported. Will continue to monitor and offer support.     Problem: Psychotic Signs/Symptoms  Goal: Improved Behavioral Control (Psychotic Signs/Symptoms)  Outcome: Progressing    Plan of Care Reviewed With: Patient

## 2024-04-05 ENCOUNTER — PATIENT OUTREACH (OUTPATIENT)
Dept: CARE COORDINATION | Facility: CLINIC | Age: 39
End: 2024-04-05
Payer: COMMERCIAL

## 2024-04-05 NOTE — PROGRESS NOTES
[facility]:   [facility] Contact  [email or web address]     Clinical Data: Post-Discharge Outreach     Outreach attempted x 2.  Left message on patient's voicemail, providing [facility]'s central phone number of [phone] ([phone]) for questions/concerns and/or to schedule an appt with an [facility] provider, if they do not have a PCP.      Plan:  [facility] will do no further outreaches at this time.       CHARLES Posey (she/her/hers)  Social Work Clinic Care Coordinator   [facility]  [email or web address]  [phone]      *Connected Care Resource Team does NOT follow patient ongoing. Referrals are identified based on internal discharge reports and the outreach is to ensure patient has an understanding of their discharge instructions.

## 2024-06-23 ENCOUNTER — HEALTH MAINTENANCE LETTER (OUTPATIENT)
Age: 39
End: 2024-06-23

## 2025-06-08 ENCOUNTER — TELEPHONE (OUTPATIENT)
Dept: BEHAVIORAL HEALTH | Facility: CLINIC | Age: 40
End: 2025-06-08

## 2025-06-08 ENCOUNTER — HOSPITAL ENCOUNTER (EMERGENCY)
Facility: CLINIC | Age: 40
Discharge: PSYCHIATRIC OR SUBSTANCE USE FACILITY | End: 2025-06-10
Attending: EMERGENCY MEDICINE | Admitting: EMERGENCY MEDICINE
Payer: COMMERCIAL

## 2025-06-08 DIAGNOSIS — F22 PARANOIA (H): ICD-10-CM

## 2025-06-08 DIAGNOSIS — F22 DELUSIONS (H): ICD-10-CM

## 2025-06-08 PROBLEM — F33.2 MAJOR DEPRESSIVE DISORDER, RECURRENT EPISODE, SEVERE (H): Status: ACTIVE | Noted: 2025-06-08

## 2025-06-08 PROCEDURE — 250N000013 HC RX MED GY IP 250 OP 250 PS 637: Performed by: EMERGENCY MEDICINE

## 2025-06-08 PROCEDURE — 250N000012 HC RX MED GY IP 250 OP 636 PS 637: Performed by: EMERGENCY MEDICINE

## 2025-06-08 PROCEDURE — 80307 DRUG TEST PRSMV CHEM ANLYZR: CPT | Performed by: EMERGENCY MEDICINE

## 2025-06-08 PROCEDURE — 99285 EMERGENCY DEPT VISIT HI MDM: CPT | Performed by: EMERGENCY MEDICINE

## 2025-06-08 RX ORDER — GABAPENTIN 300 MG/1
300 CAPSULE ORAL 3 TIMES DAILY
Status: ON HOLD | COMMUNITY

## 2025-06-08 RX ORDER — LOPERAMIDE HYDROCHLORIDE 2 MG/1
2 TABLET ORAL 4 TIMES DAILY PRN
Status: ON HOLD | COMMUNITY

## 2025-06-08 RX ORDER — BUPRENORPHINE AND NALOXONE 8; 2 MG/1; MG/1
1 FILM, SOLUBLE BUCCAL; SUBLINGUAL 2 TIMES DAILY
Status: DISCONTINUED | OUTPATIENT
Start: 2025-06-08 | End: 2025-06-10 | Stop reason: HOSPADM

## 2025-06-08 RX ORDER — GABAPENTIN 300 MG/1
300 CAPSULE ORAL 3 TIMES DAILY
Status: DISCONTINUED | OUTPATIENT
Start: 2025-06-08 | End: 2025-06-10 | Stop reason: HOSPADM

## 2025-06-08 RX ORDER — OLANZAPINE 5 MG/1
5 TABLET, FILM COATED ORAL
Status: DISCONTINUED | OUTPATIENT
Start: 2025-06-08 | End: 2025-06-10 | Stop reason: HOSPADM

## 2025-06-08 RX ORDER — ONDANSETRON 4 MG/1
4 TABLET, FILM COATED ORAL EVERY 8 HOURS PRN
Status: ON HOLD | COMMUNITY

## 2025-06-08 RX ORDER — BUPROPION HYDROCHLORIDE 150 MG/1
150 TABLET ORAL EVERY MORNING
Status: ON HOLD | COMMUNITY

## 2025-06-08 RX ORDER — DEXTROAMPHETAMINE SACCHARATE, AMPHETAMINE ASPARTATE, DEXTROAMPHETAMINE SULFATE AND AMPHETAMINE SULFATE 5; 5; 5; 5 MG/1; MG/1; MG/1; MG/1
20 TABLET ORAL
Status: ON HOLD | COMMUNITY

## 2025-06-08 RX ADMIN — GABAPENTIN 300 MG: 300 CAPSULE ORAL at 21:19

## 2025-06-08 RX ADMIN — BUPRENORPHINE AND NALOXONE 1 FILM: 8; 2 FILM BUCCAL; SUBLINGUAL at 21:19

## 2025-06-08 ASSESSMENT — ACTIVITIES OF DAILY LIVING (ADL)
ADLS_ACUITY_SCORE: 46

## 2025-06-08 NOTE — ED NOTES
IP MH Referral Acuity Rating Score (RARS)    LMHP complete at referral to IP MH, with DEC; and, daily while awaiting IP MH placement. Call score to PPS.  CRITERIA SCORING   New 72 HH and Involuntary for IP MH (not adolescent) 0/3   Boarding over 24 hours 0/1   Vulnerable adult at least 55+ with multiple co morbidities; or, Patient age 11 or under 0/1   Suicide ideation without relief of precipitating factors 1/1   Current plan for suicide 0/1   Current plan for homicide 0/1   Imminent risk or actual attempt to seriously harm another without relief of factors precipitating the attempt 0/1   Severe dysfunction in daily living (ex: complete neglect for self care, extreme disruption in vegetative function, extreme deterioration in social interactions) 1/1   Recent (last 2 weeks) or current physical aggression in the ED 0/1   Restraints or seclusion episode in ED 0/1   Verbal aggression, agitation, yelling, etc., while in the ED 0/1   Active psychosis with psychomotor agitation or catatonia 1/1   Need for constant or near constant redirection (from leaving, from others, etc).  0/1   Intrusive or disruptive behaviors 0/1   TOTAL 3     SERAFIN Narayan
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
## 2025-06-09 ENCOUNTER — TELEPHONE (OUTPATIENT)
Dept: BEHAVIORAL HEALTH | Facility: CLINIC | Age: 40
End: 2025-06-09
Payer: COMMERCIAL

## 2025-06-09 PROCEDURE — 99245 OFF/OP CONSLTJ NEW/EST HI 55: CPT | Performed by: PSYCHIATRY & NEUROLOGY

## 2025-06-09 PROCEDURE — 250N000013 HC RX MED GY IP 250 OP 250 PS 637: Performed by: EMERGENCY MEDICINE

## 2025-06-09 PROCEDURE — 250N000012 HC RX MED GY IP 250 OP 636 PS 637: Performed by: EMERGENCY MEDICINE

## 2025-06-09 PROCEDURE — 250N000013 HC RX MED GY IP 250 OP 250 PS 637: Performed by: PSYCHIATRY & NEUROLOGY

## 2025-06-09 PROCEDURE — 99417 PROLNG OP E/M EACH 15 MIN: CPT | Performed by: PSYCHIATRY & NEUROLOGY

## 2025-06-09 RX ORDER — CLONAZEPAM 0.5 MG/1
0.5 TABLET ORAL 2 TIMES DAILY
Status: DISCONTINUED | OUTPATIENT
Start: 2025-06-09 | End: 2025-06-10 | Stop reason: HOSPADM

## 2025-06-09 RX ORDER — BUPROPION HYDROCHLORIDE 150 MG/1
150 TABLET ORAL DAILY
Status: DISCONTINUED | OUTPATIENT
Start: 2025-06-09 | End: 2025-06-10 | Stop reason: HOSPADM

## 2025-06-09 RX ORDER — NICOTINE 21 MG/24HR
1 PATCH, TRANSDERMAL 24 HOURS TRANSDERMAL DAILY
Status: DISCONTINUED | OUTPATIENT
Start: 2025-06-09 | End: 2025-06-10 | Stop reason: HOSPADM

## 2025-06-09 RX ORDER — PALIPERIDONE 1.5 MG/1
1.5 TABLET, EXTENDED RELEASE ORAL DAILY
Status: DISCONTINUED | OUTPATIENT
Start: 2025-06-09 | End: 2025-06-10 | Stop reason: HOSPADM

## 2025-06-09 RX ORDER — POLYETHYLENE GLYCOL 3350 17 G
2 POWDER IN PACKET (EA) ORAL
Status: DISCONTINUED | OUTPATIENT
Start: 2025-06-09 | End: 2025-06-10 | Stop reason: HOSPADM

## 2025-06-09 RX ADMIN — BUPRENORPHINE AND NALOXONE 1 FILM: 8; 2 FILM BUCCAL; SUBLINGUAL at 08:21

## 2025-06-09 RX ADMIN — CLONAZEPAM 0.5 MG: 0.5 TABLET ORAL at 20:11

## 2025-06-09 RX ADMIN — GABAPENTIN 300 MG: 300 CAPSULE ORAL at 08:21

## 2025-06-09 RX ADMIN — GABAPENTIN 300 MG: 300 CAPSULE ORAL at 13:10

## 2025-06-09 RX ADMIN — BUPRENORPHINE AND NALOXONE 1 FILM: 8; 2 FILM BUCCAL; SUBLINGUAL at 20:11

## 2025-06-09 RX ADMIN — BUPROPION HYDROCHLORIDE 150 MG: 150 TABLET, FILM COATED, EXTENDED RELEASE ORAL at 10:48

## 2025-06-09 RX ADMIN — NICOTINE 1 PATCH: 14 PATCH, EXTENDED RELEASE TRANSDERMAL at 09:13

## 2025-06-09 RX ADMIN — CLONAZEPAM 0.5 MG: 0.5 TABLET ORAL at 12:11

## 2025-06-09 RX ADMIN — NICOTINE POLACRILEX 2 MG: 2 LOZENGE ORAL at 10:56

## 2025-06-09 RX ADMIN — NICOTINE POLACRILEX 2 MG: 2 LOZENGE ORAL at 23:48

## 2025-06-09 RX ADMIN — GABAPENTIN 300 MG: 300 CAPSULE ORAL at 20:10

## 2025-06-09 ASSESSMENT — ACTIVITIES OF DAILY LIVING (ADL)
ADLS_ACUITY_SCORE: 46

## 2025-06-09 NOTE — TELEPHONE ENCOUNTER
R: [place]  Access Inpatient Bed Call Log  6/9/2025 12:59 AM  Intake has called facilities that have not updated their bed status within the last 12 hours.    Adults:    *METRO:  [place] -- [facility]: @ CAPACITY.  [facility]-[id number]: @ POSTING 4 BEDS. Reporting no reviews overnight.    [facility]- [id number]: @ CAPACITY. Low acuity   [place] -- [facility]- [id number]: @ CAPACITY. Low acuity only -1:22 AM Per Monet, they are capped.    [place] -- [facility]- [phone]: @ CAPACITY.   [facility]- [phone]: @ CAPACITY.   [facility]/ beds- [id number]: @ POSTING 6 BEDS. Ages 18-35, Voluntary only, NO aggression/physical/sexual assault, violence hx or drug abuse, or psychosis. Negative Covid-1 AM Per Lillian, YA: 3, Adol: 5-Female, Child: 0.   [facility]- [phone]: @ CAPACITY.  [facility]- [phone]: @ CAPACITY.  [place] -- [facility]- [phone]: @ CAPACITY. Do not review overnight.       Pt remains on waitlist pending appropriate placement availability.
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
## 2025-06-09 NOTE — TELEPHONE ENCOUNTER
5:09 PM - Paged resident White  6:18 PM - Per resident Christopher, initial bed writer presented for is no longer available and patients going into double rooms must be roommate appropriate ([facility] declined pt for a shared room earlier today d/t paranoia)    6:54 PM - Called [facility] as unit is CBC. MRN provided for review  7:33 PM - [facility] reports pt is appropriate for unit  7:35 PM - Paged on callGeorge  7:49 PM - Dr. Vazquez accepts for 12NB  Placed pt in queue    R: 12 / Nba    8:07 PM - Notified unit RN that pt is in queue. N2N report on night shift as unit acuity is very high  8:09 PM - Notified ED HUC    Indicia completed
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
## 2025-06-09 NOTE — ED NOTES
IP MH Referral Acuity Rating Score (RARS)    LMHP complete at referral to IP MH, with DEC; and, daily while awaiting IP MH placement. Call score to PPS.  CRITERIA SCORING   New 72 HH and Involuntary for IP MH (not adolescent) 0/3   Boarding over 24 hours 1/1   Vulnerable adult at least 55+ with multiple co morbidities; or, Patient age 11 or under 0/1   Suicide ideation without relief of precipitating factors 1/1   Current plan for suicide 0/1   Current plan for homicide 0/1   Imminent risk or actual attempt to seriously harm another without relief of factors precipitating the attempt 0/1   Severe dysfunction in daily living (ex: complete neglect for self care, extreme disruption in vegetative function, extreme deterioration in social interactions) 1/1   Recent (last 2 weeks) or current physical aggression in the ED 0/1   Restraints or seclusion episode in ED 0/1   Verbal aggression, agitation, yelling, etc., while in the ED 0/1   Active psychosis with psychomotor agitation or catatonia 1/1   Need for constant or near constant redirection (from leaving, from others, etc).  0/1   Intrusive or disruptive behaviors 0/1   TOTAL 4

## 2025-06-09 NOTE — TELEPHONE ENCOUNTER
R:[place]  Access Inpatient Bed Call Log 6/9/2025 8:48:38 AM (metro):  Intake has called facilities that have not updated the bed status within the last 12 hours.                        [facility] is posting 0 beds.             [facility] is posting 0 beds. [phone]:   [facility] is posting 0 beds. Negative covid required.   [facility] is posting 0 beds. Neg covid. No high school/Lori-psych. [phone] 9:00AM PER PREM, AT [facility].  [facility] is posting 0 beds. [phone]   [facility] is posting 0 bed. [phone]    [facility] is posting 6 beds. (Ages 18-35) Negative covid. [phone] 8:55AM PER CHRISTOPHER,  1 YA bed avail. Pt is outside the accepted age range.   [facility] is posting 0 beds.    [facility] is posting 0 beds [phone]       Paged Rose Marie at 12:10 pm.     Paged Rose Marie at 1:19 pm.    Per Rose Marie at 1:54 pm, pt is not roommate appropriate due to his paranoia.     Pt remains on work list pending appropriate availability.

## 2025-06-09 NOTE — PHARMACY-ADMISSION MEDICATION HISTORY
Pharmacist Admission Medication History    Admission medication history is complete. The information provided in this note is only as accurate as the sources available at the time of the update.    Information Source(s): Patient and CareEverywhere/SureScripts, also verified instructions of patient's gabapentin prescription with [facility] via in-person - patient very tired during interview. ED provider had asked to complete med history despite psychosis    Pertinent information:   1) Patient states still taking the following despite medications not being recently filled: Adderall (last fill 2/20/25 #30) and atenolol (last fill 1/18/25 #90)    Changes made to PTA medication list:  Added: Wellbutrin, Adderall, Zofran, Imodium  Deleted: Pristiq, Zyprexa, Seroquel, tizanidine, topiramate  Changed: gabapentin 600mg TID changed to 300mg TID    Allergies reviewed with patient and updates made in EHR: no    Medication History Completed By: Sharon Limon PharmD 6/8/2025 9:00 PM    PTA Med List   Medication Sig Note Last Dose/Taking    amphetamine-dextroamphetamine (ADDERALL) 20 MG tablet Take 20 mg by mouth. Once to twice daily. 6/8/2025: LF 2/20/25 #60 Taking    atenolol (TENORMIN) 50 MG tablet Take 1 tablet (50 mg) by mouth daily 6/8/2025: LF 1/18/25 #90 Taking    buprenorphine HCl-naloxone HCl (SUBOXONE) 8-2 MG per film Place 1 Film under the tongue 2 times daily 6/8/2025: LF 6/4/25 #60 Taking    buPROPion (WELLBUTRIN XL) 150 MG 24 hr tablet Take 150 mg by mouth every morning. 6/8/2025: LF 6/2/25 #30 Taking    gabapentin (NEURONTIN) 300 MG capsule Take 300 mg by mouth 3 times daily.  Unknown    loperamide (IMODIUM A-D) 2 MG tablet Take 2 mg by mouth 4 times daily as needed for diarrhea. 6/8/2025: LF 5/28/25 #20 Taking As Needed    melatonin 3 MG tablet Take 3 mg by mouth at bedtime  Taking    ondansetron (ZOFRAN) 4 MG tablet Take 4 mg by mouth every 8 hours as needed for nausea. 6/8/2025: LF 5/28/25 #15 Taking As  Needed
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
## 2025-06-10 ENCOUNTER — HOSPITAL ENCOUNTER (INPATIENT)
Facility: CLINIC | Age: 40
End: 2025-06-10
Attending: PSYCHIATRY & NEUROLOGY | Admitting: PSYCHIATRY & NEUROLOGY
Payer: COMMERCIAL

## 2025-06-10 VITALS
RESPIRATION RATE: 25 BRPM | HEIGHT: 71 IN | DIASTOLIC BLOOD PRESSURE: 83 MMHG | WEIGHT: 210 LBS | BODY MASS INDEX: 29.4 KG/M2 | SYSTOLIC BLOOD PRESSURE: 108 MMHG | HEART RATE: 63 BPM | OXYGEN SATURATION: 96 % | TEMPERATURE: 98.5 F

## 2025-06-10 DIAGNOSIS — F48.9 MENTAL HEALTH PROBLEM: ICD-10-CM

## 2025-06-10 DIAGNOSIS — I10 PRIMARY HYPERTENSION: ICD-10-CM

## 2025-06-10 DIAGNOSIS — F33.1 MAJOR DEPRESSIVE DISORDER, RECURRENT, MODERATE (H): ICD-10-CM

## 2025-06-10 DIAGNOSIS — F33.1 MODERATE EPISODE OF RECURRENT MAJOR DEPRESSIVE DISORDER (H): Primary | ICD-10-CM

## 2025-06-10 DIAGNOSIS — F51.01 PRIMARY INSOMNIA: ICD-10-CM

## 2025-06-10 DIAGNOSIS — F33.2 SEVERE EPISODE OF RECURRENT MAJOR DEPRESSIVE DISORDER, WITHOUT PSYCHOTIC FEATURES (H): ICD-10-CM

## 2025-06-10 DIAGNOSIS — F17.200 TOBACCO USE DISORDER: ICD-10-CM

## 2025-06-10 DIAGNOSIS — F25.0 SCHIZOAFFECTIVE DISORDER, BIPOLAR TYPE (H): ICD-10-CM

## 2025-06-10 DIAGNOSIS — F23 ACUTE PSYCHOSIS (H): ICD-10-CM

## 2025-06-10 DIAGNOSIS — F19.99 SUBSTANCE-RELATED DISORDER (H): ICD-10-CM

## 2025-06-10 PROBLEM — F32.A DEPRESSION: Status: ACTIVE | Noted: 2025-06-10

## 2025-06-10 LAB
ALBUMIN SERPL BCG-MCNC: 3.7 G/DL (ref 3.5–5.2)
ALP SERPL-CCNC: 84 U/L (ref 40–150)
ALT SERPL W P-5'-P-CCNC: 12 U/L (ref 0–70)
ANION GAP SERPL CALCULATED.3IONS-SCNC: 11 MMOL/L (ref 7–15)
AST SERPL W P-5'-P-CCNC: 15 U/L (ref 0–45)
BASOPHILS # BLD AUTO: 0 10E3/UL (ref 0–0.2)
BASOPHILS NFR BLD AUTO: 1 %
BILIRUB SERPL-MCNC: 0.2 MG/DL
BUN SERPL-MCNC: 8.9 MG/DL (ref 6–20)
CALCIUM SERPL-MCNC: 8.1 MG/DL (ref 8.8–10.4)
CHLORIDE SERPL-SCNC: 102 MMOL/L (ref 98–107)
CREAT SERPL-MCNC: 0.87 MG/DL (ref 0.67–1.17)
EGFRCR SERPLBLD CKD-EPI 2021: >90 ML/MIN/1.73M2
EOSINOPHIL # BLD AUTO: 0.1 10E3/UL (ref 0–0.7)
EOSINOPHIL NFR BLD AUTO: 3 %
ERYTHROCYTE [DISTWIDTH] IN BLOOD BY AUTOMATED COUNT: 13.7 % (ref 10–15)
GLUCOSE SERPL-MCNC: 90 MG/DL (ref 70–99)
HCO3 SERPL-SCNC: 27 MMOL/L (ref 22–29)
HCT VFR BLD AUTO: 42 % (ref 40–53)
HGB BLD-MCNC: 14.2 G/DL (ref 13.3–17.7)
IMM GRANULOCYTES # BLD: 0 10E3/UL
IMM GRANULOCYTES NFR BLD: 0 %
LYMPHOCYTES # BLD AUTO: 1.5 10E3/UL (ref 0.8–5.3)
LYMPHOCYTES NFR BLD AUTO: 35 %
MCH RBC QN AUTO: 26.5 PG (ref 26.5–33)
MCHC RBC AUTO-ENTMCNC: 33.8 G/DL (ref 31.5–36.5)
MCV RBC AUTO: 78 FL (ref 78–100)
MONOCYTES # BLD AUTO: 0.4 10E3/UL (ref 0–1.3)
MONOCYTES NFR BLD AUTO: 9 %
NEUTROPHILS # BLD AUTO: 2.3 10E3/UL (ref 1.6–8.3)
NEUTROPHILS NFR BLD AUTO: 53 %
NRBC # BLD AUTO: 0 10E3/UL
NRBC BLD AUTO-RTO: 0 /100
PLATELET # BLD AUTO: 208 10E3/UL (ref 150–450)
POTASSIUM SERPL-SCNC: 3.5 MMOL/L (ref 3.4–5.3)
PROT SERPL-MCNC: 6.2 G/DL (ref 6.4–8.3)
RBC # BLD AUTO: 5.36 10E6/UL (ref 4.4–5.9)
SODIUM SERPL-SCNC: 140 MMOL/L (ref 135–145)
TSH SERPL DL<=0.005 MIU/L-ACNC: 0.64 UIU/ML (ref 0.3–4.2)
WBC # BLD AUTO: 4.3 10E3/UL (ref 4–11)

## 2025-06-10 PROCEDURE — 84443 ASSAY THYROID STIM HORMONE: CPT | Performed by: STUDENT IN AN ORGANIZED HEALTH CARE EDUCATION/TRAINING PROGRAM

## 2025-06-10 PROCEDURE — 80053 COMPREHEN METABOLIC PANEL: CPT | Performed by: STUDENT IN AN ORGANIZED HEALTH CARE EDUCATION/TRAINING PROGRAM

## 2025-06-10 PROCEDURE — 36415 COLL VENOUS BLD VENIPUNCTURE: CPT | Performed by: STUDENT IN AN ORGANIZED HEALTH CARE EDUCATION/TRAINING PROGRAM

## 2025-06-10 PROCEDURE — 85004 AUTOMATED DIFF WBC COUNT: CPT | Performed by: STUDENT IN AN ORGANIZED HEALTH CARE EDUCATION/TRAINING PROGRAM

## 2025-06-10 PROCEDURE — 250N000012 HC RX MED GY IP 250 OP 636 PS 637: Performed by: PSYCHIATRY & NEUROLOGY

## 2025-06-10 PROCEDURE — 93010 ELECTROCARDIOGRAM REPORT: CPT | Performed by: INTERNAL MEDICINE

## 2025-06-10 PROCEDURE — 93005 ELECTROCARDIOGRAM TRACING: CPT

## 2025-06-10 PROCEDURE — 250N000013 HC RX MED GY IP 250 OP 250 PS 637: Performed by: STUDENT IN AN ORGANIZED HEALTH CARE EDUCATION/TRAINING PROGRAM

## 2025-06-10 PROCEDURE — 124N000002 HC R&B MH UMMC

## 2025-06-10 PROCEDURE — 250N000013 HC RX MED GY IP 250 OP 250 PS 637: Performed by: PSYCHIATRY & NEUROLOGY

## 2025-06-10 PROCEDURE — 99223 1ST HOSP IP/OBS HIGH 75: CPT | Mod: AI | Performed by: STUDENT IN AN ORGANIZED HEALTH CARE EDUCATION/TRAINING PROGRAM

## 2025-06-10 PROCEDURE — HZ2ZZZZ DETOXIFICATION SERVICES FOR SUBSTANCE ABUSE TREATMENT: ICD-10-PCS | Performed by: STUDENT IN AN ORGANIZED HEALTH CARE EDUCATION/TRAINING PROGRAM

## 2025-06-10 RX ORDER — DEXTROAMPHETAMINE SACCHARATE, AMPHETAMINE ASPARTATE, DEXTROAMPHETAMINE SULFATE AND AMPHETAMINE SULFATE 5; 5; 5; 5 MG/1; MG/1; MG/1; MG/1
20 TABLET ORAL DAILY
Status: DISCONTINUED | OUTPATIENT
Start: 2025-06-10 | End: 2025-06-11

## 2025-06-10 RX ORDER — LOPERAMIDE HYDROCHLORIDE 2 MG/1
2 TABLET ORAL 4 TIMES DAILY PRN
Status: DISCONTINUED | OUTPATIENT
Start: 2025-06-10 | End: 2025-06-17

## 2025-06-10 RX ORDER — TRAZODONE HYDROCHLORIDE 50 MG/1
50 TABLET ORAL
Status: ACTIVE | OUTPATIENT
Start: 2025-06-10

## 2025-06-10 RX ORDER — BUPROPION HYDROCHLORIDE 150 MG/1
150 TABLET ORAL EVERY MORNING
Status: DISCONTINUED | OUTPATIENT
Start: 2025-06-10 | End: 2025-06-11

## 2025-06-10 RX ORDER — OLANZAPINE 10 MG/2ML
10 INJECTION, POWDER, FOR SOLUTION INTRAMUSCULAR 3 TIMES DAILY PRN
Status: ACTIVE | OUTPATIENT
Start: 2025-06-10

## 2025-06-10 RX ORDER — FOLIC ACID 1 MG/1
1 TABLET ORAL DAILY
Status: DISPENSED | OUTPATIENT
Start: 2025-06-10

## 2025-06-10 RX ORDER — ONDANSETRON 4 MG/1
4 TABLET, FILM COATED ORAL EVERY 8 HOURS PRN
Status: ACTIVE | OUTPATIENT
Start: 2025-06-10

## 2025-06-10 RX ORDER — BUPRENORPHINE AND NALOXONE 8; 2 MG/1; MG/1
1 FILM, SOLUBLE BUCCAL; SUBLINGUAL 2 TIMES DAILY
Status: DISPENSED | OUTPATIENT
Start: 2025-06-10

## 2025-06-10 RX ORDER — HYDROXYZINE HYDROCHLORIDE 25 MG/1
25 TABLET, FILM COATED ORAL EVERY 4 HOURS PRN
Status: DISPENSED | OUTPATIENT
Start: 2025-06-10

## 2025-06-10 RX ORDER — ATENOLOL 50 MG/1
50 TABLET ORAL DAILY PRN
Status: ACTIVE | OUTPATIENT
Start: 2025-06-10

## 2025-06-10 RX ORDER — MAGNESIUM HYDROXIDE/ALUMINUM HYDROXICE/SIMETHICONE 120; 1200; 1200 MG/30ML; MG/30ML; MG/30ML
30 SUSPENSION ORAL EVERY 4 HOURS PRN
Status: ACTIVE | OUTPATIENT
Start: 2025-06-10

## 2025-06-10 RX ORDER — LORAZEPAM 1 MG/1
1-4 TABLET ORAL EVERY 30 MIN PRN
Status: DISCONTINUED | OUTPATIENT
Start: 2025-06-10 | End: 2025-06-11

## 2025-06-10 RX ORDER — NICOTINE 21 MG/24HR
1 PATCH, TRANSDERMAL 24 HOURS TRANSDERMAL DAILY
Status: DISPENSED | OUTPATIENT
Start: 2025-06-10

## 2025-06-10 RX ORDER — GABAPENTIN 300 MG/1
300 CAPSULE ORAL 3 TIMES DAILY
Status: DISPENSED | OUTPATIENT
Start: 2025-06-10

## 2025-06-10 RX ORDER — ATENOLOL 50 MG/1
50 TABLET ORAL DAILY
Status: DISCONTINUED | OUTPATIENT
Start: 2025-06-10 | End: 2025-06-16

## 2025-06-10 RX ORDER — ACETAMINOPHEN 325 MG/1
650 TABLET ORAL EVERY 4 HOURS PRN
Status: DISPENSED | OUTPATIENT
Start: 2025-06-10

## 2025-06-10 RX ORDER — RISPERIDONE 1 MG/1
1 TABLET, ORALLY DISINTEGRATING ORAL AT BEDTIME
Status: DISCONTINUED | OUTPATIENT
Start: 2025-06-10 | End: 2025-06-11

## 2025-06-10 RX ORDER — OLANZAPINE 10 MG/1
10 TABLET, FILM COATED ORAL 3 TIMES DAILY PRN
Status: DISPENSED | OUTPATIENT
Start: 2025-06-10

## 2025-06-10 RX ORDER — AMOXICILLIN 250 MG
1 CAPSULE ORAL 2 TIMES DAILY PRN
Status: ACTIVE | OUTPATIENT
Start: 2025-06-10

## 2025-06-10 RX ORDER — MULTIPLE VITAMINS W/ MINERALS TAB 9MG-400MCG
1 TAB ORAL DAILY
Status: DISPENSED | OUTPATIENT
Start: 2025-06-10

## 2025-06-10 RX ADMIN — ATENOLOL 50 MG: 50 TABLET ORAL at 11:08

## 2025-06-10 RX ADMIN — NICOTINE 1 PATCH: 21 PATCH, EXTENDED RELEASE TRANSDERMAL at 13:59

## 2025-06-10 RX ADMIN — NICOTINE POLACRILEX 4 MG: 4 GUM, CHEWING BUCCAL at 14:02

## 2025-06-10 RX ADMIN — GABAPENTIN 300 MG: 300 CAPSULE ORAL at 11:10

## 2025-06-10 RX ADMIN — GABAPENTIN 300 MG: 300 CAPSULE ORAL at 22:06

## 2025-06-10 RX ADMIN — FOLIC ACID 1 MG: 1 TABLET ORAL at 13:59

## 2025-06-10 RX ADMIN — BUPRENORPHINE AND NALOXONE 1 FILM: 8; 2 FILM BUCCAL; SUBLINGUAL at 11:07

## 2025-06-10 RX ADMIN — BUPRENORPHINE AND NALOXONE 1 FILM: 8; 2 FILM BUCCAL; SUBLINGUAL at 22:06

## 2025-06-10 RX ADMIN — Medication 1 TABLET: at 14:00

## 2025-06-10 RX ADMIN — THIAMINE HCL TAB 100 MG 100 MG: 100 TAB at 13:59

## 2025-06-10 ASSESSMENT — ACTIVITIES OF DAILY LIVING (ADL)
ADLS_ACUITY_SCORE: 22
ORAL_HYGIENE: INDEPENDENT
ADLS_ACUITY_SCORE: 46
LAUNDRY: UNABLE TO COMPLETE
ADLS_ACUITY_SCORE: 22
DRESS: INDEPENDENT
ADLS_ACUITY_SCORE: 22
ORAL_HYGIENE: INDEPENDENT
ADLS_ACUITY_SCORE: 22
ADLS_ACUITY_SCORE: 46
ADLS_ACUITY_SCORE: 46
ADLS_ACUITY_SCORE: 22
HYGIENE/GROOMING: INDEPENDENT
ADLS_ACUITY_SCORE: 22
ADLS_ACUITY_SCORE: 46
ADLS_ACUITY_SCORE: 22
ADLS_ACUITY_SCORE: 22
HYGIENE/GROOMING: INDEPENDENT;HANDWASHING
ADLS_ACUITY_SCORE: 22
ADLS_ACUITY_SCORE: 22
DRESS: INDEPENDENT;SCRUBS (BEHAVIORAL HEALTH)
ADLS_ACUITY_SCORE: 22

## 2025-06-10 NOTE — ED PROVIDER NOTES
I received care of the patient from Dr. Do.  Patient is a 40-year-old male with ongoing paranoia and delusions.  Patient was initially voluntary but became more resistant to admission as the inpatient bed was arranged and patient was considering declining admission.  Ultimately patient was placed on a 72-hour hold given his ongoing paranoia and delusions with suicidal and homicidal ideation.  Patient does have a inpatient psychiatric bed currently arranged at [facility].  Plan is to transfer by EMS for admission after midnight this evening.  Care signed out to my colleague Dr. Johns pending transfer to inpatient psychiatry at [facility].      ICD-10-CM    1. Delusions (H)  F22       2. Paranoia (H)  F22              Fuentes Heredia MD  06/10/25 020

## 2025-06-10 NOTE — PLAN OF CARE
06/10/25 0604   Patient Belongings   Disposition of meds  Sent to security/pharmacy per site process   Patient Belongings locker   Patient Belongings Put in Hospital Secure Location (Security or Locker, etc.) clothing;medication(s);plastic bag;shoes;wallet   Belongings Search Yes   Clothing Search Yes   Second Staff Casa     Items in locker: 1 Pair shorts, 1 belt, 1 pair shoesm 1 t-shirt, 1 pair underwear, 1 wallet, 1 vape, 1 pack of gum, 1 necklace, 3 small red bags containing a rock and piece of glass, twisted metal, 2 small metal circles, 1 lighter, 1 large clipper    Medications brought to unit envelope number [id number]: 1 unmarked pill bottle, 1 bottle gabapentin 300mg,1 bottle acid reducer, 1 bottle buproprion xl 150mg, 1 bottle suboxone,1 bottle bupropion 150mg.     A               Admission:  I am responsible for any personal items that are not sent to the safe or pharmacy.  [facility] is not responsible for loss, theft or damage of any property in my possession.    Signature:  _________________________________ Date: _______  Time: _____                                              Staff Signature:  ____________________________ Date: ________  Time: _____      2nd Staff person, if patient is unable/unwilling to sign:    Signature: ________________________________ Date: ________  Time: _____     Discharge:  [facility] has returned all of my personal belongings:    Signature: _________________________________ Date: ________  Time: _____                                          Staff Signature:  ____________________________ Date: ________  Time: _____

## 2025-06-10 NOTE — ED NOTES
Served him more coffee(decaf) with racheal crackers.    Notified that he is on 72 hour hold -copy given to him and security.    He is aware that he is on hold and he will go to [facility].
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
## 2025-06-10 NOTE — PLAN OF CARE
Goal Outcome Evaluation:  Problem: Sleep Disturbance  Goal: Adequate Sleep/Rest  Outcome: Progressing     Patient appears to sleep comfortably since he got to his room     Patient had pudding with crackers       No PRN given     No behavioral or medical concerns noted     Patient appears to sleep 1.5 hours

## 2025-06-10 NOTE — ED PROVIDER NOTES
"Signout was taken from Dr. Trierweiler.  Patient was seen by DEC already and is apparently here voluntarily.  He is paranoid and delusional and awaiting an inpatient mental health bed.  Apparently he will be able to go to [facility] after midnight tonight.  The patient's nurse requested that I come to speak with the patient and look at his index finger.  Apparently he has had some pain to the proximal phalanx of  his index finger which appears to have a small abrasion and a small amount of clear fluid coming from it.  There is no sign of an abscess or cellulitis at this point.  I recommended warm and wet compresses to the area, which should take care of this. When I informed him that he was going to be transferred to [facility], he indicated that he does not want to go there because he does not \"feel safe there.\"  Therefore I am going to have the DEC  speak with him again, and I will be signing this out to my colleague Dr. Heredia.     Awais Do MD  06/09/25 2036       Awais Do MD  06/09/25 2116    "
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
## 2025-06-10 NOTE — ED NOTES
Wound is checked by EDMD- wound cleaning is advised and dressing.    Was cleaned with wound cleanser and saline-tolerated procedure and applied band aid.    Verbalized that he does not want to go to [place]-feels unsafe as per the patient,advised by EDMD to call DEC.
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
## 2025-06-10 NOTE — ED NOTES
Served with coffee and racheal and given his due meds.    Stated he has no thought of hurting himself.

## 2025-06-10 NOTE — PHARMACY-ADMISSION MEDICATION HISTORY
Admission medication history completed at [facility]. Please see Pharmacist Admission Medication History note from 6/8/2025.

## 2025-06-10 NOTE — DISCHARGE INSTRUCTIONS
Behavioral Discharge Planning and Instructions    Summary: You were admitted on 6/10/2025 due to {Mental Health 1:531083}.  You were treated by Kimmie Arango MD and discharged on 6/***/2025 from [facility] to {[facility]:811172}    Main Diagnosis: ***    [facility]  [facility]}    Health Care Follow-up:   Appointment Date/Time: ***   Psychiatrist/Primary Care Giver: ***   Address: ***   Phone Number: ***    Appointment Date/Time: ***   Therapist: ***   Address: ***   Phone Number: ***    Appointment Date/Time: ***   Support Group: ***   Address: ***   Phone Number: ***    Appointment Date/Time: ***   Other: ***   Address: ***   Phone Number: ***    If no appointments scheduled, explain ***      Patient Navigation Hub:   [facility] work to be your point-of-contact for trustworthy and compassionate care from Inpatient services to [facility] Programmatic Care. We will provide resources and communication to help guide you into programmatic care. Ultimately, our goal is to be the one-stop-shop of communication, coordination, and support for your journey to programmatic care.    Phone: [phone]    Information will be faxed to your outpatient providers to ensure a healthy continuity of care for you.     Attend all scheduled appointments with your outpatient providers. Call at least 24 hours in advance if you need to reschedule an appointment to ensure continued access to your outpatient providers.     Major Treatments, Procedures and Findings:  You were provided with: {Major Treatments:139957}    Symptoms to Report: {symptoms:531649}    Early warning signs can include: {BH Warning Signs:428525}    Safety and Wellness:  {Age Group Safety Wellness:128710}    Resources:   Mental Health Crisis Resources  Throughout [place]: call **CRISIS (**477569)  Crisis Text Line: is available for free, 24/7 by texting [place] to 884529  Suicide Awareness Voices of Education (SAVE)  (www.save.org): 888-511-SAVE (7283)  The National Suicide Prevention Lifeline is now: 988 Suicide and Crisis Lifeline. Call 988 anytime.  National Joy on Mental Illness (www.[place].colt.org): 547.355.7898 or 739-653-7527.  Asob6evlt: text the word LIFE to 90276 for immediate support and crisis intervention  Mental Health Consumer/Survivor Network of [place] (www.mhcsn.net): 894.915.1515 or 189-338-8944  Mental Health Association of [place] (www.mentalhealth.org): 425.936.9045 or 210-480-1893  Peer Support Connection [place] Warmline (PSC) 1-987.723.7013 Available from 5pm - 9am (7 days a week/365 days a year)  {OP Beh CRISIS PHONE NUMBERS:153848}  {Click [employer] to add resources related to addiction or EHRBLANK to skip:129172}    General Medication Instructions:   See your medication sheet(s) for instructions.   Take all medicines as directed.  Make no changes unless your doctor suggests them.   Go to all your doctor visits.  Be sure to have all your required lab tests. This way, your medicines can be refilled on time.  Do not use any drugs not prescribed by your doctor.  Avoid alcohol.    Advance Directives:   Scanned document on file with SET? No scanned doc  Is document scanned? Pt states no documents  Honoring Choices Your Rights Handout: Informed and given  Was more information offered? Materials given    The Treatment team has appreciated the opportunity to work with you. If you have any questions or concerns about your recent admission, you can contact the unit which can receive your call 24 hours a day, 7 days a week. They will be able to get in touch with a Provider if needed. The unit number is [phone].   goals. Call [phone] for more information or to schedule.       General Medication Instructions:   See your medication sheet(s) for instructions.   Take all medicines as directed.  Make no changes unless your doctor suggests them.   Go to all your doctor visits.  Be sure to have all your required lab tests. This way, your medicines can be refilled on time.  Do not use any drugs not prescribed by your doctor.    Advance Directives:   Scanned document on file with [facility]? No scanned doc  Is document scanned? Pt states no documents  Honoring Choices Your Rights Handout: Informed and given  Was more information offered? Materials given    The Treatment team has appreciated the opportunity to work with you. If you have any questions or concerns about your recent admission, you can contact the unit which can receive your call 24 hours a day, 7 days a week. They will be able to get in touch with a Provider if needed. The unit number is [phone]

## 2025-06-10 NOTE — PROGRESS NOTES
"CLINICAL NUTRITION SERVICES - ASSESSMENT NOTE    RECOMMENDATIONS FOR MDs/PROVIDERS TO ORDER:  None at this time    Registered Dietitian Interventions:  -Requested updated weight    Future/Additional Recommendations:  RD to sign off at this time, but will remain available by consult if new nutrition problem arises.       REASON FOR ASSESSMENT  Positive admission nutrition risk screen    INFORMATION OBTAINED  Patient not appropriate for interview due to acute psychosis - chart reviewed    NUTRITION HISTORY  Per chart review, pt is presenting as disorganized and guarded. He is eating and hydrating well. No concerns at this time.     CURRENT NUTRITION ORDERS  Diet: Regular  Snacks/Supplements: none    Allergies: NKFA    CURRENT INTAKE/TOLERANCE  Eating and drinking adequately per RN notes.     LABS  Nutrition-relevant labs: Reviewed    MEDICATIONS  Nutrition-relevant medications: Reviewed    ANTHROPOMETRICS  Height: 180.3 cm (5' 11\")  Admission Weight:     Most Recent Weight:    IBW: 78.2 kg   BMI: There is no height or weight on file to calculate BMI.   Weight History:   Wt Readings from Last 15 Encounters:   06/12/25 101.4 kg (223 lb 8 oz)      06/09/25 95.3 kg (210 lb)   05/30/25 110.7 kg (244 lb)      11/04/24 114.3 kg (252 lb)      10/02/24 110.4 kg (243 lb 6.4 oz)      09/08/24 98.6 kg (217 lb 6.4 oz)      04/02/24 111.1 kg (245 lb)   08/11/23 108.9 kg (240 lb)   07/10/23 118.9 kg (262 lb 3.2 oz)   11/06/22 108.9 kg (240 lb)   08/03/22 99.8 kg (220 lb)   07/14/22 93.7 kg (206 lb 8 oz)   06/29/22 95.3 kg (210 lb)   01/30/20 98.4 kg (217 lb)   01/03/20 98 kg (216 lb)   11/14/19 88 kg (194 lb)   10/15/19 88.9 kg (196 lb)   10/15/19 88.9 kg (196 lb 1.3 oz)   09/12/19 87.1 kg (192 lb)   08/15/19 84.4 kg (186 lb)   Weight fluctuations noted    Dosing Weight: 101.4 kg, based on actual wt    ASSESSED NUTRITION NEEDS  Estimated Energy Needs: 2316 kcals/day ([place] Jeor)  Justification: Maintenance and Obese  Estimated " Protein Needs: 81 - 101 grams protein/day (0.8 - 1 grams of pro/kg)  Justification: Obesity guidelines  Estimated Fluid Needs: 1 mL/kcal  Justification: Maintenance    SYSTEM AND PHYSICAL FINDINGS    GI symptoms: Reviewed  Skin/wounds: Reviewed    MALNUTRITION  % Intake: No decreased intake noted  % Weight Loss: Weight loss does not meet criteria   Subcutaneous Fat Loss: Unable to assess  Muscle Loss: Unable to assess  Fluid Accumulation/Edema: None noted  Malnutrition Diagnosis: Patient does not meet two of the established criteria necessary for diagnosing malnutrition  Malnutrition Present on Admission: No    NUTRITION DIAGNOSIS  No nutrition diagnosis at this time     INTERVENTIONS  Interventions not indicated at this time.     GOALS  Patient to consume % of nutritionally adequate meal trays TID, or the equivalent with supplements/snacks.     MONITORING/EVALUATION  RD to sign off at this time, but will remain available by consult if new nutrition problem arises.      Mackenzie Louis MPH, RDN, LD  Behavioral Health Adult & Pediatric Dietitian  BEH Clinical Dietitian Joe, Teams, or Desk: [phone]  Weekend/Holiday Joe: Weekend Holiday Clinical Dietitian [Multi Site Groups]

## 2025-06-10 NOTE — PLAN OF CARE
BEH IP Unit Acuity Rating Score (UARS)  Patient is given one point for every criteria they meet.    CRITERIA SCORING   On a 72 hour hold, court hold, committed, stay of commitment, or revocation. 0   Patient LOS on BEH unit exceeds 20 days. 0  LOS: 0   Patient under guardianship, 55+, otherwise medically complex, or under age 11. 0   Suicide ideation without relief of precipitating factors. 0   Current plan for suicide. 0   Current plan for homicide. 0   Imminent risk or actual attempt to seriously harm another without relief of factors precipitating the attempt. 0   Severe dysfunction in daily living (ex: complete neglect for self care, extreme disruption in vegetative function, extreme deterioration in social interactions). 1   Recent (last 7 days) or current physical aggression in the ED or on unit. 0   Restraints or seclusion episode in past 72 hours. 0   Recent (last 7 days) or current verbal aggression, agitation, yelling, etc., while in the ED or unit. 0   Active psychosis. 1   Need for constant or near constant redirection (from leaving, from others, etc).  0   Intrusive or disruptive behaviors. 0   Patient requires 3 or more hours of individualized nursing care per 8-hour shift (i.e. for ADLs, meds, therapeutic interventions). 0   TOTAL 2

## 2025-06-10 NOTE — ED NOTES
With bed available in [facility] station 12 -phone # [phone].    As per - Voluntary at present -can be put on transport hold .    Tried to call the CN in [place] -stated after 12 midnight.

## 2025-06-10 NOTE — CARE PLAN
06/10/25 0608   Observation Type   Harm Category none   No Harm Category Noted at This Time patient sleeping, no interaction   Level of Special Monitoring none

## 2025-06-11 LAB
ALBUMIN UR-MCNC: 20 MG/DL
APPEARANCE UR: CLEAR
ATRIAL RATE - MUSE: 71 BPM
BILIRUB UR QL STRIP: NEGATIVE
COLOR UR AUTO: YELLOW
DIASTOLIC BLOOD PRESSURE - MUSE: NORMAL MMHG
GLUCOSE UR STRIP-MCNC: NEGATIVE MG/DL
HGB UR QL STRIP: NEGATIVE
INTERPRETATION ECG - MUSE: NORMAL
KETONES UR STRIP-MCNC: NEGATIVE MG/DL
LEUKOCYTE ESTERASE UR QL STRIP: NEGATIVE
MUCOUS THREADS #/AREA URNS LPF: PRESENT /LPF
NITRATE UR QL: NEGATIVE
P AXIS - MUSE: 71 DEGREES
PH UR STRIP: 6.5 [PH] (ref 5–7)
PR INTERVAL - MUSE: 166 MS
QRS DURATION - MUSE: 94 MS
QT - MUSE: 400 MS
QTC - MUSE: 434 MS
R AXIS - MUSE: 57 DEGREES
RBC URINE: 1 /HPF
SP GR UR STRIP: 1.03 (ref 1–1.03)
SYSTOLIC BLOOD PRESSURE - MUSE: NORMAL MMHG
T AXIS - MUSE: 38 DEGREES
UROBILINOGEN UR STRIP-MCNC: NORMAL MG/DL
VENTRICULAR RATE- MUSE: 71 BPM
WBC URINE: 3 /HPF

## 2025-06-11 PROCEDURE — 250N000013 HC RX MED GY IP 250 OP 250 PS 637: Performed by: STUDENT IN AN ORGANIZED HEALTH CARE EDUCATION/TRAINING PROGRAM

## 2025-06-11 PROCEDURE — 124N000002 HC R&B MH UMMC

## 2025-06-11 PROCEDURE — 250N000013 HC RX MED GY IP 250 OP 250 PS 637: Performed by: PSYCHIATRY & NEUROLOGY

## 2025-06-11 PROCEDURE — 250N000012 HC RX MED GY IP 250 OP 636 PS 637: Performed by: PSYCHIATRY & NEUROLOGY

## 2025-06-11 PROCEDURE — 99232 SBSQ HOSP IP/OBS MODERATE 35: CPT | Performed by: STUDENT IN AN ORGANIZED HEALTH CARE EDUCATION/TRAINING PROGRAM

## 2025-06-11 PROCEDURE — 81003 URINALYSIS AUTO W/O SCOPE: CPT | Performed by: STUDENT IN AN ORGANIZED HEALTH CARE EDUCATION/TRAINING PROGRAM

## 2025-06-11 RX ORDER — HALOPERIDOL 5 MG/1
5 TABLET ORAL AT BEDTIME
Status: DISCONTINUED | OUTPATIENT
Start: 2025-06-11 | End: 2025-06-12

## 2025-06-11 RX ORDER — PHENOBARBITAL 16.2 MG/1
64.8 TABLET ORAL 3 TIMES DAILY
Status: DISCONTINUED | OUTPATIENT
Start: 2025-06-11 | End: 2025-06-17

## 2025-06-11 RX ADMIN — PHENOBARBITAL 64.8 MG: 16.2 TABLET ORAL at 11:05

## 2025-06-11 RX ADMIN — GABAPENTIN 300 MG: 300 CAPSULE ORAL at 20:14

## 2025-06-11 RX ADMIN — NICOTINE 1 PATCH: 21 PATCH, EXTENDED RELEASE TRANSDERMAL at 09:04

## 2025-06-11 RX ADMIN — GABAPENTIN 300 MG: 300 CAPSULE ORAL at 14:27

## 2025-06-11 RX ADMIN — BUPRENORPHINE AND NALOXONE 1 FILM: 8; 2 FILM BUCCAL; SUBLINGUAL at 20:14

## 2025-06-11 RX ADMIN — THIAMINE HCL TAB 100 MG 100 MG: 100 TAB at 09:04

## 2025-06-11 RX ADMIN — GABAPENTIN 300 MG: 300 CAPSULE ORAL at 09:04

## 2025-06-11 RX ADMIN — NICOTINE POLACRILEX 4 MG: 4 GUM, CHEWING BUCCAL at 15:22

## 2025-06-11 RX ADMIN — NICOTINE POLACRILEX 4 MG: 4 GUM, CHEWING BUCCAL at 02:04

## 2025-06-11 RX ADMIN — BUPRENORPHINE AND NALOXONE 1 FILM: 8; 2 FILM BUCCAL; SUBLINGUAL at 09:04

## 2025-06-11 RX ADMIN — FOLIC ACID 1 MG: 1 TABLET ORAL at 09:03

## 2025-06-11 RX ADMIN — Medication 1 TABLET: at 09:03

## 2025-06-11 RX ADMIN — PHENOBARBITAL 64.8 MG: 16.2 TABLET ORAL at 14:26

## 2025-06-11 RX ADMIN — PHENOBARBITAL 64.8 MG: 16.2 TABLET ORAL at 20:14

## 2025-06-11 RX ADMIN — NICOTINE POLACRILEX 4 MG: 4 GUM, CHEWING BUCCAL at 19:54

## 2025-06-11 RX ADMIN — ATENOLOL 50 MG: 50 TABLET ORAL at 09:03

## 2025-06-11 RX ADMIN — HYDROXYZINE HYDROCHLORIDE 25 MG: 25 TABLET ORAL at 01:58

## 2025-06-11 ASSESSMENT — ACTIVITIES OF DAILY LIVING (ADL)
ADLS_ACUITY_SCORE: 22
ADLS_ACUITY_SCORE: 22
DRESS: SCRUBS (BEHAVIORAL HEALTH);INDEPENDENT
HYGIENE/GROOMING: INDEPENDENT
ADLS_ACUITY_SCORE: 22
ORAL_HYGIENE: INDEPENDENT
ADLS_ACUITY_SCORE: 22
LAUNDRY: UNABLE TO COMPLETE
ADLS_ACUITY_SCORE: 22

## 2025-06-11 NOTE — PLAN OF CARE
"Goal Outcome Evaluation:    Patient has been in his room all evening resting/sleeping. Exhibiting disorganized thought process. Presents with a flat and blunted affect. He is anxious, depressed, paranoid. Denies SI/SIB/HI and auditory hallucinations. Minimal speech from patient as he looks fatigued. States he is sleeping a lot more because he is tired. MSSA score at 1810 was 2 BP was a bit low at 1800 95/96. MSSA scores checked again at 1012 he scored a 3. BP was 107/71. Pt denies pain and denies any physical concerns.     /71   Pulse 83   Temp 97.8  F (36.6  C) (Temporal)   Resp 16   Ht 1.778 m (5' 10\")   SpO2 95%   BMI 30.13 kg/m               "

## 2025-06-11 NOTE — PLAN OF CARE
Team Note Due:  Wednesday    Assessment/Intervention/Current Symtoms and Care Coordination:  Chart review and met with team, discussed pt progress, symptomology, and response to treatment.  Discussed the discharge plan and any potential impediments to discharge.    Pt is now on a 72 HH start 6/11 at 1:23PM  72 HH End: Monday 6/16 at 1:23PM    He is a [facility] resident not [facility]. The address is wrong in [facility]. I completed RC forms and provider signed.  Sent to system director for signature and review.    Submitted [facility] petition to [email or web address] - to Mon[place] at [facility].     Discharge Plan or Goal:  TBD     Barriers to Discharge:  Symptoms     Referral Status:  None     Legal Status:  Pt is now on a 72 HH start 6/11 at 1:23PM  72 HH End: Monday 6/16 at 1:23PM  [facility]: [facility]  File Number: pending   Start and expiration date of commitment:     Contacts (include DELORES status):  Sarika Clarke - Mother, Ph: [phone]      Upcoming Meetings and Dates/Important Information and next steps:  Coordinate care   Update internal referral tracker  Commitment process

## 2025-06-11 NOTE — PLAN OF CARE
Problem: Adult Inpatient Plan of Care  Goal: Optimal Comfort and Wellbeing  Outcome: Progressing  Goal Outcome Evaluation:  Patient observed in assigned room most of the night. He was awake briefly. Reported he was hungry and ate an apple. He also reported some anxiety.  MSSA score at 01:50 AM was 4. No other concerns. Safety rounds completed.    PRN medications administered: Hydroxyzine 25 mg at 01:58 AM, Nicotine gum at 02:04 AM.  Sleep hours: 5.75.

## 2025-06-11 NOTE — PLAN OF CARE
BEH IP Unit Acuity Rating Score (UARS)  Patient is given one point for every criteria they meet.    CRITERIA SCORING   On a 72 hour hold, court hold, committed, stay of commitment, or revocation. 0   Patient LOS on BEH unit exceeds 20 days. 0  LOS: 1   Patient under guardianship, 55+, otherwise medically complex, or under age 11. 0   Suicide ideation without relief of precipitating factors. 0   Current plan for suicide. 0   Current plan for homicide. 0   Imminent risk or actual attempt to seriously harm another without relief of factors precipitating the attempt. 0   Severe dysfunction in daily living (ex: complete neglect for self care, extreme disruption in vegetative function, extreme deterioration in social interactions). 1   Recent (last 7 days) or current physical aggression in the ED or on unit. 0   Restraints or seclusion episode in past 72 hours. 0   Recent (last 7 days) or current verbal aggression, agitation, yelling, etc., while in the ED or unit. 0   Active psychosis. 1   Need for constant or near constant redirection (from leaving, from others, etc).  0   Intrusive or disruptive behaviors. 0   Patient requires 3 or more hours of individualized nursing care per 8-hour shift (i.e. for ADLs, meds, therapeutic interventions). 0   TOTAL 2

## 2025-06-11 NOTE — PLAN OF CARE
Goal Outcome Evaluation:      Problem: Psychotic Signs/Symptoms  Goal: Improved Behavioral Control (Psychotic Signs/Symptoms)  Outcome: Progressing  Pt has been isolative to his room all day, mostly sleeping. He was up for meals and cooperative with weight check as well as providing urine specimen. Urine came out positive for Benzo's, meth and cannabis. His MSSA score this shift was 4. Pt was started on scheduled phenobarbital for withdrawals. Pt denied pain or discomfort. He had a flat and blunted affect, and was very brief on approach. Pt eating and drinking adequately.   Pt is now on a court hold.

## 2025-06-11 NOTE — PROVIDER NOTIFICATION
06/11/25 1112   Behavioral Team Discussion   Participants Dr Nba CABALLERO, Kam POPE, Savanna CERON RN   Progress Pt slept 5.75 hours. Disorganized thought process, anxious, depressed, paranoid.   Pt is willing to be here voluntarily but is opposed to neuroleptics due to fear of gaining weight. Currently tapering klonopin.   Medical/Physical See H&P   Precautions Suicide, Withdrawal (Klonopin)   Plan Medication changes, stabilize symptoms. Discharge disposition to be determined.   Safety Plan To be completed with unit therapist prior to discharge.   Anticipated Discharge Disposition [facility];home with family;group home     Goal Outcome Evaluation:         PRECAUTIONS AND SAFETY    Behavioral Orders   Procedures    Code 1 - Restrict to Unit    Routine Programming     As clinically indicated    Status 15     Every 15 minutes.    Suicide precautions: Suicide Risk: MODERATE; Clinical rationale to override score: response to medication     Patients on Suicide Precautions should have a Combination Diet ordered that includes a Diet selection(s) AND a Behavioral Tray selection for Safe Tray - with utensils, or Safe Tray - NO utensils       Suicide Risk:   MODERATE     Clinical rationale to override score::   response to medication       Safety  Safety WDL: WDL  Patient Location: patient room, own  Observed Behavior: sleeping  Safety Measures: safety rounds completed  Suicidality: Status 15

## 2025-06-12 VITALS
OXYGEN SATURATION: 97 % | BODY MASS INDEX: 32 KG/M2 | DIASTOLIC BLOOD PRESSURE: 78 MMHG | HEART RATE: 70 BPM | SYSTOLIC BLOOD PRESSURE: 128 MMHG | RESPIRATION RATE: 16 BRPM | HEIGHT: 70 IN | WEIGHT: 223.5 LBS | TEMPERATURE: 97.7 F

## 2025-06-12 PROCEDURE — 250N000013 HC RX MED GY IP 250 OP 250 PS 637: Performed by: PSYCHIATRY & NEUROLOGY

## 2025-06-12 PROCEDURE — 250N000012 HC RX MED GY IP 250 OP 636 PS 637: Performed by: PSYCHIATRY & NEUROLOGY

## 2025-06-12 PROCEDURE — 99232 SBSQ HOSP IP/OBS MODERATE 35: CPT | Performed by: STUDENT IN AN ORGANIZED HEALTH CARE EDUCATION/TRAINING PROGRAM

## 2025-06-12 PROCEDURE — 124N000002 HC R&B MH UMMC

## 2025-06-12 PROCEDURE — 250N000013 HC RX MED GY IP 250 OP 250 PS 637: Performed by: STUDENT IN AN ORGANIZED HEALTH CARE EDUCATION/TRAINING PROGRAM

## 2025-06-12 RX ORDER — POLYETHYLENE GLYCOL 3350 17 G
4 POWDER IN PACKET (EA) ORAL
Status: DISPENSED | OUTPATIENT
Start: 2025-06-12

## 2025-06-12 RX ORDER — LURASIDONE HYDROCHLORIDE 20 MG/1
20 TABLET, FILM COATED ORAL AT BEDTIME
Status: DISCONTINUED | OUTPATIENT
Start: 2025-06-12 | End: 2025-06-13

## 2025-06-12 RX ADMIN — BUPRENORPHINE AND NALOXONE 1 FILM: 8; 2 FILM BUCCAL; SUBLINGUAL at 09:06

## 2025-06-12 RX ADMIN — LURASIDONE HYDROCHLORIDE 20 MG: 20 TABLET, FILM COATED ORAL at 20:21

## 2025-06-12 RX ADMIN — NICOTINE POLACRILEX 4 MG: 4 GUM, CHEWING BUCCAL at 09:05

## 2025-06-12 RX ADMIN — PHENOBARBITAL 64.8 MG: 16.2 TABLET ORAL at 09:05

## 2025-06-12 RX ADMIN — GABAPENTIN 300 MG: 300 CAPSULE ORAL at 09:06

## 2025-06-12 RX ADMIN — PHENOBARBITAL 64.8 MG: 16.2 TABLET ORAL at 15:19

## 2025-06-12 RX ADMIN — ATENOLOL 50 MG: 50 TABLET ORAL at 09:07

## 2025-06-12 RX ADMIN — GABAPENTIN 300 MG: 300 CAPSULE ORAL at 15:19

## 2025-06-12 RX ADMIN — THIAMINE HCL TAB 100 MG 100 MG: 100 TAB at 09:07

## 2025-06-12 RX ADMIN — FOLIC ACID 1 MG: 1 TABLET ORAL at 09:07

## 2025-06-12 RX ADMIN — Medication 1 TABLET: at 09:06

## 2025-06-12 RX ADMIN — GABAPENTIN 300 MG: 300 CAPSULE ORAL at 20:21

## 2025-06-12 RX ADMIN — BUPRENORPHINE AND NALOXONE 1 FILM: 8; 2 FILM BUCCAL; SUBLINGUAL at 20:21

## 2025-06-12 RX ADMIN — NICOTINE POLACRILEX 4 MG: 4 GUM, CHEWING BUCCAL at 16:45

## 2025-06-12 RX ADMIN — PHENOBARBITAL 64.8 MG: 16.2 TABLET ORAL at 20:21

## 2025-06-12 RX ADMIN — NICOTINE 1 PATCH: 21 PATCH, EXTENDED RELEASE TRANSDERMAL at 09:06

## 2025-06-12 ASSESSMENT — ACTIVITIES OF DAILY LIVING (ADL)
ORAL_HYGIENE: INDEPENDENT
DRESS: SCRUBS (BEHAVIORAL HEALTH)
ADLS_ACUITY_SCORE: 22
ADLS_ACUITY_SCORE: 32
ADLS_ACUITY_SCORE: 22
ADLS_ACUITY_SCORE: 32
ADLS_ACUITY_SCORE: 22
DRESS: INDEPENDENT;SCRUBS (BEHAVIORAL HEALTH)
ADLS_ACUITY_SCORE: 22
ADLS_ACUITY_SCORE: 32
HYGIENE/GROOMING: INDEPENDENT
ADLS_ACUITY_SCORE: 22
ADLS_ACUITY_SCORE: 32
ADLS_ACUITY_SCORE: 22
ADLS_ACUITY_SCORE: 32
ADLS_ACUITY_SCORE: 22
ADLS_ACUITY_SCORE: 22
HYGIENE/GROOMING: HANDWASHING;SHOWER;INDEPENDENT;PROMPTS
ORAL_HYGIENE: INDEPENDENT
LAUNDRY: UNABLE TO COMPLETE
ADLS_ACUITY_SCORE: 32
ADLS_ACUITY_SCORE: 32

## 2025-06-12 NOTE — PLAN OF CARE
"Goal Outcome Evaluation:    Patient is calm and cooperative. He is guarded and presents with a blunted affect. He has disorganized thought process. Pt refused his scheduled haldol, education was provided but patient refused again. Pt is anxious, depressed, and paranoid. Pt denies SI/SIB/HI and denies AH/VH. Pt denies pain and denies any physical concerns. Pt is eating and hydrating well. MSSA scores at 1800 was a 4. MSSA scores at 2200 is 3.     BP 97/64   Pulse 79   Temp 98.4  F (36.9  C)   Resp 16   Ht 1.778 m (5' 10\")   Wt 98.9 kg (218 lb)   SpO2 97%   BMI 31.28 kg/m            "
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
## 2025-06-12 NOTE — PROGRESS NOTES
"Upon approach in his room to administer 1400 medications RN observed a black vape pen with the letters \"EXO\" on its side. RN awoke patient and informed him that the item would have to be removed per unit policy. RN notified unit Manager Janet and pen was labeled and placed in med room patient bin until further assessment could identify if the item could be returned to patient belongings or needed to be submitted to security as contraband.    Patient on assessment of the item reported it as nicotine but stated he wasn't able to use it due to low battery. Patient stated he wasn't aware he had it on him at admission, stating he just noticed it today in his waist band. Will continue to monitor.  "

## 2025-06-12 NOTE — PLAN OF CARE
Goal Outcome Evaluation:  Problem: Sleep Disturbance  Goal: Adequate Sleep/Rest  Outcome: Progressing    Patient slept without complaints of pain and other acute medical concerns.     Breathing even and non-labored    No behavioral concerns noted or observed.    Patient appeared to sleep 5.5 hours

## 2025-06-12 NOTE — CARE PLAN
06/12/25 0555   Observation Type   Harm Category none   No Harm Category Noted at This Time patient sleeping, no interaction   Level of Special Monitoring none

## 2025-06-12 NOTE — PLAN OF CARE
Problem: Adult Inpatient Plan of Care  Goal: Optimal Comfort and Wellbeing  Intervention: Provide Person-Centered Care  Recent Flowsheet Documentation  Taken 6/12/2025 1219 by Waldo Parekh RN  Trust Relationship/Rapport:   care explained   emotional support provided   empathic listening provided   questions answered   questions encouraged   reassurance provided   thoughts/feelings acknowledged   Goal Outcome Evaluation:    Plan of Care Reviewed With: patient        Patient isolative to self in his room majority of the day watching television and sleeping. Patient was independent with coming into the milieu with requests and to complete ADL's though even with numerous prompts continued to defer showering. Patient upon approach flat in affect, calm and cooperative with verbal assessment. Patient denies SI/SIB, AH/VH, or thoughts of harming others. Patient still endorsing paranoia of in law family trying to hurt him though states he feels safe while here. Patient reporting chronic anxiety and depression 7/10 and states hopefulness to restart Wellbutrin.    Patient cooperative with vital sign assessments which were stable. Patient cooperative with MSSA assessments scoring a 3 at 0840 and a 1 at 1219. Patient denying withdrawal symptoms. Patient independent with requesting and accepting of scheduled medications with no stated or observed side effects. Patient did appear slightly sedated through the day though relates it to not sleeping for several days prior to admission. Patient independent with identifying needs and completing ADL's. Patient did not shower today though stated plans to complete this evening.

## 2025-06-13 PROCEDURE — 99232 SBSQ HOSP IP/OBS MODERATE 35: CPT | Performed by: STUDENT IN AN ORGANIZED HEALTH CARE EDUCATION/TRAINING PROGRAM

## 2025-06-13 PROCEDURE — 250N000013 HC RX MED GY IP 250 OP 250 PS 637: Performed by: PSYCHIATRY & NEUROLOGY

## 2025-06-13 PROCEDURE — 250N000012 HC RX MED GY IP 250 OP 636 PS 637: Performed by: PSYCHIATRY & NEUROLOGY

## 2025-06-13 PROCEDURE — 250N000013 HC RX MED GY IP 250 OP 250 PS 637: Performed by: STUDENT IN AN ORGANIZED HEALTH CARE EDUCATION/TRAINING PROGRAM

## 2025-06-13 PROCEDURE — 124N000002 HC R&B MH UMMC

## 2025-06-13 RX ADMIN — GABAPENTIN 300 MG: 300 CAPSULE ORAL at 14:22

## 2025-06-13 RX ADMIN — PHENOBARBITAL 64.8 MG: 16.2 TABLET ORAL at 19:30

## 2025-06-13 RX ADMIN — FOLIC ACID 1 MG: 1 TABLET ORAL at 08:29

## 2025-06-13 RX ADMIN — BUPRENORPHINE AND NALOXONE 1 FILM: 8; 2 FILM BUCCAL; SUBLINGUAL at 19:30

## 2025-06-13 RX ADMIN — NICOTINE 1 PATCH: 21 PATCH, EXTENDED RELEASE TRANSDERMAL at 08:30

## 2025-06-13 RX ADMIN — GABAPENTIN 300 MG: 300 CAPSULE ORAL at 19:30

## 2025-06-13 RX ADMIN — NICOTINE POLACRILEX 4 MG: 2 LOZENGE ORAL at 11:42

## 2025-06-13 RX ADMIN — Medication 1 TABLET: at 08:29

## 2025-06-13 RX ADMIN — PHENOBARBITAL 64.8 MG: 16.2 TABLET ORAL at 08:28

## 2025-06-13 RX ADMIN — NICOTINE POLACRILEX 4 MG: 2 LOZENGE ORAL at 19:31

## 2025-06-13 RX ADMIN — GABAPENTIN 300 MG: 300 CAPSULE ORAL at 08:29

## 2025-06-13 RX ADMIN — ATENOLOL 50 MG: 50 TABLET ORAL at 08:28

## 2025-06-13 RX ADMIN — NICOTINE POLACRILEX 4 MG: 2 LOZENGE ORAL at 17:54

## 2025-06-13 RX ADMIN — THIAMINE HCL TAB 100 MG 100 MG: 100 TAB at 08:37

## 2025-06-13 RX ADMIN — LURASIDONE HYDROCHLORIDE 60 MG: 40 TABLET, FILM COATED ORAL at 19:27

## 2025-06-13 RX ADMIN — BUPRENORPHINE AND NALOXONE 1 FILM: 8; 2 FILM BUCCAL; SUBLINGUAL at 08:28

## 2025-06-13 RX ADMIN — PHENOBARBITAL 64.8 MG: 16.2 TABLET ORAL at 14:21

## 2025-06-13 ASSESSMENT — ACTIVITIES OF DAILY LIVING (ADL)
ADLS_ACUITY_SCORE: 22
LAUNDRY: UNABLE TO COMPLETE
HYGIENE/GROOMING: HANDWASHING;SHOWER;INDEPENDENT
ADLS_ACUITY_SCORE: 22
ORAL_HYGIENE: INDEPENDENT
ADLS_ACUITY_SCORE: 22
DRESS: SCRUBS (BEHAVIORAL HEALTH)

## 2025-06-13 NOTE — PLAN OF CARE
BEH IP Unit Acuity Rating Score (UARS)  Patient is given one point for every criteria they meet.    CRITERIA SCORING   On a 72 hour hold, court hold, committed, stay of commitment, or revocation. 0   Patient LOS on BEH unit exceeds 20 days. 0  LOS: 3   Patient under guardianship, 55+, otherwise medically complex, or under age 11. 0   Suicide ideation without relief of precipitating factors. 0   Current plan for suicide. 0   Current plan for homicide. 0   Imminent risk or actual attempt to seriously harm another without relief of factors precipitating the attempt. 0   Severe dysfunction in daily living (ex: complete neglect for self care, extreme disruption in vegetative function, extreme deterioration in social interactions). 1   Recent (last 7 days) or current physical aggression in the ED or on unit. 0   Restraints or seclusion episode in past 72 hours. 0   Recent (last 7 days) or current verbal aggression, agitation, yelling, etc., while in the ED or unit. 0   Active psychosis. 1   Need for constant or near constant redirection (from leaving, from others, etc).  0   Intrusive or disruptive behaviors. 0   Patient requires 3 or more hours of individualized nursing care per 8-hour shift (i.e. for ADLs, meds, therapeutic interventions). 0   TOTAL 2

## 2025-06-13 NOTE — CARE PLAN
06/13/25 0300   Observation Type   Harm Category none   No Harm Category Noted at This Time patient sleeping, no interaction   Level of Special Monitoring none

## 2025-06-13 NOTE — PLAN OF CARE
"Team Note Due:  Wednesday    Assessment/Intervention/Current Symtoms and Care Coordination:  Chart review and met with team, discussed pt progress, symptomology, and response to treatment.  Discussed the discharge plan and any potential impediments to discharge.    Pt is now on a 72 HH start 6/11 at 1:23PM  72 HH End: Monday 6/16 at 1:23PM    Case has been assigned to PPS oRselia.     I introduced Krista to patient and he was willing to meet. Pt is interested in going to [facility] once he is ready to stabilize. He shared he just \"probably shouldn't go back to [facility]\" and I shared we can discuss this after.     PPS will have to write and submit her report so if they are supporting then we will likely get court hold Monday.     72 HH End: Monday 6/16 at 1:23PM    Discharge Plan or Goal:  [facility]?      Barriers to Discharge:  Symptoms     Referral Status:  None     Legal Status:  Pt is now on a 72 HH start 6/11 at 1:23PM  72 HH End: Monday 6/16 at 1:23PM  [facility]: [place]   File Number: pending   Start and expiration date of commitment:     Contacts (include DELORES status):  Sarika Clarke - Mother, Ph: [phone]      Upcoming Meetings and Dates/Important Information and next steps:  Coordinate care   Update internal referral tracker @ discharge.   Commitment process   Refer to [facility] at discharge.   "

## 2025-06-13 NOTE — PLAN OF CARE
"Goal Outcome Evaluation:    Patient is calm and cooperative. He stayed in his room for most of the evening watching television and sleeping/relaxing. He states he is agitated, paranoid, depressed and anxious. He also says he has thoughts that comes and goes of hurting himself but states he will be safe. He denies wanting to hurt others. Pt presents with a flat and blunted affect. Pt denies AH/VH. Barak also denies having pain and denies having any physical concerns. Np acute behavioral concerns this shift. MSSA scores at 1800 was a score of 3. Pt slept before the next set of MSSA assessment. Tried waking pt but he did not want to be awaken.     /78 (BP Location: Right arm)   Pulse 70   Temp 97.7  F (36.5  C) (Temporal)   Resp 16   Ht 1.778 m (5' 10\")   Wt 101.4 kg (223 lb 8 oz)   SpO2 97%   BMI 32.07 kg/m       /78 (BP Location: Right arm)   Pulse 70   Temp 97.7  F (36.5  C) (Temporal)   Resp 16   Ht 1.778 m (5' 10\")   Wt 101.4 kg (223 lb 8 oz)   SpO2 97%   BMI 32.07 kg/m         "

## 2025-06-13 NOTE — PROGRESS NOTES
"  ___________________________________________________  [facility], [place]   Psychiatric Progress Note  Hospital Day #3  Date of Service: 06/13/2025    Interval History:  The patient's care was discussed with the treatment team and chart notes were reviewed.    Sleep: 6.75 hours (06/13/25 0700)  PRN medications:   Last 24H PRN:     nicotine polacrilex (NICORETTE) gum 4 mg, 4 mg at 06/12/25 1645     Staff Report:   Patient is calm and cooperative. He is guarded and presents with a blunted affect. He has disorganized thought process. Pt refused his scheduled haldol, education was provided but patient refused again. Pt is anxious, depressed, and paranoid. Pt denies SI/SIB/HI and denies AH/VH. Pt denies pain and denies any physical concerns. Pt is eating and hydrating well. MSSA scores at 1800 was a 4. MSSA scores at 2200 is 3   See staff notes for additional details.    Subjective:  Today, Barak says again that he is frustrated that he isn't being given bupropion. We again discussed the rationale for this while we are titrating up on lurasidone. He tolerated the first dose of lurasidone last night and is agreeable to increasing to 60mg tonight. He denies concerns right now about people being out to get him, but says \"they'll leave me alone if I do what I'm supposed to\". He is worried about missing work but contacted his boss to let him know he's in the hospital. We discussed potentially engaging in CD treatment but he says that substance use \"isn't the main problem\" and that people being after him is a bigger issue. He used cocaine prior to admission to stay awake and protect his home, but doesn't think that he needs treatment for that use. He says that he is interested in going to \"one of those places where they help you figure your shit out after discharge\" and on clarification he says he is talking about an IRTS, though he doesn't want to go to the one he went to last time. He denies " "SI/HI/AH/VHand has no other questions or concerns.       Objective:  Vital signs:  Temp: 97.7  F (36.5  C) Temp src: Temporal BP: 128/78 Pulse: 70   Resp: 16 SpO2: 97 % O2 Device: None (Room air)   Height: 177.8 cm (5' 10\") Weight: 101.4 kg (223 lb 8 oz)  Estimated body mass index is 32.07 kg/m  as calculated from the following:    Height as of this encounter: 1.778 m (5' 10\").    Weight as of this encounter: 101.4 kg (223 lb 8 oz).    Appearance:  awake, alert and slightly unkempt  Attitude:   calm, cooperative, and engaged  Eye Contact:  downcast  Mood:  \"fine\"  Affect:  mood congruent and intensity is blunted  Speech:  clear, coherent  Psychomotor Behavior:  no evidence of tardive dyskinesia, dystonia, or tics  Thought Process:  disorganized, illogical, and tangential  Thought Content:  no evidence of suicidal ideation or homicidal ideation and delusional ideas expressed; loosening of associations present  Perception: auditory hallucinations  Insight:  limited  Judgment:  limited  Oriented to:  time, person, and place  Attention Span and Concentration:  fair  Recent and Remote Memory:  fair  Language:  English with appropriate syntax and vocabulary  Fund of Knowledge: appropriate  Muscle Strength and Tone: normal  Gait and Station: Normal    Liver/kidney function Metabolic CBC   Recent Labs   Lab Test 06/10/25  0945 03/21/24  0823   CR 0.87 0.80   AST 15 24   ALT 12 27   ALKPHOS 84 107    Recent Labs   Lab Test 06/10/25  0945 03/21/24  0823 07/10/23  1644   A1C  --   --  5.1   TSH 0.64   < >  --     < > = values in this interval not displayed.    Recent Labs   Lab Test 06/10/25  0945   WBC 4.3   HGB 14.2   HCT 42.0   MCV 78             Lab results in the last 24 hours:  No results found for this or any previous visit (from the past 24 hours).      Assessment:  Diagnoses:  - Acute psychosis, suspect schizophrenia   - Opioid use disorder, in early remission  - Abuse of benzodiazepines, cocaine, cannabis " "    Clinically Significant Risk Factors      # Hypertension: Noted on problem list         # Obesity: Estimated body mass index is 32.07 kg/m  as calculated from the following:    Height as of this encounter: 1.778 m (5' 10\").    Weight as of this encounter: 101.4 kg (223 lb 8 oz)., PRESENT ON ADMISSION       # Financial/Environmental Concerns:        Barak is a 40 year old male with a past psychiatric history of substance use (benzodiazepines, cannabis, cocaine, opioids) and unspecified psychotic disorder admitted from the ED on 6/10/2025 due to concern for out of control behaviors and psychosis in the context of substance use, medication nonadherence and psychosocial stressors. Significant symptoms include sleep issues, psychosis, disorganized thought, substance use, delusional beliefs, and paranoia. His last psychiatric hospitalization was in 2024.  He has had some outpatient psychiatric management but it isn't clear if he is consistently seeing anyone.  Substance use does appear to be playing a contributing role in the patient's presentation. The MSE on admission is notable for delusional beliefs, paranoia, and disorganized thought process.      Today, Barak continues to demonstrate disorganized thought process and paranoia. He accepted oral lurasidone last night and was willing to increase the dose to 60mg qpm. He is perseverative on restarting bupropion but willing to accept the recommendation to wait until he is at a therapeutic dose of an antipsychotic, and we agreed to reconsider this next week. He declined a CD assessment as he doesn't think he has issues with this. It may be the case that his paranoia and psychosis is the dominant issue and that substance use is secondary, though we will continue to recommend a CD assessment to clarify recommendations for treatment if any.      Plan:  Acute psychosis, schizophrenia vs. substance-induced psychotic disorder  - Discontinue haloperidol 5mg qhs  - Increase " lurasidone from 20mg to 60mg qpm with food  HOLD PTA Adderall and bupropion     Opioid use disorder, in early remission  - Continue PTA Suboxone 8-2mg BID    Benzodiazepine use disorder, in early remission  - Phenobarbital 64.8mg TID     - Hydroxyzine 25-50 mg Q4H PRN for anxiety  - Olanzapine 10 mg PO/IM TID PRN severe agitation/psychosis     Patient will be treated in therapeutic milieu with appropriate individual and group therapies as described.    Psychiatric Hospital course:   Barak Clarke was admitted to [facility] as a voluntary patient on 6/10/2025 after presenting to the ED on 6/8. His PTA medications were continued with the exception of bupropion and Adderall, which were held due to acute psychosis.  - 6/10: initiate risperidone 1mg qhs  - 6/11: discontinue risperidone per patient preference, initiate haloperidol 5mg qhs. Place 72h hold and file for commitment with [facility]. Initiate phenobarbital 64.8mg TID for clonazepam withdrawal  - 6/12: discontinue haloperidol per patient preference (did not take any doses of paliperidone, risperidone, or haloperidol). Initiate lurasidone 20mg qpm with dinner  - 6/13: increase lurasidone to 60mg qpm with dinner      Legal Status:   Orders Placed This Encounter      Voluntary      Emergency Hospitalization Hold (72 Hr Hold)      Disposition: TBD, pending stabilization & development of a safe discharge plan.       Safety Assessment:   Behavioral Orders   Procedures    Code 1 - Restrict to Unit    Routine Programming     As clinically indicated    Status 15     Every 15 minutes.    Suicide precautions: Suicide Risk: MODERATE; Clinical rationale to override score: response to medication     Patients on Suicide Precautions should have a Combination Diet ordered that includes a Diet selection(s) AND a Behavioral Tray selection for Safe Tray - with utensils, or Safe Tray - NO utensils       Suicide Risk:   MODERATE     Clinical rationale to override score::   response to  medication       Current Facility-Administered Medications   Medication Dose Route Frequency Provider Last Rate Last Admin    atenolol (TENORMIN) tablet 50 mg  50 mg Oral Daily Daniel Vazquez MD   50 mg at 06/13/25 0828    buprenorphine HCl-naloxone HCl (SUBOXONE) 8-2 MG per film 1 Film  1 Film Sublingual BID Daniel Vazquez MD   1 Film at 06/13/25 0828    folic acid (FOLVITE) tablet 1 mg  1 mg Oral Daily Kimmie Arango MD   1 mg at 06/13/25 0829    gabapentin (NEURONTIN) capsule 300 mg  300 mg Oral TID Daniel Vazquez MD   300 mg at 06/13/25 0829    lurasidone (LATUDA) tablet 60 mg  60 mg Oral At Bedtime Kimmie Arango MD        multivitamin w/minerals (THERA-VIT-M) tablet 1 tablet  1 tablet Oral Daily Kimmie Arango MD   1 tablet at 06/13/25 0829    nicotine (NICODERM CQ) 21 MG/24HR 24 hr patch 1 patch  1 patch Transdermal Daily Kimmie Arango MD   1 patch at 06/12/25 0906    PHENobarbital tablet 64.8 mg  64.8 mg Oral TID Kimmie Arango MD   64.8 mg at 06/13/25 0828    thiamine (B-1) tablet 100 mg  100 mg Oral Daily Kimmie Arango MD   100 mg at 06/12/25 0907     Current Facility-Administered Medications   Medication Dose Route Frequency Provider Last Rate Last Admin    acetaminophen (TYLENOL) tablet 650 mg  650 mg Oral Q4H PRN Daniel Vazquez MD        alum & mag hydroxide-simethicone (MAALOX) suspension 30 mL  30 mL Oral Q4H PRN Daniel Vazquez MD        atenolol (TENORMIN) tablet 50 mg  50 mg Oral Daily PRN Kimmie Arango MD        hydrOXYzine HCl (ATARAX) tablet 25 mg  25 mg Oral Q4H PRN Daniel Vazquez MD   25 mg at 06/11/25 0158    loperamide (IMODIUM A-D) tablet 2 mg  2 mg Oral 4x Daily PRN Daniel Vazquez MD        nicotine (COMMIT) lozenge 4 mg  4 mg Buccal Q1H PRN Bridger Hernandez MD        nicotine polacrilex (NICORETTE) gum 4 mg  4 mg Buccal Q1H PRN Kimime Arango MD   4 mg at 06/12/25 1645    OLANZapine (zyPREXA) tablet 10 mg  10 mg  Oral TID PRN Daniel Vazquez MD        Or    OLANZapine (zyPREXA) injection 10 mg  10 mg Intramuscular TID PRN Daniel Vazquez MD        ondansetron (ZOFRAN) tablet 4 mg  4 mg Oral Q8H PRN Daniel Vazquez MD        senna-docusate (SENOKOT-S/PERICOLACE) 8.6-50 MG per tablet 1 tablet  1 tablet Oral BID PRN Daniel Vazquez MD        traZODone (DESYREL) tablet 50 mg  50 mg Oral At Bedtime PRN Daniel Vazquez MD           Allergies   Allergen Reactions    Prazosin Unknown     Worsening of nightmares       Kimmie Arango MD, PhD  06/13/2025

## 2025-06-13 NOTE — PLAN OF CARE
Problem: Sleep Disturbance  Goal: Adequate Sleep/Rest  Outcome: Progressing   Goal Outcome Evaluation:     [id number]: Pt sleeping at the start of the shift in no apparent distress. Continue on suicide/withdrawal  precautions without any related behavior noted.Slept all night for 6.75 hours.Safety maintained. Unable to assess for MSSA as pt slept all night.Will continue to monitor.

## 2025-06-13 NOTE — PLAN OF CARE
Goal Outcome Evaluation:    Plan of Care Reviewed With: patient        Pt is pleasant and cooperative.  Pt denies all mental health concerns.  Pt ate well and was med compliant.  Pt remains isolative to room.

## 2025-06-14 PROCEDURE — 124N000002 HC R&B MH UMMC

## 2025-06-14 PROCEDURE — 250N000012 HC RX MED GY IP 250 OP 636 PS 637: Performed by: PSYCHIATRY & NEUROLOGY

## 2025-06-14 PROCEDURE — 250N000013 HC RX MED GY IP 250 OP 250 PS 637: Performed by: PSYCHIATRY & NEUROLOGY

## 2025-06-14 PROCEDURE — 250N000013 HC RX MED GY IP 250 OP 250 PS 637: Performed by: STUDENT IN AN ORGANIZED HEALTH CARE EDUCATION/TRAINING PROGRAM

## 2025-06-14 RX ADMIN — LURASIDONE HYDROCHLORIDE 60 MG: 40 TABLET, FILM COATED ORAL at 19:34

## 2025-06-14 RX ADMIN — FOLIC ACID 1 MG: 1 TABLET ORAL at 08:23

## 2025-06-14 RX ADMIN — ATENOLOL 50 MG: 50 TABLET ORAL at 08:23

## 2025-06-14 RX ADMIN — PHENOBARBITAL 64.8 MG: 16.2 TABLET ORAL at 14:34

## 2025-06-14 RX ADMIN — PHENOBARBITAL 64.8 MG: 16.2 TABLET ORAL at 19:34

## 2025-06-14 RX ADMIN — Medication 1 TABLET: at 08:23

## 2025-06-14 RX ADMIN — GABAPENTIN 300 MG: 300 CAPSULE ORAL at 19:33

## 2025-06-14 RX ADMIN — NICOTINE POLACRILEX 4 MG: 2 LOZENGE ORAL at 18:11

## 2025-06-14 RX ADMIN — BUPRENORPHINE AND NALOXONE 1 FILM: 8; 2 FILM BUCCAL; SUBLINGUAL at 19:34

## 2025-06-14 RX ADMIN — THIAMINE HCL TAB 100 MG 100 MG: 100 TAB at 08:23

## 2025-06-14 RX ADMIN — BUPRENORPHINE AND NALOXONE 1 FILM: 8; 2 FILM BUCCAL; SUBLINGUAL at 08:23

## 2025-06-14 RX ADMIN — GABAPENTIN 300 MG: 300 CAPSULE ORAL at 14:34

## 2025-06-14 RX ADMIN — NICOTINE 1 PATCH: 21 PATCH, EXTENDED RELEASE TRANSDERMAL at 08:25

## 2025-06-14 RX ADMIN — NICOTINE POLACRILEX 4 MG: 2 LOZENGE ORAL at 14:45

## 2025-06-14 RX ADMIN — NICOTINE POLACRILEX 4 MG: 2 LOZENGE ORAL at 12:26

## 2025-06-14 RX ADMIN — PHENOBARBITAL 64.8 MG: 16.2 TABLET ORAL at 08:24

## 2025-06-14 RX ADMIN — NICOTINE POLACRILEX 4 MG: 4 GUM, CHEWING BUCCAL at 08:46

## 2025-06-14 RX ADMIN — GABAPENTIN 300 MG: 300 CAPSULE ORAL at 08:23

## 2025-06-14 ASSESSMENT — ACTIVITIES OF DAILY LIVING (ADL)
HYGIENE/GROOMING: HANDWASHING;INDEPENDENT
LAUNDRY: UNABLE TO COMPLETE
ADLS_ACUITY_SCORE: 22
DRESS: SCRUBS (BEHAVIORAL HEALTH)
ADLS_ACUITY_SCORE: 22
ADLS_ACUITY_SCORE: 22
ORAL_HYGIENE: INDEPENDENT
ADLS_ACUITY_SCORE: 22

## 2025-06-14 NOTE — PLAN OF CARE
Goal Outcome Evaluation:    Plan of Care Reviewed With: patient        Patient isolative to self in his room majority of the day watching television and sleeping. Patient was independent with coming into the milieu with requests and to complete ADL's though with numerous prompts continued to decline showering with disheveled appearance. Patient upon approach flat in affect, calm and cooperative with verbal assessment. Patient denies SI/SIB, AH/VH, or thoughts of harming others. Patient still endorsing paranoia of people outside the hospital, not specific with who. Patient reporting continued chronic anxiety and depression 7/10.    Patient cooperative with vital sign assessments which were stable. Patient cooperative with MSSA assessments scoring a 1 at 1647. MSSA assessments were canceled by provider. Patient independent with requesting and accepting of scheduled medications with no stated or observed side effects. Patient independent with identifying needs and completing ADL's though continues to decline showering.

## 2025-06-14 NOTE — PLAN OF CARE
"  Problem: Psychotic Signs/Symptoms  Goal: Improved Sleep (Psychotic Signs/Symptoms)  Outcome: Progressing   Goal Outcome Evaluation:    Patient is calm and cooperative. He has been isolative in room all shift but states that he is just resting and that he feels much better than few days ago. He is anxious but denies depression, paranoia, delusions. He also denies AH/VH. Pt agrees to be safe to himself and other and no safety concerns observed this shift. Pt denies pain and denies any physical concerns. He is medication compliant and denies any medication side effects. None observed also. Pt is hydrating and eating fine.       /71 (BP Location: Left arm)   Pulse 68   Temp 98.2  F (36.8  C) (Oral)   Resp 16   Ht 1.778 m (5' 10\")   Wt 101.4 kg (223 lb 8 oz)   SpO2 97%   BMI 32.07 kg/m       "

## 2025-06-14 NOTE — PLAN OF CARE
Barak appeared sleeping for 6.75 hours. Respirations are even and unlabored.  15 minutes safety checks completed throughout the night and no issues observed  No pain verbalized   Pt is calm and behaviorally controlled this shift.     Problem: Adult Behavioral Health Plan of Care  Goal: Adheres to Safety Considerations for Self and Others  Intervention: Develop and Maintain Individualized Safety Plan  Recent Flowsheet Documentation  Taken 6/14/2025 0501 by Sharonda Gleason RN  Safety Measures: safety rounds completed     Problem: Suicide Risk  Goal: Absence of Self-Harm  Outcome: Progressing   Goal Outcome Evaluation:

## 2025-06-15 LAB
C PNEUM DNA SPEC QL NAA+PROBE: NOT DETECTED
ERYTHROCYTE [DISTWIDTH] IN BLOOD BY AUTOMATED COUNT: 14.1 % (ref 10–15)
FLUAV H1 2009 PAND RNA SPEC QL NAA+PROBE: NOT DETECTED
FLUAV H1 RNA SPEC QL NAA+PROBE: NOT DETECTED
FLUAV H3 RNA SPEC QL NAA+PROBE: NOT DETECTED
FLUAV RNA SPEC QL NAA+PROBE: NEGATIVE
FLUAV RNA SPEC QL NAA+PROBE: NOT DETECTED
FLUBV RNA RESP QL NAA+PROBE: NEGATIVE
FLUBV RNA SPEC QL NAA+PROBE: NOT DETECTED
HADV DNA SPEC QL NAA+PROBE: NOT DETECTED
HCOV PNL SPEC NAA+PROBE: NOT DETECTED
HCT VFR BLD AUTO: 48.2 % (ref 40–53)
HGB BLD-MCNC: 16.4 G/DL (ref 13.3–17.7)
HMPV RNA SPEC QL NAA+PROBE: NOT DETECTED
HPIV1 RNA SPEC QL NAA+PROBE: NOT DETECTED
HPIV2 RNA SPEC QL NAA+PROBE: NOT DETECTED
HPIV3 RNA SPEC QL NAA+PROBE: NOT DETECTED
HPIV4 RNA SPEC QL NAA+PROBE: NOT DETECTED
M PNEUMO DNA SPEC QL NAA+PROBE: NOT DETECTED
MCH RBC QN AUTO: 26.6 PG (ref 26.5–33)
MCHC RBC AUTO-ENTMCNC: 34 G/DL (ref 31.5–36.5)
MCV RBC AUTO: 78 FL (ref 78–100)
PLATELET # BLD AUTO: 267 10E3/UL (ref 150–450)
RBC # BLD AUTO: 6.17 10E6/UL (ref 4.4–5.9)
RSV RNA SPEC NAA+PROBE: NEGATIVE
RSV RNA SPEC QL NAA+PROBE: NOT DETECTED
RSV RNA SPEC QL NAA+PROBE: NOT DETECTED
RV+EV RNA SPEC QL NAA+PROBE: NOT DETECTED
SARS-COV-2 RNA RESP QL NAA+PROBE: NEGATIVE
WBC # BLD AUTO: 6.2 10E3/UL (ref 4–11)

## 2025-06-15 PROCEDURE — 36415 COLL VENOUS BLD VENIPUNCTURE: CPT

## 2025-06-15 PROCEDURE — 250N000012 HC RX MED GY IP 250 OP 636 PS 637: Performed by: PSYCHIATRY & NEUROLOGY

## 2025-06-15 PROCEDURE — 87637 SARSCOV2&INF A&B&RSV AMP PRB: CPT

## 2025-06-15 PROCEDURE — 90853 GROUP PSYCHOTHERAPY: CPT

## 2025-06-15 PROCEDURE — 85027 COMPLETE CBC AUTOMATED: CPT

## 2025-06-15 PROCEDURE — 250N000013 HC RX MED GY IP 250 OP 250 PS 637: Performed by: PSYCHIATRY & NEUROLOGY

## 2025-06-15 PROCEDURE — 124N000002 HC R&B MH UMMC

## 2025-06-15 PROCEDURE — 99222 1ST HOSP IP/OBS MODERATE 55: CPT

## 2025-06-15 PROCEDURE — 87633 RESP VIRUS 12-25 TARGETS: CPT

## 2025-06-15 PROCEDURE — 250N000013 HC RX MED GY IP 250 OP 250 PS 637: Performed by: STUDENT IN AN ORGANIZED HEALTH CARE EDUCATION/TRAINING PROGRAM

## 2025-06-15 RX ORDER — GUAIFENESIN 600 MG/1
600 TABLET, EXTENDED RELEASE ORAL 2 TIMES DAILY PRN
Status: ACTIVE | OUTPATIENT
Start: 2025-06-15

## 2025-06-15 RX ADMIN — HYDROXYZINE HYDROCHLORIDE 25 MG: 25 TABLET ORAL at 13:45

## 2025-06-15 RX ADMIN — NICOTINE POLACRILEX 4 MG: 2 LOZENGE ORAL at 13:43

## 2025-06-15 RX ADMIN — NICOTINE POLACRILEX 4 MG: 2 LOZENGE ORAL at 12:34

## 2025-06-15 RX ADMIN — PHENOBARBITAL 64.8 MG: 16.2 TABLET ORAL at 19:00

## 2025-06-15 RX ADMIN — PHENOBARBITAL 64.8 MG: 16.2 TABLET ORAL at 08:22

## 2025-06-15 RX ADMIN — HYDROXYZINE HYDROCHLORIDE 25 MG: 25 TABLET ORAL at 17:56

## 2025-06-15 RX ADMIN — NICOTINE POLACRILEX 4 MG: 2 LOZENGE ORAL at 19:00

## 2025-06-15 RX ADMIN — NICOTINE POLACRILEX 4 MG: 2 LOZENGE ORAL at 08:42

## 2025-06-15 RX ADMIN — Medication 1 TABLET: at 08:22

## 2025-06-15 RX ADMIN — BUPRENORPHINE AND NALOXONE 1 FILM: 8; 2 FILM BUCCAL; SUBLINGUAL at 19:00

## 2025-06-15 RX ADMIN — NICOTINE POLACRILEX 4 MG: 2 LOZENGE ORAL at 17:55

## 2025-06-15 RX ADMIN — FOLIC ACID 1 MG: 1 TABLET ORAL at 08:22

## 2025-06-15 RX ADMIN — BUPRENORPHINE AND NALOXONE 1 FILM: 8; 2 FILM BUCCAL; SUBLINGUAL at 08:22

## 2025-06-15 RX ADMIN — GABAPENTIN 300 MG: 300 CAPSULE ORAL at 19:00

## 2025-06-15 RX ADMIN — GABAPENTIN 300 MG: 300 CAPSULE ORAL at 13:43

## 2025-06-15 RX ADMIN — THIAMINE HCL TAB 100 MG 100 MG: 100 TAB at 08:22

## 2025-06-15 RX ADMIN — NICOTINE POLACRILEX 4 MG: 2 LOZENGE ORAL at 16:56

## 2025-06-15 RX ADMIN — GABAPENTIN 300 MG: 300 CAPSULE ORAL at 08:22

## 2025-06-15 RX ADMIN — NICOTINE POLACRILEX 4 MG: 2 LOZENGE ORAL at 20:02

## 2025-06-15 RX ADMIN — NICOTINE POLACRILEX 4 MG: 2 LOZENGE ORAL at 15:46

## 2025-06-15 RX ADMIN — LURASIDONE HYDROCHLORIDE 60 MG: 40 TABLET, FILM COATED ORAL at 19:00

## 2025-06-15 RX ADMIN — NICOTINE 1 PATCH: 21 PATCH, EXTENDED RELEASE TRANSDERMAL at 08:23

## 2025-06-15 RX ADMIN — PHENOBARBITAL 64.8 MG: 16.2 TABLET ORAL at 13:43

## 2025-06-15 ASSESSMENT — ACTIVITIES OF DAILY LIVING (ADL)
ADLS_ACUITY_SCORE: 22
HYGIENE/GROOMING: INDEPENDENT
ADLS_ACUITY_SCORE: 22
ORAL_HYGIENE: INDEPENDENT
ADLS_ACUITY_SCORE: 22
LAUNDRY: UNABLE TO COMPLETE
ADLS_ACUITY_SCORE: 22
DRESS: INDEPENDENT
ADLS_ACUITY_SCORE: 22

## 2025-06-15 NOTE — CARE PLAN
06/15/25 0300   Observation Type   Harm Category none   No Harm Category Noted at This Time patient sleeping, no interaction   Level of Special Monitoring none

## 2025-06-15 NOTE — CARE PLAN
06/15/25 1050   Observation Type   Harm Category none   No Harm Category Noted at This Time no applicable harm categories or justifications   Level of Special Monitoring none

## 2025-06-15 NOTE — PLAN OF CARE
Group Attendance:  attended full group    Time session began: 1600  Time session ended: 1645  Patient's total time in group: 45    Total # Attendees   5   Group Type psychotherapeutic and psychoeducational     Group Topic Covered emotional regulation, insight improvement, symptom management, and goals and motivation     Group Session Detail    Group members checked in and discussed self-acceptance, self-compassion, and creating positive change. Writer asked questions for pt's to consider what self-acceptance looks like, what self-compassion looks like and how they practice it. Writer gave tips for ways to incorporate self-acceptance and compassion. Writer led an exercise to scan for stress and give self-affirmations for acceptance. Group discussed positive changes, what is working well, what change would look like, and ways to set goals and make them reachable.     Patient's response to the group topic/interactions:  cooperative with task, organized, socially appropriate, listened actively, expressed understanding of topic, attentive, and actively engaged     Patient Details: Pt attended group, participated in the discussion and shared the group was helpful. Pt was quiet during group but opened up more as the group continued and he connected with others.           [id number] - Group psychotherapy - 1 Session  Patient Active Problem List   Diagnosis    Tobacco use disorder    Amphetamine use disorder, mild, in early remission (H)    Major depressive disorder, recurrent, moderate (H)    Opioid use disorder, severe, in early remission, on maintenance therapy, dependence (H)    Attention deficit hyperactivity disorder (ADHD), inattentive type, moderate    Sedative, hypnotic or anxiolytic use disorder, mild, abuse (H)    Insomnia, unspecified    Personality disorder, unspecified (H)    Chemical dependency (H)    Mental health problem    Psychosis (H)    Substance-related disorder (H)    Acute psychosis (H)    HTN  (hypertension)    Major depressive disorder, recurrent episode, severe (H)    Depression

## 2025-06-15 NOTE — PLAN OF CARE
"RN Assessment    SI/SIB/HI: denies  Aggression/Agitation: none  AVH: none observed  Thought process: suspicious, guarded  Sleep:6.75 hours on NOC, awake throughout day shift  Affect & Mood: blunted, mood is \"very low, depressed\", pt also reports feeling restless/anxious.      Behavior: Med compliant. Visible in the milieu pacing but withdrawn to self. Intermittently naps in room. Not social with peers. Voices his needs appropriately. Pt had visit with his mom this afternoon.    Possible Medication SE: sleepiness, slept much of the day.  Hygiene: fair   VS: WDL  Pain: denies    Acute physical concerns: pt c/o cough with some phlegm (pt does not know what color), chest and nasal congestion. Pt requesting cough syrup. VSS. Paged on call psych provider asking about respiratory PCR panel. Provider placed routine IM consult for chest congestion. Pt seen by IM. IM ordered respiratory panel. Came back negative for covid, influenza A and B and RSV. Respiratory panel is still pending.     Pt also states \"I feel like I'm in mad withdrawal from adderall\". Pt reports he has been intermittently sweating, and feels very restless.    Appetite & Fluid intake: adequate   Bowel & Bladder: no issues reported    PRN medications utilized this shift include:  Nicotine   Hydroxyzine 25 mg- anxiety    Problem: Suicide Risk  Goal: Absence of Self-Harm  Outcome: Progressing     Problem: Psychotic Signs/Symptoms  Goal: Improved Behavioral Control (Psychotic Signs/Symptoms)  Outcome: Progressing  Plan of care reviewed with: patient  "

## 2025-06-15 NOTE — PLAN OF CARE
Problem: Sleep Disturbance  Goal: Adequate Sleep/Rest  Outcome: Progressing   Goal Outcome Evaluation:  [id number]: Pt sleeping at the start of the shift in no apparent distress. Continue on suicide precaution. No SI related behavior noted.Safety maintained. Slept for 6.75 hours

## 2025-06-15 NOTE — CONSULTS
[facility]  Consult Note - Hospitalist Service  Date of Admission:  6/10/2025  Consult Requested by: Dr. Vazquez   Reason for Consult: Chest congestion    Assessment & Plan   Barak Clarke is a 40 year old male admitted on 6/10/2025 who presents to station 12 for out of control behaviors and psychosis in the context of substance usage, medication non adherence, and psychosocial stressors. He a notable past medical history of anxiety, hypertension, major depressive disorder, opioid dependence on Suboxone, polysubstance use disorder (benzodiazepines, cocaine, and cannabis).  Medicine consult for complaints of chest congestion.    Addendum: COVID and influenza swab is negative.  Respiratory panel PCR is still pending.  However if positive most likely will just need supportive cares.  Please notify medicine if positive and as appropriate infection prevention.  Patient should wear a mask when he is around others especially if this is a viral illness.  Medicine will sign off let us know if there is any other concerns via Vocera.     # Chest and nasal congestion concerning for an acute viral infection  # Subjective chills  Patient noting that he had chills this morning and started having nasal and chest congestion.  Denies any subjective fevers, sore throat, trouble swallowing, breast pain, abdominal pain, ear pain, sinus pain, nausea, or vomiting.  No known sick contacts prior to admission but notes that there is a sick patient on the unit.  No known fever recorded other vital signs stable.  Admission labs without any overt symptoms of infection including a normal white count.  Upon exam no further objective findings including normal breath sounds.  CBC checked within normal white  - RPV, Covid/Influenza swabs  - PRN Mucinex, acetaminophen  - Recommend showering 1-2 times daily if well to to help with congestion  - Push p.o. fluids as able  - Medicine will follow in the  "periphery for viral swab     The patient's care was discussed with the Bedside Nurse, Patient, and Primary team (via this note).     Clinically Significant Risk Factors                   # Hypertension: Noted on problem list            # Obesity: Estimated body mass index is 32.07 kg/m  as calculated from the following:    Height as of this encounter: 1.778 m (5' 10\").    Weight as of this encounter: 101.4 kg (223 lb 8 oz).      # Financial/Environmental Concerns:           [facility]  Hospitalist Service  Securely message with Vocera (more info)  Text page via [facility] Paging/Directory     This chart documentation was completed with Dragon voice-recognition software. Even though reviewed, this chart may still contain some grammatical, spelling, and word errors. Please contact the author for any questions or clarifications.     ______________________________________________________________________    Chief Complaint   Chest and nasal congestion   Chills     History is obtained from the patient and per chart review.     History of Present Illness   Barak Clarke is a 40 year old male admitted on 6/10/2025 who presents to station 12 for out of control behaviors and psychosis in the context of substance usage, medication non adherence, and psychosocial stressors. He a notable past medical history of anxiety, hypertension, major depressive disorder, opioid dependence on Suboxone, polysubstance use disorder (benzodiazepines, cocaine, and cannabis).  Medicine consult for complaints of chest congestion.    Patient noting that he had chills this morning and started having nasal and chest congestion.  Denies any subjective fevers, sore throat, trouble swallowing, breast pain, abdominal pain, ear pain, sinus pain, nausea, or vomiting.  No known sick contacts prior to admission but notes that there is a sick patient on the unit.  No known fever recorded other vital signs stable.  Admission labs without any overt " symptoms of infection including a normal white count.      Past Medical History    Past Medical History:   Diagnosis Date    Anxiety     Hypertension     MDD (major depressive disorder)     Obese     BMI>30    Opioid dependence (H)     suboxone contract       Past Surgical History   Past Surgical History:   Procedure Laterality Date    ORTHOPEDIC SURGERY         Medications   I have reviewed this patient's current medications  Medications Prior to Admission   Medication Sig Dispense Refill Last Dose/Taking    amphetamine-dextroamphetamine (ADDERALL) 20 MG tablet Take 20 mg by mouth. Once to twice daily.       atenolol (TENORMIN) 50 MG tablet Take 1 tablet (50 mg) by mouth daily 30 tablet 0     buprenorphine HCl-naloxone HCl (SUBOXONE) 8-2 MG per film Place 1 Film under the tongue 2 times daily 60 Film 0     buPROPion (WELLBUTRIN XL) 150 MG 24 hr tablet Take 150 mg by mouth every morning.       gabapentin (NEURONTIN) 300 MG capsule Take 300 mg by mouth 3 times daily.       loperamide (IMODIUM A-D) 2 MG tablet Take 2 mg by mouth 4 times daily as needed for diarrhea.       melatonin 3 MG tablet Take 3 mg by mouth at bedtime       ondansetron (ZOFRAN) 4 MG tablet Take 4 mg by mouth every 8 hours as needed for nausea.             Review of Systems    The 10 point Review of Systems is negative other than noted in the HPI or here.     Social History   I have reviewed this patient's social history and updated it with pertinent information if needed.  Social History     Tobacco Use    Smoking status: Every Day     Types: Cigarettes, Vaping Device    Smokeless tobacco: Never    Tobacco comments:     e-cig only (20 mg/nicotine)   Substance Use Topics    Alcohol use: No     Comment: occasional    Drug use: Yes     Types: Marijuana, Methamphetamines, Opiates         Allergies   Allergies   Allergen Reactions    Prazosin Unknown     Worsening of nightmares        Physical Exam   Vital Signs: Temp: 96.8  F (36  C) Temp src:  Temporal BP: 112/74 Pulse: 58   Resp: 16 SpO2: 98 % O2 Device: None (Room air)    Weight: 223 lbs 8 oz    General Appearance: In NAD, sitting in bed  HEENT: Traumatic.  No sinus pain noted.  No erythema in throat, tonsils, or uvula or exudate noted.  No ear pain.   Respiratory: LS clear b/l, normal RR  Cardiovascular: S1, S2, no m/r/g  GI: BS+, all 4 quadrants, no masses, non-tender upon palpation  Skin: Intact on face, arms, legs. No wounds, bruising, or lesions noted.  Neuropsych:A&Ox4, moving all extremities      Medical Decision Making       45 MINUTES SPENT BY ME on the date of service doing chart review, history, exam, documentation & further activities per the note.      Data     I have personally reviewed the following data over the past 24 hrs:    6.2  \   16.4   / 267     N/A N/A N/A /  N/A   N/A N/A N/A \       Imaging results reviewed over the past 24 hrs:   No results found for this or any previous visit (from the past 24 hours).

## 2025-06-15 NOTE — PLAN OF CARE
Problem: Psychotic Signs/Symptoms  Goal: Enhanced Social, Occupational or Functional Skills (Psychotic Signs/Symptoms)  Outcome: Not Progressing  Intervention: Promote Social, Occupational and Functional Ability  Recent Flowsheet Documentation  Taken 6/14/2025 1945 by Waldo Parekh RN  Trust Relationship/Rapport:   care explained   emotional support provided   empathic listening provided   questions answered   questions encouraged   reassurance provided   thoughts/feelings acknowledged   Goal Outcome Evaluation:    Plan of Care Reviewed With: patient        Patient isolative to self in his room majority of the day watching television and sleeping. Patient was independent with coming into the milieu with requests and to retreive meals and medications. Patient upon approach flat in affect, calm and cooperative with verbal assessment. Patient denies SI/SIB, AH/VH, or thoughts of harming others. Patient still endorsing paranoia of people outside the hospital, not specific with who. Patient reporting continued chronic anxiety and depression 7/10.    Patient cooperative with vital sign assessments which were stable. Patient independent with requesting and accepting of scheduled medications with no stated or observed side effects. Patient independent with identifying needs and completing ADL's though continues to decline showering and exhibit disheveled appearance with noticeably dirty hands.

## 2025-06-16 PROCEDURE — 250N000012 HC RX MED GY IP 250 OP 636 PS 637: Performed by: PSYCHIATRY & NEUROLOGY

## 2025-06-16 PROCEDURE — 250N000013 HC RX MED GY IP 250 OP 250 PS 637: Performed by: STUDENT IN AN ORGANIZED HEALTH CARE EDUCATION/TRAINING PROGRAM

## 2025-06-16 PROCEDURE — 124N000002 HC R&B MH UMMC

## 2025-06-16 PROCEDURE — 250N000013 HC RX MED GY IP 250 OP 250 PS 637: Performed by: PSYCHIATRY & NEUROLOGY

## 2025-06-16 PROCEDURE — 99232 SBSQ HOSP IP/OBS MODERATE 35: CPT | Performed by: STUDENT IN AN ORGANIZED HEALTH CARE EDUCATION/TRAINING PROGRAM

## 2025-06-16 RX ORDER — LURASIDONE HYDROCHLORIDE 40 MG/1
80 TABLET, FILM COATED ORAL AT BEDTIME
Status: DISPENSED | OUTPATIENT
Start: 2025-06-16

## 2025-06-16 RX ORDER — BUPROPION HYDROCHLORIDE 150 MG/1
150 TABLET ORAL DAILY
Status: DISPENSED | OUTPATIENT
Start: 2025-06-17

## 2025-06-16 RX ADMIN — GABAPENTIN 300 MG: 300 CAPSULE ORAL at 20:46

## 2025-06-16 RX ADMIN — HYDROXYZINE HYDROCHLORIDE 25 MG: 25 TABLET ORAL at 04:32

## 2025-06-16 RX ADMIN — PHENOBARBITAL 64.8 MG: 16.2 TABLET ORAL at 14:22

## 2025-06-16 RX ADMIN — GABAPENTIN 300 MG: 300 CAPSULE ORAL at 14:22

## 2025-06-16 RX ADMIN — LURASIDONE HYDROCHLORIDE 80 MG: 40 TABLET, FILM COATED ORAL at 20:46

## 2025-06-16 RX ADMIN — NICOTINE POLACRILEX 4 MG: 2 LOZENGE ORAL at 07:27

## 2025-06-16 RX ADMIN — NICOTINE POLACRILEX 4 MG: 2 LOZENGE ORAL at 14:22

## 2025-06-16 RX ADMIN — NICOTINE POLACRILEX 4 MG: 2 LOZENGE ORAL at 18:17

## 2025-06-16 RX ADMIN — NICOTINE POLACRILEX 4 MG: 2 LOZENGE ORAL at 15:55

## 2025-06-16 RX ADMIN — NICOTINE POLACRILEX 4 MG: 2 LOZENGE ORAL at 11:39

## 2025-06-16 RX ADMIN — NICOTINE 1 PATCH: 21 PATCH, EXTENDED RELEASE TRANSDERMAL at 08:19

## 2025-06-16 RX ADMIN — PHENOBARBITAL 64.8 MG: 16.2 TABLET ORAL at 20:46

## 2025-06-16 RX ADMIN — GABAPENTIN 300 MG: 300 CAPSULE ORAL at 08:19

## 2025-06-16 RX ADMIN — BUPRENORPHINE AND NALOXONE 1 FILM: 8; 2 FILM BUCCAL; SUBLINGUAL at 20:46

## 2025-06-16 RX ADMIN — FOLIC ACID 1 MG: 1 TABLET ORAL at 08:19

## 2025-06-16 RX ADMIN — Medication 1 TABLET: at 08:19

## 2025-06-16 RX ADMIN — NICOTINE POLACRILEX 4 MG: 2 LOZENGE ORAL at 09:30

## 2025-06-16 RX ADMIN — PHENOBARBITAL 64.8 MG: 16.2 TABLET ORAL at 08:19

## 2025-06-16 RX ADMIN — NICOTINE POLACRILEX 4 MG: 2 LOZENGE ORAL at 10:33

## 2025-06-16 RX ADMIN — OLANZAPINE 10 MG: 10 TABLET, FILM COATED ORAL at 11:25

## 2025-06-16 RX ADMIN — NICOTINE POLACRILEX 4 MG: 2 LOZENGE ORAL at 08:26

## 2025-06-16 RX ADMIN — THIAMINE HCL TAB 100 MG 100 MG: 100 TAB at 08:19

## 2025-06-16 RX ADMIN — BUPRENORPHINE AND NALOXONE 1 FILM: 8; 2 FILM BUCCAL; SUBLINGUAL at 08:19

## 2025-06-16 ASSESSMENT — ACTIVITIES OF DAILY LIVING (ADL)
ADLS_ACUITY_SCORE: 22
ORAL_HYGIENE: INDEPENDENT
ADLS_ACUITY_SCORE: 22
DRESS: INDEPENDENT
ADLS_ACUITY_SCORE: 22
LAUNDRY: WITH SUPERVISION
ADLS_ACUITY_SCORE: 22
HYGIENE/GROOMING: INDEPENDENT

## 2025-06-16 NOTE — PLAN OF CARE
"RN Assessment    SI/SIB/HI: denies  Aggression/Agitation: none  AVH: denies, none observed  Thought process: paranoid, suspicious. Pt states zyprexa was helpful in lowering the amount of paranoid thoughts he was having.  Affect & Mood: blunted, mood is anxious-- pt states he continues to feel very restless, yet down/depressed.    Behavior: Med compliant. Visible in the lounge, pacing the quinn. Withdrawn to self, not social with peers. Did not attend group. Utilized the stationary bike today. Cooperative with staff. Pt continues to state \"I think I'm in really bad withdrawal\". Provider aware.    Possible Medication SE: none reported or observed  Hygiene: adequate   VS: BP low this morning, 90/67. Asymptomatic. Scheduled Atenolol held. Provider notified. VS WDL this afternoon.  Pain: denies  Acute physical concerns: none reported or observed  Appetite & Fluid intake: adequate   Bowel & Bladder: no issues reported    PRN medications utilized this shift include:  Zyprexa 10 mg-- severe paranoia-- effective  Nicotine     Problem: Suicide Risk  Goal: Absence of Self-Harm  Outcome: Progressing     Problem: Psychotic Signs/Symptoms  Goal: Improved Mood Symptoms (Psychotic Signs/Symptoms)  Outcome: Progressing   Plan of care reviewed with: patient  "

## 2025-06-16 NOTE — PLAN OF CARE
Team Note Due:  Wednesday    Assessment/Intervention/Current Symtoms and Care Coordination:  Chart review and met with team, discussed pt progress, symptomology, and response to treatment.  Discussed the discharge plan and any potential impediments to discharge.    Petition is being supported.     Court hold received. I met with pt to serve him the court hold. Provider updated. A copy sent to [facility] and a copy placed in his binder. I explained the commitment process. Pt says he was on a stay of commitment before. He reports not feeling as tired today, but still thinks he is withdrawing. He said the zyprexa helps with 'everything'.     Preliminary hearing is Fri June 20th, 2025 at 1:30PM.     I sent court tv records request.     Discharge Plan or Goal:  Pt wants IRTS once stable.      Barriers to Discharge:  Symptoms     Referral Status:  None     Legal Status:  Court Hold   [facility]   File Number: [id number]  Start and expiration date of commitment:     Upcoming court:  Preliminary hearing is Fri June 20th, 2025 at 1:30PM.     Contacts (include DELORES status):  Mother:   Sarika Clarke - Ph: [phone]     :  [facility] - Ph: [phone]    Upcoming Meetings and Dates/Important Information and next steps:  Coordinate care   Update internal referral tracker @ discharge.   Commitment process   Refer to IRTS at discharge.   Preliminary hearing is Fri June 20th, 2025 at 1:30PM.

## 2025-06-16 NOTE — PLAN OF CARE
Barak appeared sleeping for  6.75  hours without distress  Utilized PRN Atarax  for anxiety at 0432 and was helpful  No  pain verbalized   No harm/ assault  to self and others noted or reported this shift.          Problem: Adult Behavioral Health Plan of Care  Goal: Plan of Care Review  Outcome: Progressing  Goal: Adheres to Safety Considerations for Self and Others  Intervention: Develop and Maintain Individualized Safety Plan  Recent Flowsheet Documentation  Taken 6/16/2025 0621 by Sharonda Gleason RN  Safety Measures: safety rounds completed     Problem: Adult Inpatient Plan of Care  Goal: Absence of Hospital-Acquired Illness or Injury  Intervention: Prevent Skin Injury  Recent Flowsheet Documentation  Taken 6/16/2025 0621 by Sharonda Gleason RN  Body Position:   position changed independently   position maintained   Goal Outcome Evaluation:

## 2025-06-16 NOTE — PLAN OF CARE
"  Problem: Psychotic Signs/Symptoms  Goal: Enhanced Social, Occupational or Functional Skills (Psychotic Signs/Symptoms)  Intervention: Promote Social, Occupational and Functional Ability  Recent Flowsheet Documentation  Taken 6/15/2025 1905 by Waldo Parekh RN  Trust Relationship/Rapport:   care explained   emotional support provided   empathic listening provided   questions answered   questions encouraged   reassurance provided   thoughts/feelings acknowledged   Goal Outcome Evaluation:    Plan of Care Reviewed With: patient        Patient isolative to self in his room majority of the day watching television and sleeping. Patient was independent with coming into the milieu with requests and to retreive meals and medications. Patient upon approach flat in affect, calm and cooperative with verbal assessment. Patient reporting he feels improved since admission with increased sleep. Patient stating acceptance with currently ordered Latuda though reports feeling \"hungover\" in the mornings. Patient reports he feels the Zyprexa worked better but was unable to report what he felt was improved. Patient states he would like to remain on Latuda over Zyprexa due to concerns for weight gain with Zyprexa. Patient denies SI/SIB, AH/VH, or thoughts of harming others. Patient still endorsing paranoia of people outside the hospital, stating he still has fear that people are actively trying to harm him.    Patient cooperative with vital sign assessments which were stable. Patient independent with requesting and accepting of scheduled medications with no stated or observed side effects. Patient independent with identifying needs and completing ADL's which appear to have improved with clean appearance.               "

## 2025-06-16 NOTE — PLAN OF CARE
BEH IP Unit Acuity Rating Score (UARS)  Patient is given one point for every criteria they meet.    CRITERIA SCORING   On a 72 hour hold, court hold, committed, stay of commitment, or revocation. 0   Patient LOS on BEH unit exceeds 20 days. 0  LOS: 6   Patient under guardianship, 55+, otherwise medically complex, or under age 11. 0   Suicide ideation without relief of precipitating factors. 0   Current plan for suicide. 0   Current plan for homicide. 0   Imminent risk or actual attempt to seriously harm another without relief of factors precipitating the attempt. 0   Severe dysfunction in daily living (ex: complete neglect for self care, extreme disruption in vegetative function, extreme deterioration in social interactions). 1   Recent (last 7 days) or current physical aggression in the ED or on unit. 0   Restraints or seclusion episode in past 72 hours. 0   Recent (last 7 days) or current verbal aggression, agitation, yelling, etc., while in the ED or unit. 0   Active psychosis. 1   Need for constant or near constant redirection (from leaving, from others, etc).  0   Intrusive or disruptive behaviors. 0   Patient requires 3 or more hours of individualized nursing care per 8-hour shift (i.e. for ADLs, meds, therapeutic interventions). 0   TOTAL 2

## 2025-06-16 NOTE — PROGRESS NOTES
"  ___________________________________________________  [facility], [place]   Psychiatric Progress Note  Hospital Day #6  Date of Service: 06/16/2025    Interval History:  The patient's care was discussed with the treatment team and chart notes were reviewed.    Sleep: 6.75 hours (06/16/25 0621)  PRN medications:   Last 24H PRN:     hydrOXYzine HCl (ATARAX) tablet 25 mg, 25 mg at 06/16/25 0432    nicotine (COMMIT) lozenge 4 mg, 4 mg at 06/16/25 0826     Staff Report:   Patient is calm and cooperative. He is guarded and presents with a blunted affect. He has disorganized thought process. Pt refused his scheduled haldol, education was provided but patient refused again. Pt is anxious, depressed, and paranoid. Pt denies SI/SIB/HI and denies AH/VH. Pt denies pain and denies any physical concerns. Pt is eating and hydrating well. MSSA scores at 1800 was a 4. MSSA scores at 2200 is 3   See staff notes for additional details.    Subjective:  Today, Barak says that the weekend was \"bad\". He feels like he is going through withdrawal from clonazepam and Adderall and has noticed feeling shaky and physically agitated. He would like to take olanzapine \"because it is a thienobenzodiazepine\". I informed him that while that is the chemical structure, there isn't evidence to support its use for clonazepam withdrawal, though he does have it available as a PRN and he can use it for agitation. We discussed potentially increasing phenobarbital including the risks and benefits and he elected to keep the dose the same. He denies having any concerns about people on the unit. He says he is still concerned about the people who were after him before he got to the hospital and emphasizes that they are real, \"but people want to kill me and that's just life\" and shrugs. He feels safe here in the hospital. He hasn't noticed any changes in his thoughts since getting to the hospital. He is open to increasing lurasidone " "and also restarting bupropion today. He denies SI/HI/AH/VH/paranoia and has no other questions or concerns.       Objective:  Vital signs:  Temp: 96.8  F (36  C) Temp src: Temporal BP: 90/67 Pulse: 74   Resp: 17 SpO2: 98 % O2 Device: None (Room air)   Height: 177.8 cm (5' 10\") Weight: 101.4 kg (223 lb 8 oz)  Estimated body mass index is 32.07 kg/m  as calculated from the following:    Height as of this encounter: 1.778 m (5' 10\").    Weight as of this encounter: 101.4 kg (223 lb 8 oz).    Appearance:  awake, alert and slightly unkempt  Attitude:   calm, cooperative, and engaged  Eye Contact:  downcast  Mood:  \"fine\"  Affect:  mood congruent and intensity is blunted  Speech:  clear, coherent  Psychomotor Behavior:  no evidence of tardive dyskinesia, dystonia, or tics  Thought Process:  disorganized, illogical, and tangential  Thought Content:  no evidence of suicidal ideation or homicidal ideation and delusional ideas expressed; loosening of associations present  Perception: auditory hallucinations  Insight:  limited  Judgment:  limited  Oriented to:  time, person, and place  Attention Span and Concentration:  fair  Recent and Remote Memory:  fair  Language:  English with appropriate syntax and vocabulary  Fund of Knowledge: appropriate  Muscle Strength and Tone: normal  Gait and Station: Normal    Liver/kidney function Metabolic CBC   Recent Labs   Lab Test 06/10/25  0945 03/21/24  0823   CR 0.87 0.80   AST 15 24   ALT 12 27   ALKPHOS 84 107    Recent Labs   Lab Test 06/10/25  0945 03/21/24  0823 07/10/23  1644   A1C  --   --  5.1   TSH 0.64   < >  --     < > = values in this interval not displayed.    Recent Labs   Lab Test 06/15/25  1153   WBC 6.2   HGB 16.4   HCT 48.2   MCV 78             Lab results in the last 24 hours:  Recent Results (from the past 24 hours)   CBC with platelets    Collection Time: 06/15/25 11:53 AM   Result Value Ref Range    WBC Count 6.2 4.0 - 11.0 10e3/uL    RBC Count 6.17 (H) 4.40 " "- 5.90 10e6/uL    Hemoglobin 16.4 13.3 - 17.7 g/dL    Hematocrit 48.2 40.0 - 53.0 %    MCV 78 78 - 100 fL    MCH 26.6 26.5 - 33.0 pg    MCHC 34.0 31.5 - 36.5 g/dL    RDW 14.1 10.0 - 15.0 %    Platelet Count 267 150 - 450 10e3/uL   Influenza A/B, RSV and SARS-CoV2 PCR (COVID-19) Nasopharyngeal    Collection Time: 06/15/25 12:14 PM    Specimen: Nasopharyngeal; Swab   Result Value Ref Range    Influenza A PCR Negative Negative    Influenza B PCR Negative Negative    RSV PCR Negative Negative    SARS CoV2 PCR Negative Negative   Respiratory Panel PCR    Collection Time: 06/15/25 12:14 PM    Specimen: Nasopharyngeal; Swab   Result Value Ref Range    Adenovirus Not Detected Not Detected    Coronavirus Not Detected Not Detected    Human Metapneumovirus Not Detected Not Detected    Human Rhin/Enterovirus Not Detected Not Detected    Influenza A Not Detected Not Detected    Influenza A, H1 Not Detected Not Detected    Influenza A 2009 H1N1 Not Detected Not Detected    Influenza A, H3 Not Detected Not Detected    Influenza B Not Detected Not Detected    Parainfluenza Virus 1 Not Detected Not Detected    Parainfluenza Virus 2 Not Detected Not Detected    Parainfluenza Virus 3 Not Detected Not Detected    Parainfluenza Virus 4 Not Detected Not Detected    Respiratory Syncytial Virus A Not Detected Not Detected    Respiratory Syncytial Virus B Not Detected Not Detected    Chlamydia Pneumoniae Not Detected Not Detected    Mycoplasma Pneumoniae Not Detected Not Detected         Assessment:  Diagnoses:  - Acute psychosis, suspect schizophrenia   - Opioid use disorder, in early remission  - Abuse of benzodiazepines, cocaine, cannabis     Clinically Significant Risk Factors      # Hypertension: Noted on problem list         # Obesity: Estimated body mass index is 32.07 kg/m  as calculated from the following:    Height as of this encounter: 1.778 m (5' 10\").    Weight as of this encounter: 101.4 kg (223 lb 8 oz).        # " Financial/Environmental Concerns:        Barak is a 40 year old male with a past psychiatric history of substance use (benzodiazepines, cannabis, cocaine, opioids) and unspecified psychotic disorder admitted from the ED on 6/10/2025 due to concern for out of control behaviors and psychosis in the context of substance use, medication nonadherence and psychosocial stressors. Significant symptoms include sleep issues, psychosis, disorganized thought, substance use, delusional beliefs, and paranoia. His last psychiatric hospitalization was in 2024.  He has had some outpatient psychiatric management but it isn't clear if he is consistently seeing anyone.  Substance use does appear to be playing a contributing role in the patient's presentation. The MSE on admission is notable for delusional beliefs, paranoia, and disorganized thought process.      Today, Barak continues to demonstrate paranoia and mildly disorganized thought process. He is tolerating lurasidone 60mg well and we agreed to increase to 80mg. We also elected to restart bupropion cautiously so that we can monitor the effects while he is in the hospital.     Plan:  Acute psychosis, schizophrenia vs. substance-induced psychotic disorder  - Increase lurasidone from 60mg to 80mg qpm with food   - Restart bupropion at 150mg daily  HOLD PTA Adderall     Opioid use disorder, in early remission  - Continue PTA Suboxone 8-2mg BID    Benzodiazepine use disorder, in early remission  - Phenobarbital 64.8mg TID     - Hydroxyzine 25-50 mg Q4H PRN for anxiety  - Olanzapine 10 mg PO/IM TID PRN severe agitation/psychosis     Patient will be treated in therapeutic milieu with appropriate individual and group therapies as described.    Psychiatric Hospital course:   Barak Clarke was admitted to [facility] as a voluntary patient on 6/10/2025 after presenting to the ED on 6/8. His PTA medications were continued with the exception of bupropion and Adderall, which were held due to  acute psychosis.  - 6/10: initiate risperidone 1mg qhs  - 6/11: discontinue risperidone per patient preference, initiate haloperidol 5mg qhs. Place 72h hold and file for commitment with [facility]. Initiate phenobarbital 64.8mg TID for clonazepam withdrawal  - 6/12: discontinue haloperidol per patient preference (did not take any doses of paliperidone, risperidone, or haloperidol). Initiate lurasidone 20mg qpm with dinner  - 6/13: increase lurasidone to 60mg qpm with dinner  - 6/16: increase lurasidone to 80mg qpm with dinner, restart bupropion at 150mg daily    Medical diagnoses to be addressed this admission:   No acute medical issues at this time.    Other:   Consults:   None    Legal Status:   Orders Placed This Encounter      Emergency Hospitalization Hold (72 Hr Hold)      Disposition: TBD, pending stabilization & development of a safe discharge plan.       Safety Assessment:   Behavioral Orders   Procedures    Code 1 - Restrict to Unit    Routine Programming     As clinically indicated    Status 15     Every 15 minutes.    Suicide precautions: Suicide Risk: MODERATE; Clinical rationale to override score: response to medication     Patients on Suicide Precautions should have a Combination Diet ordered that includes a Diet selection(s) AND a Behavioral Tray selection for Safe Tray - with utensils, or Safe Tray - NO utensils       Suicide Risk:   MODERATE     Clinical rationale to override score::   response to medication       Current Facility-Administered Medications   Medication Dose Route Frequency Provider Last Rate Last Admin    atenolol (TENORMIN) tablet 50 mg  50 mg Oral Daily Daniel Vazquez MD   50 mg at 06/14/25 0823    buprenorphine HCl-naloxone HCl (SUBOXONE) 8-2 MG per film 1 Film  1 Film Sublingual BID Daniel Vazquez MD   1 Film at 06/16/25 0819    folic acid (FOLVITE) tablet 1 mg  1 mg Oral Daily Kimmie Arango MD   1 mg at 06/16/25 0819    gabapentin (NEURONTIN) capsule 300 mg  300  mg Oral TID Daniel Vazquez MD   300 mg at 06/16/25 0819    lurasidone (LATUDA) tablet 60 mg  60 mg Oral At Bedtime Kimmie Arango MD   60 mg at 06/15/25 1900    multivitamin w/minerals (THERA-VIT-M) tablet 1 tablet  1 tablet Oral Daily Kimmie Arango MD   1 tablet at 06/16/25 0819    nicotine (NICODERM CQ) 21 MG/24HR 24 hr patch 1 patch  1 patch Transdermal Daily Kimmie Arango MD   1 patch at 06/16/25 0819    PHENobarbital tablet 64.8 mg  64.8 mg Oral TID Kimmie Arango MD   64.8 mg at 06/16/25 0819    thiamine (B-1) tablet 100 mg  100 mg Oral Daily Kimmie Arango MD   100 mg at 06/16/25 0819     Current Facility-Administered Medications   Medication Dose Route Frequency Provider Last Rate Last Admin    acetaminophen (TYLENOL) tablet 650 mg  650 mg Oral Q4H PRN Daniel Vazquez MD        alum & mag hydroxide-simethicone (MAALOX) suspension 30 mL  30 mL Oral Q4H PRN Daniel Vazquez MD        atenolol (TENORMIN) tablet 50 mg  50 mg Oral Daily PRN Kimmie Arango MD        guaiFENesin (MUCINEX) 12 hr tablet 600 mg  600 mg Oral BID PRN Ciara Quintanilla APRN CNP        hydrOXYzine HCl (ATARAX) tablet 25 mg  25 mg Oral Q4H PRN Daniel Vazquez MD   25 mg at 06/16/25 0432    loperamide (IMODIUM A-D) tablet 2 mg  2 mg Oral 4x Daily PRN Daniel Vazquez MD        nicotine (COMMIT) lozenge 4 mg  4 mg Buccal Q1H PRN Bridger Hernandez MD   4 mg at 06/16/25 0826    nicotine polacrilex (NICORETTE) gum 4 mg  4 mg Buccal Q1H PRN Kimmie Arango MD   4 mg at 06/14/25 0846    OLANZapine (zyPREXA) tablet 10 mg  10 mg Oral TID PRN Daniel Vazquez MD        Or    OLANZapine (zyPREXA) injection 10 mg  10 mg Intramuscular TID PRN Daniel Vazquez MD        ondansetron (ZOFRAN) tablet 4 mg  4 mg Oral Q8H PRN Daniel Vazquez MD        senna-docusate (SENOKOT-S/PERICOLACE) 8.6-50 MG per tablet 1 tablet  1 tablet Oral BID PRN Daniel Vazquez MD        traZODone  (DESYREL) tablet 50 mg  50 mg Oral At Bedtime PRN Daniel Vazquez MD           Allergies   Allergen Reactions    Prazosin Unknown     Worsening of nightmares       Kimmie Arango MD, PhD  06/16/2025

## 2025-06-17 PROCEDURE — 97150 GROUP THERAPEUTIC PROCEDURES: CPT | Mod: GO

## 2025-06-17 PROCEDURE — 250N000013 HC RX MED GY IP 250 OP 250 PS 637: Performed by: STUDENT IN AN ORGANIZED HEALTH CARE EDUCATION/TRAINING PROGRAM

## 2025-06-17 PROCEDURE — 250N000013 HC RX MED GY IP 250 OP 250 PS 637: Performed by: PSYCHIATRY & NEUROLOGY

## 2025-06-17 PROCEDURE — 99232 SBSQ HOSP IP/OBS MODERATE 35: CPT | Performed by: STUDENT IN AN ORGANIZED HEALTH CARE EDUCATION/TRAINING PROGRAM

## 2025-06-17 PROCEDURE — 250N000012 HC RX MED GY IP 250 OP 636 PS 637: Performed by: PSYCHIATRY & NEUROLOGY

## 2025-06-17 PROCEDURE — 124N000002 HC R&B MH UMMC

## 2025-06-17 RX ORDER — PHENOBARBITAL 97.2 MG/1
97.2 TABLET ORAL 3 TIMES DAILY
Status: DISPENSED | OUTPATIENT
Start: 2025-06-17

## 2025-06-17 RX ORDER — PHENOBARBITAL 16.2 MG/1
16.2 TABLET ORAL ONCE
Status: COMPLETED | OUTPATIENT
Start: 2025-06-17 | End: 2025-06-17

## 2025-06-17 RX ORDER — LORAZEPAM 1 MG/1
1-4 TABLET ORAL EVERY 30 MIN PRN
Status: DISCONTINUED | OUTPATIENT
Start: 2025-06-17 | End: 2025-06-17

## 2025-06-17 RX ADMIN — GABAPENTIN 300 MG: 300 CAPSULE ORAL at 14:03

## 2025-06-17 RX ADMIN — LURASIDONE HYDROCHLORIDE 80 MG: 40 TABLET, FILM COATED ORAL at 19:59

## 2025-06-17 RX ADMIN — BUPRENORPHINE AND NALOXONE 1 FILM: 8; 2 FILM BUCCAL; SUBLINGUAL at 19:59

## 2025-06-17 RX ADMIN — NICOTINE POLACRILEX 4 MG: 2 LOZENGE ORAL at 10:46

## 2025-06-17 RX ADMIN — PHENOBARBITAL 97.2 MG: 97.2 TABLET ORAL at 14:03

## 2025-06-17 RX ADMIN — NICOTINE POLACRILEX 4 MG: 2 LOZENGE ORAL at 09:15

## 2025-06-17 RX ADMIN — GABAPENTIN 300 MG: 300 CAPSULE ORAL at 09:14

## 2025-06-17 RX ADMIN — GABAPENTIN 300 MG: 300 CAPSULE ORAL at 19:59

## 2025-06-17 RX ADMIN — PHENOBARBITAL 64.8 MG: 16.2 TABLET ORAL at 09:13

## 2025-06-17 RX ADMIN — PHENOBARBITAL 16.2 MG: 16.2 TABLET ORAL at 10:46

## 2025-06-17 RX ADMIN — NICOTINE 1 PATCH: 21 PATCH, EXTENDED RELEASE TRANSDERMAL at 09:12

## 2025-06-17 RX ADMIN — BUPRENORPHINE AND NALOXONE 1 FILM: 8; 2 FILM BUCCAL; SUBLINGUAL at 09:12

## 2025-06-17 RX ADMIN — FOLIC ACID 1 MG: 1 TABLET ORAL at 09:14

## 2025-06-17 RX ADMIN — NICOTINE POLACRILEX 4 MG: 2 LOZENGE ORAL at 20:02

## 2025-06-17 RX ADMIN — THIAMINE HCL TAB 100 MG 100 MG: 100 TAB at 09:13

## 2025-06-17 RX ADMIN — ACETAMINOPHEN 650 MG: 325 TABLET ORAL at 09:15

## 2025-06-17 RX ADMIN — OLANZAPINE 10 MG: 10 TABLET, FILM COATED ORAL at 12:42

## 2025-06-17 RX ADMIN — NICOTINE POLACRILEX 4 MG: 2 LOZENGE ORAL at 12:40

## 2025-06-17 RX ADMIN — NICOTINE POLACRILEX 4 MG: 2 LOZENGE ORAL at 14:10

## 2025-06-17 RX ADMIN — NICOTINE POLACRILEX 4 MG: 2 LOZENGE ORAL at 15:53

## 2025-06-17 RX ADMIN — BUPROPION HYDROCHLORIDE 150 MG: 150 TABLET, EXTENDED RELEASE ORAL at 09:14

## 2025-06-17 RX ADMIN — Medication 1 TABLET: at 09:13

## 2025-06-17 RX ADMIN — PHENOBARBITAL 97.2 MG: 97.2 TABLET ORAL at 19:59

## 2025-06-17 ASSESSMENT — ACTIVITIES OF DAILY LIVING (ADL)
ADLS_ACUITY_SCORE: 22
DRESS: SCRUBS (BEHAVIORAL HEALTH)
HYGIENE/GROOMING: INDEPENDENT
ADLS_ACUITY_SCORE: 22
LAUNDRY: UNABLE TO COMPLETE
ADLS_ACUITY_SCORE: 22
ORAL_HYGIENE: INDEPENDENT
ADLS_ACUITY_SCORE: 22

## 2025-06-17 NOTE — PLAN OF CARE
Problem: Adult Inpatient Plan of Care  Goal: Optimal Comfort and Wellbeing  Outcome: Progressing     Problem: Adult Behavioral Health Plan of Care  Goal: Adheres to Safety Considerations for Self and Others  Outcome: Progressing   Goal Outcome Evaluation:  Mental Status Exam   Affect:  flat  Appearance:    Attention Span/Concentration:    Eye Contact:      Mood:    Orientation to Person: ye    Orientation to Place:  yes  Orientation to Time of Day:  yes  Orientation to Date:   yes    Situation:yes    Pt is alert oriented x 4. Pt denied having

## 2025-06-17 NOTE — PLAN OF CARE
Nursing Assessment    Recent Vitals: B/P: 153/79, T: 96, P: 74, R: 16     Mental Status Exam:  Appearance:  Adequately groomed, sweaty and clammy.  Behavior/relationship to examiner/demeanor:  Guarded  Affect (objective appearance):  Blunted/Flat  Mood (subjective report):  anxious, nervous, and tense  Gait:  Normal  Speech rate, volume, coherence:  Normal, soft, Normal    Thought Process (Associations):  Paranoia  Thought process (Rate):  Normal  Abnormal Perception:  Paranoia  Attention/Concentration:  Normal,  Alert, Oriented to person, Oriented to place, Oriented to date/time, and Oriented to situation  Insight:  Fair  Judgment:  Limited    Suicide Assessment:   Recent suicidal thoughts: No   Any attempts in the last 24 hours: No   Plan or considering various methods: No   Access to means: No   Verbal or Written contract for safety: Yes   Self Injurious Behavior: No  Sleep:  Hours of sleep at night: 7    General Shift Summary    Patient stayed in room most of shift, napped 2 hours and attended groups. Ate 100% of meals, cooperative with medications, interacted with peers and staff appropriately. Denies SI, HI. Decreased feelings of paranoia.       AURORA Hernandez

## 2025-06-17 NOTE — PROGRESS NOTES
"  ___________________________________________________  [facility], [place]   Psychiatric Progress Note  Hospital Day #7  Date of Service: 06/17/2025    Interval History:  The patient's care was discussed with the treatment team and chart notes were reviewed.    Sleep: 7 hours (06/17/25 0600)  PRN medications:   Last 24H PRN:     nicotine (COMMIT) lozenge 4 mg, 4 mg at 06/16/25 1817    OLANZapine (zyPREXA) tablet 10 mg, 10 mg at 06/16/25 1125 **OR** OLANZapine (zyPREXA) injection 10 mg     Staff Report:   Behavior: Med compliant. Visible in the lounge, pacing the quinn. Withdrawn to self, not social with peers. Did not attend group. Utilized the stationary bike today. Cooperative with staff. Pt continues to state \"I think I'm in really bad withdrawal\". Provider aware.   See staff notes for additional details.    Subjective:  Today, Barak says that he is doing \"alright\" and feeling better after being given a one-time dose of 16.2mg phenobarbital. I informed him that I had talked with the pharmacy team and we decided to increase his scheduled phenobarbital. He is agreeable to this plan. He says he is now feeling more comfortable physically but more uncomfortable mentally. There is a new patient on the unit who has been following him around. He says that he is worried because when he was at [facility] last year everyone was really nice for the first two weeks and then suddenly \"flipped\" and were really mean to him. He says he appreciates the staff here and feels respected and well-cared-for. He tolerated the increased dose of lurasidone well last night and is glad to be back on bupropion. He denies SI/HI/AH/VH/paranoia and has no other questions or concerns.     Objective:  Vital signs:  Temp: 96.9  F (36.1  C) Temp src: Temporal BP: 122/81 Pulse: 70   Resp: 17 SpO2: 98 % O2 Device: None (Room air)   Height: 177.8 cm (5' 10\") Weight: 101.4 kg (223 lb 8 oz)  Estimated body mass index is 32.07 " "kg/m  as calculated from the following:    Height as of this encounter: 1.778 m (5' 10\").    Weight as of this encounter: 101.4 kg (223 lb 8 oz).    Appearance:  awake, alert and slightly unkempt  Attitude:   calm, cooperative, and engaged  Eye Contact:  downcast  Mood:  \"fine\"  Affect:  mood congruent and intensity is blunted  Speech:  clear, coherent  Psychomotor Behavior:  no evidence of tardive dyskinesia, dystonia, or tics. Pacing  Thought Process:  disorganized, illogical, and tangential  Thought Content:  no evidence of suicidal ideation or homicidal ideation and delusional ideas expressed; loosening of associations present  Perception: auditory hallucinations  Insight:  limited  Judgment:  limited  Oriented to:  time, person, and place  Attention Span and Concentration:  fair  Recent and Remote Memory:  fair  Language:  English with appropriate syntax and vocabulary  Fund of Knowledge: appropriate  Muscle Strength and Tone: normal  Gait and Station: Normal    Liver/kidney function Metabolic CBC   Recent Labs   Lab Test 06/10/25  0945 03/21/24  0823   CR 0.87 0.80   AST 15 24   ALT 12 27   ALKPHOS 84 107    Recent Labs   Lab Test 06/10/25  0945 03/21/24  0823 07/10/23  1644   A1C  --   --  5.1   TSH 0.64   < >  --     < > = values in this interval not displayed.    Recent Labs   Lab Test 06/15/25  1153   WBC 6.2   HGB 16.4   HCT 48.2   MCV 78             Lab results in the last 24 hours:  No results found for this or any previous visit (from the past 24 hours).        Assessment:  Diagnoses:  - Acute psychosis, suspect schizophrenia   - Opioid use disorder, in early remission  - Abuse of benzodiazepines, cocaine, cannabis     Clinically Significant Risk Factors      # Hypertension: Noted on problem list         # Obesity: Estimated body mass index is 32.07 kg/m  as calculated from the following:    Height as of this encounter: 1.778 m (5' 10\").    Weight as of this encounter: 101.4 kg (223 lb 8 oz).    "     # Financial/Environmental Concerns:        Barak is a 40 year old male with a past psychiatric history of substance use (benzodiazepines, cannabis, cocaine, opioids) and unspecified psychotic disorder admitted from the ED on 6/10/2025 due to concern for out of control behaviors and psychosis in the context of substance use, medication nonadherence and psychosocial stressors. Significant symptoms include sleep issues, psychosis, disorganized thought, substance use, delusional beliefs, and paranoia. His last psychiatric hospitalization was in 2024.  He has had some outpatient psychiatric management but it isn't clear if he is consistently seeing anyone.  Substance use does appear to be playing a contributing role in the patient's presentation. The MSE on admission is notable for delusional beliefs, paranoia, and disorganized thought process.      Today, Barak continues to demonstrate paranoia and mildly disorganized thought process. He is tolerating medication changes though is continuing to experience subjective symptoms of clonazepam withdrawal. MSSA score was 13 today when checked, though we elected not to maintain MSSA orders to avoid giving lorazepam. Will increase scheduled phenobarbital.    Plan:  Acute psychosis, schizophrenia vs. substance-induced psychotic disorder  - Lurasidone 80mg qpm with food  - Restart bupropion at 150mg daily  HOLD PTA Adderall    Opioid use disorder, in early remission  - Continue PTA Suboxone 8-2mg BID    Benzodiazepine use disorder, in early remission  - Phenobarbital: increase from 64.8mg TID to 97.2mg TID with one-time dose today of 16.2mg     - Hydroxyzine 25-50 mg Q4H PRN for anxiety  - Olanzapine 10 mg PO/IM TID PRN severe agitation/psychosis     Patient will be treated in therapeutic milieu with appropriate individual and group therapies as described.    Psychiatric Hospital course:   Barak Clarke was admitted to [facility] as a voluntary patient on 6/10/2025 after  presenting to the ED on 6/8. His PTA medications were continued with the exception of bupropion and Adderall, which were held due to acute psychosis.  - 6/10: initiate risperidone 1mg qhs  - 6/11: discontinue risperidone per patient preference, initiate haloperidol 5mg qhs. Place 72h hold and file for commitment with [facility]. Initiate phenobarbital 64.8mg TID for clonazepam withdrawal  - 6/12: discontinue haloperidol per patient preference (did not take any doses of paliperidone, risperidone, or haloperidol). Initiate lurasidone 20mg qpm with dinner  - 6/13: increase lurasidone to 60mg qpm with dinner  - 6/16: increase lurasidone to 80mg qpm with dinner, restart bupropion at 150mg daily  - 6/17: increase phenobarbital to 97.2mg TID       Medical diagnoses to be addressed this admission:   No acute medical issues at this time.    Other:   Consults:   None    Legal Status:   Orders Placed This Encounter      Legal status Court Hold      Disposition: TBD, pending stabilization & development of a safe discharge plan.       Safety Assessment:   Behavioral Orders   Procedures    Code 1 - Restrict to Unit    Routine Programming     As clinically indicated    Status 15     Every 15 minutes.    Suicide precautions: Suicide Risk: MODERATE; Clinical rationale to override score: response to medication     Patients on Suicide Precautions should have a Combination Diet ordered that includes a Diet selection(s) AND a Behavioral Tray selection for Safe Tray - with utensils, or Safe Tray - NO utensils       Suicide Risk:   MODERATE     Clinical rationale to override score::   response to medication       Current Facility-Administered Medications   Medication Dose Route Frequency Provider Last Rate Last Admin    buprenorphine HCl-naloxone HCl (SUBOXONE) 8-2 MG per film 1 Film  1 Film Sublingual BID Daniel Vazquez MD   1 Film at 06/16/25 2046    buPROPion (WELLBUTRIN XL) 24 hr tablet 150 mg  150 mg Oral Daily Kimmie Arango MD         folic acid (FOLVITE) tablet 1 mg  1 mg Oral Daily Kimmie Arango MD   1 mg at 06/16/25 0819    gabapentin (NEURONTIN) capsule 300 mg  300 mg Oral TID Daniel Vazquez MD   300 mg at 06/16/25 2046    lurasidone (LATUDA) tablet 80 mg  80 mg Oral At Bedtime Kimmie Arango MD   80 mg at 06/16/25 2046    multivitamin w/minerals (THERA-VIT-M) tablet 1 tablet  1 tablet Oral Daily Kimmie Arango MD   1 tablet at 06/16/25 0819    nicotine (NICODERM CQ) 21 MG/24HR 24 hr patch 1 patch  1 patch Transdermal Daily Kimmie Arango MD   1 patch at 06/16/25 0819    PHENobarbital tablet 64.8 mg  64.8 mg Oral TID Kimmie Arango MD   64.8 mg at 06/16/25 2046    thiamine (B-1) tablet 100 mg  100 mg Oral Daily Kimmie Arango MD   100 mg at 06/16/25 0819     Current Facility-Administered Medications   Medication Dose Route Frequency Provider Last Rate Last Admin    acetaminophen (TYLENOL) tablet 650 mg  650 mg Oral Q4H PRN Daniel Vazquez MD        alum & mag hydroxide-simethicone (MAALOX) suspension 30 mL  30 mL Oral Q4H PRN Daniel Vazquez MD        atenolol (TENORMIN) tablet 50 mg  50 mg Oral Daily PRN Kimmie Arango MD        guaiFENesin (MUCINEX) 12 hr tablet 600 mg  600 mg Oral BID PRN Ciara Quintanilla APRN CNP        hydrOXYzine HCl (ATARAX) tablet 25 mg  25 mg Oral Q4H PRN Daniel Vazquez MD   25 mg at 06/16/25 0432    loperamide (IMODIUM A-D) tablet 2 mg  2 mg Oral 4x Daily PRN Daniel Vazquez MD        nicotine (COMMIT) lozenge 4 mg  4 mg Buccal Q1H PRN Bridger Hernandez MD   4 mg at 06/16/25 1817    nicotine polacrilex (NICORETTE) gum 4 mg  4 mg Buccal Q1H PRN Kimmie Arango MD   4 mg at 06/14/25 0846    OLANZapine (zyPREXA) tablet 10 mg  10 mg Oral TID PRN Daniel Vazquez MD   10 mg at 06/16/25 1125    Or    OLANZapine (zyPREXA) injection 10 mg  10 mg Intramuscular TID PRN Daniel Vazquez MD        ondansetron (ZOFRAN) tablet 4 mg  4 mg Oral Q8H PRN  Daniel Vazquez MD        senna-docusate (SENOKOT-S/PERICOLACE) 8.6-50 MG per tablet 1 tablet  1 tablet Oral BID PRN Daniel Vazquez MD        traZODone (DESYREL) tablet 50 mg  50 mg Oral At Bedtime PRN Daniel Vazquez MD           Allergies   Allergen Reactions    Prazosin Unknown     Worsening of nightmares       Kimmie Arango MD, PhD  06/17/2025

## 2025-06-17 NOTE — PLAN OF CARE
"  Rehab Group    Start time: 1015  End time: 1115  Patient time total: 60 minutes    attended full group     #3 attended   Group Type: occupational therapy   Group Topic Covered: cognitive activities, healthy leisure time, and social skills   Group Session Detail: OT: Education on cognitive wellness and interactive social activity ([place]) to increase concentration, focus, attention to task/detail, task follow through, memory recall, healthy leisure engagement, coping with stress, heathy distraction engagement, cognitive wellness, social wellness, and social engagement    Patient Response/Contribution:  cooperative with task and actively engaged, restless, redirectable   Patient Detail: Pt reported during check-in that \"drawing\" is an activity he enjoys to support his cognitive wellness. Pt elected to stand for duration of group and appeared restless as he paced around the group space. Pt able to follow verbal directions and visual demonstration for novel task. Pt able to independently identify on-topic responses during each turn; pt provided support to peer who was struggling to identify a response. Pt redirectable back to task when a disruptive peer entered group space and attempted to derail the group. Pt reported they enjoyed the activity and verbalized understanding of importance of cognitive wellness in a healthy lifestyle.       [id number] OT Group (2 or more in attendance)    Patient Active Problem List   Diagnosis    Tobacco use disorder    Amphetamine use disorder, mild, in early remission (H)    Major depressive disorder, recurrent, moderate (H)    Opioid use disorder, severe, in early remission, on maintenance therapy, dependence (H)    Attention deficit hyperactivity disorder (ADHD), inattentive type, moderate    Sedative, hypnotic or anxiolytic use disorder, mild, abuse (H)    Insomnia, unspecified    Personality disorder, unspecified (H)    Chemical dependency (H)    Mental health problem    Psychosis (H) "    Substance-related disorder (H)    Acute psychosis (H)    HTN (hypertension)    Major depressive disorder, recurrent episode, severe (H)    Depression

## 2025-06-17 NOTE — PLAN OF CARE
Goal Outcome Evaluation:   Pt is isolative in his room most of the shift, Presents as flat and blunted, guarded and withdrawn. Reports depression 4/10. Denies all other MH symptoms. Took all his meds, ate 100% of dinner.   Vital signs:  Temp: 96.9  F (36.1  C) Temp src: Temporal BP: 122/81 Pulse: 70   Resp: 17 SpO2: 98 % O2 Device: None (Room air)

## 2025-06-17 NOTE — PLAN OF CARE
BEH IP Unit Acuity Rating Score (UARS)  Patient is given one point for every criteria they meet.    CRITERIA SCORING   On a 72 hour hold, court hold, committed, stay of commitment, or revocation. 1   Patient LOS on BEH unit exceeds 20 days. 0  LOS: 7   Patient under guardianship, 55+, otherwise medically complex, or under age 11. 0   Suicide ideation without relief of precipitating factors. 0   Current plan for suicide. 0   Current plan for homicide. 0   Imminent risk or actual attempt to seriously harm another without relief of factors precipitating the attempt. 0   Severe dysfunction in daily living (ex: complete neglect for self care, extreme disruption in vegetative function, extreme deterioration in social interactions). 0   Recent (last 7 days) or current physical aggression in the ED or on unit. 0   Restraints or seclusion episode in past 72 hours. 0   Recent (last 7 days) or current verbal aggression, agitation, yelling, etc., while in the ED or unit. 0   Active psychosis. 1   Need for constant or near constant redirection (from leaving, from others, etc).  0   Intrusive or disruptive behaviors. 0   Patient requires 3 or more hours of individualized nursing care per 8-hour shift (i.e. for ADLs, meds, therapeutic interventions). 0   TOTAL 2

## 2025-06-17 NOTE — PLAN OF CARE
Problem: Adult Inpatient Plan of Care  Goal: Optimal Comfort and Wellbeing  6/17/2025 1614 by Tera Faria RN  Outcome: Progressing  6/17/2025 1609 by Tera Faria RN  Outcome: Progressing     Problem: Adult Behavioral Health Plan of Care  Goal: Adheres to Safety Considerations for Self and Others  6/17/2025 1614 by Tera Faria RN  Outcome: Progressing  Flowsheets (Taken 6/17/2025 1614)  Adheres to Safety Considerations for Self and Others: making progress toward outcome  6/17/2025 1609 by Tera Faria RN  Outcome: Progressing   Goal Outcome Evaluation:  Mental Status Exam   Affect:  blunted/flat  Mood:    Orientation to Person:yes     Orientation to Place:  yes  Orientation to Time of Day:  yes  Orientation to Date:  yes     RN Assessment:  SI/Self harm:  Denied  Aggression/agitation/HI:  Denied  AVH:Denied    PRN Med: No PRNs administered this shift  Medication : Compliant  Physical Complaints/Issues:No  issues  I & O: eating and drinking well  LBM:   ADLs: independent  Visits: Mother  Vitals:  WNL  COVID 19 Assessment:        Patient's most recent vital signs are:     Vital signs:  BP: 125/76  Temp: 98.6  HR: 70  RR: 16  SpO2: 97 %     Patient does not have new respiratory symptoms.  Patient does not have new sore throat.  Patient does not have a fever greater than 99.5.    Milieu Participation:Visible in the milieu  Pt is alert and oriented. Pt is withdrawn to self and appears guarded during assessments. Presents with a flat and blunted affect. Pt can be forgetful . Pt denies having any safety concerns including thoughts of harming self or others. Pt denies auditory and visual hallucinations and takes his medications as prescribed. No medication side effects reported or observed. Denies any physical discomfort. Pt denies having any problem with eating . Plan of care ongoing

## 2025-06-17 NOTE — PLAN OF CARE
Team Note Due:  Wednesday    Assessment/Intervention/Current Symtoms and Care Coordination:  Chart review and met with team, discussed pt progress, symptomology, and response to treatment.  Discussed the discharge plan and any potential impediments to discharge.    Received large packet of documents for patient. Served this to him, and a copy placed in binder.     Pt received a fax from his  with documents to waive his hearing however when I gave this to patient he stated he doesn't want to waive the hearing and is going to try and 'fight it'. He stated he would call his  to notify him. I gave him the  contact info.     Pt received a large pack of documents. I gave this to him. Pt stated he wants to waive the hearings again. He stated he was going to discuss with his  and let me know.   Pt has been calm, cooperative.     I secure faxed this back to his  on his behalf.     Pt stated he thinks he had a psych appt today with Hitesh AMAYA so I called them to cancel it on his behalf per his request.     Discharge Plan or Goal:  Pt wants IRTS once stable.      Barriers to Discharge:  Symptoms     Referral Status:  None     Legal Status:  Court Hold   County: [place]   File Number: [id number]  Start and expiration date of commitment:     Upcoming court:  Preliminary hearing is Fri June 20th, 2025 at 1:30PM.     Contacts (include DELORES status):  Mother:   Sarika Clarke - Ph: [phone]     :  [facility] - Ph: [phone]    Psychiatrist:  Maxwell AMAYA  Address: [address] [place] [address]  Phone: [phone]    Upcoming Meetings and Dates/Important Information and next steps:  Coordinate care   Update internal referral tracker @ discharge.   Commitment process   Refer to IRTS at discharge.   Preliminary hearing is Fri June 20th, 2025 at 1:30PM.

## 2025-06-18 PROCEDURE — 97150 GROUP THERAPEUTIC PROCEDURES: CPT | Mod: GO

## 2025-06-18 PROCEDURE — 99232 SBSQ HOSP IP/OBS MODERATE 35: CPT | Performed by: STUDENT IN AN ORGANIZED HEALTH CARE EDUCATION/TRAINING PROGRAM

## 2025-06-18 PROCEDURE — 124N000002 HC R&B MH UMMC

## 2025-06-18 PROCEDURE — 250N000012 HC RX MED GY IP 250 OP 636 PS 637: Performed by: PSYCHIATRY & NEUROLOGY

## 2025-06-18 PROCEDURE — 250N000013 HC RX MED GY IP 250 OP 250 PS 637: Performed by: STUDENT IN AN ORGANIZED HEALTH CARE EDUCATION/TRAINING PROGRAM

## 2025-06-18 PROCEDURE — 250N000013 HC RX MED GY IP 250 OP 250 PS 637: Performed by: PSYCHIATRY & NEUROLOGY

## 2025-06-18 RX ADMIN — OLANZAPINE 10 MG: 10 TABLET, FILM COATED ORAL at 13:10

## 2025-06-18 RX ADMIN — NICOTINE POLACRILEX 4 MG: 2 LOZENGE ORAL at 10:03

## 2025-06-18 RX ADMIN — BUPRENORPHINE AND NALOXONE 1 FILM: 8; 2 FILM BUCCAL; SUBLINGUAL at 20:16

## 2025-06-18 RX ADMIN — NICOTINE POLACRILEX 4 MG: 2 LOZENGE ORAL at 09:00

## 2025-06-18 RX ADMIN — Medication 1 TABLET: at 09:00

## 2025-06-18 RX ADMIN — NICOTINE POLACRILEX 4 MG: 2 LOZENGE ORAL at 12:17

## 2025-06-18 RX ADMIN — PHENOBARBITAL 97.2 MG: 97.2 TABLET ORAL at 20:16

## 2025-06-18 RX ADMIN — BUPROPION HYDROCHLORIDE 150 MG: 150 TABLET, EXTENDED RELEASE ORAL at 09:00

## 2025-06-18 RX ADMIN — NICOTINE POLACRILEX 4 MG: 2 LOZENGE ORAL at 13:16

## 2025-06-18 RX ADMIN — FOLIC ACID 1 MG: 1 TABLET ORAL at 09:00

## 2025-06-18 RX ADMIN — THIAMINE HCL TAB 100 MG 100 MG: 100 TAB at 09:00

## 2025-06-18 RX ADMIN — GABAPENTIN 300 MG: 300 CAPSULE ORAL at 13:10

## 2025-06-18 RX ADMIN — GABAPENTIN 300 MG: 300 CAPSULE ORAL at 09:00

## 2025-06-18 RX ADMIN — NICOTINE POLACRILEX 4 MG: 2 LOZENGE ORAL at 16:53

## 2025-06-18 RX ADMIN — OLANZAPINE 10 MG: 10 TABLET, FILM COATED ORAL at 02:34

## 2025-06-18 RX ADMIN — PHENOBARBITAL 97.2 MG: 97.2 TABLET ORAL at 13:13

## 2025-06-18 RX ADMIN — LURASIDONE HYDROCHLORIDE 80 MG: 40 TABLET, FILM COATED ORAL at 20:16

## 2025-06-18 RX ADMIN — NICOTINE POLACRILEX 4 MG: 2 LOZENGE ORAL at 20:21

## 2025-06-18 RX ADMIN — BUPRENORPHINE AND NALOXONE 1 FILM: 8; 2 FILM BUCCAL; SUBLINGUAL at 08:59

## 2025-06-18 RX ADMIN — ACETAMINOPHEN 650 MG: 325 TABLET ORAL at 09:46

## 2025-06-18 RX ADMIN — NICOTINE 1 PATCH: 21 PATCH, EXTENDED RELEASE TRANSDERMAL at 08:59

## 2025-06-18 RX ADMIN — NICOTINE POLACRILEX 4 MG: 2 LOZENGE ORAL at 11:01

## 2025-06-18 RX ADMIN — GABAPENTIN 300 MG: 300 CAPSULE ORAL at 20:16

## 2025-06-18 RX ADMIN — PHENOBARBITAL 97.2 MG: 97.2 TABLET ORAL at 09:00

## 2025-06-18 ASSESSMENT — ACTIVITIES OF DAILY LIVING (ADL)
ADLS_ACUITY_SCORE: 22
DRESS: SCRUBS (BEHAVIORAL HEALTH);INDEPENDENT
ADLS_ACUITY_SCORE: 22
LAUNDRY: UNABLE TO COMPLETE
ADLS_ACUITY_SCORE: 22

## 2025-06-18 NOTE — PLAN OF CARE
Team Note Due:  Wednesday    Assessment/Intervention/Current Symtoms and Care Coordination:  Chart review and met with team, discussed pt progress, symptomology, and response to treatment.  Discussed the discharge plan and any potential impediments to discharge.    Pt's final commitment order and Briggs came. Briggs meds are: Haldol, Prolixin, Clozaril, Risperdal, Invega, Zyprexa, Seroquel, Abilify and Lurasidone. Sent order to UR. Provider was updated in team.     Can start IRTS referrals, still tapering off phenobarbitol so discharge likely not until next week some time.      Behavioral team note complete.     I met with pt to give him a copy of his MI + briggs order, copy placed in chart. He signed DELORES's for psych, mom, CM, and [facility] IRTS, [facility] and [facility] IRTS locations.     I submitted referrals for [facility] - all locations, [facility] and [facility] IRTS all locations (Rita Rodriguez). Pt wants a location where he can smoke tobacco.     I called Pt's OP KELSY [facility]- Ph: [phone], and left a voicemail asking if she will be continuing to work with patient or if he will be assigned someone else. Per PPS previously she had no contact in months and was considering closing the case at the time.     Discharge Plan or Goal:  IRTS - not [facility] (was there before)     Barriers to Discharge:  Symptoms     Referral Status:  6/18 [facility] IRTS   All locations    6/18 [facility] IRTS   Andrea Salinas    6/18 [facility]     All locations    Legal Status:  MI + Briggs order  [facility]   File Number: [id number]  Start and expiration date of commitment: 6/18/25-12/18/25  Briggs meds are: Haldol, Prolixin, Clozaril, Risperdal, Invega, Zyprexa, Seroquel, Abilify and Lurasidone     Upcoming court:  Preliminary hearing is Fri June 20th, 2025 at 1:30PM.     Contacts (include DELORES  status):  Mother:  Sarika Clarke - Ph: [phone] - DELORES signed    :  [facility] - Ph: [phone] - DELORES signed     Psychiatrist: - DELORES signed  [facility]  Address: [address] [address]  Phone: [phone]    Upcoming Meetings and Dates/Important Information and next steps:  Coordinate care   Update internal referral tracker @ discharge.   Commitment process     Coverage Needs: None - pt won't discharge til mid to end next week, submitted IRTS referrals today. Perhaps follow up with IRTS if they reach out to my phone.  pw starts with 7 and is on sticker under phone.

## 2025-06-18 NOTE — PLAN OF CARE
BEH IP Unit Acuity Rating Score (UARS)  Patient is given one point for every criteria they meet.    CRITERIA SCORING   On a 72 hour hold, court hold, committed, stay of commitment, or revocation. 1   Patient LOS on BEH unit exceeds 20 days. 0  LOS: 8   Patient under guardianship, 55+, otherwise medically complex, or under age 11. 0   Suicide ideation without relief of precipitating factors. 0   Current plan for suicide. 0   Current plan for homicide. 0   Imminent risk or actual attempt to seriously harm another without relief of factors precipitating the attempt. 0   Severe dysfunction in daily living (ex: complete neglect for self care, extreme disruption in vegetative function, extreme deterioration in social interactions). 0   Recent (last 7 days) or current physical aggression in the ED or on unit. 0   Restraints or seclusion episode in past 72 hours. 0   Recent (last 7 days) or current verbal aggression, agitation, yelling, etc., while in the ED or unit. 0   Active psychosis. 1   Need for constant or near constant redirection (from leaving, from others, etc).  0   Intrusive or disruptive behaviors. 0   Patient requires 3 or more hours of individualized nursing care per 8-hour shift (i.e. for ADLs, meds, therapeutic interventions). 0   TOTAL 2

## 2025-06-18 NOTE — PLAN OF CARE
"Assumed care of pt at 1300. Handoff report includes, pt given PRN zyprexa bc \"it helps with the anxious feeling of withdrawals\" Pt reports anxiety 6/10 and depression 10/10. Pt was visible pacing in the hallway with headphone mostly withdrawn to self.      "

## 2025-06-18 NOTE — PLAN OF CARE
"  Rehab Group    Start time: 1030  End time: 1210  Patient time total: 15 minutes    attended partial group    #5 attended   Group Type: OT Clinic   Group Topic Covered: balanced lifestyle, coping skills, healthy leisure time, mindfulness, and relaxation      Group Session Detail:    Intervention: Pt participated in a OT Clinic group to facilitate coping skills exploration and creative expression through personally meaningful activities, and to encourage utilization of these healthy coping skills to promote overall health and wellness. Group included clinical observation of social, cognitive and kinesthetic performance skills to inform treatment and safe discharge planning.    Mood/Affect: Anxious, Pleasant       Plan: Patient encouraged to maintain attendance for continued ongoing support in working towards occupational therapy goals to support overall treatment/care.        Patient Detail:    Joined at start of group with invitation from writer. Worked on a newly selected project, working on it quickly using personal markers that patient had in their room that they state family brought in for them. Stated \"that's about all I can do for now\" after working on for about 10 mins or so. Stated feeling very restless and did not want to sit any longer. Thanked writer for their time and left group.       [id number] OT Group (2 or more in attendance)    Writer invited patient to afternoon group. Patient reported feeling too restless right now and politely declined.     Patient Active Problem List   Diagnosis    Tobacco use disorder    Amphetamine use disorder, mild, in early remission (H)    Major depressive disorder, recurrent, moderate (H)    Opioid use disorder, severe, in early remission, on maintenance therapy, dependence (H)    Attention deficit hyperactivity disorder (ADHD), inattentive type, moderate    Sedative, hypnotic or anxiolytic use disorder, mild, abuse (H)    Insomnia, unspecified    Personality disorder, " unspecified (H)    Chemical dependency (H)    Mental health problem    Psychosis (H)    Substance-related disorder (H)    Acute psychosis (H)    HTN (hypertension)    Major depressive disorder, recurrent episode, severe (H)    Depression

## 2025-06-18 NOTE — PLAN OF CARE
Barak  appeared sleeping for 6 hours without significant discomfort  Received PRN Zyprexa per request for agitation and was helpful  No pain  verbalized  No inappropriate behavior  this shift.         Problem: Suicide Risk  Goal: Absence of Self-Harm  Outcome: Progressing   Goal Outcome Evaluation:

## 2025-06-18 NOTE — CARE PLAN
06/17/25 2144   Observation Type   Harm Category none   No Harm Category Noted at This Time no applicable harm categories or justifications;patient in their room, minimal interaction   Level of Special Monitoring none      14-Jul-2017 18-Jul-2017

## 2025-06-18 NOTE — PROVIDER NOTIFICATION
06/18/25 1037   Individualization/Patient Specific Goals   Patient Personal Strengths community support   Patient Vulnerabilities adverse childhood experience(s)   Behavioral Team Discussion   Participants Dr Nba CABALLERO, Kam POPE, Waldo GREEN RN   Progress Pt is improving, he waived his hearing and is now on MI commitment and Briggs. Will need a bit more time to stabilize and taper off phenobarbital before discharging. IRTS referrals will be started today.   Medical/Physical See H&P   Precautions Suicide, Withdrawal (Klonopin)   Plan Stabilize on meds, continue taper. Discharge to IRTS ideally.   Safety Plan Completed with unit therapist.   Anticipated Discharge Disposition IRTS     Goal Outcome Evaluation:  PRECAUTIONS AND SAFETY    Behavioral Orders   Procedures    Code 1 - Restrict to Unit    Routine Programming     As clinically indicated    Status 15     Every 15 minutes.    Suicide precautions: Suicide Risk: MODERATE; Clinical rationale to override score: response to medication     Patients on Suicide Precautions should have a Combination Diet ordered that includes a Diet selection(s) AND a Behavioral Tray selection for Safe Tray - with utensils, or Safe Tray - NO utensils       Suicide Risk:   MODERATE     Clinical rationale to override score::   response to medication    Withdrawal precautions       Safety  Safety WDL: WDL  Patient Location: patient room, own, hallway  Observed Behavior: sleeping, calm  Safety Measures: safety rounds completed  Diversional Activity: television, art work  De-Escalation Techniques: increased round frequency, stimulation decreased  Suicidality: Status 15, Minimal furniture in room  Withdrawal: monitor withdrawal process  Seizure precautions: clutter free environment, calm, consistent lighting  Assault: status 15  Elopement Interventions: status 15

## 2025-06-18 NOTE — PROGRESS NOTES
"  ___________________________________________________  [facility], [place]   Psychiatric Progress Note  Hospital Day #8  Date of Service: 06/18/2025    Interval History:  The patient's care was discussed with the treatment team and chart notes were reviewed.    Sleep: 6 hours (06/18/25 0617)  PRN medications:   Last 24H PRN:     acetaminophen (TYLENOL) tablet 650 mg, 650 mg at 06/17/25 0915    nicotine (COMMIT) lozenge 4 mg, 4 mg at 06/17/25 2002    OLANZapine (zyPREXA) tablet 10 mg, 10 mg at 06/18/25 0234 **OR** OLANZapine (zyPREXA) injection 10 mg     Staff Report:   Pt is alert and oriented. Pt is withdrawn to self and appears guarded during assessments. Presents with a flat and blunted affect. Pt can be forgetful . Pt denies having any safety concerns including thoughts of harming self or others. Pt denies auditory and visual hallucinations and takes his medications as prescribed. No medication side effects reported or observed. Denies any physical discomfort. Pt denies having any problem with eating . Plan of care ongoing   See staff notes for additional details.    Subjective:  Today, Barak says that every day he feels a little better, both mentally and physically. He is doing better from a withdrawal standpoint with the higher dose of phenobarbital. Mentally, he is feeling clearer and things are making more sense. He still feels a little \"tense\" physically but he is doing better. Nothing is really bothering him right now and he is fine with staying in the hospital. He waived his right to a court hearing and we received commitment orders today. I asked if he felt like he understood this and he said \"I just have to take my medications or I'll get ECT\". I explained that we are not recommending ECT at this time and there's nothing in the court order about ECT, and he expressed understanding. He says that ECT helped his mother a lot. He thinks she has bipolar disorder or schizoaffective " "disorder and \"she has a lot of the same problems I do and has been in and out of the hospital a lot\". He denies SI/HI/AH/VH/paranoia and has no other questions or concerns.       Objective:  Vital signs:  Temp: 98.6  F (37  C) Temp src: Temporal BP: 125/76 Pulse: 70   Resp: 16 SpO2: 97 % O2 Device: None (Room air)   Height: 177.8 cm (5' 10\") Weight: 97.9 kg (215 lb 14.4 oz)  Estimated body mass index is 30.98 kg/m  as calculated from the following:    Height as of this encounter: 1.778 m (5' 10\").    Weight as of this encounter: 97.9 kg (215 lb 14.4 oz).    Appearance:  awake, alert and slightly unkempt  Attitude:   calm, cooperative, and engaged  Eye Contact:  downcast  Mood:  \"fine\"  Affect:  mood congruent and intensity is blunted  Speech:  clear, coherent  Psychomotor Behavior:  no evidence of tardive dyskinesia, dystonia, or tics. Pacing  Thought Process:  disorganized, illogical, and tangential  Thought Content:  no evidence of suicidal ideation or homicidal ideation, no overt evidence of psychotic thought  Perception: denies hallucinations  Insight:  limited  Judgment:  limited  Oriented to:  time, person, and place  Attention Span and Concentration:  fair  Recent and Remote Memory:  fair  Language:  English with appropriate syntax and vocabulary  Fund of Knowledge: appropriate  Muscle Strength and Tone: normal  Gait and Station: Normal    Liver/kidney function Metabolic CBC   Recent Labs   Lab Test 06/10/25  0945 03/21/24  0823   CR 0.87 0.80   AST 15 24   ALT 12 27   ALKPHOS 84 107    Recent Labs   Lab Test 06/10/25  0945 03/21/24  0823 07/10/23  1644   A1C  --   --  5.1   TSH 0.64   < >  --     < > = values in this interval not displayed.    Recent Labs   Lab Test 06/15/25  1153   WBC 6.2   HGB 16.4   HCT 48.2   MCV 78             Lab results in the last 24 hours:  No results found for this or any previous visit (from the past 24 hours).        Assessment:  Diagnoses:  - Acute psychosis, suspect " "schizophrenia   - Opioid use disorder, in early remission  - Abuse of benzodiazepines, cocaine, cannabis     Clinically Significant Risk Factors      # Hypertension: Noted on problem list         # Obesity: Estimated body mass index is 30.98 kg/m  as calculated from the following:    Height as of this encounter: 1.778 m (5' 10\").    Weight as of this encounter: 97.9 kg (215 lb 14.4 oz).        # Financial/Environmental Concerns:        Barak is a 40 year old male with a past psychiatric history of substance use (benzodiazepines, cannabis, cocaine, opioids) and unspecified psychotic disorder admitted from the ED on 6/10/2025 due to concern for out of control behaviors and psychosis in the context of substance use, medication nonadherence and psychosocial stressors. Significant symptoms include sleep issues, psychosis, disorganized thought, substance use, delusional beliefs, and paranoia. His last psychiatric hospitalization was in 2024.  He has had some outpatient psychiatric management but it isn't clear if he is consistently seeing anyone.  Substance use does appear to be playing a contributing role in the patient's presentation. The MSE on admission is notable for delusional beliefs, paranoia, and disorganized thought process.      Today, Barak continues to demonstrate improvements in his symptoms. He didn't volunteer any information about delusional beliefs today and appears to be comfortable on the unit and tolerating medications well. I suspect that he continues to experience some paranoia and has room to further benefit from ongoing antipsychotic medication, as well as further management for benzodiazepine withdrawal.     Plan:  Acute psychosis, schizophrenia vs. substance-induced psychotic disorder  - Lurasidone 80mg qpm with food  - Restart bupropion at 150mg daily  HOLD PTA Adderall    Opioid use disorder, in early remission  - Continue PTA Suboxone 8-2mg BID    Benzodiazepine use disorder, in early " remission  - Phenobarbital 97.2mg TID     - Hydroxyzine 25-50 mg Q4H PRN for anxiety  - Olanzapine 10 mg PO/IM TID PRN severe agitation/psychosis     Patient will be treated in therapeutic milieu with appropriate individual and group therapies as described.    Psychiatric Hospital course:   Barak Clarke was admitted to [facility] as a voluntary patient on 6/10/2025 after presenting to the ED on 6/8. His PTA medications were continued with the exception of bupropion and Adderall, which were held due to acute psychosis.  - 6/10: initiate risperidone 1mg qhs  - 6/11: discontinue risperidone per patient preference, initiate haloperidol 5mg qhs. Place 72h hold and file for commitment with [facility]. Initiate phenobarbital 64.8mg TID for clonazepam withdrawal  - 6/12: discontinue haloperidol per patient preference (did not take any doses of paliperidone, risperidone, or haloperidol). Initiate lurasidone 20mg qpm with dinner  - 6/13: increase lurasidone to 60mg qpm with dinner  - 6/16: increase lurasidone to 80mg qpm with dinner, restart bupropion at 150mg daily  - 6/17: increase phenobarbital to 97.2mg TID. Commitment and [facility] orders recceived today (patient waived his right to a hearing)      Medical diagnoses to be addressed this admission:   No acute medical issues at this time.    Other:   Consults:   None    Legal Status:   Orders Placed This Encounter      Legal status Patient is Committed  [facility] order: clozapine, paliperidone, risperidone, quetiapine, olanzapine, aripiprazole, lurasidone    Disposition: TBD, pending stabilization & development of a safe discharge plan. Patient is interested in IRTS      Safety Assessment:   Behavioral Orders   Procedures    Code 1 - Restrict to Unit    Routine Programming     As clinically indicated    Status 15     Every 15 minutes.    Suicide precautions: Suicide Risk: MODERATE; Clinical rationale to override score: response to medication     Patients on Suicide Precautions  should have a Combination Diet ordered that includes a Diet selection(s) AND a Behavioral Tray selection for Safe Tray - with utensils, or Safe Tray - NO utensils       Suicide Risk:   MODERATE     Clinical rationale to override score::   response to medication    Withdrawal precautions       Current Facility-Administered Medications   Medication Dose Route Frequency Provider Last Rate Last Admin    buprenorphine HCl-naloxone HCl (SUBOXONE) 8-2 MG per film 1 Film  1 Film Sublingual BID Daniel Vazquez MD   1 Film at 06/17/25 1959    buPROPion (WELLBUTRIN XL) 24 hr tablet 150 mg  150 mg Oral Daily Kimmie Arango MD   150 mg at 06/17/25 0914    folic acid (FOLVITE) tablet 1 mg  1 mg Oral Daily Kimmie Aragno MD   1 mg at 06/17/25 0914    gabapentin (NEURONTIN) capsule 300 mg  300 mg Oral TID Daniel Vazquez MD   300 mg at 06/17/25 1959    lurasidone (LATUDA) tablet 80 mg  80 mg Oral At Bedtime Kimmie Aarngo MD   80 mg at 06/17/25 1959    multivitamin w/minerals (THERA-VIT-M) tablet 1 tablet  1 tablet Oral Daily Kimmie Arango MD   1 tablet at 06/17/25 0913    nicotine (NICODERM CQ) 21 MG/24HR 24 hr patch 1 patch  1 patch Transdermal Daily Kimmie Arango MD   1 patch at 06/17/25 0912    PHENobarbital tablet 97.2 mg  97.2 mg Oral TID Kimmie Arango MD   97.2 mg at 06/17/25 1959    thiamine (B-1) tablet 100 mg  100 mg Oral Daily Kimmie Arango MD   100 mg at 06/17/25 0913     Current Facility-Administered Medications   Medication Dose Route Frequency Provider Last Rate Last Admin    acetaminophen (TYLENOL) tablet 650 mg  650 mg Oral Q4H PRN Daniel Vazquez MD   650 mg at 06/17/25 0915    alum & mag hydroxide-simethicone (MAALOX) suspension 30 mL  30 mL Oral Q4H PRN Daniel Vazquez MD        atenolol (TENORMIN) tablet 50 mg  50 mg Oral Daily PRN Kimmie Arango MD        guaiFENesin (MUCINEX) 12 hr tablet 600 mg  600 mg Oral BID PRN Ciara Quintanilla APRN CNP        hydrOXYzine HCl  (ATARAX) tablet 25 mg  25 mg Oral Q4H PRN Daniel Vazquez MD   25 mg at 06/16/25 0432    nicotine (COMMIT) lozenge 4 mg  4 mg Buccal Q1H PRN Bridger Hernandez MD   4 mg at 06/17/25 2002    nicotine polacrilex (NICORETTE) gum 4 mg  4 mg Buccal Q1H PRN Kimmie Arango MD   4 mg at 06/14/25 0846    OLANZapine (zyPREXA) tablet 10 mg  10 mg Oral TID PRN Daniel Vazquez MD   10 mg at 06/18/25 0234    Or    OLANZapine (zyPREXA) injection 10 mg  10 mg Intramuscular TID PRN Daniel Vazquez MD        ondansetron (ZOFRAN) tablet 4 mg  4 mg Oral Q8H PRN Daniel Vazquez MD        senna-docusate (SENOKOT-S/PERICOLACE) 8.6-50 MG per tablet 1 tablet  1 tablet Oral BID PRN Daniel Vazquez MD        traZODone (DESYREL) tablet 50 mg  50 mg Oral At Bedtime PRN Daniel Vazquez MD           Allergies   Allergen Reactions    Prazosin Unknown     Worsening of nightmares       Kimmie Arango MD, PhD  06/18/2025

## 2025-06-19 ENCOUNTER — TELEPHONE (OUTPATIENT)
Dept: BEHAVIORAL HEALTH | Facility: CLINIC | Age: 40
End: 2025-06-19
Payer: COMMERCIAL

## 2025-06-19 VITALS
HEIGHT: 70 IN | BODY MASS INDEX: 30.91 KG/M2 | DIASTOLIC BLOOD PRESSURE: 89 MMHG | RESPIRATION RATE: 15 BRPM | OXYGEN SATURATION: 96 % | HEART RATE: 79 BPM | SYSTOLIC BLOOD PRESSURE: 128 MMHG | WEIGHT: 215.9 LBS | TEMPERATURE: 97.8 F

## 2025-06-19 PROCEDURE — 250N000013 HC RX MED GY IP 250 OP 250 PS 637: Performed by: PSYCHIATRY & NEUROLOGY

## 2025-06-19 PROCEDURE — 250N000013 HC RX MED GY IP 250 OP 250 PS 637: Performed by: STUDENT IN AN ORGANIZED HEALTH CARE EDUCATION/TRAINING PROGRAM

## 2025-06-19 PROCEDURE — 99232 SBSQ HOSP IP/OBS MODERATE 35: CPT | Performed by: STUDENT IN AN ORGANIZED HEALTH CARE EDUCATION/TRAINING PROGRAM

## 2025-06-19 PROCEDURE — 250N000012 HC RX MED GY IP 250 OP 636 PS 637: Performed by: PSYCHIATRY & NEUROLOGY

## 2025-06-19 PROCEDURE — 124N000002 HC R&B MH UMMC

## 2025-06-19 RX ADMIN — ACETAMINOPHEN 650 MG: 325 TABLET ORAL at 18:03

## 2025-06-19 RX ADMIN — BUPRENORPHINE AND NALOXONE 1 FILM: 8; 2 FILM BUCCAL; SUBLINGUAL at 08:27

## 2025-06-19 RX ADMIN — Medication 1 TABLET: at 08:23

## 2025-06-19 RX ADMIN — NICOTINE POLACRILEX 4 MG: 2 LOZENGE ORAL at 19:19

## 2025-06-19 RX ADMIN — NICOTINE 1 PATCH: 21 PATCH, EXTENDED RELEASE TRANSDERMAL at 08:24

## 2025-06-19 RX ADMIN — PHENOBARBITAL 97.2 MG: 97.2 TABLET ORAL at 14:09

## 2025-06-19 RX ADMIN — HYDROXYZINE HYDROCHLORIDE 25 MG: 25 TABLET ORAL at 03:26

## 2025-06-19 RX ADMIN — NICOTINE POLACRILEX 4 MG: 2 LOZENGE ORAL at 11:56

## 2025-06-19 RX ADMIN — LURASIDONE HYDROCHLORIDE 80 MG: 40 TABLET, FILM COATED ORAL at 19:16

## 2025-06-19 RX ADMIN — PHENOBARBITAL 97.2 MG: 97.2 TABLET ORAL at 08:27

## 2025-06-19 RX ADMIN — BUPRENORPHINE AND NALOXONE 1 FILM: 8; 2 FILM BUCCAL; SUBLINGUAL at 19:16

## 2025-06-19 RX ADMIN — PHENOBARBITAL 97.2 MG: 97.2 TABLET ORAL at 19:16

## 2025-06-19 RX ADMIN — BUPROPION HYDROCHLORIDE 150 MG: 150 TABLET, EXTENDED RELEASE ORAL at 08:23

## 2025-06-19 RX ADMIN — NICOTINE POLACRILEX 4 MG: 2 LOZENGE ORAL at 09:21

## 2025-06-19 RX ADMIN — OLANZAPINE 10 MG: 10 TABLET, FILM COATED ORAL at 18:29

## 2025-06-19 RX ADMIN — NICOTINE POLACRILEX 4 MG: 2 LOZENGE ORAL at 17:35

## 2025-06-19 RX ADMIN — GABAPENTIN 300 MG: 300 CAPSULE ORAL at 19:16

## 2025-06-19 RX ADMIN — NICOTINE POLACRILEX 4 MG: 2 LOZENGE ORAL at 14:09

## 2025-06-19 RX ADMIN — FOLIC ACID 1 MG: 1 TABLET ORAL at 08:27

## 2025-06-19 RX ADMIN — ACETAMINOPHEN 650 MG: 325 TABLET ORAL at 08:23

## 2025-06-19 RX ADMIN — NICOTINE POLACRILEX 4 MG: 2 LOZENGE ORAL at 07:42

## 2025-06-19 RX ADMIN — GABAPENTIN 300 MG: 300 CAPSULE ORAL at 14:09

## 2025-06-19 RX ADMIN — THIAMINE HCL TAB 100 MG 100 MG: 100 TAB at 08:23

## 2025-06-19 RX ADMIN — GABAPENTIN 300 MG: 300 CAPSULE ORAL at 08:23

## 2025-06-19 RX ADMIN — OLANZAPINE 10 MG: 10 TABLET, FILM COATED ORAL at 09:05

## 2025-06-19 ASSESSMENT — ACTIVITIES OF DAILY LIVING (ADL)
ADLS_ACUITY_SCORE: 22
LAUNDRY: UNABLE TO COMPLETE
ADLS_ACUITY_SCORE: 22
HYGIENE/GROOMING: INDEPENDENT
ADLS_ACUITY_SCORE: 22
ORAL_HYGIENE: INDEPENDENT
ADLS_ACUITY_SCORE: 22
ADLS_ACUITY_SCORE: 22
DRESS: SCRUBS (BEHAVIORAL HEALTH)
ADLS_ACUITY_SCORE: 22

## 2025-06-19 NOTE — PLAN OF CARE
BEH IP Unit Acuity Rating Score (UARS)  Patient is given one point for every criteria they meet.    CRITERIA SCORING   On a 72 hour hold, court hold, committed, stay of commitment, or revocation. 1   Patient LOS on BEH unit exceeds 20 days. 0  LOS: 9   Patient under guardianship, 55+, otherwise medically complex, or under age 11. 0   Suicide ideation without relief of precipitating factors. 0   Current plan for suicide. 0   Current plan for homicide. 0   Imminent risk or actual attempt to seriously harm another without relief of factors precipitating the attempt. 0   Severe dysfunction in daily living (ex: complete neglect for self care, extreme disruption in vegetative function, extreme deterioration in social interactions). 0   Recent (last 7 days) or current physical aggression in the ED or on unit. 0   Restraints or seclusion episode in past 72 hours. 0   Recent (last 7 days) or current verbal aggression, agitation, yelling, etc., while in the ED or unit. 0   Active psychosis. 1   Need for constant or near constant redirection (from leaving, from others, etc).  0   Intrusive or disruptive behaviors. 0   Patient requires 3 or more hours of individualized nursing care per 8-hour shift (i.e. for ADLs, meds, therapeutic interventions). 0   TOTAL 2

## 2025-06-19 NOTE — PROGRESS NOTES
"  ___________________________________________________  [facility], [place]   Psychiatric Progress Note  Hospital Day #9  Date of Service: 06/19/2025    Interval History:  The patient's care was discussed with the treatment team and chart notes were reviewed.    Sleep: 6.25 hours (06/19/25 0648)  PRN medications:   Last 24H PRN:     acetaminophen (TYLENOL) tablet 650 mg, 650 mg at 06/19/25 0823    hydrOXYzine HCl (ATARAX) tablet 25 mg, 25 mg at 06/19/25 0326    nicotine (COMMIT) lozenge 4 mg, 4 mg at 06/19/25 0742    OLANZapine (zyPREXA) tablet 10 mg, 10 mg at 06/18/25 1310 **OR** OLANZapine (zyPREXA) injection 10 mg     Staff Report:   Patient was alert,oriented x 4,calm and cooperative throughout the shift.Patient was visible in the milieu,observed pacing the halls and making phone calls.Patient presented with a flat affect,and maintained a bright mood.Patient displayed good eye contact and communicated effectively,independently identifying and expressing his needs.Patient endorsed anxiety rated 4/10 and depression rated 7/10.Patient denies auditory/visual hallucinations and SI/SIB/HI.Patient is medication complaint, with no medication side effects was reported or observed.Patient consumed 100% of his dinner and adequately hydrated.    See staff notes for additional details.    Subjective:  Today, Barak says he is feeling \"okay\", and mostly focused on getting through the day. He says his mood is \"not so great\" because he doesn't like being in the hospital. He is uncomfortable around another patient on the unit who has been intrusive with him. He denies paranoia, though says he is \"always anxious\". He denies withdrawal symptoms, AH/VH/SI/HI. He is agreeable to staying in the hospital while we are managing clonazepam withdrawal and agrees with the recommendation to transfer him to a lower-acuity unit.     Objective:  Vital signs:  Temp: 97.5  F (36.4  C) Temp src: Oral BP: 125/79 Pulse: 73 " "  Resp: 16 SpO2: 98 % O2 Device: None (Room air)   Height: 177.8 cm (5' 10\") Weight: 97.9 kg (215 lb 14.4 oz)  Estimated body mass index is 30.98 kg/m  as calculated from the following:    Height as of this encounter: 1.778 m (5' 10\").    Weight as of this encounter: 97.9 kg (215 lb 14.4 oz).    Appearance:  awake, alert and slightly unkempt  Attitude:   calm, cooperative, and engaged  Eye Contact:  downcast  Mood:  \"fine\"  Affect:  mood congruent and intensity is blunted  Speech:  clear, coherent  Psychomotor Behavior:  no evidence of tardive dyskinesia, dystonia, or tics. Pacing  Thought Process:  disorganized, illogical, and tangential  Thought Content:  no evidence of suicidal ideation or homicidal ideation, no overt evidence of psychotic thought  Perception: denies hallucinations  Insight:  limited  Judgment:  limited  Oriented to:  time, person, and place  Attention Span and Concentration:  fair  Recent and Remote Memory:  fair  Language:  English with appropriate syntax and vocabulary  Fund of Knowledge: appropriate  Muscle Strength and Tone: normal  Gait and Station: Normal    Liver/kidney function Metabolic CBC   Recent Labs   Lab Test 06/10/25  0945 03/21/24  0823   CR 0.87 0.80   AST 15 24   ALT 12 27   ALKPHOS 84 107    Recent Labs   Lab Test 06/10/25  0945 03/21/24  0823 07/10/23  1644   A1C  --   --  5.1   TSH 0.64   < >  --     < > = values in this interval not displayed.    Recent Labs   Lab Test 06/15/25  1153   WBC 6.2   HGB 16.4   HCT 48.2   MCV 78             Lab results in the last 24 hours:  No results found for this or any previous visit (from the past 24 hours).        Assessment:  Diagnoses:  - Acute psychosis, suspect schizophrenia   - Opioid use disorder, in early remission  - Abuse of benzodiazepines, cocaine, cannabis     Clinically Significant Risk Factors      # Hypertension: Noted on problem list         # Obesity: Estimated body mass index is 30.98 kg/m  as calculated from the " "following:    Height as of this encounter: 1.778 m (5' 10\").    Weight as of this encounter: 97.9 kg (215 lb 14.4 oz).        # Financial/Environmental Concerns:        Barak is a 40 year old male with a past psychiatric history of substance use (benzodiazepines, cannabis, cocaine, opioids) and unspecified psychotic disorder admitted from the ED on 6/10/2025 due to concern for out of control behaviors and psychosis in the context of substance use, medication nonadherence and psychosocial stressors. Significant symptoms include sleep issues, psychosis, disorganized thought, substance use, delusional beliefs, and paranoia. His last psychiatric hospitalization was in 2024.  He has had some outpatient psychiatric management but it isn't clear if he is consistently seeing anyone.  Substance use does appear to be playing a contributing role in the patient's presentation. The MSE on admission is notable for delusional beliefs, paranoia, and disorganized thought process.      Today, Barak continues to demonstrate improvements in his symptoms. No changes to medications today. I put in a request to transfer him to another unit due to discomfort with the level of restriction and an intrusive patient on this unit. Would recommend he stay on his current dose of phenobarbital for 7 days before beginning to taper.    Plan:  Acute psychosis, schizophrenia vs. substance-induced psychotic disorder  - Lurasidone 80mg qpm with food  - Restart bupropion at 150mg daily  HOLD PTA Adderall    Opioid use disorder, in early remission  - Continue PTA Suboxone 8-2mg BID    Benzodiazepine use disorder, in early remission  - Phenobarbital 97.2mg TID     - Hydroxyzine 25-50 mg Q4H PRN for anxiety  - Olanzapine 10 mg PO/IM TID PRN severe agitation/psychosis     Patient will be treated in therapeutic milieu with appropriate individual and group therapies as described.    Psychiatric Hospital course:   Barak Clarke was admitted to [facility] as a " voluntary patient on 6/10/2025 after presenting to the ED on 6/8. His PTA medications were continued with the exception of bupropion and Adderall, which were held due to acute psychosis.  - 6/10: initiate risperidone 1mg qhs  - 6/11: discontinue risperidone per patient preference, initiate haloperidol 5mg qhs. Place 72h hold and file for commitment with [facility]. Initiate phenobarbital 64.8mg TID for clonazepam withdrawal  - 6/12: discontinue haloperidol per patient preference (did not take any doses of paliperidone, risperidone, or haloperidol). Initiate lurasidone 20mg qpm with dinner  - 6/13: increase lurasidone to 60mg qpm with dinner  - 6/16: increase lurasidone to 80mg qpm with dinner, restart bupropion at 150mg daily  - 6/17: increase phenobarbital to 97.2mg TID. Commitment and [facility] orders recceived today (patient waived his right to a hearing)      Medical diagnoses to be addressed this admission:   No acute medical issues at this time.    Other:   Consults:   None    Legal Status:   Orders Placed This Encounter      Legal status Patient is Committed  [facility] order: clozapine, paliperidone, risperidone, quetiapine, olanzapine, aripiprazole, lurasidone    Disposition: TBD, pending stabilization & development of a safe discharge plan. Patient is interested in IRTS      Safety Assessment:   Behavioral Orders   Procedures    Code 1 - Restrict to Unit    Routine Programming     As clinically indicated    Status 15     Every 15 minutes.    Suicide precautions: Suicide Risk: MODERATE; Clinical rationale to override score: response to medication     Patients on Suicide Precautions should have a Combination Diet ordered that includes a Diet selection(s) AND a Behavioral Tray selection for Safe Tray - with utensils, or Safe Tray - NO utensils       Suicide Risk:   MODERATE     Clinical rationale to override score::   response to medication    Withdrawal precautions       Current Facility-Administered Medications    Medication Dose Route Frequency Provider Last Rate Last Admin    buprenorphine HCl-naloxone HCl (SUBOXONE) 8-2 MG per film 1 Film  1 Film Sublingual BID Daniel Vazquez MD   1 Film at 06/19/25 0827    buPROPion (WELLBUTRIN XL) 24 hr tablet 150 mg  150 mg Oral Daily Kimmie Arango MD   150 mg at 06/19/25 0823    folic acid (FOLVITE) tablet 1 mg  1 mg Oral Daily Kimmie Arango MD   1 mg at 06/19/25 0827    gabapentin (NEURONTIN) capsule 300 mg  300 mg Oral TID Daniel Vazquez MD   300 mg at 06/19/25 0823    lurasidone (LATUDA) tablet 80 mg  80 mg Oral At Bedtime Kimmie Arango MD   80 mg at 06/18/25 2016    multivitamin w/minerals (THERA-VIT-M) tablet 1 tablet  1 tablet Oral Daily Kimmie Arango MD   1 tablet at 06/19/25 0823    nicotine (NICODERM CQ) 21 MG/24HR 24 hr patch 1 patch  1 patch Transdermal Daily Kimmie Arango MD   1 patch at 06/19/25 0824    PHENobarbital tablet 97.2 mg  97.2 mg Oral TID Kimmie Arango MD   97.2 mg at 06/19/25 0827    thiamine (B-1) tablet 100 mg  100 mg Oral Daily Kimmie Arango MD   100 mg at 06/19/25 0823     Current Facility-Administered Medications   Medication Dose Route Frequency Provider Last Rate Last Admin    acetaminophen (TYLENOL) tablet 650 mg  650 mg Oral Q4H PRN Daniel Vazquez MD   650 mg at 06/19/25 0823    alum & mag hydroxide-simethicone (MAALOX) suspension 30 mL  30 mL Oral Q4H PRN Daniel Vazquez MD        atenolol (TENORMIN) tablet 50 mg  50 mg Oral Daily PRN Kimmie Arango MD        guaiFENesin (MUCINEX) 12 hr tablet 600 mg  600 mg Oral BID PRN DwightCiara heaton APRN CNP        hydrOXYzine HCl (ATARAX) tablet 25 mg  25 mg Oral Q4H PRN Daniel Vazquez MD   25 mg at 06/19/25 0326    nicotine (COMMIT) lozenge 4 mg  4 mg Buccal Q1H PRN Bridger Hernandez MD   4 mg at 06/19/25 0742    nicotine polacrilex (NICORETTE) gum 4 mg  4 mg Buccal Q1H PRN Kimmie Arango MD   4 mg at 06/14/25 0846    OLANZapine (zyPREXA)  tablet 10 mg  10 mg Oral TID PRN Daniel Vazquez MD   10 mg at 06/18/25 1310    Or    OLANZapine (zyPREXA) injection 10 mg  10 mg Intramuscular TID PRDaniel Baez MD        ondansetron (ZOFRAN) tablet 4 mg  4 mg Oral Q8H PRN Daniel Vazquez MD        senna-docusate (SENOKOT-S/PERICOLACE) 8.6-50 MG per tablet 1 tablet  1 tablet Oral BID PRN Daniel Vazquez MD        traZODone (DESYREL) tablet 50 mg  50 mg Oral At Bedtime PRDaniel Baez MD           Allergies   Allergen Reactions    Prazosin Unknown     Worsening of nightmares       Kimmie Arango MD, PhD  06/19/2025

## 2025-06-19 NOTE — TELEPHONE ENCOUNTER
R: Needing transfer to General Acuity Unit.     6:59 PM There are no beds available for transfer to a lower acuity unit at this time.    7:06 PM: There are no beds available for placement or review.     Pt remains on the work list pending appropriate bed availability.     11:03 PM: There are no beds available for placement or review.     Pt remains on the work list pending appropriate bed availability.

## 2025-06-19 NOTE — PLAN OF CARE
"Goal Outcome Evaluation:    Patient was alert,oriented x 4,calm and cooperative throughout the shift.Patient was visible in the milieu,observed pacing the halls and making phone calls.Patient presented with a flat affect,and maintained a bright mood.Patient displayed good eye contact and communicated effectively,independently identifying and expressing his needs.Patient endorsed anxiety rated 4/10 and depression rated 7/10.Patient denies auditory/visual hallucinations and SI/SIB/HI.Patient is medication complaint, with no medication side effects was reported or observed.Patient consumed 100% of his dinner and adequately hydrated.      /79 (BP Location: Left arm, Patient Position: Sitting, Cuff Size: Adult Regular)   Pulse 73   Temp 97.8  F (36.6  C) (Oral)   Resp 16   Ht 1.778 m (5' 10\")   Wt 97.9 kg (215 lb 14.4 oz)   SpO2 97%   BMI 30.98 kg/m      "

## 2025-06-19 NOTE — PLAN OF CARE
"Nursing Assessment    Recent Vitals: B/P: 149/92  T: 97.5  P: 80  R: 16     Mental Status Exam:  Appearance:  Poorly groomed and Disheveled  Behavior/relationship to examiner/demeanor:  Cooperative, Pleasant, and Agitated  Affect (objective appearance):  Blunted/Flat and Anxious/Nervous  Mood (subjective report):  frustrated, anxious, nervous, and tense  Gait:  Normal  Speech rate, volume, coherence:  Normal, Normal, Normal    Thought Process (Associations):  Logical, Linear, and Goal directed  Thought process (Rate):  Normal  Abnormal Perception:  None  Attention/Concentration:  Normal,  Alert, Oriented to person, Oriented to place, Oriented to date/time, and Oriented to situation  Insight:  Good  Judgment:  Good    Suicide Assessment:   Recent suicidal thoughts: No   Any attempts in the last 24 hours: No   Plan or considering various methods: No   Access to means: No   Verbal or Written contract for safety: Yes   Self Injurious Behavior: No  Sleep:  Hours of sleep at night: 6.5    General Shift Summary    Patient withdraws to room, interacts appropriately with peers and staff. Patient is visible anxious rocking back and forth while talking to writer stated 7/10 anxiety. Patient stated 6/10 pain due to withdrawal, gave tylenol for the pain. Denies SI & HI and all other mental health symptoms. Patient expressed anger and frustration due to Luz Marina following him around and intruding on his space. Stated he'll just remain in his room and \"it just is what it is.\" Writer offered for patient to move to pod 2 patient accepted then later declined \" I think it will be worse over there.\" No aggressive behaviors observed. Patient is cooperative with medications, consistent with nicotine requests, ate 100% of meals, patient appears disheveled with food bits all over clothes. Elevated systolic /92, rechecked /87. Plan is to continue to monitor patient status q 15 mins, assess response to medications, and maintain the " patients safety.      AURORA Hernandez

## 2025-06-19 NOTE — PLAN OF CARE
"Barak endorsed anxiety and received PRN Atarax at  0326 which was helpful  Denies pain SI, delusions, and  hallucination  Noted to have slept for  6.25 hours without distress.  No aggression  exhibited this shift.       Problem: Adult Inpatient Plan of Care  Goal: Patient-Specific Goal (Individualized)  Description: You can add care plan individualizations to a care plan. Examples of Individualization might be:  \"Parent requests to be called daily at 9am for status\", \"I have a hard time hearing out of my right ear\", or \"Do not touch me to wake me up as it startles  me\".  Outcome: Progressing  Goal: Absence of Hospital-Acquired Illness or Injury  Intervention: Prevent Skin Injury  Recent Flowsheet Documentation  Taken 6/19/2025 0226 by Sharonda Gleason RN  Body Position:   position changed independently   position maintained     Problem: Psychotic Signs/Symptoms  Goal: Improved Sleep (Psychotic Signs/Symptoms)  Intervention: Promote Healthy Sleep Hygiene  Recent Flowsheet Documentation  Taken 6/19/2025 0226 by Sharonda Gleason RN  Sleep Hygiene Promotion:   room lighting adjusted   awakenings minimized   noise level reduced   Goal Outcome Evaluation:                            "

## 2025-06-19 NOTE — PLAN OF CARE
"Goal Outcome Evaluation:    Plan of Care Reviewed With: patient      Patient was observed sleeping at the beginning of the shift.Patient was remained calm,pleasant and visible in the milieu during the shift.Patient affect was blunted  and mood appeared a calm.Patient speech was clear,coherent and appropriate.Patient was encouraged to take a shower, which he accepted.Patient  was noted clean and groomed well.Patient reported both anxiety and depression have been improving daily.Barak rated both his  anxiety and depression 6/10.Patient denies pain,withdraw symptoms,Auditory/visual hallucinations.Patient also denies SI/SIB/HI. Patient eating,drinking and sleeping well.    /89 (BP Location: Right arm)   Pulse 79   Temp 97.8  F (36.6  C) (Temporal)   Resp 15   Ht 1.778 m (5' 10\")   Wt 97.9 kg (215 lb 14.4 oz)   SpO2 96%   BMI 30.98 kg/m                "

## 2025-06-19 NOTE — PLAN OF CARE
Team Note Due:  Wednesday    Assessment/Intervention/Current Symtoms and Care Coordination:  Chart review and met with team, discussed pt progress, symptomology, and response to treatment.  Discussed the discharge plan and any potential impediments to discharge. Wants to move to the other pod. Good insight into illness. Really uncomfortable with peer on unit. Not talking about paranoia.     Discharge Plan or Goal:  IRTS - not [facility] (was there before)     Barriers to Discharge:  Symptoms     Referral Status:  6/18 [facility] IRTS   All locations    6/18 [facility]   [facility] Candelario   [place]    6/18 [facility]     All locations    Legal Status:  MI + Briggs order  County: [place]   File Number: [id number]  Start and expiration date of commitment: 6/18/25-12/18/25  Briggs meds are: Haldol, Prolixin, Clozaril, Risperdal, Invega, Zyprexa, Seroquel, Abilify and Lurasidone       Contacts (include DELORES status):  Mother:  Sarika Clarke - Ph: [phone] - DELORES signed    :  [facility] - Ph: [phone] - DELORES signed     Psychiatrist: - DELORES signed  [facility]  Address: [address] [place] [address]  Phone: [phone]    Upcoming Meetings and Dates/Important Information and next steps:  Coordinate care   Update internal referral tracker @ discharge.   Commitment process     Coverage Needs: None - pt won't discharge til mid to end next week, submitted IRTS referrals today. Perhaps follow up with IRTS if they reach out to my phone. VM pw starts with 7 and is on sticker under phone.

## 2025-06-20 PROCEDURE — 250N000013 HC RX MED GY IP 250 OP 250 PS 637: Performed by: STUDENT IN AN ORGANIZED HEALTH CARE EDUCATION/TRAINING PROGRAM

## 2025-06-20 PROCEDURE — 250N000013 HC RX MED GY IP 250 OP 250 PS 637: Performed by: PSYCHIATRY & NEUROLOGY

## 2025-06-20 PROCEDURE — 124N000002 HC R&B MH UMMC

## 2025-06-20 PROCEDURE — 250N000012 HC RX MED GY IP 250 OP 636 PS 637: Performed by: PSYCHIATRY & NEUROLOGY

## 2025-06-20 PROCEDURE — 99232 SBSQ HOSP IP/OBS MODERATE 35: CPT | Performed by: STUDENT IN AN ORGANIZED HEALTH CARE EDUCATION/TRAINING PROGRAM

## 2025-06-20 RX ORDER — PROPRANOLOL HYDROCHLORIDE 10 MG/1
10 TABLET ORAL 3 TIMES DAILY PRN
Status: DISCONTINUED | OUTPATIENT
Start: 2025-06-20 | End: 2025-06-23

## 2025-06-20 RX ADMIN — THIAMINE HCL TAB 100 MG 100 MG: 100 TAB at 07:57

## 2025-06-20 RX ADMIN — NICOTINE POLACRILEX 4 MG: 2 LOZENGE ORAL at 11:21

## 2025-06-20 RX ADMIN — NICOTINE 1 PATCH: 21 PATCH, EXTENDED RELEASE TRANSDERMAL at 08:05

## 2025-06-20 RX ADMIN — NICOTINE POLACRILEX 4 MG: 2 LOZENGE ORAL at 20:31

## 2025-06-20 RX ADMIN — PHENOBARBITAL 97.2 MG: 97.2 TABLET ORAL at 13:15

## 2025-06-20 RX ADMIN — GABAPENTIN 300 MG: 300 CAPSULE ORAL at 07:57

## 2025-06-20 RX ADMIN — OLANZAPINE 10 MG: 10 TABLET, FILM COATED ORAL at 17:42

## 2025-06-20 RX ADMIN — Medication 1 TABLET: at 07:57

## 2025-06-20 RX ADMIN — OLANZAPINE 10 MG: 10 TABLET, FILM COATED ORAL at 04:32

## 2025-06-20 RX ADMIN — LURASIDONE HYDROCHLORIDE 80 MG: 80 TABLET, FILM COATED ORAL at 19:17

## 2025-06-20 RX ADMIN — NICOTINE POLACRILEX 4 MG: 2 LOZENGE ORAL at 19:17

## 2025-06-20 RX ADMIN — PHENOBARBITAL 97.2 MG: 97.2 TABLET ORAL at 07:57

## 2025-06-20 RX ADMIN — NICOTINE POLACRILEX 4 MG: 2 LOZENGE ORAL at 13:15

## 2025-06-20 RX ADMIN — ACETAMINOPHEN 650 MG: 325 TABLET ORAL at 04:34

## 2025-06-20 RX ADMIN — BUPRENORPHINE AND NALOXONE 1 FILM: 8; 2 FILM BUCCAL; SUBLINGUAL at 07:57

## 2025-06-20 RX ADMIN — BUPRENORPHINE AND NALOXONE 1 FILM: 8; 2 FILM BUCCAL; SUBLINGUAL at 19:16

## 2025-06-20 RX ADMIN — OLANZAPINE 10 MG: 10 TABLET, FILM COATED ORAL at 12:11

## 2025-06-20 RX ADMIN — NICOTINE POLACRILEX 4 MG: 2 LOZENGE ORAL at 08:32

## 2025-06-20 RX ADMIN — NICOTINE POLACRILEX 4 MG: 2 LOZENGE ORAL at 16:34

## 2025-06-20 RX ADMIN — PHENOBARBITAL 97.2 MG: 97.2 TABLET ORAL at 19:16

## 2025-06-20 RX ADMIN — ACETAMINOPHEN 650 MG: 325 TABLET ORAL at 19:16

## 2025-06-20 RX ADMIN — GABAPENTIN 300 MG: 300 CAPSULE ORAL at 19:17

## 2025-06-20 RX ADMIN — NICOTINE POLACRILEX 4 MG: 2 LOZENGE ORAL at 17:42

## 2025-06-20 RX ADMIN — GABAPENTIN 300 MG: 300 CAPSULE ORAL at 13:15

## 2025-06-20 RX ADMIN — BUPROPION HYDROCHLORIDE 150 MG: 150 TABLET, EXTENDED RELEASE ORAL at 07:57

## 2025-06-20 ASSESSMENT — ACTIVITIES OF DAILY LIVING (ADL)
ADLS_ACUITY_SCORE: 22
ORAL_HYGIENE: INDEPENDENT
ADLS_ACUITY_SCORE: 22
DRESS: SCRUBS (BEHAVIORAL HEALTH);INDEPENDENT
ADLS_ACUITY_SCORE: 22
ADLS_ACUITY_SCORE: 22
HYGIENE/GROOMING: INDEPENDENT
ADLS_ACUITY_SCORE: 22

## 2025-06-20 NOTE — PROGRESS NOTES
"  ___________________________________________________  [facility], [place]   Psychiatric Progress Note  Hospital Day #10  Date of Service: 06/20/2025    Interval History:  The patient's care was discussed with the treatment team and chart notes were reviewed.    Sleep: 4.25 hours (06/20/25 0600)  PRN medications:   Last 24H PRN:     acetaminophen (TYLENOL) tablet 650 mg, 650 mg at 06/20/25 0434    nicotine (COMMIT) lozenge 4 mg, 4 mg at 06/20/25 0832    OLANZapine (zyPREXA) tablet 10 mg, 10 mg at 06/20/25 0432 **OR** OLANZapine (zyPREXA) injection 10 mg     Staff Report: No acute safety concerns. See staff notes for additional details.    Subjective:  Today, Barak says he woke up in the middle of the night feeling restless and \"sometimes I wonder if someone put a device in me\". He wonders this because there are times when he feels really restless and has a hard time staying still. He feels physically uncomfortable if he sits or stands too long. He is interested in trying propranolol which was helpful for him in the past. He is doing okay from a withdrawal standpoint, though notes that it always takes a really long time whenever he tries to stop clonazepam and it's hard to wait for the withdrawal to pass. He denies SI/HI/AH/VH and has no other questions or concerns.       Objective:  Vital signs:  Temp: 97.4  F (36.3  C) Temp src: Temporal BP: 116/80 Pulse: 71   Resp: 18 SpO2: 100 % O2 Device: None (Room air)   Height: 177.8 cm (5' 10\") Weight: 97.9 kg (215 lb 14.4 oz)  Estimated body mass index is 30.98 kg/m  as calculated from the following:    Height as of this encounter: 1.778 m (5' 10\").    Weight as of this encounter: 97.9 kg (215 lb 14.4 oz).    Appearance:  awake, alert and slightly unkempt  Attitude:   calm, cooperative, and engaged  Eye Contact:  downcast  Mood:  \"fine\"  Affect:  mood congruent and intensity is blunted  Speech:  clear, coherent  Psychomotor Behavior:  no evidence " "of tardive dyskinesia, dystonia, or tics. Pacing  Thought Process:  disorganized, more logical than previously  Thought Content:  no evidence of suicidal ideation or homicidal ideation, some delusional thought content (wondering if someone has implanted a device in him)  Perception: denies hallucinations  Insight:  limited  Judgment:  limited  Oriented to:  time, person, and place  Attention Span and Concentration:  fair  Recent and Remote Memory:  fair  Language:  English with appropriate syntax and vocabulary  Fund of Knowledge: appropriate  Muscle Strength and Tone: normal  Gait and Station: Normal    Liver/kidney function Metabolic CBC   Recent Labs   Lab Test 06/10/25  0945 03/21/24  0823   CR 0.87 0.80   AST 15 24   ALT 12 27   ALKPHOS 84 107    Recent Labs   Lab Test 06/10/25  0945 03/21/24  0823 07/10/23  1644   A1C  --   --  5.1   TSH 0.64   < >  --     < > = values in this interval not displayed.    Recent Labs   Lab Test 06/15/25  1153   WBC 6.2   HGB 16.4   HCT 48.2   MCV 78             Lab results in the last 24 hours:  No results found for this or any previous visit (from the past 24 hours).      Assessment:  Diagnoses:  - Acute psychosis, suspect schizophrenia   - Opioid use disorder, in early remission  - Abuse of benzodiazepines, cocaine, cannabis     Clinically Significant Risk Factors      # Hypertension: Noted on problem list       # Obesity: Estimated body mass index is 30.98 kg/m  as calculated from the following:    Height as of this encounter: 1.778 m (5' 10\").    Weight as of this encounter: 97.9 kg (215 lb 14.4 oz).      # Financial/Environmental Concerns:        Barak is a 40 year old male with a past psychiatric history of substance use (benzodiazepines, cannabis, cocaine, opioids) and unspecified psychotic disorder admitted from the ED on 6/10/2025 due to concern for out of control behaviors and psychosis in the context of substance use, medication nonadherence and psychosocial " stressors. Significant symptoms include sleep issues, psychosis, disorganized thought, substance use, delusional beliefs, and paranoia. His last psychiatric hospitalization was in 2024.  He has had some outpatient psychiatric management but it isn't clear if he is consistently seeing anyone.  Substance use does appear to be playing a contributing role in the patient's presentation. The MSE on admission is notable for delusional beliefs, paranoia, and disorganized thought process.      Today, Barak continues to demonstrate improvements in his symptoms. He reports restlessness that is likely attributable to clonazepam withdrawal though also sounds like akathisia. Will initiate propranolol 10mg TID PRN.    Plan:  Acute psychosis, schizophrenia vs. substance-induced psychotic disorder  - Lurasidone 80mg qpm with food  - Restart bupropion at 150mg daily  Discontinue PTA Adderall    Opioid use disorder, in early remission  - Continue PTA Suboxone 8-2mg BID    Benzodiazepine use disorder, in early remission, withdrawal  - Phenobarbital 97.2mg TID  - Propranolol 10mg TID PRN for restlessness     - Hydroxyzine 25-50 mg Q4H PRN for anxiety  - Olanzapine 10 mg PO/IM TID PRN severe agitation/psychosis     Patient will be treated in therapeutic milieu with appropriate individual and group therapies as described.    Psychiatric Hospital course:   Barak Clarke was admitted to [facility] as a voluntary patient on 6/10/2025 after presenting to the ED on 6/8. His PTA medications were continued with the exception of bupropion and Adderall, which were held due to acute psychosis.  - 6/10: initiate risperidone 1mg qhs  - 6/11: discontinue risperidone per patient preference, initiate haloperidol 5mg qhs. Place 72h hold and file for commitment with [facility]. Initiate phenobarbital 64.8mg TID for clonazepam withdrawal  - 6/12: discontinue haloperidol per patient preference (did not take any doses of paliperidone, risperidone, or haloperidol).  Initiate lurasidone 20mg qpm with dinner  - 6/13: increase lurasidone to 60mg qpm with dinner  - 6/16: increase lurasidone to 80mg qpm with dinner, restart bupropion at 150mg daily  - 6/17: increase phenobarbital to 97.2mg TID. Commitment and Briggs orders recceived today (patient waived his right to a hearing)  - 6/20: initiate propranolol 10mg TID PRN for akathisia/restlessness in context of withdrawal    Medical diagnoses to be addressed this admission:   No acute medical issues at this time.    Other:   Consults:   None    Legal Status:   Orders Placed This Encounter      Legal status Patient is Committed  Briggs order: clozapine, paliperidone, risperidone, quetiapine, olanzapine, aripiprazole, lurasidone    Disposition: TBD, pending stabilization & development of a safe discharge plan. Patient is interested in IRTS      Safety Assessment:   Behavioral Orders   Procedures    Code 1 - Restrict to Unit    Routine Programming     As clinically indicated    Status 15     Every 15 minutes.    Suicide precautions: Suicide Risk: MODERATE; Clinical rationale to override score: response to medication     Patients on Suicide Precautions should have a Combination Diet ordered that includes a Diet selection(s) AND a Behavioral Tray selection for Safe Tray - with utensils, or Safe Tray - NO utensils       Suicide Risk:   MODERATE     Clinical rationale to override score::   response to medication    Withdrawal precautions       Current Facility-Administered Medications   Medication Dose Route Frequency Provider Last Rate Last Admin    buprenorphine HCl-naloxone HCl (SUBOXONE) 8-2 MG per film 1 Film  1 Film Sublingual BID Daniel Vazquez MD   1 Film at 06/20/25 0757    buPROPion (WELLBUTRIN XL) 24 hr tablet 150 mg  150 mg Oral Daily Kimmie Arango MD   150 mg at 06/20/25 0757    folic acid (FOLVITE) tablet 1 mg  1 mg Oral Daily Kimmie Arango MD   1 mg at 06/19/25 0827    gabapentin (NEURONTIN) capsule 300 mg  300 mg  Oral TID Daniel Vazquez MD   300 mg at 06/20/25 0757    lurasidone (LATUDA) tablet 80 mg  80 mg Oral At Bedtime Kimmie Arango MD   80 mg at 06/19/25 1916    multivitamin w/minerals (THERA-VIT-M) tablet 1 tablet  1 tablet Oral Daily Kimmie Arango MD   1 tablet at 06/20/25 0757    nicotine (NICODERM CQ) 21 MG/24HR 24 hr patch 1 patch  1 patch Transdermal Daily Kimmie Arango MD   1 patch at 06/20/25 0805    PHENobarbital tablet 97.2 mg  97.2 mg Oral TID Kimmie Arango MD   97.2 mg at 06/20/25 0757    thiamine (B-1) tablet 100 mg  100 mg Oral Daily Kimmie Arango MD   100 mg at 06/20/25 0757     Current Facility-Administered Medications   Medication Dose Route Frequency Provider Last Rate Last Admin    acetaminophen (TYLENOL) tablet 650 mg  650 mg Oral Q4H PRN Daniel Vazquez MD   650 mg at 06/20/25 0434    alum & mag hydroxide-simethicone (MAALOX) suspension 30 mL  30 mL Oral Q4H PRN Daniel Vazquez MD        guaiFENesin (MUCINEX) 12 hr tablet 600 mg  600 mg Oral BID PRN Ciara Quintanilla APRN CNP        hydrOXYzine HCl (ATARAX) tablet 25 mg  25 mg Oral Q4H PRN Daniel Vazquez MD   25 mg at 06/19/25 0326    nicotine (COMMIT) lozenge 4 mg  4 mg Buccal Q1H PRN Bridger Hernandez MD   4 mg at 06/20/25 0832    nicotine polacrilex (NICORETTE) gum 4 mg  4 mg Buccal Q1H PRN Kimmie Arango MD   4 mg at 06/14/25 0846    OLANZapine (zyPREXA) tablet 10 mg  10 mg Oral TID PRN Daniel Vazquez MD   10 mg at 06/20/25 0432    Or    OLANZapine (zyPREXA) injection 10 mg  10 mg Intramuscular TID PRN Daniel Vazquez MD        ondansetron (ZOFRAN) tablet 4 mg  4 mg Oral Q8H PRN Daniel Vazquez MD        propranolol (INDERAL) tablet 10 mg  10 mg Oral TID PRN Kimmie Arango MD        senna-docusate (SENOKOT-S/PERICOLACE) 8.6-50 MG per tablet 1 tablet  1 tablet Oral BID PRN Daniel Vazquez MD        traZODone (DESYREL) tablet 50 mg  50 mg Oral At Bedtime PRN  Daniel Vazquez MD           Allergies   Allergen Reactions    Prazosin Unknown     Worsening of nightmares       Kimmie Arango MD, PhD  06/20/2025

## 2025-06-20 NOTE — PLAN OF CARE
Team Note Due:  Wednesday    Assessment/Intervention/Current Symtoms and Care Coordination:  Chart review and met with team, discussed pt progress, symptomology, and response to treatment.  Discussed the discharge plan and any potential impediments to discharge.     Pt plans to transfer to another unit, he is not needing high acuity unit and reports feeling uncomfortable around an intrusive female peer.     Pt needs to continue to titrate off phenobarbital prior to discharge.  reports needing some more time before discharge. Pt is restless, but cooperative and pleasant with staff.     Discharge Plan or Goal:  IRTS - not [facility] (was there before)  6/20: Pt needs to continue to titrate off phenobarbital prior to discharge.  reports needing some more time before discharge.    Barriers to Discharge:  Symptoms     Referral Status:  Pt prefers an IRTS where he can smoke tobacco.    6/18 [facility] IRTS -    All locations    Contact: Email: May Lee, [place]   [address], [address]  [place] [address]  Direct Dial:  [phone]  [place]fax: [phone]  Referral Intake Email: [email or web address]  Direct Email: [email or web address]    6/18 [facility]   [facility] Salinas    Contact: Carola Slaughter, Treatment Director  [place] [facility]  [address] [place] [address]  Phone [phone]  Fax [phone]    6/18 [facility]     All locations    Contact: [facility]  [address] [place] [address], [address] [place], [place], [address]  M [phone]  F [phone]    Legal Status:  MI + Briggs order  [facility]: [place]   File Number: [id number]  Start and expiration date of commitment: 6/18/25-12/18/25  Briggs meds are: Haldol, Prolixin, Clozaril, Risperdal, Invega, Zyprexa, Seroquel, Abilify and Lurasidone     Contacts (include DELORES status):  Mother:  Sarika Clarke - Ph: [phone] - DELORES signed    :  Loren  [facility] - Ph: [phone] - DELORES signed - I had called and left a voicemail I don't know if this  is still active with patient. Per PPS earlier she may have closed his account.     Psychiatrist: - DELORES signed  [facility]  Address: [address] [place] [address]  Phone: [phone]    Upcoming Meetings and Dates/Important Information and next steps:  Coordinate care   Update internal referral tracker @ discharge.   Will need PD and COS at discharge  Will need psych appt with [facility] at discharge

## 2025-06-20 NOTE — PLAN OF CARE
Goal Outcome Evaluation:    Patient during the night shift woke up around 0345 and was pacing the halls. He asked for Zyprexa and tylenol. He had experienced nightmares, agitation and auditory hallucinations. He also complained of pain in his arms. Pt slept 4.25 hrs. No behavioral concerns noted this shift.       Last 24H PRN:     acetaminophen (TYLENOL) tablet 650 mg, 650 mg at 06/20/25 0434    nicotine (COMMIT) lozenge 4 mg, 4 mg at 06/19/25 1919    OLANZapine (zyPREXA) tablet 10 mg, 10 mg at 06/20/25 0432 **OR** OLANZapine (zyPREXA) injection 10 mg

## 2025-06-20 NOTE — PLAN OF CARE
BEH IP Unit Acuity Rating Score (UARS)  Patient is given one point for every criteria they meet.    CRITERIA SCORING   On a 72 hour hold, court hold, committed, stay of commitment, or revocation. 1   Patient LOS on BEH unit exceeds 20 days. 0  LOS: 10   Patient under guardianship, 55+, otherwise medically complex, or under age 11. 0   Suicide ideation without relief of precipitating factors. 0   Current plan for suicide. 0   Current plan for homicide. 0   Imminent risk or actual attempt to seriously harm another without relief of factors precipitating the attempt. 0   Severe dysfunction in daily living (ex: complete neglect for self care, extreme disruption in vegetative function, extreme deterioration in social interactions). 0   Recent (last 7 days) or current physical aggression in the ED or on unit. 0   Restraints or seclusion episode in past 72 hours. 0   Recent (last 7 days) or current verbal aggression, agitation, yelling, etc., while in the ED or unit. 0   Active psychosis. 1   Need for constant or near constant redirection (from leaving, from others, etc).  0   Intrusive or disruptive behaviors. 0   Patient requires 3 or more hours of individualized nursing care per 8-hour shift (i.e. for ADLs, meds, therapeutic interventions). 0   TOTAL 2            This note was copied from the mother's chart. Infant Name: Monty Ward  Gestation: 38.3  Day of Life: 2  Birth weight: 6-8.1 lb (2950g)  Yesterday's weight: 6-6.3 lb (2900g)  Today's weight: 6-1.9 lb (2775g)  Weight loss: -4.76%  24 hour summary of feeds: breastfeeding x 8  Voids: 4  Stools: 3  Assistive device: none  Maternal History: , anxiety, sinus tachycardia, wisdom tooth extraction, dental surgery, current every day smoker  Maternal Medications: buspar, zofran  Maternal Goal: one day at a  time  Breast pump for home: has one ordered, not in yet. Hand held pump given       Instructed mother to continue to breastfeed every 2- 3 hours for 15-20 mins each side or on demand watching for hunger cues and using waking techniques when needed. 8-12 feedings in 24 hours being the goal. Hand expression and breast compressions encouraged to increase milk supply and transfer. Reminded mother about supply and demand. Mother and baby will be discharged home today, weight check to follow. Instructions and handouts given over management of sore nipples, engorgement, plugged ducts, mastitis, hydration, nutrition, and medications that could effect milk supply. Mother knows when to call MD if needed. Breastfeeding book given. Lactation number and hours provided. Mother knows she can call and make appointment for pre and post feeding weights whenever needed or can call with questions or concerns her entire breastfeeding journey. All questions at this time answered. Support and Encouragement given.

## 2025-06-20 NOTE — PLAN OF CARE
"Goal Outcome Evaluation:    Plan of Care Reviewed With: patient      Patient was alert,oriented x 4,calm and cooperative throughout the shift.Patient was presented with a flat affect and a calm mood.Patient eye contact intense and somewhat guarded.Patient was observed running back and forth along the halls and reported feel better when running.He also reported,experiencing restlessness,and his body muscle stiffness,stating,that running help to relax his muscles.Patient indicated that he began running when he was in high school and now it is his copping mechanism.Patient also expressed a desire to transfer to [facility] unit.    When asked patient endorses symptoms of both anxiety and depression,nothing that these symptoms persist but are currently very low.Patient denies pain or discomfort, auditory/visual hallucinations,He also denies SI/SIB/HI.Patient was medication complaint and no medication side effect was reported or observed.Patient appetite was good ate 100% of his breakfast and lunch.    /80 (BP Location: Right arm, Patient Position: Sitting, Cuff Size: Adult Regular)   Pulse 71   Temp 97.4  F (36.3  C) (Temporal)   Resp 18   Ht 1.778 m (5' 10\")   Wt 97.9 kg (215 lb 14.4 oz)   SpO2 100%   BMI 30.98 kg/m        "

## 2025-06-21 PROCEDURE — 250N000013 HC RX MED GY IP 250 OP 250 PS 637: Performed by: PSYCHIATRY & NEUROLOGY

## 2025-06-21 PROCEDURE — 124N000002 HC R&B MH UMMC

## 2025-06-21 PROCEDURE — 250N000012 HC RX MED GY IP 250 OP 636 PS 637: Performed by: PSYCHIATRY & NEUROLOGY

## 2025-06-21 PROCEDURE — 250N000013 HC RX MED GY IP 250 OP 250 PS 637: Performed by: STUDENT IN AN ORGANIZED HEALTH CARE EDUCATION/TRAINING PROGRAM

## 2025-06-21 RX ADMIN — NICOTINE POLACRILEX 4 MG: 2 LOZENGE ORAL at 09:51

## 2025-06-21 RX ADMIN — ACETAMINOPHEN 650 MG: 325 TABLET ORAL at 20:28

## 2025-06-21 RX ADMIN — BUPRENORPHINE AND NALOXONE 1 FILM: 8; 2 FILM BUCCAL; SUBLINGUAL at 07:41

## 2025-06-21 RX ADMIN — GABAPENTIN 300 MG: 300 CAPSULE ORAL at 14:02

## 2025-06-21 RX ADMIN — GABAPENTIN 300 MG: 300 CAPSULE ORAL at 07:41

## 2025-06-21 RX ADMIN — ACETAMINOPHEN 650 MG: 325 TABLET ORAL at 02:10

## 2025-06-21 RX ADMIN — LURASIDONE HYDROCHLORIDE 80 MG: 80 TABLET, FILM COATED ORAL at 19:23

## 2025-06-21 RX ADMIN — NICOTINE POLACRILEX 4 MG: 2 LOZENGE ORAL at 14:23

## 2025-06-21 RX ADMIN — OLANZAPINE 10 MG: 10 TABLET, FILM COATED ORAL at 11:05

## 2025-06-21 RX ADMIN — GABAPENTIN 300 MG: 300 CAPSULE ORAL at 19:23

## 2025-06-21 RX ADMIN — Medication 1 TABLET: at 07:41

## 2025-06-21 RX ADMIN — NICOTINE POLACRILEX 4 MG: 2 LOZENGE ORAL at 17:06

## 2025-06-21 RX ADMIN — PHENOBARBITAL 97.2 MG: 97.2 TABLET ORAL at 19:23

## 2025-06-21 RX ADMIN — PHENOBARBITAL 97.2 MG: 97.2 TABLET ORAL at 08:13

## 2025-06-21 RX ADMIN — OLANZAPINE 10 MG: 10 TABLET, FILM COATED ORAL at 18:40

## 2025-06-21 RX ADMIN — NICOTINE POLACRILEX 4 MG: 2 LOZENGE ORAL at 06:48

## 2025-06-21 RX ADMIN — BUPROPION HYDROCHLORIDE 150 MG: 150 TABLET, EXTENDED RELEASE ORAL at 07:41

## 2025-06-21 RX ADMIN — NICOTINE POLACRILEX 4 MG: 2 LOZENGE ORAL at 11:07

## 2025-06-21 RX ADMIN — TRAZODONE HYDROCHLORIDE 50 MG: 50 TABLET ORAL at 02:08

## 2025-06-21 RX ADMIN — BUPRENORPHINE AND NALOXONE 1 FILM: 8; 2 FILM BUCCAL; SUBLINGUAL at 19:22

## 2025-06-21 RX ADMIN — NICOTINE POLACRILEX 4 MG: 2 LOZENGE ORAL at 12:30

## 2025-06-21 RX ADMIN — THIAMINE HCL TAB 100 MG 100 MG: 100 TAB at 07:41

## 2025-06-21 RX ADMIN — NICOTINE 1 PATCH: 21 PATCH, EXTENDED RELEASE TRANSDERMAL at 07:41

## 2025-06-21 RX ADMIN — PHENOBARBITAL 97.2 MG: 97.2 TABLET ORAL at 14:02

## 2025-06-21 RX ADMIN — ACETAMINOPHEN 650 MG: 325 TABLET ORAL at 09:08

## 2025-06-21 RX ADMIN — NICOTINE POLACRILEX 4 MG: 4 GUM, CHEWING BUCCAL at 18:40

## 2025-06-21 RX ADMIN — NICOTINE POLACRILEX 4 MG: 2 LOZENGE ORAL at 08:16

## 2025-06-21 RX ADMIN — NICOTINE POLACRILEX 4 MG: 2 LOZENGE ORAL at 20:17

## 2025-06-21 RX ADMIN — OLANZAPINE 10 MG: 10 TABLET, FILM COATED ORAL at 02:08

## 2025-06-21 ASSESSMENT — ACTIVITIES OF DAILY LIVING (ADL)
LAUNDRY: WITH SUPERVISION
DRESS: INDEPENDENT
ADLS_ACUITY_SCORE: 22
ORAL_HYGIENE: INDEPENDENT
DRESS: INDEPENDENT
ADLS_ACUITY_SCORE: 22
ORAL_HYGIENE: INDEPENDENT
ADLS_ACUITY_SCORE: 22
HYGIENE/GROOMING: INDEPENDENT
ADLS_ACUITY_SCORE: 22
ADLS_ACUITY_SCORE: 24
HYGIENE/GROOMING: INDEPENDENT
ADLS_ACUITY_SCORE: 22

## 2025-06-21 NOTE — CARE PLAN
06/20/25 1936   Observation Type   How often does the Patient require Staff intervention/redirection? rarely   Harm Category none   No Harm Category Noted at This Time no applicable harm categories or justifications;patient in their room, minimal interaction   Level of Special Monitoring none

## 2025-06-21 NOTE — PLAN OF CARE
Problem: Adult Inpatient Plan of Care  Goal: Optimal Comfort and Wellbeing  Outcome: Progressing  Intervention: Provide Person-Centered Care  Flowsheets (Taken 6/20/2025 2040)  Trust Relationship/Rapport:   care explained   choices provided   emotional support provided   questions answered   questions encouraged   reassurance provided     Goal Outcome Evaluation:    Assumed care of patient from [facility] via wheelchair and . Patient is calm and cooperative. Patient noted to have a balanced and steady gait. Patient presents with a flat affect and intermittent eye contact. Patient orientated to the department and informed he would have a roommate. Patient expressed anxiety about having a roommate and stated he would prefer not too. After speaking with the charge nurse the patient and his roommate were informed Barak was cleared by the [facility] provider to transfer to a lower acuity unit and have a roommate. Patient and his roommate accepted the explanation. Patient is noted to be tolerating oral intake. He was given snacks of pudding and pretzels upon transfer and ate 2030 snacks. Pt experienced no s/s of acute withdrawals.    Patient is aware of his committed status as he questioned if that was the reason for him having a roommate. He continues on SI and withdrawal precautions. Nursing will continue to monitor.

## 2025-06-21 NOTE — PLAN OF CARE
Problem: Adult Behavioral Health Plan of Care  Goal: Adheres to Safety Considerations for Self and Others  Outcome: Progressing     Problem: Psychotic Signs/Symptoms  Goal: Improved Sleep (Psychotic Signs/Symptoms)  Outcome: Progressing      Pt came to  and spoke to the Writer about taking medications. Pt took Tylenol, Zyprexa and Trazodone at 0200. Pt slept through the night without distress.No problems with behavior. No concerns reported by pt. Pt slept 6 hours. Staff will continue to monitor.

## 2025-06-21 NOTE — PLAN OF CARE
Pt has been pacing in the hallway a majority of the shift, tends to be socially withdrawn. He is pleasant upon approach and cooperative with care. He endorses anxiety and depression rated 7/10, endorses some feelings of paranoia. Denies SI/SIB and states he has not heard voices so far today. Pt is med compliant, received PRN tylenol and zyprexa.    Problem: Psychotic Signs/Symptoms  Goal: Improved Behavioral Control (Psychotic Signs/Symptoms)  Outcome: Progressing   Goal Outcome Evaluation:

## 2025-06-21 NOTE — PLAN OF CARE
"  Problem: Adult Inpatient Plan of Care  Goal: Optimal Comfort and Wellbeing  Intervention: Provide Person-Centered Care  Recent Flowsheet Documentation  Taken 6/20/2025 1900 by Anitha Kent RN  Trust Relationship/Rapport:   care explained   choices provided   emotional support provided   empathic listening provided   questions answered   questions encouraged   reassurance provided   thoughts/feelings acknowledged   Goal Outcome Evaluation:    Plan of Care Reviewed With: patient      Pt presents as calm and cooperative with a blunted affect. He endorsed 7/10 anxiety and depression, and requested Zyprexa for it. He endorses voices, that are not command in nature, but denied any other mental health symptom. Pt requested Prn nicotine and was med complaint with all other bed time medications. Pt ate and drank well denied pain and had no other acute behavioral or medical concerns pt transferred to [facility] 20 approx 2010.   /86 (BP Location: Left arm)   Pulse 80   Temp 98.4  F (36.9  C) (Temporal)   Resp 15   Ht 1.778 m (5' 10\")   Wt 97.9 kg (215 lb 14.4 oz)   SpO2 98%   BMI 30.98 kg/m            "

## 2025-06-21 NOTE — CARE PLAN
06/21/25 0213   Observation Type   How often does the Patient require Staff intervention/redirection? rarely   Harm Category none   No Harm Category Noted at This Time no applicable harm categories or justifications   Level of Special Monitoring none

## 2025-06-22 PROCEDURE — 250N000013 HC RX MED GY IP 250 OP 250 PS 637: Performed by: PSYCHIATRY & NEUROLOGY

## 2025-06-22 PROCEDURE — 250N000013 HC RX MED GY IP 250 OP 250 PS 637: Performed by: STUDENT IN AN ORGANIZED HEALTH CARE EDUCATION/TRAINING PROGRAM

## 2025-06-22 PROCEDURE — 124N000002 HC R&B MH UMMC

## 2025-06-22 PROCEDURE — 250N000012 HC RX MED GY IP 250 OP 636 PS 637: Performed by: PSYCHIATRY & NEUROLOGY

## 2025-06-22 PROCEDURE — 250N000013 HC RX MED GY IP 250 OP 250 PS 637

## 2025-06-22 RX ORDER — OLANZAPINE 5 MG/1
5-10 TABLET, FILM COATED ORAL 3 TIMES DAILY PRN
Status: DISCONTINUED | OUTPATIENT
Start: 2025-06-22 | End: 2025-07-07 | Stop reason: HOSPADM

## 2025-06-22 RX ORDER — OLANZAPINE 10 MG/2ML
5-10 INJECTION, POWDER, FOR SOLUTION INTRAMUSCULAR 3 TIMES DAILY PRN
Status: DISCONTINUED | OUTPATIENT
Start: 2025-06-22 | End: 2025-07-07 | Stop reason: HOSPADM

## 2025-06-22 RX ADMIN — LURASIDONE HYDROCHLORIDE 80 MG: 80 TABLET, FILM COATED ORAL at 19:50

## 2025-06-22 RX ADMIN — PHENOBARBITAL 97.2 MG: 97.2 TABLET ORAL at 07:52

## 2025-06-22 RX ADMIN — NICOTINE POLACRILEX 4 MG: 2 LOZENGE ORAL at 17:33

## 2025-06-22 RX ADMIN — BUPROPION HYDROCHLORIDE 150 MG: 150 TABLET, EXTENDED RELEASE ORAL at 07:52

## 2025-06-22 RX ADMIN — PROPRANOLOL HYDROCHLORIDE 10 MG: 10 TABLET ORAL at 11:42

## 2025-06-22 RX ADMIN — BUPRENORPHINE AND NALOXONE 1 FILM: 8; 2 FILM BUCCAL; SUBLINGUAL at 19:50

## 2025-06-22 RX ADMIN — OLANZAPINE 10 MG: 10 TABLET, FILM COATED ORAL at 05:35

## 2025-06-22 RX ADMIN — NICOTINE POLACRILEX 4 MG: 2 LOZENGE ORAL at 05:57

## 2025-06-22 RX ADMIN — NICOTINE POLACRILEX 4 MG: 2 LOZENGE ORAL at 13:17

## 2025-06-22 RX ADMIN — NICOTINE POLACRILEX 4 MG: 2 LOZENGE ORAL at 20:14

## 2025-06-22 RX ADMIN — NICOTINE POLACRILEX 4 MG: 2 LOZENGE ORAL at 10:39

## 2025-06-22 RX ADMIN — ACETAMINOPHEN 650 MG: 325 TABLET ORAL at 20:49

## 2025-06-22 RX ADMIN — GABAPENTIN 300 MG: 300 CAPSULE ORAL at 07:52

## 2025-06-22 RX ADMIN — PHENOBARBITAL 97.2 MG: 97.2 TABLET ORAL at 19:50

## 2025-06-22 RX ADMIN — ACETAMINOPHEN 650 MG: 325 TABLET ORAL at 00:49

## 2025-06-22 RX ADMIN — NICOTINE POLACRILEX 4 MG: 2 LOZENGE ORAL at 09:13

## 2025-06-22 RX ADMIN — BUPRENORPHINE AND NALOXONE 1 FILM: 8; 2 FILM BUCCAL; SUBLINGUAL at 07:51

## 2025-06-22 RX ADMIN — ACETAMINOPHEN 650 MG: 325 TABLET ORAL at 07:55

## 2025-06-22 RX ADMIN — NICOTINE POLACRILEX 4 MG: 2 LOZENGE ORAL at 08:03

## 2025-06-22 RX ADMIN — PROPRANOLOL HYDROCHLORIDE 10 MG: 10 TABLET ORAL at 16:28

## 2025-06-22 RX ADMIN — NICOTINE POLACRILEX 4 MG: 2 LOZENGE ORAL at 11:38

## 2025-06-22 RX ADMIN — GABAPENTIN 300 MG: 300 CAPSULE ORAL at 13:17

## 2025-06-22 RX ADMIN — PHENOBARBITAL 97.2 MG: 97.2 TABLET ORAL at 13:17

## 2025-06-22 RX ADMIN — TRAZODONE HYDROCHLORIDE 50 MG: 50 TABLET ORAL at 00:49

## 2025-06-22 RX ADMIN — NICOTINE POLACRILEX 4 MG: 2 LOZENGE ORAL at 14:01

## 2025-06-22 RX ADMIN — OLANZAPINE 5 MG: 5 TABLET, FILM COATED ORAL at 16:26

## 2025-06-22 RX ADMIN — NICOTINE POLACRILEX 4 MG: 4 GUM, CHEWING BUCCAL at 06:58

## 2025-06-22 RX ADMIN — GABAPENTIN 300 MG: 300 CAPSULE ORAL at 19:50

## 2025-06-22 RX ADMIN — NICOTINE 1 PATCH: 21 PATCH, EXTENDED RELEASE TRANSDERMAL at 07:51

## 2025-06-22 RX ADMIN — NICOTINE POLACRILEX 4 MG: 2 LOZENGE ORAL at 16:26

## 2025-06-22 ASSESSMENT — ACTIVITIES OF DAILY LIVING (ADL)
ADLS_ACUITY_SCORE: 24
DRESS: INDEPENDENT
ADLS_ACUITY_SCORE: 24
DRESS: INDEPENDENT
ORAL_HYGIENE: INDEPENDENT
ORAL_HYGIENE: INDEPENDENT
LAUNDRY: WITH SUPERVISION
ADLS_ACUITY_SCORE: 24
HYGIENE/GROOMING: INDEPENDENT
ADLS_ACUITY_SCORE: 24
ADLS_ACUITY_SCORE: 24
HYGIENE/GROOMING: INDEPENDENT
ADLS_ACUITY_SCORE: 24

## 2025-06-22 NOTE — PLAN OF CARE
Problem: Psychotic Signs/Symptoms  Goal: Improved Behavioral Control (Psychotic Signs/Symptoms)  6/22/2025 1636 by Roberto Velazquez RN  Outcome: Progressing  6/22/2025 1635 by Roberto Velazquez RN  Outcome: Progressing  Intervention: Manage Behavior  Recent Flowsheet Documentation  Taken 6/22/2025 1628 by Roberto Velazquez RN  De-Escalation Techniques: physical activity promoted     Problem: Anxiety  Goal: Anxiety Reduction or Resolution  Outcome: Progressing  Intervention: Promote Anxiety Reduction  Recent Flowsheet Documentation  Taken 6/22/2025 1628 by Roberto Velazquez RN  Family/Support System Care:   involvement promoted   presence promoted   self-care encouraged   support provided   Goal Outcome Evaluation:    Plan of Care Reviewed With: patient      Patient was present in the milieu, but isolative and withdrawn to self; he accompanied peers for dinner and attended group;he endorsed anxiety and depression, and rated both @ 6/10; he was somewhat irritated when writer delayed giving him Zyprexa and propanolol because dosing was too close; he eventually received both drugs,but then requested a second dose of Zyprexa because according to him,the 1st dos was not effective.Writer explained the dangers of overmedication and advise patient to use other methods like deep breathing and relaxation techniques to control his anxiety,which he agreed;he ate dinner and snacks and was compliant with ADLs/ medication compliant;he denied other psych symptoms and was oriented through the shift.Staff will continue to encourage and support patient in the course of his treatment.

## 2025-06-22 NOTE — PLAN OF CARE
Pt visible in the milieu, pacing/jogging most of the shift. Pt appears restless, offered PRN propranolol which pt accepted and said was helpful. Pt is med compliant, also received frequent PRN nicotine and tylenol. Pt endorses moderate anxiety and depression, states they have improved from yesterday. He denies all other mental health symptoms. Pt is pleasant and cooperative with care, no behavioral concerns noted.    Problem: Psychotic Signs/Symptoms  Goal: Improved Behavioral Control (Psychotic Signs/Symptoms)  Outcome: Progressing   Goal Outcome Evaluation:    Plan of Care Reviewed With: patient

## 2025-06-22 NOTE — PLAN OF CARE
Problem: Sleep Disturbance  Goal: Adequate Sleep/Rest  Outcome: Progressing   Patient affect flat and blunted. Mood anxious requested Zyprexa, given and effective. Patient unable to sleep at beginning of the shift-Trazodone given. Patient had 6 hours of sleep. Patient paced the hallway. No complaints of pain and discomfort. Patient continues on q15 minutes safety checks, no behavioral issues noted. Will continue to monitor the patient and provide therapeutic intervention as needed. Will continue with current plan of care. Notify MD with any concerns.

## 2025-06-23 PROCEDURE — 250N000013 HC RX MED GY IP 250 OP 250 PS 637

## 2025-06-23 PROCEDURE — 250N000013 HC RX MED GY IP 250 OP 250 PS 637: Performed by: PSYCHIATRY & NEUROLOGY

## 2025-06-23 PROCEDURE — 124N000002 HC R&B MH UMMC

## 2025-06-23 PROCEDURE — 250N000013 HC RX MED GY IP 250 OP 250 PS 637: Performed by: STUDENT IN AN ORGANIZED HEALTH CARE EDUCATION/TRAINING PROGRAM

## 2025-06-23 PROCEDURE — 99231 SBSQ HOSP IP/OBS SF/LOW 25: CPT | Mod: GC | Performed by: PSYCHIATRY & NEUROLOGY

## 2025-06-23 PROCEDURE — 250N000012 HC RX MED GY IP 250 OP 636 PS 637: Performed by: PSYCHIATRY & NEUROLOGY

## 2025-06-23 RX ORDER — PROPRANOLOL HYDROCHLORIDE 10 MG/1
10 TABLET ORAL 3 TIMES DAILY
Status: DISCONTINUED | OUTPATIENT
Start: 2025-06-23 | End: 2025-06-23

## 2025-06-23 RX ORDER — PROPRANOLOL HYDROCHLORIDE 10 MG/1
10 TABLET ORAL 3 TIMES DAILY
Status: DISCONTINUED | OUTPATIENT
Start: 2025-06-23 | End: 2025-06-25

## 2025-06-23 RX ADMIN — NICOTINE POLACRILEX 4 MG: 2 LOZENGE ORAL at 14:48

## 2025-06-23 RX ADMIN — PHENOBARBITAL 97.2 MG: 97.2 TABLET ORAL at 14:48

## 2025-06-23 RX ADMIN — NICOTINE POLACRILEX 4 MG: 2 LOZENGE ORAL at 06:08

## 2025-06-23 RX ADMIN — OLANZAPINE 10 MG: 5 TABLET, FILM COATED ORAL at 03:47

## 2025-06-23 RX ADMIN — BUPRENORPHINE AND NALOXONE 1 FILM: 8; 2 FILM BUCCAL; SUBLINGUAL at 07:37

## 2025-06-23 RX ADMIN — NICOTINE POLACRILEX 4 MG: 2 LOZENGE ORAL at 10:08

## 2025-06-23 RX ADMIN — NICOTINE POLACRILEX 4 MG: 2 LOZENGE ORAL at 12:23

## 2025-06-23 RX ADMIN — NICOTINE POLACRILEX 4 MG: 2 LOZENGE ORAL at 17:17

## 2025-06-23 RX ADMIN — PROPRANOLOL HYDROCHLORIDE 10 MG: 10 TABLET ORAL at 19:04

## 2025-06-23 RX ADMIN — GABAPENTIN 300 MG: 300 CAPSULE ORAL at 14:48

## 2025-06-23 RX ADMIN — GABAPENTIN 300 MG: 300 CAPSULE ORAL at 19:05

## 2025-06-23 RX ADMIN — PHENOBARBITAL 97.2 MG: 97.2 TABLET ORAL at 19:04

## 2025-06-23 RX ADMIN — PROPRANOLOL HYDROCHLORIDE 10 MG: 10 TABLET ORAL at 05:43

## 2025-06-23 RX ADMIN — PROPRANOLOL HYDROCHLORIDE 10 MG: 10 TABLET ORAL at 12:24

## 2025-06-23 RX ADMIN — NICOTINE POLACRILEX 4 MG: 2 LOZENGE ORAL at 07:42

## 2025-06-23 RX ADMIN — BUPROPION HYDROCHLORIDE 150 MG: 150 TABLET, EXTENDED RELEASE ORAL at 07:37

## 2025-06-23 RX ADMIN — GABAPENTIN 300 MG: 300 CAPSULE ORAL at 07:37

## 2025-06-23 RX ADMIN — NICOTINE POLACRILEX 4 MG: 2 LOZENGE ORAL at 18:26

## 2025-06-23 RX ADMIN — OLANZAPINE 10 MG: 5 TABLET, FILM COATED ORAL at 12:23

## 2025-06-23 RX ADMIN — BUPRENORPHINE AND NALOXONE 1 FILM: 8; 2 FILM BUCCAL; SUBLINGUAL at 19:04

## 2025-06-23 RX ADMIN — LURASIDONE HYDROCHLORIDE 80 MG: 80 TABLET, FILM COATED ORAL at 19:05

## 2025-06-23 RX ADMIN — OLANZAPINE 10 MG: 5 TABLET, FILM COATED ORAL at 18:46

## 2025-06-23 RX ADMIN — PHENOBARBITAL 97.2 MG: 97.2 TABLET ORAL at 07:37

## 2025-06-23 RX ADMIN — NICOTINE POLACRILEX 4 MG: 2 LOZENGE ORAL at 19:39

## 2025-06-23 RX ADMIN — NICOTINE 1 PATCH: 21 PATCH, EXTENDED RELEASE TRANSDERMAL at 07:37

## 2025-06-23 RX ADMIN — ACETAMINOPHEN 650 MG: 325 TABLET ORAL at 07:10

## 2025-06-23 ASSESSMENT — ACTIVITIES OF DAILY LIVING (ADL)
ADLS_ACUITY_SCORE: 24
DRESS: SCRUBS (BEHAVIORAL HEALTH);INDEPENDENT
ADLS_ACUITY_SCORE: 24
ADLS_ACUITY_SCORE: 24
HYGIENE/GROOMING: HANDWASHING;INDEPENDENT
HYGIENE/GROOMING: INDEPENDENT
ADLS_ACUITY_SCORE: 24
ORAL_HYGIENE: INDEPENDENT
ADLS_ACUITY_SCORE: 24
LAUNDRY: WITH SUPERVISION
DRESS: INDEPENDENT
ADLS_ACUITY_SCORE: 24
ORAL_HYGIENE: INDEPENDENT
ADLS_ACUITY_SCORE: 24

## 2025-06-23 NOTE — PLAN OF CARE
Pt pacing in the hallway, social with select peers. He did not attend groups today. Endorses anxiety and depression, denies all other mental health symptoms. He is med compliant, PRN propranolol and zyprexa 10mg given with relief.     Problem: Psychotic Signs/Symptoms  Goal: Improved Behavioral Control (Psychotic Signs/Symptoms)  Outcome: Progressing   Goal Outcome Evaluation:    Plan of Care Reviewed With: patient

## 2025-06-23 NOTE — PLAN OF CARE
Assessment/Intervention/Current Symtoms and Care Coordination:  Chart review and met with team, discussed pt progress, symptomology, and response to treatment.  Discussed the discharge plan and any potential impediments to discharge.     Patient was transferred to [facility] 6/21. He has been doing well on the unit.  Social with peers, attending groups.     Pt needs to continue to titrate off phenobarbital prior to discharge.   Previous unit made referrals to IRTS however due to polysubstance use, recommendation will be for Dual diagnosis residential treatment.  Order will be placed for DAVID assessment.  Writer will attempt to reach assigned CM      Discharge Plan or Goal:  MI/CD Residential treatment  Psychiatry  Therapy    Barriers to Discharge:  Med mgmt  Placement     Referral Status:  Pt prefers an IRTS where he can smoke tobacco.    6/18 [facility] -    All locations    Contact: Email: May Lee, Central   [address], [address]  [place] [address]  Direct Dial:  [phone]  [place]: [phone]  Referral Intake Email: [email or web address]  Direct Email: [email or web address]    6/18 Andree Gudino CARLITOS Candelario   [place]    Contact: Carola Slaughter, Treatment Director  [place] [facility]  [address] [place] [address]  Phone [phone]  Fax [phone]    6/18 [facility]     All locations    Contact: [facility]  [address] [place] [address], [address] [place], [place], [address]  M [phone]  F [phone]    Legal Status:  MI + Briggs order  County: [place]   File Number: [id number]  Start and expiration date of commitment: 6/18/25-12/18/25  Briggs meds are: Haldol, Prolixin, Clozaril, Risperdal, Invega, Zyprexa, Seroquel, Abilify and Lurasidone     Contacts (include DELORES status):  Mother:  Sarika Clarke - Ph: [phone] - DELORES signed    :  Loren Cloud-[facility] - Ph: [phone] - DELORES  signed - I had called and left a voicemail I don't know if this  is still active with patient. Per PPS earlier she may have closed his account.     Psychiatrist: - DELORES signed  [facility]  Address: [address] [place] [address]  Phone: [phone]    Upcoming Meetings and Dates/Important Information and next steps:  Team Note Due: Wednesday  Update internal referral tracker @ discharge.   Will need PD and COS at discharge  Will need psych appt with [facility] at discharge

## 2025-06-23 NOTE — PROVIDER NOTIFICATION
06/23/25 0939   Individualization/Patient Specific Goals   Patient Personal Strengths community support;positive vocational history   Patient Vulnerabilities adverse childhood experience(s);substance abuse/addiction;family/relationship conflict;lacks insight into illness   Anxieties, Fears or Concerns None   Individualized Care Needs None   Interprofessional Rounds   Summary Patient admitted with psychotic sx's, polysubstance use.   Participants CTC;psychiatrist   Behavioral Team Discussion   Participants Dr. Trotter, Dr. Pinedo, Susy Jack RN, Gayathri Pearosn MA.LP,   Progress Pt is improving, he waived his hearing and is now on MI commitment and Briggs. Will need more time to stabilize and taper off phenobarbital before discharging. IRTS referrals will be started today.   Anticipated length of stay 7-10 days   Continued Stay Criteria/Rationale Med mgmt per MD's, placement   Medical/Physical See H&P   Precautions Suicide, Withdrawal (Klonopin)   Plan Stabilize on meds, continue taper. Discharge to Residential MI/CD treatment   Rationale for change in precautions or plan No change in plan/precautions   Safety Plan Completed   Anticipated Discharge Disposition substance use treatment     Goal Outcome Evaluation:

## 2025-06-23 NOTE — PROGRESS NOTES
"  ----------------------------------------------------------------------------------------------------------  [facility]  Psychiatry Progress Note  Hospital Day #13     Interim History:     The patient's care was discussed with the treatment team and chart notes were reviewed.    Vitals:   /80   Pulse 90   Temp 98  F (36.7  C) (Oral)   Resp 16   Ht 1.778 m (5' 10\")   Wt 99.4 kg (219 lb 1.6 oz)   SpO2 100%   BMI 31.44 kg/m     Sleep: 6.25 hours (06/22/25 0600)  Scheduled medications: Took all scheduled medications as prescribed other than folic acid and thiamine  PRN medications:     Last 24H PRN:     acetaminophen (TYLENOL) tablet 650 mg, 650 mg at 06/23/25 0710    nicotine (COMMIT) lozenge 4 mg, 4 mg at 06/23/25 1717    OLANZapine (zyPREXA) tablet 5-10 mg, 10 mg at 06/23/25 1223 **OR** OLANZapine (zyPREXA) injection 5-10 mg      Staff Report:   No acute events or safety concerns overnight. Please see staff notes for details.      Subjective:     Patient Interview:  Barak Clarke was interviewed in the [facility] conference room by the team. He reports that this weekend has been hard because he is still going through benzodiazepine withdrawal, which takes a long time in his experience. However he endorses that medications have been helpful in managing this. He says that  this place doesn t sit well with me  in reference to the hospital and talked about feeling targeted on the unit that he was previously on, however he reports feeling safe while on [facility]. His most bothersome symptoms have been restlessness and feeling tense, however he has been taking propranolol which has been helpful. Would like this to be scheduled. His appetite has been good. No other questions or concerns today.    ROS:  Patient has no bothersome physical symptoms  Patient denies acute concerns     Objective:     Vitals:  /80   Pulse 90   Temp 98  F (36.7  C) (Oral)   Resp 16   " "Ht 1.778 m (5' 10\")   Wt 99.4 kg (219 lb 1.6 oz)   SpO2 100%   BMI 31.44 kg/m      Allergies:  Allergies   Allergen Reactions    Prazosin Unknown     Worsening of nightmares       Current Medications:  Scheduled:  Current Facility-Administered Medications   Medication Dose Route Frequency Provider Last Rate Last Admin    acetaminophen (TYLENOL) tablet 650 mg  650 mg Oral Q4H PRN Daniel Vazquez MD   650 mg at 06/23/25 0710    alum & mag hydroxide-simethicone (MAALOX) suspension 30 mL  30 mL Oral Q4H PRN Daniel Vazquez MD        buprenorphine HCl-naloxone HCl (SUBOXONE) 8-2 MG per film 1 Film  1 Film Sublingual BID Daniel Vzaquez MD   1 Film at 06/23/25 0737    buPROPion (WELLBUTRIN XL) 24 hr tablet 150 mg  150 mg Oral Daily Kimmie Arango MD   150 mg at 06/23/25 0737    folic acid (FOLVITE) tablet 1 mg  1 mg Oral Daily Kimmie Arango MD   1 mg at 06/19/25 0827    gabapentin (NEURONTIN) capsule 300 mg  300 mg Oral TID Daniel Vazquez MD   300 mg at 06/23/25 1448    guaiFENesin (MUCINEX) 12 hr tablet 600 mg  600 mg Oral BID PRN Ciara Quintanilla, APRN CNP        hydrOXYzine HCl (ATARAX) tablet 25 mg  25 mg Oral Q4H PRN Daniel Vazquez MD   25 mg at 06/19/25 0326    lurasidone (LATUDA) tablet 80 mg  80 mg Oral At Bedtime Kimmie Arango MD   80 mg at 06/22/25 1950    multivitamin w/minerals (THERA-VIT-M) tablet 1 tablet  1 tablet Oral Daily Kimmie Arango MD   1 tablet at 06/21/25 0741    nicotine (COMMIT) lozenge 4 mg  4 mg Buccal Q1H PRN Bridger Hernandez MD   4 mg at 06/23/25 1717    nicotine (NICODERM CQ) 21 MG/24HR 24 hr patch 1 patch  1 patch Transdermal Daily Kimmie Arango MD   1 patch at 06/23/25 0737    nicotine polacrilex (NICORETTE) gum 4 mg  4 mg Buccal Q1H PRN Kimmie Arango MD   4 mg at 06/22/25 0658    OLANZapine (zyPREXA) tablet 5-10 mg  5-10 mg Oral TID PRN Franny Best MD   10 mg at 06/23/25 1223    Or    OLANZapine (zyPREXA) injection 5-10 " mg  5-10 mg Intramuscular TID PRN Franny Best MD        ondansetron (ZOFRAN) tablet 4 mg  4 mg Oral Q8H PRN Daniel Vazquez MD        PHENobarbital tablet 97.2 mg  97.2 mg Oral TID Kimmie Arango MD   97.2 mg at 06/23/25 1448    propranolol (INDERAL) tablet 10 mg  10 mg Oral TID Radha Pinedo MD        senna-docusate (SENOKOT-S/PERICOLACE) 8.6-50 MG per tablet 1 tablet  1 tablet Oral BID PRN Daniel Vazquez MD        thiamine (B-1) tablet 100 mg  100 mg Oral Daily Kimmie Arango MD   100 mg at 06/21/25 0741    traZODone (DESYREL) tablet 50 mg  50 mg Oral At Bedtime PRN Daniel Vazquez MD   50 mg at 06/22/25 0049       PRN:  Current Facility-Administered Medications   Medication Dose Route Frequency Provider Last Rate Last Admin    acetaminophen (TYLENOL) tablet 650 mg  650 mg Oral Q4H PRN Daniel Vazquez MD   650 mg at 06/23/25 0710    alum & mag hydroxide-simethicone (MAALOX) suspension 30 mL  30 mL Oral Q4H PRN Daniel Vazquez MD        buprenorphine HCl-naloxone HCl (SUBOXONE) 8-2 MG per film 1 Film  1 Film Sublingual BID Daniel Vazquez MD   1 Film at 06/23/25 0737    buPROPion (WELLBUTRIN XL) 24 hr tablet 150 mg  150 mg Oral Daily Kimmie Arango MD   150 mg at 06/23/25 0737    folic acid (FOLVITE) tablet 1 mg  1 mg Oral Daily Kimmie Arango MD   1 mg at 06/19/25 0827    gabapentin (NEURONTIN) capsule 300 mg  300 mg Oral TID Daniel Vazquez MD   300 mg at 06/23/25 1448    guaiFENesin (MUCINEX) 12 hr tablet 600 mg  600 mg Oral BID PRN Ciara Quintanilla APRN CNP        hydrOXYzine HCl (ATARAX) tablet 25 mg  25 mg Oral Q4H PRN Daniel Vazquez MD   25 mg at 06/19/25 0326    lurasidone (LATUDA) tablet 80 mg  80 mg Oral At Bedtime Kimmie Arango MD   80 mg at 06/22/25 1950    multivitamin w/minerals (THERA-VIT-M) tablet 1 tablet  1 tablet Oral Daily Kimmie Arango MD   1 tablet at 06/21/25 0741    nicotine (COMMIT) lozenge 4 mg  4 mg Buccal  Q1H PRN Bridger Hernandez MD   4 mg at 06/23/25 1717    nicotine (NICODERM CQ) 21 MG/24HR 24 hr patch 1 patch  1 patch Transdermal Daily Kimmie Arango MD   1 patch at 06/23/25 0737    nicotine polacrilex (NICORETTE) gum 4 mg  4 mg Buccal Q1H PRN Kimmie Arango MD   4 mg at 06/22/25 0658    OLANZapine (zyPREXA) tablet 5-10 mg  5-10 mg Oral TID PRN Franny Best MD   10 mg at 06/23/25 1223    Or    OLANZapine (zyPREXA) injection 5-10 mg  5-10 mg Intramuscular TID PRN Franny Best MD        ondansetron (ZOFRAN) tablet 4 mg  4 mg Oral Q8H PRN Daniel Vazquez MD        PHENobarbital tablet 97.2 mg  97.2 mg Oral TID Kimmie Arango MD   97.2 mg at 06/23/25 1448    propranolol (INDERAL) tablet 10 mg  10 mg Oral TID Radha Pinedo MD        senna-docusate (SENOKOT-S/PERICOLACE) 8.6-50 MG per tablet 1 tablet  1 tablet Oral BID PRN Daniel Vazquez MD        thiamine (B-1) tablet 100 mg  100 mg Oral Daily Kimmie Arango MD   100 mg at 06/21/25 0741    traZODone (DESYREL) tablet 50 mg  50 mg Oral At Bedtime PRN Daniel Vazquez MD   50 mg at 06/22/25 0049       Labs and Imaging:  New results:   No results found for this or any previous visit (from the past 24 hours).    Data this admission:  - CBC unremarkable  - CMP unremarkable other than Ca  - TSH normal  - UDS positive for amphetamines, benzos, cannabinoids  - Vit D low in 2024, consider repeat  - Hgb A1c 5.1 2023, consider repeat  - Lipids elevated in 2024. Consider repeat  - Vit B12 not obtained  - Folate not obtained, normal in 2024 (had been taking it orally)  - EKG normal sinus rhythm, QTc 434     Mental Status Exam:     Oriented to:  Grossly Oriented  General:  Awake and Alert  Appearance:  appears stated age and Grooming is adequate  Behavior/Attitude:  Calm and Cooperative  Eye Contact: Appropriate at times, but somewhat downcast  Psychomotor: Normal no catatonia present.   Speech:  appropriate  "volume/tone  Language: Fluent in English with appropriate syntax and vocabulary.  Mood:   this place doesn t sit well with me\"  Affect:  congruent with mood, intensity is blunted  Thought Process:  disorganized still, but seeming improved in relation to previous (per EMR)  Thought Content:   No HI and does not appear to be responding to internal stimuli; delusions of paranoia. No SI.   Associations:  questionable  Insight:  limited  Judgment:  limited   Impulse control: fair  Attention Span:  adequate for conversation  Concentration:  grossly intact  Recent and Remote Memory:  not formally assessed  Fund of Knowledge: average  Muscle Strength and Tone: normal  Gait and Station: Normal     Psychiatric Assessment     Barak is a 40 year old male with a past psychiatric history of substance use (benzodiazepines, cannabis, cocaine, opioids) and unspecified psychotic disorder admitted from the ED on 6/10/2025 due to concern for out of control behaviors and psychosis in the context of substance use, medication nonadherence and psychosocial stressors. Significant symptoms include sleep issues, psychosis, disorganized thought, substance use, delusional beliefs, and paranoia. His last psychiatric hospitalization was in 2024.  He has had some outpatient psychiatric management but it isn't clear if he is consistently seeing anyone. The MSE on admission is notable for delusional beliefs, paranoia, and disorganized thought process.       In summary, presentation is consistent with psychosis, likely schizophrenia vs substance induced. He will likely benefit from medication optimization, therapeutic milieu and access to resources this admission.     Psychiatric Plan by Diagnosis      Today's changes:  - made propranolol scheduled     # Acute psychosis, schizophrenia vs. substance-induced psychotic disorder   1. Medications:  - Lurasidone 80mg qpm with food  - Previously restarted bupropion at 150mg daily  - holding PTA Adderall    "   2. Pertinent Labs/Monitoring:   - EKG QTc 434     3. Additional Plans:  - Patient will be treated in therapeutic milieu with appropriate individual and group therapies as described      # PSUD (opioids, benzodiazepines, cocaine, cannabis)  # benzodiazepine use disorder  - continue phenobarb taper (Phenobarbital 97.2mg TID)  - Continue PTA Suboxone 8-2mg BID   - Propranolol 10mg TID for restlessness  (likely also aspect of akathisia)     Psychiatric Hospital Course:      Barak Clarke was admitted to [facility] as a voluntary patient on 6/10/2025 after presenting to the ED on 6/8. His PTA medications were continued with the exception of bupropion and Adderall, which were held due to acute psychosis. On 6/21 he was transferred to unit 20, a lower acuity unit.  Medications:  Barak Clarke was admitted to [facility] as a voluntary patient on 6/10/2025 after presenting to the ED on 6/8. His PTA medications were continued with the exception of bupropion and Adderall, which were held due to acute psychosis.  - 6/10: initiate risperidone 1mg qhs  - 6/11: discontinue risperidone per patient preference, initiate haloperidol 5mg qhs. Place 72h hold and file for commitment with [facility]. Initiate phenobarbital 64.8mg TID for clonazepam withdrawal  - 6/12: discontinue haloperidol per patient preference (did not take any doses of paliperidone, risperidone, or haloperidol). Initiate lurasidone 20mg qpm with dinner  - 6/13: increase lurasidone to 60mg qpm with dinner  - 6/16: increase lurasidone to 80mg qpm with dinner, restart bupropion at 150mg daily  - 6/17: increase phenobarbital to 97.2mg TID. Commitment and [facility] orders received today (patient waived his right to a hearing)   6/20: initiate propranolol 10mg TID PRN for akathisia/restlessness in context of withdrawal   6/24: scheduled propranolol     The risks, benefits, alternatives, and side effects were discussed and understood by the patient.     Medical Assessment and Plan      Medical diagnoses to be addressed this admission:    # HTN  - not on any medication    Medical course: Patient was physically examined by the ED prior to being transferred to the unit and was found to be medically stable and appropriate for admission.     Consults: medicine previously for URI symptoms     Checklist     Legal Status: [facility]  Committed     [facility] order: clozapine, paliperidone, risperidone, quetiapine, olanzapine, aripiprazole, lurasidone     Safety Assessment:   Behavioral Orders   Procedures    Code 1 - Restrict to Unit    Routine Programming     As clinically indicated    Status 15     Every 15 minutes.    Suicide precautions: Suicide Risk: MODERATE; Clinical rationale to override score: response to medication     Patients on Suicide Precautions should have a Combination Diet ordered that includes a Diet selection(s) AND a Behavioral Tray selection for Safe Tray - with utensils, or Safe Tray - NO utensils       Suicide Risk:   MODERATE     Clinical rationale to override score::   response to medication    Withdrawal precautions       Risk Assessment:  Risk for harm is moderate.  Risk factors: maladaptive coping, substance use, and past behaviors  Protective factors: engaged in treatment     SIO: no    Disposition: TBD. Pending stabilization, medication optimization, & development of a safe discharge plan.     Attestations     This patient was seen and discussed with my attending physician.  Radha Pinedo MD   Psychiatry Resident Physician     Attestation:  This patient has been seen and evaluated by me, Sandee Trotter MD.  I have discussed this patient with the house staff team including the resident and/or medical student and I agree with the findings and plan in this note.    I have reviewed today's vital signs, medications, labs and imaging. Sandee Trotter MD , PhD.

## 2025-06-24 PROCEDURE — 90853 GROUP PSYCHOTHERAPY: CPT

## 2025-06-24 PROCEDURE — 99231 SBSQ HOSP IP/OBS SF/LOW 25: CPT | Mod: GC | Performed by: PSYCHIATRY & NEUROLOGY

## 2025-06-24 PROCEDURE — 250N000012 HC RX MED GY IP 250 OP 636 PS 637: Performed by: PSYCHIATRY & NEUROLOGY

## 2025-06-24 PROCEDURE — 250N000013 HC RX MED GY IP 250 OP 250 PS 637

## 2025-06-24 PROCEDURE — 124N000002 HC R&B MH UMMC

## 2025-06-24 PROCEDURE — 250N000013 HC RX MED GY IP 250 OP 250 PS 637: Performed by: PSYCHIATRY & NEUROLOGY

## 2025-06-24 PROCEDURE — 97150 GROUP THERAPEUTIC PROCEDURES: CPT | Mod: GO

## 2025-06-24 PROCEDURE — 250N000013 HC RX MED GY IP 250 OP 250 PS 637: Performed by: STUDENT IN AN ORGANIZED HEALTH CARE EDUCATION/TRAINING PROGRAM

## 2025-06-24 RX ORDER — PHENOBARBITAL 97.2 MG/1
97.2 TABLET ORAL 2 TIMES DAILY
Status: DISCONTINUED | OUTPATIENT
Start: 2025-06-24 | End: 2025-06-25

## 2025-06-24 RX ORDER — GABAPENTIN 300 MG/1
600 CAPSULE ORAL 3 TIMES DAILY
Status: DISCONTINUED | OUTPATIENT
Start: 2025-06-24 | End: 2025-07-07 | Stop reason: HOSPADM

## 2025-06-24 RX ORDER — PHENOBARBITAL 64.8 MG/1
64.8 TABLET ORAL EVERY MORNING
Status: DISCONTINUED | OUTPATIENT
Start: 2025-06-25 | End: 2025-06-27

## 2025-06-24 RX ADMIN — NICOTINE POLACRILEX 4 MG: 2 LOZENGE ORAL at 18:46

## 2025-06-24 RX ADMIN — NICOTINE POLACRILEX 4 MG: 2 LOZENGE ORAL at 14:06

## 2025-06-24 RX ADMIN — GABAPENTIN 300 MG: 300 CAPSULE ORAL at 07:39

## 2025-06-24 RX ADMIN — BUPRENORPHINE AND NALOXONE 1 FILM: 8; 2 FILM BUCCAL; SUBLINGUAL at 19:35

## 2025-06-24 RX ADMIN — OLANZAPINE 10 MG: 5 TABLET, FILM COATED ORAL at 17:46

## 2025-06-24 RX ADMIN — NICOTINE POLACRILEX 4 MG: 2 LOZENGE ORAL at 12:56

## 2025-06-24 RX ADMIN — PHENOBARBITAL 97.2 MG: 97.2 TABLET ORAL at 19:34

## 2025-06-24 RX ADMIN — NICOTINE POLACRILEX 4 MG: 2 LOZENGE ORAL at 07:46

## 2025-06-24 RX ADMIN — HYDROXYZINE HYDROCHLORIDE 25 MG: 25 TABLET ORAL at 12:56

## 2025-06-24 RX ADMIN — PROPRANOLOL HYDROCHLORIDE 10 MG: 10 TABLET ORAL at 14:07

## 2025-06-24 RX ADMIN — PHENOBARBITAL 97.2 MG: 97.2 TABLET ORAL at 14:06

## 2025-06-24 RX ADMIN — BUPRENORPHINE AND NALOXONE 1 FILM: 8; 2 FILM BUCCAL; SUBLINGUAL at 07:40

## 2025-06-24 RX ADMIN — THIAMINE HCL TAB 100 MG 100 MG: 100 TAB at 07:39

## 2025-06-24 RX ADMIN — PROPRANOLOL HYDROCHLORIDE 10 MG: 10 TABLET ORAL at 07:39

## 2025-06-24 RX ADMIN — NICOTINE 1 PATCH: 21 PATCH, EXTENDED RELEASE TRANSDERMAL at 07:46

## 2025-06-24 RX ADMIN — LURASIDONE HYDROCHLORIDE 80 MG: 80 TABLET, FILM COATED ORAL at 19:35

## 2025-06-24 RX ADMIN — BUPROPION HYDROCHLORIDE 150 MG: 150 TABLET, EXTENDED RELEASE ORAL at 07:39

## 2025-06-24 RX ADMIN — OLANZAPINE 5 MG: 5 TABLET, FILM COATED ORAL at 05:37

## 2025-06-24 RX ADMIN — NICOTINE POLACRILEX 4 MG: 2 LOZENGE ORAL at 06:12

## 2025-06-24 RX ADMIN — NICOTINE POLACRILEX 4 MG: 2 LOZENGE ORAL at 20:15

## 2025-06-24 RX ADMIN — NICOTINE POLACRILEX 4 MG: 2 LOZENGE ORAL at 15:09

## 2025-06-24 RX ADMIN — PHENOBARBITAL 97.2 MG: 97.2 TABLET ORAL at 07:39

## 2025-06-24 RX ADMIN — PROPRANOLOL HYDROCHLORIDE 10 MG: 10 TABLET ORAL at 19:35

## 2025-06-24 RX ADMIN — NICOTINE POLACRILEX 4 MG: 2 LOZENGE ORAL at 21:15

## 2025-06-24 RX ADMIN — NICOTINE POLACRILEX 4 MG: 2 LOZENGE ORAL at 11:45

## 2025-06-24 RX ADMIN — NICOTINE POLACRILEX 4 MG: 2 LOZENGE ORAL at 17:46

## 2025-06-24 RX ADMIN — GABAPENTIN 600 MG: 300 CAPSULE ORAL at 14:06

## 2025-06-24 RX ADMIN — NICOTINE POLACRILEX 4 MG: 2 LOZENGE ORAL at 09:55

## 2025-06-24 RX ADMIN — ACETAMINOPHEN 650 MG: 325 TABLET ORAL at 05:37

## 2025-06-24 RX ADMIN — HYDROXYZINE HYDROCHLORIDE 25 MG: 25 TABLET ORAL at 21:51

## 2025-06-24 RX ADMIN — GABAPENTIN 600 MG: 300 CAPSULE ORAL at 19:34

## 2025-06-24 ASSESSMENT — ACTIVITIES OF DAILY LIVING (ADL)
DRESS: SCRUBS (BEHAVIORAL HEALTH)
ADLS_ACUITY_SCORE: 24
HYGIENE/GROOMING: INDEPENDENT
ADLS_ACUITY_SCORE: 24

## 2025-06-24 NOTE — PLAN OF CARE
Assessment/Intervention/Current Symtoms and Care Coordination:  Chart review and met with team, discussed pt progress, symptomology, and response to treatment.  Discussed the discharge plan and any potential impediments to discharge.     Patient has been doing well on the unit.  Social with peers, attending groups. States he is feeling better- restlessness improving.  He continues to be tapered off benzo.    Patient was agreeable to go to CD treatment. He completed a DAVID assessment this AM and referrals have been sent to [place], [facility] and [facility].    Writer contacted patient's CM Loren ([phone]).  She confirmed that she is currently assigned to patient but he will likely be transferred in the next week or so.  Will continue to coordinate care with her until patient transfers.    Discharge Plan or Goal:  MI/CD Residential treatment  Psychiatry  Therapy    Barriers to Discharge:  Med mgmt  Placement     Referral Status:    6/18 [facility] -    All locations    6/18 [facility]   [facility] [place]   [place]    6/18 [facility]     All locations    DAVID TREATMENT: 6/24/25  [facility]- declined due to acuitiy of MI      Legal Status:  MI + [facility] order  [facility]: [place]   File Number: [id number]  Start and expiration date of commitment: 6/18/25-12/18/25  [facility] meds are: Haldol, Prolixin, Clozaril, Risperdal, Invega, Zyprexa, Seroquel, Abilify and Lurasidone     Contacts (include DELORES status):  Mother:  Sarika Clarke - Ph: [phone] - DELORES signed    :  Loren Cloud-[facility] - Ph: [phone] - DELORES signed -     Psychiatrist: - DELORES signed  [facility]  Address: [address] [place] [address]  Phone: [phone]    Upcoming Meetings and Dates/Important Information and next steps:  Team Note Due: Wednesday    Will need PD and COS at discharge

## 2025-06-24 NOTE — CONSULTS
Met with patient to discuss possible DAVID treatment options and to possibly complete an assessment. Assessment has been completed at this time and patient is being recommended:         Recommendations:   Recommendations: Patient meets criteria for the following levels of care based on ASAM Criteria:    Withdrawal Management - No Withdrawal Management Indicated  Treatment/Recovery Services - 3.5 Clinically Managed Medium and High Intensity Residential Services.  Patient's placement align's with the assessment and placement level of care recommendation based on current ASAM Dimension scale ratings.     Patient reports they are willing to follow these recommendations.    Patient would like the following family or other support people involved in their treatment:  NA.   Patient does not have a history of opiate use. And currently takes 16 mg suboxone daily    Referrals/ Alternatives:   [facility]  [address] [place] [address]  Phone: [phone]  [email or web address]     [place] Access Team for Referrals  Phone: [phone]  Fax: [phone]  [email or web address]     [facility]  [address] [place] [address]  Phone: [phone]  Fax: [phone]  [email or web address]/  [address] [address]  Phone: [phone]  Fax: [phone]  [email or web address]/     [facility]  Main: [phone]  Referral/Intake Line: [phone]  Fax: [phone]  [email or web address]     [facility]  [address] [place] [address]  (same numbers for all)  [facility]  [address] [place] [address]  (same numbers for all)       Referrals are being made  at this time.  Patient is aware of the referrals and is in agreement. Patient gave verbal DELORES's for referrals to [facility], [facility], [facility], and [facility].     Rasta Pradhan [facility] on 6/24/2025 at 11:53 AM

## 2025-06-24 NOTE — PLAN OF CARE
Problem: Sleep Disturbance  Goal: Adequate Sleep/Rest  Outcome: Progressing   Goal Outcome Evaluation:    Patient appears to have slept a total of 5.75 hours.     Given PRN Tylenol for generalized pain and PRN Zyprexa for restlessness. Reports feeling increasingly restless and spent time pacing and lightly jogging in the hallway before going back to sleep.     Safety/environment checks conducted every 15 minutes with no further concerns noted.

## 2025-06-24 NOTE — PROGRESS NOTES
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  [email or web address]/    [facility]  Main: [phone]  Referral/Intake Line: [phone]  Fax: [phone]  [email or web address]    [facility]  [address] [place] [address]  (same numbers for all)  [facility]  [address] [place] [address]  (same numbers for all)        DAVID consult completed by:   Rasta Pradhan [facility]   DAVID Evaluation Counselor  Email: [email or web address] ; Phone: [phone]        Provider Name/ Credentials:  ALIYAH Pisano on 6/24/2025 at 11:24 AM

## 2025-06-24 NOTE — CARE PLAN
06/23/25 2142   Observation Type   How often does the Patient require Staff intervention/redirection? rarely   Harm Category none   No Harm Category Noted at This Time no applicable harm categories or justifications   Level of Special Monitoring none

## 2025-06-24 NOTE — PLAN OF CARE
Group Attendance:  attended full group    Time session began: 1645  Time session ended: 1715  Patient's total time in group: 30    Total # Attendees   8   Group Type DBT     Group Topic Covered Distress Tolerance     Group Session Detail DBT Distress Tolerance skills were taught. Lived experiences explored using DBT distress tolerance skills lens to promote further learning. Handouts provided.       Patient's response to the group topic/interactions:  actively engaged     Patient Details: Barak participated in the group discussion. He shared his own coping strategies for intense emotions. He acknowledged that he has a difficult time sitting and chose to walk around for the duration of the group.          [id number] - Group psychotherapy - 1 Session  Patient Active Problem List   Diagnosis    Tobacco use disorder    Amphetamine use disorder, mild, in early remission (H)    Major depressive disorder, recurrent, moderate (H)    Opioid use disorder, severe, in early remission, on maintenance therapy, dependence (H)    Attention deficit hyperactivity disorder (ADHD), inattentive type, moderate    Sedative, hypnotic or anxiolytic use disorder, mild, abuse (H)    Insomnia, unspecified    Personality disorder, unspecified (H)    Chemical dependency (H)    Mental health problem    Psychosis (H)    Substance-related disorder (H)    Acute psychosis (H)    HTN (hypertension)    Major depressive disorder, recurrent episode, severe (H)    Depression

## 2025-06-24 NOTE — PLAN OF CARE
Start time: 1320  End time: 1400  Patient time total: 40 minutes     attended full group     #5 attended   Group Type: general health and coping and self-care   Group Topic Covered: balanced lifestyle, coping skills, mindfulness, and self-care         Group Session Detail:  Mind/Body/Spirit      Patient Response/Contribution:  cooperative with task, listened actively, expressed understanding of topic, attentive, and engaged socially when prompted         Patient Detail:     Mental health management discussion group on identifying the important things/routines that must be done on a daily/weekly/monthly/etc basis in order to create life balance and promote/maintain stability with mental health.     Able to identify and reflect on needed elements in life that contribute to, and maintain balance. Pt identified a support sytem, his son and exercise as ways he maintains health mind, body/spirit.    Pt will continue to be encouraged to attend groups for ongoing assessment and to address goals identified on plan of care.        [id number] OT Group (2 or more in attendance)       Patient Active Problem List   Diagnosis    Tobacco use disorder    Amphetamine use disorder, mild, in early remission (H)    Major depressive disorder, recurrent, moderate (H)    Opioid use disorder, severe, in early remission, on maintenance therapy, dependence (H)    Attention deficit hyperactivity disorder (ADHD), inattentive type, moderate    Sedative, hypnotic or anxiolytic use disorder, mild, abuse (H)    Insomnia, unspecified    Personality disorder, unspecified (H)    Chemical dependency (H)    Mental health problem    Psychosis (H)    Substance-related disorder (H)    Acute psychosis (H)    HTN (hypertension)    Major depressive disorder, recurrent episode, severe (H)    Depression

## 2025-06-24 NOTE — PLAN OF CARE
Rehab Group     Start time: 1015  End time: 1145  Patient time total: 45 minutes     attended partial group     #8 attended   Group Type: occupational therapy and OT Clinic   Group Topic Covered: activity therapy, coping skills, problem solving, and self-esteem         Group Session Detail:  OT Clinic      Patient Response/Contribution:  cooperative with task, attentive, and actively engaged         Patient Detail:  Pt actively participated in occupational therapy clinic to facilitate coping skill exploration, creative expression within personally meaningful activities, and clinical observation of social, cognitive, and kinesthetic performance skills. Pt response: Needed assistance to initiate, gather materials, sequence, and adjust to workspace demands as needed. Demonstrated fair focus, planning, and problem solving for selected wood project ask. Able to ask for assistance as needed, and appropriately social with peers and staff when prompted.     Pt will continue to be encouraged to attend groups for ongoing assessment and to address goals identified on plan of care.             [id number] OT Group (2 or more in attendance)      Patient Active Problem List   Diagnosis    Tobacco use disorder    Amphetamine use disorder, mild, in early remission (H)    Major depressive disorder, recurrent, moderate (H)    Opioid use disorder, severe, in early remission, on maintenance therapy, dependence (H)    Attention deficit hyperactivity disorder (ADHD), inattentive type, moderate    Sedative, hypnotic or anxiolytic use disorder, mild, abuse (H)    Insomnia, unspecified    Personality disorder, unspecified (H)    Chemical dependency (H)    Mental health problem    Psychosis (H)    Substance-related disorder (H)    Acute psychosis (H)    HTN (hypertension)    Major depressive disorder, recurrent episode, severe (H)    Depression

## 2025-06-24 NOTE — CARE PLAN
06/24/25 1357   Observation Type   How often does the Patient require Staff intervention/redirection? occasionally   Harm Category none   No Harm Category Noted at This Time no applicable harm categories or justifications   Level of Special Monitoring none

## 2025-06-24 NOTE — PROGRESS NOTES
"  ----------------------------------------------------------------------------------------------------------  [facility]  Psychiatry Progress Note  Hospital Day #14     Interim History:     The patient's care was discussed with the treatment team and chart notes were reviewed.    Vitals:   /76 (BP Location: Right arm)   Pulse 79   Temp 97  F (36.1  C) (Temporal)   Resp 18   Ht 1.778 m (5' 10\")   Wt 98.6 kg (217 lb 4.8 oz)   SpO2 96%   BMI 31.18 kg/m     Sleep: 5.75 hours (06/24/25 0600)  Scheduled medications: Took all scheduled medications as prescribed other than folic acid and multivitamin.  PRN medications:   Last 24H PRN:     acetaminophen (TYLENOL) tablet 650 mg, 650 mg at 06/24/25 0537    nicotine (COMMIT) lozenge 4 mg, 4 mg at 06/24/25 0746    OLANZapine (zyPREXA) tablet 5-10 mg, 5 mg at 06/24/25 0537 **OR** OLANZapine (zyPREXA) injection 5-10 mg     Staff Report:   No acute events or safety concerns overnight. Please see staff notes for details.      Subjective:     Patient Interview:  Barak was interviewed by the honey team in the conference room. He says he is feeling  good  today and that he had an epiphany that  I ve gotta stay tough . He adds,  I think this place has helped a lot.\" He reports that his restlessness is slowly getting better and that he doesn t feel  out of it  anymore. He says he is  still antsy  but attributes this to withdrawal from the high dose of clonazepam he was taking prior to admission. He says he has enjoyed playing the guitar here.     The team discussed beginning to taper phenobarbital and he was agreeable to this. He reports no other symptoms. He says he saw the CD assessment team earlier today and has no other questions or concerns.    ROS:  Patient has no bothersome physical symptoms  Patient denies acute concerns     Objective:     Vitals:  /76 (BP Location: Right arm)   Pulse 79   Temp 97  F (36.1  C) " "(Temporal)   Resp 18   Ht 1.778 m (5' 10\")   Wt 98.6 kg (217 lb 4.8 oz)   SpO2 96%   BMI 31.18 kg/m      Allergies:  Allergies   Allergen Reactions    Prazosin Unknown     Worsening of nightmares       Current Medications:  Scheduled:  Current Facility-Administered Medications   Medication Dose Route Frequency Provider Last Rate Last Admin    acetaminophen (TYLENOL) tablet 650 mg  650 mg Oral Q4H PRN Daniel Vazquez MD   650 mg at 06/24/25 0537    alum & mag hydroxide-simethicone (MAALOX) suspension 30 mL  30 mL Oral Q4H PRN Daniel Vazquez MD        buprenorphine HCl-naloxone HCl (SUBOXONE) 8-2 MG per film 1 Film  1 Film Sublingual BID Daniel Vazquez MD   1 Film at 06/24/25 0740    buPROPion (WELLBUTRIN XL) 24 hr tablet 150 mg  150 mg Oral Daily Kimmie Arango MD   150 mg at 06/24/25 0739    folic acid (FOLVITE) tablet 1 mg  1 mg Oral Daily Kimmie Arango MD   1 mg at 06/19/25 0827    gabapentin (NEURONTIN) capsule 300 mg  300 mg Oral TID Daniel Vazquez MD   300 mg at 06/24/25 0739    guaiFENesin (MUCINEX) 12 hr tablet 600 mg  600 mg Oral BID PRN Ciara Quintanilla APRN CNP        hydrOXYzine HCl (ATARAX) tablet 25 mg  25 mg Oral Q4H PRN Daniel Vazquez MD   25 mg at 06/19/25 0326    lurasidone (LATUDA) tablet 80 mg  80 mg Oral At Bedtime Kimmie Arango MD   80 mg at 06/23/25 1905    multivitamin w/minerals (THERA-VIT-M) tablet 1 tablet  1 tablet Oral Daily Kimmie Arango MD   1 tablet at 06/21/25 0741    nicotine (COMMIT) lozenge 4 mg  4 mg Buccal Q1H PRN Bridger Hernandez MD   4 mg at 06/24/25 0746    nicotine (NICODERM CQ) 21 MG/24HR 24 hr patch 1 patch  1 patch Transdermal Daily Kimmie Arango MD   1 patch at 06/24/25 0746    nicotine polacrilex (NICORETTE) gum 4 mg  4 mg Buccal Q1H PRN Kimmie Arango MD   4 mg at 06/22/25 0658    OLANZapine (zyPREXA) tablet 5-10 mg  5-10 mg Oral TID PRN Franny Best MD   5 mg at 06/24/25 0537    Or    OLANZapine " (zyPREXA) injection 5-10 mg  5-10 mg Intramuscular TID PRN Franny Best MD        ondansetron (ZOFRAN) tablet 4 mg  4 mg Oral Q8H PRN Daniel Vazquez MD        PHENobarbital tablet 97.2 mg  97.2 mg Oral TID Kimmie Arango MD   97.2 mg at 06/24/25 0739    propranolol (INDERAL) tablet 10 mg  10 mg Oral TID Radha Pinedo MD   10 mg at 06/24/25 0739    senna-docusate (SENOKOT-S/PERICOLACE) 8.6-50 MG per tablet 1 tablet  1 tablet Oral BID PRN Daniel Vazquez MD        thiamine (B-1) tablet 100 mg  100 mg Oral Daily Kimmie Arango MD   100 mg at 06/24/25 0739    traZODone (DESYREL) tablet 50 mg  50 mg Oral At Bedtime PRN Daniel Vazquez MD   50 mg at 06/22/25 0049       PRN:  Current Facility-Administered Medications   Medication Dose Route Frequency Provider Last Rate Last Admin    acetaminophen (TYLENOL) tablet 650 mg  650 mg Oral Q4H PRN Daniel Vazquez MD   650 mg at 06/24/25 0537    alum & mag hydroxide-simethicone (MAALOX) suspension 30 mL  30 mL Oral Q4H PRN Daniel Vazquez MD        buprenorphine HCl-naloxone HCl (SUBOXONE) 8-2 MG per film 1 Film  1 Film Sublingual BID Daniel Vazquez MD   1 Film at 06/24/25 0740    buPROPion (WELLBUTRIN XL) 24 hr tablet 150 mg  150 mg Oral Daily Kimmie Arango MD   150 mg at 06/24/25 0739    folic acid (FOLVITE) tablet 1 mg  1 mg Oral Daily Kimmie Arango MD   1 mg at 06/19/25 0827    gabapentin (NEURONTIN) capsule 300 mg  300 mg Oral TID Daniel Vazquez MD   300 mg at 06/24/25 0739    guaiFENesin (MUCINEX) 12 hr tablet 600 mg  600 mg Oral BID PRN Dwight, Ciara, APRN CNP        hydrOXYzine HCl (ATARAX) tablet 25 mg  25 mg Oral Q4H PRN Daniel Vazquez MD   25 mg at 06/19/25 0326    lurasidone (LATUDA) tablet 80 mg  80 mg Oral At Bedtime Kimmie Arango MD   80 mg at 06/23/25 0876    multivitamin w/minerals (THERA-VIT-M) tablet 1 tablet  1 tablet Oral Daily Kimmie Arango MD   1 tablet at 06/21/25 5105     nicotine (COMMIT) lozenge 4 mg  4 mg Buccal Q1H PRN DavidBridger sterling MD   4 mg at 06/24/25 0746    nicotine (NICODERM CQ) 21 MG/24HR 24 hr patch 1 patch  1 patch Transdermal Daily Kimmie Arango MD   1 patch at 06/24/25 0746    nicotine polacrilex (NICORETTE) gum 4 mg  4 mg Buccal Q1H PRN Kimmie Arango MD   4 mg at 06/22/25 0658    OLANZapine (zyPREXA) tablet 5-10 mg  5-10 mg Oral TID PRN Franny Best MD   5 mg at 06/24/25 0537    Or    OLANZapine (zyPREXA) injection 5-10 mg  5-10 mg Intramuscular TID PRN Franny Best MD        ondansetron (ZOFRAN) tablet 4 mg  4 mg Oral Q8H PRN Daniel Vazquez MD        PHENobarbital tablet 97.2 mg  97.2 mg Oral TID Kimmie Arango MD   97.2 mg at 06/24/25 0739    propranolol (INDERAL) tablet 10 mg  10 mg Oral TID Radha Pinedo MD   10 mg at 06/24/25 0739    senna-docusate (SENOKOT-S/PERICOLACE) 8.6-50 MG per tablet 1 tablet  1 tablet Oral BID PRN Daniel Vazquez MD        thiamine (B-1) tablet 100 mg  100 mg Oral Daily Kimmie Arango MD   100 mg at 06/24/25 0739    traZODone (DESYREL) tablet 50 mg  50 mg Oral At Bedtime PRN Daniel Vazquez MD   50 mg at 06/22/25 0049       Labs and Imaging:  New results:   No results found for this or any previous visit (from the past 24 hours).    Data this admission:  - CBC unremarkable  - CMP unremarkable other than Ca  - TSH normal  - UDS positive for amphetamines, benzos, cannabinoids  - Vit D low in 2024, consider repeat  - Hgb A1c 5.1 2023, consider repeat  - Lipids elevated in 2024. Consider repeat  - Vit B12 not obtained  - Folate not obtained, normal in 2024 (had been taking it orally)  - EKG normal sinus rhythm, QTc 434     Mental Status Exam:     Oriented to:  Grossly Oriented  General:  Awake and Alert  Appearance:  appears stated age and Grooming is adequate  Behavior/Attitude:  Calm and Cooperative  Eye Contact: Appropriate at times, stares off into the distance but somewhat  "improved  Psychomotor: Normal no catatonia present.   Speech:  appropriate volume/tone  Language: Fluent in English with appropriate syntax and vocabulary.  Mood:   good\"  Affect:  congruent with mood  Thought Process:  disorganized still, but somewhat improved  Thought Content:   No HI and does not appear to be responding to internal stimuli; delusions of paranoia. No SI.   Associations:  questionable  Insight:  limited  Judgment:  limited   Impulse control: fair  Attention Span:  adequate for conversation  Concentration:  grossly intact  Recent and Remote Memory:  not formally assessed  Fund of Knowledge: average  Muscle Strength and Tone: normal  Gait and Station: Normal     Psychiatric Assessment     Barak is a 40 year old male with a past psychiatric history of substance use (benzodiazepines, cannabis, cocaine, opioids) and unspecified psychotic disorder admitted from the ED on 6/10/2025 due to concern for out of control behaviors and psychosis in the context of substance use, medication nonadherence and psychosocial stressors. Significant symptoms include sleep issues, psychosis, disorganized thought, substance use, delusional beliefs, and paranoia. His last psychiatric hospitalization was in 2024.  He has had some outpatient psychiatric management but it isn't clear if he is consistently seeing anyone. The MSE on admission is notable for delusional beliefs, paranoia, and disorganized thought process.       In summary, presentation is consistent with psychosis, likely schizophrenia vs substance induced. He will likely benefit from medication optimization, therapeutic milieu and access to resources this admission.     Psychiatric Plan by Diagnosis      Today's changes:  - Increase gabapentin from 300 mg to 600 mg TID for restlessness  - Decrease phenobarbital from 97.2 mg TID to 97.2 mg BID + 64.8 mg qd (begin taper)    # Acute psychosis, schizophrenia vs. substance-induced psychotic disorder   1. " Medications:  - Continue lurasidone 80mg qpm with food  - Continue previously restarted bupropion at 150mg daily  - Holding PTA Adderall      2. Pertinent Labs/Monitoring:   - EKG QTc 434     3. Additional Plans:  - Patient will be treated in therapeutic milieu with appropriate individual and group therapies as described    # PSUD (opioids, benzodiazepines, cocaine, cannabis)  # benzodiazepine use disorder  - Initiate phenobarbital taper from 97.2mg TID to 97.2 mg BID + 64.8 mg qd  - Increase gabapentin to 600 mg TID for restlessness  - Continue PTA Suboxone 8-2mg BID   - Continue propranolol 10mg TID for restlessness  (likely also aspect of akathisia)     Psychiatric Hospital Course:      Barak Clarke was admitted to [facility] as a voluntary patient on 6/10/2025 after presenting to the ED on 6/8. His PTA medications were continued with the exception of bupropion and Adderall, which were held due to acute psychosis. On 6/21 he was transferred to unit 20, a lower acuity unit.  Medications:  Barak Clarke was admitted to [facility] as a voluntary patient on 6/10/2025 after presenting to the ED on 6/8. His PTA medications were continued with the exception of bupropion and Adderall, which were held due to acute psychosis.  6/10: initiate risperidone 1mg qhs  6/11: discontinue risperidone per patient preference, initiate haloperidol 5mg qhs. Place 72h hold and file for commitment with [facility]. Initiate phenobarbital 64.8mg TID for clonazepam withdrawal  6/12: discontinue haloperidol per patient preference (did not take any doses of paliperidone, risperidone, or haloperidol). Initiate lurasidone 20mg qpm with dinner  6/13: increase lurasidone to 60mg qpm with dinner  6/16: increase lurasidone to 80mg qpm with dinner, restart bupropion at 150mg daily  6/17: increase phenobarbital to 97.2mg TID. Commitment and [facility] orders received today (patient waived his right to a hearing)  6/20: initiate propranolol 10mg TID PRN for  akathisia/restlessness in context of withdrawal   6/24: scheduled propranolol   6/24: decrease phenobarbital to 97.2 mg BID + 64.8 mg qd  6/24: increase gabapentin to 600 mg TID for restlessness    The risks, benefits, alternatives, and side effects were discussed and understood by the patient.     Medical Assessment and Plan     Medical diagnoses to be addressed this admission:    # HTN  - not on any medication    Medical course: Patient was physically examined by the ED prior to being transferred to the unit and was found to be medically stable and appropriate for admission.     Consults: medicine previously for URI symptoms     Checklist     Legal Status: [facility]  Committed     [facility] order: clozapine, paliperidone, risperidone, quetiapine, olanzapine, aripiprazole, lurasidone     Safety Assessment:   Behavioral Orders   Procedures    Code 1 - Restrict to Unit    Routine Programming     As clinically indicated    Status 15     Every 15 minutes.    Suicide precautions: Suicide Risk: MODERATE; Clinical rationale to override score: response to medication     Patients on Suicide Precautions should have a Combination Diet ordered that includes a Diet selection(s) AND a Behavioral Tray selection for Safe Tray - with utensils, or Safe Tray - NO utensils       Suicide Risk:   MODERATE     Clinical rationale to override score::   response to medication    Withdrawal precautions       Risk Assessment:  Risk for harm is moderate.  Risk factors: maladaptive coping, substance use, and past behaviors  Protective factors: engaged in treatment     SIO: no    Disposition: TBD. Pending stabilization, medication optimization, & development of a safe discharge plan.     Attestations     This patient was seen and discussed with my attending physician.    Keith Christian, MS3  [facility] Medical Student  06/24/25     I was present with the medical student who participated in the service and in the documentation of the note. I have verified the  history and personally performed the exam and medical decision making. I agree with the assessment and plan of care as documented in the note. Sandee Trotter M.D., Ph.D.

## 2025-06-24 NOTE — PLAN OF CARE
"  Problem: Adult Behavioral Health Plan of Care  Goal: Adheres to Safety Considerations for Self and Others  Outcome: Progressing  Intervention: Develop and Maintain Individualized Safety Plan  Recent Flowsheet Documentation  Taken 6/24/2025 1647 by Joanie Schumacher RN  Safety Measures:   environmental rounds completed   safety rounds completed     Problem: Suicide Risk  Goal: Absence of Self-Harm  Outcome: Progressing  Intervention: Assess Risk to Self and Maintain Safety  Recent Flowsheet Documentation  Taken 6/24/2025 1647 by Joanie Schumacher RN  Behavior Management:   boundaries reinforced   impulse control promoted  Self-Harm Prevention: environmental self-harm risks assessed  Intervention: Promote Psychosocial Wellbeing  Recent Flowsheet Documentation  Taken 6/24/2025 1647 by Joanie Schumacher RN  Supportive Measures:   active listening utilized   self-care encouraged   self-reflection promoted   self-responsibility promoted  Intervention: Establish Safety Plan and Continuity of Care  Recent Flowsheet Documentation  Taken 6/24/2025 1647 by Joaine Schumacher RN  Safe Transition Promotion: protective factors promoted     Problem: Psychotic Signs/Symptoms  Goal: Improved Behavioral Control (Psychotic Signs/Symptoms)  Outcome: Progressing  Intervention: Manage Behavior  Recent Flowsheet Documentation  Taken 6/24/2025 1647 by Joanie Schumacher RN  De-Escalation Techniques: physical activity promoted   Goal Outcome Evaluation:  Pt was visible in the milieu intermittently. He was observed pacing the quinn playing his stress ball. Stated :my mood is very good  today compared to all the other days\". Pt reported ankle pain . Rated it at 2/10. Declined intervention. Endorsed anxiety 6, depression 5. Denies hallucinations and delusions. Food and fluid intake adequate. Pt attended group. Pt was cooperative with medications. No safety concerns. No behavioral concerns. PRN Zyprexa and hydroxyzine administered per " pt request         Opt out

## 2025-06-24 NOTE — PROGRESS NOTES
Per intake at [facility]:  I sent a message in EPIC regarding Barak Clarke. He is being declined due to MH concerns, including an MI commitment and [facility].     Rasta Pradhan [facility] on 6/24/2025 at 1:29 PM

## 2025-06-24 NOTE — PLAN OF CARE
Problem: Sleep Disturbance  Goal: Adequate Sleep/Rest  Outcome: Not Progressing     Problem: Anxiety  Goal: Anxiety Reduction or Resolution  Outcome: Not Progressing  Intervention: Promote Anxiety Reduction  Recent Flowsheet Documentation  Taken 6/24/2025 0840 by Erika Hamilton RN  Supportive Measures:   active listening utilized   self-care encouraged   self-reflection promoted   self-responsibility promoted  Family/Support System Care:   self-care encouraged   involvement promoted     Problem: Adult Inpatient Plan of Care  Goal: Absence of Hospital-Acquired Illness or Injury  Outcome: Progressing  Goal: Optimal Comfort and Wellbeing  Outcome: Progressing     Problem: Adult Behavioral Health Plan of Care  Goal: Adheres to Safety Considerations for Self and Others  Outcome: Progressing  Goal: Absence of New-Onset Illness or Injury  Outcome: Progressing  Goal: Optimized Coping Skills in Response to Life Stressors  Outcome: Progressing  Intervention: Promote Effective Coping Strategies  Recent Flowsheet Documentation  Taken 6/24/2025 0840 by Erika Hamilton RN  Supportive Measures:   active listening utilized   self-care encouraged   self-reflection promoted   self-responsibility promoted   Goal Outcome Evaluation:    Pt presented with a flat affect. He rated his depression at 6/10, managed with scheduled medication and PRN Hydroxyzine. He said his depression was at 7/10. He denied SI and hearing voices at the time. He said he hears voices when he sleeps . Pt took shower  this shift. He came for his medications early. He said he was feeling restless. Pt was medication compliant. He was provided with distractions, like guitar and stress ball. He was observed jogging about the halls , and was redirected when he was jogging fast by the hallway. He was receptive to redirection.He was offered the exercise bike, but he declined.  Pt was able to participate in groups. Pt frequently requested for  manuela mitchell.

## 2025-06-24 NOTE — PLAN OF CARE
BEH IP Unit Acuity Rating Score (UARS)  Patient is given one point for every criteria they meet.    CRITERIA SCORING   On a 72 hour hold, court hold, committed, stay of commitment, or revocation. 1   Patient LOS on BEH unit exceeds 20 days. 0  LOS: 14   Patient under guardianship, 55+, otherwise medically complex, or under age 11. 0   Suicide ideation without relief of precipitating factors. 0   Current plan for suicide. 0   Current plan for homicide. 0   Imminent risk or actual attempt to seriously harm another without relief of factors precipitating the attempt. 0   Severe dysfunction in daily living (ex: complete neglect for self care, extreme disruption in vegetative function, extreme deterioration in social interactions). 1   Recent (last 7 days) or current physical aggression in the ED or on unit. 0   Restraints or seclusion episode in past 72 hours. 0   Recent (last 7 days) or current verbal aggression, agitation, yelling, etc., while in the ED or unit. 0   Active psychosis. 1   Need for constant or near constant redirection (from leaving, from others, etc).  0   Intrusive or disruptive behaviors. 0   Patient requires 3 or more hours of individualized nursing care per 8-hour shift (i.e. for ADLs, meds, therapeutic interventions). 0   TOTAL 3

## 2025-06-24 NOTE — PLAN OF CARE
Problem: Adult Inpatient Plan of Care  Goal: Absence of Hospital-Acquired Illness or Injury  Intervention: Prevent Skin Injury  Recent Flowsheet Documentation  Taken 6/23/2025 1724 by Magdaleno Naidu RN  Body Position: position changed independently     Problem: Suicide Risk  Goal: Absence of Self-Harm  Outcome: Progressing     Problem: Anxiety  Goal: Anxiety Reduction or Resolution  Outcome: Progressing   Goal Outcome Evaluation:    Plan of Care Reviewed With: patient      Overall Patient Progress: improvingOverall Patient Progress: improving     Alert and oriented, able to communicate needs. He was intermittently visible in milieu, mostly observed pacing, slow jogging at time. Appeared flat, somewhat disorganized, he was cooperative and medication. He received PRN Zyprexa at 1845 per his request for reported increased anxiety, rated at 10, depression rated at 9, he denied suicidal thoughts. ADLs WNL, had a shower. Patient denied any pain or discomfort. Adequate food and fluids intake at supper time.

## 2025-06-25 PROCEDURE — 250N000013 HC RX MED GY IP 250 OP 250 PS 637: Performed by: PSYCHIATRY & NEUROLOGY

## 2025-06-25 PROCEDURE — 250N000013 HC RX MED GY IP 250 OP 250 PS 637

## 2025-06-25 PROCEDURE — 97150 GROUP THERAPEUTIC PROCEDURES: CPT | Mod: GO

## 2025-06-25 PROCEDURE — 250N000012 HC RX MED GY IP 250 OP 636 PS 637: Performed by: PSYCHIATRY & NEUROLOGY

## 2025-06-25 PROCEDURE — 99231 SBSQ HOSP IP/OBS SF/LOW 25: CPT | Mod: GC | Performed by: PSYCHIATRY & NEUROLOGY

## 2025-06-25 PROCEDURE — 250N000013 HC RX MED GY IP 250 OP 250 PS 637: Performed by: STUDENT IN AN ORGANIZED HEALTH CARE EDUCATION/TRAINING PROGRAM

## 2025-06-25 PROCEDURE — 124N000002 HC R&B MH UMMC

## 2025-06-25 RX ORDER — PHENOBARBITAL 97.2 MG/1
97.2 TABLET ORAL 2 TIMES DAILY
Status: COMPLETED | OUTPATIENT
Start: 2025-06-25 | End: 2025-06-25

## 2025-06-25 RX ORDER — PHENOBARBITAL 97.2 MG/1
97.2 TABLET ORAL DAILY
Status: DISCONTINUED | OUTPATIENT
Start: 2025-06-26 | End: 2025-06-26

## 2025-06-25 RX ORDER — PROPRANOLOL HCL 20 MG
20 TABLET ORAL 3 TIMES DAILY
Status: DISCONTINUED | OUTPATIENT
Start: 2025-06-25 | End: 2025-07-07 | Stop reason: HOSPADM

## 2025-06-25 RX ORDER — PHENOBARBITAL 64.8 MG/1
64.8 TABLET ORAL DAILY
Status: DISCONTINUED | OUTPATIENT
Start: 2025-06-26 | End: 2025-07-01

## 2025-06-25 RX ADMIN — BUPRENORPHINE AND NALOXONE 1 FILM: 8; 2 FILM BUCCAL; SUBLINGUAL at 19:43

## 2025-06-25 RX ADMIN — GABAPENTIN 600 MG: 300 CAPSULE ORAL at 19:44

## 2025-06-25 RX ADMIN — THIAMINE HCL TAB 100 MG 100 MG: 100 TAB at 07:58

## 2025-06-25 RX ADMIN — TRAZODONE HYDROCHLORIDE 50 MG: 50 TABLET ORAL at 00:24

## 2025-06-25 RX ADMIN — BUPROPION HYDROCHLORIDE 150 MG: 150 TABLET, EXTENDED RELEASE ORAL at 07:58

## 2025-06-25 RX ADMIN — NICOTINE POLACRILEX 4 MG: 2 LOZENGE ORAL at 07:54

## 2025-06-25 RX ADMIN — NICOTINE POLACRILEX 4 MG: 2 LOZENGE ORAL at 11:12

## 2025-06-25 RX ADMIN — NICOTINE POLACRILEX 4 MG: 2 LOZENGE ORAL at 09:51

## 2025-06-25 RX ADMIN — LURASIDONE HYDROCHLORIDE 80 MG: 80 TABLET, FILM COATED ORAL at 19:43

## 2025-06-25 RX ADMIN — OLANZAPINE 10 MG: 5 TABLET, FILM COATED ORAL at 06:01

## 2025-06-25 RX ADMIN — NICOTINE 1 PATCH: 21 PATCH, EXTENDED RELEASE TRANSDERMAL at 08:03

## 2025-06-25 RX ADMIN — PROPRANOLOL HYDROCHLORIDE 10 MG: 10 TABLET ORAL at 07:58

## 2025-06-25 RX ADMIN — PHENOBARBITAL 97.2 MG: 97.2 TABLET ORAL at 14:27

## 2025-06-25 RX ADMIN — PHENOBARBITAL 97.2 MG: 97.2 TABLET ORAL at 19:44

## 2025-06-25 RX ADMIN — PROPRANOLOL HYDROCHLORIDE 20 MG: 20 TABLET ORAL at 19:44

## 2025-06-25 RX ADMIN — NICOTINE POLACRILEX 4 MG: 2 LOZENGE ORAL at 19:00

## 2025-06-25 RX ADMIN — NICOTINE POLACRILEX 4 MG: 2 LOZENGE ORAL at 17:14

## 2025-06-25 RX ADMIN — OLANZAPINE 5 MG: 5 TABLET, FILM COATED ORAL at 19:51

## 2025-06-25 RX ADMIN — NICOTINE POLACRILEX 4 MG: 2 LOZENGE ORAL at 06:01

## 2025-06-25 RX ADMIN — PROPRANOLOL HYDROCHLORIDE 20 MG: 20 TABLET ORAL at 14:27

## 2025-06-25 RX ADMIN — ACETAMINOPHEN 650 MG: 325 TABLET ORAL at 11:19

## 2025-06-25 RX ADMIN — PHENOBARBITAL 64.8 MG: 64.8 TABLET ORAL at 07:57

## 2025-06-25 RX ADMIN — NICOTINE POLACRILEX 4 MG: 2 LOZENGE ORAL at 16:14

## 2025-06-25 RX ADMIN — NICOTINE POLACRILEX 4 MG: 2 LOZENGE ORAL at 14:28

## 2025-06-25 RX ADMIN — GABAPENTIN 600 MG: 300 CAPSULE ORAL at 07:59

## 2025-06-25 RX ADMIN — BUPRENORPHINE AND NALOXONE 1 FILM: 8; 2 FILM BUCCAL; SUBLINGUAL at 07:58

## 2025-06-25 RX ADMIN — GABAPENTIN 600 MG: 300 CAPSULE ORAL at 14:27

## 2025-06-25 ASSESSMENT — ACTIVITIES OF DAILY LIVING (ADL)
ADLS_ACUITY_SCORE: 24
DRESS: SCRUBS (BEHAVIORAL HEALTH)
ADLS_ACUITY_SCORE: 24
DRESS: INDEPENDENT
ADLS_ACUITY_SCORE: 24
HYGIENE/GROOMING: INDEPENDENT
ADLS_ACUITY_SCORE: 24
ORAL_HYGIENE: INDEPENDENT
ADLS_ACUITY_SCORE: 24
HYGIENE/GROOMING: BATH
ADLS_ACUITY_SCORE: 24
ORAL_HYGIENE: INDEPENDENT
ADLS_ACUITY_SCORE: 24

## 2025-06-25 NOTE — PLAN OF CARE
Problem: Sleep Disturbance  Goal: Adequate Sleep/Rest  Outcome: Progressing   Goal Outcome Evaluation:    Patient appears to have slept a total of 4.75 hours.     Spent Given PRN Zyprexa (10 mg) for continues restlessness. Reported some relief.    Safety/environment checks conducted every 15 minutes with no other concerns noted. No complaints of pain/discomfort.

## 2025-06-25 NOTE — PLAN OF CARE
BEH IP Unit Acuity Rating Score (UARS)  Patient is given one point for every criteria they meet.    CRITERIA SCORING   On a 72 hour hold, court hold, committed, stay of commitment, or revocation. 1   Patient LOS on BEH unit exceeds 20 days. 0  LOS: 15   Patient under guardianship, 55+, otherwise medically complex, or under age 11. 0   Suicide ideation without relief of precipitating factors. 0   Current plan for suicide. 0   Current plan for homicide. 0   Imminent risk or actual attempt to seriously harm another without relief of factors precipitating the attempt. 0   Severe dysfunction in daily living (ex: complete neglect for self care, extreme disruption in vegetative function, extreme deterioration in social interactions). 1   Recent (last 7 days) or current physical aggression in the ED or on unit. 0   Restraints or seclusion episode in past 72 hours. 0   Recent (last 7 days) or current verbal aggression, agitation, yelling, etc., while in the ED or unit. 0   Active psychosis. 1   Need for constant or near constant redirection (from leaving, from others, etc).  0   Intrusive or disruptive behaviors. 0   Patient requires 3 or more hours of individualized nursing care per 8-hour shift (i.e. for ADLs, meds, therapeutic interventions). 0   TOTAL 3

## 2025-06-25 NOTE — PLAN OF CARE
Brief Note:    Re: DAVID referrals.  Patient has been declined admission to [facility].    [facility] ([place]) received assessment. Waiting for further stabilization.  Informed that they can likely continue benzo taper in tx.  [facility]: Requested med records, commitment papers. Awaiting review.

## 2025-06-25 NOTE — PROGRESS NOTES
Rehab Group    Start time: 1330  End time: 1415  Patient time total: 45 minutes    attended full group    #5 attended   Group Type: occupational therapy and recreation   Group Topic Covered: activity therapy, coping skills, and healthy leisure time     Group Session Detail:  Leisure exploration and engagement offered for increased intrinsic motivation to engage in social non-obligatory occupations, challenge new learning, sequencing, problem solving, and retention via a cognitive group game.      Patient Response/Contribution:  cooperative with task and attentive     Patient Detail:  Congruent affect. Able to learn and follow novel game instructions. Demonstrated awareness of strategy. Pt requested to stand d/t restlessness; able to participate in group game while standing.         [id number] OT Group (2 or more in attendance)      Patient Active Problem List   Diagnosis    Tobacco use disorder    Amphetamine use disorder, mild, in early remission (H)    Major depressive disorder, recurrent, moderate (H)    Opioid use disorder, severe, in early remission, on maintenance therapy, dependence (H)    Attention deficit hyperactivity disorder (ADHD), inattentive type, moderate    Sedative, hypnotic or anxiolytic use disorder, mild, abuse (H)    Insomnia, unspecified    Personality disorder, unspecified (H)    Chemical dependency (H)    Mental health problem    Psychosis (H)    Substance-related disorder (H)    Acute psychosis (H)    HTN (hypertension)    Major depressive disorder, recurrent episode, severe (H)    Depression

## 2025-06-25 NOTE — PROGRESS NOTES
Rehab Group    Start time: 1015  End time: 1145  Patient time total: 40 minutes    attended partial group    #9 attended   Group Type: OT Clinic   Group Topic Covered: activity therapy, cognitive activities, and coping skills     Group Session Detail:  Coping skill exploration, creative expression within personally meaningful activities, and observation of social, cognitive, and kinesthetic performance skills.     Patient Response/Contribution:  cooperative with task, socially appropriate, safe use of materials/supplies, actively engaged     Patient Detail:  Congruent affect. Able to organize a selected task to completion. Appeared intermittently restless and excused self early d/t need to walk. Returned for a second project with congruent demeanor.         [id number] OT Group (2 or more in attendance)      Patient Active Problem List   Diagnosis    Tobacco use disorder    Amphetamine use disorder, mild, in early remission (H)    Major depressive disorder, recurrent, moderate (H)    Opioid use disorder, severe, in early remission, on maintenance therapy, dependence (H)    Attention deficit hyperactivity disorder (ADHD), inattentive type, moderate    Sedative, hypnotic or anxiolytic use disorder, mild, abuse (H)    Insomnia, unspecified    Personality disorder, unspecified (H)    Chemical dependency (H)    Mental health problem    Psychosis (H)    Substance-related disorder (H)    Acute psychosis (H)    HTN (hypertension)    Major depressive disorder, recurrent episode, severe (H)    Depression

## 2025-06-25 NOTE — PROGRESS NOTES
"  ----------------------------------------------------------------------------------------------------------  [facility]  Psychiatry Progress Note  Hospital Day #15     Interim History:     The patient's care was discussed with the treatment team and chart notes were reviewed.    Vitals:   /83   Pulse 79   Temp 97.2  F (36.2  C) (Temporal)   Resp 16   Ht 1.778 m (5' 10\")   Wt 98.6 kg (217 lb 4.8 oz)   SpO2 98%   BMI 31.18 kg/m     Sleep: 4.75 hours (06/25/25 0608)  Scheduled medications: Took all scheduled medications as prescribed other than folic acid and multivitamin.  PRN medications:   Last 24H PRN:     acetaminophen (TYLENOL) tablet 650 mg, 650 mg at 06/25/25 1119    hydrOXYzine HCl (ATARAX) tablet 25 mg, 25 mg at 06/24/25 2151    nicotine (COMMIT) lozenge 4 mg, 4 mg at 06/25/25 1112    OLANZapine (zyPREXA) tablet 5-10 mg, 10 mg at 06/25/25 0601 **OR** OLANZapine (zyPREXA) injection 5-10 mg    traZODone (DESYREL) tablet 50 mg, 50 mg at 06/25/25 0024     Staff Report:   No acute events or safety concerns overnight. Please see staff notes for details.   OT: \"Appeared intermittently restless and excused self early d/t need to walk.\"     Subjective:     Patient Interview:  Barak was interviewed in the [facility] conference room by the team. He is feeling  so much better  today. He reports that yesterday he  broke down in his room  but felt better after that. He reports that he isn t thinking as much about withdrawal pain anymore, instead he is thinking more about his next steps including treatment. He endorses that his restlessness is better and thinks that the increased dose of gabapentin has been helpful. His body does not feel as tense. He has minor ankle pain due to jogging in the hallway but says that shoes have been helpful for this. Team suggested increasing propranolol dose, patient was agreeable to this. He reports waking up a few times during the " "night but was able to fall back asleep without asking for a medication, which is new for him. Endorses good appetite. Does not report any medication side effects. Patient is ready to slowly taper phenobarbital.     ROS:  Patient has ankle pain,   Patient denies acute concerns     Objective:     Vitals:  /83   Pulse 79   Temp 97.2  F (36.2  C) (Temporal)   Resp 16   Ht 1.778 m (5' 10\")   Wt 98.6 kg (217 lb 4.8 oz)   SpO2 98%   BMI 31.18 kg/m      Allergies:  Allergies   Allergen Reactions    Prazosin Unknown     Worsening of nightmares       Current Medications:  Scheduled:  Current Facility-Administered Medications   Medication Dose Route Frequency Provider Last Rate Last Admin    acetaminophen (TYLENOL) tablet 650 mg  650 mg Oral Q4H PRN Daniel Vazquez MD   650 mg at 06/25/25 1119    alum & mag hydroxide-simethicone (MAALOX) suspension 30 mL  30 mL Oral Q4H PRN Daniel Vazquez MD        buprenorphine HCl-naloxone HCl (SUBOXONE) 8-2 MG per film 1 Film  1 Film Sublingual BID Daniel Vazquez MD   1 Film at 06/25/25 0758    buPROPion (WELLBUTRIN XL) 24 hr tablet 150 mg  150 mg Oral Daily Kimmie Arango MD   150 mg at 06/25/25 0758    gabapentin (NEURONTIN) capsule 600 mg  600 mg Oral TID Sandee Trotter MD   600 mg at 06/25/25 0759    guaiFENesin (MUCINEX) 12 hr tablet 600 mg  600 mg Oral BID PRN Ciara Quintanilla APRN CNP        hydrOXYzine HCl (ATARAX) tablet 25 mg  25 mg Oral Q4H PRN Daniel Vazquez MD   25 mg at 06/24/25 2151    lurasidone (LATUDA) tablet 80 mg  80 mg Oral At Bedtime Kimmie Arango MD   80 mg at 06/24/25 1935    nicotine (COMMIT) lozenge 4 mg  4 mg Buccal Q1H PRN Bridger Hernandez MD   4 mg at 06/25/25 1112    nicotine (NICODERM CQ) 21 MG/24HR 24 hr patch 1 patch  1 patch Transdermal Daily Kimmie Arango MD   1 patch at 06/25/25 0803    nicotine polacrilex (NICORETTE) gum 4 mg  4 mg Buccal Q1H PRN Kimmie Arango MD   4 mg at 06/22/25 0658    " OLANZapine (zyPREXA) tablet 5-10 mg  5-10 mg Oral TID PRN Franny Best MD   10 mg at 06/25/25 0601    Or    OLANZapine (zyPREXA) injection 5-10 mg  5-10 mg Intramuscular TID PRN Franny Best MD        ondansetron (ZOFRAN) tablet 4 mg  4 mg Oral Q8H PRN Daniel Vazquez MD        PHENobarbital tablet 64.8 mg  64.8 mg Oral QAM Radha Pinedo MD   64.8 mg at 06/25/25 0757    PHENobarbital tablet 97.2 mg  97.2 mg Oral BID Radha Pinedo MD   97.2 mg at 06/24/25 1934    propranolol (INDERAL) tablet 20 mg  20 mg Oral TID Radha Pinedo MD        senna-docusate (SENOKOT-S/PERICOLACE) 8.6-50 MG per tablet 1 tablet  1 tablet Oral BID PRN Daniel Vazquez MD        [START ON 6/26/2025] thiamine (B-1) tablet 100 mg  100 mg Oral Daily Radha Pinedo MD        traZODone (DESYREL) tablet 50 mg  50 mg Oral At Bedtime PRN Daniel Vazquez MD   50 mg at 06/25/25 0024       PRN:  Current Facility-Administered Medications   Medication Dose Route Frequency Provider Last Rate Last Admin    acetaminophen (TYLENOL) tablet 650 mg  650 mg Oral Q4H PRN Daniel Vazquez MD   650 mg at 06/25/25 1119    alum & mag hydroxide-simethicone (MAALOX) suspension 30 mL  30 mL Oral Q4H PRN Daniel Vazquez MD        buprenorphine HCl-naloxone HCl (SUBOXONE) 8-2 MG per film 1 Film  1 Film Sublingual BID Daniel Vazquez MD   1 Film at 06/25/25 0758    buPROPion (WELLBUTRIN XL) 24 hr tablet 150 mg  150 mg Oral Daily Kimmie Arango MD   150 mg at 06/25/25 0758    gabapentin (NEURONTIN) capsule 600 mg  600 mg Oral TID Sandee Trotter MD   600 mg at 06/25/25 0759    guaiFENesin (MUCINEX) 12 hr tablet 600 mg  600 mg Oral BID PRN Ciara Quintanilla APRN CNP        hydrOXYzine HCl (ATARAX) tablet 25 mg  25 mg Oral Q4H PRN Daniel Vazquez MD   25 mg at 06/24/25 2151    lurasidone (LATUDA) tablet 80 mg  80 mg Oral At Bedtime Kimmie Arango MD   80 mg at 06/24/25 4004     nicotine (COMMIT) lozenge 4 mg  4 mg Buccal Q1H PRN DavidBridger lincoln MD   4 mg at 06/25/25 1112    nicotine (NICODERM CQ) 21 MG/24HR 24 hr patch 1 patch  1 patch Transdermal Daily Kimmie Arango MD   1 patch at 06/25/25 0803    nicotine polacrilex (NICORETTE) gum 4 mg  4 mg Buccal Q1H PRN Kimmie Arango MD   4 mg at 06/22/25 0658    OLANZapine (zyPREXA) tablet 5-10 mg  5-10 mg Oral TID PRN Franny Best MD   10 mg at 06/25/25 0601    Or    OLANZapine (zyPREXA) injection 5-10 mg  5-10 mg Intramuscular TID PRN Franny Best MD        ondansetron (ZOFRAN) tablet 4 mg  4 mg Oral Q8H PRN Daniel Vazquez MD        PHENobarbital tablet 64.8 mg  64.8 mg Oral QAM Radha Pinedo MD   64.8 mg at 06/25/25 0757    PHENobarbital tablet 97.2 mg  97.2 mg Oral BID Radha Pinedo MD   97.2 mg at 06/24/25 1934    propranolol (INDERAL) tablet 20 mg  20 mg Oral TID Radha Pinedo MD        senna-docusate (SENOKOT-S/PERICOLACE) 8.6-50 MG per tablet 1 tablet  1 tablet Oral BID PRN Daniel Vazquez MD        [START ON 6/26/2025] thiamine (B-1) tablet 100 mg  100 mg Oral Daily Radha Pinedo MD        traZODone (DESYREL) tablet 50 mg  50 mg Oral At Bedtime PRN Daniel Vazquez MD   50 mg at 06/25/25 0024       Labs and Imaging:  New results:   No results found for this or any previous visit (from the past 24 hours).    Data this admission:  - CBC unremarkable  - CMP unremarkable other than Ca  - TSH normal  - UDS positive for amphetamines, benzos, cannabinoids  - Vit D low in 2024, consider repeat  - Hgb A1c 5.1 2023, consider repeat  - Lipids elevated in 2024. Consider repeat  - Vit B12 not obtained  - Folate not obtained, normal in 2024 (had been taking it orally)  - EKG normal sinus rhythm, QTc 434     Mental Status Exam:     Oriented to:  Grossly Oriented  General:  Awake and Alert  Appearance:  appears stated age and Grooming is adequate  Behavior/Attitude:   "Calm and Cooperative  Eye Contact: Appropriate at times, stares off into the distance but somewhat improved  Psychomotor: Normal no catatonia present.   Speech:  appropriate volume/tone  Language: Fluent in English with appropriate syntax and vocabulary.  Mood:   so much better\"  Affect:  congruent with mood  Thought Process:  disorganized still, but somewhat improved  Thought Content:   No HI and does not appear to be responding to internal stimuli; delusions of paranoia. No SI.   Associations:  questionable  Insight:  limited  Judgment:  limited   Impulse control: fair  Attention Span:  adequate for conversation  Concentration:  grossly intact  Recent and Remote Memory:  not formally assessed  Fund of Knowledge: average  Muscle Strength and Tone: normal  Gait and Station: Normal     Psychiatric Assessment     Barak is a 40 year old male with a past psychiatric history of substance use (benzodiazepines, cannabis, cocaine, opioids) & unspecified psychotic disorder admitted from the ED on 6/10/2025 due to concern for out of control behaviors and psychosis in the context of substance use, medication nonadherence and psychosocial stressors. Significant symptoms include sleep issues, disorganized and delusional thoughts, substance use, delusional beliefs, and paranoia. His last psychiatric hospitalization was in 2024. He has had some outpatient psychiatric management but it isn't clear if he is consistently seeing anyone. The MSE on admission is notable for delusional beliefs, paranoia, and disorganized thought process.       In summary, presentation is consistent with psychosis, likely schizophrenia vs substance induced. He will likely benefit from medication optimization, therapeutic milieu and access to resources this admission.     Psychiatric Plan by Diagnosis      Today's changes:  - Increase propranolol to 20 mg TID for restlessness  - Decrease phenobarbital from 97.2 mg TID to 97.2 mg BID + 64.8 mg qd (begin " taper)    # Acute psychosis, schizophrenia vs. substance-induced psychotic disorder   1. Medications:  - Continue lurasidone 80mg qpm with food  - Continue previously restarted bupropion at 150mg daily  - Holding PTA Adderall (would not recommend restarting this)     2. Pertinent Labs/Monitoring:   - EKG QTc 434     3. Additional Plans:  - Patient will be treated in therapeutic milieu with appropriate individual and group therapies as described    # PSUD (opioids, benzodiazepines, cocaine, cannabis)  # benzodiazepine use disorder  - continue phenobarbital taper from 97.2mg TID to 97.2 mg BID + 64.8 mg qd  - Gabapentin 600 mg TID for restlessness  - Continue PTA Suboxone 8-2mg BID   - Increase propranolol to 20 mg TID for restlessness (likely also aspect of akathisia)    # Akathisia  - continue propranolol        Psychiatric Hospital Course:      Barak Clarke was admitted to [facility] as a voluntary patient on 6/10/2025 after presenting to the ED on 6/8. His PTA medications were continued with the exception of bupropion and Adderall, which were held due to acute psychosis. On 6/21 he was transferred to unit 20, a lower acuity unit.  Medications:  Barak Clarke was admitted to [facility] as a voluntary patient on 6/10/2025 after presenting to the ED on 6/8. His PTA medications were continued with the exception of bupropion and Adderall, which were held due to acute psychosis.  6/10: initiate risperidone 1mg qhs  6/11: discontinue risperidone per patient preference, initiate haloperidol 5mg qhs. Place 72h hold and file for commitment with [facility]. Initiate phenobarbital 64.8mg TID for clonazepam withdrawal  6/12: discontinue haloperidol per patient preference (did not take any doses of paliperidone, risperidone, or haloperidol). Initiate lurasidone 20mg qpm with dinner  6/13: increase lurasidone to 60mg qpm with dinner  6/16: increase lurasidone to 80mg qpm with dinner, restart bupropion at 150mg daily  6/17: increase  phenobarbital to 97.2mg TID. Commitment and [facility] orders received today (patient waived his right to a hearing)  6/20: initiate propranolol 10mg TID PRN for akathisia/restlessness in context of withdrawal   6/24: scheduled propranolol   6/24: decrease phenobarbital to 97.2 mg BID + 64.8 mg qd  6/24: increase gabapentin to 600 mg TID for restlessness  6/25: increase propranolol to 20 mg TID for akathisia/restlessness    The risks, benefits, alternatives, and side effects were discussed and understood by the patient.     Medical Assessment and Plan     Medical diagnoses to be addressed this admission:    # HTN  - not on any medication    Medical course: Patient was physically examined by the ED prior to being transferred to the unit and was found to be medically stable and appropriate for admission.     Consults: medicine previously for URI symptoms     Checklist     Legal Status: [facility]  Committed     [facility] order: clozapine, paliperidone, risperidone, quetiapine, olanzapine, aripiprazole, lurasidone     Safety Assessment:   Behavioral Orders   Procedures    Code 1 - Restrict to Unit    Routine Programming     As clinically indicated    Status 15     Every 15 minutes.    Suicide precautions: Suicide Risk: MODERATE; Clinical rationale to override score: response to medication     Patients on Suicide Precautions should have a Combination Diet ordered that includes a Diet selection(s) AND a Behavioral Tray selection for Safe Tray - with utensils, or Safe Tray - NO utensils       Suicide Risk:   MODERATE     Clinical rationale to override score::   response to medication    Withdrawal precautions       Risk Assessment:  Risk for harm is moderate.  Risk factors: maladaptive coping, substance use, and past behaviors  Protective factors: engaged in treatment     SIO: no    Disposition: TBD. Pending stabilization, medication optimization, & development of a safe discharge plan.     Attestations     This patient was seen and  discussed with my attending physician.    Valerie Mcneill, MS3  [facility] Medical Student  06/25/25     Resident/Fellow Attestation   I, Radha Pinedo MD, was present with the medical/DAGOBERTO student who participated in the service and in the documentation of the note.  I have verified the history and personally performed the physical exam and medical decision making.  I agree with the assessment and plan of care as documented in the note.        Radha Pinedo MD  PGY1  Date of Service (when I saw the patient): 06/25/25    Attestation:  This patient has been seen and evaluated by me, Sandee Trotter MD.  I have discussed this patient with the house staff team including the resident and/or medical student and I agree with the findings and plan in this note.    I have reviewed today's vital signs, medications, labs and imaging. Sandee Trotter MD , PhD.

## 2025-06-25 NOTE — PLAN OF CARE
Problem: Adult Behavioral Health Plan of Care  Goal: Adheres to Safety Considerations for Self and Others  Outcome: Progressing  Intervention: Develop and Maintain Individualized Safety Plan  Recent Flowsheet Documentation  Taken 6/25/2025 1700 by Joanie Schumacher RN  Safety Measures:   environmental rounds completed   safety rounds completed     Problem: Adult Inpatient Plan of Care  Goal: Absence of Hospital-Acquired Illness or Injury  Intervention: Prevent Skin Injury  Recent Flowsheet Documentation  Taken 6/25/2025 1700 by Joanie Schumacher RN  Body Position: position changed independently     Problem: Adult Behavioral Health Plan of Care  Goal: Plan of Care Review  Recent Flowsheet Documentation  Taken 6/25/2025 1700 by Joanie Schumacher RN  Patient Agreement with Plan of Care: agrees  Goal: Optimized Coping Skills in Response to Life Stressors  Intervention: Promote Effective Coping Strategies  Recent Flowsheet Documentation  Taken 6/25/2025 1700 by Joanie Schumacher RN  Supportive Measures:   active listening utilized   self-care encouraged   self-reflection promoted   self-responsibility promoted     Problem: Suicide Risk  Goal: Absence of Self-Harm  Intervention: Assess Risk to Self and Maintain Safety  Recent Flowsheet Documentation  Taken 6/25/2025 1819 by Joanie Schumacher RN  Behavior Management: behavioral plan reviewed  Taken 6/25/2025 1700 by Joanie Schumacher RN  Behavior Management:   boundaries reinforced   impulse control promoted  Self-Harm Prevention: environmental self-harm risks assessed  Intervention: Promote Psychosocial Wellbeing  Recent Flowsheet Documentation  Taken 6/25/2025 1700 by Joanie Schumacher RN  Supportive Measures:   active listening utilized   self-care encouraged   self-reflection promoted   self-responsibility promoted  Family/Support System Care:   self-care encouraged   support provided  Intervention: Establish Safety Plan and Continuity of Care  Recent  Impression: Age-related nuclear cataract, bilateral: H25.13. Plan: Discussed diagnosis. Will continue to observe. Flowsheet Documentation  Taken 6/25/2025 1700 by Joanie Schumacher RN  Safe Transition Promotion: protective factors promoted     Problem: Psychotic Signs/Symptoms  Goal: Improved Behavioral Control (Psychotic Signs/Symptoms)  Intervention: Manage Behavior  Recent Flowsheet Documentation  Taken 6/25/2025 1700 by Joanie Schumacher RN  De-Escalation Techniques: physical activity promoted  Goal: Optimal Cognitive Function (Psychotic Signs/Symptoms)  Intervention: Support and Promote Cognitive Ability  Recent Flowsheet Documentation  Taken 6/25/2025 1700 by Joanie Schumacher RN  Reorientation Measures: reorientation provided  Communication Support Strategies: active listening utilized  Goal: Increased Participation and Engagement (Psychotic Signs/Symptoms)  Intervention: Facilitate Participation and Engagement  Recent Flowsheet Documentation  Taken 6/25/2025 1700 by Joanie Schumacher RN  Supportive Measures:   active listening utilized   self-care encouraged   self-reflection promoted   self-responsibility promoted  Diversional Activity: other (see comments)  Goal: Improved Mood Symptoms (Psychotic Signs/Symptoms)  Intervention: Optimize Emotion and Mood  Recent Flowsheet Documentation  Taken 6/25/2025 1700 by Joanie Schumacher RN  Supportive Measures:   active listening utilized   self-care encouraged   self-reflection promoted   self-responsibility promoted  Diversional Activity: other (see comments)  Goal: Decreased Sensory Symptoms (Psychotic Signs/Symptoms)  Intervention: Minimize and Manage Sensory Impairment  Recent Flowsheet Documentation  Taken 6/25/2025 1700 by Joanie Schumacher RN  Sensory Stimulation Regulation: quiet environment promoted     Problem: Skin Injury Risk Increased  Goal: Skin Health and Integrity  Intervention: Plan: Nurse Driven Intervention: Moisture Management  Recent Flowsheet Documentation  Taken 6/25/2025 1700 by Joanie Schumacher RN  Moisture Interventions: Encourage  regular toileting  Intervention: Optimize Skin Protection  Recent Flowsheet Documentation  Taken 6/25/2025 1700 by Joanie Schumacher RN  Head of Bed (HOB) Positioning: HOB flat     Problem: Anxiety  Goal: Anxiety Reduction or Resolution  Intervention: Promote Anxiety Reduction  Recent Flowsheet Documentation  Taken 6/25/2025 1700 by Joanie Schumacher RN  Supportive Measures:   active listening utilized   self-care encouraged   self-reflection promoted   self-responsibility promoted  Family/Support System Care:   self-care encouraged   support provided   Goal Outcome Evaluation:    Plan of Care Reviewed With: patient      Pt was observed pacing the halls. Pt was visible in the milieu intermittently.Affect flat and blunted. Mood anxious and depressed. Endorsed anxiety 6/10, denies depression, SI, hallucinations and delusions. Pt took a shower this shift. Hygiene appropriate. Po intake appropriate. Pt was cooperative and compliant with medications. No safety concerns. No behavioral concerns.

## 2025-06-25 NOTE — CARE PLAN
06/25/25 1437   Observation Type   How often does the Patient require Staff intervention/redirection? rarely   Harm Category none   Level of Special Monitoring none

## 2025-06-25 NOTE — PLAN OF CARE
"  Problem: Sleep Disturbance  Goal: Adequate Sleep/Rest  Outcome: Not Progressing     Problem: Adult Inpatient Plan of Care  Goal: Patient-Specific Goal (Individualized)  Description: You can add care plan individualizations to a care plan. Examples of Individualization might be:  \"Parent requests to be called daily at 9am for status\", \"I have a hard time hearing out of my right ear\", or \"Do not touch me to wake me up as it startles  me\".  Outcome: Progressing  Goal: Absence of Hospital-Acquired Illness or Injury  Outcome: Progressing  Goal: Optimal Comfort and Wellbeing  Outcome: Progressing     Problem: Adult Behavioral Health Plan of Care  Goal: Adheres to Safety Considerations for Self and Others  Outcome: Progressing  Intervention: Develop and Maintain Individualized Safety Plan  Recent Flowsheet Documentation  Taken 6/25/2025 1400 by Erika Hamilton RN  Safety Measures:   environmental rounds completed   safety rounds completed  Goal: Absence of New-Onset Illness or Injury  Outcome: Progressing  Goal: Optimized Coping Skills in Response to Life Stressors  Outcome: Progressing  Intervention: Promote Effective Coping Strategies  Recent Flowsheet Documentation  Taken 6/25/2025 1400 by Erika Hamilton RN  Supportive Measures:   active listening utilized   self-care encouraged   self-reflection promoted   self-responsibility promoted  Taken 6/25/2025 0850 by Erika Hamilton, RN  Supportive Measures:   active listening utilized   counseling provided   self-reflection promoted   self-responsibility promoted     Problem: Suicide Risk  Goal: Absence of Self-Harm  Outcome: Progressing  Intervention: Assess Risk to Self and Maintain Safety  Recent Flowsheet Documentation  Taken 6/25/2025 1400 by Erika Hamilton, RN  Behavior Management:   boundaries reinforced   impulse control promoted  Self-Harm Prevention: environmental self-harm risks assessed  Taken 6/25/2025 0850 by Alfonso, " Erika HUNT RN  Behavior Management:   boundaries reinforced   impulse control promoted  Self-Harm Prevention: environmental self-harm risks assessed  Enhanced Safety Measures: pain management  Intervention: Promote Psychosocial Wellbeing  Recent Flowsheet Documentation  Taken 6/25/2025 1400 by Erika Hamilton RN  Supportive Measures:   active listening utilized   self-care encouraged   self-reflection promoted   self-responsibility promoted  Taken 6/25/2025 0850 by Erika Hamilton RN  Supportive Measures:   active listening utilized   counseling provided   self-reflection promoted   self-responsibility promoted  Family/Support System Care:   self-care encouraged   support provided  Intervention: Establish Safety Plan and Continuity of Care  Recent Flowsheet Documentation  Taken 6/25/2025 1400 by Erika Hamilton RN  Safe Transition Promotion: protective factors promoted  Taken 6/25/2025 0850 by Erika Hamilton RN  Safe Transition Promotion: protective factors promoted     Problem: Anxiety  Goal: Anxiety Reduction or Resolution  Outcome: Progressing  Intervention: Promote Anxiety Reduction  Recent Flowsheet Documentation  Taken 6/25/2025 1400 by Erika Hamilton RN  Supportive Measures:   active listening utilized   self-care encouraged   self-reflection promoted   self-responsibility promoted  Taken 6/25/2025 0850 by Erika Hamilton RN  Supportive Measures:   active listening utilized   counseling provided   self-reflection promoted   self-responsibility promoted  Family/Support System Care:   self-care encouraged   support provided   Goal Outcome Evaluation:    Plan of Care Reviewed With: patient       Pt is visible in the milieu. He is able to verbalize needs. He asked for his medications early. He is cooperative, medication compliant. His nutrition and hydration is adequate. Hygiene is adequate as well. He took shower in the morning. Pt was able to  join OT clinic groups this shift. He interacts with peers. Interactions were appropriate. Pt requested to use the guitar and asked to print out some song - chords and lyrics. Pt is working on learning to play a song. Pt frequently asked for nicotine lozenges. Pt was observed to be less tense today as compared to yesterdays morning shift. He did not do any running . Easily redirectable. Less jogging today. He endorsed 5/10 anxiety, depression at 4/10,  denies SI and hallucinations.

## 2025-06-25 NOTE — PROVIDER NOTIFICATION
06/25/25 0926   Individualization/Patient Specific Goals   Patient Personal Strengths community support;positive vocational history;motivated for treatment;expressive of needs;expressive of emotions;family/social support   Patient Vulnerabilities adverse childhood experience(s);substance abuse/addiction;family/relationship conflict;lacks insight into illness   Anxieties, Fears or Concerns None   Individualized Care Needs None   Interprofessional Rounds   Summary Patient admitted with SI, polysubstance use   Participants CTC;psychiatrist;nursing   Behavioral Team Discussion   Participants Dr. Trotter, Dr. Pinedo, Andree Miranda RN, Gayathri Pearson MA.LP,   Progress Pt is improving, he waived his hearing and is now on MI commitment and Briggs. Will need more time to stabilize and taper off phenobarbital before discharging. DAVID assessment completed and referrals made   Anticipated length of stay 7-10 days   Continued Stay Criteria/Rationale Med mgmt per MD's, placement   Medical/Physical See H&P   Precautions Suicide, Withdrawal (Klonopin)   Plan Stabilize on meds, continue taper. Discharge to Residential MI/CD treatment   Rationale for change in precautions or plan No change in plan/precautions   Safety Plan Completed   Anticipated Discharge Disposition substance use treatment     Goal Outcome Evaluation:

## 2025-06-26 PROCEDURE — 99231 SBSQ HOSP IP/OBS SF/LOW 25: CPT | Mod: GC | Performed by: PSYCHIATRY & NEUROLOGY

## 2025-06-26 PROCEDURE — 124N000002 HC R&B MH UMMC

## 2025-06-26 PROCEDURE — 250N000013 HC RX MED GY IP 250 OP 250 PS 637: Performed by: PSYCHIATRY & NEUROLOGY

## 2025-06-26 PROCEDURE — 250N000012 HC RX MED GY IP 250 OP 636 PS 637: Performed by: PSYCHIATRY & NEUROLOGY

## 2025-06-26 PROCEDURE — 97150 GROUP THERAPEUTIC PROCEDURES: CPT | Mod: GO

## 2025-06-26 PROCEDURE — 250N000013 HC RX MED GY IP 250 OP 250 PS 637

## 2025-06-26 PROCEDURE — 250N000013 HC RX MED GY IP 250 OP 250 PS 637: Performed by: STUDENT IN AN ORGANIZED HEALTH CARE EDUCATION/TRAINING PROGRAM

## 2025-06-26 RX ORDER — PHENOBARBITAL 64.8 MG/1
64.8 TABLET ORAL AT BEDTIME
Status: DISCONTINUED | OUTPATIENT
Start: 2025-06-27 | End: 2025-07-02

## 2025-06-26 RX ORDER — PHENOBARBITAL 97.2 MG/1
97.2 TABLET ORAL DAILY
Status: COMPLETED | OUTPATIENT
Start: 2025-06-26 | End: 2025-06-26

## 2025-06-26 RX ADMIN — NICOTINE POLACRILEX 4 MG: 2 LOZENGE ORAL at 06:09

## 2025-06-26 RX ADMIN — ACETAMINOPHEN 650 MG: 325 TABLET ORAL at 08:05

## 2025-06-26 RX ADMIN — NICOTINE POLACRILEX 4 MG: 2 LOZENGE ORAL at 07:55

## 2025-06-26 RX ADMIN — PHENOBARBITAL 97.2 MG: 97.2 TABLET ORAL at 20:05

## 2025-06-26 RX ADMIN — OLANZAPINE 10 MG: 5 TABLET, FILM COATED ORAL at 21:07

## 2025-06-26 RX ADMIN — NICOTINE 1 PATCH: 21 PATCH, EXTENDED RELEASE TRANSDERMAL at 08:00

## 2025-06-26 RX ADMIN — BUPRENORPHINE AND NALOXONE 1 FILM: 8; 2 FILM BUCCAL; SUBLINGUAL at 07:56

## 2025-06-26 RX ADMIN — PHENOBARBITAL 64.8 MG: 64.8 TABLET ORAL at 07:56

## 2025-06-26 RX ADMIN — PHENOBARBITAL 64.8 MG: 64.8 TABLET ORAL at 14:51

## 2025-06-26 RX ADMIN — PROPRANOLOL HYDROCHLORIDE 20 MG: 20 TABLET ORAL at 07:56

## 2025-06-26 RX ADMIN — NICOTINE POLACRILEX 4 MG: 2 LOZENGE ORAL at 16:33

## 2025-06-26 RX ADMIN — BUPROPION HYDROCHLORIDE 150 MG: 150 TABLET, EXTENDED RELEASE ORAL at 07:56

## 2025-06-26 RX ADMIN — LURASIDONE HYDROCHLORIDE 80 MG: 80 TABLET, FILM COATED ORAL at 20:06

## 2025-06-26 RX ADMIN — GABAPENTIN 600 MG: 300 CAPSULE ORAL at 20:05

## 2025-06-26 RX ADMIN — PROPRANOLOL HYDROCHLORIDE 20 MG: 20 TABLET ORAL at 14:51

## 2025-06-26 RX ADMIN — GABAPENTIN 600 MG: 300 CAPSULE ORAL at 14:51

## 2025-06-26 RX ADMIN — NICOTINE POLACRILEX 4 MG: 2 LOZENGE ORAL at 20:06

## 2025-06-26 RX ADMIN — OLANZAPINE 5 MG: 5 TABLET, FILM COATED ORAL at 01:20

## 2025-06-26 RX ADMIN — TRAZODONE HYDROCHLORIDE 50 MG: 50 TABLET ORAL at 02:20

## 2025-06-26 RX ADMIN — BUPRENORPHINE AND NALOXONE 1 FILM: 8; 2 FILM BUCCAL; SUBLINGUAL at 20:05

## 2025-06-26 RX ADMIN — NICOTINE POLACRILEX 4 MG: 2 LOZENGE ORAL at 10:37

## 2025-06-26 RX ADMIN — THIAMINE HCL TAB 100 MG 100 MG: 100 TAB at 07:56

## 2025-06-26 RX ADMIN — PROPRANOLOL HYDROCHLORIDE 20 MG: 20 TABLET ORAL at 20:11

## 2025-06-26 RX ADMIN — NICOTINE POLACRILEX 4 MG: 2 LOZENGE ORAL at 18:33

## 2025-06-26 RX ADMIN — GABAPENTIN 600 MG: 300 CAPSULE ORAL at 07:56

## 2025-06-26 ASSESSMENT — ACTIVITIES OF DAILY LIVING (ADL)
ADLS_ACUITY_SCORE: 24
DRESS: SCRUBS (BEHAVIORAL HEALTH)
ADLS_ACUITY_SCORE: 24
ORAL_HYGIENE: INDEPENDENT
ADLS_ACUITY_SCORE: 24
HYGIENE/GROOMING: INDEPENDENT
ADLS_ACUITY_SCORE: 24

## 2025-06-26 NOTE — PLAN OF CARE
"  Problem: Sleep Disturbance  Goal: Adequate Sleep/Rest  Outcome: Progressing   Goal Outcome Evaluation:    Pt requested and was given PRN  Zyprexa and Seroquel. Pt reported that the medication was some what effective. Pt denies pain. Pt slept for 4.5 hours. No new concerns at this time.    Most Resent vitals are:  Blood pressure 109/72, pulse 98, temperature 97.2  F (36.2  C), temperature source Temporal, resp. rate 16, height 1.778 m (5' 10\"), weight 98.6 kg (217 lb 4.8 oz), SpO2 98%.    "

## 2025-06-26 NOTE — PLAN OF CARE
"  Problem: Adult Inpatient Plan of Care  Goal: Patient-Specific Goal (Individualized)  Description: You can add care plan individualizations to a care plan. Examples of Individualization might be:  \"Parent requests to be called daily at 9am for status\", \"I have a hard time hearing out of my right ear\", or \"Do not touch me to wake me up as it startles  me\".  Outcome: Progressing  Goal: Absence of Hospital-Acquired Illness or Injury  Outcome: Progressing  Intervention: Prevent Skin Injury  Recent Flowsheet Documentation  Taken 6/26/2025 0800 by Lillian Castaneda RN  Body Position: position changed independently  Goal: Optimal Comfort and Wellbeing  Outcome: Progressing  Intervention: Provide Person-Centered Care  Recent Flowsheet Documentation  Taken 6/26/2025 0800 by Lillian Castaneda RN  Trust Relationship/Rapport:   care explained   choices provided   emotional support provided   empathic listening provided   questions answered   questions encouraged   reassurance provided   thoughts/feelings acknowledged  Goal: Readiness for Transition of Care  Outcome: Progressing       Goal Outcome Evaluation:    Plan of Care Reviewed With: patient      Patient presented with a flat and blunted affect.  His mood was calm. He was medication complaint. He was eating and hydrating adequately. Patient endorsed pain 4/10 in his legs. PRN Tylenol was given and was helpful. He denied SI/HI/AH/VH and contracted for safety.  There was no behavioral escalations or safety concerns observed or reported. Will continue with the current plan of care and notify the provider of any acute concerns.   /84   Pulse 96   Temp 97.5  F (36.4  C) (Oral)   Resp 18   Ht 1.778 m (5' 10\")   Wt 101.1 kg (222 lb 12.8 oz)   SpO2 98%   BMI 31.97 kg/m                      "

## 2025-06-26 NOTE — CARE PLAN
06/25/25 2001   Observation Type   How often does the Patient require Staff intervention/redirection? rarely   Harm Category none   Level of Special Monitoring none

## 2025-06-26 NOTE — CARE PLAN
06/25/25 2002   Observation Type   How often does the Patient require Staff intervention/redirection? rarely   Harm Category none   Level of Special Monitoring none

## 2025-06-26 NOTE — CARE PLAN
06/26/25 0248   Observation Type   How often does the Patient require Staff intervention/redirection? very frequently   Harm Category none   No Harm Category Noted at This Time no applicable harm categories or justifications   Level of Special Monitoring none

## 2025-06-26 NOTE — PROGRESS NOTES
Rehab Group    Start time: 1015  End time: 1145  Patient time total: 65 minutes    attended partial group    #5 attended   Group Type: occupational therapy and OT Clinic   Group Topic Covered: activity therapy and coping skills     Group Session Detail:  Coping skill exploration, creative expression within personally meaningful activities, and observation of social, cognitive, and kinesthetic performance skills.     Patient Response/Contribution:  cooperative with task, organized, socially appropriate, actively engaged     Patient Detail:  Pt presented with an overall congruent affect. Demonstrated adequate focus, organization, and problem solving to initiate and complete a self-directed project of moderate complexity. Opted to engage in a detailed drawing which she reported helps him manage anxiety and restlessness. IND to gather materials, organize work space, and sequence task to completion with beneficial response to such engagement. Partook in appropriate conversation with peers upon approach.         [id number] OT Group (2 or more in attendance)      Patient Active Problem List   Diagnosis    Tobacco use disorder    Amphetamine use disorder, mild, in early remission (H)    Major depressive disorder, recurrent, moderate (H)    Opioid use disorder, severe, in early remission, on maintenance therapy, dependence (H)    Attention deficit hyperactivity disorder (ADHD), inattentive type, moderate    Sedative, hypnotic or anxiolytic use disorder, mild, abuse (H)    Insomnia, unspecified    Personality disorder, unspecified (H)    Chemical dependency (H)    Mental health problem    Psychosis (H)    Substance-related disorder (H)    Acute psychosis (H)    HTN (hypertension)    Major depressive disorder, recurrent episode, severe (H)    Depression

## 2025-06-26 NOTE — PLAN OF CARE
BEH IP Unit Acuity Rating Score (UARS)  Patient is given one point for every criteria they meet.    CRITERIA SCORING   On a 72 hour hold, court hold, committed, stay of commitment, or revocation. 1   Patient LOS on BEH unit exceeds 20 days. 0  LOS: 16   Patient under guardianship, 55+, otherwise medically complex, or under age 11. 0   Suicide ideation without relief of precipitating factors. 0   Current plan for suicide. 0   Current plan for homicide. 0   Imminent risk or actual attempt to seriously harm another without relief of factors precipitating the attempt. 0   Severe dysfunction in daily living (ex: complete neglect for self care, extreme disruption in vegetative function, extreme deterioration in social interactions). 1   Recent (last 7 days) or current physical aggression in the ED or on unit. 0   Restraints or seclusion episode in past 72 hours. 0   Recent (last 7 days) or current verbal aggression, agitation, yelling, etc., while in the ED or unit. 0   Active psychosis. 1   Need for constant or near constant redirection (from leaving, from others, etc).  0   Intrusive or disruptive behaviors. 0   Patient requires 3 or more hours of individualized nursing care per 8-hour shift (i.e. for ADLs, meds, therapeutic interventions). 0   TOTAL 3

## 2025-06-26 NOTE — PLAN OF CARE
Assessment/Intervention/Current Symtoms and Care Coordination:  Chart review and met with team, discussed pt progress, symptomology, and response to treatment.  Discussed the discharge plan and any potential impediments to discharge.     Patient has been doing well on the unit.  Social with peers, attending groups. States every day he is feeling better.  Patient denies any issues with phenobarb taper.    Writer in contact with [facility] intake.  They have placed his referral on hold to allow more time for stabilization.   Will continue to coordinate care with CM as needed.    Discharge Plan or Goal:  MI/CD Residential treatment  Psychiatry  Therapy    Barriers to Discharge:  Med mgmt  Placement     Referral Status:    6/18 [facility] -    All locations    6/18 [facility]   Andrea Andree   [place]    6/18 [facility]     All locations    DAVID TREATMENT: 6/24/25  [facility]-  on hold for further stabilization  [facility]  - on hold for further stabilization  [facility] - declined due to being on [facility] order  [facility]- declined due to acuitiy of MI      Legal Status:  MI + [facility] order  County: [place]   File Number: [id number]  Start and expiration date of commitment: 6/18/25-12/18/25  Briggs meds are: Haldol, Prolixin, Clozaril, Risperdal, Invega, Zyprexa, Seroquel, Abilify and Lurasidone     Contacts (include DELROES status):  Mother:  Sarika Clarke - Ph: [phone] - DELORES signed    [facility]  [facility] - Ph: [phone] - DELORES signed -     Psychiatrist: - DELORES signed  [facility]  Address: [address] [place] [address]  Phone: [phone]    Upcoming Meetings and Dates/Important Information and next steps:  Team Note Due: Wednesday    Will need PD and COS at discharge

## 2025-06-26 NOTE — PROGRESS NOTES
"  ----------------------------------------------------------------------------------------------------------  [facility]  Psychiatry Progress Note  Hospital Day #16     Interim History:     The patient's care was discussed with the treatment team and chart notes were reviewed.    Vitals:   /81   Pulse 81   Temp 97.2  F (36.2  C) (Temporal)   Resp 16   Ht 1.778 m (5' 10\")   Wt 98.6 kg (217 lb 4.8 oz)   SpO2 98%   BMI 31.18 kg/m     Sleep: 4.3 hours (06/26/25 0600)  Scheduled medications: Took all scheduled medications as prescribed other than folic acid and multivitamin.  PRN medications:   Last 24H PRN:     acetaminophen (TYLENOL) tablet 650 mg, 650 mg at 06/26/25 0805    nicotine (COMMIT) lozenge 4 mg, 4 mg at 06/26/25 0755    OLANZapine (zyPREXA) tablet 5-10 mg, 5 mg at 06/26/25 0120 **OR** OLANZapine (zyPREXA) injection 5-10 mg    traZODone (DESYREL) tablet 50 mg, 50 mg at 06/26/25 0220     Staff Report:   He is cooperative, medication compliant... Pt was observed to be less tense today as compared to yesterdays morning shift. He did not do any running . Easily redirectable. Less jogging today. He endorsed 5/10 anxiety, depression at 4/10,  denies SI and hallucinations.     Please see staff notes 06/25 at 1447 for more details.     Subjective:     Patient Interview:  Barak was interviewed by the honey team in the conference room. He says he is feeling  good  today and reports that every day he feels a little bit better. He says that since 6/24, things have \"taken a turn in a good way\". He adds that he is not thinking about his pain or withdrawal symptoms as much and that  things feel lighter .     Barak says has not needed to take as much PRN Zyprexa and feels more awake; he is hoping to only take Zyprexa once per day moving forward. He reports no side effects from the increased propranolol dose and feels good about continuing the phenobarbital taper. He " "notes that urination has been easier for him lately and suspects that this is related to his medication changes.    Barak reports that his restlessness is improved. He says that he has had minor ankle pain from jogging in the hallway so he hasn t been able to move around as much recently. He reports being a little nervous about getting a roommate soon but says he is ready for it and has no specific concerns other than sharing space. He has no other questions or concerns today.    ROS:  Patient has ankle pain   Patient denies acute concerns     Objective:     Vitals:  /81   Pulse 81   Temp 97.2  F (36.2  C) (Temporal)   Resp 16   Ht 1.778 m (5' 10\")   Wt 98.6 kg (217 lb 4.8 oz)   SpO2 98%   BMI 31.18 kg/m      Allergies:  Allergies   Allergen Reactions    Prazosin Unknown     Worsening of nightmares       Current Medications:  Scheduled:  Current Facility-Administered Medications   Medication Dose Route Frequency Provider Last Rate Last Admin    acetaminophen (TYLENOL) tablet 650 mg  650 mg Oral Q4H PRN Daniel Vazquez MD   650 mg at 06/26/25 0805    alum & mag hydroxide-simethicone (MAALOX) suspension 30 mL  30 mL Oral Q4H PRN Daniel Vazquez MD        buprenorphine HCl-naloxone HCl (SUBOXONE) 8-2 MG per film 1 Film  1 Film Sublingual BID Daniel Vazquez MD   1 Film at 06/26/25 0756    buPROPion (WELLBUTRIN XL) 24 hr tablet 150 mg  150 mg Oral Daily Kimmie Arango MD   150 mg at 06/26/25 0756    gabapentin (NEURONTIN) capsule 600 mg  600 mg Oral TID Sandee Trotter MD   600 mg at 06/26/25 0756    guaiFENesin (MUCINEX) 12 hr tablet 600 mg  600 mg Oral BID PRN Ciara Quintanilla APRN CNP        hydrOXYzine HCl (ATARAX) tablet 25 mg  25 mg Oral Q4H PRN Daniel Vazquez MD   25 mg at 06/24/25 2151    lurasidone (LATUDA) tablet 80 mg  80 mg Oral At Bedtime Kimmie Arango MD   80 mg at 06/25/25 1943    nicotine (COMMIT) lozenge 4 mg  4 mg Buccal Q1H PRN David, Bridger Alaniz MD "   4 mg at 06/26/25 0755    nicotine (NICODERM CQ) 21 MG/24HR 24 hr patch 1 patch  1 patch Transdermal Daily Kimmie Arango MD   1 patch at 06/26/25 0800    nicotine polacrilex (NICORETTE) gum 4 mg  4 mg Buccal Q1H PRN Kimmie Arango MD   4 mg at 06/22/25 0658    OLANZapine (zyPREXA) tablet 5-10 mg  5-10 mg Oral TID PRN Franny Best MD   5 mg at 06/26/25 0120    Or    OLANZapine (zyPREXA) injection 5-10 mg  5-10 mg Intramuscular TID PRN Franny Best MD        ondansetron (ZOFRAN) tablet 4 mg  4 mg Oral Q8H PRN Daniel Vazquez MD        PHENobarbital tablet 64.8 mg  64.8 mg Oral Daily Radha Pinedo MD        PHENobarbital tablet 64.8 mg  64.8 mg Oral QAM Radha Pinedo MD   64.8 mg at 06/26/25 0756    PHENobarbital tablet 97.2 mg  97.2 mg Oral Daily Radha Pinedo MD        propranolol (INDERAL) tablet 20 mg  20 mg Oral TID Radha Pinedo MD   20 mg at 06/26/25 0756    senna-docusate (SENOKOT-S/PERICOLACE) 8.6-50 MG per tablet 1 tablet  1 tablet Oral BID PRN Daniel Vazquez MD        thiamine (B-1) tablet 100 mg  100 mg Oral Daily Radha Pinedo MD   100 mg at 06/26/25 0756    traZODone (DESYREL) tablet 50 mg  50 mg Oral At Bedtime PRN Daniel Vazquez MD   50 mg at 06/26/25 0220       PRN:  Current Facility-Administered Medications   Medication Dose Route Frequency Provider Last Rate Last Admin    acetaminophen (TYLENOL) tablet 650 mg  650 mg Oral Q4H PRN Daniel Vazquez MD   650 mg at 06/26/25 0805    alum & mag hydroxide-simethicone (MAALOX) suspension 30 mL  30 mL Oral Q4H PRN Daniel Vazquez MD        buprenorphine HCl-naloxone HCl (SUBOXONE) 8-2 MG per film 1 Film  1 Film Sublingual BID Daniel Vazquez MD   1 Film at 06/26/25 0756    buPROPion (WELLBUTRIN XL) 24 hr tablet 150 mg  150 mg Oral Daily Kimmie Arango MD   150 mg at 06/26/25 0756    gabapentin (NEURONTIN) capsule 600 mg  600 mg Oral TID Sandee Trotter,  MD   600 mg at 06/26/25 0756    guaiFENesin (MUCINEX) 12 hr tablet 600 mg  600 mg Oral BID PRN Ciara Quintanilla APRN CNP        hydrOXYzine HCl (ATARAX) tablet 25 mg  25 mg Oral Q4H PRN Daniel Vazquez MD   25 mg at 06/24/25 2151    lurasidone (LATUDA) tablet 80 mg  80 mg Oral At Bedtime Kimmie Arango MD   80 mg at 06/25/25 1943    nicotine (COMMIT) lozenge 4 mg  4 mg Buccal Q1H PRN Bridger Hernandez MD   4 mg at 06/26/25 0755    nicotine (NICODERM CQ) 21 MG/24HR 24 hr patch 1 patch  1 patch Transdermal Daily Kimmie Arango MD   1 patch at 06/26/25 0800    nicotine polacrilex (NICORETTE) gum 4 mg  4 mg Buccal Q1H PRN Kimmie Arango MD   4 mg at 06/22/25 0658    OLANZapine (zyPREXA) tablet 5-10 mg  5-10 mg Oral TID PRN Franny Best MD   5 mg at 06/26/25 0120    Or    OLANZapine (zyPREXA) injection 5-10 mg  5-10 mg Intramuscular TID PRN Franny Best MD        ondansetron (ZOFRAN) tablet 4 mg  4 mg Oral Q8H PRN Daniel Vazquez MD        PHENobarbital tablet 64.8 mg  64.8 mg Oral Daily Radha Pinedo MD        PHENobarbital tablet 64.8 mg  64.8 mg Oral QAM Radha Pinedo MD   64.8 mg at 06/26/25 0756    PHENobarbital tablet 97.2 mg  97.2 mg Oral Daily Radha Pinedo MD        propranolol (INDERAL) tablet 20 mg  20 mg Oral TID Radha Pinedo MD   20 mg at 06/26/25 0756    senna-docusate (SENOKOT-S/PERICOLACE) 8.6-50 MG per tablet 1 tablet  1 tablet Oral BID PRN Daniel Vazquez MD        thiamine (B-1) tablet 100 mg  100 mg Oral Daily Radha Pinedo MD   100 mg at 06/26/25 0756    traZODone (DESYREL) tablet 50 mg  50 mg Oral At Bedtime PRN Daniel Vazquez MD   50 mg at 06/26/25 0220       Labs and Imaging:  New results:   No results found for this or any previous visit (from the past 24 hours).    Data this admission:  - CBC unremarkable  - CMP unremarkable other than Ca  - TSH normal  - UDS positive for amphetamines, benzos,  "cannabinoids  - Vit D low in 2024, consider repeat  - Hgb A1c 5.1 2023, consider repeat  - Lipids elevated in 2024. Consider repeat  - Vit B12 not obtained  - Folate not obtained, normal in 2024 (had been taking it orally)  - EKG normal sinus rhythm, QTc 434     Mental Status Exam:     Oriented to:  Grossly Oriented  General:  Awake and Alert  Appearance:  appears stated age and Grooming is adequate  Behavior/Attitude:  Calm and Cooperative  Eye Contact: Appropriate at times, stares off into the distance but this appears to be improving  Psychomotor: Restless, rocking back and forth and spinning in swivel chair, no catatonia present.   Speech:  appropriate volume/tone  Language: Fluent in English with appropriate syntax and vocabulary.  Mood:   good\"  Affect:  congruent with mood  Thought Process:  slightly disorganized, but significantly improved  Thought Content:   No HI and does not appear to be responding to internal stimuli; no longer mentioning delusions of paranoia. No mention of SI.   Associations:  questionable  Insight:  limited  Judgment:  limited   Impulse control: fair  Attention Span:  adequate for conversation  Concentration:  grossly intact  Recent and Remote Memory:  not formally assessed  Fund of Knowledge: average  Muscle Strength and Tone: normal  Gait and Station: Normal     Psychiatric Assessment     Barak is a 40 year old male with a past psychiatric history of substance use (benzodiazepines, cannabis, cocaine, opioids) & unspecified psychotic disorder admitted from the ED on 6/10/2025 due to concern for out of control behaviors and psychosis in the context of substance use, medication nonadherence and psychosocial stressors. Significant symptoms include sleep issues, disorganized and delusional thoughts, substance use, delusional beliefs, and paranoia. His last psychiatric hospitalization was in 2024. He has had some outpatient psychiatric management but it isn't clear if he is consistently " seeing anyone. The MSE on admission is notable for delusional beliefs, paranoia, and disorganized thought process.       In summary, presentation is consistent with psychosis, likely schizophrenia vs substance induced. He will likely benefit from medication optimization, therapeutic milieu and access to resources this admission.     Psychiatric Plan by Diagnosis      Today's changes:  - Decrease phenobarbital from 97.2 mg BID + 64.8 mg qd to 97.2 mg qd + 64.8 mg BID (continue taper)    # Acute psychosis, schizophrenia vs. substance-induced psychotic disorder   1. Medications:  - Continue lurasidone 80mg qpm with food  - Continue previously restarted bupropion at 150mg daily  - Holding PTA Adderall (would not recommend restarting this)     2. Pertinent Labs/Monitoring:   - EKG QTc 434     3. Additional Plans:  - Patient will be treated in therapeutic milieu with appropriate individual and group therapies as described    # PSUD (opioids, benzodiazepines, cocaine, cannabis)  # benzodiazepine use disorder  - Continue phenobarbital taper from 97.2 mg BID + 64.8 mg qd to 97.2 mg qd + 64.8 mg BID  - Continue gabapentin 600 mg TID for restlessness  - Continue PTA Suboxone 8-2mg BID   - Continue propranolol 20 mg TID for restlessness (likely also aspect of akathisia)    # Akathisia  - Continue propranolol       Psychiatric Hospital Course:      Barak Clarke was admitted to [facility] as a voluntary patient on 6/10/2025 after presenting to the ED on 6/8. His PTA medications were continued with the exception of bupropion and Adderall, which were held due to acute psychosis. On 6/21 he was transferred to unit 20, a lower acuity unit.  Medications:  Barak Clarke was admitted to [facility] as a voluntary patient on 6/10/2025 after presenting to the ED on 6/8. His PTA medications were continued with the exception of bupropion and Adderall, which were held due to acute psychosis.  6/10: initiate risperidone 1mg qhs  6/11:  discontinue risperidone per patient preference, initiate haloperidol 5mg qhs. Place 72h hold and file for commitment with [facility]. Initiate phenobarbital 64.8mg TID for clonazepam withdrawal  6/12: discontinue haloperidol per patient preference (did not take any doses of paliperidone, risperidone, or haloperidol). Initiate lurasidone 20mg qpm with dinner  6/13: increase lurasidone to 60mg qpm with dinner  6/16: increase lurasidone to 80mg qpm with dinner, restart bupropion at 150mg daily  6/17: increase phenobarbital to 97.2mg TID. Commitment and [facility] orders received today (patient waived his right to a hearing)  6/20: initiate propranolol 10mg TID PRN for akathisia/restlessness in context of withdrawal   6/24: scheduled propranolol   6/24: decrease phenobarbital to 97.2 mg BID + 64.8 mg qd  6/24: increase gabapentin to 600 mg TID for restlessness  6/25: increase propranolol to 20 mg TID for akathisia/restlessness  6/26: decrease phenobarbital to 97.2 mg qd + 64.8 mg BID    The risks, benefits, alternatives, and side effects were discussed and understood by the patient.     Medical Assessment and Plan     Medical diagnoses to be addressed this admission:    # HTN  - not on any medication    Medical course: Patient was physically examined by the ED prior to being transferred to the unit and was found to be medically stable and appropriate for admission.     Consults: medicine previously for URI symptoms     Checklist     Legal Status: [facility]  Committed     [facility] order: clozapine, paliperidone, risperidone, quetiapine, olanzapine, aripiprazole, lurasidone     Safety Assessment:   Behavioral Orders   Procedures    Code 1 - Restrict to Unit    Routine Programming     As clinically indicated    Status 15     Every 15 minutes.    Suicide precautions: Suicide Risk: MODERATE; Clinical rationale to override score: response to medication     Patients on Suicide Precautions should have a Combination Diet ordered that  includes a Diet selection(s) AND a Behavioral Tray selection for Safe Tray - with utensils, or Safe Tray - NO utensils       Suicide Risk:   MODERATE     Clinical rationale to override score::   response to medication    Withdrawal precautions       Risk Assessment:  Risk for harm is moderate.  Risk factors: maladaptive coping, substance use, and past behaviors  Protective factors: engaged in treatment     SIO: no    Disposition: TBD. Pending stabilization, medication optimization, & development of a safe discharge plan.     Attestations     This patient was seen and discussed with my attending physician.    Keith Christian, MS3  [facility] Medical Student  06/26/25     Resident/Fellow Attestation   I, Radha Pinedo MD, was present with the medical/DAGOBERTO student who participated in the service and in the documentation of the note.  I have verified the history and personally performed the physical exam and medical decision making.  I agree with the assessment and plan of care as documented in the note.      Radha Pinedo MD  PGY1  Date of Service (when I saw the patient): 06/26/25    Attestation:  This patient has been seen and evaluated by me, Sandee Trotter MD.  I have discussed this patient with the house staff team including the resident and/or medical student and I agree with the findings and plan in this note.    I have reviewed today's vital signs, medications, labs and imaging. Sandee Trotter MD , PhD.

## 2025-06-26 NOTE — CARE PLAN
06/26/25 1137   Observation Type   How often does the Patient require Staff intervention/redirection? occasionally   Harm Category none   Suicide/Self-Harm high risk for suicide (C-SSRS)   screening result is high (NOT APPLICABLE after 48 hours on unit)   No Harm Category Noted at This Time patient in their room, minimal interaction   Level of Special Monitoring continuous

## 2025-06-27 PROCEDURE — 250N000013 HC RX MED GY IP 250 OP 250 PS 637: Performed by: PSYCHIATRY & NEUROLOGY

## 2025-06-27 PROCEDURE — 250N000013 HC RX MED GY IP 250 OP 250 PS 637

## 2025-06-27 PROCEDURE — 250N000012 HC RX MED GY IP 250 OP 636 PS 637: Performed by: PSYCHIATRY & NEUROLOGY

## 2025-06-27 PROCEDURE — 124N000002 HC R&B MH UMMC

## 2025-06-27 PROCEDURE — 250N000013 HC RX MED GY IP 250 OP 250 PS 637: Performed by: STUDENT IN AN ORGANIZED HEALTH CARE EDUCATION/TRAINING PROGRAM

## 2025-06-27 PROCEDURE — 90853 GROUP PSYCHOTHERAPY: CPT | Performed by: COUNSELOR

## 2025-06-27 PROCEDURE — H2032 ACTIVITY THERAPY, PER 15 MIN: HCPCS

## 2025-06-27 PROCEDURE — 99231 SBSQ HOSP IP/OBS SF/LOW 25: CPT | Mod: GC | Performed by: PSYCHIATRY & NEUROLOGY

## 2025-06-27 RX ORDER — PHENOBARBITAL 64.8 MG/1
64.8 TABLET ORAL EVERY MORNING
Status: COMPLETED | OUTPATIENT
Start: 2025-06-28 | End: 2025-06-29

## 2025-06-27 RX ORDER — PHENOBARBITAL 32.4 MG/1
32.4 TABLET ORAL EVERY MORNING
Status: DISCONTINUED | OUTPATIENT
Start: 2025-06-30 | End: 2025-07-03

## 2025-06-27 RX ADMIN — NICOTINE 1 PATCH: 21 PATCH, EXTENDED RELEASE TRANSDERMAL at 07:59

## 2025-06-27 RX ADMIN — NICOTINE POLACRILEX 4 MG: 2 LOZENGE ORAL at 07:59

## 2025-06-27 RX ADMIN — ACETAMINOPHEN 650 MG: 325 TABLET ORAL at 04:59

## 2025-06-27 RX ADMIN — OLANZAPINE 5 MG: 5 TABLET, FILM COATED ORAL at 06:52

## 2025-06-27 RX ADMIN — GABAPENTIN 600 MG: 300 CAPSULE ORAL at 14:46

## 2025-06-27 RX ADMIN — TRAZODONE HYDROCHLORIDE 50 MG: 50 TABLET ORAL at 21:06

## 2025-06-27 RX ADMIN — OLANZAPINE 10 MG: 5 TABLET, FILM COATED ORAL at 19:54

## 2025-06-27 RX ADMIN — NICOTINE POLACRILEX 4 MG: 2 LOZENGE ORAL at 21:06

## 2025-06-27 RX ADMIN — BUPRENORPHINE AND NALOXONE 1 FILM: 8; 2 FILM BUCCAL; SUBLINGUAL at 07:57

## 2025-06-27 RX ADMIN — PHENOBARBITAL 64.8 MG: 64.8 TABLET ORAL at 19:23

## 2025-06-27 RX ADMIN — NICOTINE POLACRILEX 4 MG: 2 LOZENGE ORAL at 14:49

## 2025-06-27 RX ADMIN — GABAPENTIN 600 MG: 300 CAPSULE ORAL at 19:23

## 2025-06-27 RX ADMIN — NICOTINE POLACRILEX 4 MG: 2 LOZENGE ORAL at 12:35

## 2025-06-27 RX ADMIN — PROPRANOLOL HYDROCHLORIDE 20 MG: 20 TABLET ORAL at 14:47

## 2025-06-27 RX ADMIN — ACETAMINOPHEN 650 MG: 325 TABLET ORAL at 17:55

## 2025-06-27 RX ADMIN — PROPRANOLOL HYDROCHLORIDE 20 MG: 20 TABLET ORAL at 07:56

## 2025-06-27 RX ADMIN — LURASIDONE HYDROCHLORIDE 80 MG: 80 TABLET, FILM COATED ORAL at 19:23

## 2025-06-27 RX ADMIN — NICOTINE POLACRILEX 4 MG: 2 LOZENGE ORAL at 16:28

## 2025-06-27 RX ADMIN — PROPRANOLOL HYDROCHLORIDE 20 MG: 20 TABLET ORAL at 19:23

## 2025-06-27 RX ADMIN — NICOTINE POLACRILEX 4 MG: 2 LOZENGE ORAL at 06:05

## 2025-06-27 RX ADMIN — NICOTINE POLACRILEX 4 MG: 2 LOZENGE ORAL at 19:23

## 2025-06-27 RX ADMIN — BUPRENORPHINE AND NALOXONE 1 FILM: 8; 2 FILM BUCCAL; SUBLINGUAL at 19:22

## 2025-06-27 RX ADMIN — BUPROPION HYDROCHLORIDE 150 MG: 150 TABLET, EXTENDED RELEASE ORAL at 07:57

## 2025-06-27 RX ADMIN — NICOTINE POLACRILEX 4 MG: 2 LOZENGE ORAL at 17:55

## 2025-06-27 RX ADMIN — GABAPENTIN 600 MG: 300 CAPSULE ORAL at 07:56

## 2025-06-27 RX ADMIN — PHENOBARBITAL 64.8 MG: 64.8 TABLET ORAL at 07:56

## 2025-06-27 RX ADMIN — PHENOBARBITAL 64.8 MG: 64.8 TABLET ORAL at 14:46

## 2025-06-27 ASSESSMENT — ACTIVITIES OF DAILY LIVING (ADL)
ADLS_ACUITY_SCORE: 24
DRESS: INDEPENDENT;SCRUBS (BEHAVIORAL HEALTH)
ADLS_ACUITY_SCORE: 24
HYGIENE/GROOMING: INDEPENDENT
ADLS_ACUITY_SCORE: 24
HYGIENE/GROOMING: HANDWASHING;INDEPENDENT
ADLS_ACUITY_SCORE: 24
ORAL_HYGIENE: INDEPENDENT
ADLS_ACUITY_SCORE: 24
ORAL_HYGIENE: INDEPENDENT
ADLS_ACUITY_SCORE: 24
DRESS: INDEPENDENT;SCRUBS (BEHAVIORAL HEALTH)
ADLS_ACUITY_SCORE: 24
ADLS_ACUITY_SCORE: 24

## 2025-06-27 NOTE — PROGRESS NOTES
Rehab Group    Start time: 1315  End time: 1445  Patient time total: 85 minutes    attended full group    #6 attended   Group Type: music   Group Topic Covered: emotional regulation, mindfulness, and self-esteem     Group Session Detail: Mindfulness II & III, Rhythm Band     Patient Response/Contribution:  cooperative with task and actively engaged     Patient Detail: Cooperatively engaged in extended Afternoon Music Mindfulness and Relaxation group to decrease anxiety and promote self-esteem and personal reflection.  Upbeat affect, appropriately engaged in session, responding well to the music.  Pt helped co-create the Mindfulness playlist in real time with the group, and offered many salient suggestions for meaningful music, centered around metaphors, love and loss, etc.  Pt also played a Pollen[employer] song on [email or web address] and sang at the end of session for group.  Playing music could be a beneficial and protective factor for pt in aftercare to maintain creativity, make connections, and provide for self-expression and self-esteem.       Activity Therapy Per 15 min ()    Patient Active Problem List   Diagnosis    Tobacco use disorder    Amphetamine use disorder, mild, in early remission (H)    Major depressive disorder, recurrent, moderate (H)    Opioid use disorder, severe, in early remission, on maintenance therapy, dependence (H)    Attention deficit hyperactivity disorder (ADHD), inattentive type, moderate    Sedative, hypnotic or anxiolytic use disorder, mild, abuse (H)    Insomnia, unspecified    Personality disorder, unspecified (H)    Chemical dependency (H)    Mental health problem    Psychosis (H)    Substance-related disorder (H)    Acute psychosis (H)    HTN (hypertension)    Major depressive disorder, recurrent episode, severe (H)    Depression

## 2025-06-27 NOTE — PLAN OF CARE
"Pt has been up all day. Pt interactive with peers.  Pt playing games, singing, drawing and eating. Pt reported some pain in ankle rec'd PRN Tylenol and stated that Tylenol was effective. Pt has been doing much less running on the unit.  Pt has been medication compliant and is able to make his needs known.    Problem: Adult Inpatient Plan of Care  Goal: Patient-Specific Goal (Individualized)  Description: You can add care plan individualizations to a care plan. Examples of Individualization might be:  \"Parent requests to be called daily at 9am for status\", \"I have a hard time hearing out of my right ear\", or \"Do not touch me to wake me up as it startles  me\".  Outcome: Not Progressing  Goal: Absence of Hospital-Acquired Illness or Injury  Outcome: Not Progressing  Goal: Optimal Comfort and Wellbeing  Outcome: Not Progressing  Goal: Readiness for Transition of Care  Outcome: Not Progressing     Problem: Adult Behavioral Health Plan of Care  Goal: Plan of Care Review  Outcome: Not Progressing  Goal: Patient-Specific Goal (Individualization)  Description: You can add care plan individualizations to a care plan. Examples of Individualization might be:  \"Parent requests to be called daily at 9am for status\", \"I have a hard time hearing out of my right ear\", or \"Do not touch me to wake me up as it startles  me\".  Outcome: Not Progressing  Goal: Adheres to Safety Considerations for Self and Others  Outcome: Not Progressing  Intervention: Develop and Maintain Individualized Safety Plan  Recent Flowsheet Documentation  Taken 6/27/2025 1000 by Andree Velásquez RN  Safety Measures:   environmental rounds completed   safety rounds completed  Goal: Absence of New-Onset Illness or Injury  Outcome: Not Progressing  Goal: Optimized Coping Skills in Response to Life Stressors  Outcome: Not Progressing  Goal: Develops/Participates in Therapeutic [place] to Support Successful Transition  Outcome: Not Progressing     Problem: Suicide " Risk  Goal: Absence of Self-Harm  Outcome: Not Progressing     Problem: Psychotic Signs/Symptoms  Goal: Improved Behavioral Control (Psychotic Signs/Symptoms)  Outcome: Not Progressing  Goal: Optimal Cognitive Function (Psychotic Signs/Symptoms)  Outcome: Not Progressing  Goal: Increased Participation and Engagement (Psychotic Signs/Symptoms)  Outcome: Not Progressing  Goal: Improved Mood Symptoms (Psychotic Signs/Symptoms)  Outcome: Not Progressing  Goal: Decreased Sensory Symptoms (Psychotic Signs/Symptoms)  Outcome: Not Progressing  Goal: Improved Sleep (Psychotic Signs/Symptoms)  Outcome: Not Progressing  Goal: Enhanced Social, Occupational or Functional Skills (Psychotic Signs/Symptoms)  Outcome: Not Progressing     Problem: Sleep Disturbance  Goal: Adequate Sleep/Rest  Outcome: Not Progressing     Problem: Skin Injury Risk Increased  Goal: Skin Health and Integrity  Outcome: Not Progressing     Problem: Anxiety  Goal: Anxiety Reduction or Resolution  Outcome: Not Progressing     Problem: Depression  Goal: Decreased Depression Symptoms  Outcome: Not Progressing   Goal Outcome Evaluation:

## 2025-06-27 NOTE — CARE PLAN
06/26/25 1921   Observation Type   How often does the Patient require Staff intervention/redirection? rarely   Harm Category none   Level of Special Monitoring none

## 2025-06-27 NOTE — PLAN OF CARE
"  Problem: Sleep Disturbance  Goal: Adequate Sleep/Rest  Outcome: Progressing   Goal Outcome Evaluation:       Pt was in bed the first half of this shift. Pt reported  bilateral lower extremity pain. Pt reported that medication was helpful upon reassessment.. He slept for about  4.5 hours this shift. No new concern noted or reported at this time.    Blood pressure 128/86, pulse 75, temperature 98  F (36.7  C), resp. rate 18, height 1.778 m (5' 10\"), weight 101.1 kg (222 lb 12.8 oz), SpO2 98%.   "

## 2025-06-27 NOTE — PROGRESS NOTES
"  ----------------------------------------------------------------------------------------------------------  [facility]  Psychiatry Progress Note  Hospital Day #17     Interim History:     The patient's care was discussed with the treatment team and chart notes were reviewed.    Vitals:   BP (!) 133/92   Pulse 77   Temp 96.8  F (36  C) (Temporal)   Resp 18   Ht 1.778 m (5' 10\")   Wt 101.1 kg (222 lb 12.8 oz)   SpO2 100%   BMI 31.97 kg/m     Sleep: 4.75 hours (06/27/25 0609)  Scheduled medications: Took all scheduled medications as prescribed.    PRN medications:   Last 24H PRN:     acetaminophen (TYLENOL) tablet 650 mg, 650 mg at 06/27/25 0459    nicotine (COMMIT) lozenge 4 mg, 4 mg at 06/27/25 1449    OLANZapine (zyPREXA) tablet 5-10 mg, 5 mg at 06/27/25 0652 **OR** OLANZapine (zyPREXA) injection 5-10 mg     Staff Report:   Pt was in bed the first half of this shift. Pt reported  bilateral lower extremity pain. Pt reported that medication was helpful upon reassessment... No new concern noted or reported at this time.     Please see staff notes 06/27 at 0548 for more details.     Subjective:     Patient Interview:  Barak was interviewed by the honey team in the conference room. He says he has been learning a new song on the guitar, which has been emotional for him because it reminds him of his sister s passing 3 years ago. He says he is otherwise  feeling good .     He says he still feels restless but that this has been improving. He adds that he doesn t feel the need to run up and down the hallways like he did previously, although he still has some lingering ankle pain from all the physical activity. He says he has been reading the Biblical story of Job, who went through a lot of difficult challenges, which has been helpful. He adds,  I know it s time to get sober, there are no alternatives, I ve gotta do it.  He expresses optimism about the future, saying that " "his goal is to save money and eventually move to [place] where his dad currently lives. Barak reports that the phenobarbital taper is going well and that he hasn t noticed his symptoms worsening at all. He says he would still like to reduce his intake of PRN Zyprexa moving forward. He has no other questions or concerns today.    ROS:  Patient has bilateral ankle pain   Patient denies acute concerns     Objective:     Vitals:  BP (!) 133/92   Pulse 77   Temp 96.8  F (36  C) (Temporal)   Resp 18   Ht 1.778 m (5' 10\")   Wt 101.1 kg (222 lb 12.8 oz)   SpO2 100%   BMI 31.97 kg/m      Allergies:  Allergies   Allergen Reactions    Prazosin Unknown     Worsening of nightmares       Current Medications:  Scheduled:  Current Facility-Administered Medications   Medication Dose Route Frequency Provider Last Rate Last Admin    acetaminophen (TYLENOL) tablet 650 mg  650 mg Oral Q4H PRN Daniel Vazquez MD   650 mg at 06/27/25 0459    alum & mag hydroxide-simethicone (MAALOX) suspension 30 mL  30 mL Oral Q4H PRN Daniel Vazquez MD        buprenorphine HCl-naloxone HCl (SUBOXONE) 8-2 MG per film 1 Film  1 Film Sublingual BID Daniel Vazquez MD   1 Film at 06/27/25 0757    buPROPion (WELLBUTRIN XL) 24 hr tablet 150 mg  150 mg Oral Daily Kimmie Arango MD   150 mg at 06/27/25 0757    gabapentin (NEURONTIN) capsule 600 mg  600 mg Oral TID Sandee Trotter MD   600 mg at 06/27/25 1446    guaiFENesin (MUCINEX) 12 hr tablet 600 mg  600 mg Oral BID PRN Ciara Quintanilla APRN CNP        hydrOXYzine HCl (ATARAX) tablet 25 mg  25 mg Oral Q4H PRN Daniel Vazquez MD   25 mg at 06/24/25 2151    lurasidone (LATUDA) tablet 80 mg  80 mg Oral At Bedtime Kimmie Arango MD   80 mg at 06/26/25 2006    nicotine (COMMIT) lozenge 4 mg  4 mg Buccal Q1H PRN Bridger Hernandez MD   4 mg at 06/27/25 1449    nicotine (NICODERM CQ) 21 MG/24HR 24 hr patch 1 patch  1 patch Transdermal Daily Kimmie Arango MD   1 " patch at 06/27/25 0759    nicotine polacrilex (NICORETTE) gum 4 mg  4 mg Buccal Q1H PRN Kimmie Arango MD   4 mg at 06/22/25 0658    OLANZapine (zyPREXA) tablet 5-10 mg  5-10 mg Oral TID PRN Franny Best MD   5 mg at 06/27/25 0652    Or    OLANZapine (zyPREXA) injection 5-10 mg  5-10 mg Intramuscular TID PRN Franny Best MD        ondansetron (ZOFRAN) tablet 4 mg  4 mg Oral Q8H PRN Daniel Vazquez MD        [START ON 6/30/2025] PHENobarbital tablet 32.4 mg  32.4 mg Oral QAM Radha Pinedo MD        [START ON 6/28/2025] PHENobarbital tablet 64.8 mg  64.8 mg Oral QAM Radha Pinedo MD        PHENobarbital tablet 64.8 mg  64.8 mg Oral At Bedtime Radha Pinedo MD        PHENobarbital tablet 64.8 mg  64.8 mg Oral Daily Radha Pinedo MD   64.8 mg at 06/27/25 1446    propranolol (INDERAL) tablet 20 mg  20 mg Oral TID Radha Pinedo MD   20 mg at 06/27/25 1447    senna-docusate (SENOKOT-S/PERICOLACE) 8.6-50 MG per tablet 1 tablet  1 tablet Oral BID PRN Daniel Vazquez MD        thiamine (B-1) tablet 100 mg  100 mg Oral Daily Radha Pinedo MD   100 mg at 06/26/25 0756    traZODone (DESYREL) tablet 50 mg  50 mg Oral At Bedtime PRN Daniel Vazquez MD   50 mg at 06/26/25 0220       PRN:  Current Facility-Administered Medications   Medication Dose Route Frequency Provider Last Rate Last Admin    acetaminophen (TYLENOL) tablet 650 mg  650 mg Oral Q4H PRN Daniel Vazquez MD   650 mg at 06/27/25 0459    alum & mag hydroxide-simethicone (MAALOX) suspension 30 mL  30 mL Oral Q4H PRN Daniel Vazquez MD        buprenorphine HCl-naloxone HCl (SUBOXONE) 8-2 MG per film 1 Film  1 Film Sublingual BID Daniel Vazquez MD   1 Film at 06/27/25 0757    buPROPion (WELLBUTRIN XL) 24 hr tablet 150 mg  150 mg Oral Daily Kimmie Arango MD   150 mg at 06/27/25 0757    gabapentin (NEURONTIN) capsule 600 mg  600 mg Oral TID Sandee Trotter MD    600 mg at 06/27/25 1446    guaiFENesin (MUCINEX) 12 hr tablet 600 mg  600 mg Oral BID PRN Ciara Quintanilla APRN CNP        hydrOXYzine HCl (ATARAX) tablet 25 mg  25 mg Oral Q4H PRN Daniel Vazquez MD   25 mg at 06/24/25 2151    lurasidone (LATUDA) tablet 80 mg  80 mg Oral At Bedtime Kimmie Arango MD   80 mg at 06/26/25 2006    nicotine (COMMIT) lozenge 4 mg  4 mg Buccal Q1H PRN Bridger Hernandez MD   4 mg at 06/27/25 1449    nicotine (NICODERM CQ) 21 MG/24HR 24 hr patch 1 patch  1 patch Transdermal Daily Kimmie Arango MD   1 patch at 06/27/25 0759    nicotine polacrilex (NICORETTE) gum 4 mg  4 mg Buccal Q1H PRN Kimmie Arango MD   4 mg at 06/22/25 0658    OLANZapine (zyPREXA) tablet 5-10 mg  5-10 mg Oral TID PRN Franny Best MD   5 mg at 06/27/25 0652    Or    OLANZapine (zyPREXA) injection 5-10 mg  5-10 mg Intramuscular TID PRN Franny Best MD        ondansetron (ZOFRAN) tablet 4 mg  4 mg Oral Q8H PRN Daniel Vazquez MD        [START ON 6/30/2025] PHENobarbital tablet 32.4 mg  32.4 mg Oral ANNIEM Radha Pinedo MD        [START ON 6/28/2025] PHENobarbital tablet 64.8 mg  64.8 mg Oral QAM Radha Pinedo MD        PHENobarbital tablet 64.8 mg  64.8 mg Oral At Bedtime Radha Pinedo MD        PHENobarbital tablet 64.8 mg  64.8 mg Oral Daily Radha Pinedo MD   64.8 mg at 06/27/25 1446    propranolol (INDERAL) tablet 20 mg  20 mg Oral TID Radha Pinedo MD   20 mg at 06/27/25 1447    senna-docusate (SENOKOT-S/PERICOLACE) 8.6-50 MG per tablet 1 tablet  1 tablet Oral BID PRN Daniel Vazquez MD        thiamine (B-1) tablet 100 mg  100 mg Oral Daily Radha Pinedo MD   100 mg at 06/26/25 0756    traZODone (DESYREL) tablet 50 mg  50 mg Oral At Bedtime PRN Daniel Vazquez MD   50 mg at 06/26/25 0220       Labs and Imaging:  New results:   No results found for this or any previous visit (from the past 24 hours).    Data  "this admission:  - CBC unremarkable  - CMP unremarkable other than Ca  - TSH normal  - UDS positive for amphetamines, benzos, cannabinoids  - Vit D low in 2024, consider repeat  - Hgb A1c 5.1 2023, consider repeat  - Lipids elevated in 2024. Consider repeat  - Vit B12 not obtained  - Folate not obtained, normal in 2024 (had been taking it orally)  - EKG normal sinus rhythm, QTc 434     Mental Status Exam:     Oriented to:  Grossly Oriented  General:  Awake and Alert  Appearance:  appears stated age and Grooming is adequate  Behavior/Attitude:  Calm and Cooperative  Eye Contact: Appropriate at times, stares off into the distance but this appears to be improving  Psychomotor: Restless, swiveling slightly in swivel chair, no catatonia present.   Speech:  appropriate volume/tone  Language: Fluent in English with appropriate syntax and vocabulary.  Mood:   feeling\"  Affect:  congruent with mood  Thought Process:  slightly disorganized, but continues to improve  Thought Content:   No HI and does not appear to be responding to internal stimuli; no longer mentioning delusions of paranoia. No mention of SI.   Associations:  questionable  Insight:  partial  Judgment:  fair   Impulse control: fair  Attention Span:  adequate for conversation  Concentration:  grossly intact  Recent and Remote Memory:  not formally assessed  Fund of Knowledge: average  Muscle Strength and Tone: normal  Gait and Station: Normal     Psychiatric Assessment     Barak is a 40 year old male with a past psychiatric history of substance use (benzodiazepines, cannabis, cocaine, opioids) & unspecified psychotic disorder admitted from the ED on 6/10/2025 due to concern for out of control behaviors and psychosis in the context of substance use, medication nonadherence and psychosocial stressors. Significant symptoms include sleep issues, disorganized and delusional thoughts, substance use, delusional beliefs, and paranoia. His last psychiatric hospitalization " was in 2024. He has had some outpatient psychiatric management but it isn't clear if he is consistently seeing anyone. The MSE on admission is notable for delusional beliefs, paranoia, and disorganized thought process.       In summary, presentation is consistent with psychosis, likely schizophrenia vs substance induced. He will likely benefit from medication optimization, therapeutic milieu and access to resources this admission.     Psychiatric Plan by Diagnosis      Today's changes:  - Decrease phenobarbital from 97.2 mg qd + 64.8 mg BID to 64.8 mg TID (continue taper)    # Acute psychosis, schizophrenia vs. substance-induced psychotic disorder   1. Medications:  - Continue lurasidone 80mg qpm with food  - Continue previously restarted bupropion at 150mg daily  - Holding PTA Adderall (would not recommend restarting this)     2. Pertinent Labs/Monitoring:   - EKG QTc 434     3. Additional Plans:  - Patient will be treated in therapeutic milieu with appropriate individual and group therapies as described    # PSUD (opioids, benzodiazepines, cocaine, cannabis)  # benzodiazepine use disorder  - Continue phenobarbital taper from 97.2 mg qd + 64.8 mg BID to 64.8 mg TID  - Continue gabapentin 600 mg TID for restlessness  - Continue PTA Suboxone 8-2mg BID   - Continue propranolol 20 mg TID for restlessness (likely also aspect of akathisia)    # Akathisia  - Continue propranolol       Psychiatric Hospital Course:      Barak Clarke was admitted to [facility] as a voluntary patient on 6/10/2025 after presenting to the ED on 6/8. His PTA medications were continued with the exception of bupropion and Adderall, which were held due to acute psychosis. On 6/21 he was transferred to unit 20, a lower acuity unit.  Medications:  Barak Clarke was admitted to [facility] as a voluntary patient on 6/10/2025 after presenting to the ED on 6/8. His PTA medications were continued with the exception of bupropion and Adderall, which were  held due to acute psychosis.  6/10: initiate risperidone 1mg qhs  6/11: discontinue risperidone per patient preference, initiate haloperidol 5mg qhs. Place 72h hold and file for commitment with [facility]. Initiate phenobarbital 64.8mg TID for clonazepam withdrawal  6/12: discontinue haloperidol per patient preference (did not take any doses of paliperidone, risperidone, or haloperidol). Initiate lurasidone 20mg qpm with dinner  6/13: increase lurasidone to 60mg qpm with dinner  6/16: increase lurasidone to 80mg qpm with dinner, restart bupropion at 150mg daily  6/17: increase phenobarbital to 97.2mg TID. Commitment and [facility] orders received today (patient waived his right to a hearing)  6/20: initiate propranolol 10mg TID PRN for akathisia/restlessness in context of withdrawal   6/24: scheduled propranolol   6/24: decrease phenobarbital to 97.2 mg BID + 64.8 mg qd  6/24: increase gabapentin to 600 mg TID for restlessness  6/25: increase propranolol to 20 mg TID for akathisia/restlessness  6/26: decrease phenobarbital to 97.2 mg qd + 64.8 mg BID  6/27: decrease phenobarbital to 64.8 mg TID    The risks, benefits, alternatives, and side effects were discussed and understood by the patient.     Medical Assessment and Plan     Medical diagnoses to be addressed this admission:    # HTN  - not on any medication    Medical course: Patient was physically examined by the ED prior to being transferred to the unit and was found to be medically stable and appropriate for admission.     Consults: medicine previously for URI symptoms     Checklist     Legal Status: [facility]  Committed     [facility] order: clozapine, paliperidone, risperidone, quetiapine, olanzapine, aripiprazole, lurasidone     Safety Assessment:   Behavioral Orders   Procedures    Code 1 - Restrict to Unit    Routine Programming     As clinically indicated    Status 15     Every 15 minutes.    Suicide precautions: Suicide Risk: MODERATE; Clinical rationale to override  score: response to medication     Patients on Suicide Precautions should have a Combination Diet ordered that includes a Diet selection(s) AND a Behavioral Tray selection for Safe Tray - with utensils, or Safe Tray - NO utensils       Suicide Risk:   MODERATE     Clinical rationale to override score::   response to medication    Withdrawal precautions       Risk Assessment:  Risk for harm is moderate.  Risk factors: maladaptive coping, substance use, and past behaviors  Protective factors: engaged in treatment     SIO: no    Disposition: TBD. Pending stabilization, medication optimization, & development of a safe discharge plan. Likely to CD treatment.     Attestations     This patient was seen and discussed with my attending physician.    Keith Christian, MS3  [facility] Medical Student  06/26/25     Resident/Fellow Attestation   I, Radha Pinedo MD, was present with the medical/DAGOBERTO student who participated in the service and in the documentation of the note.  I have verified the history and personally performed the physical exam and medical decision making.  I agree with the assessment and plan of care as documented in the note.      Radha Pinedo MD   PGY1  Date of Service (when I saw the patient): 06/27/25     Attestation:  This patient has been seen and evaluated by me, Sandee Trotter MD.  I have discussed this patient with the house staff team including the resident and/or medical student and I agree with the findings and plan in this note.    I have reviewed today's vital signs, medications, labs and imaging. Sandee Trotter MD , PhD.

## 2025-06-27 NOTE — PLAN OF CARE
BEH IP Unit Acuity Rating Score (UARS)  Patient is given one point for every criteria they meet.    CRITERIA SCORING   On a 72 hour hold, court hold, committed, stay of commitment, or revocation. 1   Patient LOS on BEH unit exceeds 20 days. 0  LOS: 17   Patient under guardianship, 55+, otherwise medically complex, or under age 11. 0   Suicide ideation without relief of precipitating factors. 0   Current plan for suicide. 0   Current plan for homicide. 0   Imminent risk or actual attempt to seriously harm another without relief of factors precipitating the attempt. 0   Severe dysfunction in daily living (ex: complete neglect for self care, extreme disruption in vegetative function, extreme deterioration in social interactions). 1   Recent (last 7 days) or current physical aggression in the ED or on unit. 0   Restraints or seclusion episode in past 72 hours. 0   Recent (last 7 days) or current verbal aggression, agitation, yelling, etc., while in the ED or unit. 0   Active psychosis. 1   Need for constant or near constant redirection (from leaving, from others, etc).  0   Intrusive or disruptive behaviors. 0   Patient requires 3 or more hours of individualized nursing care per 8-hour shift (i.e. for ADLs, meds, therapeutic interventions). 0   TOTAL 3

## 2025-06-27 NOTE — PLAN OF CARE
Goal Outcome Evaluation:      Group Attendance:  attended full group    Time session began: 10:20 am  Time session ended: 11:00 am  Patient's total time in group: 40    Total # Attendees   5   Group Type psychotherapeutic     Group Topic Covered mindfulness     Group Session Detail Process     Patient's response to the group topic/interactions:  supportive of peers, listened actively, and actively engaged     Patient Details: Pt was a positive participant, enjoying playing [employer] on unit, thinking about sister who has passed and father who is ill, he was kind and supportive to all peers. He was standing, said he was feeling restless.         Patient Active Problem List   Diagnosis    Tobacco use disorder    Amphetamine use disorder, mild, in early remission (H)    Major depressive disorder, recurrent, moderate (H)    Opioid use disorder, severe, in early remission, on maintenance therapy, dependence (H)    Attention deficit hyperactivity disorder (ADHD), inattentive type, moderate    Sedative, hypnotic or anxiolytic use disorder, mild, abuse (H)    Insomnia, unspecified    Personality disorder, unspecified (H)    Chemical dependency (H)    Mental health problem    Psychosis (H)    Substance-related disorder (H)    Acute psychosis (H)    HTN (hypertension)    Major depressive disorder, recurrent episode, severe (H)    Depression

## 2025-06-27 NOTE — PLAN OF CARE
Problem: Suicide Risk  Goal: Absence of Self-Harm  Outcome: Progressing  Intervention: Assess Risk to Self and Maintain Safety  Recent Flowsheet Documentation  Taken 6/26/2025 1918 by Joanie Schumacher RN  Behavior Management: behavioral plan reviewed  Enhanced Safety Measures: other (see comments)     Problem: Psychotic Signs/Symptoms  Goal: Improved Mood Symptoms (Psychotic Signs/Symptoms)  Outcome: Progressing     Problem: Anxiety  Goal: Anxiety Reduction or Resolution  Outcome: Progressing     Problem: Depression  Goal: Decreased Depression Symptoms  Outcome: Progressing   Goal Outcome Evaluation:     Pt was visible in the milieu interacting with select peers and pacing the quinn.. Affect blunted and flat. Mood calm and cooperative. He was playing [employer] in the dinner room. Denies pain or discomfort. Denies SI/HI/depression/ hallucinations and anxiety. Hygiene appropriate. Pt had a shower this shift. Food and fluid intake adequate. Pt was cooperative and compliant with medications No safety concerns No behavioral issues noted.

## 2025-06-27 NOTE — PLAN OF CARE
Assessment/Intervention/Current Symtoms and Care Coordination:  Chart review and met with team, discussed pt progress, symptomology, and response to treatment.  Discussed the discharge plan and any potential impediments to discharge.     No changes in the past 24 hours.  Patient continues to do well on the unit.  Social with peers, attending groups. States every day he is feeling better.  Patient denies any issues with phenobarb taper.    Writer in contact with [facility].  He has been accepted to [facility] with admission scheduled for 7/7. They will call nursing station with   time.  If plan changes, please call intake at [phone].    Will continue to coordinate care with CM as needed.    Discharge Plan or Goal:  MI/CD Residential treatment  Psychiatry  Therapy    Barriers to Discharge:  Med mgmt  Placement     Referral Status:    6/18 [facility] -    All locations    6/18 [facility]    6/18 [facility]     All [facility]    DAVID TREATMENT: 6/24/25  [facility]-  on hold for further stabilization (contact Fernanda Barroso  [email or web address])  [facility]  - on hold for further stabilization (contact Tania)   [facility] - declined due to being on [facility] order  [facility]- declined due to acuitiy of MI      Legal Status:  MI + [facility] order  County: [place]   File Number: [id number]  Start and expiration date of commitment: 6/18/25-12/18/25  [facility] meds are: Haldol, Prolixin, Clozaril, Risperdal, Invega, Zyprexa, Seroquel, Abilify and Lurasidone     Contacts (include DELORES status):  Mother:  Sarika Clarke - Ph: [phone] - DELORES signed    :  [facility] - Ph: [phone] - DELORES signed -     Psychiatrist: - DELORES signed  [facility]  Address: [address] [place] [id number]  Phone: [phone]    Upcoming Meetings and Dates/Important Information and next steps:  Team Note Due: Wednesday    Will  need PD and COS at discharge

## 2025-06-27 NOTE — CARE PLAN
06/27/25 0637   Observation Type   How often does the Patient require Staff intervention/redirection? rarely   Harm Category none   Level of Special Monitoring none

## 2025-06-27 NOTE — PROGRESS NOTES
"Rehab Group     Start time: 1115  End time: 1210  Patient time total: 10     attended partial group     #5 attended   Group Type: music   Group Topic Covered: coping skills, emotional regulation, and mindfulness      Group Session Detail: Mindfulness I      Patient Response/Contribution:  cooperative with task and attentive      Patient Detail: Pt came in for the end of session, drawn in by the music of Terrell Faith playing, whom the pt stated \"It reminds me of my sister, she's dead,\" to another peer. Pt was aware Terrell Faith wrote the song \"Fire and Rain\" after having been in a mental institution himself, and pt appeared to resonate with this, deeply moved by it in a healing way with the music.  Pt stayed for 3 Terrell Faith songs, also \"Sweet Baby Terrell\", who pt said \"I named my son after this song.\"       Activity Therapy Per 15 min ()    Patient Active Problem List   Diagnosis    Tobacco use disorder    Amphetamine use disorder, mild, in early remission (H)    Major depressive disorder, recurrent, moderate (H)    Opioid use disorder, severe, in early remission, on maintenance therapy, dependence (H)    Attention deficit hyperactivity disorder (ADHD), inattentive type, moderate    Sedative, hypnotic or anxiolytic use disorder, mild, abuse (H)    Insomnia, unspecified    Personality disorder, unspecified (H)    Chemical dependency (H)    Mental health problem    Psychosis (H)    Substance-related disorder (H)    Acute psychosis (H)    HTN (hypertension)    Major depressive disorder, recurrent episode, severe (H)    Depression       "

## 2025-06-27 NOTE — PLAN OF CARE
"  Problem: Anxiety  Goal: Anxiety Reduction or Resolution  Outcome: Progressing     Problem: Depression  Goal: Decreased Depression Symptoms  Outcome: Progressing     Problem: Adult Behavioral Health Plan of Care  Goal: Adheres to Safety Considerations for Self and Others  Outcome: Progressing  Flowsheets (Taken 6/27/2025 1851)  Adheres to Safety Considerations for Self and Others: making progress toward outcome   Goal Outcome Evaluation:    Plan of Care Reviewed With: patient Plan of Care Reviewed With: patient    Overall Patient Progress: improvingOverall Patient Progress: improving     Patient presented as flat and blunted. He denied depression, endorsed moderate anxiety, anxiety managed with PRN Zyprexa per his request \"Hydroxyzine doesn't work.\" He denied suicidal thoughts. He was cooperative and medication compliant. Intermittently visible in milieu, mostly pacing. No interactions with peers noted but able to communicate needs. Bilateral ankles pain managed with PRN Tylenol, ADLs WNL, had a shower. He had adequate food and fluids intake.           "

## 2025-06-28 PROCEDURE — 250N000013 HC RX MED GY IP 250 OP 250 PS 637: Performed by: PSYCHIATRY & NEUROLOGY

## 2025-06-28 PROCEDURE — 250N000013 HC RX MED GY IP 250 OP 250 PS 637

## 2025-06-28 PROCEDURE — 124N000002 HC R&B MH UMMC

## 2025-06-28 PROCEDURE — 250N000012 HC RX MED GY IP 250 OP 636 PS 637: Performed by: PSYCHIATRY & NEUROLOGY

## 2025-06-28 PROCEDURE — 250N000013 HC RX MED GY IP 250 OP 250 PS 637: Performed by: STUDENT IN AN ORGANIZED HEALTH CARE EDUCATION/TRAINING PROGRAM

## 2025-06-28 RX ADMIN — NICOTINE POLACRILEX 4 MG: 2 LOZENGE ORAL at 11:19

## 2025-06-28 RX ADMIN — ACETAMINOPHEN 650 MG: 325 TABLET ORAL at 21:23

## 2025-06-28 RX ADMIN — PROPRANOLOL HYDROCHLORIDE 20 MG: 20 TABLET ORAL at 08:06

## 2025-06-28 RX ADMIN — OLANZAPINE 10 MG: 5 TABLET, FILM COATED ORAL at 04:36

## 2025-06-28 RX ADMIN — NICOTINE POLACRILEX 4 MG: 2 LOZENGE ORAL at 19:18

## 2025-06-28 RX ADMIN — GABAPENTIN 600 MG: 300 CAPSULE ORAL at 13:31

## 2025-06-28 RX ADMIN — ACETAMINOPHEN 650 MG: 325 TABLET ORAL at 03:40

## 2025-06-28 RX ADMIN — NICOTINE POLACRILEX 4 MG: 2 LOZENGE ORAL at 18:03

## 2025-06-28 RX ADMIN — OLANZAPINE 10 MG: 5 TABLET, FILM COATED ORAL at 19:19

## 2025-06-28 RX ADMIN — PROPRANOLOL HYDROCHLORIDE 20 MG: 20 TABLET ORAL at 13:31

## 2025-06-28 RX ADMIN — PHENOBARBITAL 64.8 MG: 64.8 TABLET ORAL at 13:31

## 2025-06-28 RX ADMIN — NICOTINE 1 PATCH: 21 PATCH, EXTENDED RELEASE TRANSDERMAL at 08:07

## 2025-06-28 RX ADMIN — NICOTINE POLACRILEX 4 MG: 2 LOZENGE ORAL at 21:23

## 2025-06-28 RX ADMIN — PHENOBARBITAL 64.8 MG: 64.8 TABLET ORAL at 19:18

## 2025-06-28 RX ADMIN — BUPRENORPHINE AND NALOXONE 1 FILM: 8; 2 FILM BUCCAL; SUBLINGUAL at 08:07

## 2025-06-28 RX ADMIN — TRAZODONE HYDROCHLORIDE 50 MG: 50 TABLET ORAL at 21:23

## 2025-06-28 RX ADMIN — BUPROPION HYDROCHLORIDE 150 MG: 150 TABLET, EXTENDED RELEASE ORAL at 08:06

## 2025-06-28 RX ADMIN — ACETAMINOPHEN 650 MG: 325 TABLET ORAL at 11:19

## 2025-06-28 RX ADMIN — LURASIDONE HYDROCHLORIDE 80 MG: 80 TABLET, FILM COATED ORAL at 19:18

## 2025-06-28 RX ADMIN — GABAPENTIN 600 MG: 300 CAPSULE ORAL at 19:18

## 2025-06-28 RX ADMIN — PHENOBARBITAL 64.8 MG: 64.8 TABLET ORAL at 08:06

## 2025-06-28 RX ADMIN — GABAPENTIN 600 MG: 300 CAPSULE ORAL at 08:06

## 2025-06-28 RX ADMIN — PROPRANOLOL HYDROCHLORIDE 20 MG: 20 TABLET ORAL at 19:18

## 2025-06-28 RX ADMIN — NICOTINE POLACRILEX 4 MG: 2 LOZENGE ORAL at 16:20

## 2025-06-28 RX ADMIN — NICOTINE POLACRILEX 4 MG: 2 LOZENGE ORAL at 08:15

## 2025-06-28 RX ADMIN — NICOTINE POLACRILEX 4 MG: 2 LOZENGE ORAL at 14:16

## 2025-06-28 RX ADMIN — NICOTINE POLACRILEX 4 MG: 2 LOZENGE ORAL at 06:16

## 2025-06-28 RX ADMIN — BUPRENORPHINE AND NALOXONE 1 FILM: 8; 2 FILM BUCCAL; SUBLINGUAL at 19:18

## 2025-06-28 ASSESSMENT — ACTIVITIES OF DAILY LIVING (ADL)
ADLS_ACUITY_SCORE: 42
LAUNDRY: WITH SUPERVISION
HYGIENE/GROOMING: HANDWASHING;SHOWER;INDEPENDENT
ADLS_ACUITY_SCORE: 42
ADLS_ACUITY_SCORE: 32
ADLS_ACUITY_SCORE: 24
ADLS_ACUITY_SCORE: 24
ADLS_ACUITY_SCORE: 42
DRESS: SCRUBS (BEHAVIORAL HEALTH);INDEPENDENT
HYGIENE/GROOMING: HANDWASHING;SHOWER;INDEPENDENT
ADLS_ACUITY_SCORE: 24
ADLS_ACUITY_SCORE: 42
ORAL_HYGIENE: INDEPENDENT;PROMPTS
ADLS_ACUITY_SCORE: 24
LAUNDRY: UNABLE TO COMPLETE
ADLS_ACUITY_SCORE: 24
DRESS: SCRUBS (BEHAVIORAL HEALTH);INDEPENDENT;PROMPTS
ADLS_ACUITY_SCORE: 42
ADLS_ACUITY_SCORE: 42
ORAL_HYGIENE: INDEPENDENT;PROMPTS
ADLS_ACUITY_SCORE: 32
ADLS_ACUITY_SCORE: 24

## 2025-06-28 NOTE — PLAN OF CARE
"  Problem: Sleep Disturbance  Goal: Adequate Sleep/Rest  Outcome: Progressing   Goal Outcome Evaluation:     Pt was out in the [place]inn most of this shift requesting snacks and medications.PRN Zyprexa and tylenol was given for anxiety and bilateral leg pain. Medication somewhat effective per pt.  Pt was later encouraged to go back to bed afterwards. Pt slept for 3.25 hours. No behavioral outburst or safety concern noted.      Most recent Vitals    Blood pressure 122/48, pulse 75, temperature 98.4  F (36.9  C), temperature source Oral, resp. rate 16, height 1.778 m (5' 10\"), weight 101.1 kg (222 lb 12.8 oz), SpO2 99%.    "

## 2025-06-28 NOTE — PLAN OF CARE
Goal Outcome Evaluation:    Plan of Care Reviewed With: patient               Problem: Adult Inpatient Plan of Care  Goal: Optimal Comfort and Wellbeing  Outcome: Progressing  Intervention: Provide Person-Centered Care  Recent Flowsheet Documentation  Taken 6/28/2025 1152 by Cielo Metcalf RN  Trust Relationship/Rapport:   care explained   choices provided   emotional support provided   empathic listening provided   questions answered   questions encouraged   reassurance provided   thoughts/feelings acknowledged         Behavioral  Pt slept 3.25 hours overnight; eating and hydrating adequately. Compliant with medications. Attending to ADL's independently; pt seeking for staff frequently but  pleasant and cooperative upon approach; speech fluent and appropriate; Pt social with select staff and peers; attended groups; was observed playing cards with peers in the dining room. denied SI, SIB, HI, and hallucinations; affect flat and blunted; mood is calm;    Medical  Pt complaints of physical pain/discomfort to bilateral ankles this shift. Vital signs stable.     PRNS:  - Tylenol  -  Nicottine lozenge

## 2025-06-28 NOTE — CARE PLAN
06/28/25 0345   Observation Type   How often does the Patient require Staff intervention/redirection? rarely   Harm Category none   No Harm Category Noted at This Time no applicable harm categories or justifications   Level of Special Monitoring none

## 2025-06-28 NOTE — CARE PLAN
06/27/25 2219   Observation Type   How often does the Patient require Staff intervention/redirection? rarely   Harm Category none   No Harm Category Noted at This Time no applicable harm categories or justifications   Level of Special Monitoring none

## 2025-06-28 NOTE — PLAN OF CARE
"Goal Outcome Evaluation:    Plan of Care Reviewed With: patient               Problem: Adult Inpatient Plan of Care  Goal: Absence of Hospital-Acquired Illness or Injury  Intervention: Prevent Skin Injury  Recent Flowsheet Documentation  Taken 6/28/2025 1730 by Cielo Metcalf, RN    Problem: Adult Inpatient Plan of Care  Goal: Patient-Specific Goal (Individualized)  Description: You can add care plan individualizations to a care plan. Examples of Individualization might be:  \"Parent requests to be called daily at 9am for status\", \"I have a hard time hearing out of my right ear\", or \"Do not touch me to wake me up as it startles  me\".  6/28/2025 1735 by Cieol Metcalf, RN  Outcome: Progressing     Observation: Patient intermittently observed in the milieu.    Functioning:    Eating and hydrating adequately.    Attending to ADLs independently.    Compliant with medications.    Behavior:    Frequently seeking staff but remains pleasant and cooperative.    Speech is fluent and appropriate.    Social with select staff and peers; observed playing cards in the dining room.    Mood & Affect:    Mood described as  calm.     Affect flat and blunted.    Patient stated mood is  up and down.     Denials:    Denied suicidal ideation (SI), self-injurious behavior (SIB), homicidal ideation (HI), and hallucinations.    Denied depression or anxiety this evening.    Complaints: Reports bilateral ankle pain, rated 6/10.    Vital Signs: Stable.     PRN Medications Administered    Tylenol (for pain)    Nicotine lozenge    Zyprexa    Trazodone    "

## 2025-06-29 PROCEDURE — 250N000013 HC RX MED GY IP 250 OP 250 PS 637

## 2025-06-29 PROCEDURE — 124N000002 HC R&B MH UMMC

## 2025-06-29 PROCEDURE — 250N000013 HC RX MED GY IP 250 OP 250 PS 637: Performed by: PSYCHIATRY & NEUROLOGY

## 2025-06-29 PROCEDURE — 97150 GROUP THERAPEUTIC PROCEDURES: CPT | Mod: GO

## 2025-06-29 PROCEDURE — 250N000013 HC RX MED GY IP 250 OP 250 PS 637: Performed by: STUDENT IN AN ORGANIZED HEALTH CARE EDUCATION/TRAINING PROGRAM

## 2025-06-29 PROCEDURE — 250N000012 HC RX MED GY IP 250 OP 636 PS 637: Performed by: PSYCHIATRY & NEUROLOGY

## 2025-06-29 RX ADMIN — PROPRANOLOL HYDROCHLORIDE 20 MG: 20 TABLET ORAL at 14:55

## 2025-06-29 RX ADMIN — ACETAMINOPHEN 650 MG: 325 TABLET ORAL at 05:23

## 2025-06-29 RX ADMIN — NICOTINE POLACRILEX 4 MG: 2 LOZENGE ORAL at 06:17

## 2025-06-29 RX ADMIN — PROPRANOLOL HYDROCHLORIDE 20 MG: 20 TABLET ORAL at 07:59

## 2025-06-29 RX ADMIN — PHENOBARBITAL 64.8 MG: 64.8 TABLET ORAL at 14:55

## 2025-06-29 RX ADMIN — ACETAMINOPHEN 650 MG: 325 TABLET ORAL at 17:28

## 2025-06-29 RX ADMIN — OLANZAPINE 10 MG: 5 TABLET, FILM COATED ORAL at 05:24

## 2025-06-29 RX ADMIN — GABAPENTIN 600 MG: 300 CAPSULE ORAL at 07:59

## 2025-06-29 RX ADMIN — PROPRANOLOL HYDROCHLORIDE 20 MG: 20 TABLET ORAL at 19:38

## 2025-06-29 RX ADMIN — BUPRENORPHINE AND NALOXONE 1 FILM: 8; 2 FILM BUCCAL; SUBLINGUAL at 19:38

## 2025-06-29 RX ADMIN — NICOTINE POLACRILEX 4 MG: 2 LOZENGE ORAL at 15:02

## 2025-06-29 RX ADMIN — PHENOBARBITAL 64.8 MG: 64.8 TABLET ORAL at 19:38

## 2025-06-29 RX ADMIN — NICOTINE POLACRILEX 4 MG: 2 LOZENGE ORAL at 09:55

## 2025-06-29 RX ADMIN — BUPRENORPHINE AND NALOXONE 1 FILM: 8; 2 FILM BUCCAL; SUBLINGUAL at 07:59

## 2025-06-29 RX ADMIN — NICOTINE POLACRILEX 4 MG: 2 LOZENGE ORAL at 19:42

## 2025-06-29 RX ADMIN — GABAPENTIN 600 MG: 300 CAPSULE ORAL at 14:55

## 2025-06-29 RX ADMIN — GABAPENTIN 600 MG: 300 CAPSULE ORAL at 19:38

## 2025-06-29 RX ADMIN — PHENOBARBITAL 64.8 MG: 64.8 TABLET ORAL at 07:59

## 2025-06-29 RX ADMIN — TRAZODONE HYDROCHLORIDE 50 MG: 50 TABLET ORAL at 20:54

## 2025-06-29 RX ADMIN — NICOTINE POLACRILEX 4 MG: 2 LOZENGE ORAL at 08:02

## 2025-06-29 RX ADMIN — NICOTINE 1 PATCH: 21 PATCH, EXTENDED RELEASE TRANSDERMAL at 08:12

## 2025-06-29 RX ADMIN — OLANZAPINE 10 MG: 5 TABLET, FILM COATED ORAL at 20:47

## 2025-06-29 RX ADMIN — BUPROPION HYDROCHLORIDE 150 MG: 150 TABLET, EXTENDED RELEASE ORAL at 07:59

## 2025-06-29 RX ADMIN — ACETAMINOPHEN 650 MG: 325 TABLET ORAL at 09:55

## 2025-06-29 RX ADMIN — NICOTINE POLACRILEX 4 MG: 2 LOZENGE ORAL at 17:01

## 2025-06-29 RX ADMIN — NICOTINE POLACRILEX 4 MG: 2 LOZENGE ORAL at 22:12

## 2025-06-29 RX ADMIN — LURASIDONE HYDROCHLORIDE 80 MG: 80 TABLET, FILM COATED ORAL at 19:38

## 2025-06-29 ASSESSMENT — ACTIVITIES OF DAILY LIVING (ADL)
LAUNDRY: WITH SUPERVISION
ADLS_ACUITY_SCORE: 32
ADLS_ACUITY_SCORE: 42
ADLS_ACUITY_SCORE: 32
ADLS_ACUITY_SCORE: 42
HYGIENE/GROOMING: HANDWASHING;SHOWER;INDEPENDENT
DRESS: SCRUBS (BEHAVIORAL HEALTH)
ADLS_ACUITY_SCORE: 42
ADLS_ACUITY_SCORE: 32
ADLS_ACUITY_SCORE: 32
ADLS_ACUITY_SCORE: 42
ADLS_ACUITY_SCORE: 32
ADLS_ACUITY_SCORE: 32
ADLS_ACUITY_SCORE: 42
ADLS_ACUITY_SCORE: 42
ADLS_ACUITY_SCORE: 32
ORAL_HYGIENE: INDEPENDENT
ADLS_ACUITY_SCORE: 32
ADLS_ACUITY_SCORE: 32
DRESS: SCRUBS (BEHAVIORAL HEALTH);INDEPENDENT
HYGIENE/GROOMING: INDEPENDENT
ADLS_ACUITY_SCORE: 42
LAUNDRY: UNABLE TO COMPLETE
ADLS_ACUITY_SCORE: 32
ADLS_ACUITY_SCORE: 42
ADLS_ACUITY_SCORE: 32
ORAL_HYGIENE: INDEPENDENT;PROMPTS

## 2025-06-29 NOTE — CARE PLAN
06/28/25 2300   Observation Type   How often does the Patient require Staff intervention/redirection? rarely   Harm Category suicide/self-harm   Suicide/Self-Harm high risk for suicide (C-SSRS)   screening result is high (NOT APPLICABLE after 48 hours on unit)   Level of Special Monitoring none

## 2025-06-29 NOTE — PLAN OF CARE
"  Problem: Sleep Disturbance  Goal: Adequate Sleep/Rest  Outcome: Progressing   Goal Outcome Evaluation:    Pt slept for 5.25 hours. Pt resting in bed, eyes closed, breathing regular. PRN Zyprexa and Tylenol give for anxiety and leg pain.     Blood pressure 120/81, pulse 81, temperature 97.6  F (36.4  C), temperature source Oral, resp. rate 16, height 1.778 m (5' 10\"), weight 101.1 kg (222 lb 12.8 oz), SpO2 96%.   "

## 2025-06-29 NOTE — PLAN OF CARE
"Goal Outcome Evaluation:    Plan of Care Reviewed With: patient                 Problem: Adult Inpatient Plan of Care  Goal: Patient-Specific Goal (Individualized)  Description: You can add care plan individualizations to a care plan. Examples of Individualization might be:  \"Parent requests to be called daily at 9am for status\", \"I have a hard time hearing out of my right ear\", or \"Do not touch me to wake me up as it startles  me\".  Outcome: Progressing       Functioning:  The patient is self-sufficient in daily activities (ADLs), eating, and hydrating well, which indicates a level of independence and self-care.    Compliance with medication.    Behavior:  The patient is seeking staff attention but remains cooperative.    The patient's speech is appropriate and good intake of foods and fluids.    Social interaction with select staff and peers, including playing cards;    Family visited and the visit went well. Family brought pt juan pabloiteer.     Mood & Affect:  Mood is described as  calm  but with a  flat and blunted  affect, which could be indicative of an emotional state that is subdued or lacking in variation.    Denials:  The patient denies any acute symptoms such as suicidal ideation (SI), self-injurious behavior (SIB), homicidal ideation (HI), or hallucinations, which is reassuring.The patient also denies experiencing depression or anxiety at this time.    Complaints:  Reports of bilateral ankle pain, rated 5/10, are noted.     Vital Signs:  Stable vital signs suggest no immediate physical health concerns.    PRN Medications Administered:  Medications provided include Tylenol (for pain), Nicotine lozenge  "

## 2025-06-29 NOTE — CARE PLAN
06/29/25 0508   Observation Type   How often does the Patient require Staff intervention/redirection? rarely   Harm Category none   No Harm Category Noted at This Time patient sleeping, no interaction   Level of Special Monitoring none

## 2025-06-30 PROCEDURE — 250N000013 HC RX MED GY IP 250 OP 250 PS 637: Performed by: PSYCHIATRY & NEUROLOGY

## 2025-06-30 PROCEDURE — 250N000013 HC RX MED GY IP 250 OP 250 PS 637

## 2025-06-30 PROCEDURE — 250N000012 HC RX MED GY IP 250 OP 636 PS 637: Performed by: PSYCHIATRY & NEUROLOGY

## 2025-06-30 PROCEDURE — 99231 SBSQ HOSP IP/OBS SF/LOW 25: CPT | Mod: GC | Performed by: PSYCHIATRY & NEUROLOGY

## 2025-06-30 PROCEDURE — 97150 GROUP THERAPEUTIC PROCEDURES: CPT | Mod: GO

## 2025-06-30 PROCEDURE — 250N000013 HC RX MED GY IP 250 OP 250 PS 637: Performed by: STUDENT IN AN ORGANIZED HEALTH CARE EDUCATION/TRAINING PROGRAM

## 2025-06-30 PROCEDURE — 124N000002 HC R&B MH UMMC

## 2025-06-30 RX ADMIN — ACETAMINOPHEN 650 MG: 325 TABLET ORAL at 11:00

## 2025-06-30 RX ADMIN — OLANZAPINE 10 MG: 5 TABLET, FILM COATED ORAL at 19:42

## 2025-06-30 RX ADMIN — GABAPENTIN 600 MG: 300 CAPSULE ORAL at 07:57

## 2025-06-30 RX ADMIN — NICOTINE POLACRILEX 4 MG: 2 LOZENGE ORAL at 12:21

## 2025-06-30 RX ADMIN — BUPRENORPHINE AND NALOXONE 1 FILM: 8; 2 FILM BUCCAL; SUBLINGUAL at 19:43

## 2025-06-30 RX ADMIN — THIAMINE HCL TAB 100 MG 100 MG: 100 TAB at 07:57

## 2025-06-30 RX ADMIN — BUPROPION HYDROCHLORIDE 150 MG: 150 TABLET, EXTENDED RELEASE ORAL at 07:57

## 2025-06-30 RX ADMIN — NICOTINE POLACRILEX 4 MG: 2 LOZENGE ORAL at 19:43

## 2025-06-30 RX ADMIN — GABAPENTIN 600 MG: 300 CAPSULE ORAL at 14:08

## 2025-06-30 RX ADMIN — BUPRENORPHINE AND NALOXONE 1 FILM: 8; 2 FILM BUCCAL; SUBLINGUAL at 07:55

## 2025-06-30 RX ADMIN — ACETAMINOPHEN 650 MG: 325 TABLET ORAL at 05:01

## 2025-06-30 RX ADMIN — PROPRANOLOL HYDROCHLORIDE 20 MG: 20 TABLET ORAL at 14:08

## 2025-06-30 RX ADMIN — ACETAMINOPHEN 650 MG: 325 TABLET ORAL at 16:50

## 2025-06-30 RX ADMIN — NICOTINE POLACRILEX 4 MG: 4 GUM, CHEWING BUCCAL at 18:35

## 2025-06-30 RX ADMIN — NICOTINE POLACRILEX 4 MG: 2 LOZENGE ORAL at 11:00

## 2025-06-30 RX ADMIN — TRAZODONE HYDROCHLORIDE 50 MG: 50 TABLET ORAL at 20:51

## 2025-06-30 RX ADMIN — NICOTINE 1 PATCH: 21 PATCH, EXTENDED RELEASE TRANSDERMAL at 08:00

## 2025-06-30 RX ADMIN — OLANZAPINE 10 MG: 5 TABLET, FILM COATED ORAL at 04:41

## 2025-06-30 RX ADMIN — GABAPENTIN 600 MG: 300 CAPSULE ORAL at 19:43

## 2025-06-30 RX ADMIN — NICOTINE POLACRILEX 4 MG: 2 LOZENGE ORAL at 16:48

## 2025-06-30 RX ADMIN — PHENOBARBITAL 64.8 MG: 64.8 TABLET ORAL at 14:08

## 2025-06-30 RX ADMIN — NICOTINE POLACRILEX 4 MG: 2 LOZENGE ORAL at 07:55

## 2025-06-30 RX ADMIN — PROPRANOLOL HYDROCHLORIDE 20 MG: 20 TABLET ORAL at 07:57

## 2025-06-30 RX ADMIN — PHENOBARBITAL 64.8 MG: 64.8 TABLET ORAL at 19:43

## 2025-06-30 RX ADMIN — LURASIDONE HYDROCHLORIDE 80 MG: 80 TABLET, FILM COATED ORAL at 19:43

## 2025-06-30 RX ADMIN — PROPRANOLOL HYDROCHLORIDE 20 MG: 20 TABLET ORAL at 19:43

## 2025-06-30 RX ADMIN — NICOTINE POLACRILEX 4 MG: 4 GUM, CHEWING BUCCAL at 20:52

## 2025-06-30 RX ADMIN — NICOTINE POLACRILEX 4 MG: 2 LOZENGE ORAL at 14:08

## 2025-06-30 RX ADMIN — PHENOBARBITAL 32.4 MG: 32.4 TABLET ORAL at 07:57

## 2025-06-30 RX ADMIN — NICOTINE POLACRILEX 4 MG: 2 LOZENGE ORAL at 06:25

## 2025-06-30 ASSESSMENT — ACTIVITIES OF DAILY LIVING (ADL)
ADLS_ACUITY_SCORE: 32

## 2025-06-30 NOTE — CARE PLAN
06/30/25 1102   Observation Type   How often does the Patient require Staff intervention/redirection? rarely   Harm Category none   No Harm Category Noted at This Time patient in their room, minimal interaction   Level of Special Monitoring milieu management

## 2025-06-30 NOTE — PLAN OF CARE
Rehab Group    Start time: 20:15  End time: 21:00  Patient time total: 30 minutes    attended partial group    #6 attended   Group Type: occupational therapy   Group Topic Covered: coping skills, mindfulness, and relaxation      Group Session Detail:  Patient actively participated in a progressive muscle relaxation group in order to promote: positive relaxation and coping skills, encourage insight and self-reflection, promote a positive change, manage behaviors, and improve focus. In addition, patient was guided through proper deep breathing techniques and gentle full body movements/stretches to further promote relaxation.      Patient Response/Contribution:  cooperative with task, listened actively, actively engaged, and anxious, left group early     Patient Detail:  Upon check-in, patient reported that engaging in Spanish martial arts helps to promote relaxation for him. Patient remained an engaged, attentive, and cooperative group attendee. Patient was observed attempting engagement in the guided exercises; although, he appeared quite anxious throughout group which negatively affected his participation. Patient was observed fidgeting in his chair often as group progressed. Patient opted to leave group early; no return. Pleasant and polite in brief interactions.        [id number] OT Group (2 or more in attendance)      Patient Active Problem List   Diagnosis    Tobacco use disorder    Amphetamine use disorder, mild, in early remission (H)    Major depressive disorder, recurrent, moderate (H)    Opioid use disorder, severe, in early remission, on maintenance therapy, dependence (H)    Attention deficit hyperactivity disorder (ADHD), inattentive type, moderate    Sedative, hypnotic or anxiolytic use disorder, mild, abuse (H)    Insomnia, unspecified    Personality disorder, unspecified (H)    Chemical dependency (H)    Mental health problem    Psychosis (H)    Substance-related disorder (H)    Acute psychosis (H)    HTN  (hypertension)    Major depressive disorder, recurrent episode, severe (H)    Depression

## 2025-06-30 NOTE — CARE PLAN
06/30/25 0658   Observation Type   How often does the Patient require Staff intervention/redirection? rarely   Harm Category none   No Harm Category Noted at This Time no applicable harm categories or justifications   Level of Special Monitoring none

## 2025-06-30 NOTE — PROGRESS NOTES
"  ----------------------------------------------------------------------------------------------------------  [facility]  Psychiatry Progress Note  Hospital Day #20     Interim History:     The patient's care was discussed with the treatment team and chart notes were reviewed.    Vitals:   /87   Pulse 73   Temp 98  F (36.7  C)   Resp 16   Ht 1.778 m (5' 10\")   Wt 101.1 kg (222 lb 12.8 oz)   SpO2 98%   BMI 31.97 kg/m     Sleep: 5.5 hours (06/30/25 0600)  Scheduled medications: Took all scheduled medications as prescribed.    PRN medications:   Last 24H PRN:     acetaminophen (TYLENOL) tablet 650 mg, 650 mg at 06/30/25 0501    nicotine (COMMIT) lozenge 4 mg, 4 mg at 06/30/25 0625    OLANZapine (zyPREXA) tablet 5-10 mg, 10 mg at 06/30/25 0441 **OR** OLANZapine (zyPREXA) injection 5-10 mg    traZODone (DESYREL) tablet 50 mg, 50 mg at 06/29/25 2054     Staff Report:   \"He was cooperative and brightens upon approach. Mood was anxious and depressed. Patient claimed having anxiety and depression, rated both at 5/10. Affect was congruent with mood. Patient claimed her feels that the medication regimen has been effective in managing his symptoms. Patient denies SI/SIB/HI. Denies experiencing any type of hallucinations. Patient verbalized that music, playing the guitar as part of his coping skill while in the unit.\"    Please see staff notes  for more details.     Subjective:     Patient Interview:  Barak was interviewed by the honey team in the conference room. He says it's been a hard road but states he is doing \"alright\". Patient states he \"was on way too much klonopin\". Patient notes he has been feeling restless at times and describes it as \"wants to jump out of his skin.\" Patient endorses that he has been trying to do everything right with this hospitalization. Notes he has a \"warrior's spirit\" and is going to keep on fighting. Patient notes no changes in his " "mood.     Notes he is ready to keep on tapering down on his phenobarbital. Patient says the propranolol has been very helpful. States he did not realize how many issues his hypertension was causing him. Patient notes more normal urinary habits with propanolol. Agreeable to discharge with [facility] tentatively July 7. No other concerns.     ROS:  Patient has bilateral ankle pain   Patient denies acute concerns     Objective:     Vitals:  /87   Pulse 73   Temp 98  F (36.7  C)   Resp 16   Ht 1.778 m (5' 10\")   Wt 101.1 kg (222 lb 12.8 oz)   SpO2 98%   BMI 31.97 kg/m      Allergies:  Allergies   Allergen Reactions    Prazosin Unknown     Worsening of nightmares       Current Medications:  Scheduled:  Current Facility-Administered Medications   Medication Dose Route Frequency Provider Last Rate Last Admin    acetaminophen (TYLENOL) tablet 650 mg  650 mg Oral Q4H PRN Daniel Vazquez MD   650 mg at 06/30/25 0501    alum & mag hydroxide-simethicone (MAALOX) suspension 30 mL  30 mL Oral Q4H PRN Daniel Vazquez MD        buprenorphine HCl-naloxone HCl (SUBOXONE) 8-2 MG per film 1 Film  1 Film Sublingual BID Daniel Vazquez MD   1 Film at 06/29/25 1938    buPROPion (WELLBUTRIN XL) 24 hr tablet 150 mg  150 mg Oral Daily Kimmie Arango MD   150 mg at 06/29/25 0759    gabapentin (NEURONTIN) capsule 600 mg  600 mg Oral TID Sandee Trotter MD   600 mg at 06/29/25 1938    guaiFENesin (MUCINEX) 12 hr tablet 600 mg  600 mg Oral BID PRN Ciara Quintanilla APRN CNP        hydrOXYzine HCl (ATARAX) tablet 25 mg  25 mg Oral Q4H PRN Daniel Vazquez MD   25 mg at 06/24/25 2151    lurasidone (LATUDA) tablet 80 mg  80 mg Oral At Bedtime Kimmie Arango MD   80 mg at 06/29/25 1938    nicotine (COMMIT) lozenge 4 mg  4 mg Buccal Q1H PRN Bridger Hernandez MD   4 mg at 06/30/25 0625    nicotine (NICODERM CQ) 21 MG/24HR 24 hr patch 1 patch  1 patch Transdermal Daily Kimmie Arango MD   1 patch at " 06/29/25 0812    nicotine polacrilex (NICORETTE) gum 4 mg  4 mg Buccal Q1H PRN Kimmie Arango MD   4 mg at 06/22/25 0658    OLANZapine (zyPREXA) tablet 5-10 mg  5-10 mg Oral TID PRN Franny Best MD   10 mg at 06/30/25 0441    Or    OLANZapine (zyPREXA) injection 5-10 mg  5-10 mg Intramuscular TID PRN Franny Best MD        ondansetron (ZOFRAN) tablet 4 mg  4 mg Oral Q8H PRN Daniel Vazquez MD        PHENobarbital tablet 32.4 mg  32.4 mg Oral QAM Radha Pinedo MD        PHENobarbital tablet 64.8 mg  64.8 mg Oral At Bedtime Radha Pinedo MD   64.8 mg at 06/29/25 1938    PHENobarbital tablet 64.8 mg  64.8 mg Oral Daily Radha Pinedo MD   64.8 mg at 06/29/25 1455    propranolol (INDERAL) tablet 20 mg  20 mg Oral TID Radha Pinedo MD   20 mg at 06/29/25 1938    senna-docusate (SENOKOT-S/PERICOLACE) 8.6-50 MG per tablet 1 tablet  1 tablet Oral BID PRN Daniel Vazquez MD        thiamine (B-1) tablet 100 mg  100 mg Oral Daily Radha Pinedo MD   100 mg at 06/26/25 0756    traZODone (DESYREL) tablet 50 mg  50 mg Oral At Bedtime PRN Daniel Vazquez MD   50 mg at 06/29/25 2054       PRN:  Current Facility-Administered Medications   Medication Dose Route Frequency Provider Last Rate Last Admin    acetaminophen (TYLENOL) tablet 650 mg  650 mg Oral Q4H PRN Daniel Vazquez MD   650 mg at 06/30/25 0501    alum & mag hydroxide-simethicone (MAALOX) suspension 30 mL  30 mL Oral Q4H PRN Daniel Vazquez MD        buprenorphine HCl-naloxone HCl (SUBOXONE) 8-2 MG per film 1 Film  1 Film Sublingual BID Daniel Vazquez MD   1 Film at 06/29/25 1938    buPROPion (WELLBUTRIN XL) 24 hr tablet 150 mg  150 mg Oral Daily Kimmie Arango MD   150 mg at 06/29/25 0759    gabapentin (NEURONTIN) capsule 600 mg  600 mg Oral TID Sandee Trotter MD   600 mg at 06/29/25 1938    guaiFENesin (MUCINEX) 12 hr tablet 600 mg  600 mg Oral BID PRN Ciara Quintanilla,  APRN CNP        hydrOXYzine HCl (ATARAX) tablet 25 mg  25 mg Oral Q4H PRN Daniel Vazquez MD   25 mg at 06/24/25 2151    lurasidone (LATUDA) tablet 80 mg  80 mg Oral At Bedtime Kimmie Arango MD   80 mg at 06/29/25 1938    nicotine (COMMIT) lozenge 4 mg  4 mg Buccal Q1H PRN Bridger Hernandez MD   4 mg at 06/30/25 0625    nicotine (NICODERM CQ) 21 MG/24HR 24 hr patch 1 patch  1 patch Transdermal Daily Kimmie Arango MD   1 patch at 06/29/25 0812    nicotine polacrilex (NICORETTE) gum 4 mg  4 mg Buccal Q1H PRN Kimmie Arango MD   4 mg at 06/22/25 0658    OLANZapine (zyPREXA) tablet 5-10 mg  5-10 mg Oral TID PRN Franny Best MD   10 mg at 06/30/25 0441    Or    OLANZapine (zyPREXA) injection 5-10 mg  5-10 mg Intramuscular TID PRN Franny Best MD        ondansetron (ZOFRAN) tablet 4 mg  4 mg Oral Q8H PRN Daniel Vazquez MD        PHENobarbital tablet 32.4 mg  32.4 mg Oral QAM Radha Pinedo MD        PHENobarbital tablet 64.8 mg  64.8 mg Oral At Bedtime Radha Pinedo MD   64.8 mg at 06/29/25 1938    PHENobarbital tablet 64.8 mg  64.8 mg Oral Daily Radha Pinedo MD   64.8 mg at 06/29/25 1455    propranolol (INDERAL) tablet 20 mg  20 mg Oral TID Radha Pinedo MD   20 mg at 06/29/25 1938    senna-docusate (SENOKOT-S/PERICOLACE) 8.6-50 MG per tablet 1 tablet  1 tablet Oral BID PRN Daniel Vazquez MD        thiamine (B-1) tablet 100 mg  100 mg Oral Daily Radha Pinedo MD   100 mg at 06/26/25 0756    traZODone (DESYREL) tablet 50 mg  50 mg Oral At Bedtime Daniel Dixon MD   50 mg at 06/29/25 2054       Labs and Imaging:  New results:   No results found for this or any previous visit (from the past 24 hours).    Data this admission:  - CBC unremarkable  - CMP unremarkable other than Ca  - TSH normal  - UDS positive for amphetamines, benzos, cannabinoids  - Vit D low in 2024, consider repeat  - Hgb A1c 5.1 2023, consider  "repeat  - Lipids elevated in 2024. Consider repeat  - Vit B12 not obtained  - Folate not obtained, normal in 2024 (had been taking it orally)  - EKG normal sinus rhythm, QTc 434     Mental Status Exam:     Oriented to:  Grossly Oriented  General:  Awake and Alert  Appearance:  appears stated age and Grooming is adequate  Behavior/Attitude:  Calm and Cooperative  Eye Contact: Appropriate at times, stares off into the distance but this appears to be improving  Psychomotor: Restless, swiveling slightly in swivel chair, no catatonia present.   Speech:  appropriate volume/tone  Language: Fluent in English with appropriate syntax and vocabulary.  Mood:   feeling alright\"  Affect:  congruent with mood, positive about recovery  Thought Process: Linear and coherent   Thought Content:   No HI and does not appear to be responding to internal stimuli; no longer mentioning delusions of paranoia. No mention of SI.   Associations: Intact   Insight:  partial  Judgment:  fair   Impulse control: fair  Attention Span:  adequate for conversation  Concentration:  grossly intact  Recent and Remote Memory:  not formally assessed  Fund of Knowledge: average  Muscle Strength and Tone: normal  Gait and Station: Normal       Psychiatric Assessment     Barak is a 40 year old male with a past psychiatric history of substance use (benzodiazepines, cannabis, cocaine, opioids) & unspecified psychotic disorder admitted from the ED on 6/10/2025 due to concern for out of control behaviors and psychosis in the context of substance use, medication nonadherence and psychosocial stressors. Significant symptoms include sleep issues, disorganized and delusional thoughts, substance use, delusional beliefs, and paranoia. His last psychiatric hospitalization was in 2024. He has had some outpatient psychiatric management but it isn't clear if he is consistently seeing anyone. The MSE on admission is notable for delusional beliefs, paranoia, and disorganized " thought process.       In summary, presentation is consistent with psychosis, likely schizophrenia vs substance induced. He will likely benefit from medication optimization, therapeutic milieu and access to resources this admission.     Psychiatric Plan by Diagnosis      Today's changes:  - Continue to taper down on phenobarbital to 32.4mg qam + 64.8mg BID.     # Acute psychosis, schizophrenia vs. substance-induced psychotic disorder   1. Medications:  - Continue lurasidone 80mg qpm with food  - Continue previously restarted bupropion at 150mg daily  - Holding PTA Adderall (would not recommend restarting this)     2. Pertinent Labs/Monitoring:   - EKG QTc 434     3. Additional Plans:  - Patient will be treated in therapeutic milieu with appropriate individual and group therapies as described    # PSUD (opioids, benzodiazepines, cocaine, cannabis)  # benzodiazepine use disorder  - Phenobarbital taper to 32.4 mg qd + 64.8 mg BID from 64.8 mg TID  - Continue gabapentin 600 mg TID for restlessness  - Continue PTA Suboxone 8-2mg BID   - Continue propranolol 20 mg TID for restlessness (likely also aspect of akathisia)    # Akathisia  - Continue propranolol as above        Psychiatric Hospital Course:      Barak Clarke was admitted to [facility] as a voluntary patient on 6/10/2025 after presenting to the ED on 6/8. His PTA medications were continued with the exception of bupropion and Adderall, which were held due to acute psychosis. On 6/21 he was transferred to unit 20, a lower acuity unit.  Medications:  Barak Clarke was admitted to [facility] as a voluntary patient on 6/10/2025 after presenting to the ED on 6/8. His PTA medications were continued with the exception of bupropion and Adderall, which were held due to acute psychosis.  6/10: initiate risperidone 1mg qhs  6/11: discontinue risperidone per patient preference, initiate haloperidol 5mg qhs. Place 72h hold and file for commitment with [facility]. Initiate  phenobarbital 64.8mg TID for clonazepam withdrawal  6/12: discontinue haloperidol per patient preference (did not take any doses of paliperidone, risperidone, or haloperidol). Initiate lurasidone 20mg qpm with dinner  6/13: increase lurasidone to 60mg qpm with dinner  6/16: increase lurasidone to 80mg qpm with dinner, restart bupropion at 150mg daily  6/17: increase phenobarbital to 97.2mg TID. Commitment and [facility] orders received today (patient waived his right to a hearing)  6/20: initiate propranolol 10mg TID PRN for akathisia/restlessness in context of withdrawal   6/24: scheduled propranolol   6/24: decrease phenobarbital to 97.2 mg BID + 64.8 mg qd  6/24: increase gabapentin to 600 mg TID for restlessness  6/25: increase propranolol to 20 mg TID for akathisia/restlessness  6/26: decrease phenobarbital to 97.2 mg qd + 64.8 mg BID  6/27: decrease phenobarbital to 64.8 mg TID  6/30: decrease phenobarbital to 32.4 mg qam + 64.8 mg BID.  Patient continues to tolerate phenobarbital taper. Consents to further taper.      The risks, benefits, alternatives, and side effects were discussed and understood by the patient.     Medical Assessment and Plan     Medical diagnoses to be addressed this admission:    # HTN  - not on any medication    Medical course: Patient was physically examined by the ED prior to being transferred to the unit and was found to be medically stable and appropriate for admission.     Consults: medicine previously for URI symptoms     Checklist     Legal Status: [facility]  Committed     [facility] order: clozapine, paliperidone, risperidone, quetiapine, olanzapine, aripiprazole, lurasidone     Safety Assessment:   Behavioral Orders   Procedures    Code 1 - Restrict to Unit    Routine Programming     As clinically indicated    Status 15     Every 15 minutes.    Suicide precautions: Suicide Risk: MODERATE; Clinical rationale to override score: response to medication     Patients on Suicide Precautions  should have a Combination Diet ordered that includes a Diet selection(s) AND a Behavioral Tray selection for Safe Tray - with utensils, or Safe Tray - NO utensils       Suicide Risk:   MODERATE     Clinical rationale to override score::   response to medication    Withdrawal precautions       Risk Assessment:  Risk for harm is moderate.  Risk factors: maladaptive coping, substance use, and past behaviors  Protective factors: engaged in treatment     SIO: no    Disposition: TBD. Pending stabilization, medication optimization, & development of a safe discharge plan. Likely to CD treatment.     Attestations     Vishal Phillips  MS3 [facility]    ---  This patient was seen with the medical student and discussed with my attending physician. I have reviewed the medical students note.   Martin Ventura MD  [facility]  PGY1 Psychiatry Resident     Attestation:  This patient has been seen and evaluated by me, Sandee Trotter MD.  I have discussed this patient with the house staff team including the resident and/or medical student and I agree with the findings and plan in this note.    I have reviewed today's vital signs, medications, labs and imaging. Sandee Trotter MD , PhD.

## 2025-06-30 NOTE — CARE PLAN
06/29/25 1938   Observation Type   How often does the Patient require Staff intervention/redirection? rarely   Harm Category none   No Harm Category Noted at This Time no applicable harm categories or justifications   Level of Special Monitoring none

## 2025-06-30 NOTE — PLAN OF CARE
Problem: Psychotic Signs/Symptoms  Goal: Improved Behavioral Control (Psychotic Signs/Symptoms)  Outcome: Progressing     Problem: Psychotic Signs/Symptoms  Goal: Improved Mood Symptoms (Psychotic Signs/Symptoms)  Outcome: Progressing   Goal Outcome Evaluation:    Plan of Care Reviewed With: patient    Patient observed out in the milieu, interacting with selected peers. He was cooperative and brightens upon approach. Mood was anxious and depressed. Patient claimed having anxiety and depression, rated both at 5/10. Affect was congruent with mood. Patient claimed her feels that the medication regimen has been effective in managing his symptoms. Patient denies SI/SIB/HI. Denies experiencing any type of hallucinations. Patient verbalized that music, playing the guitar as part of his coping skill while in the unit.     Patient came to staff and claimed having racing thoughts, agitated, restless and anxious. PRN Zyprexa given per request at 2047H. Patient claimed medication was effective after reassessment.     Patient was medication compliant. Denies experiencing side effects. On withdrawal and suicide precautions. No behavioral outbursts noted this shift.     Medical Concerns: complained of bilateral ankle pain. Rated pain 5/10. PRN Tylenol given per request. Cold pack applied to affected area. Patient claimed medication and treatment effective after re assessment.   Appetite: Consumed 100% of share of dinner and took approximately 500 ml of fluids.   Sleep: pt claimed sometimes having difficulty sleeping at night. PRN Trazodone given per request.   LBM: pt claimed he had BM yesterday. Encouraged to increase fluid intake. Pt verbalized understanding.   ADLs: Independent.   PRNs given: PRN Tylenol for pain, PRN Zyprexa for agitation, PRN Trazodone for sleep, Nicotine lozenges x 3.     Needs attended to. No further concerns noted as of this time.

## 2025-06-30 NOTE — PROGRESS NOTES
"  Rehab Group    Start time: 1015  End time: 1200  Patient time total: 35 minutes    came in and out of group session    #8 attended   Group Type: OT Clinic   Group Topic Covered: balanced lifestyle, coping skills, healthy leisure time, and social skills     Group Session Detail:  Occupational therapy clinic to facilitate coping skill exploration, creative expression within personally meaningful activities, and clinical observation of social, cognitive, and kinesthetic performance skills     Patient Response/Contribution:  cooperative with task, socially appropriate, safe use of materials/supplies, actively engaged, and engaged socially when prompted     Patient Detail:  Pt min encouragement to initiate, gather materials, sequence, and adjust to workspace demands as needed. Demonstrated limited focus, planning, and problem solving for selected creative expression task, reporting \"I need to go walk for a bit\" returning some time later. Able to ask for assistance as needed, and engaged in discussion with peers and staff.         [id number] OT Group (2 or more in attendance)      Patient Active Problem List   Diagnosis    Tobacco use disorder    Amphetamine use disorder, mild, in early remission (H)    Major depressive disorder, recurrent, moderate (H)    Opioid use disorder, severe, in early remission, on maintenance therapy, dependence (H)    Attention deficit hyperactivity disorder (ADHD), inattentive type, moderate    Sedative, hypnotic or anxiolytic use disorder, mild, abuse (H)    Insomnia, unspecified    Personality disorder, unspecified (H)    Chemical dependency (H)    Mental health problem    Psychosis (H)    Substance-related disorder (H)    Acute psychosis (H)    HTN (hypertension)    Major depressive disorder, recurrent episode, severe (H)    Depression       "

## 2025-06-30 NOTE — PLAN OF CARE
BEH IP Unit Acuity Rating Score (UARS)  Patient is given one point for every criteria they meet.    CRITERIA SCORING   On a 72 hour hold, court hold, committed, stay of commitment, or revocation. 1   Patient LOS on BEH unit exceeds 20 days. 0  LOS: 20   Patient under guardianship, 55+, otherwise medically complex, or under age 11. 0   Suicide ideation without relief of precipitating factors. 0   Current plan for suicide. 0   Current plan for homicide. 0   Imminent risk or actual attempt to seriously harm another without relief of factors precipitating the attempt. 0   Severe dysfunction in daily living (ex: complete neglect for self care, extreme disruption in vegetative function, extreme deterioration in social interactions). 1   Recent (last 7 days) or current physical aggression in the ED or on unit. 0   Restraints or seclusion episode in past 72 hours. 0   Recent (last 7 days) or current verbal aggression, agitation, yelling, etc., while in the ED or unit. 0   Active psychosis. 1   Need for constant or near constant redirection (from leaving, from others, etc).  0   Intrusive or disruptive behaviors. 0   Patient requires 3 or more hours of individualized nursing care per 8-hour shift (i.e. for ADLs, meds, therapeutic interventions). 0   TOTAL 3

## 2025-06-30 NOTE — CARE PLAN
06/30/25 0657   Observation Type   How often does the Patient require Staff intervention/redirection? rarely   Harm Category none   No Harm Category Noted at This Time no applicable harm categories or justifications   Level of Special Monitoring none

## 2025-06-30 NOTE — PLAN OF CARE
"  Problem: Adult Inpatient Plan of Care  Goal: Patient-Specific Goal (Individualized)  Description: You can add care plan individualizations to a care plan. Examples of Individualization might be:  \"Parent requests to be called daily at 9am for status\", \"I have a hard time hearing out of my right ear\", or \"Do not touch me to wake me up as it startles  me\".  Outcome: Progressing  Goal: Absence of Hospital-Acquired Illness or Injury  Outcome: Progressing  Intervention: Prevent Skin Injury  Recent Flowsheet Documentation  Taken 6/30/2025 0800 by Lillian Castaneda RN  Body Position: position changed independently  Goal: Optimal Comfort and Wellbeing  Outcome: Progressing  Intervention: Provide Person-Centered Care  Recent Flowsheet Documentation  Taken 6/30/2025 0800 by Lillian Castaneda RN  Trust Relationship/Rapport:   care explained   choices provided   emotional support provided   empathic listening provided   questions answered   questions encouraged   reassurance provided   thoughts/feelings acknowledged  Goal: Readiness for Transition of Care  Outcome: Progressing       Goal Outcome Evaluation:    Plan of Care Reviewed With: patient      Patient's affect was flat and blunted. His mood was calm. He was medication compliant. He was seen in the milieu interacting with selected peers. He was also seen pacing the [address] with his headset on. He endorsed bilateral ankle pain and rated it 4/10. He said he just took Tylenol before writer got here. He denied SI/AH/VH. Patient also endorsed anxiety and depression and rated them 4/10. Patient attended group OT. His hygiene was appropriate. His fluid and food intake was adequate. There was no behavioral escalations or safety concerns observed or reported.   /87   Pulse 73   Temp 98  F (36.7  C)   Resp 16   Ht 1.778 m (5' 10\")   Wt 101.1 kg (222 lb 12.8 oz)   SpO2 98%   BMI 31.97 kg/m                  "

## 2025-06-30 NOTE — PLAN OF CARE
"  Problem: Adult Inpatient Plan of Care  Goal: Patient-Specific Goal (Individualized)  Description: You can add care plan individualizations to a care plan. Examples of Individualization might be:  \"Parent requests to be called daily at 9am for status\", \"I have a hard time hearing out of my right ear\", or \"Do not touch me to wake me up as it startles  me\".  Outcome: Progressing     Problem: Psychotic Signs/Symptoms  Goal: Improved Behavioral Control (Psychotic Signs/Symptoms)  Outcome: Progressing  Intervention: Manage Behavior  Recent Flowsheet Documentation  Taken 6/30/2025 1619 by Shilpa Flower RN  De-Escalation Techniques:   appropriate behavior reinforced   diversional activity encouraged   verbally redirected   Goal Outcome Evaluation:    Plan of Care Reviewed With: patient      Pt was visible in the milieu, he paced the halls couple times, C/O headache rated 4/10, PRN Tylenol was given with effect, right foot pain with some limbing stated from too much walking. He endorsed some mild anxiety, no intervention needed, denied SI, SIB, HI, denied A/V/Hallucination. Pt social and interacts well with peers and staff, able to make his needs known. Watched television and attended groups. He was medication compliant, adequate food/fluid intake.                "

## 2025-07-01 PROCEDURE — 250N000012 HC RX MED GY IP 250 OP 636 PS 637: Performed by: PSYCHIATRY & NEUROLOGY

## 2025-07-01 PROCEDURE — 250N000013 HC RX MED GY IP 250 OP 250 PS 637: Performed by: PSYCHIATRY & NEUROLOGY

## 2025-07-01 PROCEDURE — 250N000013 HC RX MED GY IP 250 OP 250 PS 637: Performed by: STUDENT IN AN ORGANIZED HEALTH CARE EDUCATION/TRAINING PROGRAM

## 2025-07-01 PROCEDURE — 124N000002 HC R&B MH UMMC

## 2025-07-01 PROCEDURE — 250N000013 HC RX MED GY IP 250 OP 250 PS 637

## 2025-07-01 PROCEDURE — 97150 GROUP THERAPEUTIC PROCEDURES: CPT | Mod: GO

## 2025-07-01 PROCEDURE — 99232 SBSQ HOSP IP/OBS MODERATE 35: CPT | Mod: GC | Performed by: PSYCHIATRY & NEUROLOGY

## 2025-07-01 PROCEDURE — 90853 GROUP PSYCHOTHERAPY: CPT

## 2025-07-01 RX ORDER — PHENOBARBITAL 32.4 MG/1
32.4 TABLET ORAL AT BEDTIME
Status: CANCELLED | OUTPATIENT
Start: 2025-07-01

## 2025-07-01 RX ORDER — PHENOBARBITAL 32.4 MG/1
32.4 TABLET ORAL DAILY
Status: DISCONTINUED | OUTPATIENT
Start: 2025-07-01 | End: 2025-07-03

## 2025-07-01 RX ADMIN — BUPROPION HYDROCHLORIDE 150 MG: 150 TABLET, EXTENDED RELEASE ORAL at 07:46

## 2025-07-01 RX ADMIN — PROPRANOLOL HYDROCHLORIDE 20 MG: 20 TABLET ORAL at 19:11

## 2025-07-01 RX ADMIN — NICOTINE POLACRILEX 4 MG: 4 GUM, CHEWING BUCCAL at 13:08

## 2025-07-01 RX ADMIN — PHENOBARBITAL 32.4 MG: 32.4 TABLET ORAL at 13:49

## 2025-07-01 RX ADMIN — ACETAMINOPHEN 650 MG: 325 TABLET ORAL at 20:40

## 2025-07-01 RX ADMIN — BUPRENORPHINE AND NALOXONE 1 FILM: 8; 2 FILM BUCCAL; SUBLINGUAL at 19:12

## 2025-07-01 RX ADMIN — PROPRANOLOL HYDROCHLORIDE 20 MG: 20 TABLET ORAL at 13:49

## 2025-07-01 RX ADMIN — NICOTINE POLACRILEX 4 MG: 4 GUM, CHEWING BUCCAL at 07:46

## 2025-07-01 RX ADMIN — PHENOBARBITAL 64.8 MG: 64.8 TABLET ORAL at 19:11

## 2025-07-01 RX ADMIN — PROPRANOLOL HYDROCHLORIDE 20 MG: 20 TABLET ORAL at 07:46

## 2025-07-01 RX ADMIN — NICOTINE POLACRILEX 4 MG: 4 GUM, CHEWING BUCCAL at 17:34

## 2025-07-01 RX ADMIN — GABAPENTIN 600 MG: 300 CAPSULE ORAL at 13:48

## 2025-07-01 RX ADMIN — ACETAMINOPHEN 650 MG: 325 TABLET ORAL at 04:45

## 2025-07-01 RX ADMIN — OLANZAPINE 10 MG: 5 TABLET, FILM COATED ORAL at 19:11

## 2025-07-01 RX ADMIN — NICOTINE POLACRILEX 4 MG: 4 GUM, CHEWING BUCCAL at 15:20

## 2025-07-01 RX ADMIN — BUPRENORPHINE AND NALOXONE 1 FILM: 8; 2 FILM BUCCAL; SUBLINGUAL at 07:46

## 2025-07-01 RX ADMIN — LURASIDONE HYDROCHLORIDE 80 MG: 80 TABLET, FILM COATED ORAL at 19:12

## 2025-07-01 RX ADMIN — PHENOBARBITAL 32.4 MG: 32.4 TABLET ORAL at 07:46

## 2025-07-01 RX ADMIN — GABAPENTIN 600 MG: 300 CAPSULE ORAL at 19:12

## 2025-07-01 RX ADMIN — NICOTINE POLACRILEX 4 MG: 4 GUM, CHEWING BUCCAL at 19:47

## 2025-07-01 RX ADMIN — NICOTINE 1 PATCH: 21 PATCH, EXTENDED RELEASE TRANSDERMAL at 07:46

## 2025-07-01 RX ADMIN — NICOTINE POLACRILEX 4 MG: 4 GUM, CHEWING BUCCAL at 10:45

## 2025-07-01 RX ADMIN — OLANZAPINE 10 MG: 5 TABLET, FILM COATED ORAL at 04:44

## 2025-07-01 RX ADMIN — NICOTINE POLACRILEX 4 MG: 4 GUM, CHEWING BUCCAL at 06:16

## 2025-07-01 RX ADMIN — TRAZODONE HYDROCHLORIDE 50 MG: 50 TABLET ORAL at 20:40

## 2025-07-01 RX ADMIN — ACETAMINOPHEN 650 MG: 325 TABLET ORAL at 13:48

## 2025-07-01 RX ADMIN — GABAPENTIN 600 MG: 300 CAPSULE ORAL at 07:46

## 2025-07-01 ASSESSMENT — ACTIVITIES OF DAILY LIVING (ADL)
DRESS: INDEPENDENT;SCRUBS (BEHAVIORAL HEALTH)
ADLS_ACUITY_SCORE: 32
ORAL_HYGIENE: INDEPENDENT
ADLS_ACUITY_SCORE: 32
ORAL_HYGIENE: INDEPENDENT
ADLS_ACUITY_SCORE: 32
HYGIENE/GROOMING: INDEPENDENT
ADLS_ACUITY_SCORE: 32
ADLS_ACUITY_SCORE: 32
DRESS: SCRUBS (BEHAVIORAL HEALTH);INDEPENDENT
ADLS_ACUITY_SCORE: 32
HYGIENE/GROOMING: HANDWASHING;INDEPENDENT

## 2025-07-01 NOTE — PLAN OF CARE
No PRNs given or requested this shift. No c/o pain to feet noted/reported.  Safety checks completed every 15 minutes; no concerns noted. Pt appears to have slept for 6 hours; will continue to monitor and offer support.     Problem: Sleep Disturbance  Goal: Adequate Sleep/Rest  Outcome: Progressing   Goal Outcome Evaluation:

## 2025-07-01 NOTE — PLAN OF CARE
Group Attendance:  attended full group    Time session began: 1300  Time session ended: 1345  Patient's total time in group: 45    Total # Attendees   8   Group Type psychotherapeutic     Group Topic Covered insight improvement, relationships, healthy coping skills, goals and motivation, mindfulness, and relationships and boundaries     Group Session Detail Group members checked in before the activity. Writer described the activity and had pts follow along with tracing each hand and filling them in with what was in their past and what they want in their future. After completing the activity, pts reflected on what is in each, how the activity went, how they felt before and after and if they noticed anything that surprised them with the activity.      Patient's response to the group topic/interactions:  cooperative with task, organized, supportive of peers, socially appropriate, listened actively, expressed understanding of topic, attentive, and actively engaged     Patient Details: Pt attended group, engaged in the activity, and chatted with peers. Pt became restless at points and would stand.            [id number] - Group psychotherapy - 1 Session  Patient Active Problem List   Diagnosis    Tobacco use disorder    Amphetamine use disorder, mild, in early remission (H)    Major depressive disorder, recurrent, moderate (H)    Opioid use disorder, severe, in early remission, on maintenance therapy, dependence (H)    Attention deficit hyperactivity disorder (ADHD), inattentive type, moderate    Sedative, hypnotic or anxiolytic use disorder, mild, abuse (H)    Insomnia, unspecified    Personality disorder, unspecified (H)    Chemical dependency (H)    Mental health problem    Psychosis (H)    Substance-related disorder (H)    Acute psychosis (H)    HTN (hypertension)    Major depressive disorder, recurrent episode, severe (H)    Depression

## 2025-07-01 NOTE — CARE PLAN
06/30/25 2312   Observation Type   How often does the Patient require Staff intervention/redirection? rarely   Harm Category none   No Harm Category Noted at This Time no applicable harm categories or justifications   Level of Special Monitoring none

## 2025-07-01 NOTE — PROGRESS NOTES
"  ----------------------------------------------------------------------------------------------------------  [facility]  Psychiatry Progress Note  Hospital Day #21     Interim History:     The patient's care was discussed with the treatment team and chart notes were reviewed.    Vitals:   BP (!) 148/90   Pulse 87   Temp (!) 96.4  F (35.8  C)   Resp 16   Ht 1.778 m (5' 10\")   Wt 101.1 kg (222 lb 12.8 oz)   SpO2 95%   BMI 31.97 kg/m     Sleep: 6 hours (07/01/25 0615)  Scheduled medications: Took all scheduled medications as prescribed.    PRN medications:   Last 24H PRN:     acetaminophen (TYLENOL) tablet 650 mg, 650 mg at 07/01/25 0445    nicotine (COMMIT) lozenge 4 mg, 4 mg at 06/30/25 1943    nicotine polacrilex (NICORETTE) gum 4 mg, 4 mg at 07/01/25 0616    OLANZapine (zyPREXA) tablet 5-10 mg, 10 mg at 07/01/25 0444 **OR** OLANZapine (zyPREXA) injection 5-10 mg    traZODone (DESYREL) tablet 50 mg, 50 mg at 06/30/25 2051     Staff Report:   \"Pt was visible in the milieu, he paced the halls couple times, C/O headache rated 4/10, PRN Tylenol was given with effect, right foot pain with some limbing stated from too much walking. He endorsed some mild anxiety, no intervention needed, denied SI, SIB, HI, denied A/V/Hallucination. Pt social and interacts well with peers and staff, able to make his needs known. Watched television and attended groups. He was medication compliant, adequate food/fluid intake.\"    Please see staff notes  for more details.     Subjective:     Patient Interview:  Barak was interviewed in the [facility] conference room by the team. Patient feeling \"alright\". Patient notes he is starting to feel better. Notices body feeling better, still \"up and down and all over the place\" Feeling restless in the morning, mornings are the hardest - wakes up at 5, but doesn't get meds until 7:30, then takes as needed zyprexa dose which helps. Propranolol helping the " "feeling of restlessness. Noticed some effects of the taper 2 days ago, now doesn't feel it so much. Patient reports is ready to work and do a good job at [employer] upon discharge.     Tylenol helps with ankle pain. Has been taking in the early morning. Patient reports waking up once to twice a night. Reports he walks a few times in the [place] then goes back to bed, trazodone helping, only wakes 1x per night, usually wakes 3+ times per night. Reports improving sleep.    ROS:  Patient has bilateral ankle pain   Patient denies acute concerns     Objective:     Vitals:  BP (!) 148/90   Pulse 87   Temp (!) 96.4  F (35.8  C)   Resp 16   Ht 1.778 m (5' 10\")   Wt 101.1 kg (222 lb 12.8 oz)   SpO2 95%   BMI 31.97 kg/m      Allergies:  Allergies   Allergen Reactions    Prazosin Unknown     Worsening of nightmares       Current Medications:  Scheduled:  Current Facility-Administered Medications   Medication Dose Route Frequency Provider Last Rate Last Admin    acetaminophen (TYLENOL) tablet 650 mg  650 mg Oral Q4H PRN Daniel Vazquez MD   650 mg at 07/01/25 0445    alum & mag hydroxide-simethicone (MAALOX) suspension 30 mL  30 mL Oral Q4H PRN Daniel Vazquez MD        buprenorphine HCl-naloxone HCl (SUBOXONE) 8-2 MG per film 1 Film  1 Film Sublingual BID Daniel Vazquez MD   1 Film at 06/30/25 1943    buPROPion (WELLBUTRIN XL) 24 hr tablet 150 mg  150 mg Oral Daily Kimmie Arango MD   150 mg at 06/30/25 0757    gabapentin (NEURONTIN) capsule 600 mg  600 mg Oral TID Sandee Trotter MD   600 mg at 06/30/25 1943    guaiFENesin (MUCINEX) 12 hr tablet 600 mg  600 mg Oral BID PRN Ciara Quintanilla APRN CNP        hydrOXYzine HCl (ATARAX) tablet 25 mg  25 mg Oral Q4H PRN Daniel Vazquez MD   25 mg at 06/24/25 2151    lurasidone (LATUDA) tablet 80 mg  80 mg Oral At Bedtime Kimmie Arango MD   80 mg at 06/30/25 1943    nicotine (COMMIT) lozenge 4 mg  4 mg Buccal Q1H PRN Bridger Hernandez MD   " 4 mg at 06/30/25 1943    nicotine (NICODERM CQ) 21 MG/24HR 24 hr patch 1 patch  1 patch Transdermal Daily Kimmie Arango MD   1 patch at 06/30/25 0800    nicotine polacrilex (NICORETTE) gum 4 mg  4 mg Buccal Q1H PRN Kimmie Arango MD   4 mg at 07/01/25 0616    OLANZapine (zyPREXA) tablet 5-10 mg  5-10 mg Oral TID PRN Franny Best MD   10 mg at 07/01/25 0444    Or    OLANZapine (zyPREXA) injection 5-10 mg  5-10 mg Intramuscular TID PRN Franny Best MD        ondansetron (ZOFRAN) tablet 4 mg  4 mg Oral Q8H PRN Daniel Vazquez MD        PHENobarbital tablet 32.4 mg  32.4 mg Oral QAM Radha Pinedo MD   32.4 mg at 06/30/25 0757    PHENobarbital tablet 64.8 mg  64.8 mg Oral At Bedtime Radha Pinedo MD   64.8 mg at 06/30/25 1943    PHENobarbital tablet 64.8 mg  64.8 mg Oral Daily Radha Pinedo MD   64.8 mg at 06/30/25 1408    propranolol (INDERAL) tablet 20 mg  20 mg Oral TID Radha Pinedo MD   20 mg at 06/30/25 1943    senna-docusate (SENOKOT-S/PERICOLACE) 8.6-50 MG per tablet 1 tablet  1 tablet Oral BID PRN Daniel Vazquez MD        thiamine (B-1) tablet 100 mg  100 mg Oral Daily Radha Pinedo MD   100 mg at 06/30/25 0757    traZODone (DESYREL) tablet 50 mg  50 mg Oral At Bedtime PRN Daniel Vazquez MD   50 mg at 06/30/25 2051       PRN:  Current Facility-Administered Medications   Medication Dose Route Frequency Provider Last Rate Last Admin    acetaminophen (TYLENOL) tablet 650 mg  650 mg Oral Q4H PRN Daniel Vazquez MD   650 mg at 07/01/25 0445    alum & mag hydroxide-simethicone (MAALOX) suspension 30 mL  30 mL Oral Q4H PRN Daniel Vazquez MD        buprenorphine HCl-naloxone HCl (SUBOXONE) 8-2 MG per film 1 Film  1 Film Sublingual BID Daniel Vazquez MD   1 Film at 06/30/25 1943    buPROPion (WELLBUTRIN XL) 24 hr tablet 150 mg  150 mg Oral Daily Kimmie Arango MD   150 mg at 06/30/25 8949    gabapentin  (NEURONTIN) capsule 600 mg  600 mg Oral TID Sandee Trotter MD   600 mg at 06/30/25 1943    guaiFENesin (MUCINEX) 12 hr tablet 600 mg  600 mg Oral BID PRN Ciara Quintanilla APRN CNP        hydrOXYzine HCl (ATARAX) tablet 25 mg  25 mg Oral Q4H PRN Daniel Vazquez MD   25 mg at 06/24/25 2151    lurasidone (LATUDA) tablet 80 mg  80 mg Oral At Bedtime Kimmie Arango MD   80 mg at 06/30/25 1943    nicotine (COMMIT) lozenge 4 mg  4 mg Buccal Q1H PRN Bridger Hernandez MD   4 mg at 06/30/25 1943    nicotine (NICODERM CQ) 21 MG/24HR 24 hr patch 1 patch  1 patch Transdermal Daily Kimmie Arango MD   1 patch at 06/30/25 0800    nicotine polacrilex (NICORETTE) gum 4 mg  4 mg Buccal Q1H PRN Kimmie Arango MD   4 mg at 07/01/25 0616    OLANZapine (zyPREXA) tablet 5-10 mg  5-10 mg Oral TID PRN Franny Best MD   10 mg at 07/01/25 0444    Or    OLANZapine (zyPREXA) injection 5-10 mg  5-10 mg Intramuscular TID PRN Franny Best MD        ondansetron (ZOFRAN) tablet 4 mg  4 mg Oral Q8H PRN Daniel Vazquez MD        PHENobarbital tablet 32.4 mg  32.4 mg Oral QAM Radha Pinedo MD   32.4 mg at 06/30/25 0757    PHENobarbital tablet 64.8 mg  64.8 mg Oral At Bedtime Radha Pinedo MD   64.8 mg at 06/30/25 1943    PHENobarbital tablet 64.8 mg  64.8 mg Oral Daily Radha Pinedo MD   64.8 mg at 06/30/25 1408    propranolol (INDERAL) tablet 20 mg  20 mg Oral TID Radha Pinedo MD   20 mg at 06/30/25 1943    senna-docusate (SENOKOT-S/PERICOLACE) 8.6-50 MG per tablet 1 tablet  1 tablet Oral BID PRN Daniel Vazquez MD        thiamine (B-1) tablet 100 mg  100 mg Oral Daily Radha Pinedo MD   100 mg at 06/30/25 0757    traZODone (DESYREL) tablet 50 mg  50 mg Oral At Bedtime PRN Daniel Vazquez MD   50 mg at 06/30/25 2051       Labs and Imaging:  New results:   No results found for this or any previous visit (from the past 24 hours).    Data this  "admission:  - CBC unremarkable  - CMP unremarkable other than Ca  - TSH normal  - UDS positive for amphetamines, benzos, cannabinoids  - Vit D low in 2024, consider repeat  - Hgb A1c 5.1 2023, consider repeat  - Lipids elevated in 2024. Consider repeat  - Vit B12 not obtained  - Folate not obtained, normal in 2024 (had been taking it orally)  - EKG normal sinus rhythm, QTc 434     Mental Status Exam:     Oriented to:  Grossly Oriented  General:  Awake and Alert  Appearance:  appears stated age and Grooming is adequate  Behavior/Attitude:  Calm and Cooperative  Eye Contact: Appropriate at times, stares off into the distance but this appears to be improving  Psychomotor: Restless, swiveling slightly in swivel chair, no catatonia present.   Speech:  appropriate volume/tone  Language: Fluent in English with appropriate syntax and vocabulary.  Mood:   feeling alright\"  Affect:  congruent with mood, positive about recovery  Thought Process: Linear and coherent   Thought Content:   No HI and does not appear to be responding to internal stimuli; no longer mentioning delusions of paranoia. No mention of SI.   Associations: Intact   Insight:  partial  Judgment:  fair   Impulse control: fair  Attention Span:  adequate for conversation  Concentration:  grossly intact  Recent and Remote Memory:  not formally assessed  Fund of Knowledge: average  Muscle Strength and Tone: normal  Gait and Station: Normal       Psychiatric Assessment   Barak is a 40 year old male with a past psychiatric history of substance use (benzodiazepines, cannabis, cocaine, opioids) & unspecified psychotic disorder admitted from the ED on 6/10/2025 due to concern for out of control behaviors and psychosis in the context of substance use, medication nonadherence and psychosocial stressors. Significant symptoms include sleep issues, disorganized and delusional thoughts, substance use, delusional beliefs, and paranoia. His last psychiatric hospitalization was " in 2024. He has had some outpatient psychiatric management but it isn't clear if he is consistently seeing anyone. The MSE on admission is notable for delusional beliefs, paranoia, and disorganized thought process.     In summary, presentation is consistent with psychosis, likely schizophrenia vs substance induced. He will likely benefit from medication optimization, therapeutic milieu and access to resources this admission.     Psychiatric Plan by Diagnosis      Today's changes:  - Decrease phenobarbital to 32.4mg BID + 64.8mg qhs    # Acute psychosis, schizophrenia vs. substance-induced psychotic disorder   1. Medications:  - Continue lurasidone 80mg qpm with food  - Continue previously restarted bupropion at 150mg daily  - Holding PTA Adderall (would not recommend restarting this)     2. Pertinent Labs/Monitoring:   - EKG QTc 434     3. Additional Plans:  - Patient will be treated in therapeutic milieu with appropriate individual and group therapies as described    # PSUD (opioids, benzodiazepines, cocaine, cannabis)  # benzodiazepine use disorder  - Phenobarbital taper to 32.4mg BID + 64.8mg qhs from 64.8mg BID +32.4mg qd  - Continue gabapentin 600 mg TID for restlessness  - Continue PTA Suboxone 8-2mg BID   - Continue propranolol 20 mg TID for restlessness (likely also aspect of akathisia)    # Akathisia  - Continue propranolol as above        Psychiatric Hospital Course:      Barak Clarke was admitted to [facility] as a voluntary patient on 6/10/2025 after presenting to the ED on 6/8. His PTA medications were continued with the exception of bupropion and Adderall, which were held due to acute psychosis. On 6/21 he was transferred to unit 20, a lower acuity unit.  Medications:  Barak Clarke was admitted to [facility] as a voluntary patient on 6/10/2025 after presenting to the ED on 6/8. His PTA medications were continued with the exception of bupropion and Adderall, which were held due to acute  psychosis.  6/10: initiate risperidone 1mg qhs  6/11: discontinue risperidone per patient preference, initiate haloperidol 5mg qhs. Place 72h hold and file for commitment with [facility]. Initiate phenobarbital 64.8mg TID for clonazepam withdrawal  6/12: discontinue haloperidol per patient preference (did not take any doses of paliperidone, risperidone, or haloperidol). Initiate lurasidone 20mg qpm with dinner  6/13: increase lurasidone to 60mg qpm with dinner  6/16: increase lurasidone to 80mg qpm with dinner, restart bupropion at 150mg daily  6/17: increase phenobarbital to 97.2mg TID. Commitment and [facility] orders received today (patient waived his right to a hearing)  6/20: initiate propranolol 10mg TID PRN for akathisia/restlessness in context of withdrawal   6/24: scheduled propranolol   6/24: decrease phenobarbital to 97.2 mg BID + 64.8 mg qd  6/24: increase gabapentin to 600 mg TID for restlessness  6/25: increase propranolol to 20 mg TID for akathisia/restlessness  6/26: decrease phenobarbital to 97.2 mg qd + 64.8 mg BID  6/27: decrease phenobarbital to 64.8 mg TID  6/30: decrease phenobarbital to 32.4 mg qam + 64.8 mg BID.  Patient continues to tolerate phenobarbital taper. Consents to further taper.    7/1: Decrease phenobarbital to 32.4mg BID + 64.8 mg qhs.  Patient continues to tolerate phenobarbital taper. Consents to further taper.    The risks, benefits, alternatives, and side effects were discussed and understood by the patient.     Medical Assessment and Plan     Medical diagnoses to be addressed this admission:    # HTN  - not on any medication    Medical course: Patient was physically examined by the ED prior to being transferred to the unit and was found to be medically stable and appropriate for admission.     Consults: medicine previously for URI symptoms     Checklist     Legal Status: [facility]  Committed     [facility] order: clozapine, paliperidone, risperidone, quetiapine, olanzapine, aripiprazole,  lurasidone     Safety Assessment:   Behavioral Orders   Procedures    Code 1 - Restrict to Unit    Routine Programming     As clinically indicated    Status 15     Every 15 minutes.    Suicide precautions: Suicide Risk: MODERATE; Clinical rationale to override score: response to medication     Patients on Suicide Precautions should have a Combination Diet ordered that includes a Diet selection(s) AND a Behavioral Tray selection for Safe Tray - with utensils, or Safe Tray - NO utensils       Suicide Risk:   MODERATE     Clinical rationale to override score::   response to medication    Withdrawal precautions       Risk Assessment:  Risk for harm is moderate.  Risk factors: maladaptive coping, substance use, and past behaviors  Protective factors: engaged in treatment     SIO: no    Disposition: TBD. Pending stabilization, medication optimization, & development of a safe discharge plan. Likely to CD treatment.     Attestations   Vishal Phillips, MS3  [employer]     ---  This patient was seen with the medical student and discussed with my attending physician. I have reviewed the medical student's note.   Martin Ventura MD  [facility]  PGY1 Psychiatry Resident     Attestation:  This patient has been seen and evaluated by me, Sandee Trotter MD.  I have discussed this patient with the house staff team including the resident and/or medical student and I agree with the findings and plan in this note.    I have reviewed today's vital signs, medications, labs and imaging. Sandee Trotter MD , PhD.

## 2025-07-01 NOTE — PLAN OF CARE
"Pt denies SI. Pt rates his depression at a 5/10 and anxiety 6/10.  Pt is social with peers.  Pt plays guitar and sings with other patients. Pt observed drawing/coloring.  Pt drank several pops. Pt has stated that he's going to [employer] [place] Then this afternoon. Pt stated he wanted to go [place] and something was telling him to go there. Pt frequently asking for nicotine gum. Pt has also asked for Tylenol for ankle pain. Pt at times observed walking without a limp and other times pt observed walking with a limp.    Problem: Adult Inpatient Plan of Care  Goal: Patient-Specific Goal (Individualized)  Description: You can add care plan individualizations to a care plan. Examples of Individualization might be:  \"Parent requests to be called daily at 9am for status\", \"I have a hard time hearing out of my right ear\", or \"Do not touch me to wake me up as it startles  me\".  Outcome: Not Progressing  Goal: Absence of Hospital-Acquired Illness or Injury  Outcome: Not Progressing  Goal: Optimal Comfort and Wellbeing  Outcome: Not Progressing  Intervention: Monitor Pain and Promote Comfort  Recent Flowsheet Documentation  Taken 7/1/2025 1349 by Andree Velásquez RN  Pain Management Interventions: medication (see MAR)  Goal: Readiness for Transition of Care  Outcome: Not Progressing     Problem: Adult Behavioral Health Plan of Care  Goal: Plan of Care Review  Outcome: Not Progressing  Goal: Patient-Specific Goal (Individualization)  Description: You can add care plan individualizations to a care plan. Examples of Individualization might be:  \"Parent requests to be called daily at 9am for status\", \"I have a hard time hearing out of my right ear\", or \"Do not touch me to wake me up as it startles  me\".  Outcome: Not Progressing  Goal: Adheres to Safety Considerations for Self and Others  Outcome: Not Progressing  Intervention: Develop and Maintain Individualized Safety Plan  Recent " Flowsheet Documentation  Taken 7/1/2025 1351 by Andree Velásquez RN  Safety Measures: safety rounds completed  Goal: Absence of New-Onset Illness or Injury  Outcome: Not Progressing  Goal: Optimized Coping Skills in Response to Life Stressors  Outcome: Not Progressing  Goal: Develops/Participates in Therapeutic [place] to Support Successful Transition  Outcome: Not Progressing     Problem: Suicide Risk  Goal: Absence of Self-Harm  Outcome: Not Progressing     Problem: Psychotic Signs/Symptoms  Goal: Improved Behavioral Control (Psychotic Signs/Symptoms)  Outcome: Not Progressing  Goal: Optimal Cognitive Function (Psychotic Signs/Symptoms)  Outcome: Not Progressing  Goal: Increased Participation and Engagement (Psychotic Signs/Symptoms)  Outcome: Not Progressing  Goal: Improved Mood Symptoms (Psychotic Signs/Symptoms)  Outcome: Not Progressing  Goal: Decreased Sensory Symptoms (Psychotic Signs/Symptoms)  Outcome: Not Progressing  Goal: Improved Sleep (Psychotic Signs/Symptoms)  Outcome: Not Progressing  Goal: Enhanced Social, Occupational or Functional Skills (Psychotic Signs/Symptoms)  Outcome: Not Progressing     Problem: Sleep Disturbance  Goal: Adequate Sleep/Rest  Outcome: Not Progressing     Problem: Skin Injury Risk Increased  Goal: Skin Health and Integrity  Outcome: Not Progressing     Problem: Anxiety  Goal: Anxiety Reduction or Resolution  Outcome: Not Progressing     Problem: Depression  Goal: Decreased Depression Symptoms  Outcome: Not Progressing   Goal Outcome Evaluation:

## 2025-07-01 NOTE — PROGRESS NOTES
Rehab Group    Start time: 1015  End time: 1045  Patient time total: 30 minutes    attended full group    #6 attended   Group Type: occupational therapy   Group Topic Covered: self-care   Group Session Detail:  Facilitated a self-care group. During this group, patients were asked the definition of self-care and to describe the importance of self-care. Worksheet and discussion aspects were included highlighting the various dimensions of self care (physical, emotional, spiritual, professional, social, financial, environmental, and psychological/mental). The purpose of this exercise was to provide educational support on this life skill, break down self-care into manageable steps, and provide patients with tool to use to build healthy routines. Additionally, to work on life balance, identify goals, identify preferred self-care coping strategies, and enhance self-awareness using self care as a way to determine health.        Patient Response/Contribution:  cooperative with task, supportive of peers, socially appropriate, and actively engaged     Patient Detail:  Pt arrived to group with pleasant affect. Pt was oriented to group and verbalized understanding. Pt was actively engaged in group disucssion, participating with good insight. Pt reported good knowledge of healthy self care in a daily routine. Pt was receptive to group discussion throughout. Will continue to encourage attendance and participation.          [id number] OT Group (2 or more in attendance)      Patient Active Problem List   Diagnosis    Tobacco use disorder    Amphetamine use disorder, mild, in early remission (H)    Major depressive disorder, recurrent, moderate (H)    Opioid use disorder, severe, in early remission, on maintenance therapy, dependence (H)    Attention deficit hyperactivity disorder (ADHD), inattentive type, moderate    Sedative, hypnotic or anxiolytic use disorder, mild, abuse (H)    Insomnia, unspecified    Personality disorder,  unspecified (H)    Chemical dependency (H)    Mental health problem    Psychosis (H)    Substance-related disorder (H)    Acute psychosis (H)    HTN (hypertension)    Major depressive disorder, recurrent episode, severe (H)    Depression

## 2025-07-01 NOTE — PROGRESS NOTES
Rehab Group    Start time: 1045  End time: 1200  Patient time total: 45 minutes    came in and out of group session    #6 attended   Group Type: OT Clinic   Group Topic Covered: balanced lifestyle, coping skills, healthy leisure time, and social skills     Group Session Detail:  Occupational therapy clinic to facilitate coping skill exploration, creative expression within personally meaningful activities, and clinical observation of social, cognitive, and kinesthetic performance skills     Patient Response/Contribution:  cooperative with task, organized, supportive of peers, socially appropriate, and actively engaged     Patient Detail:  Pt required min A to initiate, gather materials, sequence, and adjust to workspace demands as needed. Demonstrated good focus, planning, and problem solving for selected creative expression task. Able to ask for assistance as needed, and engaged in discussion with peers and staff throughout. Will continue to encourage attendance and participation.          [id number] OT Group (2 or more in attendance)      Patient Active Problem List   Diagnosis    Tobacco use disorder    Amphetamine use disorder, mild, in early remission (H)    Major depressive disorder, recurrent, moderate (H)    Opioid use disorder, severe, in early remission, on maintenance therapy, dependence (H)    Attention deficit hyperactivity disorder (ADHD), inattentive type, moderate    Sedative, hypnotic or anxiolytic use disorder, mild, abuse (H)    Insomnia, unspecified    Personality disorder, unspecified (H)    Chemical dependency (H)    Mental health problem    Psychosis (H)    Substance-related disorder (H)    Acute psychosis (H)    HTN (hypertension)    Major depressive disorder, recurrent episode, severe (H)    Depression

## 2025-07-02 PROCEDURE — 250N000013 HC RX MED GY IP 250 OP 250 PS 637: Performed by: PSYCHIATRY & NEUROLOGY

## 2025-07-02 PROCEDURE — 250N000013 HC RX MED GY IP 250 OP 250 PS 637

## 2025-07-02 PROCEDURE — 250N000013 HC RX MED GY IP 250 OP 250 PS 637: Performed by: STUDENT IN AN ORGANIZED HEALTH CARE EDUCATION/TRAINING PROGRAM

## 2025-07-02 PROCEDURE — 124N000002 HC R&B MH UMMC

## 2025-07-02 PROCEDURE — 250N000012 HC RX MED GY IP 250 OP 636 PS 637: Performed by: PSYCHIATRY & NEUROLOGY

## 2025-07-02 PROCEDURE — H2032 ACTIVITY THERAPY, PER 15 MIN: HCPCS

## 2025-07-02 PROCEDURE — 97150 GROUP THERAPEUTIC PROCEDURES: CPT | Mod: GO

## 2025-07-02 RX ORDER — PHENOBARBITAL 32.4 MG/1
32.4 TABLET ORAL AT BEDTIME
Status: DISCONTINUED | OUTPATIENT
Start: 2025-07-02 | End: 2025-07-03

## 2025-07-02 RX ADMIN — PHENOBARBITAL 32.4 MG: 32.4 TABLET ORAL at 19:12

## 2025-07-02 RX ADMIN — PHENOBARBITAL 32.4 MG: 32.4 TABLET ORAL at 13:37

## 2025-07-02 RX ADMIN — OLANZAPINE 10 MG: 5 TABLET, FILM COATED ORAL at 19:11

## 2025-07-02 RX ADMIN — PROPRANOLOL HYDROCHLORIDE 20 MG: 20 TABLET ORAL at 07:56

## 2025-07-02 RX ADMIN — THIAMINE HCL TAB 100 MG 100 MG: 100 TAB at 07:56

## 2025-07-02 RX ADMIN — ACETAMINOPHEN 650 MG: 325 TABLET ORAL at 19:11

## 2025-07-02 RX ADMIN — PROPRANOLOL HYDROCHLORIDE 20 MG: 20 TABLET ORAL at 13:37

## 2025-07-02 RX ADMIN — BUPRENORPHINE AND NALOXONE 1 FILM: 8; 2 FILM BUCCAL; SUBLINGUAL at 07:55

## 2025-07-02 RX ADMIN — BUPROPION HYDROCHLORIDE 150 MG: 150 TABLET, EXTENDED RELEASE ORAL at 07:56

## 2025-07-02 RX ADMIN — NICOTINE POLACRILEX 4 MG: 4 GUM, CHEWING BUCCAL at 13:38

## 2025-07-02 RX ADMIN — NICOTINE POLACRILEX 4 MG: 4 GUM, CHEWING BUCCAL at 10:14

## 2025-07-02 RX ADMIN — LURASIDONE HYDROCHLORIDE 80 MG: 80 TABLET, FILM COATED ORAL at 19:11

## 2025-07-02 RX ADMIN — NICOTINE POLACRILEX 4 MG: 2 LOZENGE ORAL at 06:27

## 2025-07-02 RX ADMIN — NICOTINE POLACRILEX 4 MG: 4 GUM, CHEWING BUCCAL at 17:01

## 2025-07-02 RX ADMIN — GABAPENTIN 600 MG: 300 CAPSULE ORAL at 13:37

## 2025-07-02 RX ADMIN — BUPRENORPHINE AND NALOXONE 1 FILM: 8; 2 FILM BUCCAL; SUBLINGUAL at 19:11

## 2025-07-02 RX ADMIN — NICOTINE 1 PATCH: 21 PATCH, EXTENDED RELEASE TRANSDERMAL at 07:59

## 2025-07-02 RX ADMIN — GABAPENTIN 600 MG: 300 CAPSULE ORAL at 07:56

## 2025-07-02 RX ADMIN — NICOTINE POLACRILEX 4 MG: 4 GUM, CHEWING BUCCAL at 07:56

## 2025-07-02 RX ADMIN — ACETAMINOPHEN 650 MG: 325 TABLET ORAL at 03:08

## 2025-07-02 RX ADMIN — NICOTINE POLACRILEX 4 MG: 4 GUM, CHEWING BUCCAL at 21:24

## 2025-07-02 RX ADMIN — PHENOBARBITAL 32.4 MG: 32.4 TABLET ORAL at 07:56

## 2025-07-02 RX ADMIN — TRAZODONE HYDROCHLORIDE 50 MG: 50 TABLET ORAL at 21:24

## 2025-07-02 RX ADMIN — PROPRANOLOL HYDROCHLORIDE 20 MG: 20 TABLET ORAL at 19:12

## 2025-07-02 RX ADMIN — OLANZAPINE 10 MG: 5 TABLET, FILM COATED ORAL at 05:01

## 2025-07-02 RX ADMIN — GABAPENTIN 600 MG: 300 CAPSULE ORAL at 19:11

## 2025-07-02 RX ADMIN — NICOTINE POLACRILEX 4 MG: 4 GUM, CHEWING BUCCAL at 18:43

## 2025-07-02 ASSESSMENT — ACTIVITIES OF DAILY LIVING (ADL)
ADLS_ACUITY_SCORE: 32
HYGIENE/GROOMING: HANDWASHING;INDEPENDENT
ADLS_ACUITY_SCORE: 32
DRESS: SCRUBS (BEHAVIORAL HEALTH)
ADLS_ACUITY_SCORE: 32
ORAL_HYGIENE: INDEPENDENT
ADLS_ACUITY_SCORE: 32

## 2025-07-02 NOTE — PROGRESS NOTES
Rehab Group    Start time: 1015  End time: 1115  Patient time total: 30 minutes    attended partial group    #5 attended   Group Type: art   Group Topic Covered: activity therapy       Group Session Detail:  Art Therapy directive was to identify and express through chosen art materials a sensory item from a real or imagined safe/peaceful place.  Goals of directive: emotional expression, trauma containment, media exploration, mindfulness     Patient Response/Contribution:  cooperative with task       Patient Detail:    Pt was a quiet participant, focused on task for the first 30 minutes of group. Pt appeared to finish drawing, though left without sharing art with author or group.  Pts mood was calm.      Activity Therapy Per 15 min ()      Patient Active Problem List   Diagnosis    Tobacco use disorder    Amphetamine use disorder, mild, in early remission (H)    Major depressive disorder, recurrent, moderate (H)    Opioid use disorder, severe, in early remission, on maintenance therapy, dependence (H)    Attention deficit hyperactivity disorder (ADHD), inattentive type, moderate    Sedative, hypnotic or anxiolytic use disorder, mild, abuse (H)    Insomnia, unspecified    Personality disorder, unspecified (H)    Chemical dependency (H)    Mental health problem    Psychosis (H)    Substance-related disorder (H)    Acute psychosis (H)    HTN (hypertension)    Major depressive disorder, recurrent episode, severe (H)    Depression

## 2025-07-02 NOTE — PROGRESS NOTES
"  ----------------------------------------------------------------------------------------------------------  [facility]  Psychiatry Progress Note  Hospital Day #22     Interim History:     The patient's care was discussed with the treatment team and chart notes were reviewed.    Vitals:   BP (!) 137/91   Pulse 82   Temp 98  F (36.7  C) (Oral)   Resp 16   Ht 1.778 m (5' 10\")   Wt 101.1 kg (222 lb 12.8 oz)   SpO2 97%   BMI 31.97 kg/m     Sleep: 5 hours (07/02/25 0600)  Scheduled medications: Took all scheduled medications as prescribed.    PRN medications:   Last 24H PRN:     acetaminophen (TYLENOL) tablet 650 mg, 650 mg at 07/02/25 0308    nicotine (COMMIT) lozenge 4 mg, 4 mg at 07/02/25 0627    nicotine polacrilex (NICORETTE) gum 4 mg, 4 mg at 07/01/25 1947    OLANZapine (zyPREXA) tablet 5-10 mg, 10 mg at 07/02/25 0501 **OR** OLANZapine (zyPREXA) injection 5-10 mg    traZODone (DESYREL) tablet 50 mg, 50 mg at 07/01/25 2040     Staff Report:   \"Pt denies SI. Pt rates his depression at a 5/10 and anxiety 6/10. Pt is social with peers. Pt plays guitar and sings with other patients. Pt observed drawing/coloring. Pt drank several pops. Pt has stated that he's going [place] [facility] Then this afternoon. Pt stated he wanted to go [facility] and something was telling him to go there. Pt frequently asking for nicotine gum. Pt has also asked for Tylenol for ankle pain.\"    Please see staff notes  for more details.     Subjective:     Patient Interview:  Barak was interviewed in the [facility] conference room by the team. This morning, patient states \"I'm okay, doing good.\" He states mornings are \"rough\". When he wakes up, he states \"it is hell for 2.5 hours.\" After he gets his medications and eats breakfast, he feels much better. He states this is a \"mixture of heaven and hell.\" With regards to his anxiety, he notes that he has " "been through much harder times, and feels he can get through this. He had previously been incarcerated and at the time of incarceration, he was on Suboxone. The shelter threw this away and did not give him his Suboxone. He states \"that was the worst time of my life.\"    He completed an interview with [facility] yesterday and is waiting to hear back from them. He states \"I'm a big believer in signs and all signs are pointing towards [facility].\" [facility] originally wanted the patient to be there on Thursday. In discussing with Barak that he will not liekly be ready by then with his taper, he understands. He is open to [employer]. He states \"I hit a bottom, I am willing to put in the work.\" He states this is the 2nd lowest bottom of his life, second to his incarceration. He states \"I truly want to be sober.\"    He notes that his ankles are more swollen than normal, stating that normally they are \"skinny.\" He has some pain with walking.    He is requesting a .     ROS:  Patient has bilateral ankle pain, endorses swelling which is trace on exam and non-pitting   Patient denies acute concerns     Objective:     Vitals:  BP (!) 137/91   Pulse 82   Temp 98  F (36.7  C) (Oral)   Resp 16   Ht 1.778 m (5' 10\")   Wt 101.1 kg (222 lb 12.8 oz)   SpO2 97%   BMI 31.97 kg/m      Allergies:  Allergies   Allergen Reactions    Prazosin Unknown     Worsening of nightmares       Current Medications:  Scheduled:  Current Facility-Administered Medications   Medication Dose Route Frequency Provider Last Rate Last Admin    acetaminophen (TYLENOL) tablet 650 mg  650 mg Oral Q4H PRN Daniel Vazquez MD   650 mg at 07/02/25 0308    alum & mag hydroxide-simethicone (MAALOX) suspension 30 mL  30 mL Oral Q4H PRN Daniel Vazquez MD        buprenorphine HCl-naloxone HCl (SUBOXONE) 8-2 MG per film 1 Film  1 Film Sublingual BID Daniel Vazquez MD   1 Film at 07/01/25 1912    buPROPion (WELLBUTRIN XL) 24 hr tablet " 150 mg  150 mg Oral Daily Kimmie Arango MD   150 mg at 07/01/25 0746    gabapentin (NEURONTIN) capsule 600 mg  600 mg Oral TID Sandee Trotter MD   600 mg at 07/01/25 1912    guaiFENesin (MUCINEX) 12 hr tablet 600 mg  600 mg Oral BID PRN Ciara Quintanilla APRN CNP        hydrOXYzine HCl (ATARAX) tablet 25 mg  25 mg Oral Q4H PRN Daniel Vazquez MD   25 mg at 06/24/25 2151    lurasidone (LATUDA) tablet 80 mg  80 mg Oral At Bedtime Kimmie Arango MD   80 mg at 07/01/25 1912    nicotine (COMMIT) lozenge 4 mg  4 mg Buccal Q1H PRN Bridger Hernandez MD   4 mg at 07/02/25 0627    nicotine (NICODERM CQ) 21 MG/24HR 24 hr patch 1 patch  1 patch Transdermal Daily Kimmie Arango MD   1 patch at 07/01/25 0746    nicotine polacrilex (NICORETTE) gum 4 mg  4 mg Buccal Q1H PRN Kimmie Arango MD   4 mg at 07/01/25 1947    OLANZapine (zyPREXA) tablet 5-10 mg  5-10 mg Oral TID PRN Franny Best MD   10 mg at 07/02/25 0501    Or    OLANZapine (zyPREXA) injection 5-10 mg  5-10 mg Intramuscular TID PRN Franny Best MD        ondansetron (ZOFRAN) tablet 4 mg  4 mg Oral Q8H PRN Daniel Vazquez MD        PHENobarbital tablet 32.4 mg  32.4 mg Oral Daily Sandee Trotter MD   32.4 mg at 07/01/25 1349    PHENobarbital tablet 32.4 mg  32.4 mg Oral QAM Radha Pinedo MD   32.4 mg at 07/01/25 0746    PHENobarbital tablet 64.8 mg  64.8 mg Oral At Bedtime Radha Pinedo MD   64.8 mg at 07/01/25 1911    propranolol (INDERAL) tablet 20 mg  20 mg Oral TID Radha Pinedo MD   20 mg at 07/01/25 1911    senna-docusate (SENOKOT-S/PERICOLACE) 8.6-50 MG per tablet 1 tablet  1 tablet Oral BID PRN Daniel Vazquez MD        thiamine (B-1) tablet 100 mg  100 mg Oral Daily Radha Pinedo MD   100 mg at 06/30/25 0757    traZODone (DESYREL) tablet 50 mg  50 mg Oral At Bedtime PRN Daniel Vazquez MD   50 mg at 07/01/25 2040       PRN:  Current Facility-Administered Medications    Medication Dose Route Frequency Provider Last Rate Last Admin    acetaminophen (TYLENOL) tablet 650 mg  650 mg Oral Q4H PRN Daniel Vazquez MD   650 mg at 07/02/25 0308    alum & mag hydroxide-simethicone (MAALOX) suspension 30 mL  30 mL Oral Q4H PRN Daniel Vazquez MD        buprenorphine HCl-naloxone HCl (SUBOXONE) 8-2 MG per film 1 Film  1 Film Sublingual BID Daniel Vazquez MD   1 Film at 07/01/25 1912    buPROPion (WELLBUTRIN XL) 24 hr tablet 150 mg  150 mg Oral Daily Kimmie Arango MD   150 mg at 07/01/25 0746    gabapentin (NEURONTIN) capsule 600 mg  600 mg Oral TID Sandee Trotter MD   600 mg at 07/01/25 1912    guaiFENesin (MUCINEX) 12 hr tablet 600 mg  600 mg Oral BID PRN Ciara Quintanilla APRN CNP        hydrOXYzine HCl (ATARAX) tablet 25 mg  25 mg Oral Q4H PRN Daniel Vazquez MD   25 mg at 06/24/25 2151    lurasidone (LATUDA) tablet 80 mg  80 mg Oral At Bedtime Kimmie Arango MD   80 mg at 07/01/25 1912    nicotine (COMMIT) lozenge 4 mg  4 mg Buccal Q1H PRN Bridger Hernandez MD   4 mg at 07/02/25 0627    nicotine (NICODERM CQ) 21 MG/24HR 24 hr patch 1 patch  1 patch Transdermal Daily Kimmie Arango MD   1 patch at 07/01/25 0746    nicotine polacrilex (NICORETTE) gum 4 mg  4 mg Buccal Q1H PRN Kimmie Arango MD   4 mg at 07/01/25 1947    OLANZapine (zyPREXA) tablet 5-10 mg  5-10 mg Oral TID PRN Franny Best MD   10 mg at 07/02/25 0501    Or    OLANZapine (zyPREXA) injection 5-10 mg  5-10 mg Intramuscular TID PRN Franny Best MD        ondansetron (ZOFRAN) tablet 4 mg  4 mg Oral Q8H PRN Daniel Vazquez MD        PHENobarbital tablet 32.4 mg  32.4 mg Oral Daily Sandee Trotter MD   32.4 mg at 07/01/25 1349    PHENobarbital tablet 32.4 mg  32.4 mg Oral QAM Radha Pinedo MD   32.4 mg at 07/01/25 0746    PHENobarbital tablet 64.8 mg  64.8 mg Oral At Bedtime Radha Pinedo MD   64.8 mg at 07/01/25 1911    propranolol (INDERAL) tablet  "20 mg  20 mg Oral TID Radha Pinedo MD   20 mg at 07/01/25 1911    senna-docusate (SENOKOT-S/PERICOLACE) 8.6-50 MG per tablet 1 tablet  1 tablet Oral BID PRDaniel Baez MD        thiamine (B-1) tablet 100 mg  100 mg Oral Daily Radha Pinedo MD   100 mg at 06/30/25 0757    traZODone (DESYREL) tablet 50 mg  50 mg Oral At Bedtime PRDaniel Baez MD   50 mg at 07/01/25 2040       Labs and Imaging:  New results:   No results found for this or any previous visit (from the past 24 hours).    Data this admission:  - CBC unremarkable  - CMP unremarkable other than Ca  - TSH normal  - UDS positive for amphetamines, benzos, cannabinoids  - Vit D low in 2024, consider repeat  - Hgb A1c 5.1 2023, consider repeat  - Lipids elevated in 2024. Consider repeat  - Vit B12 not obtained  - Folate not obtained, normal in 2024 (had been taking it orally)  - EKG normal sinus rhythm, QTc 434     Mental Status Exam:     Oriented to:  Grossly Oriented  General:  Awake and Alert  Appearance:  appears stated age and Grooming is adequate  Behavior/Attitude:  Calm and Cooperative  Eye Contact: Appropriate at times, stares off into the distance but this appears to be improving  Psychomotor: Restless, swiveling slightly in swivel chair, no catatonia present.   Speech:  appropriate volume/tone  Language: Fluent in English with appropriate syntax and vocabulary.  Mood:   feeling alright\"  Affect:  congruent with mood, positive about recovery  Thought Process: Linear and coherent   Thought Content:   No HI and does not appear to be responding to internal stimuli; no longer mentioning delusions of paranoia. No mention of SI.   Associations: Intact   Insight:  partial  Judgment:  fair   Impulse control: fair  Attention Span:  adequate for conversation  Concentration:  grossly intact  Recent and Remote Memory:  not formally assessed  Fund of Knowledge: average  Muscle Strength and Tone: normal  Gait and Station: " Normal       Psychiatric Assessment   Barak is a 40 year old male with a past psychiatric history of substance use (benzodiazepines, cannabis, cocaine, opioids) & unspecified psychotic disorder admitted from the ED on 6/10/2025 due to concern for out of control behaviors and psychosis in the context of substance use, medication nonadherence and psychosocial stressors. Significant symptoms include sleep issues, disorganized and delusional thoughts, substance use, delusional beliefs, and paranoia. His last psychiatric hospitalization was in 2024. He has had some outpatient psychiatric management but it isn't clear if he is consistently seeing anyone. The MSE on admission is notable for delusional beliefs, paranoia, and disorganized thought process.     In summary, presentation is consistent with psychosis, likely schizophrenia vs substance induced. He will likely benefit from medication optimization, therapeutic milieu and access to resources this admission.     Psychiatric Plan by Diagnosis      Today's changes:  -Decrease phenobarbital taper to 32.4mg TID from 64.8mg qhs +32.4mg BID    # Acute psychosis, schizophrenia vs. substance-induced psychotic disorder   1. Medications:  - Continue lurasidone 80mg qpm with food  - Continue previously restarted bupropion at 150mg daily  - Discontinued PTA Adderall (would not recommend restarting this)     2. Pertinent Labs/Monitoring:   - EKG QTc 434     3. Additional Plans:  - Patient will be treated in therapeutic milieu with appropriate individual and group therapies as described    # PSUD (opioids, benzodiazepines, cocaine, cannabis)  # benzodiazepine use disorder  - Phenobarbital taper to 32.4mg TID from 64.8mg qhs +32.4mg BID  - Continue gabapentin 600 mg TID for restlessness  - Continue PTA Suboxone 8-2mg BID   - Continue propranolol 20 mg TID for restlessness (likely also aspect of akathisia)      # Akathisia  - Continue propranolol as above        [facility]  Course:      Barak Clarke was admitted to [facility] as a voluntary patient on 6/10/2025 after presenting to the ED on 6/8. His PTA medications were continued with the exception of bupropion and Adderall, which were held due to acute psychosis. On 6/21 he was transferred to unit 20, a lower acuity unit.  Medications:  Barak Clarke was admitted to [facility] as a voluntary patient on 6/10/2025 after presenting to the ED on 6/8. His PTA medications were continued with the exception of bupropion and Adderall, which were held due to acute psychosis.  6/10: initiate risperidone 1mg qhs  6/11: discontinue risperidone per patient preference, initiate haloperidol 5mg qhs. Place 72h hold and file for commitment with [facility]. Initiate phenobarbital 64.8mg TID for clonazepam withdrawal  6/12: discontinue haloperidol per patient preference (did not take any doses of paliperidone, risperidone, or haloperidol). Initiate lurasidone 20mg qpm with dinner  6/13: increase lurasidone to 60mg qpm with dinner  6/16: increase lurasidone to 80mg qpm with dinner, restart bupropion at 150mg daily  6/17: increase phenobarbital to 97.2mg TID. Commitment and [facility] orders received today (patient waived his right to a hearing)  6/20: initiate propranolol 10mg TID PRN for akathisia/restlessness in context of withdrawal   6/24: scheduled propranolol   6/24: decrease phenobarbital to 97.2 mg BID + 64.8 mg qd  6/24: increase gabapentin to 600 mg TID for restlessness  6/25: increase propranolol to 20 mg TID for akathisia/restlessness  6/26: decrease phenobarbital to 97.2 mg qd + 64.8 mg BID  6/27: decrease phenobarbital to 64.8 mg TID  6/30: decrease phenobarbital to 32.4 mg qam + 64.8 mg BID.  Patient continues to tolerate phenobarbital taper. Consents to further taper.    7/1: Decrease phenobarbital to 32.4mg BID + 64.8 mg qhs.  Patient continues to tolerate phenobarbital taper. Consents to further taper.  7/2: Decrease phenobarbital to 32.4mg TID.   Patient continues to tolerate phenobarbital taper. Consents to further taper.    The risks, benefits, alternatives, and side effects were discussed and understood by the patient.     Medical Assessment and Plan     Medical diagnoses to be addressed this admission:    # HTN  - not on any medication    Medical course: Patient was physically examined by the ED prior to being transferred to the unit and was found to be medically stable and appropriate for admission.     Consults: medicine previously for URI symptoms     Checklist     Legal Status: [facility]  Committed     [facility] order: clozapine, paliperidone, risperidone, quetiapine, olanzapine, aripiprazole, lurasidone     Safety Assessment:   Behavioral Orders   Procedures    Code 1 - Restrict to Unit    Routine Programming     As clinically indicated    Status 15     Every 15 minutes.    Suicide precautions: Suicide Risk: MODERATE; Clinical rationale to override score: response to medication     Patients on Suicide Precautions should have a Combination Diet ordered that includes a Diet selection(s) AND a Behavioral Tray selection for Safe Tray - with utensils, or Safe Tray - NO utensils       Suicide Risk:   MODERATE     Clinical rationale to override score::   response to medication    Withdrawal precautions       Risk Assessment:  Risk for harm is moderate.  Risk factors: maladaptive coping, substance use, and past behaviors  Protective factors: engaged in treatment     SIO: no    Disposition: TBD. Pending stabilization, medication optimization, & development of a safe discharge plan. Likely to CD treatment.     Attestations     Vishal Phillips MS3  [facility]    ---  This patient was seen with the medical student and discussed with my attending physician. I have reviewed medical student's note and made necessary changes.   Martin Ventura MD  [facility]  PGY1 Psychiatry Resident     Attestation:  This patient has been seen and evaluated by  me, Sandee Trotter MD.  I have discussed this patient with the house staff team including the resident and/or medical student and I agree with the findings and plan in this note.    I have reviewed today's vital signs, medications, labs and imaging. Sandee Trotter MD , PhD.

## 2025-07-02 NOTE — PLAN OF CARE
"  Problem: Adult Inpatient Plan of Care  Goal: Patient-Specific Goal (Individualized)  Description: You can add care plan individualizations to a care plan. Examples of Individualization might be:  \"Parent requests to be called daily at 9am for status\", \"I have a hard time hearing out of my right ear\", or \"Do not touch me to wake me up as it startles  me\".  7/2/2025 1514 by Lillian Castaneda RN  Outcome: Progressing  7/2/2025 1512 by Lillian Castaneda RN  Outcome: Progressing  Goal: Absence of Hospital-Acquired Illness or Injury  7/2/2025 1514 by Lillian Castaneda RN  Outcome: Progressing  7/2/2025 1512 by Lillian Castaneda RN  Outcome: Progressing  Intervention: Prevent Skin Injury  Recent Flowsheet Documentation  Taken 7/2/2025 0900 by Lillian Castaneda RN  Body Position: position changed independently  Goal: Optimal Comfort and Wellbeing  7/2/2025 1514 by Lillian Castaneda RN  Outcome: Progressing  7/2/2025 1512 by Lillian Castaneda RN  Outcome: Progressing  Intervention: Provide Person-Centered Care  Recent Flowsheet Documentation  Taken 7/2/2025 0900 by Lillian Castaneda RN  Trust Relationship/Rapport:   care explained   choices provided   emotional support provided   empathic listening provided   questions answered   questions encouraged   reassurance provided   thoughts/feelings acknowledged  Goal: Readiness for Transition of Care  7/2/2025 1514 by Lillian Castaneda RN  Outcome: Progressing  7/2/2025 1512 by Lillian Castaneda RN  Outcome: Progressing       Goal Outcome Evaluation:    Plan of Care Reviewed With: patient      Patient's affect was flat and blunted. His mood was calm. He was medication compliant. He was seen in the milieu interacting with selected peers. He was also seen pacing the [address] with his headset on. He was observed playing the [employer]. He endorsed bilateral ankle pain and rated it 6/10. He said he just took Tylenol before writer got here. He denied " "SI/AH/VH. Patient also endorsed anxiety and depression and rated them 6/10. Patient attended group OT. His hygiene was appropriate. PRN Nicotine gum x2 this shift. His fluid and food intake was adequate. There was no behavioral escalations or safety concerns observed or reported.   /84 (BP Location: Left arm, Patient Position: Sitting, Cuff Size: Adult Large)   Pulse 74   Temp 97.1  F (36.2  C) (Oral)   Resp 16   Ht 1.778 m (5' 10\")   Wt 101.1 kg (222 lb 12.8 oz)   SpO2 96%   BMI 31.97 kg/m                      "

## 2025-07-02 NOTE — PLAN OF CARE
Problem: Depression  Goal: Decreased Depression Symptoms  Outcome: Progressing     Problem: Anxiety  Goal: Anxiety Reduction or Resolution  Outcome: Progressing     Problem: Suicide Risk  Goal: Absence of Self-Harm  Outcome: Progressing   Goal Outcome Evaluation:    Plan of Care Reviewed With: patient Plan of Care Reviewed With: patient    Overall Patient Progress: improvingOverall Patient Progress: improving     Alert and oriented, able to communicate needs. Visible in milieu, he was social and interactive with peers and staff members. Pleasant and cooperative. He endorsed anxiety at 6/10  and requested PRN Zyprexa at 1900, mild depression. He denied SI/HI, contracted for safety. Medication compliant. ADLs WNL, bilateral ankle pain rated at 6, pain managed with PRN Tylenol. Food and fluid intake WNL.

## 2025-07-02 NOTE — PLAN OF CARE
"  Problem: Sleep Disturbance  Goal: Adequate Sleep/Rest  Outcome: Progressing   Goal Outcome Evaluation:    Pt slept for 5 hours. No c/o pain/discomfort. No PRN given. PRN Zyprexa and Tylenol given for anxiety and leg pain. Pt stated the the medication was helpful.Continue Plan of care.      ..Blood pressure (!) 137/91, pulse 82, temperature 98  F (36.7  C), temperature source Oral, resp. rate 16, height 1.778 m (5' 10\"), weight 101.1 kg (222 lb 12.8 oz), SpO2 97%.   "

## 2025-07-02 NOTE — PLAN OF CARE
07/02/25 1442   Individualization/Patient Specific Goals   Patient Personal Strengths community support;positive vocational history;motivated for treatment;expressive of needs;expressive of emotions;family/social support   Patient Vulnerabilities adverse childhood experience(s);substance abuse/addiction;family/relationship conflict;lacks insight into illness   Interprofessional Rounds   Participants CTC;psychiatrist;nursing   Behavioral Team Discussion   Participants Dr. Trotter, Dr. Pinedo, Andree Miranda  RN, Talya Ernst [facility]   Progress Pt is improving and providers are tapering him off specific medications   Anticipated length of stay 3-4 days   Continued Stay Criteria/Rationale Med mgmt per MD's, placement   Medical/Physical See H&P   Plan Stabilize on meds, continue taper. Discharge to Residential [facility]/CD treatment   Rationale for change in precautions or plan No change in plan/precautions   Anticipated Discharge Disposition substance use treatment       PRECAUTIONS AND SAFETY    Behavioral Orders   Procedures    Code 1 - Restrict to Unit    Routine Programming     As clinically indicated    Status 15     Every 15 minutes.    Suicide precautions: Suicide Risk: MODERATE; Clinical rationale to override score: response to medication     Patients on Suicide Precautions should have a Combination Diet ordered that includes a Diet selection(s) AND a Behavioral Tray selection for Safe Tray - with utensils, or Safe Tray - NO utensils       Suicide Risk:   MODERATE     Clinical rationale to override score::   response to medication    Withdrawal precautions       Safety  Safety WDL: WDL  Patient Location: [facility]  Observed Behavior: calm  Observed Behavior (Comment): watching TV, reading, playing guitar  Safety Measures: environmental rounds completed, safety rounds completed  Diversional Activity: music, television  De-Escalation Techniques: appropriate behavior reinforced, diversional activity  encouraged, verbally redirected  Suicidality: Status 15  Withdrawal: monitor withdrawal process  Seizure precautions: clutter free environment, calm, consistent lighting  Assault: status 15  Elopement Interventions: status 15

## 2025-07-02 NOTE — PROGRESS NOTES
Rehab Group    Start time: 1315  End time: 1400  Patient time total: 20 minutes    attended partial group    #6 attended   Group Type: OT Clinic   Group Topic Covered: balanced lifestyle, coping skills, emotional regulation, and social skills     Group Session Detail:  Occupational therapy clinic to facilitate coping skill exploration, creative expression within personally meaningful activities, and clinical observation of social, cognitive, and kinesthetic performance skills     Patient Response/Contribution:  cooperative with task, socially appropriate, actively engaged, and engaged socially when prompted     Patient Detail:  Pt required min A to initiate, gather materials, sequence, and adjust to workspace demands as needed. Demonstrated good focus, planning, and problem solving for selected creative expression task. Able to ask for assistance as needed, and engaged in discussion when prompted with peers and staff. Pt completed project and refused opportunities to begin alternate task. Will continue to encourage attendance and participation.         [id number] OT Group (2 or more in attendance)    Patient Active Problem List   Diagnosis    Tobacco use disorder    Amphetamine use disorder, mild, in early remission (H)    Major depressive disorder, recurrent, moderate (H)    Opioid use disorder, severe, in early remission, on maintenance therapy, dependence (H)    Attention deficit hyperactivity disorder (ADHD), inattentive type, moderate    Sedative, hypnotic or anxiolytic use disorder, mild, abuse (H)    Insomnia, unspecified    Personality disorder, unspecified (H)    Chemical dependency (H)    Mental health problem    Psychosis (H)    Substance-related disorder (H)    Acute psychosis (H)    HTN (hypertension)    Major depressive disorder, recurrent episode, severe (H)    Depression

## 2025-07-02 NOTE — CARE PLAN
07/01/25 1956   Observation Type   How often does the Patient require Staff intervention/redirection? rarely   Harm Category none   No Harm Category Noted at This Time no applicable harm categories or justifications   Level of Special Monitoring none

## 2025-07-02 NOTE — PLAN OF CARE
BEH IP Unit Acuity Rating Score (UARS)  Patient is given one point for every criteria they meet.    CRITERIA SCORING   On a 72 hour hold, court hold, committed, stay of commitment, or revocation. 1   Patient LOS on BEH unit exceeds 20 days. 0  LOS: 22   Patient under guardianship, 55+, otherwise medically complex, or under age 11. 0   Suicide ideation without relief of precipitating factors. 0   Current plan for suicide. 0   Current plan for homicide. 0   Imminent risk or actual attempt to seriously harm another without relief of factors precipitating the attempt. 0   Severe dysfunction in daily living (ex: complete neglect for self care, extreme disruption in vegetative function, extreme deterioration in social interactions). 1   Recent (last 7 days) or current physical aggression in the ED or on unit. 0   Restraints or seclusion episode in past 72 hours. 0   Recent (last 7 days) or current verbal aggression, agitation, yelling, etc., while in the ED or unit. 0   Active psychosis. 1   Need for constant or near constant redirection (from leaving, from others, etc).  0   Intrusive or disruptive behaviors. 0   Patient requires 3 or more hours of individualized nursing care per 8-hour shift (i.e. for ADLs, meds, therapeutic interventions). 0   TOTAL 3

## 2025-07-02 NOTE — PLAN OF CARE
Assessment/Intervention/Current Symtoms and Care Coordination:  Chart review and met with team, discussed pt progress, symptomology, and response to treatment.  Discussed the discharge plan and any potential impediments to discharge. Per teams pt is med compliant. Pt endorsed pain 6/10, anxiety 6/10 and depression 6/10. PT completed intake call with [facility] yesterday. [facility] contacted [facility] asking for additional information and noted that they do not admit individuals with active psychosis. PT is still set to start [place].     Discharge Plan or Goal:  MI/CD Residential treatment  Psychiatry  Therapy    Barriers to Discharge:  Med mgmt  Placement     Referral Status:  6/18 [facility] -    All locations    6/18 [facility]   [facility]el[facility]   [place]    6/18 [facility]     All locations    DAVID TREATMENT: 7/2/25  [place]-  on hold for further stabilization (contact Fernanda Barroso  [email or web address])  [facility]  - declined due to psychosis   [facility] - declined due to being on [facility] order  [facility]- declined due to acuitiy of MI      Legal Status:  MI + Briggs order  County: [place]   File Number: [id number]  Start and expiration date of commitment: 6/18/25-12/18/25  Briggs meds are: Haldol, Prolixin, Clozaril, Risperdal, Invega, Zyprexa, Seroquel, Abilify and Lurasidone     Contacts (include DELORES status):  Mother:  Sarika Clarke - Ph: [phone] - DELORES signed    :  [facility] - Ph: [phone] - DELORES signed -     Psychiatrist: - DELORES signed  [facility]  Address: [address] [place] [address]  Phone: [phone]    Upcoming Meetings and Dates/Important Information and next steps:  Team Note Due: Tuesday   Will need PD and COS at discharge

## 2025-07-03 PROCEDURE — 250N000013 HC RX MED GY IP 250 OP 250 PS 637: Performed by: PSYCHIATRY & NEUROLOGY

## 2025-07-03 PROCEDURE — 250N000012 HC RX MED GY IP 250 OP 636 PS 637: Performed by: PSYCHIATRY & NEUROLOGY

## 2025-07-03 PROCEDURE — 250N000013 HC RX MED GY IP 250 OP 250 PS 637

## 2025-07-03 PROCEDURE — 250N000013 HC RX MED GY IP 250 OP 250 PS 637: Performed by: STUDENT IN AN ORGANIZED HEALTH CARE EDUCATION/TRAINING PROGRAM

## 2025-07-03 PROCEDURE — 97150 GROUP THERAPEUTIC PROCEDURES: CPT | Mod: GO

## 2025-07-03 PROCEDURE — 124N000002 HC R&B MH UMMC

## 2025-07-03 RX ORDER — PHENOBARBITAL 32.4 MG/1
32.4 TABLET ORAL EVERY MORNING
Status: DISCONTINUED | OUTPATIENT
Start: 2025-07-04 | End: 2025-07-03

## 2025-07-03 RX ORDER — PHENOBARBITAL 32.4 MG/1
32.4 TABLET ORAL AT BEDTIME
Status: COMPLETED | OUTPATIENT
Start: 2025-07-03 | End: 2025-07-03

## 2025-07-03 RX ORDER — PHENOBARBITAL 32.4 MG/1
32.4 TABLET ORAL DAILY
Status: DISCONTINUED | OUTPATIENT
Start: 2025-07-03 | End: 2025-07-03

## 2025-07-03 RX ORDER — PHENOBARBITAL 32.4 MG/1
32.4 TABLET ORAL DAILY
Status: COMPLETED | OUTPATIENT
Start: 2025-07-04 | End: 2025-07-04

## 2025-07-03 RX ADMIN — NICOTINE POLACRILEX 4 MG: 4 GUM, CHEWING BUCCAL at 09:28

## 2025-07-03 RX ADMIN — ACETAMINOPHEN 650 MG: 325 TABLET ORAL at 18:05

## 2025-07-03 RX ADMIN — OLANZAPINE 10 MG: 5 TABLET, FILM COATED ORAL at 19:09

## 2025-07-03 RX ADMIN — BUPROPION HYDROCHLORIDE 150 MG: 150 TABLET, EXTENDED RELEASE ORAL at 07:38

## 2025-07-03 RX ADMIN — GABAPENTIN 600 MG: 300 CAPSULE ORAL at 14:45

## 2025-07-03 RX ADMIN — NICOTINE POLACRILEX 4 MG: 4 GUM, CHEWING BUCCAL at 12:34

## 2025-07-03 RX ADMIN — GABAPENTIN 600 MG: 300 CAPSULE ORAL at 19:09

## 2025-07-03 RX ADMIN — NICOTINE POLACRILEX 4 MG: 4 GUM, CHEWING BUCCAL at 17:00

## 2025-07-03 RX ADMIN — ACETAMINOPHEN 650 MG: 325 TABLET ORAL at 04:00

## 2025-07-03 RX ADMIN — BUPRENORPHINE AND NALOXONE 1 FILM: 8; 2 FILM BUCCAL; SUBLINGUAL at 07:37

## 2025-07-03 RX ADMIN — NICOTINE 1 PATCH: 21 PATCH, EXTENDED RELEASE TRANSDERMAL at 07:43

## 2025-07-03 RX ADMIN — NICOTINE POLACRILEX 4 MG: 4 GUM, CHEWING BUCCAL at 07:10

## 2025-07-03 RX ADMIN — NICOTINE POLACRILEX 4 MG: 4 GUM, CHEWING BUCCAL at 21:18

## 2025-07-03 RX ADMIN — PROPRANOLOL HYDROCHLORIDE 20 MG: 20 TABLET ORAL at 19:10

## 2025-07-03 RX ADMIN — BUPRENORPHINE AND NALOXONE 1 FILM: 8; 2 FILM BUCCAL; SUBLINGUAL at 19:09

## 2025-07-03 RX ADMIN — ACETAMINOPHEN 650 MG: 325 TABLET ORAL at 22:09

## 2025-07-03 RX ADMIN — TRAZODONE HYDROCHLORIDE 50 MG: 50 TABLET ORAL at 21:18

## 2025-07-03 RX ADMIN — NICOTINE POLACRILEX 4 MG: 4 GUM, CHEWING BUCCAL at 19:13

## 2025-07-03 RX ADMIN — PROPRANOLOL HYDROCHLORIDE 20 MG: 20 TABLET ORAL at 07:38

## 2025-07-03 RX ADMIN — PHENOBARBITAL 32.4 MG: 32.4 TABLET ORAL at 19:09

## 2025-07-03 RX ADMIN — NICOTINE POLACRILEX 4 MG: 4 GUM, CHEWING BUCCAL at 18:05

## 2025-07-03 RX ADMIN — GABAPENTIN 600 MG: 300 CAPSULE ORAL at 07:38

## 2025-07-03 RX ADMIN — OLANZAPINE 10 MG: 5 TABLET, FILM COATED ORAL at 04:00

## 2025-07-03 RX ADMIN — THIAMINE HCL TAB 100 MG 100 MG: 100 TAB at 07:38

## 2025-07-03 RX ADMIN — PROPRANOLOL HYDROCHLORIDE 20 MG: 20 TABLET ORAL at 14:45

## 2025-07-03 RX ADMIN — LURASIDONE HYDROCHLORIDE 80 MG: 80 TABLET, FILM COATED ORAL at 19:09

## 2025-07-03 RX ADMIN — PHENOBARBITAL 32.4 MG: 32.4 TABLET ORAL at 07:38

## 2025-07-03 ASSESSMENT — ACTIVITIES OF DAILY LIVING (ADL)
ADLS_ACUITY_SCORE: 32
ADLS_ACUITY_SCORE: 32
ORAL_HYGIENE: INDEPENDENT;PROMPTS
ADLS_ACUITY_SCORE: 32
ADLS_ACUITY_SCORE: 32
HYGIENE/GROOMING: HANDWASHING;INDEPENDENT
HYGIENE/GROOMING: HANDWASHING;SHOWER;INDEPENDENT
ADLS_ACUITY_SCORE: 32
ORAL_HYGIENE: INDEPENDENT
DRESS: INDEPENDENT;SCRUBS (BEHAVIORAL HEALTH)
ADLS_ACUITY_SCORE: 32
LAUNDRY: WITH SUPERVISION
ADLS_ACUITY_SCORE: 32
DRESS: SCRUBS (BEHAVIORAL HEALTH);INDEPENDENT
ADLS_ACUITY_SCORE: 32

## 2025-07-03 NOTE — PLAN OF CARE
"Goal Outcome Evaluation:    Plan of Care Reviewed With: patient               Problem: Adult Inpatient Plan of Care  Goal: Patient-Specific Goal (Individualized)  Description: You can add care plan individualizations to a care plan. Examples of Individualization might be:  \"Parent requests to be called daily at 9am for status\", \"I have a hard time hearing out of my right ear\", or \"Do not touch me to wake me up as it startles  me\".  Outcome: Progressing     Functioning:  The patient is self-sufficient in daily activities (ADLs), eating, and hydrating well, which indicates a level of independence and self-care.    Compliance with medication.    Behavior:  The patient is seeking staff attention but remains cooperative.    The patient's speech is appropriate and good intake of foods and fluids.    Social interaction with select staff and peers, including playing cards, groups therapy and guitar.        Mood & Affect:  Mood is described as  calm  but with a  flat and blunted  affect, depression 5/10 and anxiety 5/10. Managed by scheduled medications.     Denials:  The patient denies any acute symptoms such as suicidal ideation (SI), self-injurious behavior (SIB), homicidal ideation (HI), or hallucinations, which is reassuring.    Complaints:  Reports of bilateral ankle pain, rated 7/10, are noted.     Vital Signs:  Stable vital signs suggest no immediate physical health concerns.    PRN Medications Administered:  Medications provided include Tylenol (for pain), Nicotine lozenge     Discharge Plan or Goal:  MI/CD Residential treatment  Psychiatry  Therapy     Referral Status:  [place] - ACCEPTED   "

## 2025-07-03 NOTE — PLAN OF CARE
Problem: Sleep Disturbance  Goal: Adequate Sleep/Rest  Outcome: Progressing   Goal Outcome Evaluation:    Patient appears to have slept a total of 6.75 hours.     Given PRN Tylenol and PRN Zyprexa for bilateral knee pain and agitation. Sleeping on reassessment.      Safety/environment checks conducted every 15 minutes with no further concerns noted.

## 2025-07-03 NOTE — CARE PLAN
07/02/25 1142   Observation Type   How often does the Patient require Staff intervention/redirection? rarely   Harm Category none   No Harm Category Noted at This Time no applicable harm categories or justifications   Level of Special Monitoring none

## 2025-07-03 NOTE — PROGRESS NOTES
Rehab Group    Start time: 1015  End time: 1200  Patient time total: 55 minutes    attended partial group    #7 attended   Group Type: OT Clinic   Group Topic Covered: balanced lifestyle, coping skills, healthy leisure time, and social skills     Group Session Detail:  Occupational therapy clinic to facilitate coping skill exploration, creative expression within personally meaningful activities, and clinical observation of social, cognitive, and kinesthetic performance skills     Patient Response/Contribution:  cooperative with task, organized, supportive of peers, socially appropriate, and actively engaged     Patient Detail:  Pt required min encouragement to initiate, gather materials, sequence, and adjust to workspace demands as needed. Demonstrated good focus, planning, and problem solving for selected creative expression task. Able to ask for assistance as needed, and engaged in discussion with peers and staff throughout.Pt observed to be more talkative and bright in this group compared to previous observations. Will continue to encourage attendance and participation.          [id number] OT Group (2 or more in attendance)      Patient Active Problem List   Diagnosis    Tobacco use disorder    Amphetamine use disorder, mild, in early remission (H)    Major depressive disorder, recurrent, moderate (H)    Opioid use disorder, severe, in early remission, on maintenance therapy, dependence (H)    Attention deficit hyperactivity disorder (ADHD), inattentive type, moderate    Sedative, hypnotic or anxiolytic use disorder, mild, abuse (H)    Insomnia, unspecified    Personality disorder, unspecified (H)    Chemical dependency (H)    Mental health problem    Psychosis (H)    Substance-related disorder (H)    Acute psychosis (H)    HTN (hypertension)    Major depressive disorder, recurrent episode, severe (H)    Depression

## 2025-07-03 NOTE — PLAN OF CARE
Problem: Adult Inpatient Plan of Care  Goal: Absence of Hospital-Acquired Illness or Injury  Intervention: Prevent Skin Injury  Recent Flowsheet Documentation  Taken 7/2/2025 1738 by Magdaleno Naidu RN  Body Position: position changed independently     Problem: Suicide Risk  Goal: Absence of Self-Harm  Outcome: Progressing     Problem: Anxiety  Goal: Anxiety Reduction or Resolution  Outcome: Progressing     Problem: Depression  Goal: Decreased Depression Symptoms  Outcome: Progressing   Goal Outcome Evaluation:    Plan of Care Reviewed With: patient Plan of Care Reviewed With: patient    Overall Patient Progress: improvingOverall Patient Progress: improving     Alert and oriented, able to communicate needs. Visible in milieu, he was social and interactive with peers and staff members. Able to play dominoes with peers. Pleasant and cooperative. He endorsed anxiety at 6/10, requested PRN Zyprexa with his Hs medications (Hydroxyzine not effective for him) at 1900. Depression rated at 5. He denied SI/HI, contracted for safety. Medication compliant. ADLs WNL, manageable 3/10 bilateral ankle pain during assessment, pain rated at 6 at 1900, pain managed with PRN Tylenol. Food and fluid intake WNL. Mother visited, visit went well.

## 2025-07-03 NOTE — PLAN OF CARE
Assessment/Intervention/Current Symtoms and Care Coordination:  Chart review and met with team, discussed pt progress, symptomology, and response to treatment.  Discussed the discharge plan and any potential impediments to discharge. Per teams pt has been doing well is med complaint and slept 7 hours. PT endorsed ankle pain. PT is visible and socially engaged in milieu. PT is set to go to [place] Monday. Pt is doing well.    Discharge Plan or Goal:  MI/CD Residential treatment  Psychiatry  Therapy    Barriers to Discharge:   Med changes     Referral Status:  [place] - ACCEPTED     DAVID TREATMENT: 7/2/25  [place]-  on hold for further stabilization (contact [email or web address])  [facility]  - declined due to psychosis   [facility] - declined due to being on Briggs order  [facility]- declined due to acuitiy of MI    Legal Status:  MI + Briggs order  County: [place]   File Number: [id number] MH [id number]  Start and expiration date of commitment: 6/18/25-12/18/25  Briggs meds are: Haldol, Prolixin, Clozaril, Risperdal, Invega, Zyprexa, Seroquel, Abilify and Lurasidone     Contacts (include DELORES status):  Mother:  Sarika Clarke - Ph: [phone] - DELORES signed    :  [facility] - Ph: [phone] - DELORES signed -     Psychiatrist: - DELORES signed  [facility]  Address: [address] [place] [address]  Phone: [phone]    Upcoming Meetings and Dates/Important Information and next steps:  Team Note Due: Tuesday   Will need PD and COS at discharge

## 2025-07-03 NOTE — PROGRESS NOTES
Rehab Group    Start time: 2000  End time: 2050  Patient time total: 15 minutes    attended partial group    #5 attended   Group Type: recreation   Group Topic Covered: activity therapy, cognitive activities, healthy leisure time, and problem solving       Group Session Detail:  TR leisure group     Patient Response/Contribution:  cooperative with task, organized, attentive, and actively engaged       Patient Detail:    Pt participated in Therapeutic Recreation group with focus on leisure participation, communication skills, and critical thinking. Engaged and focused in the group recreational activity via a group game.  Pt entered group late, but was a participant for the final 15 minutes of group. Pt was able to join the activity and follow along easily. Pt helped give extra clues to others during their turns to help them out.       Activity Therapy Per 15 min ()      Patient Active Problem List   Diagnosis    Tobacco use disorder    Amphetamine use disorder, mild, in early remission (H)    Major depressive disorder, recurrent, moderate (H)    Opioid use disorder, severe, in early remission, on maintenance therapy, dependence (H)    Attention deficit hyperactivity disorder (ADHD), inattentive type, moderate    Sedative, hypnotic or anxiolytic use disorder, mild, abuse (H)    Insomnia, unspecified    Personality disorder, unspecified (H)    Chemical dependency (H)    Mental health problem    Psychosis (H)    Substance-related disorder (H)    Acute psychosis (H)    HTN (hypertension)    Major depressive disorder, recurrent episode, severe (H)    Depression

## 2025-07-03 NOTE — CARE PLAN
07/03/25 1502   Observation Type   How often does the Patient require Staff intervention/redirection? rarely   Harm Category none   No Harm Category Noted at This Time no applicable harm categories or justifications   Level of Special Monitoring none

## 2025-07-03 NOTE — PLAN OF CARE
BEH IP Unit Acuity Rating Score (UARS)  Patient is given one point for every criteria they meet.    CRITERIA SCORING   On a 72 hour hold, court hold, committed, stay of commitment, or revocation. 1   Patient LOS on BEH unit exceeds 20 days. 0  LOS: 23   Patient under guardianship, 55+, otherwise medically complex, or under age 11. 0   Suicide ideation without relief of precipitating factors. 0   Current plan for suicide. 0   Current plan for homicide. 0   Imminent risk or actual attempt to seriously harm another without relief of factors precipitating the attempt. 0   Severe dysfunction in daily living (ex: complete neglect for self care, extreme disruption in vegetative function, extreme deterioration in social interactions). 1   Recent (last 7 days) or current physical aggression in the ED or on unit. 0   Restraints or seclusion episode in past 72 hours. 0   Recent (last 7 days) or current verbal aggression, agitation, yelling, etc., while in the ED or unit. 0   Active psychosis. 1   Need for constant or near constant redirection (from leaving, from others, etc).  0   Intrusive or disruptive behaviors. 0   Patient requires 3 or more hours of individualized nursing care per 8-hour shift (i.e. for ADLs, meds, therapeutic interventions). 0   TOTAL 3

## 2025-07-03 NOTE — PROGRESS NOTES
"  ----------------------------------------------------------------------------------------------------------  [facility]  Psychiatry Progress Note  Hospital Day #23     Interim History:     The patient's care was discussed with the treatment team and chart notes were reviewed.    Vitals:   /88   Pulse 95   Temp 98.3  F (36.8  C) (Oral)   Resp 18   Ht 1.778 m (5' 10\")   Wt 101.1 kg (222 lb 12.8 oz)   SpO2 97%   BMI 31.97 kg/m     Sleep: 6.75 hours (07/03/25 0600)  Scheduled medications: Took all scheduled medications as prescribed.    PRN medications:   Last 24H PRN:     acetaminophen (TYLENOL) tablet 650 mg, 650 mg at 07/03/25 0400    nicotine polacrilex (NICORETTE) gum 4 mg, 4 mg at 07/03/25 0710    OLANZapine (zyPREXA) tablet 5-10 mg, 10 mg at 07/03/25 0400 **OR** OLANZapine (zyPREXA) injection 5-10 mg    traZODone (DESYREL) tablet 50 mg, 50 mg at 07/02/25 2124     Staff Report:   \"Alert and oriented, able to communicate needs. Visible in milieu, he was social and interactive with peers and staff members. Able to play dominoes with peers. Pleasant and cooperative. He endorsed anxiety at 6/10, requested PRN Zyprexa with his Hs medications (Hydroxyzine not effective for him) at 1900. Depression rated at 5. He denied SI/HI, contracted for safety. Medication compliant. ADLs WNL, manageable 3/10 bilateral ankle pain during assessment, pain rated at 6 at 1900, pain managed with PRN Tylenol. Food and fluid intake WNL. Mother visited, visit went well.\"     Please see staff notes  for more details.     Subjective:     Patient Interview:  Barak was interviewed in the [facility] conference room by the team. He states he is feeling \"the same as yesterday.\" He notes that \"mornings are hell\", and \"I feel like I want to jump out of my skin\" but 20 minutes after taking his medications, he feels better. He has pain in his ankles which he attributes to running up and down the " "halls here. He feels the suboxone seems to help the most.     Currently, he rates his anxiety and depression a 4/10. In the mornings, his depression is good, the evenings are difficult, then once night comes, he feels good again.     In discussing his medication taper, he states \"I am ready to keep pushing.\" He is motivated to improve his health. He appreciates how far he's come and states \"I never thought I'd get here, I thought I'd be dead.\"     Yesterday, he asked about a . We discussed with him today that he does have a  assigned to him through the [facility] but he does not feel this  likes him. He states, \"We didn't harmonize.\"  She would skip appointments at his house that she was supposed to be at. Informed that he should be able to request a different one. No other concerns today.     ROS:  Patient has bilateral ankle pain, endorses swelling which is trace on exam and non-pitting   Patient denies acute concerns     Objective:     Vitals:  /88   Pulse 95   Temp 98.3  F (36.8  C) (Oral)   Resp 18   Ht 1.778 m (5' 10\")   Wt 101.1 kg (222 lb 12.8 oz)   SpO2 97%   BMI 31.97 kg/m      Allergies:  Allergies   Allergen Reactions    Prazosin Unknown     Worsening of nightmares       Current Medications:  Scheduled:  Current Facility-Administered Medications   Medication Dose Route Frequency Provider Last Rate Last Admin    acetaminophen (TYLENOL) tablet 650 mg  650 mg Oral Q4H PRN Daniel Vazquez MD   650 mg at 07/03/25 0400    alum & mag hydroxide-simethicone (MAALOX) suspension 30 mL  30 mL Oral Q4H PRN Daniel Vazquez MD        buprenorphine HCl-naloxone HCl (SUBOXONE) 8-2 MG per film 1 Film  1 Film Sublingual BID Daniel Vazquez MD   1 Film at 07/02/25 1911    buPROPion (WELLBUTRIN XL) 24 hr tablet 150 mg  150 mg Oral Daily Kimmie Arango MD   150 mg at 07/02/25 0756    gabapentin (NEURONTIN) capsule 600 mg  600 mg Oral TID Sandee Trotter MD   600 " mg at 07/02/25 1911    guaiFENesin (MUCINEX) 12 hr tablet 600 mg  600 mg Oral BID PRN Ciara Quintanilla APRN CNP        hydrOXYzine HCl (ATARAX) tablet 25 mg  25 mg Oral Q4H PRN Daniel Vazquez MD   25 mg at 06/24/25 2151    lurasidone (LATUDA) tablet 80 mg  80 mg Oral At Bedtime Kimmie Arango MD   80 mg at 07/02/25 1911    nicotine (COMMIT) lozenge 4 mg  4 mg Buccal Q1H PRN Bridger Hernandez MD   4 mg at 07/02/25 0627    nicotine (NICODERM CQ) 21 MG/24HR 24 hr patch 1 patch  1 patch Transdermal Daily Kimmie Arango MD   1 patch at 07/02/25 0759    nicotine polacrilex (NICORETTE) gum 4 mg  4 mg Buccal Q1H PRN Kimmie Arango MD   4 mg at 07/03/25 0710    OLANZapine (zyPREXA) tablet 5-10 mg  5-10 mg Oral TID PRN Franny Best MD   10 mg at 07/03/25 0400    Or    OLANZapine (zyPREXA) injection 5-10 mg  5-10 mg Intramuscular TID PRN Franny Best MD        ondansetron (ZOFRAN) tablet 4 mg  4 mg Oral Q8H PRN Daniel Vazquez MD        PHENobarbital tablet 32.4 mg  32.4 mg Oral At Bedtime Martin Ventura MD   32.4 mg at 07/02/25 1912    PHENobarbital tablet 32.4 mg  32.4 mg Oral Daily Sandee Trotter MD   32.4 mg at 07/02/25 1337    PHENobarbital tablet 32.4 mg  32.4 mg Oral QAM Radha Pinedo MD   32.4 mg at 07/02/25 0756    propranolol (INDERAL) tablet 20 mg  20 mg Oral TID Radha Pinedo MD   20 mg at 07/02/25 1912    senna-docusate (SENOKOT-S/PERICOLACE) 8.6-50 MG per tablet 1 tablet  1 tablet Oral BID PRN Daniel Vazquez MD        thiamine (B-1) tablet 100 mg  100 mg Oral Daily Radha Pinedo MD   100 mg at 07/02/25 0756    traZODone (DESYREL) tablet 50 mg  50 mg Oral At Bedtime PRN Daniel Vazquez MD   50 mg at 07/02/25 2124       PRN:  Current Facility-Administered Medications   Medication Dose Route Frequency Provider Last Rate Last Admin    acetaminophen (TYLENOL) tablet 650 mg  650 mg Oral Q4H PRN Daniel Vazquez MD   650 mg at  07/03/25 0400    alum & mag hydroxide-simethicone (MAALOX) suspension 30 mL  30 mL Oral Q4H PRN Daniel Vazquez MD        buprenorphine HCl-naloxone HCl (SUBOXONE) 8-2 MG per film 1 Film  1 Film Sublingual BID Daniel Vazquez MD   1 Film at 07/02/25 1911    buPROPion (WELLBUTRIN XL) 24 hr tablet 150 mg  150 mg Oral Daily Kimmie Arango MD   150 mg at 07/02/25 0756    gabapentin (NEURONTIN) capsule 600 mg  600 mg Oral TID Sandee Trotter MD   600 mg at 07/02/25 1911    guaiFENesin (MUCINEX) 12 hr tablet 600 mg  600 mg Oral BID PRN Ciara Quintanilla APRN CNP        hydrOXYzine HCl (ATARAX) tablet 25 mg  25 mg Oral Q4H PRN Daniel Vazquez MD   25 mg at 06/24/25 2151    lurasidone (LATUDA) tablet 80 mg  80 mg Oral At Bedtime Kimmie Arango MD   80 mg at 07/02/25 1911    nicotine (COMMIT) lozenge 4 mg  4 mg Buccal Q1H PRN Bridger Hernandez MD   4 mg at 07/02/25 0627    nicotine (NICODERM CQ) 21 MG/24HR 24 hr patch 1 patch  1 patch Transdermal Daily Kimmie Arango MD   1 patch at 07/02/25 0759    nicotine polacrilex (NICORETTE) gum 4 mg  4 mg Buccal Q1H PRN Kimmie Arango MD   4 mg at 07/03/25 0710    OLANZapine (zyPREXA) tablet 5-10 mg  5-10 mg Oral TID PRN Franny Best MD   10 mg at 07/03/25 0400    Or    OLANZapine (zyPREXA) injection 5-10 mg  5-10 mg Intramuscular TID PRN Franny Best MD        ondansetron (ZOFRAN) tablet 4 mg  4 mg Oral Q8H PRN Daniel Vazquez MD        PHENobarbital tablet 32.4 mg  32.4 mg Oral At Bedtime Martin Ventura MD   32.4 mg at 07/02/25 1912    PHENobarbital tablet 32.4 mg  32.4 mg Oral Daily Sandee Trotter MD   32.4 mg at 07/02/25 1337    PHENobarbital tablet 32.4 mg  32.4 mg Oral QAM Radha Pinedo MD   32.4 mg at 07/02/25 0756    propranolol (INDERAL) tablet 20 mg  20 mg Oral TID Radha Pinedo MD   20 mg at 07/02/25 1912    senna-docusate (SENOKOT-S/PERICOLACE) 8.6-50 MG per tablet 1 tablet  1 tablet Oral BID PRN  "Daniel Vazquez MD        thiamine (B-1) tablet 100 mg  100 mg Oral Daily Radha Pinedo MD   100 mg at 07/02/25 0756    traZODone (DESYREL) tablet 50 mg  50 mg Oral At Bedtime PRN Daniel Vazquez MD   50 mg at 07/02/25 2124       Labs and Imaging:  New results:   No results found for this or any previous visit (from the past 24 hours).    Data this admission:  - CBC unremarkable  - CMP unremarkable other than Ca  - TSH normal  - UDS positive for amphetamines, benzos, cannabinoids  - Vit D low in 2024, consider repeat  - Hgb A1c 5.1 2023, consider repeat  - Lipids elevated in 2024. Consider repeat  - Vit B12 not obtained  - Folate not obtained, normal in 2024 (had been taking it orally)  - EKG normal sinus rhythm, QTc 434     Mental Status Exam:     Oriented to:  Grossly Oriented  General:  Awake and Alert  Appearance:  appears stated age and Grooming is adequate  Behavior/Attitude:  Calm and Cooperative  Eye Contact: Appropriate at times, stares off into the distance but this appears to be improving  Psychomotor: Restless, swiveling slightly in swivel chair, no catatonia present.   Speech:  appropriate volume/tone  Language: Fluent in English with appropriate syntax and vocabulary.  Mood:   feeling alright\"  Affect:  congruent with mood, positive about recovery  Thought Process: Linear and coherent   Thought Content:   No HI and does not appear to be responding to internal stimuli; no longer mentioning delusions of paranoia. No mention of SI.   Associations: Intact   Insight:  partial  Judgment:  fair   Impulse control: fair  Attention Span:  adequate for conversation  Concentration:  grossly intact  Recent and Remote Memory:  not formally assessed  Fund of Knowledge: average  Muscle Strength and Tone: normal  Gait and Station: Normal       Psychiatric Assessment   Barak is a 40 year old male with a past psychiatric history of substance use (benzodiazepines, cannabis, cocaine, opioids) & " unspecified psychotic disorder admitted from the ED on 6/10/2025 due to concern for out of control behaviors and psychosis in the context of substance use, medication nonadherence and psychosocial stressors. Significant symptoms include sleep issues, disorganized and delusional thoughts, substance use, delusional beliefs, and paranoia. His last psychiatric hospitalization was in 2024. He has had some outpatient psychiatric management but it isn't clear if he is consistently seeing anyone. The MSE on admission is notable for delusional beliefs, paranoia, and disorganized thought process.     In summary, presentation is consistent with psychosis, likely schizophrenia vs substance induced. He will likely benefit from medication optimization, therapeutic milieu and access to resources this admission.     Psychiatric Plan by Diagnosis      Today's changes:  - Phenobarbital taper to 32.4mg BID from 32.4mg TID today  - Scheduled phenobarbital 32.4mg 1x for Friday (07/03)      # Acute psychosis, schizophrenia vs. substance-induced psychotic disorder   1. Medications:  - Continue lurasidone 80mg qpm with food  - Continue previously restarted bupropion at 150mg daily  - Discontinued PTA Adderall (would not recommend restarting this)     2. Pertinent Labs/Monitoring:   - EKG QTc 434     3. Additional Plans:  - Patient will be treated in therapeutic milieu with appropriate individual and group therapies as described    # PSUD (opioids, benzodiazepines, cocaine, cannabis)  # benzodiazepine use disorder  - Phenobarbital taper to 32.4mg BID from 32.4mg TID  - Scheduled phenobarbital 32.4mg 1x for Friday (07/03)  - Continue gabapentin 600 mg TID for restlessness  - Continue PTA Suboxone 8-2mg BID   - Continue propranolol 20 mg TID for restlessness (likely also aspect of akathisia)      # Akathisia  - Continue propranolol as above        Psychiatric Hospital Course:      Barak Clarke was admitted to [facility] as a voluntary patient  on 6/10/2025 after presenting to the ED on 6/8. His PTA medications were continued with the exception of bupropion and Adderall, which were held due to acute psychosis. On 6/21 he was transferred to unit 20, a lower acuity unit.  Medications:  Barak Clarke was admitted to [facility] as a voluntary patient on 6/10/2025 after presenting to the ED on 6/8. His PTA medications were continued with the exception of bupropion and Adderall, which were held due to acute psychosis.  6/10: initiate risperidone 1mg qhs  6/11: discontinue risperidone per patient preference, initiate haloperidol 5mg qhs. Place 72h hold and file for commitment with [facility]. Initiate phenobarbital 64.8mg TID for clonazepam withdrawal  6/12: discontinue haloperidol per patient preference (did not take any doses of paliperidone, risperidone, or haloperidol). Initiate lurasidone 20mg qpm with dinner  6/13: increase lurasidone to 60mg qpm with dinner  6/16: increase lurasidone to 80mg qpm with dinner, restart bupropion at 150mg daily  6/17: increase phenobarbital to 97.2mg TID. Commitment and [facility] orders received today (patient waived his right to a hearing)  6/20: initiate propranolol 10mg TID PRN for akathisia/restlessness in context of withdrawal   6/24: scheduled propranolol   6/24: decrease phenobarbital to 97.2 mg BID + 64.8 mg qd  6/24: increase gabapentin to 600 mg TID for restlessness  6/25: increase propranolol to 20 mg TID for akathisia/restlessness  6/26: decrease phenobarbital to 97.2 mg qd + 64.8 mg BID  6/27: decrease phenobarbital to 64.8 mg TID  6/30: decrease phenobarbital to 32.4 mg qam + 64.8 mg BID.  Patient continues to tolerate phenobarbital taper. Consents to further taper.    7/1: Decrease phenobarbital to 32.4mg BID + 64.8 mg qhs.  Patient continues to tolerate phenobarbital taper. Consents to further taper.  7/2: Decrease phenobarbital to 32.4mg TID.  Patient continues to tolerate phenobarbital taper. Consents to further  taper.  7/3: Decrease phenobarbital to 32.4mg BID.  Patient continues to tolerate phenobarbital taper. Consents to further taper. Scheduled phenobarbital 32.4mg 1x for Friday (07/03). Discharge to [place] Monday.    The risks, benefits, alternatives, and side effects were discussed and understood by the patient.     Medical Assessment and Plan     Medical diagnoses to be addressed this admission:    # HTN  - not on any medication    Medical course: Patient was physically examined by the ED prior to being transferred to the unit and was found to be medically stable and appropriate for admission.     Consults: medicine previously for URI symptoms     Checklist     Legal Status: [facility]  Committed     [facility] order: clozapine, paliperidone, risperidone, quetiapine, olanzapine, aripiprazole, lurasidone     Safety Assessment:   Behavioral Orders   Procedures    Code 1 - Restrict to Unit    Routine Programming     As clinically indicated    Status 15     Every 15 minutes.    Suicide precautions: Suicide Risk: MODERATE; Clinical rationale to override score: response to medication     Patients on Suicide Precautions should have a Combination Diet ordered that includes a Diet selection(s) AND a Behavioral Tray selection for Safe Tray - with utensils, or Safe Tray - NO utensils       Suicide Risk:   MODERATE     Clinical rationale to override score::   response to medication    Withdrawal precautions       Risk Assessment:  Risk for harm is moderate.  Risk factors: maladaptive coping, substance use, and past behaviors  Protective factors: engaged in treatment     SIO: no    Disposition: TBD. Pending stabilization, medication optimization, & development of a safe discharge plan. Likely to CD treatment.     Attestations     Vishal Phillips MS3  [facility]    I was present with the medical student who participated in the service and in the documentation of the note.  I have verified the history and personally performed  the physical exam and medical decision making. I agree with the assessment and plan of care as documented in the note.    This patient was seen and discussed with my attending physician.  Martin Ventura MD  [facility]  PGY1 Psychiatry Resident       Attestation:  This patient has been seen and evaluated by me, Sandee Trotter MD.  I have discussed this patient with the house staff team including the resident and/or medical student and I agree with the findings and plan in this note.    I have reviewed today's vital signs, medications, labs and imaging. Sandee Trotter MD , PhD.

## 2025-07-04 PROCEDURE — 250N000013 HC RX MED GY IP 250 OP 250 PS 637

## 2025-07-04 PROCEDURE — 250N000013 HC RX MED GY IP 250 OP 250 PS 637: Performed by: STUDENT IN AN ORGANIZED HEALTH CARE EDUCATION/TRAINING PROGRAM

## 2025-07-04 PROCEDURE — 124N000002 HC R&B MH UMMC

## 2025-07-04 PROCEDURE — 250N000013 HC RX MED GY IP 250 OP 250 PS 637: Performed by: PSYCHIATRY & NEUROLOGY

## 2025-07-04 PROCEDURE — 97150 GROUP THERAPEUTIC PROCEDURES: CPT | Mod: GO

## 2025-07-04 PROCEDURE — 250N000012 HC RX MED GY IP 250 OP 636 PS 637: Performed by: PSYCHIATRY & NEUROLOGY

## 2025-07-04 RX ADMIN — OLANZAPINE 10 MG: 5 TABLET, FILM COATED ORAL at 06:28

## 2025-07-04 RX ADMIN — ACETAMINOPHEN 650 MG: 325 TABLET ORAL at 06:27

## 2025-07-04 RX ADMIN — NICOTINE POLACRILEX 4 MG: 4 GUM, CHEWING BUCCAL at 21:32

## 2025-07-04 RX ADMIN — GABAPENTIN 600 MG: 300 CAPSULE ORAL at 19:21

## 2025-07-04 RX ADMIN — NICOTINE POLACRILEX 4 MG: 4 GUM, CHEWING BUCCAL at 11:05

## 2025-07-04 RX ADMIN — NICOTINE POLACRILEX 4 MG: 4 GUM, CHEWING BUCCAL at 07:39

## 2025-07-04 RX ADMIN — OLANZAPINE 10 MG: 5 TABLET, FILM COATED ORAL at 19:21

## 2025-07-04 RX ADMIN — ACETAMINOPHEN 650 MG: 325 TABLET ORAL at 20:56

## 2025-07-04 RX ADMIN — TRAZODONE HYDROCHLORIDE 50 MG: 50 TABLET ORAL at 20:56

## 2025-07-04 RX ADMIN — NICOTINE POLACRILEX 4 MG: 4 GUM, CHEWING BUCCAL at 20:13

## 2025-07-04 RX ADMIN — NICOTINE POLACRILEX 4 MG: 4 GUM, CHEWING BUCCAL at 15:48

## 2025-07-04 RX ADMIN — NICOTINE POLACRILEX 4 MG: 4 GUM, CHEWING BUCCAL at 06:28

## 2025-07-04 RX ADMIN — PROPRANOLOL HYDROCHLORIDE 20 MG: 20 TABLET ORAL at 07:43

## 2025-07-04 RX ADMIN — PHENOBARBITAL 32.4 MG: 32.4 TABLET ORAL at 11:05

## 2025-07-04 RX ADMIN — PROPRANOLOL HYDROCHLORIDE 20 MG: 20 TABLET ORAL at 14:57

## 2025-07-04 RX ADMIN — LURASIDONE HYDROCHLORIDE 80 MG: 80 TABLET, FILM COATED ORAL at 19:21

## 2025-07-04 RX ADMIN — BUPRENORPHINE AND NALOXONE 1 FILM: 8; 2 FILM BUCCAL; SUBLINGUAL at 19:21

## 2025-07-04 RX ADMIN — GABAPENTIN 600 MG: 300 CAPSULE ORAL at 14:57

## 2025-07-04 RX ADMIN — NICOTINE POLACRILEX 4 MG: 4 GUM, CHEWING BUCCAL at 17:23

## 2025-07-04 RX ADMIN — NICOTINE 1 PATCH: 21 PATCH, EXTENDED RELEASE TRANSDERMAL at 07:39

## 2025-07-04 RX ADMIN — GABAPENTIN 600 MG: 300 CAPSULE ORAL at 07:39

## 2025-07-04 RX ADMIN — BUPROPION HYDROCHLORIDE 150 MG: 150 TABLET, EXTENDED RELEASE ORAL at 07:39

## 2025-07-04 RX ADMIN — BUPRENORPHINE AND NALOXONE 1 FILM: 8; 2 FILM BUCCAL; SUBLINGUAL at 07:39

## 2025-07-04 RX ADMIN — PROPRANOLOL HYDROCHLORIDE 20 MG: 20 TABLET ORAL at 19:21

## 2025-07-04 ASSESSMENT — ACTIVITIES OF DAILY LIVING (ADL)
ADLS_ACUITY_SCORE: 32
DRESS: INDEPENDENT
ADLS_ACUITY_SCORE: 32
ORAL_HYGIENE: INDEPENDENT
ADLS_ACUITY_SCORE: 32
LAUNDRY: WITH SUPERVISION
ADLS_ACUITY_SCORE: 32
HYGIENE/GROOMING: INDEPENDENT
ADLS_ACUITY_SCORE: 32
ADLS_ACUITY_SCORE: 32

## 2025-07-04 NOTE — PROGRESS NOTES
Rehab Group    Start time: 1015  End time: 1100  Patient time total: 45 minutes    attended full group    #7 attended   Group Type: occupational therapy   Group Topic Covered: coping skills     Group Session Detail:  Discussion group with focus on identifying coping skills to be used in wellness tool box, identifying potential coping skills A-Z     Patient Response/Contribution:  cooperative with task and actively engaged     Patient Detail:  Pt arrived to group with blunted affect, brightening at times with participation. Pt was oriented to group and verbalized understanding. Pt was actively engaged in group disucssion, participating with decent insight. Pt reported preferred copings skills as drawing and music. Pt was receptive to group discussion. Will continue to encourage attendance and participation.         [id number] OT Group (2 or more in attendance)      Patient Active Problem List   Diagnosis    Tobacco use disorder    Amphetamine use disorder, mild, in early remission (H)    Major depressive disorder, recurrent, moderate (H)    Opioid use disorder, severe, in early remission, on maintenance therapy, dependence (H)    Attention deficit hyperactivity disorder (ADHD), inattentive type, moderate    Sedative, hypnotic or anxiolytic use disorder, mild, abuse (H)    Insomnia, unspecified    Personality disorder, unspecified (H)    Chemical dependency (H)    Mental health problem    Psychosis (H)    Substance-related disorder (H)    Acute psychosis (H)    HTN (hypertension)    Major depressive disorder, recurrent episode, severe (H)    Depression

## 2025-07-04 NOTE — CARE PLAN
07/03/25 2121   Observation Type   How often does the Patient require Staff intervention/redirection? rarely   Harm Category none   No Harm Category Noted at This Time no applicable harm categories or justifications   Level of Special Monitoring none

## 2025-07-04 NOTE — PLAN OF CARE
Problem: Sleep Disturbance  Goal: Adequate Sleep/Rest  Outcome: Progressing   Goal Outcome Evaluation:    Patient appears to have slept a total of 7 hours.    Given PRN Tylenol and PRN Zyprexa for bilateral ankle pain and anxiety with improvement in both.     Safety/environment checks conducted every 15 minutes with no further concerns noted.

## 2025-07-04 NOTE — PLAN OF CARE
Problem: Depression  Goal: Decreased Depression Symptoms  Outcome: Progressing     Problem: Anxiety  Goal: Anxiety Reduction or Resolution  Outcome: Progressing     Problem: Suicide Risk  Goal: Absence of Self-Harm  Outcome: Progressing     Problem: Adult Behavioral Health Plan of Care  Goal: Absence of New-Onset Illness or Injury  Outcome: Progressing   Goal Outcome Evaluation:    Plan of Care Reviewed With: patient Plan of Care Reviewed With: patient    Overall Patient Progress: improvingOverall Patient Progress: improving     Visible in milieu, he was social and interactive with peers and staff members. Able to play his guitar and sing. Alert and oriented, able to communicate needs. Pleasant and cooperative. He endorsed anxiety at 6, requested PRN Zyprexa with his HS medications  at 1900. Depression rated at 3. He denied SI/HI, contracted for safety. Medication compliant. ADLs WNL, bilateral ankle rated at 5, pain managed with PRN Tylenol at 1800 and 2200. Food and fluid intake WNL.

## 2025-07-04 NOTE — PLAN OF CARE
Pt is visible in milieu, spends time pacing and playing guitar. He attends all groups that are offered. Pt is pleasant upon approach, no behavioral concerns. He endorses moderate anxiety and depression, denies all other mental health symptoms. Pt is med compliant, no PRNs received this shift. He took a shower this morning, hygiene and intake are adequate. His mom visited and this went well.    Problem: Psychotic Signs/Symptoms  Goal: Enhanced Social, Occupational or Functional Skills (Psychotic Signs/Symptoms)  Outcome: Progressing   Goal Outcome Evaluation:

## 2025-07-04 NOTE — PROGRESS NOTES
Rehab Group    Start time: 1100  End time: 1200  Patient time total: 45 minutes    attended partial group    #6 attended   Group Type: OT Clinic   Group Topic Covered: balanced lifestyle, coping skills, healthy leisure time, and social skills     Group Session Detail:  Occupational therapy clinic to facilitate coping skill exploration, creative expression within personally meaningful activities, and clinical observation of social, cognitive, and kinesthetic performance skills     Patient Response/Contribution:  cooperative with task, organized, socially appropriate, and actively engaged     Patient Detail:  Pt was IND to initiate, gather materials, sequence, and adjust to workspace demands as needed. Demonstrated good focus, planning, and problem solving for selected creative expression task. Able to ask for assistance as needed, and engaged in discussion with peers and staff. Will continue to encourage attendance and participation.         [id number] OT Group (2 or more in attendance)    Patient Active Problem List   Diagnosis    Tobacco use disorder    Amphetamine use disorder, mild, in early remission (H)    Major depressive disorder, recurrent, moderate (H)    Opioid use disorder, severe, in early remission, on maintenance therapy, dependence (H)    Attention deficit hyperactivity disorder (ADHD), inattentive type, moderate    Sedative, hypnotic or anxiolytic use disorder, mild, abuse (H)    Insomnia, unspecified    Personality disorder, unspecified (H)    Chemical dependency (H)    Mental health problem    Psychosis (H)    Substance-related disorder (H)    Acute psychosis (H)    HTN (hypertension)    Major depressive disorder, recurrent episode, severe (H)    Depression

## 2025-07-05 PROCEDURE — 250N000012 HC RX MED GY IP 250 OP 636 PS 637: Performed by: PSYCHIATRY & NEUROLOGY

## 2025-07-05 PROCEDURE — 250N000013 HC RX MED GY IP 250 OP 250 PS 637: Performed by: PSYCHIATRY & NEUROLOGY

## 2025-07-05 PROCEDURE — 124N000002 HC R&B MH UMMC

## 2025-07-05 PROCEDURE — 250N000013 HC RX MED GY IP 250 OP 250 PS 637

## 2025-07-05 PROCEDURE — 250N000013 HC RX MED GY IP 250 OP 250 PS 637: Performed by: STUDENT IN AN ORGANIZED HEALTH CARE EDUCATION/TRAINING PROGRAM

## 2025-07-05 RX ADMIN — OLANZAPINE 10 MG: 5 TABLET, FILM COATED ORAL at 04:54

## 2025-07-05 RX ADMIN — BUPRENORPHINE AND NALOXONE 1 FILM: 8; 2 FILM BUCCAL; SUBLINGUAL at 07:35

## 2025-07-05 RX ADMIN — LURASIDONE HYDROCHLORIDE 80 MG: 80 TABLET, FILM COATED ORAL at 19:12

## 2025-07-05 RX ADMIN — BUPROPION HYDROCHLORIDE 150 MG: 150 TABLET, EXTENDED RELEASE ORAL at 07:34

## 2025-07-05 RX ADMIN — NICOTINE POLACRILEX 4 MG: 4 GUM, CHEWING BUCCAL at 06:50

## 2025-07-05 RX ADMIN — PROPRANOLOL HYDROCHLORIDE 20 MG: 20 TABLET ORAL at 07:35

## 2025-07-05 RX ADMIN — GABAPENTIN 600 MG: 300 CAPSULE ORAL at 13:56

## 2025-07-05 RX ADMIN — NICOTINE POLACRILEX 4 MG: 4 GUM, CHEWING BUCCAL at 18:33

## 2025-07-05 RX ADMIN — NICOTINE POLACRILEX 4 MG: 4 GUM, CHEWING BUCCAL at 10:00

## 2025-07-05 RX ADMIN — PROPRANOLOL HYDROCHLORIDE 20 MG: 20 TABLET ORAL at 19:13

## 2025-07-05 RX ADMIN — BUPRENORPHINE AND NALOXONE 1 FILM: 8; 2 FILM BUCCAL; SUBLINGUAL at 19:11

## 2025-07-05 RX ADMIN — PROPRANOLOL HYDROCHLORIDE 20 MG: 20 TABLET ORAL at 13:56

## 2025-07-05 RX ADMIN — GABAPENTIN 600 MG: 300 CAPSULE ORAL at 07:35

## 2025-07-05 RX ADMIN — NICOTINE POLACRILEX 4 MG: 4 GUM, CHEWING BUCCAL at 21:53

## 2025-07-05 RX ADMIN — ACETAMINOPHEN 650 MG: 325 TABLET ORAL at 04:54

## 2025-07-05 RX ADMIN — OLANZAPINE 10 MG: 5 TABLET, FILM COATED ORAL at 19:20

## 2025-07-05 RX ADMIN — NICOTINE POLACRILEX 4 MG: 4 GUM, CHEWING BUCCAL at 13:56

## 2025-07-05 RX ADMIN — NICOTINE 1 PATCH: 21 PATCH, EXTENDED RELEASE TRANSDERMAL at 07:35

## 2025-07-05 RX ADMIN — NICOTINE POLACRILEX 4 MG: 4 GUM, CHEWING BUCCAL at 16:33

## 2025-07-05 RX ADMIN — ACETAMINOPHEN 650 MG: 325 TABLET ORAL at 19:19

## 2025-07-05 RX ADMIN — GABAPENTIN 600 MG: 300 CAPSULE ORAL at 19:12

## 2025-07-05 ASSESSMENT — ACTIVITIES OF DAILY LIVING (ADL)
ADLS_ACUITY_SCORE: 32
HYGIENE/GROOMING: INDEPENDENT
HYGIENE/GROOMING: INDEPENDENT
ADLS_ACUITY_SCORE: 32
LAUNDRY: WITH SUPERVISION
DRESS: INDEPENDENT
ADLS_ACUITY_SCORE: 32
ORAL_HYGIENE: INDEPENDENT
ADLS_ACUITY_SCORE: 32
LAUNDRY: WITH SUPERVISION
ADLS_ACUITY_SCORE: 32
ORAL_HYGIENE: INDEPENDENT
ADLS_ACUITY_SCORE: 32
DRESS: INDEPENDENT;SCRUBS (BEHAVIORAL HEALTH)

## 2025-07-05 NOTE — PLAN OF CARE
Goal Outcome Evaluation:    Plan of Care Reviewed With: patient        Problem: Suicide Risk  Goal: Absence of Self-Harm  Outcome: Progressing    Patient was visible in the milieu, was observed playing guitar in the dining room and singing. Cooperative with assessment, denies all symptoms save for anxiety and depression which he rates 5/10 for both. Patient describes his mood as okay, affect was full range and was social to both peers and staff. Patient eats and drinks okay, denies issues with bowel and bladder. Utilizes nicotine gum every hour and was compliant with scheduled medications. Requested and was given prn zyprexa 10 together with hs medications verbalizing that it helps with anxiety and psychosis. Patient did shave his beards in the hallways and was cooperative with returning the shaver after use. Patient was given prn tylenol 650mg for bilateral ankle pain and PRN trazodone at bedtime for sleep promotion. No anger or behavioral outbursts, continue with plan of care.

## 2025-07-05 NOTE — PLAN OF CARE
Problem: Adult Behavioral Health Plan of Care  Goal: Adheres to Safety Considerations for Self and Others  Outcome: Progressing     Problem: Suicide Risk  Goal: Absence of Self-Harm  Outcome: Progressing  Intervention: Assess Risk to Self and Maintain Safety  Recent Flowsheet Documentation  Taken 7/5/2025 1600 by Joanie Schumacher RN  Self-Harm Prevention: environmental self-harm risks assessed  Intervention: Establish Safety Plan and Continuity of Care  Recent Flowsheet Documentation  Taken 7/5/2025 1600 by Joanie Schumacher RN  Safe Transition Promotion: protective factors promoted     Problem: Psychotic Signs/Symptoms  Goal: Improved Behavioral Control (Psychotic Signs/Symptoms)  Outcome: Progressing     Problem: Anxiety  Goal: Anxiety Reduction or Resolution  Outcome: Progressing   Goal Outcome Evaluation:    Plan of Care Reviewed With: patient    Pt was visible in the milieu watching TV. Pt had minimal interactions with peers. Affect flat but pleasant on approach. He was observed pacing the quinn. Denies all mental health and psych symptoms. Endorsed  ankle pain. PRN  tylenol administered.Po intake adequate. Pt was compliant medications and contracted for safety. No behavioral concerns this shift.

## 2025-07-05 NOTE — CARE PLAN
07/04/25 2010   Observation Type   How often does the Patient require Staff intervention/redirection? rarely   Harm Category none   No Harm Category Noted at This Time no applicable harm categories or justifications   Level of Special Monitoring none

## 2025-07-05 NOTE — PLAN OF CARE
Problem: Sleep Disturbance  Goal: Adequate Sleep/Rest  Outcome: Progressing   Goal Outcome Evaluation:    Patient appears to have slept a total of 6.75 hours.     Given PRN Tylenol for headache and PRN Zyprexa for anxiety. Sleeping on reassessment.     Safety/environment checks conducted every 15 minutes with no further concerns noted.

## 2025-07-05 NOTE — PLAN OF CARE
Pt pacing in the hallway throughout the shift, selectively social with peers. He endorses moderate anxiety and depression, denies all other mental health symptoms. Pt is med compliant, no PRNs received this shift. He is looking forward to discharging on Monday, no other concerns at this time.    Problem: Psychotic Signs/Symptoms  Goal: Improved Behavioral Control (Psychotic Signs/Symptoms)  Outcome: Progressing   Goal Outcome Evaluation:    Plan of Care Reviewed With: patient

## 2025-07-05 NOTE — CARE PLAN
07/05/25 1134   Observation Type   How often does the Patient require Staff intervention/redirection? rarely   Harm Category none   Level of Special Monitoring none

## 2025-07-06 PROCEDURE — 250N000012 HC RX MED GY IP 250 OP 636 PS 637: Performed by: PSYCHIATRY & NEUROLOGY

## 2025-07-06 PROCEDURE — 124N000002 HC R&B MH UMMC

## 2025-07-06 PROCEDURE — 250N000013 HC RX MED GY IP 250 OP 250 PS 637

## 2025-07-06 PROCEDURE — 250N000013 HC RX MED GY IP 250 OP 250 PS 637: Performed by: STUDENT IN AN ORGANIZED HEALTH CARE EDUCATION/TRAINING PROGRAM

## 2025-07-06 PROCEDURE — 250N000013 HC RX MED GY IP 250 OP 250 PS 637: Performed by: PSYCHIATRY & NEUROLOGY

## 2025-07-06 RX ADMIN — NICOTINE POLACRILEX 4 MG: 4 GUM, CHEWING BUCCAL at 08:47

## 2025-07-06 RX ADMIN — NICOTINE POLACRILEX 4 MG: 4 GUM, CHEWING BUCCAL at 21:13

## 2025-07-06 RX ADMIN — TRAZODONE HYDROCHLORIDE 50 MG: 50 TABLET ORAL at 02:52

## 2025-07-06 RX ADMIN — BUPRENORPHINE AND NALOXONE 1 FILM: 8; 2 FILM BUCCAL; SUBLINGUAL at 07:42

## 2025-07-06 RX ADMIN — NICOTINE POLACRILEX 4 MG: 4 GUM, CHEWING BUCCAL at 12:07

## 2025-07-06 RX ADMIN — NICOTINE 1 PATCH: 21 PATCH, EXTENDED RELEASE TRANSDERMAL at 07:42

## 2025-07-06 RX ADMIN — NICOTINE POLACRILEX 4 MG: 4 GUM, CHEWING BUCCAL at 17:11

## 2025-07-06 RX ADMIN — LURASIDONE HYDROCHLORIDE 80 MG: 80 TABLET, FILM COATED ORAL at 19:26

## 2025-07-06 RX ADMIN — ACETAMINOPHEN 650 MG: 325 TABLET ORAL at 22:20

## 2025-07-06 RX ADMIN — BUPRENORPHINE AND NALOXONE 1 FILM: 8; 2 FILM BUCCAL; SUBLINGUAL at 19:27

## 2025-07-06 RX ADMIN — PROPRANOLOL HYDROCHLORIDE 20 MG: 20 TABLET ORAL at 13:28

## 2025-07-06 RX ADMIN — THIAMINE HCL TAB 100 MG 100 MG: 100 TAB at 07:42

## 2025-07-06 RX ADMIN — NICOTINE POLACRILEX 4 MG: 4 GUM, CHEWING BUCCAL at 19:27

## 2025-07-06 RX ADMIN — TRAZODONE HYDROCHLORIDE 50 MG: 50 TABLET ORAL at 22:19

## 2025-07-06 RX ADMIN — GABAPENTIN 600 MG: 300 CAPSULE ORAL at 19:26

## 2025-07-06 RX ADMIN — BUPROPION HYDROCHLORIDE 150 MG: 150 TABLET, EXTENDED RELEASE ORAL at 07:42

## 2025-07-06 RX ADMIN — OLANZAPINE 10 MG: 5 TABLET, FILM COATED ORAL at 07:10

## 2025-07-06 RX ADMIN — PROPRANOLOL HYDROCHLORIDE 20 MG: 20 TABLET ORAL at 19:37

## 2025-07-06 RX ADMIN — PROPRANOLOL HYDROCHLORIDE 20 MG: 20 TABLET ORAL at 07:42

## 2025-07-06 RX ADMIN — GABAPENTIN 600 MG: 300 CAPSULE ORAL at 07:42

## 2025-07-06 RX ADMIN — OLANZAPINE 10 MG: 5 TABLET, FILM COATED ORAL at 22:18

## 2025-07-06 RX ADMIN — GABAPENTIN 600 MG: 300 CAPSULE ORAL at 13:28

## 2025-07-06 RX ADMIN — NICOTINE POLACRILEX 4 MG: 4 GUM, CHEWING BUCCAL at 07:11

## 2025-07-06 RX ADMIN — NICOTINE POLACRILEX 4 MG: 4 GUM, CHEWING BUCCAL at 10:38

## 2025-07-06 ASSESSMENT — ACTIVITIES OF DAILY LIVING (ADL)
ADLS_ACUITY_SCORE: 32
ADLS_ACUITY_SCORE: 32
LAUNDRY: WITH SUPERVISION
ADLS_ACUITY_SCORE: 32
DRESS: INDEPENDENT
ADLS_ACUITY_SCORE: 32
HYGIENE/GROOMING: INDEPENDENT
ADLS_ACUITY_SCORE: 32
DRESS: SCRUBS (BEHAVIORAL HEALTH)
ADLS_ACUITY_SCORE: 32
HYGIENE/GROOMING: INDEPENDENT
ADLS_ACUITY_SCORE: 32
ORAL_HYGIENE: INDEPENDENT
ADLS_ACUITY_SCORE: 32
ORAL_HYGIENE: INDEPENDENT
ADLS_ACUITY_SCORE: 32

## 2025-07-06 NOTE — PLAN OF CARE
"Towards the end of the shift, pt came to the nursing station requesting for Zyprexa. \" I feel not myself this morning, my 10 mg Zyprexa calms all my agitations\" he stated. Noted to be anxious and irritable when offered prn Atarax for anxiety. Denied pain ; denied MH sx as well. Zyprexa 10 mg was administered as ordered - was helpful.  Safety checks completed every 15 minutes; no other concerns noted. Pt appears to have slept for 5.75 hours; will continue to monitor and offer support.     Problem: Sleep Disturbance  Goal: Adequate Sleep/Rest  Outcome: Progressing   Goal Outcome Evaluation:                            "

## 2025-07-06 NOTE — CARE PLAN
07/05/25 1940   Observation Type   How often does the Patient require Staff intervention/redirection? rarely   Harm Category none   Level of Special Monitoring none

## 2025-07-06 NOTE — PLAN OF CARE
Pt visible in milieu, pacing hallway with peer. Pt endorses mild anxiety and depression, denies all other mental health symptoms. He is med compliant, received PRN nicotine gum. No other concerns at this time, pt is looking forward to discharge tomorrow.     Problem: Psychotic Signs/Symptoms  Goal: Improved Behavioral Control (Psychotic Signs/Symptoms)  Outcome: Progressing   Goal Outcome Evaluation:    Plan of Care Reviewed With: patient

## 2025-07-06 NOTE — PLAN OF CARE
Problem: Adult Behavioral Health Plan of Care  Goal: Optimized Coping Skills in Response to Life Stressors  Outcome: Progressing     Problem: Psychotic Signs/Symptoms  Goal: Improved Behavioral Control (Psychotic Signs/Symptoms)  Outcome: Progressing  Intervention: Manage Behavior  Recent Flowsheet Documentation  Taken 7/6/2025 [id number] by Joanie Schumacher RN  De-Escalation Techniques:   appropriate behavior reinforced   diversional activity encouraged   verbally redirected   Goal Outcome Evaluation:    Plan of Care Reviewed With: patient      Pt was visible in the milieu, isolative and withdrawn to self. Paced the quinn intermittently. Mood calm and cooperative. Food and fluid intake adequate. Pt was compliant with medications and contracted for safety. No behavioral concerns this shift.   Endorsed head ache, requested and received  prn tylenol. PRN Zyprexa and trazodone administered. Pt was cooperative with medications and contracted for safety. No behavioral issues.

## 2025-07-07 ENCOUNTER — DOCUMENTATION ONLY (OUTPATIENT)
Dept: BEHAVIORAL HEALTH | Facility: CLINIC | Age: 40
End: 2025-07-07

## 2025-07-07 VITALS
HEIGHT: 70 IN | OXYGEN SATURATION: 97 % | TEMPERATURE: 97.8 F | DIASTOLIC BLOOD PRESSURE: 86 MMHG | SYSTOLIC BLOOD PRESSURE: 135 MMHG | HEART RATE: 87 BPM | BODY MASS INDEX: 31.21 KG/M2 | RESPIRATION RATE: 16 BRPM | WEIGHT: 218 LBS

## 2025-07-07 PROCEDURE — 250N000013 HC RX MED GY IP 250 OP 250 PS 637: Performed by: PSYCHIATRY & NEUROLOGY

## 2025-07-07 PROCEDURE — 250N000013 HC RX MED GY IP 250 OP 250 PS 637

## 2025-07-07 PROCEDURE — 250N000013 HC RX MED GY IP 250 OP 250 PS 637: Performed by: STUDENT IN AN ORGANIZED HEALTH CARE EDUCATION/TRAINING PROGRAM

## 2025-07-07 PROCEDURE — 250N000012 HC RX MED GY IP 250 OP 636 PS 637: Performed by: PSYCHIATRY & NEUROLOGY

## 2025-07-07 RX ORDER — LURASIDONE HYDROCHLORIDE 80 MG/1
80 TABLET, FILM COATED ORAL AT BEDTIME
Qty: 30 TABLET | Refills: 0 | Status: SHIPPED | OUTPATIENT
Start: 2025-07-07

## 2025-07-07 RX ORDER — HYDROXYZINE HYDROCHLORIDE 25 MG/1
25 TABLET, FILM COATED ORAL EVERY 4 HOURS PRN
Qty: 30 TABLET | Refills: 0 | Status: SHIPPED | OUTPATIENT
Start: 2025-07-07

## 2025-07-07 RX ORDER — BUPROPION HYDROCHLORIDE 150 MG/1
150 TABLET ORAL DAILY
Qty: 30 TABLET | Refills: 0 | Status: SHIPPED | OUTPATIENT
Start: 2025-07-08

## 2025-07-07 RX ORDER — TRAZODONE HYDROCHLORIDE 50 MG/1
50 TABLET ORAL
Qty: 30 TABLET | Refills: 0 | Status: SHIPPED | OUTPATIENT
Start: 2025-07-07

## 2025-07-07 RX ORDER — GABAPENTIN 300 MG/1
600 CAPSULE ORAL 3 TIMES DAILY
Qty: 30 CAPSULE | Refills: 0 | Status: SHIPPED | OUTPATIENT
Start: 2025-07-07

## 2025-07-07 RX ORDER — PROPRANOLOL HCL 20 MG
20 TABLET ORAL 3 TIMES DAILY
Qty: 30 TABLET | Refills: 0 | Status: SHIPPED | OUTPATIENT
Start: 2025-07-07

## 2025-07-07 RX ORDER — NICOTINE 21 MG/24HR
1 PATCH, TRANSDERMAL 24 HOURS TRANSDERMAL DAILY
Qty: 30 PATCH | Refills: 0 | Status: SHIPPED | OUTPATIENT
Start: 2025-07-08

## 2025-07-07 RX ORDER — OLANZAPINE 5 MG/1
5-10 TABLET, FILM COATED ORAL 3 TIMES DAILY PRN
Qty: 30 TABLET | Refills: 0 | Status: SHIPPED | OUTPATIENT
Start: 2025-07-07

## 2025-07-07 RX ORDER — POLYETHYLENE GLYCOL 3350 17 G
4 POWDER IN PACKET (EA) ORAL
Qty: 30 LOZENGE | Refills: 0 | Status: SHIPPED | OUTPATIENT
Start: 2025-07-07

## 2025-07-07 RX ORDER — BUPRENORPHINE AND NALOXONE 8; 2 MG/1; MG/1
1 FILM, SOLUBLE BUCCAL; SUBLINGUAL 2 TIMES DAILY
Qty: 60 FILM | Refills: 0 | Status: SHIPPED | OUTPATIENT
Start: 2025-07-07

## 2025-07-07 RX ADMIN — BUPROPION HYDROCHLORIDE 150 MG: 150 TABLET, EXTENDED RELEASE ORAL at 07:34

## 2025-07-07 RX ADMIN — THIAMINE HCL TAB 100 MG 100 MG: 100 TAB at 07:43

## 2025-07-07 RX ADMIN — BUPRENORPHINE AND NALOXONE 1 FILM: 8; 2 FILM BUCCAL; SUBLINGUAL at 07:35

## 2025-07-07 RX ADMIN — PROPRANOLOL HYDROCHLORIDE 20 MG: 20 TABLET ORAL at 07:35

## 2025-07-07 RX ADMIN — GABAPENTIN 600 MG: 300 CAPSULE ORAL at 07:34

## 2025-07-07 RX ADMIN — ACETAMINOPHEN 650 MG: 325 TABLET ORAL at 06:40

## 2025-07-07 RX ADMIN — NICOTINE POLACRILEX 4 MG: 4 GUM, CHEWING BUCCAL at 06:39

## 2025-07-07 RX ADMIN — OLANZAPINE 10 MG: 5 TABLET, FILM COATED ORAL at 06:39

## 2025-07-07 RX ADMIN — NICOTINE 1 PATCH: 21 PATCH, EXTENDED RELEASE TRANSDERMAL at 07:35

## 2025-07-07 ASSESSMENT — ACTIVITIES OF DAILY LIVING (ADL)
ADLS_ACUITY_SCORE: 32

## 2025-07-07 NOTE — PLAN OF CARE
Pt discharged at 0830 this morning. Endorses anxiety and depression, denies all other mental health symptoms. Pt is med compliant, no PRNs received this morning.     Problem: Psychotic Signs/Symptoms  Goal: Improved Behavioral Control (Psychotic Signs/Symptoms)  Outcome: Progressing   Goal Outcome Evaluation:    Plan of Care Reviewed With: patient

## 2025-07-07 NOTE — PLAN OF CARE
Assessment/Intervention/Current Symtoms and Care Coordination:  Chart review and met with team, discussed pt progress, symptomology, and response to treatment.  Discussed the discharge plan and any potential impediments to discharge.     Patient is being discharged to [facility] this AM..  Has been doing well on the unit- playing guitar with peers,   Looking forward to treatment.    Pepin  is here to .  Patient signed Provisional discharge and KELSY Pimentel was left message with update     Discharge Plan or Goal:  MI/CD Residential treatment at [facility]  Psychiatry  Therapy     Barriers to Discharge:  None- discharge today     Referral Status:  None today      DAVID TREATMENT: 7/2/25  [facility]  - declined due to psychosis   [facility] - declined due to being on [facility] order  [facility]- declined due to acuitiy of MI        Legal Status:  MI + [facility] order  [facility]: [place]   File Number: [id number]  Start and expiration date of commitment: 6/18/25-12/18/25  [facility] meds are: Haldol, Prolixin, Clozaril, Risperdal, Invega, Zyprexa, Seroquel, Abilify and Lurasidone      Contacts (include DELORES status):  Mother:  Sarika Clarke - Ph: [phone] - DELORES signed     :  [facility] - Ph: [phone] - DELORES signed -      Psychiatrist: - DELORES signed  [facility]  Address: [address] [place] [address]  Phone: [phone]     Upcoming Meetings and Dates/Important Information and next steps:  Team Note Due: Tuesday

## 2025-07-07 NOTE — CARE PLAN
07/06/25 1944   Observation Type   How often does the Patient require Staff intervention/redirection? rarely   Harm Category none   Level of Special Monitoring none

## 2025-07-07 NOTE — PLAN OF CARE
Patient appears sleeping most of the shift. No complaints of pain and discomfort. Patient continues on q15 minutes safety checks, no behavioral issues noted. Will continue to monitor the patient and provide therapeutic intervention as needed. Will continue with current plan of care. Notify MD with any concerns. The patient had 6.75 total hours of sleep this shift.
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
## 2025-07-07 NOTE — CARE PLAN
07/06/25 1943   Observation Type   How often does the Patient require Staff intervention/redirection? rarely   Harm Category none   No Harm Category Noted at This Time no applicable harm categories or justifications   Level of Special Monitoring none

## 2025-07-21 NOTE — PLAN OF CARE
Problem: Sleep Disturbance  Goal: Adequate Sleep/Rest  Outcome: Progressing   Goal Outcome Evaluation:    Patient appears to have slept a total of 4.25 hours.     Given PRN Zyprexa for anxiety and agitation rating 8/10. Reported brief relief but later needed PRN propranolol for restlessness.    Safety/environment checks conducted every 15 minutes with no further concerns noted. No complaints of pain/discomfort.       Anesthesia Post-op Note    Patient: Itz Burgos  Procedure(s) Performed: ESOPHAGOGASTRODUODENOSCOPY (EGD)  COLONOSCOPY  Anesthesia type: MAC    Vitals Value Taken Time   Temp 36.2 °C (97.2 °F) 07/21/25 1310   Pulse 70 07/21/25 1340   Resp 13 07/21/25 1340   SpO2 96 % 07/21/25 1340   /83 07/21/25 1340         Patient Location: PACU Phase 1  Post-op Vital Signs:stable  Level of Consciousness: awake and alert  Respiratory Status: spontaneous ventilation and unassisted  Cardiovascular stable  Hydration: euvolemic  Pain Management: adequately controlled  Handoff: Handoff to receiving clinician was performed and questions were answered  Vomiting: none  Nausea: None  Airway Patency:patent  Post-op Assessment: awake, alert, appropriately conversant, or baseline, no complications, patient tolerated procedure well, dentition, mouth, tongue, and oropharynx unchanged , moving all extremities and No Corneal Abrasion      No notable events documented.